# Patient Record
Sex: MALE | Race: WHITE | Employment: OTHER | URBAN - METROPOLITAN AREA
[De-identification: names, ages, dates, MRNs, and addresses within clinical notes are randomized per-mention and may not be internally consistent; named-entity substitution may affect disease eponyms.]

---

## 2018-03-12 ENCOUNTER — TELEPHONE (OUTPATIENT)
Dept: CARDIOLOGY CLINIC | Age: 68
End: 2018-03-12

## 2018-03-12 RX ORDER — METOLAZONE 5 MG/1
5 TABLET ORAL PRN
Qty: 10 TABLET | Refills: 0 | Status: SHIPPED | OUTPATIENT
Start: 2018-03-12 | End: 2018-03-28 | Stop reason: CLARIF

## 2018-03-13 LAB
ALBUMIN SERPL-MCNC: NORMAL G/DL
ALP BLD-CCNC: NORMAL U/L
ALT SERPL-CCNC: NORMAL U/L
ANION GAP SERPL CALCULATED.3IONS-SCNC: NORMAL MMOL/L
AST SERPL-CCNC: NORMAL U/L
AVERAGE GLUCOSE: NORMAL
BASOPHILS ABSOLUTE: NORMAL /ΜL
BASOPHILS RELATIVE PERCENT: NORMAL %
BILIRUB SERPL-MCNC: NORMAL MG/DL (ref 0.1–1.4)
BUN BLDV-MCNC: NORMAL MG/DL
CALCIUM SERPL-MCNC: NORMAL MG/DL
CHLORIDE BLD-SCNC: NORMAL MMOL/L
CO2: NORMAL MMOL/L
CREAT SERPL-MCNC: NORMAL MG/DL
EOSINOPHILS ABSOLUTE: NORMAL /ΜL
EOSINOPHILS RELATIVE PERCENT: NORMAL %
GFR CALCULATED: NORMAL
GLUCOSE BLD-MCNC: 180 MG/DL
HBA1C MFR BLD: 8 %
HCT VFR BLD CALC: 46.7 % (ref 41–53)
HEMOGLOBIN: 15.5 G/DL (ref 13.5–17.5)
LYMPHOCYTES ABSOLUTE: NORMAL /ΜL
LYMPHOCYTES RELATIVE PERCENT: NORMAL %
MCH RBC QN AUTO: NORMAL PG
MCHC RBC AUTO-ENTMCNC: NORMAL G/DL
MCV RBC AUTO: NORMAL FL
MONOCYTES ABSOLUTE: NORMAL /ΜL
MONOCYTES RELATIVE PERCENT: NORMAL %
NEUTROPHILS ABSOLUTE: NORMAL /ΜL
NEUTROPHILS RELATIVE PERCENT: NORMAL %
PLATELET # BLD: NORMAL K/ΜL
PMV BLD AUTO: NORMAL FL
POTASSIUM SERPL-SCNC: NORMAL MMOL/L
RBC # BLD: NORMAL 10^6/ΜL
SODIUM BLD-SCNC: NORMAL MMOL/L
TOTAL PROTEIN: NORMAL
WBC # BLD: NORMAL 10^3/ML

## 2018-03-14 ENCOUNTER — TELEPHONE (OUTPATIENT)
Dept: NON INVASIVE DIAGNOSTICS | Age: 68
End: 2018-03-14

## 2018-03-14 ENCOUNTER — HOSPITAL ENCOUNTER (OUTPATIENT)
Dept: OTHER | Age: 68
Setting detail: THERAPIES SERIES
Discharge: HOME | End: 2018-03-14
Payer: COMMERCIAL

## 2018-03-14 VITALS
HEART RATE: 82 BPM | SYSTOLIC BLOOD PRESSURE: 141 MMHG | WEIGHT: 315 LBS | BODY MASS INDEX: 48.54 KG/M2 | RESPIRATION RATE: 20 BRPM | DIASTOLIC BLOOD PRESSURE: 74 MMHG

## 2018-03-14 LAB
ANION GAP SERPL CALCULATED.3IONS-SCNC: 14 MMOL/L (ref 7–16)
BUN BLDV-MCNC: 32 MG/DL (ref 8–23)
CALCIUM SERPL-MCNC: 9.7 MG/DL (ref 8.6–10.2)
CHLORIDE BLD-SCNC: 96 MMOL/L (ref 98–107)
CO2: 25 MMOL/L (ref 22–29)
CREAT SERPL-MCNC: 1.3 MG/DL (ref 0.7–1.2)
GFR AFRICAN AMERICAN: >60
GFR NON-AFRICAN AMERICAN: >60 ML/MIN/1.73
GLUCOSE BLD-MCNC: 270 MG/DL (ref 74–109)
POTASSIUM SERPL-SCNC: 4.3 MMOL/L (ref 3.5–5)
PRO-BNP: 1340 PG/ML (ref 0–125)
SODIUM BLD-SCNC: 135 MMOL/L (ref 132–146)

## 2018-03-14 PROCEDURE — 99214 OFFICE O/P EST MOD 30 MIN: CPT

## 2018-03-14 PROCEDURE — 36415 COLL VENOUS BLD VENIPUNCTURE: CPT

## 2018-03-14 PROCEDURE — 83880 ASSAY OF NATRIURETIC PEPTIDE: CPT

## 2018-03-14 PROCEDURE — 2580000003 HC RX 258: Performed by: INTERNAL MEDICINE

## 2018-03-14 PROCEDURE — 96374 THER/PROPH/DIAG INJ IV PUSH: CPT

## 2018-03-14 PROCEDURE — 80048 BASIC METABOLIC PNL TOTAL CA: CPT

## 2018-03-14 PROCEDURE — 6360000002 HC RX W HCPCS: Performed by: INTERNAL MEDICINE

## 2018-03-14 RX ORDER — SODIUM CHLORIDE 0.9 % (FLUSH) 0.9 %
10 SYRINGE (ML) INJECTION PRN
Status: DISCONTINUED | OUTPATIENT
Start: 2018-03-14 | End: 2018-03-15 | Stop reason: HOSPADM

## 2018-03-14 RX ORDER — FUROSEMIDE 10 MG/ML
40 INJECTION INTRAMUSCULAR; INTRAVENOUS ONCE
Status: COMPLETED | OUTPATIENT
Start: 2018-03-14 | End: 2018-03-14

## 2018-03-14 RX ADMIN — Medication 10 ML: at 10:26

## 2018-03-14 RX ADMIN — FUROSEMIDE 40 MG: 10 INJECTION, SOLUTION INTRAMUSCULAR; INTRAVENOUS at 10:22

## 2018-03-22 ENCOUNTER — HOSPITAL ENCOUNTER (OUTPATIENT)
Dept: OTHER | Age: 68
Setting detail: THERAPIES SERIES
Discharge: HOME | End: 2018-03-22
Payer: COMMERCIAL

## 2018-03-22 VITALS
BODY MASS INDEX: 47.7 KG/M2 | WEIGHT: 315 LBS | DIASTOLIC BLOOD PRESSURE: 57 MMHG | SYSTOLIC BLOOD PRESSURE: 108 MMHG | RESPIRATION RATE: 18 BRPM | HEART RATE: 80 BPM

## 2018-03-22 LAB
ANION GAP SERPL CALCULATED.3IONS-SCNC: 16 MMOL/L (ref 7–16)
BUN BLDV-MCNC: 40 MG/DL (ref 8–23)
CALCIUM SERPL-MCNC: 9.1 MG/DL (ref 8.6–10.2)
CHLORIDE BLD-SCNC: 100 MMOL/L (ref 98–107)
CO2: 21 MMOL/L (ref 22–29)
CREAT SERPL-MCNC: 1.6 MG/DL (ref 0.7–1.2)
GFR AFRICAN AMERICAN: 53
GFR NON-AFRICAN AMERICAN: 53 ML/MIN/1.73
GLUCOSE BLD-MCNC: 193 MG/DL (ref 74–109)
POTASSIUM SERPL-SCNC: 4.1 MMOL/L (ref 3.5–5)
PRO-BNP: 625 PG/ML (ref 0–125)
SODIUM BLD-SCNC: 137 MMOL/L (ref 132–146)

## 2018-03-22 PROCEDURE — 99204 OFFICE O/P NEW MOD 45 MIN: CPT

## 2018-03-22 PROCEDURE — 83880 ASSAY OF NATRIURETIC PEPTIDE: CPT

## 2018-03-22 PROCEDURE — 36415 COLL VENOUS BLD VENIPUNCTURE: CPT

## 2018-03-22 PROCEDURE — 80048 BASIC METABOLIC PNL TOTAL CA: CPT

## 2018-03-26 ENCOUNTER — HOSPITAL ENCOUNTER (OUTPATIENT)
Dept: OTHER | Age: 68
Setting detail: THERAPIES SERIES
Discharge: HOME | End: 2018-03-26
Payer: COMMERCIAL

## 2018-03-26 VITALS
BODY MASS INDEX: 49.09 KG/M2 | RESPIRATION RATE: 18 BRPM | DIASTOLIC BLOOD PRESSURE: 73 MMHG | SYSTOLIC BLOOD PRESSURE: 131 MMHG | WEIGHT: 315 LBS | HEART RATE: 76 BPM

## 2018-03-26 LAB
ANION GAP SERPL CALCULATED.3IONS-SCNC: 15 MMOL/L (ref 7–16)
BUN BLDV-MCNC: 28 MG/DL (ref 8–23)
CALCIUM SERPL-MCNC: 9 MG/DL (ref 8.6–10.2)
CHLORIDE BLD-SCNC: 98 MMOL/L (ref 98–107)
CO2: 23 MMOL/L (ref 22–29)
CREAT SERPL-MCNC: 1.5 MG/DL (ref 0.7–1.2)
GFR AFRICAN AMERICAN: 57
GFR NON-AFRICAN AMERICAN: 57 ML/MIN/1.73
GLUCOSE BLD-MCNC: 343 MG/DL (ref 74–109)
POTASSIUM SERPL-SCNC: 4.6 MMOL/L (ref 3.5–5)
PRO-BNP: 1039 PG/ML (ref 0–125)
SODIUM BLD-SCNC: 136 MMOL/L (ref 132–146)

## 2018-03-26 PROCEDURE — 80048 BASIC METABOLIC PNL TOTAL CA: CPT

## 2018-03-26 PROCEDURE — 36415 COLL VENOUS BLD VENIPUNCTURE: CPT

## 2018-03-26 PROCEDURE — 99204 OFFICE O/P NEW MOD 45 MIN: CPT

## 2018-03-26 PROCEDURE — 83880 ASSAY OF NATRIURETIC PEPTIDE: CPT

## 2018-03-26 PROCEDURE — 96374 THER/PROPH/DIAG INJ IV PUSH: CPT

## 2018-03-26 PROCEDURE — 2580000003 HC RX 258: Performed by: INTERNAL MEDICINE

## 2018-03-26 PROCEDURE — 6360000002 HC RX W HCPCS: Performed by: INTERNAL MEDICINE

## 2018-03-26 RX ORDER — FUROSEMIDE 10 MG/ML
40 INJECTION INTRAMUSCULAR; INTRAVENOUS ONCE
Status: COMPLETED | OUTPATIENT
Start: 2018-03-26 | End: 2018-03-26

## 2018-03-26 RX ORDER — SODIUM CHLORIDE 0.9 % (FLUSH) 0.9 %
10 SYRINGE (ML) INJECTION PRN
Status: DISCONTINUED | OUTPATIENT
Start: 2018-03-26 | End: 2018-03-27 | Stop reason: HOSPADM

## 2018-03-26 RX ADMIN — FUROSEMIDE 40 MG: 10 INJECTION, SOLUTION INTRAMUSCULAR; INTRAVENOUS at 08:45

## 2018-03-26 RX ADMIN — Medication 10 ML: at 08:45

## 2018-03-28 ENCOUNTER — OFFICE VISIT (OUTPATIENT)
Dept: CARDIOLOGY CLINIC | Age: 68
End: 2018-03-28
Payer: COMMERCIAL

## 2018-03-28 ENCOUNTER — OFFICE VISIT (OUTPATIENT)
Dept: NON INVASIVE DIAGNOSTICS | Age: 68
End: 2018-03-28
Payer: COMMERCIAL

## 2018-03-28 VITALS
BODY MASS INDEX: 44.1 KG/M2 | WEIGHT: 315 LBS | HEART RATE: 68 BPM | DIASTOLIC BLOOD PRESSURE: 70 MMHG | HEIGHT: 71 IN | SYSTOLIC BLOOD PRESSURE: 102 MMHG | RESPIRATION RATE: 18 BRPM

## 2018-03-28 VITALS
SYSTOLIC BLOOD PRESSURE: 136 MMHG | BODY MASS INDEX: 44.1 KG/M2 | HEART RATE: 67 BPM | HEIGHT: 71 IN | DIASTOLIC BLOOD PRESSURE: 84 MMHG | RESPIRATION RATE: 16 BRPM | WEIGHT: 315 LBS

## 2018-03-28 DIAGNOSIS — I42.8 NICM (NONISCHEMIC CARDIOMYOPATHY) (HCC): ICD-10-CM

## 2018-03-28 DIAGNOSIS — I48.0 PAF (PAROXYSMAL ATRIAL FIBRILLATION) (HCC): ICD-10-CM

## 2018-03-28 DIAGNOSIS — I10 HTN (HYPERTENSION), BENIGN: Chronic | ICD-10-CM

## 2018-03-28 DIAGNOSIS — I47.1 AVNRT (AV NODAL RE-ENTRY TACHYCARDIA) (HCC): ICD-10-CM

## 2018-03-28 DIAGNOSIS — I50.22 CHRONIC SYSTOLIC CHF (CONGESTIVE HEART FAILURE) (HCC): Primary | ICD-10-CM

## 2018-03-28 DIAGNOSIS — Z95.810 BIVENTRICULAR IMPLANTABLE CARDIOVERTER-DEFIBRILLATOR IN SITU: ICD-10-CM

## 2018-03-28 DIAGNOSIS — I42.8 NICM (NONISCHEMIC CARDIOMYOPATHY) (HCC): Primary | ICD-10-CM

## 2018-03-28 PROCEDURE — 99214 OFFICE O/P EST MOD 30 MIN: CPT | Performed by: INTERNAL MEDICINE

## 2018-03-28 PROCEDURE — 1123F ACP DISCUSS/DSCN MKR DOCD: CPT | Performed by: NURSE PRACTITIONER

## 2018-03-28 PROCEDURE — G8484 FLU IMMUNIZE NO ADMIN: HCPCS | Performed by: INTERNAL MEDICINE

## 2018-03-28 PROCEDURE — G8417 CALC BMI ABV UP PARAM F/U: HCPCS | Performed by: INTERNAL MEDICINE

## 2018-03-28 PROCEDURE — G8427 DOCREV CUR MEDS BY ELIG CLIN: HCPCS | Performed by: NURSE PRACTITIONER

## 2018-03-28 PROCEDURE — G8484 FLU IMMUNIZE NO ADMIN: HCPCS | Performed by: NURSE PRACTITIONER

## 2018-03-28 PROCEDURE — G8417 CALC BMI ABV UP PARAM F/U: HCPCS | Performed by: NURSE PRACTITIONER

## 2018-03-28 PROCEDURE — 3017F COLORECTAL CA SCREEN DOC REV: CPT | Performed by: INTERNAL MEDICINE

## 2018-03-28 PROCEDURE — 99213 OFFICE O/P EST LOW 20 MIN: CPT | Performed by: NURSE PRACTITIONER

## 2018-03-28 PROCEDURE — G8427 DOCREV CUR MEDS BY ELIG CLIN: HCPCS | Performed by: INTERNAL MEDICINE

## 2018-03-28 PROCEDURE — 1036F TOBACCO NON-USER: CPT | Performed by: NURSE PRACTITIONER

## 2018-03-28 PROCEDURE — 4040F PNEUMOC VAC/ADMIN/RCVD: CPT | Performed by: NURSE PRACTITIONER

## 2018-03-28 PROCEDURE — 3017F COLORECTAL CA SCREEN DOC REV: CPT | Performed by: NURSE PRACTITIONER

## 2018-03-28 PROCEDURE — 1036F TOBACCO NON-USER: CPT | Performed by: INTERNAL MEDICINE

## 2018-03-28 PROCEDURE — 4040F PNEUMOC VAC/ADMIN/RCVD: CPT | Performed by: INTERNAL MEDICINE

## 2018-03-28 PROCEDURE — 1123F ACP DISCUSS/DSCN MKR DOCD: CPT | Performed by: INTERNAL MEDICINE

## 2018-03-28 PROCEDURE — 93284 PRGRMG EVAL IMPLANTABLE DFB: CPT | Performed by: INTERNAL MEDICINE

## 2018-03-28 PROCEDURE — 93000 ELECTROCARDIOGRAM COMPLETE: CPT | Performed by: INTERNAL MEDICINE

## 2018-03-28 NOTE — PATIENT INSTRUCTIONS
Please call the office if there are any questions about your most recent appointment or future testing/procedures to be performed.

## 2018-03-28 NOTE — PROGRESS NOTES
Cardiac Electrophysiology Office Progress Note    Malik Gordon  1950  Date of Service: 3/28/18  PCP: Bruno Miller MD   Cardiology: Macon Bence, MD  Electrophysiology: Alexandro Gomez DO    Patient Active Problem List    Diagnosis Date Noted    Renal insufficiency 08/17/2014     Priority: High    NICM (nonischemic cardiomyopathy) ([facility] 12/14/2013     Priority: High    AVNRT (AV yue re-entry tachycardia) ([facility] 11/17/2011     Priority: High     Overview Note:     1). S/P SVT ablation 10/16/14: 1. Atrial Function: Abnormal with inducible atrial fibrillation/flutter. 2. AV Yue Function: Abnormal with inducible SVT consistent with AVNRT. Successful slow pathway modification without recurrent SVT.  DM (diabetes mellitus), type 2, uncontrolled ([facility] 11/17/2011     Priority: Medium    HTN (hypertension), benign 11/17/2011     Priority: Medium    Gout 12/19/2016     Priority: Low    Hyperlipidemia 11/17/2011     Priority: Low    SNEHA (obstructive sleep apnea) 11/17/2011     Priority: Low    Depression 11/17/2011     Priority: Low    CHF (NYHA class III, ACC/AHA stage C) ([facility]     Chronic systolic CHF (congestive heart failure) ([facility] 12/08/2015    PAF (paroxysmal atrial fibrillation) ([facility] 12/08/2015    Biventricular implantable cardioverter-defibrillator in situ 09/05/2014     Overview Note:     A). ICD generator is a [employer], model #: F0584296, serial #: [id number]. DOI: 8/20/14   B). The right atrial lead is a [employer] model #: [id number], serial #: [id number]. C). The RV ICD lead is a [employer] model #: [id number], serial #: [id number]. D). The LV lead is a [employer] model #: [id number], serial #: [id number].            Current Outpatient Prescriptions   Medication Sig Dispense Refill    sertraline (ZOLOFT) 100 MG tablet Take 100 mg by mouth daily      ELIQUIS 5 MG TABS tablet TAKE 1 TABLET TWICE A  tablet 3    VILAZODONE HCL 10 MG Tablet TAKE ONE TABLET BY MOUTH EVERY DAY  2    metoprolol succinate (TOPROL XL) 100 MG extended release tablet TAKE 1 TABLET TWICE A  tablet 3    dofetilide (TIKOSYN) 250 MCG capsule TAKE 1 CAPSULE TWICE A  capsule 3    torsemide (DEMADEX) 20 MG tablet Take 20 mg by mouth daily      furosemide (LASIX) 40 MG tablet Take 1 tablet by mouth 2 times daily 30 tablet 0    famotidine (PEPCID) 20 MG tablet Take 1 tablet by mouth daily 60 tablet 3    metolazone (ZAROXOLYN) 5 MG tablet TAKE ONE TABLET BY MOUTH DAILY (TAKE 1 TABLET BY MOUTH ONE-HALF HOUR BEFORE MORNING LASIX DOSE ON 09/21 AND 09/22 ONLY. ) 10 tablet 0    spironolactone (ALDACTONE) 25 MG tablet Take 1 tablet by mouth daily 90 tablet 3    allopurinol (ZYLOPRIM) 100 MG tablet Take 200 mg by mouth daily       colchicine (COLCRYS) 0.6 MG tablet Take 0.6 mg by mouth three times a week Monday Wednesday Friday.  gabapentin (NEURONTIN) 100 MG capsule Take 1 capsule by mouth 3 times daily 90 capsule 3    busPIRone (BUSPAR) 15 MG tablet Take 15 mg by mouth daily       HUMULIN R 500 UNIT/ML injection See Admin Instructions @ 0700 150 units  @ 1500 120 units  @ 2300 30 units      isosorbide mononitrate (IMDUR) 30 MG CR tablet Take 1 tablet by mouth 2 times daily. 30 tablet 3    hydrALAZINE (APRESOLINE) 100 MG tablet Take 1 tablet by mouth every 8 hours. 90 tablet 3    potassium chloride SA (K-DUR;KLOR-CON M) 20 MEQ tablet Take 1 tablet by mouth daily. 30 tablet 11    magnesium oxide (MAG-OX) 400 (240 MG) MG tablet Take 1 tablet by mouth daily. 30 tablet 6    digoxin (LANOXIN) 125 MCG tablet Take 1 tablet by mouth daily. 30 tablet 3    atorvastatin (LIPITOR) 20 MG tablet Take 20 mg by mouth nightly        No current facility-administered medications for this visit.          Allergies   Allergen Reactions    Food Hives     Strawberries      Soap & Cleansers Rash     TIDE detergent       SUBJECTIVE: Tess Lei presents to the office today for the management of: Severe NICMP, AVNRT s/p ablation, PAF, LBBB, [id number], serial #: [id number]. DOI: 8/20/14   B). Normal function    4. IDDM    5. HTN    6. SNEHA    7. Combined systolic/diastolic HF  A). NYHA Class lll, Stage C  B). Follows with Dr Zoila Paris    8. PAF  A). [id number] score: 4  B). Eliquis  C). Tikosyn 250 mcg BID  D). CrCl 66.2 mL/min (#, cr 1.2 - labs 2/15/17)      Plan:    1. Mr Horne's Bi-V ICD function is normal and the device programmed accordingly based on the above interrogation. [facility] has [facility] and feels his HEATHER and LINDSAY are improving after receiving IV Lasix. He also started the metolazone this week which was prescribed by Dr Zoila Paris 3/12/18. From a rhythm perspective he is doing well on Tikosyn with an overall AF burden <1%. His QTc (manually calculated) is 490 ms. He reports no bleeding issues on Eliquis. 2. He was reminded to obtain Q3-6 month BMP for CrCl and Mg level while on Tikosyn. We will obtain his most recent labs from Dr Elida Abarca office. 3. He will send a remote transmission in 3 months with in-office follow-up in 6 months. 4. Mr Bradley Peralta was asked to call the office with concerns prior to his scheduled follow-up. Thank you for allowing me to participate in his care. I have spent a total of 20 minutes with the patient reviewing the above stated recommendations. And a total of >50% of that time involved face-to-face time providing counseling and or coordination of care with the other providers    John Thomas, MSN, APRN-BC, AGACNP, [address] [facility]    CC: Dr Iza Prajapati.  Tomasic          Dr. Michelle Rincon

## 2018-03-29 ENCOUNTER — HOSPITAL ENCOUNTER (OUTPATIENT)
Dept: OTHER | Age: 68
Setting detail: THERAPIES SERIES
Discharge: HOME | End: 2018-03-29
Payer: COMMERCIAL

## 2018-03-29 VITALS
WEIGHT: 315 LBS | SYSTOLIC BLOOD PRESSURE: 122 MMHG | BODY MASS INDEX: 47.98 KG/M2 | DIASTOLIC BLOOD PRESSURE: 71 MMHG | HEART RATE: 71 BPM | RESPIRATION RATE: 18 BRPM

## 2018-03-29 LAB
ANION GAP SERPL CALCULATED.3IONS-SCNC: 16 MMOL/L (ref 7–16)
BUN BLDV-MCNC: 36 MG/DL (ref 8–23)
CALCIUM SERPL-MCNC: 9.4 MG/DL (ref 8.6–10.2)
CHLORIDE BLD-SCNC: 95 MMOL/L (ref 98–107)
CO2: 24 MMOL/L (ref 22–29)
CREAT SERPL-MCNC: 1.4 MG/DL (ref 0.7–1.2)
GFR AFRICAN AMERICAN: >60
GFR NON-AFRICAN AMERICAN: >60 ML/MIN/1.73
GLUCOSE BLD-MCNC: 240 MG/DL (ref 74–109)
POTASSIUM SERPL-SCNC: 4 MMOL/L (ref 3.5–5)
PRO-BNP: 615 PG/ML (ref 0–125)
SODIUM BLD-SCNC: 135 MMOL/L (ref 132–146)

## 2018-03-29 PROCEDURE — 83880 ASSAY OF NATRIURETIC PEPTIDE: CPT

## 2018-03-29 PROCEDURE — 36415 COLL VENOUS BLD VENIPUNCTURE: CPT

## 2018-03-29 PROCEDURE — 80048 BASIC METABOLIC PNL TOTAL CA: CPT

## 2018-03-29 PROCEDURE — 2580000003 HC RX 258: Performed by: INTERNAL MEDICINE

## 2018-03-29 PROCEDURE — 6360000002 HC RX W HCPCS: Performed by: INTERNAL MEDICINE

## 2018-03-29 PROCEDURE — 96374 THER/PROPH/DIAG INJ IV PUSH: CPT

## 2018-03-29 PROCEDURE — 99204 OFFICE O/P NEW MOD 45 MIN: CPT

## 2018-03-29 RX ORDER — SODIUM CHLORIDE 0.9 % (FLUSH) 0.9 %
10 SYRINGE (ML) INJECTION PRN
Status: DISCONTINUED | OUTPATIENT
Start: 2018-03-29 | End: 2018-03-30 | Stop reason: HOSPADM

## 2018-03-29 RX ORDER — FUROSEMIDE 10 MG/ML
40 INJECTION INTRAMUSCULAR; INTRAVENOUS ONCE
Status: COMPLETED | OUTPATIENT
Start: 2018-03-29 | End: 2018-03-29

## 2018-03-29 RX ADMIN — FUROSEMIDE 40 MG: 10 INJECTION, SOLUTION INTRAMUSCULAR; INTRAVENOUS at 08:38

## 2018-03-29 RX ADMIN — Medication 10 ML: at 08:38

## 2018-04-04 ENCOUNTER — TELEPHONE (OUTPATIENT)
Dept: NON INVASIVE DIAGNOSTICS | Age: 68
End: 2018-04-04

## 2018-04-05 ENCOUNTER — HOSPITAL ENCOUNTER (OUTPATIENT)
Dept: OTHER | Age: 68
Setting detail: THERAPIES SERIES
Discharge: HOME | End: 2018-04-05
Payer: COMMERCIAL

## 2018-04-05 VITALS
RESPIRATION RATE: 18 BRPM | DIASTOLIC BLOOD PRESSURE: 74 MMHG | SYSTOLIC BLOOD PRESSURE: 135 MMHG | HEART RATE: 72 BPM | WEIGHT: 315 LBS | BODY MASS INDEX: 47.56 KG/M2

## 2018-04-05 LAB
ANION GAP SERPL CALCULATED.3IONS-SCNC: 17 MMOL/L (ref 7–16)
BUN BLDV-MCNC: 39 MG/DL (ref 8–23)
CALCIUM SERPL-MCNC: 9.1 MG/DL (ref 8.6–10.2)
CHLORIDE BLD-SCNC: 92 MMOL/L (ref 98–107)
CO2: 23 MMOL/L (ref 22–29)
CREAT SERPL-MCNC: 1.5 MG/DL (ref 0.7–1.2)
GFR AFRICAN AMERICAN: 57
GFR NON-AFRICAN AMERICAN: 57 ML/MIN/1.73
GLUCOSE BLD-MCNC: 433 MG/DL (ref 74–109)
POTASSIUM SERPL-SCNC: 4.2 MMOL/L (ref 3.5–5)
PRO-BNP: 537 PG/ML (ref 0–125)
SODIUM BLD-SCNC: 132 MMOL/L (ref 132–146)

## 2018-04-05 PROCEDURE — 36415 COLL VENOUS BLD VENIPUNCTURE: CPT

## 2018-04-05 PROCEDURE — 83880 ASSAY OF NATRIURETIC PEPTIDE: CPT

## 2018-04-05 PROCEDURE — 80048 BASIC METABOLIC PNL TOTAL CA: CPT

## 2018-04-05 PROCEDURE — 99204 OFFICE O/P NEW MOD 45 MIN: CPT

## 2018-04-19 ENCOUNTER — HOSPITAL ENCOUNTER (OUTPATIENT)
Dept: OTHER | Age: 68
Setting detail: THERAPIES SERIES
Discharge: HOME | End: 2018-04-19
Payer: COMMERCIAL

## 2018-04-19 VITALS
BODY MASS INDEX: 48.54 KG/M2 | WEIGHT: 315 LBS | DIASTOLIC BLOOD PRESSURE: 82 MMHG | HEART RATE: 72 BPM | RESPIRATION RATE: 18 BRPM | SYSTOLIC BLOOD PRESSURE: 127 MMHG

## 2018-04-19 LAB
ANION GAP SERPL CALCULATED.3IONS-SCNC: 15 MMOL/L (ref 7–16)
BUN BLDV-MCNC: 25 MG/DL (ref 8–23)
CALCIUM SERPL-MCNC: 9.3 MG/DL (ref 8.6–10.2)
CHLORIDE BLD-SCNC: 98 MMOL/L (ref 98–107)
CO2: 25 MMOL/L (ref 22–29)
CREAT SERPL-MCNC: 1.3 MG/DL (ref 0.7–1.2)
GFR AFRICAN AMERICAN: >60
GFR NON-AFRICAN AMERICAN: >60 ML/MIN/1.73
GLUCOSE BLD-MCNC: 245 MG/DL (ref 74–109)
POTASSIUM SERPL-SCNC: 4.3 MMOL/L (ref 3.5–5)
PRO-BNP: 1008 PG/ML (ref 0–125)
SODIUM BLD-SCNC: 138 MMOL/L (ref 132–146)

## 2018-04-19 PROCEDURE — 36415 COLL VENOUS BLD VENIPUNCTURE: CPT

## 2018-04-19 PROCEDURE — 96374 THER/PROPH/DIAG INJ IV PUSH: CPT

## 2018-04-19 PROCEDURE — 80048 BASIC METABOLIC PNL TOTAL CA: CPT

## 2018-04-19 PROCEDURE — 2580000003 HC RX 258: Performed by: INTERNAL MEDICINE

## 2018-04-19 PROCEDURE — 83880 ASSAY OF NATRIURETIC PEPTIDE: CPT

## 2018-04-19 PROCEDURE — 99214 OFFICE O/P EST MOD 30 MIN: CPT

## 2018-04-19 PROCEDURE — 6360000002 HC RX W HCPCS: Performed by: INTERNAL MEDICINE

## 2018-04-19 RX ORDER — FUROSEMIDE 10 MG/ML
40 INJECTION INTRAMUSCULAR; INTRAVENOUS ONCE
Status: COMPLETED | OUTPATIENT
Start: 2018-04-19 | End: 2018-04-19

## 2018-04-19 RX ORDER — SODIUM CHLORIDE 0.9 % (FLUSH) 0.9 %
10 SYRINGE (ML) INJECTION PRN
Status: DISCONTINUED | OUTPATIENT
Start: 2018-04-19 | End: 2018-04-20 | Stop reason: HOSPADM

## 2018-04-19 RX ADMIN — Medication 10 ML: at 08:37

## 2018-04-19 RX ADMIN — FUROSEMIDE 40 MG: 10 INJECTION, SOLUTION INTRAMUSCULAR; INTRAVENOUS at 08:36

## 2018-04-26 ENCOUNTER — HOSPITAL ENCOUNTER (OUTPATIENT)
Dept: OTHER | Age: 68
Setting detail: THERAPIES SERIES
Discharge: HOME | End: 2018-04-26
Payer: COMMERCIAL

## 2018-04-26 VITALS
DIASTOLIC BLOOD PRESSURE: 68 MMHG | SYSTOLIC BLOOD PRESSURE: 154 MMHG | BODY MASS INDEX: 47.7 KG/M2 | HEART RATE: 70 BPM | RESPIRATION RATE: 18 BRPM | WEIGHT: 315 LBS

## 2018-04-26 PROCEDURE — 99214 OFFICE O/P EST MOD 30 MIN: CPT

## 2018-05-03 ENCOUNTER — OFFICE VISIT (OUTPATIENT)
Dept: FAMILY MEDICINE CLINIC | Age: 68
End: 2018-05-03
Payer: COMMERCIAL

## 2018-05-03 VITALS
TEMPERATURE: 97.7 F | RESPIRATION RATE: 16 BRPM | HEIGHT: 71 IN | SYSTOLIC BLOOD PRESSURE: 127 MMHG | HEART RATE: 70 BPM | BODY MASS INDEX: 44.1 KG/M2 | DIASTOLIC BLOOD PRESSURE: 79 MMHG | OXYGEN SATURATION: 97 % | WEIGHT: 315 LBS

## 2018-05-03 DIAGNOSIS — I10 HTN (HYPERTENSION), BENIGN: Primary | Chronic | ICD-10-CM

## 2018-05-03 DIAGNOSIS — I50.9 CHF (NYHA CLASS III, ACC/AHA STAGE C) (HCC): ICD-10-CM

## 2018-05-03 DIAGNOSIS — E78.2 MIXED HYPERLIPIDEMIA: Chronic | ICD-10-CM

## 2018-05-03 PROCEDURE — 2022F DILAT RTA XM EVC RTNOPTHY: CPT | Performed by: FAMILY MEDICINE

## 2018-05-03 PROCEDURE — 99214 OFFICE O/P EST MOD 30 MIN: CPT | Performed by: FAMILY MEDICINE

## 2018-05-03 PROCEDURE — 1123F ACP DISCUSS/DSCN MKR DOCD: CPT | Performed by: FAMILY MEDICINE

## 2018-05-03 PROCEDURE — 3017F COLORECTAL CA SCREEN DOC REV: CPT | Performed by: FAMILY MEDICINE

## 2018-05-03 PROCEDURE — 1036F TOBACCO NON-USER: CPT | Performed by: FAMILY MEDICINE

## 2018-05-03 PROCEDURE — 4040F PNEUMOC VAC/ADMIN/RCVD: CPT | Performed by: FAMILY MEDICINE

## 2018-05-03 PROCEDURE — G8427 DOCREV CUR MEDS BY ELIG CLIN: HCPCS | Performed by: FAMILY MEDICINE

## 2018-05-03 PROCEDURE — 3045F PR MOST RECENT HEMOGLOBIN A1C LEVEL 7.0-9.0%: CPT | Performed by: FAMILY MEDICINE

## 2018-05-03 PROCEDURE — G8417 CALC BMI ABV UP PARAM F/U: HCPCS | Performed by: FAMILY MEDICINE

## 2018-05-18 ENCOUNTER — HOSPITAL ENCOUNTER (OUTPATIENT)
Dept: OTHER | Age: 68
Setting detail: THERAPIES SERIES
Discharge: HOME | End: 2018-05-18
Payer: COMMERCIAL

## 2018-05-18 VITALS
RESPIRATION RATE: 18 BRPM | BODY MASS INDEX: 49.79 KG/M2 | SYSTOLIC BLOOD PRESSURE: 128 MMHG | HEART RATE: 70 BPM | DIASTOLIC BLOOD PRESSURE: 79 MMHG | WEIGHT: 315 LBS

## 2018-05-18 LAB
ANION GAP SERPL CALCULATED.3IONS-SCNC: 15 MMOL/L (ref 7–16)
BUN BLDV-MCNC: 25 MG/DL (ref 8–23)
CALCIUM SERPL-MCNC: 9 MG/DL (ref 8.6–10.2)
CHLORIDE BLD-SCNC: 104 MMOL/L (ref 98–107)
CO2: 22 MMOL/L (ref 22–29)
CREAT SERPL-MCNC: 1.4 MG/DL (ref 0.7–1.2)
GFR AFRICAN AMERICAN: >60
GFR NON-AFRICAN AMERICAN: >60 ML/MIN/1.73
GLUCOSE BLD-MCNC: 98 MG/DL (ref 74–109)
POTASSIUM SERPL-SCNC: 4.5 MMOL/L (ref 3.5–5)
PRO-BNP: 2381 PG/ML (ref 0–125)
SODIUM BLD-SCNC: 141 MMOL/L (ref 132–146)

## 2018-05-18 PROCEDURE — 6360000002 HC RX W HCPCS: Performed by: INTERNAL MEDICINE

## 2018-05-18 PROCEDURE — 99214 OFFICE O/P EST MOD 30 MIN: CPT

## 2018-05-18 PROCEDURE — 80048 BASIC METABOLIC PNL TOTAL CA: CPT

## 2018-05-18 PROCEDURE — 36415 COLL VENOUS BLD VENIPUNCTURE: CPT

## 2018-05-18 PROCEDURE — 96374 THER/PROPH/DIAG INJ IV PUSH: CPT

## 2018-05-18 PROCEDURE — 83880 ASSAY OF NATRIURETIC PEPTIDE: CPT

## 2018-05-18 PROCEDURE — 2580000003 HC RX 258: Performed by: INTERNAL MEDICINE

## 2018-05-18 RX ORDER — FUROSEMIDE 10 MG/ML
40 INJECTION INTRAMUSCULAR; INTRAVENOUS ONCE
Status: COMPLETED | OUTPATIENT
Start: 2018-05-18 | End: 2018-05-18

## 2018-05-18 RX ORDER — SODIUM CHLORIDE 0.9 % (FLUSH) 0.9 %
10 SYRINGE (ML) INJECTION PRN
Status: DISCONTINUED | OUTPATIENT
Start: 2018-05-18 | End: 2018-05-19 | Stop reason: HOSPADM

## 2018-05-18 RX ADMIN — FUROSEMIDE 40 MG: 10 INJECTION, SOLUTION INTRAMUSCULAR; INTRAVENOUS at 13:03

## 2018-05-18 RX ADMIN — Medication 10 ML: at 13:03

## 2018-05-21 ENCOUNTER — HOSPITAL ENCOUNTER (OUTPATIENT)
Dept: OTHER | Age: 68
Setting detail: THERAPIES SERIES
Discharge: HOME | End: 2018-05-21
Payer: COMMERCIAL

## 2018-05-21 VITALS
BODY MASS INDEX: 49.65 KG/M2 | DIASTOLIC BLOOD PRESSURE: 74 MMHG | WEIGHT: 315 LBS | RESPIRATION RATE: 18 BRPM | HEART RATE: 70 BPM | SYSTOLIC BLOOD PRESSURE: 131 MMHG

## 2018-05-21 LAB
ANION GAP SERPL CALCULATED.3IONS-SCNC: 13 MMOL/L (ref 7–16)
BUN BLDV-MCNC: 27 MG/DL (ref 8–23)
CALCIUM SERPL-MCNC: 9 MG/DL (ref 8.6–10.2)
CHLORIDE BLD-SCNC: 104 MMOL/L (ref 98–107)
CO2: 25 MMOL/L (ref 22–29)
CREAT SERPL-MCNC: 1.3 MG/DL (ref 0.7–1.2)
GFR AFRICAN AMERICAN: >60
GFR NON-AFRICAN AMERICAN: >60 ML/MIN/1.73
GLUCOSE BLD-MCNC: 98 MG/DL (ref 74–109)
POTASSIUM SERPL-SCNC: 4.2 MMOL/L (ref 3.5–5)
PRO-BNP: 2037 PG/ML (ref 0–125)
SODIUM BLD-SCNC: 142 MMOL/L (ref 132–146)

## 2018-05-21 PROCEDURE — 36415 COLL VENOUS BLD VENIPUNCTURE: CPT

## 2018-05-21 PROCEDURE — 2580000003 HC RX 258: Performed by: INTERNAL MEDICINE

## 2018-05-21 PROCEDURE — 99214 OFFICE O/P EST MOD 30 MIN: CPT

## 2018-05-21 PROCEDURE — 83880 ASSAY OF NATRIURETIC PEPTIDE: CPT

## 2018-05-21 PROCEDURE — 96374 THER/PROPH/DIAG INJ IV PUSH: CPT

## 2018-05-21 PROCEDURE — 6360000002 HC RX W HCPCS: Performed by: INTERNAL MEDICINE

## 2018-05-21 PROCEDURE — 80048 BASIC METABOLIC PNL TOTAL CA: CPT

## 2018-05-21 RX ORDER — SODIUM CHLORIDE 0.9 % (FLUSH) 0.9 %
10 SYRINGE (ML) INJECTION PRN
Status: DISCONTINUED | OUTPATIENT
Start: 2018-05-21 | End: 2018-05-22 | Stop reason: HOSPADM

## 2018-05-21 RX ORDER — FUROSEMIDE 10 MG/ML
40 INJECTION INTRAMUSCULAR; INTRAVENOUS ONCE
Status: COMPLETED | OUTPATIENT
Start: 2018-05-21 | End: 2018-05-21

## 2018-05-21 RX ADMIN — Medication 10 ML: at 09:58

## 2018-05-21 RX ADMIN — FUROSEMIDE 40 MG: 10 INJECTION, SOLUTION INTRAMUSCULAR; INTRAVENOUS at 09:58

## 2018-05-31 ENCOUNTER — HOSPITAL ENCOUNTER (OUTPATIENT)
Dept: OTHER | Age: 68
Setting detail: THERAPIES SERIES
Discharge: HOME | End: 2018-05-31
Payer: COMMERCIAL

## 2018-05-31 VITALS
DIASTOLIC BLOOD PRESSURE: 79 MMHG | WEIGHT: 315 LBS | RESPIRATION RATE: 18 BRPM | BODY MASS INDEX: 48.26 KG/M2 | HEART RATE: 70 BPM | SYSTOLIC BLOOD PRESSURE: 134 MMHG

## 2018-06-26 ENCOUNTER — HOSPITAL ENCOUNTER (OUTPATIENT)
Dept: OTHER | Age: 68
Setting detail: THERAPIES SERIES
Discharge: HOME | End: 2018-06-26
Payer: COMMERCIAL

## 2018-06-26 VITALS
BODY MASS INDEX: 48.26 KG/M2 | DIASTOLIC BLOOD PRESSURE: 82 MMHG | HEART RATE: 89 BPM | WEIGHT: 315 LBS | RESPIRATION RATE: 18 BRPM | SYSTOLIC BLOOD PRESSURE: 118 MMHG

## 2018-06-26 LAB
ANION GAP SERPL CALCULATED.3IONS-SCNC: 15 MMOL/L (ref 7–16)
BUN BLDV-MCNC: 30 MG/DL (ref 8–23)
CALCIUM SERPL-MCNC: 8.8 MG/DL (ref 8.6–10.2)
CHLORIDE BLD-SCNC: 105 MMOL/L (ref 98–107)
CO2: 20 MMOL/L (ref 22–29)
CREAT SERPL-MCNC: 1.2 MG/DL (ref 0.7–1.2)
GFR AFRICAN AMERICAN: >60
GFR NON-AFRICAN AMERICAN: >60 ML/MIN/1.73
GLUCOSE BLD-MCNC: 225 MG/DL (ref 74–109)
POTASSIUM SERPL-SCNC: 4.7 MMOL/L (ref 3.5–5)
PRO-BNP: 1678 PG/ML (ref 0–125)
SODIUM BLD-SCNC: 140 MMOL/L (ref 132–146)

## 2018-06-26 PROCEDURE — 99214 OFFICE O/P EST MOD 30 MIN: CPT

## 2018-06-26 PROCEDURE — 83880 ASSAY OF NATRIURETIC PEPTIDE: CPT

## 2018-06-26 PROCEDURE — 36415 COLL VENOUS BLD VENIPUNCTURE: CPT

## 2018-06-26 PROCEDURE — 80048 BASIC METABOLIC PNL TOTAL CA: CPT

## 2018-07-26 ENCOUNTER — HOSPITAL ENCOUNTER (OUTPATIENT)
Dept: OTHER | Age: 68
Setting detail: THERAPIES SERIES
Discharge: HOME | End: 2018-07-26
Payer: COMMERCIAL

## 2018-07-26 VITALS — BODY MASS INDEX: 47.55 KG/M2 | WEIGHT: 315 LBS

## 2018-07-26 LAB
ANION GAP SERPL CALCULATED.3IONS-SCNC: 14 MMOL/L (ref 7–16)
BUN BLDV-MCNC: 25 MG/DL (ref 8–23)
CALCIUM SERPL-MCNC: 8.8 MG/DL (ref 8.6–10.2)
CHLORIDE BLD-SCNC: 105 MMOL/L (ref 98–107)
CO2: 21 MMOL/L (ref 22–29)
CREAT SERPL-MCNC: 1.2 MG/DL (ref 0.7–1.2)
GFR AFRICAN AMERICAN: >60
GFR NON-AFRICAN AMERICAN: >60 ML/MIN/1.73
GLUCOSE BLD-MCNC: 147 MG/DL (ref 74–109)
POTASSIUM SERPL-SCNC: 4.3 MMOL/L (ref 3.5–5)
PRO-BNP: 1047 PG/ML (ref 0–125)
SODIUM BLD-SCNC: 140 MMOL/L (ref 132–146)

## 2018-07-26 PROCEDURE — 83880 ASSAY OF NATRIURETIC PEPTIDE: CPT

## 2018-07-26 PROCEDURE — 80048 BASIC METABOLIC PNL TOTAL CA: CPT

## 2018-07-26 PROCEDURE — 36415 COLL VENOUS BLD VENIPUNCTURE: CPT

## 2018-07-26 PROCEDURE — 99214 OFFICE O/P EST MOD 30 MIN: CPT

## 2018-08-09 ENCOUNTER — OFFICE VISIT (OUTPATIENT)
Dept: FAMILY MEDICINE CLINIC | Age: 68
End: 2018-08-09
Payer: COMMERCIAL

## 2018-08-09 VITALS
HEIGHT: 71 IN | WEIGHT: 315 LBS | RESPIRATION RATE: 16 BRPM | BODY MASS INDEX: 44.1 KG/M2 | HEART RATE: 74 BPM | SYSTOLIC BLOOD PRESSURE: 115 MMHG | DIASTOLIC BLOOD PRESSURE: 74 MMHG | OXYGEN SATURATION: 97 %

## 2018-08-09 DIAGNOSIS — I50.22 CHRONIC SYSTOLIC CHF (CONGESTIVE HEART FAILURE) (HCC): ICD-10-CM

## 2018-08-09 DIAGNOSIS — G47.33 OSA (OBSTRUCTIVE SLEEP APNEA): Chronic | ICD-10-CM

## 2018-08-09 DIAGNOSIS — Z12.11 SCREEN FOR COLON CANCER: ICD-10-CM

## 2018-08-09 DIAGNOSIS — E78.2 MIXED HYPERLIPIDEMIA: Chronic | ICD-10-CM

## 2018-08-09 DIAGNOSIS — I10 HTN (HYPERTENSION), BENIGN: Primary | Chronic | ICD-10-CM

## 2018-08-09 DIAGNOSIS — Z12.11 ENCOUNTER FOR SCREENING FECAL OCCULT BLOOD TESTING: ICD-10-CM

## 2018-08-09 DIAGNOSIS — M1A.49X0 OTHER SECONDARY CHRONIC GOUT OF MULTIPLE SITES WITHOUT TOPHUS: Chronic | ICD-10-CM

## 2018-08-09 PROCEDURE — 3017F COLORECTAL CA SCREEN DOC REV: CPT | Performed by: FAMILY MEDICINE

## 2018-08-09 PROCEDURE — G8427 DOCREV CUR MEDS BY ELIG CLIN: HCPCS | Performed by: FAMILY MEDICINE

## 2018-08-09 PROCEDURE — 1101F PT FALLS ASSESS-DOCD LE1/YR: CPT | Performed by: FAMILY MEDICINE

## 2018-08-09 PROCEDURE — 99214 OFFICE O/P EST MOD 30 MIN: CPT | Performed by: FAMILY MEDICINE

## 2018-08-09 PROCEDURE — G8417 CALC BMI ABV UP PARAM F/U: HCPCS | Performed by: FAMILY MEDICINE

## 2018-08-09 PROCEDURE — 1123F ACP DISCUSS/DSCN MKR DOCD: CPT | Performed by: FAMILY MEDICINE

## 2018-08-09 PROCEDURE — 4040F PNEUMOC VAC/ADMIN/RCVD: CPT | Performed by: FAMILY MEDICINE

## 2018-08-09 PROCEDURE — 1036F TOBACCO NON-USER: CPT | Performed by: FAMILY MEDICINE

## 2018-08-09 ASSESSMENT — PATIENT HEALTH QUESTIONNAIRE - PHQ9
SUM OF ALL RESPONSES TO PHQ QUESTIONS 1-9: 0
SUM OF ALL RESPONSES TO PHQ9 QUESTIONS 1 & 2: 0
2. FEELING DOWN, DEPRESSED OR HOPELESS: 0
SUM OF ALL RESPONSES TO PHQ QUESTIONS 1-9: 0
1. LITTLE INTEREST OR PLEASURE IN DOING THINGS: 0

## 2018-08-09 NOTE — PROGRESS NOTES
Hypertension:  Patient is here for follow up chronic hypertension. This is  generally controlled on current medication regimen. Takes meds as directed and tolerates them well. Most recent labs reviewed with patient and are remarkable. No symptoms from htn standpoint per ROS. Patient is  compliant with lifestyle modifications. Patient does not smoke. Comorbid conditions include cardiomyopathy, SNEHA, hyperlipidemia, diabetes, and gout. He also has diabetic neuropathy, CHF, CAD, atrial fibrillation and arthritis. Patient stated he gets the pneumo shot at the [facility] and is not due yet. Patient also complains about his knees and hands. He see a docotor for his hands 8-16-18. He will be seeing a hand speicalist for her left hand trigger finger and numbness. He does have a history of fusion of the 4th and 5th cervical vertebrae. The xrays revealed DDD. He does use his CPAP machine nightly and this has improved his sleep apnea symptoms. I reviewed his labs from the [facility]. Patient's past medical, surgical, social and/or family history reviewed, updated in chart, and are non-contributory (unless otherwise stated). Medications and allergies also reviewed and updated in chart.         Review of Systems:  Constitutional:  No fever, no fatigue, no chills, no headaches, no weight change  Dermatology:  No rash, no mole, no dry or sensitive skin  ENT:  No cough, no sore throat, no sinus pain, no runny nose, no ear pain  Cardiology:  No chest pain, no palpitations, no leg edema, no shortness of breath, no PND  Gastroenterology:  No dysphagia, no abdominal pain, no nausea, no vomiting, no constipation, no diarrhea, no heartburn  Musculoskeletal:  No joint pain, no leg cramps, no back pain, no muscle aches  Respiratory:  No shortness of breath, no orthopnea, no wheezing, no LINDSAY, no hemoptysis  Urology:  No blood in the urine, no urinary frequency, no urinary incontinence, no urinary urgency, no nocturia, no dysuria    Vitals:    08/09/18 0745   BP: 115/74   Pulse: 74   Resp: 16   SpO2: 97%   Weight: (!) 346 lb (156.9 kg)   Height: 5' 11\" (1.803 m)       General:  Patient alert and oriented x 3, NAD, pleasant  HEENT:  Atraumatic, normocephalic, PERRLA, EOMI, clear conjunctiva, TMs clear, nose-clear, throat - no erythema  Neck:  Supple, no goiter, no carotid bruits, no LAD  Lungs:  CTA B  Heart:  RRR, no murmurs, gallops or rubs  Abdomen:  Soft/nt/nd, + bowel sounds  Extremities:  No clubbing, cyanosis or edema  Skin: unremarkable    Assessment/Plan:      Ofelia Scott was seen today for hypertension. Diagnoses and all orders for this visit:    HTN (hypertension), benign    Encounter for screening fecal occult blood testing    Other secondary chronic gout of multiple sites without tophus    Mixed hyperlipidemia    Chronic systolic CHF (congestive heart failure) (HCC)    SNEHA (obstructive sleep apnea)    Screen for colon cancer  -     POCT Fecal Immunochemical Test (FIT); Future      As above. Call or go to ED immediately if symptoms worsen or persist.  No Follow-up on file. , or sooner if necessary. Educational materials and/or home exercises printed for patient's review and were included in patient instructions on his/her After Visit Summary and given to patient at the end of visit. Counseled regarding above diagnosis, including possible risks and complications,  especially if left uncontrolled. Counseled regarding the possible side effects, risks, benefits and alternatives to treatment; patient and/or guardian verbalizes understanding, agrees, feels comfortable with and wishes to proceed with above treatment plan. Advised patient to call with any new medication issues, and read all Rx info from pharmacy to assure aware of all possible risks and side effects of medication before taking. Reviewed age and gender appropriate health screening exams and vaccinations.   Advised patient regarding importance of

## 2018-08-31 ENCOUNTER — HOSPITAL ENCOUNTER (OUTPATIENT)
Dept: OTHER | Age: 68
Setting detail: THERAPIES SERIES
Discharge: HOME | End: 2018-08-31
Payer: COMMERCIAL

## 2018-08-31 VITALS
BODY MASS INDEX: 47.14 KG/M2 | DIASTOLIC BLOOD PRESSURE: 77 MMHG | WEIGHT: 315 LBS | RESPIRATION RATE: 16 BRPM | SYSTOLIC BLOOD PRESSURE: 133 MMHG | HEART RATE: 73 BPM

## 2018-08-31 LAB
ANION GAP SERPL CALCULATED.3IONS-SCNC: 17 MMOL/L (ref 7–16)
BUN BLDV-MCNC: 29 MG/DL (ref 8–23)
CALCIUM SERPL-MCNC: 9.2 MG/DL (ref 8.6–10.2)
CHLORIDE BLD-SCNC: 96 MMOL/L (ref 98–107)
CO2: 23 MMOL/L (ref 22–29)
CREAT SERPL-MCNC: 1.3 MG/DL (ref 0.7–1.2)
GFR AFRICAN AMERICAN: >60
GFR NON-AFRICAN AMERICAN: >60 ML/MIN/1.73
GLUCOSE BLD-MCNC: 233 MG/DL (ref 74–109)
POTASSIUM SERPL-SCNC: 4.3 MMOL/L (ref 3.5–5)
PRO-BNP: 687 PG/ML (ref 0–125)
SODIUM BLD-SCNC: 136 MMOL/L (ref 132–146)

## 2018-08-31 PROCEDURE — 83880 ASSAY OF NATRIURETIC PEPTIDE: CPT

## 2018-08-31 PROCEDURE — 36415 COLL VENOUS BLD VENIPUNCTURE: CPT

## 2018-08-31 PROCEDURE — 80048 BASIC METABOLIC PNL TOTAL CA: CPT

## 2018-08-31 PROCEDURE — 99214 OFFICE O/P EST MOD 30 MIN: CPT

## 2018-09-07 LAB
ALBUMIN SERPL-MCNC: 4 G/DL
ALP BLD-CCNC: NORMAL U/L
ALT SERPL-CCNC: NORMAL U/L
ANION GAP SERPL CALCULATED.3IONS-SCNC: NORMAL MMOL/L
AST SERPL-CCNC: NORMAL U/L
AVERAGE GLUCOSE: NORMAL
BASOPHILS ABSOLUTE: ABNORMAL /ΜL
BASOPHILS RELATIVE PERCENT: ABNORMAL %
BILIRUB SERPL-MCNC: NORMAL MG/DL (ref 0.1–1.4)
BILIRUBIN, URINE: NEGATIVE
BLOOD, URINE: NORMAL
BUN BLDV-MCNC: 24 MG/DL
CALCIUM SERPL-MCNC: 10.1 MG/DL
CHLORIDE BLD-SCNC: 102 MMOL/L
CLARITY: CLEAR
CO2: 28 MMOL/L
COLOR: YELLOW
CREAT SERPL-MCNC: 1.3 MG/DL
EOSINOPHILS ABSOLUTE: ABNORMAL /ΜL
EOSINOPHILS RELATIVE PERCENT: ABNORMAL %
GFR CALCULATED: NORMAL
GLUCOSE BLD-MCNC: 81 MG/DL
GLUCOSE URINE: NORMAL
HBA1C MFR BLD: 8.5 %
HCT VFR BLD CALC: 47.9 % (ref 41–53)
HEMOGLOBIN: 16.1 G/DL (ref 13.5–17.5)
KETONES, URINE: NEGATIVE
LEUKOCYTE ESTERASE, URINE: NEGATIVE
LYMPHOCYTES ABSOLUTE: ABNORMAL /ΜL
LYMPHOCYTES RELATIVE PERCENT: ABNORMAL %
MCH RBC QN AUTO: ABNORMAL PG
MCHC RBC AUTO-ENTMCNC: ABNORMAL G/DL
MCV RBC AUTO: ABNORMAL FL
MONOCYTES ABSOLUTE: ABNORMAL /ΜL
MONOCYTES RELATIVE PERCENT: ABNORMAL %
NEUTROPHILS ABSOLUTE: ABNORMAL /ΜL
NEUTROPHILS RELATIVE PERCENT: ABNORMAL %
NITRITE, URINE: POSITIVE
PH UA: 6 (ref 4.5–8)
PLATELET # BLD: 146 K/ΜL
PMV BLD AUTO: ABNORMAL FL
POTASSIUM SERPL-SCNC: 4.2 MMOL/L
PROTEIN UA: NEGATIVE
RBC # BLD: ABNORMAL 10^6/ΜL
SODIUM BLD-SCNC: 141 MMOL/L
SPECIFIC GRAVITY, URINE: 1.01
TOTAL PROTEIN: 7.3
TSH SERPL DL<=0.05 MIU/L-ACNC: 2.18 UIU/ML
UROBILINOGEN, URINE: NORMAL
WBC # BLD: 8.4 10^3/ML

## 2018-09-27 ENCOUNTER — HOSPITAL ENCOUNTER (OUTPATIENT)
Dept: OTHER | Age: 68
Setting detail: THERAPIES SERIES
Discharge: HOME | End: 2018-09-27
Payer: COMMERCIAL

## 2018-09-27 VITALS
RESPIRATION RATE: 18 BRPM | DIASTOLIC BLOOD PRESSURE: 72 MMHG | SYSTOLIC BLOOD PRESSURE: 131 MMHG | BODY MASS INDEX: 46.85 KG/M2 | WEIGHT: 315 LBS

## 2018-09-27 LAB
ANION GAP SERPL CALCULATED.3IONS-SCNC: 13 MMOL/L (ref 7–16)
BUN BLDV-MCNC: 19 MG/DL (ref 8–23)
CALCIUM SERPL-MCNC: 9.3 MG/DL (ref 8.6–10.2)
CHLORIDE BLD-SCNC: 102 MMOL/L (ref 98–107)
CO2: 24 MMOL/L (ref 22–29)
CREAT SERPL-MCNC: 1.2 MG/DL (ref 0.7–1.2)
GFR AFRICAN AMERICAN: >60
GFR NON-AFRICAN AMERICAN: >60 ML/MIN/1.73
GLUCOSE BLD-MCNC: 190 MG/DL (ref 74–109)
POTASSIUM SERPL-SCNC: 3.9 MMOL/L (ref 3.5–5)
PRO-BNP: 741 PG/ML (ref 0–125)
SODIUM BLD-SCNC: 139 MMOL/L (ref 132–146)

## 2018-09-27 PROCEDURE — 36415 COLL VENOUS BLD VENIPUNCTURE: CPT

## 2018-09-27 PROCEDURE — 99214 OFFICE O/P EST MOD 30 MIN: CPT

## 2018-09-27 PROCEDURE — 80048 BASIC METABOLIC PNL TOTAL CA: CPT

## 2018-09-27 PROCEDURE — 83880 ASSAY OF NATRIURETIC PEPTIDE: CPT

## 2018-10-04 ENCOUNTER — OFFICE VISIT (OUTPATIENT)
Dept: NON INVASIVE DIAGNOSTICS | Age: 68
End: 2018-10-04
Payer: COMMERCIAL

## 2018-10-04 VITALS
WEIGHT: 315 LBS | RESPIRATION RATE: 20 BRPM | BODY MASS INDEX: 44.1 KG/M2 | HEART RATE: 76 BPM | DIASTOLIC BLOOD PRESSURE: 88 MMHG | SYSTOLIC BLOOD PRESSURE: 124 MMHG | HEIGHT: 71 IN

## 2018-10-04 DIAGNOSIS — I48.0 PAF (PAROXYSMAL ATRIAL FIBRILLATION) (HCC): ICD-10-CM

## 2018-10-04 DIAGNOSIS — Z95.810 BIVENTRICULAR IMPLANTABLE CARDIOVERTER-DEFIBRILLATOR IN SITU: Primary | ICD-10-CM

## 2018-10-04 DIAGNOSIS — I42.8 NICM (NONISCHEMIC CARDIOMYOPATHY) (HCC): ICD-10-CM

## 2018-10-04 DIAGNOSIS — I47.1 AVNRT (AV NODAL RE-ENTRY TACHYCARDIA) (HCC): ICD-10-CM

## 2018-10-04 PROCEDURE — 3017F COLORECTAL CA SCREEN DOC REV: CPT | Performed by: INTERNAL MEDICINE

## 2018-10-04 PROCEDURE — 1101F PT FALLS ASSESS-DOCD LE1/YR: CPT | Performed by: INTERNAL MEDICINE

## 2018-10-04 PROCEDURE — G8417 CALC BMI ABV UP PARAM F/U: HCPCS | Performed by: INTERNAL MEDICINE

## 2018-10-04 PROCEDURE — 1036F TOBACCO NON-USER: CPT | Performed by: INTERNAL MEDICINE

## 2018-10-04 PROCEDURE — G8427 DOCREV CUR MEDS BY ELIG CLIN: HCPCS | Performed by: INTERNAL MEDICINE

## 2018-10-04 PROCEDURE — 1123F ACP DISCUSS/DSCN MKR DOCD: CPT | Performed by: INTERNAL MEDICINE

## 2018-10-04 PROCEDURE — 93284 PRGRMG EVAL IMPLANTABLE DFB: CPT | Performed by: INTERNAL MEDICINE

## 2018-10-04 PROCEDURE — 4040F PNEUMOC VAC/ADMIN/RCVD: CPT | Performed by: INTERNAL MEDICINE

## 2018-10-04 PROCEDURE — 99213 OFFICE O/P EST LOW 20 MIN: CPT | Performed by: INTERNAL MEDICINE

## 2018-10-04 PROCEDURE — G8484 FLU IMMUNIZE NO ADMIN: HCPCS | Performed by: INTERNAL MEDICINE

## 2018-10-04 PROCEDURE — 93290 INTERROG DEV EVAL ICPMS IP: CPT | Performed by: INTERNAL MEDICINE

## 2018-10-10 ENCOUNTER — OFFICE VISIT (OUTPATIENT)
Dept: CARDIOLOGY CLINIC | Age: 68
End: 2018-10-10
Payer: COMMERCIAL

## 2018-10-10 VITALS
HEART RATE: 63 BPM | BODY MASS INDEX: 44.1 KG/M2 | RESPIRATION RATE: 12 BRPM | DIASTOLIC BLOOD PRESSURE: 78 MMHG | WEIGHT: 315 LBS | SYSTOLIC BLOOD PRESSURE: 108 MMHG | HEIGHT: 71 IN

## 2018-10-10 DIAGNOSIS — I48.0 PAF (PAROXYSMAL ATRIAL FIBRILLATION) (HCC): ICD-10-CM

## 2018-10-10 DIAGNOSIS — I50.22 CHRONIC SYSTOLIC CHF (CONGESTIVE HEART FAILURE) (HCC): ICD-10-CM

## 2018-10-10 DIAGNOSIS — I10 HTN (HYPERTENSION), BENIGN: Chronic | ICD-10-CM

## 2018-10-10 DIAGNOSIS — Z95.810 BIVENTRICULAR IMPLANTABLE CARDIOVERTER-DEFIBRILLATOR IN SITU: ICD-10-CM

## 2018-10-10 DIAGNOSIS — I42.8 NICM (NONISCHEMIC CARDIOMYOPATHY) (HCC): Primary | ICD-10-CM

## 2018-10-10 PROCEDURE — G8417 CALC BMI ABV UP PARAM F/U: HCPCS | Performed by: INTERNAL MEDICINE

## 2018-10-10 PROCEDURE — 99214 OFFICE O/P EST MOD 30 MIN: CPT | Performed by: INTERNAL MEDICINE

## 2018-10-10 PROCEDURE — 1123F ACP DISCUSS/DSCN MKR DOCD: CPT | Performed by: INTERNAL MEDICINE

## 2018-10-10 PROCEDURE — 1036F TOBACCO NON-USER: CPT | Performed by: INTERNAL MEDICINE

## 2018-10-10 PROCEDURE — 3017F COLORECTAL CA SCREEN DOC REV: CPT | Performed by: INTERNAL MEDICINE

## 2018-10-10 PROCEDURE — 1101F PT FALLS ASSESS-DOCD LE1/YR: CPT | Performed by: INTERNAL MEDICINE

## 2018-10-10 PROCEDURE — G8427 DOCREV CUR MEDS BY ELIG CLIN: HCPCS | Performed by: INTERNAL MEDICINE

## 2018-10-10 PROCEDURE — 93000 ELECTROCARDIOGRAM COMPLETE: CPT | Performed by: INTERNAL MEDICINE

## 2018-10-10 PROCEDURE — 4040F PNEUMOC VAC/ADMIN/RCVD: CPT | Performed by: INTERNAL MEDICINE

## 2018-10-10 PROCEDURE — G8484 FLU IMMUNIZE NO ADMIN: HCPCS | Performed by: INTERNAL MEDICINE

## 2018-10-10 NOTE — PROGRESS NOTES
Patient Active Problem List   Diagnosis    [facility] teddy [facility]    DM (diabetes mellitus), type 2, uncontrolled ([facility]    HTN (hypertension), benign    Hyperlipidemia    SNEHA (obstructive sleep apnea)    Depression    NICM (nonischemic cardiomyopathy) ([facility]    Renal insufficiency    Biventricular implantable cardioverter-defibrillator in situ    Chronic systolic CHF (congestive heart failure) ([facility])    PAF (paroxysmal atrial fibrillation) ([facility])    CHF (NYHA class III, ACC/AHA stage C) ([facility])    Gout       Current Outpatient Prescriptions   Medication Sig Dispense Refill    metoprolol succinate (TOPROL XL) 100 MG extended release tablet TAKE 1 TABLET TWICE A  tablet 3    dofetilide (TIKOSYN) 250 MCG capsule TAKE 1 CAPSULE TWICE A  capsule 1    sertraline (ZOLOFT) 100 MG tablet Take 100 mg by mouth daily      ELIQUIS 5 MG TABS tablet TAKE 1 TABLET TWICE A  tablet 3    VILAZODONE HCL 10 MG Tablet TAKE ONE TABLET BY MOUTH EVERY DAY  2    torsemide (DEMADEX) 20 MG tablet Take 20 mg by mouth daily      furosemide (LASIX) 40 MG tablet Take 1 tablet by mouth 2 times daily 30 tablet 0    famotidine (PEPCID) 20 MG tablet Take 1 tablet by mouth daily 60 tablet 3    metolazone (ZAROXOLYN) 5 MG tablet TAKE ONE TABLET BY MOUTH DAILY (TAKE 1 TABLET BY MOUTH ONE-HALF HOUR BEFORE MORNING LASIX DOSE ON 09/21 AND 09/22 ONLY.) (Patient taking differently: TAKE ONE TABLET BY MOUTH PRN) 10 tablet 0    spironolactone (ALDACTONE) 25 MG tablet Take 1 tablet by mouth daily 90 tablet 3    allopurinol (ZYLOPRIM) 100 MG tablet Take 400 mg by mouth daily       gabapentin (NEURONTIN) 100 MG capsule Take 1 capsule by mouth 3 times daily 90 capsule 3    busPIRone (BUSPAR) 15 MG tablet Take 15 mg by mouth daily       HUMULIN R 500 UNIT/ML injection See Admin Instructions @ 0700 150 units  @ 1500 120 units  @ 2300 30 units      isosorbide mononitrate (IMDUR) 30 MG CR tablet Take 1

## 2018-10-22 RX ORDER — APIXABAN 5 MG/1
TABLET, FILM COATED ORAL
Qty: 180 TABLET | Refills: 3 | Status: ON HOLD | OUTPATIENT
Start: 2018-10-22 | End: 2019-10-19 | Stop reason: SDUPTHER

## 2018-10-25 ENCOUNTER — HOSPITAL ENCOUNTER (OUTPATIENT)
Dept: OTHER | Age: 68
Setting detail: THERAPIES SERIES
Discharge: HOME | End: 2018-10-25
Payer: COMMERCIAL

## 2018-10-25 VITALS
BODY MASS INDEX: 48.08 KG/M2 | WEIGHT: 315 LBS | HEART RATE: 86 BPM | DIASTOLIC BLOOD PRESSURE: 85 MMHG | RESPIRATION RATE: 18 BRPM | SYSTOLIC BLOOD PRESSURE: 152 MMHG

## 2018-10-25 LAB
ANION GAP SERPL CALCULATED.3IONS-SCNC: 13 MMOL/L (ref 7–16)
BUN BLDV-MCNC: 24 MG/DL (ref 8–23)
CALCIUM SERPL-MCNC: 9.5 MG/DL (ref 8.6–10.2)
CHLORIDE BLD-SCNC: 97 MMOL/L (ref 98–107)
CO2: 25 MMOL/L (ref 22–29)
CREAT SERPL-MCNC: 1.2 MG/DL (ref 0.7–1.2)
GFR AFRICAN AMERICAN: >60
GFR NON-AFRICAN AMERICAN: >60 ML/MIN/1.73
GLUCOSE BLD-MCNC: 311 MG/DL (ref 74–109)
POTASSIUM SERPL-SCNC: 4.4 MMOL/L (ref 3.5–5)
PRO-BNP: 469 PG/ML (ref 0–125)
SODIUM BLD-SCNC: 135 MMOL/L (ref 132–146)

## 2018-10-25 PROCEDURE — 36415 COLL VENOUS BLD VENIPUNCTURE: CPT

## 2018-10-25 PROCEDURE — 99214 OFFICE O/P EST MOD 30 MIN: CPT

## 2018-10-25 PROCEDURE — 83880 ASSAY OF NATRIURETIC PEPTIDE: CPT

## 2018-10-25 PROCEDURE — 80048 BASIC METABOLIC PNL TOTAL CA: CPT

## 2018-10-25 NOTE — PROGRESS NOTES
Pt. Up 9 pounds, denies any discomfort, no lower leg edema noted, abd softly distneded which is not a new finding, pt requesting not to receive lasix, pt stated will call if edema occurs or weight continues to increase, labs drawn and follow up appt. Made, will monitor bld work.

## 2018-11-06 ENCOUNTER — TELEPHONE (OUTPATIENT)
Dept: FAMILY MEDICINE CLINIC | Age: 68
End: 2018-11-06

## 2018-11-08 ENCOUNTER — OFFICE VISIT (OUTPATIENT)
Dept: FAMILY MEDICINE CLINIC | Age: 68
End: 2018-11-08
Payer: COMMERCIAL

## 2018-11-08 VITALS
DIASTOLIC BLOOD PRESSURE: 76 MMHG | WEIGHT: 315 LBS | OXYGEN SATURATION: 96 % | HEART RATE: 55 BPM | BODY MASS INDEX: 46.44 KG/M2 | TEMPERATURE: 97.8 F | SYSTOLIC BLOOD PRESSURE: 127 MMHG | RESPIRATION RATE: 20 BRPM

## 2018-11-08 DIAGNOSIS — I50.9 CHF (NYHA CLASS III, ACC/AHA STAGE C) (HCC): ICD-10-CM

## 2018-11-08 DIAGNOSIS — E78.2 MIXED HYPERLIPIDEMIA: Chronic | ICD-10-CM

## 2018-11-08 DIAGNOSIS — I10 HTN (HYPERTENSION), BENIGN: Primary | Chronic | ICD-10-CM

## 2018-11-08 DIAGNOSIS — G47.33 OSA (OBSTRUCTIVE SLEEP APNEA): Chronic | ICD-10-CM

## 2018-11-08 DIAGNOSIS — N28.9 RENAL INSUFFICIENCY: Chronic | ICD-10-CM

## 2018-11-08 PROCEDURE — G8484 FLU IMMUNIZE NO ADMIN: HCPCS | Performed by: FAMILY MEDICINE

## 2018-11-08 PROCEDURE — G8417 CALC BMI ABV UP PARAM F/U: HCPCS | Performed by: FAMILY MEDICINE

## 2018-11-08 PROCEDURE — 99214 OFFICE O/P EST MOD 30 MIN: CPT | Performed by: FAMILY MEDICINE

## 2018-11-08 PROCEDURE — G8427 DOCREV CUR MEDS BY ELIG CLIN: HCPCS | Performed by: FAMILY MEDICINE

## 2018-11-08 PROCEDURE — 1101F PT FALLS ASSESS-DOCD LE1/YR: CPT | Performed by: FAMILY MEDICINE

## 2018-11-08 PROCEDURE — 1123F ACP DISCUSS/DSCN MKR DOCD: CPT | Performed by: FAMILY MEDICINE

## 2018-11-08 PROCEDURE — 3017F COLORECTAL CA SCREEN DOC REV: CPT | Performed by: FAMILY MEDICINE

## 2018-11-08 PROCEDURE — 4040F PNEUMOC VAC/ADMIN/RCVD: CPT | Performed by: FAMILY MEDICINE

## 2018-11-08 PROCEDURE — 1036F TOBACCO NON-USER: CPT | Performed by: FAMILY MEDICINE

## 2018-11-08 NOTE — PROGRESS NOTES
disease. Patient and/or guardian verbalizes understanding and agrees with above counseling, assessment and plan. All questions answered.

## 2018-11-15 ENCOUNTER — TELEPHONE (OUTPATIENT)
Dept: NON INVASIVE DIAGNOSTICS | Age: 68
End: 2018-11-15

## 2018-11-28 ENCOUNTER — HOSPITAL ENCOUNTER (OUTPATIENT)
Dept: OTHER | Age: 68
Setting detail: THERAPIES SERIES
Discharge: HOME | End: 2018-11-28
Payer: COMMERCIAL

## 2018-11-28 VITALS
RESPIRATION RATE: 18 BRPM | HEART RATE: 79 BPM | DIASTOLIC BLOOD PRESSURE: 93 MMHG | WEIGHT: 315 LBS | SYSTOLIC BLOOD PRESSURE: 144 MMHG | BODY MASS INDEX: 47.73 KG/M2

## 2018-11-28 LAB
ANION GAP SERPL CALCULATED.3IONS-SCNC: 13 MMOL/L (ref 7–16)
BUN BLDV-MCNC: 24 MG/DL (ref 8–23)
CALCIUM SERPL-MCNC: 9.2 MG/DL (ref 8.6–10.2)
CHLORIDE BLD-SCNC: 100 MMOL/L (ref 98–107)
CO2: 26 MMOL/L (ref 22–29)
CREAT SERPL-MCNC: 1.4 MG/DL (ref 0.7–1.2)
GFR AFRICAN AMERICAN: >60
GFR NON-AFRICAN AMERICAN: >60 ML/MIN/1.73
GLUCOSE BLD-MCNC: 193 MG/DL (ref 74–99)
POTASSIUM SERPL-SCNC: 4.5 MMOL/L (ref 3.5–5)
PRO-BNP: 766 PG/ML (ref 0–125)
SODIUM BLD-SCNC: 139 MMOL/L (ref 132–146)

## 2018-11-28 PROCEDURE — 36415 COLL VENOUS BLD VENIPUNCTURE: CPT

## 2018-11-28 PROCEDURE — 83880 ASSAY OF NATRIURETIC PEPTIDE: CPT

## 2018-11-28 PROCEDURE — 99214 OFFICE O/P EST MOD 30 MIN: CPT

## 2018-11-28 PROCEDURE — 80048 BASIC METABOLIC PNL TOTAL CA: CPT

## 2018-12-11 RX ORDER — DOFETILIDE 0.25 MG/1
CAPSULE ORAL
Qty: 180 CAPSULE | Refills: 1 | Status: SHIPPED | OUTPATIENT
Start: 2018-12-11 | End: 2019-06-09 | Stop reason: SDUPTHER

## 2018-12-12 ENCOUNTER — HOSPITAL ENCOUNTER (OUTPATIENT)
Dept: OTHER | Age: 68
Setting detail: THERAPIES SERIES
Discharge: HOME | End: 2018-12-12
Payer: COMMERCIAL

## 2018-12-12 VITALS — RESPIRATION RATE: 18 BRPM | BODY MASS INDEX: 47.77 KG/M2 | WEIGHT: 315 LBS

## 2018-12-12 LAB
ANION GAP SERPL CALCULATED.3IONS-SCNC: 9 MMOL/L (ref 7–16)
BUN BLDV-MCNC: 28 MG/DL (ref 8–23)
CALCIUM SERPL-MCNC: 9.2 MG/DL (ref 8.6–10.2)
CHLORIDE BLD-SCNC: 102 MMOL/L (ref 98–107)
CO2: 25 MMOL/L (ref 22–29)
CREAT SERPL-MCNC: 1.3 MG/DL (ref 0.7–1.2)
GFR AFRICAN AMERICAN: >60
GFR NON-AFRICAN AMERICAN: >60 ML/MIN/1.73
GLUCOSE BLD-MCNC: 107 MG/DL (ref 74–99)
POTASSIUM SERPL-SCNC: 4.1 MMOL/L (ref 3.5–5)
PRO-BNP: 741 PG/ML (ref 0–125)
SODIUM BLD-SCNC: 136 MMOL/L (ref 132–146)

## 2018-12-12 PROCEDURE — 80048 BASIC METABOLIC PNL TOTAL CA: CPT

## 2018-12-12 PROCEDURE — 83880 ASSAY OF NATRIURETIC PEPTIDE: CPT

## 2018-12-12 PROCEDURE — 99214 OFFICE O/P EST MOD 30 MIN: CPT

## 2018-12-12 PROCEDURE — 36415 COLL VENOUS BLD VENIPUNCTURE: CPT

## 2019-01-16 ENCOUNTER — HOSPITAL ENCOUNTER (OUTPATIENT)
Dept: OTHER | Age: 69
Setting detail: THERAPIES SERIES
Discharge: HOME | End: 2019-01-16
Payer: COMMERCIAL

## 2019-01-16 VITALS
SYSTOLIC BLOOD PRESSURE: 134 MMHG | WEIGHT: 315 LBS | DIASTOLIC BLOOD PRESSURE: 81 MMHG | BODY MASS INDEX: 47.92 KG/M2 | RESPIRATION RATE: 18 BRPM | HEART RATE: 82 BPM

## 2019-01-16 LAB
ANION GAP SERPL CALCULATED.3IONS-SCNC: 12 MMOL/L (ref 7–16)
BUN BLDV-MCNC: 29 MG/DL (ref 8–23)
CALCIUM SERPL-MCNC: 9.3 MG/DL (ref 8.6–10.2)
CHLORIDE BLD-SCNC: 99 MMOL/L (ref 98–107)
CO2: 23 MMOL/L (ref 22–29)
CREAT SERPL-MCNC: 1.3 MG/DL (ref 0.7–1.2)
GFR AFRICAN AMERICAN: >60
GFR NON-AFRICAN AMERICAN: >60 ML/MIN/1.73
GLUCOSE BLD-MCNC: 247 MG/DL (ref 74–99)
POTASSIUM SERPL-SCNC: 4.2 MMOL/L (ref 3.5–5)
PRO-BNP: 966 PG/ML (ref 0–125)
SODIUM BLD-SCNC: 134 MMOL/L (ref 132–146)

## 2019-01-16 PROCEDURE — 83880 ASSAY OF NATRIURETIC PEPTIDE: CPT

## 2019-01-16 PROCEDURE — 36415 COLL VENOUS BLD VENIPUNCTURE: CPT

## 2019-01-16 PROCEDURE — 99214 OFFICE O/P EST MOD 30 MIN: CPT

## 2019-01-16 PROCEDURE — 80048 BASIC METABOLIC PNL TOTAL CA: CPT

## 2019-01-21 ENCOUNTER — TELEPHONE (OUTPATIENT)
Dept: NON INVASIVE DIAGNOSTICS | Age: 69
End: 2019-01-21

## 2019-01-24 ENCOUNTER — NURSE ONLY (OUTPATIENT)
Dept: NON INVASIVE DIAGNOSTICS | Age: 69
End: 2019-01-24
Payer: COMMERCIAL

## 2019-01-24 DIAGNOSIS — Z95.810 BIVENTRICULAR IMPLANTABLE CARDIOVERTER-DEFIBRILLATOR IN SITU: Primary | ICD-10-CM

## 2019-01-24 PROCEDURE — 93295 DEV INTERROG REMOTE 1/2/MLT: CPT | Performed by: INTERNAL MEDICINE

## 2019-01-24 PROCEDURE — 93296 REM INTERROG EVL PM/IDS: CPT | Performed by: INTERNAL MEDICINE

## 2019-02-06 ENCOUNTER — HOSPITAL ENCOUNTER (INPATIENT)
Age: 69
LOS: 3 days | Discharge: HOME | DRG: 292 | End: 2019-02-10
Attending: EMERGENCY MEDICINE | Admitting: HOSPITALIST
Payer: COMMERCIAL

## 2019-02-06 ENCOUNTER — APPOINTMENT (OUTPATIENT)
Dept: GENERAL RADIOLOGY | Age: 69
DRG: 292 | End: 2019-02-06
Payer: COMMERCIAL

## 2019-02-06 DIAGNOSIS — I50.43 ACUTE ON CHRONIC COMBINED SYSTOLIC AND DIASTOLIC CONGESTIVE HEART FAILURE (HCC): Primary | ICD-10-CM

## 2019-02-06 DIAGNOSIS — R07.9 CHEST PAIN, UNSPECIFIED TYPE: ICD-10-CM

## 2019-02-06 LAB
BASOPHILS ABSOLUTE: 0.02 E9/L (ref 0–0.2)
BASOPHILS RELATIVE PERCENT: 0.2 % (ref 0–2)
EOSINOPHILS ABSOLUTE: 0.04 E9/L (ref 0.05–0.5)
EOSINOPHILS RELATIVE PERCENT: 0.3 % (ref 0–6)
HCT VFR BLD CALC: 46.1 % (ref 37–54)
HEMOGLOBIN: 15.3 G/DL (ref 12.5–16.5)
IMMATURE GRANULOCYTES #: 0.06 E9/L
IMMATURE GRANULOCYTES %: 0.5 % (ref 0–5)
LYMPHOCYTES ABSOLUTE: 1.64 E9/L (ref 1.5–4)
LYMPHOCYTES RELATIVE PERCENT: 14 % (ref 20–42)
MCH RBC QN AUTO: 31 PG (ref 26–35)
MCHC RBC AUTO-ENTMCNC: 33.2 % (ref 32–34.5)
MCV RBC AUTO: 93.3 FL (ref 80–99.9)
MONOCYTES ABSOLUTE: 1.14 E9/L (ref 0.1–0.95)
MONOCYTES RELATIVE PERCENT: 9.7 % (ref 2–12)
NEUTROPHILS ABSOLUTE: 8.84 E9/L (ref 1.8–7.3)
NEUTROPHILS RELATIVE PERCENT: 75.3 % (ref 43–80)
PDW BLD-RTO: 14.5 FL (ref 11.5–15)
PLATELET # BLD: 161 E9/L (ref 130–450)
PMV BLD AUTO: 12.1 FL (ref 7–12)
RBC # BLD: 4.94 E12/L (ref 3.8–5.8)
WBC # BLD: 11.7 E9/L (ref 4.5–11.5)

## 2019-02-06 PROCEDURE — 85025 COMPLETE CBC W/AUTO DIFF WBC: CPT

## 2019-02-06 PROCEDURE — 80048 BASIC METABOLIC PNL TOTAL CA: CPT

## 2019-02-06 PROCEDURE — 83880 ASSAY OF NATRIURETIC PEPTIDE: CPT

## 2019-02-06 PROCEDURE — 84484 ASSAY OF TROPONIN QUANT: CPT

## 2019-02-06 PROCEDURE — 99285 EMERGENCY DEPT VISIT HI MDM: CPT

## 2019-02-06 PROCEDURE — 71045 X-RAY EXAM CHEST 1 VIEW: CPT

## 2019-02-06 PROCEDURE — 80162 ASSAY OF DIGOXIN TOTAL: CPT

## 2019-02-06 RX ORDER — FUROSEMIDE 10 MG/ML
40 INJECTION INTRAMUSCULAR; INTRAVENOUS ONCE
Status: COMPLETED | OUTPATIENT
Start: 2019-02-06 | End: 2019-02-07

## 2019-02-06 RX ORDER — DOFETILIDE 0.25 MG/1
250 CAPSULE ORAL ONCE
Status: COMPLETED | OUTPATIENT
Start: 2019-02-06 | End: 2019-02-07

## 2019-02-06 RX ORDER — ASPIRIN 81 MG/1
324 TABLET, CHEWABLE ORAL ONCE
Status: COMPLETED | OUTPATIENT
Start: 2019-02-06 | End: 2019-02-07

## 2019-02-07 PROBLEM — I50.43 ACUTE ON CHRONIC COMBINED SYSTOLIC AND DIASTOLIC HEART FAILURE (HCC): Status: ACTIVE | Noted: 2019-02-07

## 2019-02-07 LAB
ANION GAP SERPL CALCULATED.3IONS-SCNC: 11 MMOL/L (ref 7–16)
BUN BLDV-MCNC: 24 MG/DL (ref 8–23)
CALCIUM SERPL-MCNC: 9.3 MG/DL (ref 8.6–10.2)
CHLORIDE BLD-SCNC: 100 MMOL/L (ref 98–107)
CO2: 27 MMOL/L (ref 22–29)
CREAT SERPL-MCNC: 1.4 MG/DL (ref 0.7–1.2)
DIGOXIN LEVEL: <0.3 NG/ML (ref 0.8–2)
GFR AFRICAN AMERICAN: >60
GFR NON-AFRICAN AMERICAN: >60 ML/MIN/1.73
GLUCOSE BLD-MCNC: 85 MG/DL (ref 74–99)
LV EF: 25 %
LVEF MODALITY: NORMAL
METER GLUCOSE: 112 MG/DL (ref 74–99)
METER GLUCOSE: 131 MG/DL (ref 74–99)
METER GLUCOSE: 151 MG/DL (ref 74–99)
METER GLUCOSE: 213 MG/DL (ref 74–99)
METER GLUCOSE: 75 MG/DL (ref 74–99)
POTASSIUM REFLEX MAGNESIUM: 4.7 MMOL/L (ref 3.5–5)
PRO-BNP: 2383 PG/ML (ref 0–125)
SODIUM BLD-SCNC: 138 MMOL/L (ref 132–146)
TROPONIN: 0.02 NG/ML (ref 0–0.03)
TROPONIN: 0.02 NG/ML (ref 0–0.03)
TROPONIN: 0.03 NG/ML (ref 0–0.03)
TSH SERPL DL<=0.05 MIU/L-ACNC: 2.62 UIU/ML (ref 0.27–4.2)

## 2019-02-07 PROCEDURE — 84443 ASSAY THYROID STIM HORMONE: CPT

## 2019-02-07 PROCEDURE — 6370000000 HC RX 637 (ALT 250 FOR IP): Performed by: HOSPITALIST

## 2019-02-07 PROCEDURE — 97165 OT EVAL LOW COMPLEX 30 MIN: CPT

## 2019-02-07 PROCEDURE — 96374 THER/PROPH/DIAG INJ IV PUSH: CPT

## 2019-02-07 PROCEDURE — 6370000000 HC RX 637 (ALT 250 FOR IP): Performed by: EMERGENCY MEDICINE

## 2019-02-07 PROCEDURE — 6360000002 HC RX W HCPCS: Performed by: HOSPITALIST

## 2019-02-07 PROCEDURE — 97535 SELF CARE MNGMENT TRAINING: CPT

## 2019-02-07 PROCEDURE — 6360000002 HC RX W HCPCS: Performed by: EMERGENCY MEDICINE

## 2019-02-07 PROCEDURE — 99255 IP/OBS CONSLTJ NEW/EST HI 80: CPT | Performed by: INTERNAL MEDICINE

## 2019-02-07 PROCEDURE — 99223 1ST HOSP IP/OBS HIGH 75: CPT | Performed by: HOSPITALIST

## 2019-02-07 PROCEDURE — 84484 ASSAY OF TROPONIN QUANT: CPT

## 2019-02-07 PROCEDURE — 82962 GLUCOSE BLOOD TEST: CPT

## 2019-02-07 PROCEDURE — 94660 CPAP INITIATION&MGMT: CPT

## 2019-02-07 PROCEDURE — 6370000000 HC RX 637 (ALT 250 FOR IP): Performed by: INTERNAL MEDICINE

## 2019-02-07 PROCEDURE — 2060000000 HC ICU INTERMEDIATE R&B

## 2019-02-07 PROCEDURE — 36415 COLL VENOUS BLD VENIPUNCTURE: CPT

## 2019-02-07 PROCEDURE — 97161 PT EVAL LOW COMPLEX 20 MIN: CPT

## 2019-02-07 PROCEDURE — 93306 TTE W/DOPPLER COMPLETE: CPT

## 2019-02-07 PROCEDURE — 2580000003 HC RX 258: Performed by: HOSPITALIST

## 2019-02-07 RX ORDER — LANOLIN ALCOHOL/MO/W.PET/CERES
400 CREAM (GRAM) TOPICAL DAILY
Status: DISCONTINUED | OUTPATIENT
Start: 2019-02-07 | End: 2019-02-07 | Stop reason: SDUPTHER

## 2019-02-07 RX ORDER — POTASSIUM CHLORIDE 20 MEQ/1
40 TABLET, EXTENDED RELEASE ORAL PRN
Status: DISCONTINUED | OUTPATIENT
Start: 2019-02-07 | End: 2019-02-10 | Stop reason: HOSPADM

## 2019-02-07 RX ORDER — SODIUM CHLORIDE 0.9 % (FLUSH) 0.9 %
10 SYRINGE (ML) INJECTION PRN
Status: DISCONTINUED | OUTPATIENT
Start: 2019-02-07 | End: 2019-02-10 | Stop reason: HOSPADM

## 2019-02-07 RX ORDER — DEXTROSE MONOHYDRATE 50 MG/ML
100 INJECTION, SOLUTION INTRAVENOUS PRN
Status: DISCONTINUED | OUTPATIENT
Start: 2019-02-07 | End: 2019-02-10 | Stop reason: HOSPADM

## 2019-02-07 RX ORDER — FUROSEMIDE 10 MG/ML
40 INJECTION INTRAMUSCULAR; INTRAVENOUS 2 TIMES DAILY WITH MEALS
Status: DISCONTINUED | OUTPATIENT
Start: 2019-02-07 | End: 2019-02-10

## 2019-02-07 RX ORDER — ALLOPURINOL 100 MG/1
200 TABLET ORAL 2 TIMES DAILY
Status: DISCONTINUED | OUTPATIENT
Start: 2019-02-07 | End: 2019-02-10 | Stop reason: HOSPADM

## 2019-02-07 RX ORDER — DOFETILIDE 0.25 MG/1
250 CAPSULE ORAL EVERY 12 HOURS SCHEDULED
Status: DISCONTINUED | OUTPATIENT
Start: 2019-02-07 | End: 2019-02-10 | Stop reason: HOSPADM

## 2019-02-07 RX ORDER — SODIUM CHLORIDE 0.9 % (FLUSH) 0.9 %
10 SYRINGE (ML) INJECTION EVERY 12 HOURS SCHEDULED
Status: DISCONTINUED | OUTPATIENT
Start: 2019-02-07 | End: 2019-02-10 | Stop reason: HOSPADM

## 2019-02-07 RX ORDER — POTASSIUM CHLORIDE 7.45 MG/ML
10 INJECTION INTRAVENOUS PRN
Status: DISCONTINUED | OUTPATIENT
Start: 2019-02-07 | End: 2019-02-10 | Stop reason: HOSPADM

## 2019-02-07 RX ORDER — DEXTROSE MONOHYDRATE 25 G/50ML
12.5 INJECTION, SOLUTION INTRAVENOUS PRN
Status: DISCONTINUED | OUTPATIENT
Start: 2019-02-07 | End: 2019-02-10 | Stop reason: HOSPADM

## 2019-02-07 RX ORDER — POTASSIUM CHLORIDE 20 MEQ/1
20 TABLET, EXTENDED RELEASE ORAL DAILY
Status: DISCONTINUED | OUTPATIENT
Start: 2019-02-07 | End: 2019-02-10 | Stop reason: HOSPADM

## 2019-02-07 RX ORDER — POTASSIUM CHLORIDE 20MEQ/15ML
40 LIQUID (ML) ORAL PRN
Status: DISCONTINUED | OUTPATIENT
Start: 2019-02-07 | End: 2019-02-10 | Stop reason: HOSPADM

## 2019-02-07 RX ORDER — GABAPENTIN 100 MG/1
100 CAPSULE ORAL 3 TIMES DAILY
Status: DISCONTINUED | OUTPATIENT
Start: 2019-02-07 | End: 2019-02-10 | Stop reason: HOSPADM

## 2019-02-07 RX ORDER — SPIRONOLACTONE 25 MG/1
25 TABLET ORAL DAILY
Status: DISCONTINUED | OUTPATIENT
Start: 2019-02-07 | End: 2019-02-10 | Stop reason: HOSPADM

## 2019-02-07 RX ORDER — BUSPIRONE HYDROCHLORIDE 5 MG/1
15 TABLET ORAL DAILY
Status: DISCONTINUED | OUTPATIENT
Start: 2019-02-07 | End: 2019-02-07 | Stop reason: CLARIF

## 2019-02-07 RX ORDER — BUSPIRONE HYDROCHLORIDE 7.5 MG/1
15 TABLET ORAL DAILY
Status: DISCONTINUED | OUTPATIENT
Start: 2019-02-07 | End: 2019-02-10 | Stop reason: HOSPADM

## 2019-02-07 RX ORDER — DIGOXIN 125 MCG
125 TABLET ORAL DAILY
Status: DISCONTINUED | OUTPATIENT
Start: 2019-02-07 | End: 2019-02-10 | Stop reason: HOSPADM

## 2019-02-07 RX ORDER — SERTRALINE HYDROCHLORIDE 100 MG/1
100 TABLET, FILM COATED ORAL DAILY
Status: DISCONTINUED | OUTPATIENT
Start: 2019-02-07 | End: 2019-02-10 | Stop reason: HOSPADM

## 2019-02-07 RX ORDER — METOPROLOL SUCCINATE 100 MG/1
100 TABLET, EXTENDED RELEASE ORAL 2 TIMES DAILY
Status: DISCONTINUED | OUTPATIENT
Start: 2019-02-07 | End: 2019-02-10 | Stop reason: HOSPADM

## 2019-02-07 RX ORDER — NICOTINE POLACRILEX 4 MG
15 LOZENGE BUCCAL PRN
Status: DISCONTINUED | OUTPATIENT
Start: 2019-02-07 | End: 2019-02-10 | Stop reason: HOSPADM

## 2019-02-07 RX ORDER — HYDRALAZINE HYDROCHLORIDE 50 MG/1
100 TABLET, FILM COATED ORAL EVERY 8 HOURS SCHEDULED
Status: DISCONTINUED | OUTPATIENT
Start: 2019-02-07 | End: 2019-02-10 | Stop reason: HOSPADM

## 2019-02-07 RX ORDER — FAMOTIDINE 20 MG/1
20 TABLET, FILM COATED ORAL DAILY
Status: DISCONTINUED | OUTPATIENT
Start: 2019-02-07 | End: 2019-02-10 | Stop reason: HOSPADM

## 2019-02-07 RX ORDER — ISOSORBIDE MONONITRATE 30 MG/1
30 TABLET, EXTENDED RELEASE ORAL 2 TIMES DAILY
Status: DISCONTINUED | OUTPATIENT
Start: 2019-02-07 | End: 2019-02-10 | Stop reason: HOSPADM

## 2019-02-07 RX ORDER — LISINOPRIL 5 MG/1
5 TABLET ORAL DAILY
Status: DISCONTINUED | OUTPATIENT
Start: 2019-02-07 | End: 2019-02-10 | Stop reason: HOSPADM

## 2019-02-07 RX ORDER — ATORVASTATIN CALCIUM 20 MG/1
20 TABLET, FILM COATED ORAL NIGHTLY
Status: DISCONTINUED | OUTPATIENT
Start: 2019-02-07 | End: 2019-02-10 | Stop reason: HOSPADM

## 2019-02-07 RX ADMIN — ISOSORBIDE MONONITRATE 30 MG: 30 TABLET, EXTENDED RELEASE ORAL at 08:24

## 2019-02-07 RX ADMIN — POTASSIUM CHLORIDE 20 MEQ: 20 TABLET, EXTENDED RELEASE ORAL at 08:24

## 2019-02-07 RX ADMIN — GABAPENTIN 100 MG: 100 CAPSULE ORAL at 08:24

## 2019-02-07 RX ADMIN — FAMOTIDINE 20 MG: 20 TABLET, FILM COATED ORAL at 08:24

## 2019-02-07 RX ADMIN — MAGNESIUM OXIDE TAB 400 MG (241.3 MG ELEMENTAL MG) 400 MG: 400 (241.3 MG) TAB at 11:16

## 2019-02-07 RX ADMIN — FUROSEMIDE 40 MG: 10 INJECTION, SOLUTION INTRAMUSCULAR; INTRAVENOUS at 00:16

## 2019-02-07 RX ADMIN — METOPROLOL SUCCINATE 100 MG: 100 TABLET, EXTENDED RELEASE ORAL at 21:38

## 2019-02-07 RX ADMIN — Medication 10 ML: at 21:35

## 2019-02-07 RX ADMIN — APIXABAN 5 MG: 5 TABLET, FILM COATED ORAL at 21:30

## 2019-02-07 RX ADMIN — INSULIN LISPRO 30 UNITS: 100 INJECTION, SOLUTION INTRAVENOUS; SUBCUTANEOUS at 11:41

## 2019-02-07 RX ADMIN — FUROSEMIDE 40 MG: 10 INJECTION, SOLUTION INTRAMUSCULAR; INTRAVENOUS at 08:25

## 2019-02-07 RX ADMIN — INSULIN LISPRO 3 UNITS: 100 INJECTION, SOLUTION INTRAVENOUS; SUBCUTANEOUS at 21:43

## 2019-02-07 RX ADMIN — LISINOPRIL 5 MG: 5 TABLET ORAL at 11:16

## 2019-02-07 RX ADMIN — BUSPIRONE HYDROCHLORIDE 15 MG: 7.5 TABLET ORAL at 14:10

## 2019-02-07 RX ADMIN — ALLOPURINOL 200 MG: 100 TABLET ORAL at 08:24

## 2019-02-07 RX ADMIN — FUROSEMIDE 40 MG: 10 INJECTION, SOLUTION INTRAMUSCULAR; INTRAVENOUS at 16:55

## 2019-02-07 RX ADMIN — DIGOXIN 125 MCG: 125 TABLET ORAL at 08:24

## 2019-02-07 RX ADMIN — ISOSORBIDE MONONITRATE 30 MG: 30 TABLET, EXTENDED RELEASE ORAL at 16:55

## 2019-02-07 RX ADMIN — DOFETILIDE 250 MCG: 0.25 CAPSULE ORAL at 21:34

## 2019-02-07 RX ADMIN — ATORVASTATIN CALCIUM 20 MG: 20 TABLET, FILM COATED ORAL at 21:34

## 2019-02-07 RX ADMIN — INSULIN LISPRO 3 UNITS: 100 INJECTION, SOLUTION INTRAVENOUS; SUBCUTANEOUS at 11:42

## 2019-02-07 RX ADMIN — HYDRALAZINE HYDROCHLORIDE 100 MG: 50 TABLET, FILM COATED ORAL at 08:24

## 2019-02-07 RX ADMIN — HYDRALAZINE HYDROCHLORIDE 100 MG: 50 TABLET, FILM COATED ORAL at 14:10

## 2019-02-07 RX ADMIN — DOFETILIDE 250 MCG: 0.25 CAPSULE ORAL at 01:37

## 2019-02-07 RX ADMIN — GABAPENTIN 100 MG: 100 CAPSULE ORAL at 21:30

## 2019-02-07 RX ADMIN — DOFETILIDE 250 MCG: 0.25 CAPSULE ORAL at 11:16

## 2019-02-07 RX ADMIN — METOPROLOL SUCCINATE 100 MG: 100 TABLET, EXTENDED RELEASE ORAL at 08:25

## 2019-02-07 RX ADMIN — SERTRALINE 100 MG: 100 TABLET, FILM COATED ORAL at 16:58

## 2019-02-07 RX ADMIN — ALLOPURINOL 200 MG: 100 TABLET ORAL at 16:55

## 2019-02-07 RX ADMIN — SPIRONOLACTONE 25 MG: 25 TABLET, FILM COATED ORAL at 08:25

## 2019-02-07 RX ADMIN — HYDRALAZINE HYDROCHLORIDE 100 MG: 50 TABLET, FILM COATED ORAL at 21:47

## 2019-02-07 RX ADMIN — ASPIRIN 81 MG 324 MG: 81 TABLET ORAL at 00:16

## 2019-02-07 RX ADMIN — Medication 10 ML: at 08:24

## 2019-02-07 RX ADMIN — GABAPENTIN 100 MG: 100 CAPSULE ORAL at 14:12

## 2019-02-07 ASSESSMENT — PAIN SCALES - GENERAL
PAINLEVEL_OUTOF10: 0

## 2019-02-08 LAB
ANION GAP SERPL CALCULATED.3IONS-SCNC: 11 MMOL/L (ref 7–16)
BASOPHILS ABSOLUTE: 0.03 E9/L (ref 0–0.2)
BASOPHILS RELATIVE PERCENT: 0.3 % (ref 0–2)
BUN BLDV-MCNC: 27 MG/DL (ref 8–23)
CALCIUM SERPL-MCNC: 8.6 MG/DL (ref 8.6–10.2)
CHLORIDE BLD-SCNC: 100 MMOL/L (ref 98–107)
CHOLESTEROL, TOTAL: 101 MG/DL (ref 0–199)
CO2: 24 MMOL/L (ref 22–29)
CREAT SERPL-MCNC: 1.4 MG/DL (ref 0.7–1.2)
EOSINOPHILS ABSOLUTE: 0.14 E9/L (ref 0.05–0.5)
EOSINOPHILS RELATIVE PERCENT: 1.6 % (ref 0–6)
GFR AFRICAN AMERICAN: >60
GFR NON-AFRICAN AMERICAN: >60 ML/MIN/1.73
GLUCOSE BLD-MCNC: 185 MG/DL (ref 74–99)
HCT VFR BLD CALC: 42 % (ref 37–54)
HDLC SERPL-MCNC: 34 MG/DL
HEMOGLOBIN: 13.9 G/DL (ref 12.5–16.5)
IMMATURE GRANULOCYTES #: 0.03 E9/L
IMMATURE GRANULOCYTES %: 0.3 % (ref 0–5)
LDL CHOLESTEROL CALCULATED: 55 MG/DL (ref 0–99)
LYMPHOCYTES ABSOLUTE: 1.88 E9/L (ref 1.5–4)
LYMPHOCYTES RELATIVE PERCENT: 21.5 % (ref 20–42)
MAGNESIUM: 2 MG/DL (ref 1.6–2.6)
MCH RBC QN AUTO: 30.5 PG (ref 26–35)
MCHC RBC AUTO-ENTMCNC: 33.1 % (ref 32–34.5)
MCV RBC AUTO: 92.1 FL (ref 80–99.9)
METER GLUCOSE: 136 MG/DL (ref 74–99)
METER GLUCOSE: 173 MG/DL (ref 74–99)
METER GLUCOSE: 187 MG/DL (ref 74–99)
METER GLUCOSE: 202 MG/DL (ref 74–99)
MONOCYTES ABSOLUTE: 0.88 E9/L (ref 0.1–0.95)
MONOCYTES RELATIVE PERCENT: 10.1 % (ref 2–12)
NEUTROPHILS ABSOLUTE: 5.77 E9/L (ref 1.8–7.3)
NEUTROPHILS RELATIVE PERCENT: 66.2 % (ref 43–80)
PDW BLD-RTO: 14.2 FL (ref 11.5–15)
PLATELET # BLD: 128 E9/L (ref 130–450)
PMV BLD AUTO: 12.2 FL (ref 7–12)
POTASSIUM SERPL-SCNC: 4.2 MMOL/L (ref 3.5–5)
RBC # BLD: 4.56 E12/L (ref 3.8–5.8)
SODIUM BLD-SCNC: 135 MMOL/L (ref 132–146)
TRIGL SERPL-MCNC: 62 MG/DL (ref 0–149)
VLDLC SERPL CALC-MCNC: 12 MG/DL
WBC # BLD: 8.7 E9/L (ref 4.5–11.5)

## 2019-02-08 PROCEDURE — 2580000003 HC RX 258: Performed by: HOSPITALIST

## 2019-02-08 PROCEDURE — 82962 GLUCOSE BLOOD TEST: CPT

## 2019-02-08 PROCEDURE — 85025 COMPLETE CBC W/AUTO DIFF WBC: CPT

## 2019-02-08 PROCEDURE — 80061 LIPID PANEL: CPT

## 2019-02-08 PROCEDURE — 99232 SBSQ HOSP IP/OBS MODERATE 35: CPT | Performed by: INTERNAL MEDICINE

## 2019-02-08 PROCEDURE — 6360000002 HC RX W HCPCS: Performed by: HOSPITALIST

## 2019-02-08 PROCEDURE — 36415 COLL VENOUS BLD VENIPUNCTURE: CPT

## 2019-02-08 PROCEDURE — 83735 ASSAY OF MAGNESIUM: CPT

## 2019-02-08 PROCEDURE — 2060000000 HC ICU INTERMEDIATE R&B

## 2019-02-08 PROCEDURE — 80048 BASIC METABOLIC PNL TOTAL CA: CPT

## 2019-02-08 PROCEDURE — 94660 CPAP INITIATION&MGMT: CPT

## 2019-02-08 PROCEDURE — 6370000000 HC RX 637 (ALT 250 FOR IP): Performed by: HOSPITALIST

## 2019-02-08 PROCEDURE — 6370000000 HC RX 637 (ALT 250 FOR IP): Performed by: INTERNAL MEDICINE

## 2019-02-08 RX ADMIN — ATORVASTATIN CALCIUM 20 MG: 20 TABLET, FILM COATED ORAL at 20:58

## 2019-02-08 RX ADMIN — SERTRALINE 100 MG: 100 TABLET, FILM COATED ORAL at 09:04

## 2019-02-08 RX ADMIN — HYDRALAZINE HYDROCHLORIDE 100 MG: 50 TABLET, FILM COATED ORAL at 20:58

## 2019-02-08 RX ADMIN — INSULIN LISPRO 3 UNITS: 100 INJECTION, SOLUTION INTRAVENOUS; SUBCUTANEOUS at 06:42

## 2019-02-08 RX ADMIN — LISINOPRIL 5 MG: 5 TABLET ORAL at 09:04

## 2019-02-08 RX ADMIN — GABAPENTIN 100 MG: 100 CAPSULE ORAL at 14:12

## 2019-02-08 RX ADMIN — INSULIN LISPRO 3 UNITS: 100 INJECTION, SOLUTION INTRAVENOUS; SUBCUTANEOUS at 11:54

## 2019-02-08 RX ADMIN — BUSPIRONE HYDROCHLORIDE 15 MG: 7.5 TABLET ORAL at 09:04

## 2019-02-08 RX ADMIN — APIXABAN 5 MG: 5 TABLET, FILM COATED ORAL at 09:04

## 2019-02-08 RX ADMIN — GABAPENTIN 100 MG: 100 CAPSULE ORAL at 20:57

## 2019-02-08 RX ADMIN — GABAPENTIN 100 MG: 100 CAPSULE ORAL at 09:04

## 2019-02-08 RX ADMIN — Medication 10 ML: at 09:05

## 2019-02-08 RX ADMIN — DOFETILIDE 250 MCG: 0.25 CAPSULE ORAL at 20:58

## 2019-02-08 RX ADMIN — HYDRALAZINE HYDROCHLORIDE 100 MG: 50 TABLET, FILM COATED ORAL at 14:12

## 2019-02-08 RX ADMIN — ISOSORBIDE MONONITRATE 30 MG: 30 TABLET, EXTENDED RELEASE ORAL at 15:29

## 2019-02-08 RX ADMIN — INSULIN LISPRO 30 UNITS: 100 INJECTION, SOLUTION INTRAVENOUS; SUBCUTANEOUS at 11:56

## 2019-02-08 RX ADMIN — Medication 10 ML: at 20:59

## 2019-02-08 RX ADMIN — FAMOTIDINE 20 MG: 20 TABLET, FILM COATED ORAL at 09:04

## 2019-02-08 RX ADMIN — APIXABAN 5 MG: 5 TABLET, FILM COATED ORAL at 20:58

## 2019-02-08 RX ADMIN — METOPROLOL SUCCINATE 100 MG: 100 TABLET, EXTENDED RELEASE ORAL at 20:58

## 2019-02-08 RX ADMIN — DIGOXIN 125 MCG: 125 TABLET ORAL at 09:05

## 2019-02-08 RX ADMIN — METOPROLOL SUCCINATE 100 MG: 100 TABLET, EXTENDED RELEASE ORAL at 09:04

## 2019-02-08 RX ADMIN — FUROSEMIDE 40 MG: 10 INJECTION, SOLUTION INTRAMUSCULAR; INTRAVENOUS at 09:05

## 2019-02-08 RX ADMIN — INSULIN LISPRO 3 UNITS: 100 INJECTION, SOLUTION INTRAVENOUS; SUBCUTANEOUS at 21:03

## 2019-02-08 RX ADMIN — ALLOPURINOL 200 MG: 100 TABLET ORAL at 09:05

## 2019-02-08 RX ADMIN — ALLOPURINOL 200 MG: 100 TABLET ORAL at 15:29

## 2019-02-08 RX ADMIN — MAGNESIUM OXIDE TAB 400 MG (241.3 MG ELEMENTAL MG) 400 MG: 400 (241.3 MG) TAB at 09:04

## 2019-02-08 RX ADMIN — SPIRONOLACTONE 25 MG: 25 TABLET, FILM COATED ORAL at 09:04

## 2019-02-08 RX ADMIN — POTASSIUM CHLORIDE 20 MEQ: 20 TABLET, EXTENDED RELEASE ORAL at 09:05

## 2019-02-08 RX ADMIN — DOFETILIDE 250 MCG: 0.25 CAPSULE ORAL at 09:04

## 2019-02-08 RX ADMIN — HYDRALAZINE HYDROCHLORIDE 100 MG: 50 TABLET, FILM COATED ORAL at 06:42

## 2019-02-08 RX ADMIN — ISOSORBIDE MONONITRATE 30 MG: 30 TABLET, EXTENDED RELEASE ORAL at 09:04

## 2019-02-08 RX ADMIN — INSULIN LISPRO 30 UNITS: 100 INJECTION, SOLUTION INTRAVENOUS; SUBCUTANEOUS at 06:44

## 2019-02-08 RX ADMIN — FUROSEMIDE 40 MG: 10 INJECTION, SOLUTION INTRAMUSCULAR; INTRAVENOUS at 17:03

## 2019-02-08 ASSESSMENT — PAIN SCALES - GENERAL
PAINLEVEL_OUTOF10: 0

## 2019-02-09 LAB
ANION GAP SERPL CALCULATED.3IONS-SCNC: 11 MMOL/L (ref 7–16)
BASOPHILS ABSOLUTE: 0.03 E9/L (ref 0–0.2)
BASOPHILS RELATIVE PERCENT: 0.3 % (ref 0–2)
BUN BLDV-MCNC: 28 MG/DL (ref 8–23)
CALCIUM SERPL-MCNC: 8.8 MG/DL (ref 8.6–10.2)
CHLORIDE BLD-SCNC: 98 MMOL/L (ref 98–107)
CO2: 26 MMOL/L (ref 22–29)
CREAT SERPL-MCNC: 1.2 MG/DL (ref 0.7–1.2)
EOSINOPHILS ABSOLUTE: 0.17 E9/L (ref 0.05–0.5)
EOSINOPHILS RELATIVE PERCENT: 1.9 % (ref 0–6)
GFR AFRICAN AMERICAN: >60
GFR NON-AFRICAN AMERICAN: >60 ML/MIN/1.73
GLUCOSE BLD-MCNC: 180 MG/DL (ref 74–99)
HCT VFR BLD CALC: 45.9 % (ref 37–54)
HEMOGLOBIN: 15.1 G/DL (ref 12.5–16.5)
IMMATURE GRANULOCYTES #: 0.03 E9/L
IMMATURE GRANULOCYTES %: 0.3 % (ref 0–5)
LYMPHOCYTES ABSOLUTE: 2.48 E9/L (ref 1.5–4)
LYMPHOCYTES RELATIVE PERCENT: 28.4 % (ref 20–42)
MAGNESIUM: 2.3 MG/DL (ref 1.6–2.6)
MCH RBC QN AUTO: 30.6 PG (ref 26–35)
MCHC RBC AUTO-ENTMCNC: 32.9 % (ref 32–34.5)
MCV RBC AUTO: 93.1 FL (ref 80–99.9)
METER GLUCOSE: 106 MG/DL (ref 74–99)
METER GLUCOSE: 115 MG/DL (ref 74–99)
METER GLUCOSE: 148 MG/DL (ref 74–99)
METER GLUCOSE: 221 MG/DL (ref 74–99)
METER GLUCOSE: 69 MG/DL (ref 74–99)
METER GLUCOSE: 85 MG/DL (ref 74–99)
MONOCYTES ABSOLUTE: 0.9 E9/L (ref 0.1–0.95)
MONOCYTES RELATIVE PERCENT: 10.3 % (ref 2–12)
NEUTROPHILS ABSOLUTE: 5.13 E9/L (ref 1.8–7.3)
NEUTROPHILS RELATIVE PERCENT: 58.8 % (ref 43–80)
PDW BLD-RTO: 14.1 FL (ref 11.5–15)
PLATELET # BLD: 158 E9/L (ref 130–450)
PMV BLD AUTO: 12.4 FL (ref 7–12)
POTASSIUM SERPL-SCNC: 4.4 MMOL/L (ref 3.5–5)
RBC # BLD: 4.93 E12/L (ref 3.8–5.8)
SODIUM BLD-SCNC: 135 MMOL/L (ref 132–146)
WBC # BLD: 8.7 E9/L (ref 4.5–11.5)

## 2019-02-09 PROCEDURE — 99232 SBSQ HOSP IP/OBS MODERATE 35: CPT | Performed by: INTERNAL MEDICINE

## 2019-02-09 PROCEDURE — 36415 COLL VENOUS BLD VENIPUNCTURE: CPT

## 2019-02-09 PROCEDURE — 82962 GLUCOSE BLOOD TEST: CPT

## 2019-02-09 PROCEDURE — 6370000000 HC RX 637 (ALT 250 FOR IP): Performed by: INTERNAL MEDICINE

## 2019-02-09 PROCEDURE — 85025 COMPLETE CBC W/AUTO DIFF WBC: CPT

## 2019-02-09 PROCEDURE — 99232 SBSQ HOSP IP/OBS MODERATE 35: CPT | Performed by: NURSE PRACTITIONER

## 2019-02-09 PROCEDURE — 2580000003 HC RX 258: Performed by: HOSPITALIST

## 2019-02-09 PROCEDURE — 6360000002 HC RX W HCPCS: Performed by: HOSPITALIST

## 2019-02-09 PROCEDURE — APPSS30 APP SPLIT SHARED TIME 16-30 MINUTES: Performed by: NURSE PRACTITIONER

## 2019-02-09 PROCEDURE — 83735 ASSAY OF MAGNESIUM: CPT

## 2019-02-09 PROCEDURE — 2060000000 HC ICU INTERMEDIATE R&B

## 2019-02-09 PROCEDURE — 94660 CPAP INITIATION&MGMT: CPT

## 2019-02-09 PROCEDURE — 6370000000 HC RX 637 (ALT 250 FOR IP): Performed by: HOSPITALIST

## 2019-02-09 PROCEDURE — 80048 BASIC METABOLIC PNL TOTAL CA: CPT

## 2019-02-09 RX ADMIN — APIXABAN 5 MG: 5 TABLET, FILM COATED ORAL at 20:33

## 2019-02-09 RX ADMIN — HYDRALAZINE HYDROCHLORIDE 100 MG: 50 TABLET, FILM COATED ORAL at 06:29

## 2019-02-09 RX ADMIN — INSULIN LISPRO 3 UNITS: 100 INJECTION, SOLUTION INTRAVENOUS; SUBCUTANEOUS at 20:39

## 2019-02-09 RX ADMIN — ALLOPURINOL 200 MG: 100 TABLET ORAL at 16:50

## 2019-02-09 RX ADMIN — DOFETILIDE 250 MCG: 0.25 CAPSULE ORAL at 08:24

## 2019-02-09 RX ADMIN — Medication 10 ML: at 16:50

## 2019-02-09 RX ADMIN — GABAPENTIN 100 MG: 100 CAPSULE ORAL at 20:33

## 2019-02-09 RX ADMIN — APIXABAN 5 MG: 5 TABLET, FILM COATED ORAL at 08:24

## 2019-02-09 RX ADMIN — HYDRALAZINE HYDROCHLORIDE 100 MG: 50 TABLET, FILM COATED ORAL at 13:29

## 2019-02-09 RX ADMIN — METOPROLOL SUCCINATE 100 MG: 100 TABLET, EXTENDED RELEASE ORAL at 08:24

## 2019-02-09 RX ADMIN — FUROSEMIDE 40 MG: 10 INJECTION, SOLUTION INTRAMUSCULAR; INTRAVENOUS at 08:25

## 2019-02-09 RX ADMIN — METOPROLOL SUCCINATE 100 MG: 100 TABLET, EXTENDED RELEASE ORAL at 20:36

## 2019-02-09 RX ADMIN — Medication 10 ML: at 08:25

## 2019-02-09 RX ADMIN — INSULIN LISPRO 30 UNITS: 100 INJECTION, SOLUTION INTRAVENOUS; SUBCUTANEOUS at 07:55

## 2019-02-09 RX ADMIN — SPIRONOLACTONE 25 MG: 25 TABLET, FILM COATED ORAL at 08:24

## 2019-02-09 RX ADMIN — INSULIN LISPRO 3 UNITS: 100 INJECTION, SOLUTION INTRAVENOUS; SUBCUTANEOUS at 07:54

## 2019-02-09 RX ADMIN — Medication 10 ML: at 20:37

## 2019-02-09 RX ADMIN — POTASSIUM CHLORIDE 20 MEQ: 20 TABLET, EXTENDED RELEASE ORAL at 08:24

## 2019-02-09 RX ADMIN — GABAPENTIN 100 MG: 100 CAPSULE ORAL at 13:29

## 2019-02-09 RX ADMIN — ISOSORBIDE MONONITRATE 30 MG: 30 TABLET, EXTENDED RELEASE ORAL at 16:50

## 2019-02-09 RX ADMIN — BUSPIRONE HYDROCHLORIDE 15 MG: 7.5 TABLET ORAL at 08:25

## 2019-02-09 RX ADMIN — FUROSEMIDE 40 MG: 10 INJECTION, SOLUTION INTRAMUSCULAR; INTRAVENOUS at 16:50

## 2019-02-09 RX ADMIN — HYDRALAZINE HYDROCHLORIDE 100 MG: 50 TABLET, FILM COATED ORAL at 20:36

## 2019-02-09 RX ADMIN — INSULIN LISPRO 30 UNITS: 100 INJECTION, SOLUTION INTRAVENOUS; SUBCUTANEOUS at 12:00

## 2019-02-09 RX ADMIN — ISOSORBIDE MONONITRATE 30 MG: 30 TABLET, EXTENDED RELEASE ORAL at 08:25

## 2019-02-09 RX ADMIN — SERTRALINE 100 MG: 100 TABLET, FILM COATED ORAL at 08:25

## 2019-02-09 RX ADMIN — ATORVASTATIN CALCIUM 20 MG: 20 TABLET, FILM COATED ORAL at 20:33

## 2019-02-09 RX ADMIN — LISINOPRIL 5 MG: 5 TABLET ORAL at 08:24

## 2019-02-09 RX ADMIN — ALLOPURINOL 200 MG: 100 TABLET ORAL at 08:24

## 2019-02-09 RX ADMIN — DOFETILIDE 250 MCG: 0.25 CAPSULE ORAL at 20:33

## 2019-02-09 RX ADMIN — MAGNESIUM OXIDE TAB 400 MG (241.3 MG ELEMENTAL MG) 400 MG: 400 (241.3 MG) TAB at 08:24

## 2019-02-09 RX ADMIN — GABAPENTIN 100 MG: 100 CAPSULE ORAL at 08:24

## 2019-02-09 RX ADMIN — DIGOXIN 125 MCG: 125 TABLET ORAL at 08:24

## 2019-02-09 RX ADMIN — FAMOTIDINE 20 MG: 20 TABLET, FILM COATED ORAL at 08:25

## 2019-02-09 ASSESSMENT — PAIN SCALES - GENERAL
PAINLEVEL_OUTOF10: 0

## 2019-02-10 VITALS
SYSTOLIC BLOOD PRESSURE: 104 MMHG | TEMPERATURE: 97.8 F | WEIGHT: 315 LBS | OXYGEN SATURATION: 97 % | RESPIRATION RATE: 18 BRPM | DIASTOLIC BLOOD PRESSURE: 69 MMHG | HEIGHT: 71 IN | BODY MASS INDEX: 44.1 KG/M2 | HEART RATE: 82 BPM

## 2019-02-10 LAB
ANION GAP SERPL CALCULATED.3IONS-SCNC: 10 MMOL/L (ref 7–16)
BASOPHILS ABSOLUTE: 0.03 E9/L (ref 0–0.2)
BASOPHILS RELATIVE PERCENT: 0.4 % (ref 0–2)
BUN BLDV-MCNC: 29 MG/DL (ref 8–23)
CALCIUM SERPL-MCNC: 8.4 MG/DL (ref 8.6–10.2)
CHLORIDE BLD-SCNC: 99 MMOL/L (ref 98–107)
CO2: 25 MMOL/L (ref 22–29)
CREAT SERPL-MCNC: 1.3 MG/DL (ref 0.7–1.2)
EOSINOPHILS ABSOLUTE: 0.15 E9/L (ref 0.05–0.5)
EOSINOPHILS RELATIVE PERCENT: 2.2 % (ref 0–6)
GFR AFRICAN AMERICAN: >60
GFR NON-AFRICAN AMERICAN: >60 ML/MIN/1.73
GLUCOSE BLD-MCNC: 156 MG/DL (ref 74–99)
HCT VFR BLD CALC: 42.1 % (ref 37–54)
HEMOGLOBIN: 13.7 G/DL (ref 12.5–16.5)
IMMATURE GRANULOCYTES #: 0.01 E9/L
IMMATURE GRANULOCYTES %: 0.1 % (ref 0–5)
LYMPHOCYTES ABSOLUTE: 2.02 E9/L (ref 1.5–4)
LYMPHOCYTES RELATIVE PERCENT: 29.1 % (ref 20–42)
MAGNESIUM: 2.3 MG/DL (ref 1.6–2.6)
MCH RBC QN AUTO: 30.5 PG (ref 26–35)
MCHC RBC AUTO-ENTMCNC: 32.5 % (ref 32–34.5)
MCV RBC AUTO: 93.8 FL (ref 80–99.9)
METER GLUCOSE: 146 MG/DL (ref 74–99)
METER GLUCOSE: 232 MG/DL (ref 74–99)
METER GLUCOSE: 267 MG/DL (ref 74–99)
MONOCYTES ABSOLUTE: 0.76 E9/L (ref 0.1–0.95)
MONOCYTES RELATIVE PERCENT: 11 % (ref 2–12)
NEUTROPHILS ABSOLUTE: 3.97 E9/L (ref 1.8–7.3)
NEUTROPHILS RELATIVE PERCENT: 57.2 % (ref 43–80)
PDW BLD-RTO: 14 FL (ref 11.5–15)
PLATELET # BLD: 151 E9/L (ref 130–450)
PMV BLD AUTO: 12.3 FL (ref 7–12)
POTASSIUM SERPL-SCNC: 4.1 MMOL/L (ref 3.5–5)
RBC # BLD: 4.49 E12/L (ref 3.8–5.8)
SODIUM BLD-SCNC: 134 MMOL/L (ref 132–146)
WBC # BLD: 6.9 E9/L (ref 4.5–11.5)

## 2019-02-10 PROCEDURE — 80048 BASIC METABOLIC PNL TOTAL CA: CPT

## 2019-02-10 PROCEDURE — 94660 CPAP INITIATION&MGMT: CPT

## 2019-02-10 PROCEDURE — APPSS45 APP SPLIT SHARED TIME 31-45 MINUTES: Performed by: NURSE PRACTITIONER

## 2019-02-10 PROCEDURE — 6360000002 HC RX W HCPCS: Performed by: HOSPITALIST

## 2019-02-10 PROCEDURE — 99232 SBSQ HOSP IP/OBS MODERATE 35: CPT | Performed by: NURSE PRACTITIONER

## 2019-02-10 PROCEDURE — 6370000000 HC RX 637 (ALT 250 FOR IP): Performed by: INTERNAL MEDICINE

## 2019-02-10 PROCEDURE — 82962 GLUCOSE BLOOD TEST: CPT

## 2019-02-10 PROCEDURE — 6360000002 HC RX W HCPCS: Performed by: NURSE PRACTITIONER

## 2019-02-10 PROCEDURE — 99239 HOSP IP/OBS DSCHRG MGMT >30: CPT | Performed by: INTERNAL MEDICINE

## 2019-02-10 PROCEDURE — 6370000000 HC RX 637 (ALT 250 FOR IP): Performed by: HOSPITALIST

## 2019-02-10 PROCEDURE — 83735 ASSAY OF MAGNESIUM: CPT

## 2019-02-10 PROCEDURE — 36415 COLL VENOUS BLD VENIPUNCTURE: CPT

## 2019-02-10 PROCEDURE — 85025 COMPLETE CBC W/AUTO DIFF WBC: CPT

## 2019-02-10 PROCEDURE — 2580000003 HC RX 258: Performed by: HOSPITALIST

## 2019-02-10 RX ORDER — FUROSEMIDE 10 MG/ML
40 INJECTION INTRAMUSCULAR; INTRAVENOUS ONCE
Status: COMPLETED | OUTPATIENT
Start: 2019-02-10 | End: 2019-02-10

## 2019-02-10 RX ORDER — FUROSEMIDE 40 MG/1
40 TABLET ORAL 2 TIMES DAILY
Status: DISCONTINUED | OUTPATIENT
Start: 2019-02-11 | End: 2019-02-10 | Stop reason: HOSPADM

## 2019-02-10 RX ADMIN — INSULIN LISPRO 6 UNITS: 100 INJECTION, SOLUTION INTRAVENOUS; SUBCUTANEOUS at 16:51

## 2019-02-10 RX ADMIN — DIGOXIN 125 MCG: 125 TABLET ORAL at 08:37

## 2019-02-10 RX ADMIN — FUROSEMIDE 40 MG: 10 INJECTION, SOLUTION INTRAMUSCULAR; INTRAVENOUS at 14:30

## 2019-02-10 RX ADMIN — GABAPENTIN 100 MG: 100 CAPSULE ORAL at 13:48

## 2019-02-10 RX ADMIN — Medication 10 ML: at 08:38

## 2019-02-10 RX ADMIN — DOFETILIDE 250 MCG: 0.25 CAPSULE ORAL at 08:39

## 2019-02-10 RX ADMIN — INSULIN LISPRO 3 UNITS: 100 INJECTION, SOLUTION INTRAVENOUS; SUBCUTANEOUS at 06:30

## 2019-02-10 RX ADMIN — HYDRALAZINE HYDROCHLORIDE 100 MG: 50 TABLET, FILM COATED ORAL at 14:30

## 2019-02-10 RX ADMIN — SERTRALINE 100 MG: 100 TABLET, FILM COATED ORAL at 08:39

## 2019-02-10 RX ADMIN — APIXABAN 5 MG: 5 TABLET, FILM COATED ORAL at 08:38

## 2019-02-10 RX ADMIN — HYDRALAZINE HYDROCHLORIDE 100 MG: 50 TABLET, FILM COATED ORAL at 06:30

## 2019-02-10 RX ADMIN — FAMOTIDINE 20 MG: 20 TABLET, FILM COATED ORAL at 08:37

## 2019-02-10 RX ADMIN — LISINOPRIL 5 MG: 5 TABLET ORAL at 08:38

## 2019-02-10 RX ADMIN — BUSPIRONE HYDROCHLORIDE 15 MG: 7.5 TABLET ORAL at 08:39

## 2019-02-10 RX ADMIN — INSULIN LISPRO 9 UNITS: 100 INJECTION, SOLUTION INTRAVENOUS; SUBCUTANEOUS at 11:43

## 2019-02-10 RX ADMIN — FUROSEMIDE 40 MG: 10 INJECTION, SOLUTION INTRAMUSCULAR; INTRAVENOUS at 08:37

## 2019-02-10 RX ADMIN — ISOSORBIDE MONONITRATE 30 MG: 30 TABLET, EXTENDED RELEASE ORAL at 08:37

## 2019-02-10 RX ADMIN — SPIRONOLACTONE 25 MG: 25 TABLET, FILM COATED ORAL at 08:38

## 2019-02-10 RX ADMIN — ISOSORBIDE MONONITRATE 30 MG: 30 TABLET, EXTENDED RELEASE ORAL at 16:11

## 2019-02-10 RX ADMIN — SODIUM CHLORIDE, PRESERVATIVE FREE 10 ML: 5 INJECTION INTRAVENOUS at 08:38

## 2019-02-10 RX ADMIN — MAGNESIUM OXIDE TAB 400 MG (241.3 MG ELEMENTAL MG) 400 MG: 400 (241.3 MG) TAB at 08:38

## 2019-02-10 RX ADMIN — METOPROLOL SUCCINATE 100 MG: 100 TABLET, EXTENDED RELEASE ORAL at 08:38

## 2019-02-10 RX ADMIN — POTASSIUM CHLORIDE 20 MEQ: 20 TABLET, EXTENDED RELEASE ORAL at 08:38

## 2019-02-10 RX ADMIN — GABAPENTIN 100 MG: 100 CAPSULE ORAL at 08:38

## 2019-02-10 RX ADMIN — ALLOPURINOL 200 MG: 100 TABLET ORAL at 16:11

## 2019-02-10 RX ADMIN — ALLOPURINOL 200 MG: 100 TABLET ORAL at 08:38

## 2019-02-10 ASSESSMENT — PAIN SCALES - GENERAL
PAINLEVEL_OUTOF10: 0
PAINLEVEL_OUTOF10: 0

## 2019-02-11 ENCOUNTER — TELEPHONE (OUTPATIENT)
Dept: CARDIOLOGY CLINIC | Age: 69
End: 2019-02-11

## 2019-02-11 ENCOUNTER — CARE COORDINATION (OUTPATIENT)
Dept: CASE MANAGEMENT | Age: 69
End: 2019-02-11

## 2019-02-12 LAB
EKG ATRIAL RATE: 78 BPM
EKG P AXIS: 15 DEGREES
EKG P-R INTERVAL: 172 MS
EKG Q-T INTERVAL: 454 MS
EKG QRS DURATION: 164 MS
EKG QTC CALCULATION (BAZETT): 517 MS
EKG R AXIS: -139 DEGREES
EKG T AXIS: 20 DEGREES
EKG VENTRICULAR RATE: 78 BPM

## 2019-02-13 ENCOUNTER — HOSPITAL ENCOUNTER (OUTPATIENT)
Dept: OTHER | Age: 69
Setting detail: THERAPIES SERIES
Discharge: HOME | End: 2019-02-13
Payer: COMMERCIAL

## 2019-02-13 VITALS
WEIGHT: 315 LBS | SYSTOLIC BLOOD PRESSURE: 125 MMHG | DIASTOLIC BLOOD PRESSURE: 79 MMHG | HEART RATE: 69 BPM | RESPIRATION RATE: 18 BRPM | BODY MASS INDEX: 46.88 KG/M2

## 2019-02-13 LAB
ANION GAP SERPL CALCULATED.3IONS-SCNC: 10 MMOL/L (ref 7–16)
BUN BLDV-MCNC: 29 MG/DL (ref 8–23)
CALCIUM SERPL-MCNC: 9.8 MG/DL (ref 8.6–10.2)
CHLORIDE BLD-SCNC: 101 MMOL/L (ref 98–107)
CO2: 28 MMOL/L (ref 22–29)
CREAT SERPL-MCNC: 1.4 MG/DL (ref 0.7–1.2)
GFR AFRICAN AMERICAN: >60
GFR NON-AFRICAN AMERICAN: >60 ML/MIN/1.73
GLUCOSE BLD-MCNC: 165 MG/DL (ref 74–99)
POTASSIUM SERPL-SCNC: 4.4 MMOL/L (ref 3.5–5)
PRO-BNP: 1589 PG/ML (ref 0–125)
SODIUM BLD-SCNC: 139 MMOL/L (ref 132–146)

## 2019-02-13 PROCEDURE — 83880 ASSAY OF NATRIURETIC PEPTIDE: CPT

## 2019-02-13 PROCEDURE — 80048 BASIC METABOLIC PNL TOTAL CA: CPT

## 2019-02-13 PROCEDURE — 36415 COLL VENOUS BLD VENIPUNCTURE: CPT

## 2019-02-13 PROCEDURE — 99214 OFFICE O/P EST MOD 30 MIN: CPT

## 2019-02-21 ENCOUNTER — HOSPITAL ENCOUNTER (OUTPATIENT)
Dept: OTHER | Age: 69
Setting detail: THERAPIES SERIES
Discharge: HOME | End: 2019-02-21
Payer: COMMERCIAL

## 2019-02-21 VITALS
DIASTOLIC BLOOD PRESSURE: 83 MMHG | WEIGHT: 315 LBS | RESPIRATION RATE: 18 BRPM | BODY MASS INDEX: 47.07 KG/M2 | SYSTOLIC BLOOD PRESSURE: 134 MMHG | HEART RATE: 81 BPM

## 2019-02-21 LAB
ANION GAP SERPL CALCULATED.3IONS-SCNC: 12 MMOL/L (ref 7–16)
BUN BLDV-MCNC: 25 MG/DL (ref 8–23)
CALCIUM SERPL-MCNC: 9.3 MG/DL (ref 8.6–10.2)
CHLORIDE BLD-SCNC: 100 MMOL/L (ref 98–107)
CO2: 25 MMOL/L (ref 22–29)
CREAT SERPL-MCNC: 1.3 MG/DL (ref 0.7–1.2)
GFR AFRICAN AMERICAN: >60
GFR NON-AFRICAN AMERICAN: >60 ML/MIN/1.73
GLUCOSE BLD-MCNC: 268 MG/DL (ref 74–99)
POTASSIUM SERPL-SCNC: 4.1 MMOL/L (ref 3.5–5)
PRO-BNP: 999 PG/ML (ref 0–125)
SODIUM BLD-SCNC: 137 MMOL/L (ref 132–146)

## 2019-02-21 PROCEDURE — 36415 COLL VENOUS BLD VENIPUNCTURE: CPT

## 2019-02-21 PROCEDURE — 83880 ASSAY OF NATRIURETIC PEPTIDE: CPT

## 2019-02-21 PROCEDURE — 80048 BASIC METABOLIC PNL TOTAL CA: CPT

## 2019-02-21 PROCEDURE — 99214 OFFICE O/P EST MOD 30 MIN: CPT

## 2019-02-22 ENCOUNTER — CARE COORDINATION (OUTPATIENT)
Dept: CARE COORDINATION | Age: 69
End: 2019-02-22

## 2019-02-26 ENCOUNTER — OFFICE VISIT (OUTPATIENT)
Dept: CARDIOLOGY CLINIC | Age: 69
End: 2019-02-26
Payer: COMMERCIAL

## 2019-02-26 VITALS
BODY MASS INDEX: 44.1 KG/M2 | WEIGHT: 315 LBS | HEIGHT: 71 IN | DIASTOLIC BLOOD PRESSURE: 68 MMHG | HEART RATE: 60 BPM | RESPIRATION RATE: 18 BRPM | SYSTOLIC BLOOD PRESSURE: 102 MMHG

## 2019-02-26 DIAGNOSIS — N18.2 CKD (CHRONIC KIDNEY DISEASE) STAGE 2, GFR 60-89 ML/MIN: ICD-10-CM

## 2019-02-26 DIAGNOSIS — Z95.810 BIVENTRICULAR IMPLANTABLE CARDIOVERTER-DEFIBRILLATOR IN SITU: ICD-10-CM

## 2019-02-26 DIAGNOSIS — I44.7 LBBB (LEFT BUNDLE BRANCH BLOCK): ICD-10-CM

## 2019-02-26 DIAGNOSIS — I42.8 NICM (NONISCHEMIC CARDIOMYOPATHY) (HCC): Primary | ICD-10-CM

## 2019-02-26 DIAGNOSIS — I50.22 CHRONIC SYSTOLIC HEART FAILURE (HCC): ICD-10-CM

## 2019-02-26 DIAGNOSIS — I48.0 PAF (PAROXYSMAL ATRIAL FIBRILLATION) (HCC): ICD-10-CM

## 2019-02-26 PROCEDURE — 4040F PNEUMOC VAC/ADMIN/RCVD: CPT | Performed by: PHYSICIAN ASSISTANT

## 2019-02-26 PROCEDURE — 3017F COLORECTAL CA SCREEN DOC REV: CPT | Performed by: PHYSICIAN ASSISTANT

## 2019-02-26 PROCEDURE — G8427 DOCREV CUR MEDS BY ELIG CLIN: HCPCS | Performed by: PHYSICIAN ASSISTANT

## 2019-02-26 PROCEDURE — 1101F PT FALLS ASSESS-DOCD LE1/YR: CPT | Performed by: PHYSICIAN ASSISTANT

## 2019-02-26 PROCEDURE — 1123F ACP DISCUSS/DSCN MKR DOCD: CPT | Performed by: PHYSICIAN ASSISTANT

## 2019-02-26 PROCEDURE — 1036F TOBACCO NON-USER: CPT | Performed by: PHYSICIAN ASSISTANT

## 2019-02-26 PROCEDURE — G8417 CALC BMI ABV UP PARAM F/U: HCPCS | Performed by: PHYSICIAN ASSISTANT

## 2019-02-26 PROCEDURE — 99214 OFFICE O/P EST MOD 30 MIN: CPT | Performed by: PHYSICIAN ASSISTANT

## 2019-02-26 PROCEDURE — 93000 ELECTROCARDIOGRAM COMPLETE: CPT | Performed by: INTERNAL MEDICINE

## 2019-02-26 PROCEDURE — G8484 FLU IMMUNIZE NO ADMIN: HCPCS | Performed by: PHYSICIAN ASSISTANT

## 2019-02-26 RX ORDER — COLCHICINE 0.6 MG/1
0.6 TABLET ORAL DAILY
COMMUNITY
End: 2019-03-26 | Stop reason: ALTCHOICE

## 2019-02-26 RX ORDER — METOLAZONE 5 MG/1
5 TABLET ORAL DAILY
COMMUNITY
End: 2019-07-01 | Stop reason: SDUPTHER

## 2019-02-26 RX ORDER — BUMETANIDE 2 MG/1
2 TABLET ORAL DAILY
Qty: 30 TABLET | Refills: 5 | Status: SHIPPED | OUTPATIENT
Start: 2019-02-26 | End: 2019-03-26 | Stop reason: SDUPTHER

## 2019-02-26 RX ORDER — TORSEMIDE 20 MG/1
20 TABLET ORAL DAILY
COMMUNITY
End: 2019-02-26 | Stop reason: ALTCHOICE

## 2019-03-08 ENCOUNTER — HOSPITAL ENCOUNTER (OUTPATIENT)
Dept: OTHER | Age: 69
Setting detail: THERAPIES SERIES
Discharge: HOME | End: 2019-03-08
Payer: COMMERCIAL

## 2019-03-08 DIAGNOSIS — Z12.11 SCREEN FOR COLON CANCER: ICD-10-CM

## 2019-03-08 LAB
CONTROL: NORMAL
HEMOCCULT STL QL: NEGATIVE

## 2019-03-08 PROCEDURE — 82274 ASSAY TEST FOR BLOOD FECAL: CPT | Performed by: FAMILY MEDICINE

## 2019-03-12 ENCOUNTER — OFFICE VISIT (OUTPATIENT)
Dept: FAMILY MEDICINE CLINIC | Age: 69
End: 2019-03-12
Payer: COMMERCIAL

## 2019-03-12 ENCOUNTER — CARE COORDINATION (OUTPATIENT)
Dept: CARE COORDINATION | Age: 69
End: 2019-03-12

## 2019-03-12 VITALS
HEART RATE: 67 BPM | SYSTOLIC BLOOD PRESSURE: 115 MMHG | WEIGHT: 315 LBS | DIASTOLIC BLOOD PRESSURE: 78 MMHG | RESPIRATION RATE: 16 BRPM | BODY MASS INDEX: 44.1 KG/M2 | HEIGHT: 71 IN | OXYGEN SATURATION: 96 %

## 2019-03-12 DIAGNOSIS — E11.65 UNCONTROLLED TYPE 2 DIABETES MELLITUS WITH HYPERGLYCEMIA (HCC): ICD-10-CM

## 2019-03-12 DIAGNOSIS — Z23 NEED FOR PNEUMOCOCCAL VACCINATION: ICD-10-CM

## 2019-03-12 DIAGNOSIS — E66.01 MORBID OBESITY WITH BMI OF 45.0-49.9, ADULT (HCC): ICD-10-CM

## 2019-03-12 DIAGNOSIS — I10 HTN (HYPERTENSION), BENIGN: Primary | ICD-10-CM

## 2019-03-12 DIAGNOSIS — E78.2 MIXED HYPERLIPIDEMIA: ICD-10-CM

## 2019-03-12 DIAGNOSIS — F33.41 RECURRENT MAJOR DEPRESSIVE DISORDER, IN PARTIAL REMISSION (HCC): ICD-10-CM

## 2019-03-12 DIAGNOSIS — I50.9 CHF (NYHA CLASS III, ACC/AHA STAGE C) (HCC): ICD-10-CM

## 2019-03-12 PROCEDURE — G8417 CALC BMI ABV UP PARAM F/U: HCPCS | Performed by: FAMILY MEDICINE

## 2019-03-12 PROCEDURE — 1101F PT FALLS ASSESS-DOCD LE1/YR: CPT | Performed by: FAMILY MEDICINE

## 2019-03-12 PROCEDURE — 3046F HEMOGLOBIN A1C LEVEL >9.0%: CPT | Performed by: FAMILY MEDICINE

## 2019-03-12 PROCEDURE — 1036F TOBACCO NON-USER: CPT | Performed by: FAMILY MEDICINE

## 2019-03-12 PROCEDURE — 2022F DILAT RTA XM EVC RTNOPTHY: CPT | Performed by: FAMILY MEDICINE

## 2019-03-12 PROCEDURE — 90732 PPSV23 VACC 2 YRS+ SUBQ/IM: CPT | Performed by: FAMILY MEDICINE

## 2019-03-12 PROCEDURE — 4040F PNEUMOC VAC/ADMIN/RCVD: CPT | Performed by: FAMILY MEDICINE

## 2019-03-12 PROCEDURE — 3017F COLORECTAL CA SCREEN DOC REV: CPT | Performed by: FAMILY MEDICINE

## 2019-03-12 PROCEDURE — G8482 FLU IMMUNIZE ORDER/ADMIN: HCPCS | Performed by: FAMILY MEDICINE

## 2019-03-12 PROCEDURE — 1123F ACP DISCUSS/DSCN MKR DOCD: CPT | Performed by: FAMILY MEDICINE

## 2019-03-12 PROCEDURE — G8427 DOCREV CUR MEDS BY ELIG CLIN: HCPCS | Performed by: FAMILY MEDICINE

## 2019-03-12 PROCEDURE — 90471 IMMUNIZATION ADMIN: CPT | Performed by: FAMILY MEDICINE

## 2019-03-12 PROCEDURE — 99214 OFFICE O/P EST MOD 30 MIN: CPT | Performed by: FAMILY MEDICINE

## 2019-03-12 PROCEDURE — 1111F DSCHRG MED/CURRENT MED MERGE: CPT | Performed by: FAMILY MEDICINE

## 2019-03-21 ENCOUNTER — HOSPITAL ENCOUNTER (OUTPATIENT)
Dept: OTHER | Age: 69
Setting detail: THERAPIES SERIES
Discharge: HOME | End: 2019-03-21
Payer: COMMERCIAL

## 2019-03-26 ENCOUNTER — HOSPITAL ENCOUNTER (OUTPATIENT)
Age: 69
Discharge: HOME | End: 2019-03-28
Payer: COMMERCIAL

## 2019-03-26 ENCOUNTER — OFFICE VISIT (OUTPATIENT)
Dept: CARDIOLOGY CLINIC | Age: 69
End: 2019-03-26
Payer: COMMERCIAL

## 2019-03-26 VITALS
HEART RATE: 61 BPM | WEIGHT: 315 LBS | SYSTOLIC BLOOD PRESSURE: 108 MMHG | RESPIRATION RATE: 16 BRPM | DIASTOLIC BLOOD PRESSURE: 60 MMHG | HEIGHT: 71 IN | BODY MASS INDEX: 44.1 KG/M2

## 2019-03-26 DIAGNOSIS — Z95.810 BIVENTRICULAR IMPLANTABLE CARDIOVERTER-DEFIBRILLATOR IN SITU: ICD-10-CM

## 2019-03-26 DIAGNOSIS — I10 HTN (HYPERTENSION), BENIGN: Chronic | ICD-10-CM

## 2019-03-26 DIAGNOSIS — I44.7 LBBB (LEFT BUNDLE BRANCH BLOCK): ICD-10-CM

## 2019-03-26 DIAGNOSIS — I38 VHD (VALVULAR HEART DISEASE): ICD-10-CM

## 2019-03-26 DIAGNOSIS — I42.8 NICM (NONISCHEMIC CARDIOMYOPATHY) (HCC): ICD-10-CM

## 2019-03-26 DIAGNOSIS — I48.0 PAF (PAROXYSMAL ATRIAL FIBRILLATION) (HCC): ICD-10-CM

## 2019-03-26 DIAGNOSIS — N18.2 CKD (CHRONIC KIDNEY DISEASE) STAGE 2, GFR 60-89 ML/MIN: ICD-10-CM

## 2019-03-26 DIAGNOSIS — I50.22 CHRONIC SYSTOLIC HEART FAILURE (HCC): ICD-10-CM

## 2019-03-26 DIAGNOSIS — I50.22 CHRONIC SYSTOLIC HEART FAILURE (HCC): Primary | ICD-10-CM

## 2019-03-26 LAB
ANION GAP SERPL CALCULATED.3IONS-SCNC: 16 MMOL/L (ref 7–16)
BUN BLDV-MCNC: 29 MG/DL (ref 8–23)
CALCIUM SERPL-MCNC: 8.9 MG/DL (ref 8.6–10.2)
CHLORIDE BLD-SCNC: 100 MMOL/L (ref 98–107)
CO2: 24 MMOL/L (ref 22–29)
CREAT SERPL-MCNC: 1.3 MG/DL (ref 0.7–1.2)
GFR AFRICAN AMERICAN: >60
GFR NON-AFRICAN AMERICAN: >60 ML/MIN/1.73
GLUCOSE BLD-MCNC: 184 MG/DL (ref 74–99)
POTASSIUM SERPL-SCNC: 4.2 MMOL/L (ref 3.5–5)
PRO-BNP: 1312 PG/ML (ref 0–125)
SODIUM BLD-SCNC: 140 MMOL/L (ref 132–146)

## 2019-03-26 PROCEDURE — G8427 DOCREV CUR MEDS BY ELIG CLIN: HCPCS | Performed by: PHYSICIAN ASSISTANT

## 2019-03-26 PROCEDURE — 93000 ELECTROCARDIOGRAM COMPLETE: CPT | Performed by: PHYSICIAN ASSISTANT

## 2019-03-26 PROCEDURE — 83880 ASSAY OF NATRIURETIC PEPTIDE: CPT

## 2019-03-26 PROCEDURE — 80048 BASIC METABOLIC PNL TOTAL CA: CPT

## 2019-03-26 PROCEDURE — 3017F COLORECTAL CA SCREEN DOC REV: CPT | Performed by: PHYSICIAN ASSISTANT

## 2019-03-26 PROCEDURE — 1123F ACP DISCUSS/DSCN MKR DOCD: CPT | Performed by: PHYSICIAN ASSISTANT

## 2019-03-26 PROCEDURE — 4040F PNEUMOC VAC/ADMIN/RCVD: CPT | Performed by: PHYSICIAN ASSISTANT

## 2019-03-26 PROCEDURE — 1036F TOBACCO NON-USER: CPT | Performed by: PHYSICIAN ASSISTANT

## 2019-03-26 PROCEDURE — 99213 OFFICE O/P EST LOW 20 MIN: CPT | Performed by: PHYSICIAN ASSISTANT

## 2019-03-26 PROCEDURE — G8482 FLU IMMUNIZE ORDER/ADMIN: HCPCS | Performed by: PHYSICIAN ASSISTANT

## 2019-03-26 PROCEDURE — G8417 CALC BMI ABV UP PARAM F/U: HCPCS | Performed by: PHYSICIAN ASSISTANT

## 2019-03-26 RX ORDER — BUMETANIDE 2 MG/1
2 TABLET ORAL DAILY
Qty: 90 TABLET | Refills: 3 | Status: ON HOLD | OUTPATIENT
Start: 2019-03-26 | End: 2019-09-09 | Stop reason: SDUPTHER

## 2019-03-27 ENCOUNTER — TELEPHONE (OUTPATIENT)
Dept: CARDIOLOGY CLINIC | Age: 69
End: 2019-03-27

## 2019-03-28 ENCOUNTER — HOSPITAL ENCOUNTER (OUTPATIENT)
Dept: OTHER | Age: 69
Setting detail: THERAPIES SERIES
Discharge: HOME | End: 2019-03-28
Payer: COMMERCIAL

## 2019-03-28 VITALS
BODY MASS INDEX: 47.56 KG/M2 | RESPIRATION RATE: 18 BRPM | DIASTOLIC BLOOD PRESSURE: 88 MMHG | SYSTOLIC BLOOD PRESSURE: 137 MMHG | WEIGHT: 315 LBS | HEART RATE: 82 BPM

## 2019-03-28 PROCEDURE — 99214 OFFICE O/P EST MOD 30 MIN: CPT

## 2019-04-01 ENCOUNTER — CARE COORDINATION (OUTPATIENT)
Dept: CASE MANAGEMENT | Age: 69
End: 2019-04-01

## 2019-04-09 NOTE — PROGRESS NOTES
Cardiac Electrophysiology Office Progress Note    Candi Hobbs  1950  Date of Service: 4/10/19  PCP: Jose J Liu MD   Cardiology: Maritza Dai MD  Electrophysiology: Katarzyna Sanabria DO    Patient Active Problem List    Diagnosis Date Noted    Renal insufficiency 08/17/2014     Priority: High    NICM (nonischemic cardiomyopathy) ([facility] 12/14/2013     Priority: High    AVNRT (AV yue re-entry tachycardia) ([facility] 11/17/2011     Priority: High     Overview Note:     1). S/P SVT ablation 10/16/14: 1. Atrial Function: Abnormal with inducible atrial fibrillation/flutter. 2. AV Yue Function: Abnormal with inducible SVT consistent with AVNRT. Successful slow pathway modification without recurrent SVT.  DM (diabetes mellitus), type 2, uncontrolled ([facility] 11/17/2011     Priority: Medium    HTN (hypertension), benign 11/17/2011     Priority: Medium    Gout 12/19/2016     Priority: Low    Hyperlipidemia 11/17/2011     Priority: Low    SNEHA (obstructive sleep apnea) 11/17/2011     Priority: Low    Depression 11/17/2011     Priority: Low    Acute on chronic combined systolic and diastolic congestive heart failure ([facility] 02/07/2019    CHF (NYHA class III, ACC/AHA stage C) ([facility]     Chronic systolic CHF (congestive heart failure) ([facility] 12/08/2015    PAF (paroxysmal atrial fibrillation) ([facility] 12/08/2015    Biventricular implantable cardioverter-defibrillator in situ 09/05/2014     Overview Note:     A). ICD generator is a [employer], model #: [id number], serial #: [id number]. DOI: 8/20/14   B). The right atrial lead is a [employer] model #: [id number], serial #: [id number]. C). The RV ICD lead is a [employer] model #: [id number], serial #: [id number]. D). The LV lead is a [employer] model #: [id number], serial #: [id number].            Current Outpatient Medications   Medication Sig Dispense Refill    bumetanide (BUMEX) 2 MG tablet Take 1 tablet by mouth daily 90 tablet 3    metolazone (ZAROXOLYN) 5 MG tablet Take 5 mg by mouth daily Indications: States takes as needed for weight gain.  dofetilide (TIKOSYN) 250 MCG capsule TAKE 1 CAPSULE TWICE A  capsule 1    ELIQUIS 5 MG TABS tablet TAKE 1 TABLET TWICE A  tablet 3    metoprolol succinate (TOPROL XL) 100 MG extended release tablet TAKE 1 TABLET TWICE A  tablet 3    sertraline (ZOLOFT) 100 MG tablet Take 100 mg by mouth daily      VILAZODONE HCL 10 MG Tablet TAKE ONE TABLET BY MOUTH EVERY DAY  2    famotidine (PEPCID) 20 MG tablet Take 1 tablet by mouth daily 60 tablet 3    spironolactone (ALDACTONE) 25 MG tablet Take 1 tablet by mouth daily 90 tablet 3    allopurinol (ZYLOPRIM) 100 MG tablet Take 400 mg by mouth daily       gabapentin (NEURONTIN) 100 MG capsule Take 1 capsule by mouth 3 times daily 90 capsule 3    busPIRone (BUSPAR) 15 MG tablet Take 15 mg by mouth daily       HUMULIN R 500 UNIT/ML injection See Admin Instructions @ 0700 150 units  @ 1500 120 units  @ 2300 30 units      isosorbide mononitrate (IMDUR) 30 MG CR tablet Take 1 tablet by mouth 2 times daily. 30 tablet 3    hydrALAZINE (APRESOLINE) 100 MG tablet Take 1 tablet by mouth every 8 hours. 90 tablet 3    potassium chloride SA (K-DUR;KLOR-CON M) 20 MEQ tablet Take 1 tablet by mouth daily. 30 tablet 11    magnesium oxide (MAG-OX) 400 (240 MG) MG tablet Take 1 tablet by mouth daily. 30 tablet 6    digoxin (LANOXIN) 125 MCG tablet Take 1 tablet by mouth daily. 30 tablet 3    atorvastatin (LIPITOR) 20 MG tablet Take 20 mg by mouth nightly        No current facility-administered medications for this visit. Allergies   Allergen Reactions    Food Hives     Strawberries      Soap & Cleansers Rash     TIDE detergent       SUBJECTIVE: Jeaneth Pena presents to the office today for the management of: Severe NICMP, AVNRT s/p ablation, PAF, LBBB, NYHA III, & CRT-D in situ, Tikosyn AAD therapy.  He was admitted to the hospital with acute decompensated heart failure and evaluated by Cardiology. He received IV diuresis. His Lasix was switched to Bumex. An echocardiogram was performed with an LVEF 25%; LVPWd 1.1 cm; IVSd 1.1 cm; mod dilated right and left atria; mod MR. In January he was BiV paced 96%. He presents today for 6 month follow-up. A friend  unexpectedly and he is understandably upset. From an EP perspective he offers no complaints. His overall AF burden is <1%; he presents in sinus rhythm on Tikosyn. He is noted to be on Zoloft which is contraindicated with Tikosyn due to potential QT prolongation>>torsades de pointe. He will call the [facility] today where he follows. His QTc is stable. He appears euvolemic and denies HF symptoms. He is BiV paced 97%. He denies angina, dyspnea, syncope, orthopnea and PND. He also denies ICD shock. He is enrolled in remote monitoring. Review of Systems   Respiratory: Negative for chest tightness and shortness of breath. Cardiovascular: Negative for chest pain, palpitations and leg swelling. Neurological: Negative for dizziness, syncope and light-headedness. All other systems reviewed and are negative. Vitals:    04/10/19 0858   BP: 114/70   Pulse: 60   Resp: 20   Weight: (!) 337 lb (152.9 kg)   Height: 5' 11\" (1.803 m)     Constitutional: Oriented to person, place, and time. Well-developed and cooperative. Head: Normocephalic and atraumatic. Eyes: Conjunctivae are normal.    Cardiovascular: S1 normal, S2 normal and intact distal pulses. Normal rate and rhythm. PMI is not displaced. Pulmonary/Chest: Effort normal and breath sounds normal. No respiratory distress. Abdominal: Soft. No tenderness. Musculoskeletal: Normal range of motion of all extremities, no muscle weakness. Neurological: Alert and oriented to person, place, and time. Gait normal.   Skin: Skin is warm and dry. No bruising, no ecchymosis and no rash noted. Extremity: No clubbing or cyanosis. No edema. Psychiatric: Normal mood and affect.  Thought content normal.   CRT-D site: stable, well healed, no evidence of erosion    2D echocardiogram 2/6/19  Details     Reading Physician Reading Date Result Priority   Sandip Reyes MD 2/7/2019       Narrative     Transthoracic Echocardiography Report (TTE)     Demographics      Patient Name   [facility]       Gender           Male                 Demetrice Dawn      Medical Record [id number]        Room Number      7514   Number      Account #     [de-identified]       Procedure Date   02/07/2019      Corporate ID                   Ordering         Mayco East MD                                  Physician      Accession      [id number]       Referring       Norbert Villegas   Number                         Physician      Date of Birth  1950      Sonographer      Cassie Borrero RD      Age            68 year(s)      Interpreting     [address]                              Physician        Physician Cardiology                                                   Josie Crowder MD                                     Any Other     Procedure    Type of Study      TTE procedure:Echo Complete W/Doppler & Color Flow.     Procedure Date  Date: 02/07/2019 Start: 08:37 AM    Study Location: Echo Lab    Indications:Congestive heart failure. Patient Status: Routine    Height: 71 inches Weight: 341 pounds BSA: 2.65 m^2 BMI: 47.56 kg/m^2    HR: 71 bpm BP: 142/86 mmHg     Findings      Left Ventricle   Left ventricle is moderately enlarged .   Mild left ventricular concentric hypertrophy noted.   Global hypokinesis.   Ejection fraction is visually estimated at 25%.   There is doppler evidence of stage III diastolic dysfunction.      Right Ventricle   Mildly dilated right ventricle.      Left Atrium   The left atrium is moderately dilated.      Right Atrium   Moderately enlarged right atrium size.      Mitral Valve   Moderate mitral regurgitation is present.      Tricuspid Valve   No evidence of tricuspid regurgitation.  Mild tricuspid regurgitation. RVSP   is 55 mmHg.      Aortic Valve   Aortic valve opens well.      Pulmonic Valve   Not well visualized. Normal Doppler evaluation.      Pericardial Effusion  Dickenson Star is a trivial pericardial effusion noted.      Aorta   Aortic root dimension within normal limits.   Miscellaneous   Normal Inferior Vena Cava diameter.      Conclusions      Summary   Left ventricle is moderately enlarged .   Mild left ventricular concentric hypertrophy noted.   Global hypokinesis.   Ejection fraction is visually estimated at 25%.   There is doppler evidence of stage III diastolic dysfunction.   Moderate mitral regurgitation is present.   No evidence of tricuspid regurgitation. Mild tricuspid regurgitation.  RVSP   is 55 mmHg.      Signature      ----------------------------------------------------------------   Electronically signed by Mara Del Rosario MD(Interpreting physician)  Kait Hylton 02/07/2019 04:56 PM   ----------------------------------------------------------------     M-Mode/2D Measurements & Calculations      LV Diastolic    LV Systolic Dimension: 5.4   AV Cusp Separation: 1.5 cmLA   Dimension: 6.3  cm                           Dimension: 5.8 cmAO Root   cm              LV Volume Diastolic: 655.5   Dimension: 3.2 cm   LV FS:14.3 %    ml   LV PW           LV Volume Systolic: 889.4 ml   Diastolic: 1.1  LV EDV/LV EDV Index: 203.8   cm              ml/77 ml/m^2LV ESV/LV ESV    RV Diastolic Dimension: 4.7   LV PW Systolic: Index: 460.6 YG/85KN/ m^2    cm   1.3 cm          EF Calculated: 31.8 %   Septum          LV Mass Index: 115 l/min*m^2 LA/Aorta: 9.45   Diastolic: 1.1  LV Length: 10.3 cm           Ascending Aorta: 3.5 cm   cm                                           LA volume/Index: 110.3 ml   Septum          LVOT: 2.1 cm                 /85UE/C^4   Systolic: 1.4                                RA Area: 34.5 cm^2   cm   CO: 2.88 l/min   CI: 1.09   l/m*m^2   LV Mass: 303.51   g     Doppler Measurements & Calculations      MV Peak E-Wave: 1.1 m/s    AV Peak Velocity:    LVOT Peak Velocity: 0.66   MV Peak A-Wave: 0.33 m/s   1.27 m/s             m/s   MV E/A Ratio: 3.32         AV Peak Gradient:    LVOT Mean Velocity: 0.42   MV Peak Gradient: 6.6 mmHg 6.43 mmHg            m/s   MV Mean Gradient: 1.9 mmHg AV Mean Velocity:    LVOT Peak Gradient: 1.8   MV Mean Velocity: 0.63 m/s 0.83 m/s             mmHgLVOT Mean Gradient:   MV Deceleration Time: 111  AV Mean Gradient:    0.9 mmHg   msec                       3.2 mmHg             Estimated RVSP: 54.8 mmHg   MV P1/2t: 48.9 msec        AV VTI: 19.9 cm      Estimated RAP:8 mmHg   MVA by PHT:4.5 cm^2        AV Area   MV Area (continuity): 1.3  (Continuity):2.04   cm^2                       cm^2                 TR Velocity:3.42 m/s   MV E' Septal Velocity:                          TR Gradient:46.81 mmHg   0.04 m/s                   LVOT VTI: 11.7 cm    PV Peak Velocity: 0.62 m/s   MV E' Lateral Velocity: 5                       PV Peak Gradient: 1.55   m/s                        Estimated PASP:      mmHg   MR Velocity: 4.71 m/s      54.81 mmHg           PV Mean Velocity: 0.39 m/s   MV TERESSA PISA: 0.27 cm^2                          PV Mean Gradient: 0.7 mmHg   MR VTI: 125.5 cm   Alias Velocity: 0.21   m/sPISA Radius: 1 cm      PISA area: 6.28 cm^2MR   flow rate: 128.74 ml/sMR   volume:33.88 ml     [email or web address]/[email or web address]? DocKey=[id number]         Device Interrogation/Reprogramming 4/10/19   Make/Model [employer]. DOI 8/20/14  Mode DDD  60/130 ppm  P wave: 1.8 mV  Impedance: 360 ohms   Threshold: 1 V @ 0.5 ms  RV R wave: >12 mV  Impedance: 450 ohms   Threshold: 0.75 V @ 0.5 ms  LV:  Impedance: 610 ohms   Threshold:0.75 V @ 0.5 ms  Pacing: A: 10%  RV: 97%  LV: 97%  Battery Voltage/Longevity:  2.8years. Arrhythmias: 1. AF burden: <1%. CoreVue: Stable.   Overall device function is normal  All device programmable settings were evaluated per above and in the scanned document, along with iterative adjustments (capture thresholds) to assess and select the most appropriate final programming to provide for consistent delivery of the appropriate therapy and to verify function of the device. EC/10/19: Sinus rhythm, AS-BiV paced, QTc: 510 ms. I have independently reviewed all of the ECGs as per above. I have reviewed all progress notes & records from the time of the patient's last office visit up to present. Impression:    1. AVNRT  A). S/P SVT ablation 10/16/14: 1. Atrial Function: Abnormal with inducible atrial fibrillation/flutter. 2. AV Yue Function: Abnormal with inducible SVT consistent with AVNRT. Successful slow pathway modification without recurrent SVT. 2. NICMP  A). follows with Dr Ivanna Gupta  B). Imdur/apresoline, Toprol XL, aldactone, bumex, digoxin  C). EF: 10-15%--2016  D). Echocardiogram 19: LVEF 25%    3. CRT-D in situ  A). ICD generator is a [employer], model #: [id number], serial #: [id number]. DOI: 14   B). Normal function    4. IRDM  Lab Results   Component Value Date    LABA1C 8.5 2018     No results found for: EAG    5. HTN    6. SNEHA    7. Combined systolic/diastolic HF  A). NYHA Class lll, Stage C  B). Follows with Dr Ivanna Gupta    8. PAF  A). [id number]-IVOy score: 4  B). Eliquis  C). Tikosyn 250 mcg BID  D). Estimated Creatinine Clearance: 84 mL/min (A) (based on SCr of 1.3 mg/dL (H)). E). QTc is stable      Plan:    1. Mr Anastasiia Dubose was hospitalized in February with acute decompensated HF. He was diuresed by Cardiology and now follows at the [address] [place] [address]. An echocardiogram showed an LVEF 25% (see above). His OptiVol is stable and he appears euvolemic today. He denies any HF symptoms. He is BiV paced 97%. He remains on Tikosyn with an overall AF burden <1%; his QTc is stable. He denies any bleeding on Eliquis.     2. Zoloft is contraindicated

## 2019-04-10 ENCOUNTER — OFFICE VISIT (OUTPATIENT)
Dept: NON INVASIVE DIAGNOSTICS | Age: 69
End: 2019-04-10
Payer: COMMERCIAL

## 2019-04-10 VITALS
HEART RATE: 60 BPM | WEIGHT: 315 LBS | HEIGHT: 71 IN | RESPIRATION RATE: 20 BRPM | DIASTOLIC BLOOD PRESSURE: 70 MMHG | BODY MASS INDEX: 44.1 KG/M2 | SYSTOLIC BLOOD PRESSURE: 114 MMHG

## 2019-04-10 DIAGNOSIS — I42.8 NICM (NONISCHEMIC CARDIOMYOPATHY) (HCC): ICD-10-CM

## 2019-04-10 DIAGNOSIS — I48.0 PAF (PAROXYSMAL ATRIAL FIBRILLATION) (HCC): Primary | ICD-10-CM

## 2019-04-10 DIAGNOSIS — Z95.810 BIVENTRICULAR IMPLANTABLE CARDIOVERTER-DEFIBRILLATOR IN SITU: ICD-10-CM

## 2019-04-10 PROCEDURE — 99214 OFFICE O/P EST MOD 30 MIN: CPT | Performed by: NURSE PRACTITIONER

## 2019-04-10 PROCEDURE — 1036F TOBACCO NON-USER: CPT | Performed by: NURSE PRACTITIONER

## 2019-04-10 PROCEDURE — G8417 CALC BMI ABV UP PARAM F/U: HCPCS | Performed by: NURSE PRACTITIONER

## 2019-04-10 PROCEDURE — 93284 PRGRMG EVAL IMPLANTABLE DFB: CPT | Performed by: NURSE PRACTITIONER

## 2019-04-10 PROCEDURE — 3017F COLORECTAL CA SCREEN DOC REV: CPT | Performed by: NURSE PRACTITIONER

## 2019-04-10 PROCEDURE — G8427 DOCREV CUR MEDS BY ELIG CLIN: HCPCS | Performed by: NURSE PRACTITIONER

## 2019-04-10 PROCEDURE — 1123F ACP DISCUSS/DSCN MKR DOCD: CPT | Performed by: NURSE PRACTITIONER

## 2019-04-10 PROCEDURE — 4040F PNEUMOC VAC/ADMIN/RCVD: CPT | Performed by: NURSE PRACTITIONER

## 2019-04-10 PROCEDURE — 93000 ELECTROCARDIOGRAM COMPLETE: CPT | Performed by: NURSE PRACTITIONER

## 2019-04-10 ASSESSMENT — ENCOUNTER SYMPTOMS
SHORTNESS OF BREATH: 0
CHEST TIGHTNESS: 0

## 2019-04-11 ENCOUNTER — CARE COORDINATION (OUTPATIENT)
Dept: CASE MANAGEMENT | Age: 69
End: 2019-04-11

## 2019-04-11 NOTE — CARE COORDINATION
[facility] Transitions Follow Up Call    2019    Patient: Orlando Garcia  Patient : 1950   MRN: <[id number]>  Reason for Admission: No admission diagnoses are documented for this encounter. Discharge Date: 2/10/19 RARS: Readmission Risk Score: 17    Spoke with: Gemini Gold for Care Transition BPCI-A follow up. Identified self/role. Care Transitions Subsequent and Final Call    Subsequent and Final Calls  Do you have any ongoing symptoms?:  No  Have your medications changed?:  No  Do you have any questions related to your medications?:  Yes  Patient Reports:  [employer] - Pre-Authorization (See Note)  Do you currently have any active services?:  Yes  Are you currently active with any services?:  Outpatient/Community Services  Do you have any needs or concerns that I can assist you with?:  No  Identified Barriers:  None  Care Transitions Interventions  No Identified Needs  Other Interventions:          Patient states he is doing well. Patient denies chest pain, SOB, swelling, and weight gain. Patient states glucose WNL. Patient states he is down 1.5 lbs since yesterday. [facility] Patient had appointment with Electrophysiology yesterday. Patient states he was started on [place] but insurance denied claim. Advised patient of process for pre-authorization and to contact PCP regarding denial, verbalized understanding. Reviewed diet and 1500 fluid restriction, verbalized understanding. Reviewed appointments, verbalized understanding. Patient denies additional needs or concerns at this time. Patient instructed to notify PCP or CTC for any new or worsening symptoms, contact information provided. Patient verbalized understanding. Central Team BPCI-A will continue to follow.       Follow Up  Future Appointments   Date Time Provider Kamala Guzman   2019  8:00 AM [facility] CHF ROOM 5 SEYZ CHF None   2019  5:10 PM SCHEDULE, REMOTE CHECK ANNY OSBORN PHYS [facility]   6/14/2019  9:30 AM [facility]   7/1/2019 12:45 PM Dorys Alarcon MD [address] [place] [address],  [address] [facility]   10/15/2019 10:30 AM Madelyn Saunders DO ELECTRO PHYS [facility]       Marlee Conn RN  Care Transition Coordinator  [phone]  21

## 2019-04-24 ENCOUNTER — CARE COORDINATION (OUTPATIENT)
Dept: CASE MANAGEMENT | Age: 69
End: 2019-04-24

## 2019-04-25 ENCOUNTER — NURSE ONLY (OUTPATIENT)
Dept: NON INVASIVE DIAGNOSTICS | Age: 69
End: 2019-04-25
Payer: COMMERCIAL

## 2019-04-25 ENCOUNTER — HOSPITAL ENCOUNTER (OUTPATIENT)
Dept: OTHER | Age: 69
Setting detail: THERAPIES SERIES
Discharge: HOME | End: 2019-04-25
Payer: COMMERCIAL

## 2019-04-25 VITALS
DIASTOLIC BLOOD PRESSURE: 90 MMHG | SYSTOLIC BLOOD PRESSURE: 142 MMHG | HEART RATE: 86 BPM | RESPIRATION RATE: 20 BRPM | BODY MASS INDEX: 47.7 KG/M2 | WEIGHT: 315 LBS

## 2019-04-25 DIAGNOSIS — Z95.810 BIVENTRICULAR IMPLANTABLE CARDIOVERTER-DEFIBRILLATOR IN SITU: Primary | ICD-10-CM

## 2019-04-25 DIAGNOSIS — I42.8 NICM (NONISCHEMIC CARDIOMYOPATHY) (HCC): ICD-10-CM

## 2019-04-25 LAB
ANION GAP SERPL CALCULATED.3IONS-SCNC: 9 MMOL/L (ref 7–16)
BUN BLDV-MCNC: 24 MG/DL (ref 8–23)
CALCIUM SERPL-MCNC: 9 MG/DL (ref 8.6–10.2)
CHLORIDE BLD-SCNC: 102 MMOL/L (ref 98–107)
CO2: 26 MMOL/L (ref 22–29)
CREAT SERPL-MCNC: 1.1 MG/DL (ref 0.7–1.2)
GFR AFRICAN AMERICAN: >60
GFR NON-AFRICAN AMERICAN: >60 ML/MIN/1.73
GLUCOSE BLD-MCNC: 270 MG/DL (ref 74–99)
POTASSIUM SERPL-SCNC: 4.6 MMOL/L (ref 3.5–5)
PRO-BNP: 1457 PG/ML (ref 0–125)
SODIUM BLD-SCNC: 137 MMOL/L (ref 132–146)

## 2019-04-25 PROCEDURE — 80048 BASIC METABOLIC PNL TOTAL CA: CPT

## 2019-04-25 PROCEDURE — 96374 THER/PROPH/DIAG INJ IV PUSH: CPT

## 2019-04-25 PROCEDURE — 36415 COLL VENOUS BLD VENIPUNCTURE: CPT

## 2019-04-25 PROCEDURE — 99214 OFFICE O/P EST MOD 30 MIN: CPT

## 2019-04-25 PROCEDURE — 6360000002 HC RX W HCPCS: Performed by: INTERNAL MEDICINE

## 2019-04-25 PROCEDURE — 93296 REM INTERROG EVL PM/IDS: CPT | Performed by: INTERNAL MEDICINE

## 2019-04-25 PROCEDURE — 93295 DEV INTERROG REMOTE 1/2/MLT: CPT | Performed by: INTERNAL MEDICINE

## 2019-04-25 PROCEDURE — 83880 ASSAY OF NATRIURETIC PEPTIDE: CPT

## 2019-04-25 PROCEDURE — 2580000003 HC RX 258: Performed by: INTERNAL MEDICINE

## 2019-04-25 RX ORDER — FUROSEMIDE 10 MG/ML
40 INJECTION INTRAMUSCULAR; INTRAVENOUS ONCE
Status: COMPLETED | OUTPATIENT
Start: 2019-04-25 | End: 2019-04-25

## 2019-04-25 RX ORDER — SODIUM CHLORIDE 0.9 % (FLUSH) 0.9 %
10 SYRINGE (ML) INJECTION 2 TIMES DAILY
Status: DISCONTINUED | OUTPATIENT
Start: 2019-04-25 | End: 2019-04-26 | Stop reason: HOSPADM

## 2019-04-25 RX ADMIN — Medication 10 ML: at 08:06

## 2019-04-25 RX ADMIN — FUROSEMIDE 40 MG: 10 INJECTION, SOLUTION INTRAMUSCULAR; INTRAVENOUS at 08:06

## 2019-04-25 NOTE — PROGRESS NOTES
Weight today 342lb, breath sounds diminished bilat with fine scattered crackles in left lower base, pt denies any shortness of breath or discomnfort, l, 1+ pitting edema noted BLE, IV Lasix given after labs drawn and follow up appt made.

## 2019-05-09 NOTE — PROGRESS NOTES
See P[employer] Busby report. Remote monitoring reviewed over a 90 day period.   End of 90 day monitoring period date of service 04/27/19

## 2019-05-15 ENCOUNTER — CARE COORDINATION (OUTPATIENT)
Dept: CASE MANAGEMENT | Age: 69
End: 2019-05-15

## 2019-05-15 NOTE — CARE COORDINATION
[facility] Transitions Follow Up Call    5/15/2019    Patient: Georgia Mclain  Patient : 1950   MRN: <[id number]>  Reason for Admission:   Discharge Date: 2/10/19 RARS: Readmission Risk Score: 17           Care Transitions Subsequent and Final Call    Subsequent and Final Calls  Care Transitions Interventions  Other Interventions: Follow Up : Final call attempted today. Was unable to reach patient, left a voice message with my contact information for return call if needed. Pt has reached the end of the bundle program so will sign off at this point for care transitions.    Future Appointments   Date Time Provider Kamala Guzman   2019  8:00 AM [facility] CHF ROOM 5 SEYZ CHF None   2019  9:30 AM [facility]   2019 12:45 PM Edilma Cornell MD [address] [facility]   2019  5:00 PM SCHEDULE, REMOTE CHECK ANNY ELECTRO PHYS [facility]   10/15/2019 10:30 AM Shea Gallego DO ELECTRO PHYS [facility]       Dayanna Hale RN

## 2019-05-23 ENCOUNTER — HOSPITAL ENCOUNTER (OUTPATIENT)
Dept: OTHER | Age: 69
Setting detail: THERAPIES SERIES
Discharge: HOME | End: 2019-05-23
Payer: COMMERCIAL

## 2019-05-23 VITALS
RESPIRATION RATE: 18 BRPM | DIASTOLIC BLOOD PRESSURE: 81 MMHG | SYSTOLIC BLOOD PRESSURE: 129 MMHG | HEART RATE: 77 BPM | BODY MASS INDEX: 48.24 KG/M2 | WEIGHT: 315 LBS

## 2019-05-23 LAB
ANION GAP SERPL CALCULATED.3IONS-SCNC: 10 MMOL/L (ref 7–16)
BUN BLDV-MCNC: 29 MG/DL (ref 8–23)
CALCIUM SERPL-MCNC: 9.3 MG/DL (ref 8.6–10.2)
CHLORIDE BLD-SCNC: 101 MMOL/L (ref 98–107)
CO2: 25 MMOL/L (ref 22–29)
CREAT SERPL-MCNC: 1.2 MG/DL (ref 0.7–1.2)
GFR AFRICAN AMERICAN: >60
GFR NON-AFRICAN AMERICAN: >60 ML/MIN/1.73
GLUCOSE BLD-MCNC: 349 MG/DL (ref 74–99)
POTASSIUM SERPL-SCNC: 4.3 MMOL/L (ref 3.5–5)
PRO-BNP: 1217 PG/ML (ref 0–125)
SODIUM BLD-SCNC: 136 MMOL/L (ref 132–146)

## 2019-05-23 PROCEDURE — 36415 COLL VENOUS BLD VENIPUNCTURE: CPT

## 2019-05-23 PROCEDURE — 80048 BASIC METABOLIC PNL TOTAL CA: CPT

## 2019-05-23 PROCEDURE — 83880 ASSAY OF NATRIURETIC PEPTIDE: CPT

## 2019-05-23 PROCEDURE — 99214 OFFICE O/P EST MOD 30 MIN: CPT

## 2019-06-10 RX ORDER — DOFETILIDE 0.25 MG/1
CAPSULE ORAL
Qty: 180 CAPSULE | Refills: 1 | Status: ON HOLD | OUTPATIENT
Start: 2019-06-10 | End: 2019-09-24 | Stop reason: HOSPADM

## 2019-06-14 ENCOUNTER — HOSPITAL ENCOUNTER (OUTPATIENT)
Dept: OTHER | Age: 69
Setting detail: THERAPIES SERIES
Discharge: HOME | End: 2019-06-14
Payer: COMMERCIAL

## 2019-06-14 ENCOUNTER — OFFICE VISIT (OUTPATIENT)
Dept: FAMILY MEDICINE CLINIC | Age: 69
End: 2019-06-14
Payer: COMMERCIAL

## 2019-06-14 VITALS
SYSTOLIC BLOOD PRESSURE: 160 MMHG | WEIGHT: 315 LBS | BODY MASS INDEX: 47.82 KG/M2 | OXYGEN SATURATION: 97 % | DIASTOLIC BLOOD PRESSURE: 85 MMHG | RESPIRATION RATE: 18 BRPM | HEART RATE: 85 BPM

## 2019-06-14 VITALS
WEIGHT: 315 LBS | DIASTOLIC BLOOD PRESSURE: 76 MMHG | OXYGEN SATURATION: 96 % | SYSTOLIC BLOOD PRESSURE: 133 MMHG | HEIGHT: 71 IN | RESPIRATION RATE: 16 BRPM | BODY MASS INDEX: 44.1 KG/M2 | HEART RATE: 78 BPM

## 2019-06-14 DIAGNOSIS — E11.65 UNCONTROLLED TYPE 2 DIABETES MELLITUS WITH HYPERGLYCEMIA (HCC): Primary | ICD-10-CM

## 2019-06-14 LAB
ANION GAP SERPL CALCULATED.3IONS-SCNC: 14 MMOL/L (ref 7–16)
BUN BLDV-MCNC: 28 MG/DL (ref 8–23)
CALCIUM SERPL-MCNC: 9.6 MG/DL (ref 8.6–10.2)
CHLORIDE BLD-SCNC: 101 MMOL/L (ref 98–107)
CO2: 25 MMOL/L (ref 22–29)
CREAT SERPL-MCNC: 1.3 MG/DL (ref 0.7–1.2)
CREATININE URINE POCT: 50
GFR AFRICAN AMERICAN: >60
GFR NON-AFRICAN AMERICAN: >60 ML/MIN/1.73
GLUCOSE BLD-MCNC: 197 MG/DL (ref 74–99)
HBA1C MFR BLD: 9.7 %
MICROALBUMIN/CREAT 24H UR: 80 MG/G{CREAT}
MICROALBUMIN/CREAT UR-RTO: 300
POTASSIUM SERPL-SCNC: 3.9 MMOL/L (ref 3.5–5)
PRO-BNP: 878 PG/ML (ref 0–125)
SODIUM BLD-SCNC: 140 MMOL/L (ref 132–146)

## 2019-06-14 PROCEDURE — 1123F ACP DISCUSS/DSCN MKR DOCD: CPT | Performed by: FAMILY MEDICINE

## 2019-06-14 PROCEDURE — 2580000003 HC RX 258: Performed by: INTERNAL MEDICINE

## 2019-06-14 PROCEDURE — 1036F TOBACCO NON-USER: CPT | Performed by: FAMILY MEDICINE

## 2019-06-14 PROCEDURE — 6360000002 HC RX W HCPCS: Performed by: INTERNAL MEDICINE

## 2019-06-14 PROCEDURE — G8417 CALC BMI ABV UP PARAM F/U: HCPCS | Performed by: FAMILY MEDICINE

## 2019-06-14 PROCEDURE — G8427 DOCREV CUR MEDS BY ELIG CLIN: HCPCS | Performed by: FAMILY MEDICINE

## 2019-06-14 PROCEDURE — 96374 THER/PROPH/DIAG INJ IV PUSH: CPT

## 2019-06-14 PROCEDURE — 4040F PNEUMOC VAC/ADMIN/RCVD: CPT | Performed by: FAMILY MEDICINE

## 2019-06-14 PROCEDURE — 2022F DILAT RTA XM EVC RTNOPTHY: CPT | Performed by: FAMILY MEDICINE

## 2019-06-14 PROCEDURE — 80048 BASIC METABOLIC PNL TOTAL CA: CPT

## 2019-06-14 PROCEDURE — 99214 OFFICE O/P EST MOD 30 MIN: CPT | Performed by: FAMILY MEDICINE

## 2019-06-14 PROCEDURE — 83880 ASSAY OF NATRIURETIC PEPTIDE: CPT

## 2019-06-14 PROCEDURE — 36415 COLL VENOUS BLD VENIPUNCTURE: CPT

## 2019-06-14 PROCEDURE — 3017F COLORECTAL CA SCREEN DOC REV: CPT | Performed by: FAMILY MEDICINE

## 2019-06-14 PROCEDURE — 82044 UR ALBUMIN SEMIQUANTITATIVE: CPT | Performed by: FAMILY MEDICINE

## 2019-06-14 PROCEDURE — 99214 OFFICE O/P EST MOD 30 MIN: CPT

## 2019-06-14 PROCEDURE — 3046F HEMOGLOBIN A1C LEVEL >9.0%: CPT | Performed by: FAMILY MEDICINE

## 2019-06-14 PROCEDURE — 83036 HEMOGLOBIN GLYCOSYLATED A1C: CPT | Performed by: FAMILY MEDICINE

## 2019-06-14 RX ORDER — FUROSEMIDE 10 MG/ML
40 INJECTION INTRAMUSCULAR; INTRAVENOUS ONCE
Status: COMPLETED | OUTPATIENT
Start: 2019-06-14 | End: 2019-06-14

## 2019-06-14 RX ORDER — SODIUM CHLORIDE 0.9 % (FLUSH) 0.9 %
10 SYRINGE (ML) INJECTION 2 TIMES DAILY
Status: DISCONTINUED | OUTPATIENT
Start: 2019-06-14 | End: 2019-06-15 | Stop reason: HOSPADM

## 2019-06-14 RX ADMIN — Medication 10 ML: at 12:28

## 2019-06-14 RX ADMIN — FUROSEMIDE 40 MG: 10 INJECTION, SOLUTION INTRAMUSCULAR; INTRAVENOUS at 12:28

## 2019-06-14 NOTE — PROGRESS NOTES
Pt c/o shortness of breath, breath sounds clear diminished bilat, SaO2 on room air 97%. 1+ ptting edema note BLE, IV Lasix given after labs drawn and follow up appt.  made

## 2019-07-01 ENCOUNTER — OFFICE VISIT (OUTPATIENT)
Dept: CARDIOLOGY CLINIC | Age: 69
End: 2019-07-01
Payer: COMMERCIAL

## 2019-07-01 VITALS
HEART RATE: 66 BPM | SYSTOLIC BLOOD PRESSURE: 134 MMHG | HEIGHT: 71 IN | RESPIRATION RATE: 16 BRPM | WEIGHT: 315 LBS | DIASTOLIC BLOOD PRESSURE: 74 MMHG | BODY MASS INDEX: 44.1 KG/M2

## 2019-07-01 DIAGNOSIS — I50.22 CHRONIC SYSTOLIC HEART FAILURE (HCC): Primary | ICD-10-CM

## 2019-07-01 DIAGNOSIS — I48.0 PAF (PAROXYSMAL ATRIAL FIBRILLATION) (HCC): ICD-10-CM

## 2019-07-01 DIAGNOSIS — I38 VHD (VALVULAR HEART DISEASE): ICD-10-CM

## 2019-07-01 DIAGNOSIS — I44.7 LBBB (LEFT BUNDLE BRANCH BLOCK): ICD-10-CM

## 2019-07-01 DIAGNOSIS — I10 HTN (HYPERTENSION), BENIGN: ICD-10-CM

## 2019-07-01 DIAGNOSIS — Z95.810 BIVENTRICULAR IMPLANTABLE CARDIOVERTER-DEFIBRILLATOR IN SITU: ICD-10-CM

## 2019-07-01 DIAGNOSIS — I42.8 NICM (NONISCHEMIC CARDIOMYOPATHY) (HCC): ICD-10-CM

## 2019-07-01 DIAGNOSIS — N18.2 CKD (CHRONIC KIDNEY DISEASE) STAGE 2, GFR 60-89 ML/MIN: ICD-10-CM

## 2019-07-01 PROCEDURE — 99214 OFFICE O/P EST MOD 30 MIN: CPT | Performed by: INTERNAL MEDICINE

## 2019-07-01 PROCEDURE — 93000 ELECTROCARDIOGRAM COMPLETE: CPT | Performed by: INTERNAL MEDICINE

## 2019-07-01 PROCEDURE — G8417 CALC BMI ABV UP PARAM F/U: HCPCS | Performed by: INTERNAL MEDICINE

## 2019-07-01 PROCEDURE — 3017F COLORECTAL CA SCREEN DOC REV: CPT | Performed by: INTERNAL MEDICINE

## 2019-07-01 PROCEDURE — 1123F ACP DISCUSS/DSCN MKR DOCD: CPT | Performed by: INTERNAL MEDICINE

## 2019-07-01 PROCEDURE — 1036F TOBACCO NON-USER: CPT | Performed by: INTERNAL MEDICINE

## 2019-07-01 PROCEDURE — G8427 DOCREV CUR MEDS BY ELIG CLIN: HCPCS | Performed by: INTERNAL MEDICINE

## 2019-07-01 PROCEDURE — 4040F PNEUMOC VAC/ADMIN/RCVD: CPT | Performed by: INTERNAL MEDICINE

## 2019-07-01 RX ORDER — SERTRALINE HYDROCHLORIDE 100 MG/1
100 TABLET, FILM COATED ORAL DAILY
COMMUNITY
End: 2019-09-12 | Stop reason: ALTCHOICE

## 2019-07-01 RX ORDER — METOLAZONE 5 MG/1
5 TABLET ORAL DAILY
Qty: 10 TABLET | Refills: 5 | Status: ON HOLD | OUTPATIENT
Start: 2019-07-01 | End: 2019-09-24 | Stop reason: HOSPADM

## 2019-07-02 NOTE — PROGRESS NOTES
Patient Active Problem List   Diagnosis    AVNRT (AV teddy re-entry tachycardia) ([facility]    DM (diabetes mellitus), type 2, uncontrolled ([facility]    HTN (hypertension), benign    Hyperlipidemia    SNEHA (obstructive sleep apnea)    Depression    NICM (nonischemic cardiomyopathy) ([facility]    Renal insufficiency    Biventricular implantable cardioverter-defibrillator in situ    Chronic systolic [facility]congest[facility]) [facility]    PAF (paroxysmal atrial fibrillation) ([facility]    CHF (NYHA class III, ACC/AHA stage C) ([facility]    Gout    Acute on chronic combined systolic and diastolic congestive heart failure ([facility])       Current Outpatient Medications   Medication Sig Dispense Refill    sertraline (ZOLOFT) 100 MG tablet Take 100 mg by mouth daily      metolazone (ZAROXOLYN) 5 MG tablet Take 1 tablet by mouth daily Indications: States takes as needed for weight gain.  10 tablet 5    insulin detemir (LEVEMIR FLEXTOUCH) 100 UNIT/ML injection pen Inject 10 Units into the skin nightly 5 pen 3    dofetilide (TIKOSYN) 250 MCG capsule TAKE 1 CAPSULE TWICE A  capsule 1    bumetanide (BUMEX) 2 MG tablet Take 1 tablet by mouth daily 90 tablet 3    ELIQUIS 5 MG TABS tablet TAKE 1 TABLET TWICE A  tablet 3    metoprolol succinate (TOPROL XL) 100 MG extended release tablet TAKE 1 TABLET TWICE A  tablet 3    VILAZODONE HCL 10 MG Tablet TAKE ONE TABLET BY MOUTH EVERY DAY  2    famotidine (PEPCID) 20 MG tablet Take 1 tablet by mouth daily 60 tablet 3    spironolactone (ALDACTONE) 25 MG tablet Take 1 tablet by mouth daily 90 tablet 3    allopurinol (ZYLOPRIM) 100 MG tablet Take 400 mg by mouth daily       gabapentin (NEURONTIN) 100 MG capsule Take 1 capsule by mouth 3 times daily 90 capsule 3    busPIRone (BUSPAR) 15 MG tablet Take 15 mg by mouth daily       HUMULIN R 500 UNIT/ML injection See Admin Instructions @ 0700 150 units  @ 1500 120 units  @ 2300 30 units      isosorbide mononitrate (IMDUR) 30 bundle branch block)  metolazone (ZAROXOLYN) 5 MG tablet   7. Biventricular implantable cardioverter-defibrillator in situ  metolazone (ZAROXOLYN) 5 MG tablet   8. CKD (chronic kidney disease) stage 2, GFR 60-89 ml/min  metolazone (ZAROXOLYN) 5 MG tablet   6    Above assessment cardiac issues stable. PLAN:   See above orders. Reviewed prior records and testing. Assessment of medication compliance. Continue to monitor labs and procedures in CHF Clinic. Labs 6/14/19 noted with BUN 23 and creatinine of 1.3 also BNP level of 878  Prescription for Xarelto 5 mg to use on an as-needed basis for volume management  Discussed issues that would prompt earlier evaluation. Follow-up office visit in 6 mos.

## 2019-07-29 ENCOUNTER — NURSE ONLY (OUTPATIENT)
Dept: NON INVASIVE DIAGNOSTICS | Age: 69
End: 2019-07-29
Payer: COMMERCIAL

## 2019-07-29 DIAGNOSIS — Z95.810 BIVENTRICULAR IMPLANTABLE CARDIOVERTER-DEFIBRILLATOR IN SITU: Primary | ICD-10-CM

## 2019-07-29 DIAGNOSIS — I48.0 PAF (PAROXYSMAL ATRIAL FIBRILLATION) (HCC): ICD-10-CM

## 2019-07-29 PROCEDURE — 93295 DEV INTERROG REMOTE 1/2/MLT: CPT | Performed by: INTERNAL MEDICINE

## 2019-07-29 PROCEDURE — 93296 REM INTERROG EVL PM/IDS: CPT | Performed by: INTERNAL MEDICINE

## 2019-08-08 ENCOUNTER — HOSPITAL ENCOUNTER (OUTPATIENT)
Dept: OTHER | Age: 69
Setting detail: THERAPIES SERIES
Discharge: HOME | End: 2019-08-08
Payer: COMMERCIAL

## 2019-08-08 VITALS
SYSTOLIC BLOOD PRESSURE: 130 MMHG | HEART RATE: 84 BPM | DIASTOLIC BLOOD PRESSURE: 73 MMHG | RESPIRATION RATE: 18 BRPM | BODY MASS INDEX: 47.18 KG/M2 | WEIGHT: 315 LBS

## 2019-08-08 LAB
ANION GAP SERPL CALCULATED.3IONS-SCNC: 11 MMOL/L (ref 7–16)
BUN BLDV-MCNC: 31 MG/DL (ref 8–23)
CALCIUM SERPL-MCNC: 10.7 MG/DL (ref 8.6–10.2)
CHLORIDE BLD-SCNC: 100 MMOL/L (ref 98–107)
CO2: 29 MMOL/L (ref 22–29)
CREAT SERPL-MCNC: 1.3 MG/DL (ref 0.7–1.2)
GFR AFRICAN AMERICAN: >60
GFR NON-AFRICAN AMERICAN: >60 ML/MIN/1.73
GLUCOSE BLD-MCNC: 246 MG/DL (ref 74–99)
POTASSIUM SERPL-SCNC: 3.9 MMOL/L (ref 3.5–5)
PRO-BNP: 1264 PG/ML (ref 0–125)
SODIUM BLD-SCNC: 140 MMOL/L (ref 132–146)

## 2019-08-08 PROCEDURE — 99214 OFFICE O/P EST MOD 30 MIN: CPT

## 2019-08-08 PROCEDURE — 80048 BASIC METABOLIC PNL TOTAL CA: CPT

## 2019-08-08 PROCEDURE — 36415 COLL VENOUS BLD VENIPUNCTURE: CPT

## 2019-08-08 PROCEDURE — 83880 ASSAY OF NATRIURETIC PEPTIDE: CPT

## 2019-08-20 RX ORDER — METOPROLOL SUCCINATE 100 MG/1
TABLET, EXTENDED RELEASE ORAL
Qty: 180 TABLET | Refills: 0 | Status: ON HOLD | OUTPATIENT
Start: 2019-08-20 | End: 2019-10-30 | Stop reason: HOSPADM

## 2019-09-03 ENCOUNTER — HOSPITAL ENCOUNTER (INPATIENT)
Age: 69
LOS: 6 days | Discharge: HOME HEALTH | DRG: 264 | End: 2019-09-09
Attending: EMERGENCY MEDICINE | Admitting: INTERNAL MEDICINE
Payer: COMMERCIAL

## 2019-09-03 ENCOUNTER — APPOINTMENT (OUTPATIENT)
Dept: GENERAL RADIOLOGY | Age: 69
DRG: 264 | End: 2019-09-03
Payer: COMMERCIAL

## 2019-09-03 DIAGNOSIS — I44.7 LBBB (LEFT BUNDLE BRANCH BLOCK): ICD-10-CM

## 2019-09-03 DIAGNOSIS — I48.0 PAF (PAROXYSMAL ATRIAL FIBRILLATION) (HCC): ICD-10-CM

## 2019-09-03 DIAGNOSIS — N18.2 CKD (CHRONIC KIDNEY DISEASE) STAGE 2, GFR 60-89 ML/MIN: ICD-10-CM

## 2019-09-03 DIAGNOSIS — I50.9 ACUTE ON CHRONIC CONGESTIVE HEART FAILURE, UNSPECIFIED HEART FAILURE TYPE (HCC): Primary | ICD-10-CM

## 2019-09-03 DIAGNOSIS — I42.8 NICM (NONISCHEMIC CARDIOMYOPATHY) (HCC): ICD-10-CM

## 2019-09-03 DIAGNOSIS — Z95.810 BIVENTRICULAR IMPLANTABLE CARDIOVERTER-DEFIBRILLATOR IN SITU: ICD-10-CM

## 2019-09-03 DIAGNOSIS — I50.22 CHRONIC SYSTOLIC HEART FAILURE (HCC): ICD-10-CM

## 2019-09-03 PROBLEM — I50.23 CHF (CONGESTIVE HEART FAILURE), NYHA CLASS III, ACUTE ON CHRONIC, SYSTOLIC (HCC): Status: ACTIVE | Noted: 2019-09-03

## 2019-09-03 LAB
ANION GAP SERPL CALCULATED.3IONS-SCNC: 12 MMOL/L (ref 7–16)
BASOPHILS ABSOLUTE: 0.02 E9/L (ref 0–0.2)
BASOPHILS RELATIVE PERCENT: 0.3 % (ref 0–2)
BUN BLDV-MCNC: 30 MG/DL (ref 8–23)
CALCIUM SERPL-MCNC: 8.7 MG/DL (ref 8.6–10.2)
CHLORIDE BLD-SCNC: 104 MMOL/L (ref 98–107)
CK MB: 2.8 NG/ML (ref 0–7.7)
CK MB: 3 NG/ML (ref 0–7.7)
CO2: 24 MMOL/L (ref 22–29)
CREAT SERPL-MCNC: 1.2 MG/DL (ref 0.7–1.2)
DIGOXIN LEVEL: 0.9 NG/ML (ref 0.8–2)
EOSINOPHILS ABSOLUTE: 0.16 E9/L (ref 0.05–0.5)
EOSINOPHILS RELATIVE PERCENT: 2.6 % (ref 0–6)
GFR AFRICAN AMERICAN: >60
GFR NON-AFRICAN AMERICAN: >60 ML/MIN/1.73
GLUCOSE BLD-MCNC: 160 MG/DL (ref 74–99)
HCT VFR BLD CALC: 39 % (ref 37–54)
HEMOGLOBIN: 12.9 G/DL (ref 12.5–16.5)
IMMATURE GRANULOCYTES #: 0.02 E9/L
IMMATURE GRANULOCYTES %: 0.3 % (ref 0–5)
LYMPHOCYTES ABSOLUTE: 1.59 E9/L (ref 1.5–4)
LYMPHOCYTES RELATIVE PERCENT: 25.5 % (ref 20–42)
MAGNESIUM: 1.7 MG/DL (ref 1.6–2.6)
MCH RBC QN AUTO: 30.9 PG (ref 26–35)
MCHC RBC AUTO-ENTMCNC: 33.1 % (ref 32–34.5)
MCV RBC AUTO: 93.3 FL (ref 80–99.9)
METER GLUCOSE: 102 MG/DL (ref 74–99)
METER GLUCOSE: 82 MG/DL (ref 74–99)
MONOCYTES ABSOLUTE: 0.63 E9/L (ref 0.1–0.95)
MONOCYTES RELATIVE PERCENT: 10.1 % (ref 2–12)
NEUTROPHILS ABSOLUTE: 3.81 E9/L (ref 1.8–7.3)
NEUTROPHILS RELATIVE PERCENT: 61.2 % (ref 43–80)
PDW BLD-RTO: 14.4 FL (ref 11.5–15)
PLATELET # BLD: 161 E9/L (ref 130–450)
PMV BLD AUTO: 11.5 FL (ref 7–12)
POTASSIUM SERPL-SCNC: 3.8 MMOL/L (ref 3.5–5)
PRO-BNP: 3857 PG/ML (ref 0–125)
RBC # BLD: 4.18 E12/L (ref 3.8–5.8)
SODIUM BLD-SCNC: 140 MMOL/L (ref 132–146)
TOTAL CK: 112 U/L (ref 20–200)
TOTAL CK: 118 U/L (ref 20–200)
TROPONIN: 0.04 NG/ML (ref 0–0.03)
TROPONIN: 0.04 NG/ML (ref 0–0.03)
TROPONIN: 0.05 NG/ML (ref 0–0.03)
WBC # BLD: 6.2 E9/L (ref 4.5–11.5)

## 2019-09-03 PROCEDURE — 94664 DEMO&/EVAL PT USE INHALER: CPT

## 2019-09-03 PROCEDURE — 2580000003 HC RX 258: Performed by: NURSE PRACTITIONER

## 2019-09-03 PROCEDURE — 93005 ELECTROCARDIOGRAM TRACING: CPT | Performed by: EMERGENCY MEDICINE

## 2019-09-03 PROCEDURE — 80162 ASSAY OF DIGOXIN TOTAL: CPT

## 2019-09-03 PROCEDURE — 6370000000 HC RX 637 (ALT 250 FOR IP): Performed by: EMERGENCY MEDICINE

## 2019-09-03 PROCEDURE — 94760 N-INVAS EAR/PLS OXIMETRY 1: CPT

## 2019-09-03 PROCEDURE — 99222 1ST HOSP IP/OBS MODERATE 55: CPT | Performed by: INTERNAL MEDICINE

## 2019-09-03 PROCEDURE — 82962 GLUCOSE BLOOD TEST: CPT

## 2019-09-03 PROCEDURE — 36415 COLL VENOUS BLD VENIPUNCTURE: CPT

## 2019-09-03 PROCEDURE — 82550 ASSAY OF CK (CPK): CPT

## 2019-09-03 PROCEDURE — 85025 COMPLETE CBC W/AUTO DIFF WBC: CPT

## 2019-09-03 PROCEDURE — 6370000000 HC RX 637 (ALT 250 FOR IP): Performed by: NURSE PRACTITIONER

## 2019-09-03 PROCEDURE — 71045 X-RAY EXAM CHEST 1 VIEW: CPT

## 2019-09-03 PROCEDURE — 2060000000 HC ICU INTERMEDIATE R&B

## 2019-09-03 PROCEDURE — 99255 IP/OBS CONSLTJ NEW/EST HI 80: CPT | Performed by: INTERNAL MEDICINE

## 2019-09-03 PROCEDURE — 84484 ASSAY OF TROPONIN QUANT: CPT

## 2019-09-03 PROCEDURE — APPSS45 APP SPLIT SHARED TIME 31-45 MINUTES: Performed by: NURSE PRACTITIONER

## 2019-09-03 PROCEDURE — 99285 EMERGENCY DEPT VISIT HI MDM: CPT

## 2019-09-03 PROCEDURE — 82553 CREATINE MB FRACTION: CPT

## 2019-09-03 PROCEDURE — 80048 BASIC METABOLIC PNL TOTAL CA: CPT

## 2019-09-03 PROCEDURE — 83735 ASSAY OF MAGNESIUM: CPT

## 2019-09-03 PROCEDURE — APPSS60 APP SPLIT SHARED TIME 46-60 MINUTES: Performed by: NURSE PRACTITIONER

## 2019-09-03 PROCEDURE — 2500000003 HC RX 250 WO HCPCS: Performed by: INTERNAL MEDICINE

## 2019-09-03 PROCEDURE — 2500000003 HC RX 250 WO HCPCS: Performed by: EMERGENCY MEDICINE

## 2019-09-03 PROCEDURE — 83880 ASSAY OF NATRIURETIC PEPTIDE: CPT

## 2019-09-03 RX ORDER — SODIUM CHLORIDE 0.9 % (FLUSH) 0.9 %
10 SYRINGE (ML) INJECTION EVERY 12 HOURS SCHEDULED
Status: DISCONTINUED | OUTPATIENT
Start: 2019-09-03 | End: 2019-09-03 | Stop reason: SDUPTHER

## 2019-09-03 RX ORDER — DEXTROSE MONOHYDRATE 25 G/50ML
12.5 INJECTION, SOLUTION INTRAVENOUS PRN
Status: DISCONTINUED | OUTPATIENT
Start: 2019-09-03 | End: 2019-09-09 | Stop reason: HOSPADM

## 2019-09-03 RX ORDER — ACETAMINOPHEN 325 MG/1
650 TABLET ORAL EVERY 4 HOURS PRN
Status: DISCONTINUED | OUTPATIENT
Start: 2019-09-03 | End: 2019-09-09 | Stop reason: HOSPADM

## 2019-09-03 RX ORDER — ACETAMINOPHEN 325 MG/1
650 TABLET ORAL EVERY 4 HOURS PRN
Status: DISCONTINUED | OUTPATIENT
Start: 2019-09-03 | End: 2019-09-03 | Stop reason: SDUPTHER

## 2019-09-03 RX ORDER — SODIUM CHLORIDE 0.9 % (FLUSH) 0.9 %
10 SYRINGE (ML) INJECTION PRN
Status: DISCONTINUED | OUTPATIENT
Start: 2019-09-03 | End: 2019-09-09 | Stop reason: HOSPADM

## 2019-09-03 RX ORDER — BUMETANIDE 0.25 MG/ML
2 INJECTION, SOLUTION INTRAMUSCULAR; INTRAVENOUS 2 TIMES DAILY
Status: DISCONTINUED | OUTPATIENT
Start: 2019-09-03 | End: 2019-09-09 | Stop reason: HOSPADM

## 2019-09-03 RX ORDER — POTASSIUM CHLORIDE 20 MEQ/1
20 TABLET, EXTENDED RELEASE ORAL DAILY
Status: DISCONTINUED | OUTPATIENT
Start: 2019-09-04 | End: 2019-09-08

## 2019-09-03 RX ORDER — FAMOTIDINE 20 MG/1
20 TABLET, FILM COATED ORAL 2 TIMES DAILY
Status: DISCONTINUED | OUTPATIENT
Start: 2019-09-03 | End: 2019-09-09 | Stop reason: HOSPADM

## 2019-09-03 RX ORDER — SODIUM CHLORIDE 0.9 % (FLUSH) 0.9 %
10 SYRINGE (ML) INJECTION EVERY 12 HOURS SCHEDULED
Status: DISCONTINUED | OUTPATIENT
Start: 2019-09-03 | End: 2019-09-09 | Stop reason: HOSPADM

## 2019-09-03 RX ORDER — HYDRALAZINE HYDROCHLORIDE 50 MG/1
100 TABLET, FILM COATED ORAL EVERY 8 HOURS SCHEDULED
Status: DISCONTINUED | OUTPATIENT
Start: 2019-09-03 | End: 2019-09-09 | Stop reason: HOSPADM

## 2019-09-03 RX ORDER — MORPHINE SULFATE 2 MG/ML
2 INJECTION, SOLUTION INTRAMUSCULAR; INTRAVENOUS
Status: DISCONTINUED | OUTPATIENT
Start: 2019-09-03 | End: 2019-09-09 | Stop reason: HOSPADM

## 2019-09-03 RX ORDER — SODIUM CHLORIDE 0.9 % (FLUSH) 0.9 %
10 SYRINGE (ML) INJECTION PRN
Status: DISCONTINUED | OUTPATIENT
Start: 2019-09-03 | End: 2019-09-03 | Stop reason: SDUPTHER

## 2019-09-03 RX ORDER — SENNA PLUS 8.6 MG/1
1 TABLET ORAL DAILY PRN
Status: DISCONTINUED | OUTPATIENT
Start: 2019-09-03 | End: 2019-09-09 | Stop reason: HOSPADM

## 2019-09-03 RX ORDER — BUSPIRONE HYDROCHLORIDE 5 MG/1
30 TABLET ORAL DAILY
Status: DISCONTINUED | OUTPATIENT
Start: 2019-09-04 | End: 2019-09-09 | Stop reason: HOSPADM

## 2019-09-03 RX ORDER — INSULIN GLARGINE 100 [IU]/ML
10 INJECTION, SOLUTION SUBCUTANEOUS NIGHTLY
Status: DISCONTINUED | OUTPATIENT
Start: 2019-09-03 | End: 2019-09-09 | Stop reason: HOSPADM

## 2019-09-03 RX ORDER — BUMETANIDE 0.25 MG/ML
1 INJECTION, SOLUTION INTRAMUSCULAR; INTRAVENOUS ONCE
Status: COMPLETED | OUTPATIENT
Start: 2019-09-03 | End: 2019-09-03

## 2019-09-03 RX ORDER — NICOTINE POLACRILEX 4 MG
15 LOZENGE BUCCAL PRN
Status: DISCONTINUED | OUTPATIENT
Start: 2019-09-03 | End: 2019-09-09 | Stop reason: HOSPADM

## 2019-09-03 RX ORDER — DIGOXIN 125 MCG
125 TABLET ORAL DAILY
Status: DISCONTINUED | OUTPATIENT
Start: 2019-09-04 | End: 2019-09-09 | Stop reason: HOSPADM

## 2019-09-03 RX ORDER — ALLOPURINOL 100 MG/1
200 TABLET ORAL 2 TIMES DAILY
Status: DISCONTINUED | OUTPATIENT
Start: 2019-09-03 | End: 2019-09-09 | Stop reason: HOSPADM

## 2019-09-03 RX ORDER — IPRATROPIUM BROMIDE AND ALBUTEROL SULFATE 2.5; .5 MG/3ML; MG/3ML
1 SOLUTION RESPIRATORY (INHALATION) ONCE
Status: COMPLETED | OUTPATIENT
Start: 2019-09-03 | End: 2019-09-03

## 2019-09-03 RX ORDER — METOPROLOL SUCCINATE 100 MG/1
100 TABLET, EXTENDED RELEASE ORAL 2 TIMES DAILY
Status: DISCONTINUED | OUTPATIENT
Start: 2019-09-03 | End: 2019-09-09 | Stop reason: HOSPADM

## 2019-09-03 RX ORDER — DEXTROSE MONOHYDRATE 50 MG/ML
100 INJECTION, SOLUTION INTRAVENOUS PRN
Status: DISCONTINUED | OUTPATIENT
Start: 2019-09-03 | End: 2019-09-09 | Stop reason: HOSPADM

## 2019-09-03 RX ORDER — ATORVASTATIN CALCIUM 40 MG/1
80 TABLET, FILM COATED ORAL NIGHTLY
Status: DISCONTINUED | OUTPATIENT
Start: 2019-09-03 | End: 2019-09-09 | Stop reason: HOSPADM

## 2019-09-03 RX ORDER — ONDANSETRON 2 MG/ML
4 INJECTION INTRAMUSCULAR; INTRAVENOUS EVERY 8 HOURS PRN
Status: DISCONTINUED | OUTPATIENT
Start: 2019-09-03 | End: 2019-09-03

## 2019-09-03 RX ORDER — SERTRALINE HYDROCHLORIDE 100 MG/1
100 TABLET, FILM COATED ORAL DAILY
Status: DISCONTINUED | OUTPATIENT
Start: 2019-09-03 | End: 2019-09-09 | Stop reason: HOSPADM

## 2019-09-03 RX ORDER — GABAPENTIN 100 MG/1
100 CAPSULE ORAL 3 TIMES DAILY
Status: DISCONTINUED | OUTPATIENT
Start: 2019-09-03 | End: 2019-09-09 | Stop reason: HOSPADM

## 2019-09-03 RX ORDER — DOFETILIDE 0.25 MG/1
250 CAPSULE ORAL EVERY 12 HOURS SCHEDULED
Status: DISCONTINUED | OUTPATIENT
Start: 2019-09-03 | End: 2019-09-09 | Stop reason: HOSPADM

## 2019-09-03 RX ORDER — SPIRONOLACTONE 25 MG/1
25 TABLET ORAL DAILY
Status: DISCONTINUED | OUTPATIENT
Start: 2019-09-04 | End: 2019-09-09 | Stop reason: HOSPADM

## 2019-09-03 RX ORDER — ISOSORBIDE MONONITRATE 30 MG/1
30 TABLET, EXTENDED RELEASE ORAL 2 TIMES DAILY
Status: DISCONTINUED | OUTPATIENT
Start: 2019-09-03 | End: 2019-09-09 | Stop reason: HOSPADM

## 2019-09-03 RX ADMIN — HYDRALAZINE HYDROCHLORIDE 100 MG: 50 TABLET, FILM COATED ORAL at 21:52

## 2019-09-03 RX ADMIN — FAMOTIDINE 20 MG: 20 TABLET ORAL at 20:36

## 2019-09-03 RX ADMIN — METOPROLOL SUCCINATE 100 MG: 100 TABLET, EXTENDED RELEASE ORAL at 20:36

## 2019-09-03 RX ADMIN — IPRATROPIUM BROMIDE AND ALBUTEROL SULFATE 1 AMPULE: .5; 3 SOLUTION RESPIRATORY (INHALATION) at 11:11

## 2019-09-03 RX ADMIN — ISOSORBIDE MONONITRATE 30 MG: 30 TABLET, EXTENDED RELEASE ORAL at 16:26

## 2019-09-03 RX ADMIN — BUMETANIDE 2 MG: 0.25 INJECTION INTRAMUSCULAR; INTRAVENOUS at 22:06

## 2019-09-03 RX ADMIN — Medication 10 ML: at 20:37

## 2019-09-03 RX ADMIN — Medication 10 ML: at 22:07

## 2019-09-03 RX ADMIN — ALLOPURINOL 200 MG: 100 TABLET ORAL at 20:36

## 2019-09-03 RX ADMIN — BUMETANIDE 1 MG: 0.25 INJECTION INTRAMUSCULAR; INTRAVENOUS at 10:37

## 2019-09-03 RX ADMIN — ATORVASTATIN CALCIUM 80 MG: 40 TABLET, FILM COATED ORAL at 20:36

## 2019-09-03 RX ADMIN — GABAPENTIN 100 MG: 100 CAPSULE ORAL at 14:17

## 2019-09-03 RX ADMIN — FAMOTIDINE 20 MG: 20 TABLET ORAL at 14:17

## 2019-09-03 RX ADMIN — HYDRALAZINE HYDROCHLORIDE 100 MG: 50 TABLET, FILM COATED ORAL at 14:17

## 2019-09-03 RX ADMIN — DOFETILIDE 250 MCG: 0.25 CAPSULE ORAL at 20:36

## 2019-09-03 RX ADMIN — APIXABAN 5 MG: 5 TABLET, FILM COATED ORAL at 20:36

## 2019-09-03 RX ADMIN — GABAPENTIN 100 MG: 100 CAPSULE ORAL at 20:36

## 2019-09-03 ASSESSMENT — PAIN DESCRIPTION - PROGRESSION
CLINICAL_PROGRESSION: NOT CHANGED
CLINICAL_PROGRESSION: NOT CHANGED

## 2019-09-03 ASSESSMENT — PAIN DESCRIPTION - DESCRIPTORS
DESCRIPTORS: ACHING;CONSTANT;DISCOMFORT
DESCRIPTORS: ACHING;DISCOMFORT;HEADACHE

## 2019-09-03 ASSESSMENT — PAIN DESCRIPTION - FREQUENCY
FREQUENCY: CONTINUOUS
FREQUENCY: INTERMITTENT

## 2019-09-03 ASSESSMENT — PAIN - FUNCTIONAL ASSESSMENT
PAIN_FUNCTIONAL_ASSESSMENT: ACTIVITIES ARE NOT PREVENTED
PAIN_FUNCTIONAL_ASSESSMENT: PREVENTS OR INTERFERES SOME ACTIVE ACTIVITIES AND ADLS

## 2019-09-03 ASSESSMENT — PAIN DESCRIPTION - LOCATION
LOCATION: LEG
LOCATION: HEAD

## 2019-09-03 ASSESSMENT — PAIN DESCRIPTION - PAIN TYPE
TYPE: ACUTE PAIN
TYPE: CHRONIC PAIN

## 2019-09-03 ASSESSMENT — PAIN DESCRIPTION - ONSET
ONSET: PROGRESSIVE
ONSET: ON-GOING

## 2019-09-03 ASSESSMENT — PAIN SCALES - GENERAL
PAINLEVEL_OUTOF10: 6
PAINLEVEL_OUTOF10: 0
PAINLEVEL_OUTOF10: 7

## 2019-09-03 ASSESSMENT — PAIN DESCRIPTION - ORIENTATION: ORIENTATION: RIGHT;LEFT

## 2019-09-03 NOTE — CONSULTS
showed normal  coronary arteries (detailed report unavailable for review). 11/11 Lexiscan stress (EF 30%, reversible lateral defect, severe diffuse hypokinesis)  9. Nonischemic cardiomyopathy. A. History of atrial fibrillation/supraventricular tachycardia/AVNRT. B. Chronic anticoagulation with Eliquis therapy. C. Status post biventricular ICD (SJM model [id number], serial #  [id number]) on August 20, 2014, per Dr. Shade Avery. 10. Echocardiogram August 19, 2014 (interpreting physician Dr. Christie Frank) summary: Left ventricle is moderately enlarged, fairly  reduced left ventricular systolic function. Overall ejection fraction is  severely decreased. EF 20% to 25%. The left atrium is severely dilated. Moderately enlarged right atrial size. Moderately dilated right  ventricle. Moderately reduced right ventricular systolic function. Mild  to moderate mitral regurgitation is present. Mild to moderate tricuspid  regurgitation and pulmonary hypertension are severe. 11. Echocariogram 3/15: EF 40%, mild global hypokinesis, mod inferior hypokinesis, mod LVH, LA mildly dilated, mild TR, mod PH (RVSP 59-64)  12. Left bundle branch block--chronic. 13. Remote Left leg DVT  14. Anxiety and Depression   15. Gout  16. S/p Bilateral knee surgery, back surgery, and right eye cataract surgery   17. Most recent echocardiogram 02/07/2019: LV moderately enlarged. Mild LVH. Global hypokinesis. EF 25% Stage III Diastolic Dysfunction. Moderate MR. Medications Prior to admit:  Prior to Admission medications    Medication Sig Start Date End Date Taking? Authorizing Provider   SEMAGLUTIDE SC Inject 1.5 mLs into the skin once a week Patient takes on mondays.    Yes Historical Provider, MD   metoprolol succinate (TOPROL XL) 100 MG extended release tablet TAKE 1 TABLET TWICE A DAY  Patient taking differently: 50 mg 2 times daily  8/20/19  Yes Howie Murphy DO   metolazone (ZAROXOLYN) 5 MG tablet Take 1 tablet by mouth daily clear throughout fields   CARDIOVASCULAR:     No carotid bruit  Heart Inspection- shows no noted pulsations  Heart Palpation- no heaves or thrills; PMI is non-displaced   Heart Ausculation- Regular rate and rhythm, no murmur. No s3, s4 or rub   PV: 2+ pitting bilateral lower extremity edema. No varicosities. Pedal pulses palpable, no clubbing or cyanosis   ABDOMEN: Soft, obese, non-tender to light palpation. Bowel sounds present. No palpable masses no organomegaly; no abdominal bruit  MS: Good muscle strength and tone. No atrophy or abnormal movements. : Deferred  SKIN: Warm and dry no statis dermatitis or ulcers   NEURO / PSYCH: Oriented to person, place and time. Speech clear and appropriate. Follows all commands. Pleasant affect     DATA:    ECG: Paced rhythm   Tele strips: Paced rhythm   Diagnostic:      Intake/Output Summary (Last 24 hours) at 9/3/2019 1410  Last data filed at 9/3/2019 1152  Gross per 24 hour   Intake --   Output 475 ml   Net -475 ml       Labs:   CBC:   Recent Labs     09/03/19  0950   WBC 6.2   HGB 12.9   HCT 39.0        BMP:   Recent Labs     09/03/19  0950      K 3.8   CO2 24   BUN 30*   CREATININE 1.2   LABGLOM >60   CALCIUM 8.7     Mag:   Recent Labs     09/03/19  0950   MG 1.7   CARDIAC ENZYMES:  Recent Labs     09/03/19  0950 09/03/19  1301   CKTOTAL  --  118   CKMB  --  3.0   TROPONINI 0.04* 0.04*     09/03/2019 CXR:   Mild cardiomegaly, unchanged     IMPRESSION and PLAN to follow as per Dr. Adia Levy     Electronically signed by PILAR Infante CNP on 9/3/2019 at 2:10 PM   ____________________________________  I independently interviewed and examined the patient. I have reviewed the above documentation completed by the [facility]. Please see my additional contributions to the HPI, physical exam, and assessment / medical decision making.     HPI, ROS, PMH, PSH, [facility], , and medications independently reviewed (agree; see above documentation)    Reason for Consultation: Acute

## 2019-09-03 NOTE — ED PROVIDER NOTES
HPI:  9/3/19,   Time: 10:40 AM         Pooja Acharya is a 81 y.o. male presenting to the ED for shortness of breath, beginning more than 3 days ago. The complaint has been persistent, moderate in severity, and worsened by light exertion. Patient has a history of congestive heart failure and has been increasingly short of breath with increasing leg edema over the last 5 days. He has a history of CHF and atrial fibrillation he has a pacemaker. ROS:   Pertinent positives and negatives are stated within HPI, all other systems reviewed and are negative.  --------------------------------------------- PAST HISTORY ---------------------------------------------  Past Medical History:  has a past medical history of Anxiety, Arthritis, Atrial fibrillation ([address] AVNRT (AV teddy re-entry tachycardia) ([address] CAD (coronary artery disease), CHF (congestive heart failure) ([address] Diabetes mellitus ([address] Diabetic neuropathy ([address] Diastolic dysfunction, Fall, Gout, History of blood transfusion, Hx of blood clots, Hyperlipidemia, Hypertension, LBBB (left bundle branch block), Moderate mitral regurgitation, Nonischemic cardiomyopathy ([address] Obesity, SNEHA (obstructive sleep apnea), Severe tricuspid regurgitation, SVT (supraventricular tachycardia) ([address] and Viral cardiomyopathy ([address] Past Surgical History:  has a past surgical history that includes Diagnostic Cardiac Cath Lab Procedure; knee surgery; Femur Surgery; ECHO Compl W Dop Color Flow (11/19/2011); Cardiac catheterization (11/21/2011); Echo Complete (12/14/2013); back surgery; joint replacement (Left); Cardiac defibrillator placement (08/20/2014); and eye surgery (Right, 08/2019). Social History:  reports that he quit smoking about 39 years ago. His smoking use included cigars. He has never used smokeless tobacco. He reports that he does not drink alcohol or use drugs.     Family History: family history includes Alzheimer's Disease in his mother; Arrhythmia Given 9/5/19 2045)   busPIRone (BUSPAR) tablet 30 mg (30 mg Oral Given 9/5/19 0930)   digoxin (LANOXIN) tablet 125 mcg (125 mcg Oral Given 9/5/19 0930)   dofetilide (TIKOSYN) capsule 250 mcg (250 mcg Oral Given 9/5/19 2045)   apixaban (ELIQUIS) tablet 5 mg (5 mg Oral Given 9/5/19 2045)   hydrALAZINE (APRESOLINE) tablet 100 mg (100 mg Oral Given 9/5/19 2247)   isosorbide mononitrate (IMDUR) extended release tablet 30 mg (30 mg Oral Given 9/5/19 1629)   magnesium oxide (MAG-OX) tablet 400 mg (400 mg Oral Given 9/5/19 0930)   metoprolol succinate (TOPROL XL) extended release tablet 100 mg (100 mg Oral Given 9/5/19 2045)   potassium chloride (KLOR-CON M) extended release tablet 20 mEq (20 mEq Oral Given 9/5/19 0930)   spironolactone (ALDACTONE) tablet 25 mg (25 mg Oral Given 9/5/19 0930)   sertraline (ZOLOFT) tablet 100 mg (100 mg Oral Given 9/5/19 0930)   glucose (GLUTOSE) 40 % oral gel 15 g (has no administration in time range)   dextrose 50 % IV solution (has no administration in time range)   glucagon (rDNA) injection 1 mg (has no administration in time range)   dextrose 5 % solution (has no administration in time range)   insulin lispro (HUMALOG) injection vial 0-12 Units (2 Units Subcutaneous Given 9/5/19 1629)   insulin lispro (HUMALOG) injection vial 0-6 Units (2 Units Subcutaneous Given 9/5/19 2048)   gabapentin (NEURONTIN) capsule 100 mg (100 mg Oral Given 9/5/19 2045)   insulin glargine (LANTUS) injection vial 10 Units (10 Units Subcutaneous Given 9/5/19 2048)   bumetanide (BUMEX) injection 2 mg (2 mg Intravenous Given 9/5/19 1629)   bumetanide (BUMEX) injection 1 mg (1 mg Intravenous Given 9/3/19 1037)   ipratropium-albuterol (DUONEB) nebulizer solution 1 ampule (1 ampule Inhalation Given 9/3/19 1111)   metOLazone (ZAROXOLYN) tablet 5 mg (5 mg Oral Given 9/5/19 1114)         Medical Decision Making:    Review of labs and imaging. BNP is elevated at 3800. EKG shows a paced rhythm.   Chest x-ray shows

## 2019-09-03 NOTE — H&P
(obstructive sleep apnea)     treated with CPAP    Severe tricuspid regurgitation 04/19/2016    SVT (supraventricular tachycardia) (HCC)     Viral cardiomyopathy [facility])        Surgical History:  Past Surgical History:   Procedure Laterality Date    BACK SURGERY      CARDIAC CATHETERIZATION  11/21/2011    Dr. Aquiles Mcclendon  08/20/2014    BiV/ICD  (Sofia Martinez)    Dr. Stoney Tello CATH LAB PROCEDURE      ECHO COMPL W DOP COLOR FLOW  11/19/2011         ECHO COMPLETE  12/14/2013         FEMUR SURGERY      rt; compound frx right femur at 3years old.  JOINT REPLACEMENT Left     knee    KNEE SURGERY      7 on right/1 on left/ total replacement on R       Medications Prior to Admission:    Prior to Admission medications    Medication Sig Start Date End Date Taking? Authorizing Provider   metoprolol succinate (TOPROL XL) 100 MG extended release tablet TAKE 1 TABLET TWICE A DAY 8/20/19   Yousif Sensing, DO   sertraline (ZOLOFT) 100 MG tablet Take 100 mg by mouth daily    Historical Provider, MD   metolazone (ZAROXOLYN) 5 MG tablet Take 1 tablet by mouth daily Indications: States takes as needed for weight gain.  7/1/19   Tanya Lyon MD   insulin detemir (LEVEMIR FLEXTOUCH) 100 UNIT/ML injection pen Inject 10 Units into the skin nightly 6/14/19   Renetta Quintero MD   [facility]) 250 MCG capsule TAKE 1 CAPSULE TWICE A DAY 6/10/19   [place] Sensing, DO   bumetanide (BUMEX) 2 MG tablet Take 1 tablet by mouth daily 3/26/19   JERO Bhat   ELIQUIS 5 MG TABS tablet TAKE 1 TABLET TWICE A DAY 10/22/18   Yousif Sensing, DO   VILAZODONE HCL 10 MG Tablet TAKE ONE TABLET BY MOUTH EVERY DAY 9/14/17   Historical Provider, MD   famotidine (PEPCID) 20 MG tablet Take 1 tablet by mouth daily 12/22/16   Erika Gaming MD   spironolactone (ALDACTONE) 25 MG tablet Take 1 tablet by mouth daily 5/3/16   Tanya Lyon MD   allopurinol (ZYLOPRIM) 100 MG tablet Take 400 mg by mouth daily

## 2019-09-04 LAB
ALBUMIN SERPL-MCNC: 3.2 G/DL (ref 3.5–5.2)
ALP BLD-CCNC: 82 U/L (ref 40–129)
ALT SERPL-CCNC: 28 U/L (ref 0–40)
ANION GAP SERPL CALCULATED.3IONS-SCNC: 12 MMOL/L (ref 7–16)
AST SERPL-CCNC: 29 U/L (ref 0–39)
BASOPHILS ABSOLUTE: 0.03 E9/L (ref 0–0.2)
BASOPHILS RELATIVE PERCENT: 0.5 % (ref 0–2)
BILIRUB SERPL-MCNC: 0.8 MG/DL (ref 0–1.2)
BUN BLDV-MCNC: 26 MG/DL (ref 8–23)
CALCIUM SERPL-MCNC: 8.7 MG/DL (ref 8.6–10.2)
CHLORIDE BLD-SCNC: 103 MMOL/L (ref 98–107)
CO2: 25 MMOL/L (ref 22–29)
CREAT SERPL-MCNC: 1.2 MG/DL (ref 0.7–1.2)
EKG ATRIAL RATE: 86 BPM
EKG Q-T INTERVAL: 496 MS
EKG QRS DURATION: 190 MS
EKG QTC CALCULATION (BAZETT): 572 MS
EKG R AXIS: -143 DEGREES
EKG T AXIS: 12 DEGREES
EKG VENTRICULAR RATE: 80 BPM
EOSINOPHILS ABSOLUTE: 0.23 E9/L (ref 0.05–0.5)
EOSINOPHILS RELATIVE PERCENT: 3.5 % (ref 0–6)
GFR AFRICAN AMERICAN: >60
GFR NON-AFRICAN AMERICAN: >60 ML/MIN/1.73
GLUCOSE BLD-MCNC: 61 MG/DL (ref 74–99)
HBA1C MFR BLD: 8.4 % (ref 4–5.6)
HCT VFR BLD CALC: 38.2 % (ref 37–54)
HEMOGLOBIN: 12.6 G/DL (ref 12.5–16.5)
IMMATURE GRANULOCYTES #: 0.03 E9/L
IMMATURE GRANULOCYTES %: 0.5 % (ref 0–5)
LYMPHOCYTES ABSOLUTE: 1.71 E9/L (ref 1.5–4)
LYMPHOCYTES RELATIVE PERCENT: 25.8 % (ref 20–42)
MAGNESIUM: 1.8 MG/DL (ref 1.6–2.6)
MCH RBC QN AUTO: 31.1 PG (ref 26–35)
MCHC RBC AUTO-ENTMCNC: 33 % (ref 32–34.5)
MCV RBC AUTO: 94.3 FL (ref 80–99.9)
METER GLUCOSE: 125 MG/DL (ref 74–99)
METER GLUCOSE: 135 MG/DL (ref 74–99)
METER GLUCOSE: 186 MG/DL (ref 74–99)
METER GLUCOSE: 55 MG/DL (ref 74–99)
METER GLUCOSE: 81 MG/DL (ref 74–99)
MONOCYTES ABSOLUTE: 0.67 E9/L (ref 0.1–0.95)
MONOCYTES RELATIVE PERCENT: 10.1 % (ref 2–12)
NEUTROPHILS ABSOLUTE: 3.97 E9/L (ref 1.8–7.3)
NEUTROPHILS RELATIVE PERCENT: 59.6 % (ref 43–80)
PDW BLD-RTO: 14.3 FL (ref 11.5–15)
PHOSPHORUS: 3.3 MG/DL (ref 2.5–4.5)
PLATELET # BLD: 152 E9/L (ref 130–450)
PMV BLD AUTO: 12 FL (ref 7–12)
POTASSIUM REFLEX MAGNESIUM: 3.3 MMOL/L (ref 3.5–5)
RBC # BLD: 4.05 E12/L (ref 3.8–5.8)
SODIUM BLD-SCNC: 140 MMOL/L (ref 132–146)
TOTAL PROTEIN: 6.6 G/DL (ref 6.4–8.3)
WBC # BLD: 6.6 E9/L (ref 4.5–11.5)

## 2019-09-04 PROCEDURE — 0JBQ0ZZ EXCISION OF RIGHT FOOT SUBCUTANEOUS TISSUE AND FASCIA, OPEN APPROACH: ICD-10-PCS | Performed by: PODIATRIST

## 2019-09-04 PROCEDURE — 36415 COLL VENOUS BLD VENIPUNCTURE: CPT

## 2019-09-04 PROCEDURE — 2580000003 HC RX 258: Performed by: NURSE PRACTITIONER

## 2019-09-04 PROCEDURE — 99232 SBSQ HOSP IP/OBS MODERATE 35: CPT | Performed by: INTERNAL MEDICINE

## 2019-09-04 PROCEDURE — 2500000003 HC RX 250 WO HCPCS: Performed by: INTERNAL MEDICINE

## 2019-09-04 PROCEDURE — 85025 COMPLETE CBC W/AUTO DIFF WBC: CPT

## 2019-09-04 PROCEDURE — 2060000000 HC ICU INTERMEDIATE R&B

## 2019-09-04 PROCEDURE — 97161 PT EVAL LOW COMPLEX 20 MIN: CPT

## 2019-09-04 PROCEDURE — 97165 OT EVAL LOW COMPLEX 30 MIN: CPT

## 2019-09-04 PROCEDURE — 83036 HEMOGLOBIN GLYCOSYLATED A1C: CPT

## 2019-09-04 PROCEDURE — 6370000000 HC RX 637 (ALT 250 FOR IP): Performed by: NURSE PRACTITIONER

## 2019-09-04 PROCEDURE — APPSS30 APP SPLIT SHARED TIME 16-30 MINUTES: Performed by: NURSE PRACTITIONER

## 2019-09-04 PROCEDURE — 2700000000 HC OXYGEN THERAPY PER DAY

## 2019-09-04 PROCEDURE — 93010 ELECTROCARDIOGRAM REPORT: CPT | Performed by: INTERNAL MEDICINE

## 2019-09-04 PROCEDURE — 82962 GLUCOSE BLOOD TEST: CPT

## 2019-09-04 PROCEDURE — 84100 ASSAY OF PHOSPHORUS: CPT

## 2019-09-04 PROCEDURE — 83735 ASSAY OF MAGNESIUM: CPT

## 2019-09-04 PROCEDURE — 80053 COMPREHEN METABOLIC PANEL: CPT

## 2019-09-04 RX ADMIN — ISOSORBIDE MONONITRATE 30 MG: 30 TABLET, EXTENDED RELEASE ORAL at 08:56

## 2019-09-04 RX ADMIN — MAGNESIUM OXIDE TAB 400 MG (241.3 MG ELEMENTAL MG) 400 MG: 400 (241.3 MG) TAB at 08:55

## 2019-09-04 RX ADMIN — APIXABAN 5 MG: 5 TABLET, FILM COATED ORAL at 20:36

## 2019-09-04 RX ADMIN — APIXABAN 5 MG: 5 TABLET, FILM COATED ORAL at 08:55

## 2019-09-04 RX ADMIN — Medication 10 ML: at 20:36

## 2019-09-04 RX ADMIN — DOFETILIDE 250 MCG: 0.25 CAPSULE ORAL at 08:56

## 2019-09-04 RX ADMIN — FAMOTIDINE 20 MG: 20 TABLET ORAL at 20:36

## 2019-09-04 RX ADMIN — POTASSIUM CHLORIDE 20 MEQ: 20 TABLET, EXTENDED RELEASE ORAL at 08:56

## 2019-09-04 RX ADMIN — Medication 10 ML: at 08:55

## 2019-09-04 RX ADMIN — DIGOXIN 125 MCG: 125 TABLET ORAL at 08:55

## 2019-09-04 RX ADMIN — FAMOTIDINE 20 MG: 20 TABLET ORAL at 08:55

## 2019-09-04 RX ADMIN — Medication 10 ML: at 07:13

## 2019-09-04 RX ADMIN — ISOSORBIDE MONONITRATE 30 MG: 30 TABLET, EXTENDED RELEASE ORAL at 16:12

## 2019-09-04 RX ADMIN — DOFETILIDE 250 MCG: 0.25 CAPSULE ORAL at 20:36

## 2019-09-04 RX ADMIN — GABAPENTIN 100 MG: 100 CAPSULE ORAL at 20:36

## 2019-09-04 RX ADMIN — BUSPIRONE HYDROCHLORIDE 30 MG: 5 TABLET ORAL at 08:56

## 2019-09-04 RX ADMIN — ALLOPURINOL 200 MG: 100 TABLET ORAL at 08:55

## 2019-09-04 RX ADMIN — METOPROLOL SUCCINATE 100 MG: 100 TABLET, EXTENDED RELEASE ORAL at 20:36

## 2019-09-04 RX ADMIN — METOPROLOL SUCCINATE 100 MG: 100 TABLET, EXTENDED RELEASE ORAL at 08:55

## 2019-09-04 RX ADMIN — GABAPENTIN 100 MG: 100 CAPSULE ORAL at 13:46

## 2019-09-04 RX ADMIN — HYDRALAZINE HYDROCHLORIDE 100 MG: 50 TABLET, FILM COATED ORAL at 23:30

## 2019-09-04 RX ADMIN — SERTRALINE 100 MG: 100 TABLET, FILM COATED ORAL at 08:56

## 2019-09-04 RX ADMIN — BUMETANIDE 2 MG: 0.25 INJECTION INTRAMUSCULAR; INTRAVENOUS at 16:12

## 2019-09-04 RX ADMIN — ATORVASTATIN CALCIUM 80 MG: 40 TABLET, FILM COATED ORAL at 20:36

## 2019-09-04 RX ADMIN — SPIRONOLACTONE 25 MG: 25 TABLET ORAL at 08:55

## 2019-09-04 RX ADMIN — BUMETANIDE 2 MG: 0.25 INJECTION INTRAMUSCULAR; INTRAVENOUS at 07:12

## 2019-09-04 RX ADMIN — ALLOPURINOL 200 MG: 100 TABLET ORAL at 20:36

## 2019-09-04 RX ADMIN — GABAPENTIN 100 MG: 100 CAPSULE ORAL at 08:56

## 2019-09-04 RX ADMIN — HYDRALAZINE HYDROCHLORIDE 100 MG: 50 TABLET, FILM COATED ORAL at 13:46

## 2019-09-04 ASSESSMENT — PAIN SCALES - GENERAL
PAINLEVEL_OUTOF10: 0

## 2019-09-04 NOTE — PROGRESS NOTES
CARDIOLOGY  INPATIENT PROGRESS NOTE       Date:  9/4/2019    Patient: Dionne Peace, 81 y.o., 1950  Admission:  9/3/2019  9:07 AM,  LOS: 1 day   Admit DX: CHF (congestive heart failure), NYHA class III, acute on chronic, systolic (HCC) [T37.38]  CHF (congestive heart failure), NYHA class III, acute on chronic, systolic (HCC) [R46.99]      Chief Complaint:  Patient is being seen in follow up for:  1. Acute on chronic systolic CHF  2. Nonischemic cardiomyopathy  3. Paroxysmal atrial fibrillation  4. Biventricular ICD  5. Moderate mitral regurgitation    SUBJECTIVE:    Dionne Peace was seen and examined. Notes and labs reviewed. No CP  SOB with minimal exertion. There were not complications over night. ROS:   Cardiac: As per HPI   General: No fever, chills   Pulmonary: As per HPI   HEENT: No diplopia, dysphagia, epistaxis   GI: No nausea, vomiting, abdominal pain, hematochezia or melena  : No dysuria or hematuria   Endocrine: no polydipsia, polyuria  Musculoskeletal: WOO x 4, no arthritis   Skin: no rash or pruritis  Neuro/Psych: No headache or focal motor/sensory deficits    OBJECTIVE:        Intake/Output Summary (Last 24 hours) at 9/4/2019 0911  Last data filed at 9/4/2019 0859  Gross per 24 hour   Intake 948 ml   Output 2125 ml   Net -1177 ml       PHYSICAL EXAM: /76   Pulse 83   Temp 97.6 °F (36.4 °C) (Oral)   Resp 16   Ht 5' 11\" (1.803 m)   Wt (!) 346 lb 6.4 oz (157.1 kg)   SpO2 98%   BMI 48.31 kg/m²       CONST: Well developed, well nourished, NAD   HEENT: NC/AT, EOMI, no icterus, MMM, no rhinorrhea, no perioral lesions   NECK: Supple, no JVD, trachea medline, no thyromegaly. No carotid bruits   RESP: No respiratory distress. Normal effort. No accessory muscle use. LUNGS: Decreased in bases bilaterally no wheezes, rales, or rhonchi. CV: PMI non-displaced. No parasternal heave. No thrill.     Heart:  RRR normal S1S2, grade 2/6 whole systolic murmur heard

## 2019-09-04 NOTE — PROGRESS NOTES
needed/appropriate   Balance Sitting: Good  (at EOB)  Standing: Fair  (with cane)  Fair+ dynamic standing balance during completion of ADLs and other functional tasks. Activity Tolerance Fair-  Patient reported experiencing shortness of breath with minimal functional mobility; patient's O2 saturation remained >94% throughout this session. Patient will demonstrate Good understanding and consistent implementation of energy conservation techniques and work simplification techniques into ADL/IADL routines. Visual/  Perceptual WFL     N/A        Strength: ROM: Additional Information:    R UE  4/5  WFL    L UE 4/5  WFL      Hearing: WFL  Sensation: No complaints of numbness/tingling in B UEs. Tone: WFL  Edema: No (B UEs)    Comments/Treatment: Upon arrival, patient supine in bed. At end of session, patient supine in bed (per patient preferenc) with call light and phone within reach and all lines and tubes intact. Patient would benefit from continued skilled OT to increase safety and independence with completion of ADL/IADL tasks for functional independence and quality of life. Patient education provided regardin) energy conservation techniques for implementation into ADL/IADL routines. Patient verbalized understanding.     Eval Complexity: Low    Assessment of Current Deficits:   Functional Mobility [x]  ADLs [x] Strength [x]  Cognition []  Functional Transfers  [x] IADLs [x] Safety Awareness [x]  Endurance [x]  Fine Motor Coordination [] Balance [x] Vision/Perception [] Sensation []   Gross Motor Coordination [] ROM [] Delirium []                  Motor Control []    Plan of Care:   ADL Retraining [x]   Equipment Needs [x]   Neuromuscular Re-Education [x] Energy Conservation Techniques [x]  Functional Transfer Training [x] Patient and/or Family Education [x]  Functional Mobility Training [x]  Environmental Modifications [x]  Cognitive Re-Training []   Compensatory Techniques for ADLs [x]  Splinting Needs

## 2019-09-04 NOTE — PROGRESS NOTES
Physical Therapy    Facility/Department: [address] INTERNAL MEDICINE 2  Initial Assessment    NAME: Pooja Acharya  : 1950  MRN: [id number]    Date of Service: 2019      Patient Diagnosis(es): There were no encounter diagnoses. has a past medical history of Anxiety, Arthritis, Atrial fibrillation (HCC), AVNRT (AV teddy re-entry tachycardia) ([address] CAD (coronary artery disease), CHF (congestive heart failure) ([address] Diabetes mellitus ([address] Diabetic neuropathy ([address] Diastolic dysfunction, Fall, Gout, History of blood transfusion, Hx of blood clots, Hyperlipidemia, Hypertension, LBBB (left bundle branch block), Moderate mitral regurgitation, Nonischemic cardiomyopathy ([address] Obesity, SNEHA (obstructive sleep apnea), Severe tricuspid regurgitation, SVT (supraventricular tachycardia) ([address] and Viral cardiomyopathy ([address] has a past surgical history that includes Diagnostic Cardiac Cath Lab Procedure; knee surgery; Femur Surgery; ECHO Compl W Dop Color Flow (2011); Cardiac catheterization (2011); Echo Complete (2013); back surgery; joint replacement (Left); Cardiac defibrillator placement (2014); and eye surgery (Right, 2019). Evaluating Therapist: Sheryl Hinojosa PT    Room #:402  DIAGNOSIS: CHF  PRECAUTIONS: falls    Social:  Pt lives with wife in a 1st floor setup. 2 steps to enter with no rails. Prior to admission independent with cane. Initial Evaluation  Date: 19 Treatment      Short Term/ Long Term   Goals   Was pt agreeable to Eval/treatment? yes     Does pt have pain?  Chronic B LE pain knees and ankles     Bed Mobility  Rolling: independent  Supine to sit: independent  Sit to supine: independent  Scooting: independent  independent   Transfers Sit to stand: SBA  Stand to sit: SBA  Stand pivot: SBA  independenet   Ambulation    75 feet with s cane with CG/SBA  100 feet with cane with independent   Stair Negotiation  Ascended and descended  NT   3 steps

## 2019-09-04 NOTE — PROGRESS NOTES
oz (157.1 kg)   SpO2 98%   BMI 48.31 kg/m²   General Appearance: well-developed and well nourished, in no acute distress, alert and obese  Skin: warm and dry  Head: normocephalic and atraumatic  Eyes: pupils equal, round, and reactive to light and conjunctivae normal  ENT: hearing grossly normal bilaterally  Neck: neck supple and non tender without mass   Pulmonary/Chest: decreased breath sounds noted- throughout moist cough noted   Cardiovascular: normal rate, intact distal pulses and paced   Abdomen: soft, non-tender, bowel sounds normal, no masses and distention present  Extremities: no cyanosis, no clubbing and 2-3 + edema-  bilateral lower extremity   Musculoskeletal: no swollen joints, no joint deformity and no tender joints  Neurologic: no cranial nerve deficit, speech normal and gait abnormal- unsteady uses cane      Recent Labs     09/03/19  0950 09/04/19  0250    140   K 3.8 3.3*    103   CO2 24 25   BUN 30* 26*   CREATININE 1.2 1.2   GLUCOSE 160* 61*   CALCIUM 8.7 8.7       Recent Labs     09/03/19  0950 09/04/19  0250   WBC 6.2 6.6   RBC 4.18 4.05   HGB 12.9 12.6   HCT 39.0 38.2   MCV 93.3 94.3   MCH 30.9 31.1   MCHC 33.1 33.0   RDW 14.4 14.3    152   MPV 11.5 12.0       Last 3 Troponin:    Lab Results   Component Value Date    TROPONINI 0.05 09/03/2019    TROPONINI 0.04 09/03/2019    TROPONINI 0.04 09/03/2019        Radiology:   XR CHEST PORTABLE   Final Result   Mild cardiomegaly, unchanged                   Assessment:    Principal Problem:    CHF (congestive heart failure), NYHA class III, acute on chronic, systolic (HCC)  Active Problems:    DM (diabetes mellitus), type 2, uncontrolled (HCC)    HTN (hypertension), benign    Hyperlipidemia    Depression    Chronic anticoagulation  Resolved Problems:    * No resolved hospital problems. *      Plan:    1.  Acute on Chronic Stage III Systolic and Diastolic CHF - cardiology following - diurese with IV  Bumex - fluid restriction - consulted CHF educator - monitor electrolytes - bilateral lower extremity edema with ascites present - no cardiac intervention planned at thistime   2. Chest Pain - cardiac enzymes remain stable  - in past have few silent MI - left arm discomfort with nausea   3. Coronary Artery Disease -status post pacemaker - continue current medication regime - cardiology following  4. Hypertension -BP well controlled - no change in current antihypertensive medications  5. Diabetes mellitus type 2 -uncontrolled - A1c 8.4 - was on Levemir nightly and Humulin are 500 sliding scale - patient currently on Lantus and medium dose Humalog sliding scale -will monitor blood glucose closely may need to decrease the basal insulin as BG was 61 today - will monitor BG closely   6. Hyperlipidemia - controlled with medications   7. Depression - pleasant and corporative   8.  Hypokalemia  - oral K-dur ordered       Electronically signed by PILAR Tompkins - CNP on 9/4/2019 at 9:08 AM

## 2019-09-04 NOTE — CONSULTS
Podiatry  Attending Consult Note      Reason for Consult: Evaluation right great toe    Requesting Physician:  Primary care    CHIEF COMPLAINT: Right great toe    HISTORY OF PRESENT ILLNESS:      The patient is a 81 y.o. male with significant past medical history of diabetes who was seen in the consultation for evaluation of his right great toe. Patient relates that he usually gets a callus on the bottom of his great toe, recently they noticed the area appeared split. Patient relates no known injury, no prior episodes of any type of diabetic wounds. He has been diabetic for quite some time. He was evaluated previously in the [place] by podiatry where he usually follows at the [facility]. Patient at present denies any nausea, vomiting, fever or chills.       Past Medical History:    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 tachycardia) ([address]     CAD (coronary artery disease)     CHF (congestive heart failure) (HCC)     Diabetes mellitus ([address]     Diabetic neuropathy ([address]     Diastolic dysfunction 15/20/9448    stage 3    Fall     right knee    Gout     chemically induced    History of blood transfusion     Hx of blood clots 1976    left leg    Hyperlipidemia     Hypertension     LBBB (left bundle branch block)     Moderate mitral regurgitation 04/19/2016    Nonischemic cardiomyopathy ([address]     Obesity     SNEHA (obstructive sleep apnea)     treated with CPAP    Severe tricuspid regurgitation 04/19/2016    SVT [facility]ra[facility]ricular tachycardia) [facility])     Viral cardiomyopathy ([address]      Past Surgical History:    Past Surgical History:   Procedure Laterality Date    BACK SURGERY      CARDIAC CATHETERIZATION  11/21/2011    Dr. Mas  08/20/2014    BiV/ICD  (Rufus Lerma)    Dr. Valery Lam CATH LAB PROCEDURE      ECHO COMPL W DOP COLOR FLOW  11/19/2011         ECHO COMPLETE  12/14/2013         EYE SURGERY Right 08/2019    FEMUR SURGERY      rt; compound frx right femur at 3years old.     JOINT REPLACEMENT Left     knee    KNEE SURGERY      7 on right/1 on left/ total replacement on R     Current Medications:     sodium chloride flush  10 mL Intravenous 2 times per day    famotidine  20 mg Oral BID    allopurinol  200 mg Oral BID    atorvastatin  80 mg Oral Nightly    busPIRone  30 mg Oral Daily    digoxin  125 mcg Oral Daily    dofetilide  250 mcg Oral 2 times per day    apixaban  5 mg Oral BID    hydrALAZINE  100 mg Oral 3 times per day    isosorbide mononitrate  30 mg Oral BID    magnesium oxide  400 mg Oral Daily    metoprolol succinate  100 mg Oral BID    potassium chloride  20 mEq Oral Daily    spironolactone  25 mg Oral Daily    sertraline  100 mg Oral Daily    insulin lispro  0-12 Units Subcutaneous TID WC    insulin lispro  0-6 Units Subcutaneous Nightly    gabapentin  100 mg Oral TID    insulin glargine  10 Units Subcutaneous Nightly    bumetanide  2 mg Intravenous BID      dextrose       acetaminophen, morphine, sodium chloride flush, senna, glucose, dextrose, glucagon (rDNA), dextrose    Allergies:  Food and Soap & cleansers    Social History:    Social History     Socioeconomic History    Marital status:      Spouse name: Not on file    Number of children: Not on file    Years of education: Not on file    Highest education level: Not on file   Occupational History    Occupation: [employer]     Comment: [place]; medical discharge    Occupation: teacher     Comment: [id number] handicapped    Occupation:    Social Needs    Financial resource strain: Not on file    Food insecurity:     Worry: Not on file     Inability: Not on file    Transportation needs:     Medical: Not on file     Non-medical: Not on file   Tobacco Use    Smoking status: Former Smoker     Types: Cigars     Last attempt to quit: 1/1/1980 came open (Active)   Dressing/Treatment Open to air 9/4/2019  1:50 PM   Wound Length (cm) 2.5 cm 9/3/2019  7:30 PM   Wound Width (cm) 3 cm 9/3/2019  7:30 PM   Wound Depth (cm) 0 cm 9/3/2019 12:10 PM   Wound Surface Area (cm^2) 7.5 cm^2 9/3/2019  7:30 PM   Change in Wound Size % (l*w) 0 9/3/2019  7:30 PM   Wound Volume (cm^3) 0 cm^3 9/3/2019 12:10 PM   Wound Assessment Pink;Red; White 9/4/2019  1:50 PM   Drainage Amount None 9/4/2019  1:50 PM   Ricarda-wound Assessment Pink 9/4/2019  1:50 PM   Number of days: 1       Labs:  Cultures : No results found for: ORG  No results found for: PAT      IMPRESSION/RECOMMENDATIONS:    1. Diabetic with ulceration. Neuropathy in a patient with foot deformity, equinus. Posterior tibial type dysfunction. Edema. After oral consent attention directed to the right great toe where sharp excisional debridement utilizing a 15 blade to remove devitalized nonviable tissue through and including subcutaneous tissue to stimulate bleeding and promote healing. Post debridement good bleeding base and wound edges. Pressure for hemostasis. Xeroform dressing. Utilize shoes with ambulation. Patient would benefit once discharged from diabetic shoes with custom inserts to decrease pressure. Decrease edema, elevate, cardio following  Discussed in detail. Recommend him doing this through the [place].       Electronically signed by Wendi Carrel, DPM on 9/4/2019 at 3:13 PM

## 2019-09-04 NOTE — PROGRESS NOTES
[facility] Quality Flow/Interdisciplinary Rounds Progress Note        Quality Flow Rounds held on September 4, 2019    Disciplines Attending:  Bedside Nurse, ,  and Nursing Unit Leadership    Pooja Acharya was admitted on 9/3/2019  9:07 AM    Anticipated Discharge Date:  Expected Discharge Date: 09/06/19    Disposition:    Jareth Score:  Jareth Scale Score: 19    Readmission Risk              Risk of Unplanned Readmission:        17           Discussed patient goal for the day, patient clinical progression, and barriers to discharge.   The following Goal(s) of the Day/Commitment(s) have been identified:  Other  PT/OT eval. needed      Og Santana  September 4, 2019

## 2019-09-04 NOTE — CONSULTS
I provided Justine Zuñiga with the Heart Failure book, the HF Zones, and the Sodium Content pamphlet. We reviewed the HF zones, signs and symptoms to report on day 1 of onset, medication compliance, daily weights, low sodium diet (label reading)   I advised patient you can reduce the risk for heart failure exacerbations by modifying/controlling the risk factors they have. We discussed self-managed care which includes the following: to take medications as prescribed- to call the doctor with any side effects do not just stop taking the medication, follow a cardiac heart healthy / low sodium diet, do daily weights, exercise regularly- per doctor recommendation and not to smoke. I stressed the importance of informing the cardiologist the first day of onset of signs and symptoms in the \"Yellow Zone\" of the HF Zones. Verbalizes understanding     Echocardiogram / EF: 2/7/2019   Summary   Left ventricle is moderately enlarged .   Mild left ventricular concentric hypertrophy noted.   Global hypokinesis.   Ejection fraction is visually estimated at 25%.   There is doppler evidence of stage III diastolic dysfunction.   Moderate mitral regurgitation is present.   No evidence of tricuspid regurgitation. Mild tricuspid regurgitation. RVSP   is 55 mmHg. Lab Results   Component Value Date    BNP 1,791 (H) 12/15/2013      Lab Results   Component Value Date    PROBNP 3,857 (H) 09/03/2019        History of: SVT, viral cardiomyopathy, SNEHA, obesity, LBBB, HTN, HLD, diastolic dysfunction.  CAD, CHF, afib, anxiety     Medications:    sodium chloride flush  10 mL Intravenous 2 times per day    famotidine  20 mg Oral BID    allopurinol  200 mg Oral BID    atorvastatin  80 mg Oral Nightly    busPIRone  30 mg Oral Daily    digoxin  125 mcg Oral Daily    dofetilide  250 mcg Oral 2 times per day    apixaban  5 mg Oral BID    hydrALAZINE  100 mg Oral 3 times per day    isosorbide mononitrate  30 mg Oral BID    magnesium oxide

## 2019-09-05 LAB
ANION GAP SERPL CALCULATED.3IONS-SCNC: 11 MMOL/L (ref 7–16)
BUN BLDV-MCNC: 28 MG/DL (ref 8–23)
CALCIUM SERPL-MCNC: 8.7 MG/DL (ref 8.6–10.2)
CHLORIDE BLD-SCNC: 103 MMOL/L (ref 98–107)
CO2: 26 MMOL/L (ref 22–29)
CREAT SERPL-MCNC: 1.3 MG/DL (ref 0.7–1.2)
GFR AFRICAN AMERICAN: >60
GFR NON-AFRICAN AMERICAN: >60 ML/MIN/1.73
GLUCOSE BLD-MCNC: 180 MG/DL (ref 74–99)
METER GLUCOSE: 178 MG/DL (ref 74–99)
METER GLUCOSE: 189 MG/DL (ref 74–99)
METER GLUCOSE: 211 MG/DL (ref 74–99)
METER GLUCOSE: 214 MG/DL (ref 74–99)
POTASSIUM SERPL-SCNC: 3.9 MMOL/L (ref 3.5–5)
SODIUM BLD-SCNC: 140 MMOL/L (ref 132–146)

## 2019-09-05 PROCEDURE — 2580000003 HC RX 258: Performed by: NURSE PRACTITIONER

## 2019-09-05 PROCEDURE — 99232 SBSQ HOSP IP/OBS MODERATE 35: CPT | Performed by: INTERNAL MEDICINE

## 2019-09-05 PROCEDURE — 82962 GLUCOSE BLOOD TEST: CPT

## 2019-09-05 PROCEDURE — 2060000000 HC ICU INTERMEDIATE R&B

## 2019-09-05 PROCEDURE — 6370000000 HC RX 637 (ALT 250 FOR IP): Performed by: NURSE PRACTITIONER

## 2019-09-05 PROCEDURE — 2500000003 HC RX 250 WO HCPCS: Performed by: INTERNAL MEDICINE

## 2019-09-05 PROCEDURE — 6370000000 HC RX 637 (ALT 250 FOR IP): Performed by: INTERNAL MEDICINE

## 2019-09-05 PROCEDURE — 97535 SELF CARE MNGMENT TRAINING: CPT

## 2019-09-05 PROCEDURE — 80048 BASIC METABOLIC PNL TOTAL CA: CPT

## 2019-09-05 PROCEDURE — APPSS30 APP SPLIT SHARED TIME 16-30 MINUTES: Performed by: NURSE PRACTITIONER

## 2019-09-05 PROCEDURE — 36415 COLL VENOUS BLD VENIPUNCTURE: CPT

## 2019-09-05 PROCEDURE — 2700000000 HC OXYGEN THERAPY PER DAY

## 2019-09-05 RX ORDER — METOLAZONE 5 MG/1
5 TABLET ORAL ONCE
Status: COMPLETED | OUTPATIENT
Start: 2019-09-05 | End: 2019-09-05

## 2019-09-05 RX ADMIN — DOFETILIDE 250 MCG: 0.25 CAPSULE ORAL at 20:45

## 2019-09-05 RX ADMIN — FAMOTIDINE 20 MG: 20 TABLET ORAL at 20:45

## 2019-09-05 RX ADMIN — ISOSORBIDE MONONITRATE 30 MG: 30 TABLET, EXTENDED RELEASE ORAL at 16:29

## 2019-09-05 RX ADMIN — METOLAZONE 5 MG: 5 TABLET ORAL at 11:14

## 2019-09-05 RX ADMIN — GABAPENTIN 100 MG: 100 CAPSULE ORAL at 13:52

## 2019-09-05 RX ADMIN — HYDRALAZINE HYDROCHLORIDE 100 MG: 50 TABLET, FILM COATED ORAL at 06:04

## 2019-09-05 RX ADMIN — ALLOPURINOL 200 MG: 100 TABLET ORAL at 20:45

## 2019-09-05 RX ADMIN — Medication 10 ML: at 20:45

## 2019-09-05 RX ADMIN — METOPROLOL SUCCINATE 100 MG: 100 TABLET, EXTENDED RELEASE ORAL at 20:45

## 2019-09-05 RX ADMIN — INSULIN GLARGINE 10 UNITS: 100 INJECTION, SOLUTION SUBCUTANEOUS at 20:48

## 2019-09-05 RX ADMIN — APIXABAN 5 MG: 5 TABLET, FILM COATED ORAL at 20:45

## 2019-09-05 RX ADMIN — Medication 10 ML: at 09:31

## 2019-09-05 RX ADMIN — GABAPENTIN 100 MG: 100 CAPSULE ORAL at 09:37

## 2019-09-05 RX ADMIN — DOFETILIDE 250 MCG: 0.25 CAPSULE ORAL at 09:30

## 2019-09-05 RX ADMIN — INSULIN LISPRO 2 UNITS: 100 INJECTION, SOLUTION INTRAVENOUS; SUBCUTANEOUS at 16:29

## 2019-09-05 RX ADMIN — APIXABAN 5 MG: 5 TABLET, FILM COATED ORAL at 09:30

## 2019-09-05 RX ADMIN — HYDRALAZINE HYDROCHLORIDE 100 MG: 50 TABLET, FILM COATED ORAL at 13:52

## 2019-09-05 RX ADMIN — HYDRALAZINE HYDROCHLORIDE 100 MG: 50 TABLET, FILM COATED ORAL at 22:47

## 2019-09-05 RX ADMIN — BUMETANIDE 2 MG: 0.25 INJECTION INTRAMUSCULAR; INTRAVENOUS at 16:29

## 2019-09-05 RX ADMIN — INSULIN LISPRO 4 UNITS: 100 INJECTION, SOLUTION INTRAVENOUS; SUBCUTANEOUS at 11:14

## 2019-09-05 RX ADMIN — ATORVASTATIN CALCIUM 80 MG: 40 TABLET, FILM COATED ORAL at 20:45

## 2019-09-05 RX ADMIN — MAGNESIUM OXIDE TAB 400 MG (241.3 MG ELEMENTAL MG) 400 MG: 400 (241.3 MG) TAB at 09:30

## 2019-09-05 RX ADMIN — GABAPENTIN 100 MG: 100 CAPSULE ORAL at 20:45

## 2019-09-05 RX ADMIN — FAMOTIDINE 20 MG: 20 TABLET ORAL at 09:30

## 2019-09-05 RX ADMIN — ISOSORBIDE MONONITRATE 30 MG: 30 TABLET, EXTENDED RELEASE ORAL at 09:30

## 2019-09-05 RX ADMIN — INSULIN LISPRO 2 UNITS: 100 INJECTION, SOLUTION INTRAVENOUS; SUBCUTANEOUS at 20:48

## 2019-09-05 RX ADMIN — SPIRONOLACTONE 25 MG: 25 TABLET ORAL at 09:30

## 2019-09-05 RX ADMIN — SERTRALINE 100 MG: 100 TABLET, FILM COATED ORAL at 09:30

## 2019-09-05 RX ADMIN — ALLOPURINOL 200 MG: 100 TABLET ORAL at 09:30

## 2019-09-05 RX ADMIN — BUMETANIDE 2 MG: 0.25 INJECTION INTRAMUSCULAR; INTRAVENOUS at 09:30

## 2019-09-05 RX ADMIN — DIGOXIN 125 MCG: 125 TABLET ORAL at 09:30

## 2019-09-05 RX ADMIN — POTASSIUM CHLORIDE 20 MEQ: 20 TABLET, EXTENDED RELEASE ORAL at 09:30

## 2019-09-05 RX ADMIN — METOPROLOL SUCCINATE 100 MG: 100 TABLET, EXTENDED RELEASE ORAL at 09:30

## 2019-09-05 RX ADMIN — BUSPIRONE HYDROCHLORIDE 30 MG: 5 TABLET ORAL at 09:30

## 2019-09-05 RX ADMIN — INSULIN LISPRO 2 UNITS: 100 INJECTION, SOLUTION INTRAVENOUS; SUBCUTANEOUS at 06:08

## 2019-09-05 ASSESSMENT — PAIN DESCRIPTION - ORIENTATION
ORIENTATION: RIGHT;LEFT
ORIENTATION: RIGHT;LEFT

## 2019-09-05 ASSESSMENT — PAIN SCALES - GENERAL
PAINLEVEL_OUTOF10: 6
PAINLEVEL_OUTOF10: 6

## 2019-09-05 ASSESSMENT — PAIN DESCRIPTION - DESCRIPTORS
DESCRIPTORS: ACHING;CONSTANT;DISCOMFORT
DESCRIPTORS: ACHING

## 2019-09-05 ASSESSMENT — PAIN DESCRIPTION - LOCATION
LOCATION: KNEE
LOCATION: KNEE

## 2019-09-05 ASSESSMENT — PAIN DESCRIPTION - PAIN TYPE
TYPE: CHRONIC PAIN
TYPE: CHRONIC PAIN

## 2019-09-05 NOTE — PROGRESS NOTES
4. Hypertension -BP well controlled - no change in current antihypertensive medications  5. Diabetes mellitus type 2 - uncontrolled - A1c 8.4 - was on Levemir nightly and Humulin are 500 sliding scale - patient currently on Lantus and medium dose Humalog sliding scale - monitoring glucose closely   6. Hyperlipidemia - controlled with medications   7. Depression - Zoloft and Buspar   8.  Hypokalemia  - clinically resolved       Electronically signed by PILAR Alex - CNP on 9/5/2019 at 12:27 PM

## 2019-09-06 LAB
ANION GAP SERPL CALCULATED.3IONS-SCNC: 10 MMOL/L (ref 7–16)
BUN BLDV-MCNC: 33 MG/DL (ref 8–23)
CALCIUM SERPL-MCNC: 9.1 MG/DL (ref 8.6–10.2)
CHLORIDE BLD-SCNC: 98 MMOL/L (ref 98–107)
CO2: 29 MMOL/L (ref 22–29)
CREAT SERPL-MCNC: 1.5 MG/DL (ref 0.7–1.2)
GFR AFRICAN AMERICAN: 56
GFR NON-AFRICAN AMERICAN: 56 ML/MIN/1.73
GLUCOSE BLD-MCNC: 163 MG/DL (ref 74–99)
METER GLUCOSE: 132 MG/DL (ref 74–99)
METER GLUCOSE: 200 MG/DL (ref 74–99)
METER GLUCOSE: 225 MG/DL (ref 74–99)
METER GLUCOSE: 229 MG/DL (ref 74–99)
POTASSIUM SERPL-SCNC: 3.9 MMOL/L (ref 3.5–5)
SODIUM BLD-SCNC: 137 MMOL/L (ref 132–146)

## 2019-09-06 PROCEDURE — 2060000000 HC ICU INTERMEDIATE R&B

## 2019-09-06 PROCEDURE — 6370000000 HC RX 637 (ALT 250 FOR IP): Performed by: NURSE PRACTITIONER

## 2019-09-06 PROCEDURE — 97530 THERAPEUTIC ACTIVITIES: CPT

## 2019-09-06 PROCEDURE — 6370000000 HC RX 637 (ALT 250 FOR IP): Performed by: INTERNAL MEDICINE

## 2019-09-06 PROCEDURE — 2500000003 HC RX 250 WO HCPCS: Performed by: INTERNAL MEDICINE

## 2019-09-06 PROCEDURE — 99233 SBSQ HOSP IP/OBS HIGH 50: CPT | Performed by: INTERNAL MEDICINE

## 2019-09-06 PROCEDURE — APPSS30 APP SPLIT SHARED TIME 16-30 MINUTES: Performed by: PHYSICIAN ASSISTANT

## 2019-09-06 PROCEDURE — 2580000003 HC RX 258: Performed by: NURSE PRACTITIONER

## 2019-09-06 PROCEDURE — 36415 COLL VENOUS BLD VENIPUNCTURE: CPT

## 2019-09-06 PROCEDURE — 82962 GLUCOSE BLOOD TEST: CPT

## 2019-09-06 PROCEDURE — 80048 BASIC METABOLIC PNL TOTAL CA: CPT

## 2019-09-06 PROCEDURE — 2700000000 HC OXYGEN THERAPY PER DAY

## 2019-09-06 PROCEDURE — 99232 SBSQ HOSP IP/OBS MODERATE 35: CPT | Performed by: INTERNAL MEDICINE

## 2019-09-06 RX ORDER — METOLAZONE 5 MG/1
5 TABLET ORAL ONCE
Status: COMPLETED | OUTPATIENT
Start: 2019-09-06 | End: 2019-09-06

## 2019-09-06 RX ADMIN — ALLOPURINOL 200 MG: 100 TABLET ORAL at 13:25

## 2019-09-06 RX ADMIN — MAGNESIUM OXIDE TAB 400 MG (241.3 MG ELEMENTAL MG) 400 MG: 400 (241.3 MG) TAB at 10:30

## 2019-09-06 RX ADMIN — GABAPENTIN 100 MG: 100 CAPSULE ORAL at 20:21

## 2019-09-06 RX ADMIN — HYDRALAZINE HYDROCHLORIDE 100 MG: 50 TABLET, FILM COATED ORAL at 13:25

## 2019-09-06 RX ADMIN — FAMOTIDINE 20 MG: 20 TABLET ORAL at 10:31

## 2019-09-06 RX ADMIN — BUMETANIDE 2 MG: 0.25 INJECTION INTRAMUSCULAR; INTRAVENOUS at 09:37

## 2019-09-06 RX ADMIN — INSULIN LISPRO 2 UNITS: 100 INJECTION, SOLUTION INTRAVENOUS; SUBCUTANEOUS at 20:28

## 2019-09-06 RX ADMIN — DIGOXIN 125 MCG: 125 TABLET ORAL at 10:31

## 2019-09-06 RX ADMIN — APIXABAN 5 MG: 5 TABLET, FILM COATED ORAL at 10:31

## 2019-09-06 RX ADMIN — HYDRALAZINE HYDROCHLORIDE 100 MG: 50 TABLET, FILM COATED ORAL at 23:07

## 2019-09-06 RX ADMIN — ISOSORBIDE MONONITRATE 30 MG: 30 TABLET, EXTENDED RELEASE ORAL at 16:09

## 2019-09-06 RX ADMIN — Medication 10 ML: at 17:06

## 2019-09-06 RX ADMIN — Medication 10 ML: at 08:13

## 2019-09-06 RX ADMIN — BUSPIRONE HYDROCHLORIDE 30 MG: 5 TABLET ORAL at 10:31

## 2019-09-06 RX ADMIN — INSULIN GLARGINE 10 UNITS: 100 INJECTION, SOLUTION SUBCUTANEOUS at 20:28

## 2019-09-06 RX ADMIN — BUMETANIDE 2 MG: 0.25 INJECTION INTRAMUSCULAR; INTRAVENOUS at 17:06

## 2019-09-06 RX ADMIN — ALLOPURINOL 200 MG: 100 TABLET ORAL at 20:21

## 2019-09-06 RX ADMIN — METOPROLOL SUCCINATE 100 MG: 100 TABLET, EXTENDED RELEASE ORAL at 10:32

## 2019-09-06 RX ADMIN — ATORVASTATIN CALCIUM 80 MG: 40 TABLET, FILM COATED ORAL at 20:21

## 2019-09-06 RX ADMIN — APIXABAN 5 MG: 5 TABLET, FILM COATED ORAL at 20:21

## 2019-09-06 RX ADMIN — POTASSIUM CHLORIDE 20 MEQ: 20 TABLET, EXTENDED RELEASE ORAL at 10:30

## 2019-09-06 RX ADMIN — GABAPENTIN 100 MG: 100 CAPSULE ORAL at 13:25

## 2019-09-06 RX ADMIN — METOLAZONE 5 MG: 5 TABLET ORAL at 08:34

## 2019-09-06 RX ADMIN — METOPROLOL SUCCINATE 100 MG: 100 TABLET, EXTENDED RELEASE ORAL at 20:21

## 2019-09-06 RX ADMIN — Medication 10 ML: at 20:22

## 2019-09-06 RX ADMIN — INSULIN LISPRO 4 UNITS: 100 INJECTION, SOLUTION INTRAVENOUS; SUBCUTANEOUS at 12:09

## 2019-09-06 RX ADMIN — GABAPENTIN 100 MG: 100 CAPSULE ORAL at 10:30

## 2019-09-06 RX ADMIN — DOFETILIDE 250 MCG: 0.25 CAPSULE ORAL at 20:21

## 2019-09-06 RX ADMIN — SPIRONOLACTONE 25 MG: 25 TABLET ORAL at 10:31

## 2019-09-06 RX ADMIN — ISOSORBIDE MONONITRATE 30 MG: 30 TABLET, EXTENDED RELEASE ORAL at 11:15

## 2019-09-06 RX ADMIN — DOFETILIDE 250 MCG: 0.25 CAPSULE ORAL at 10:31

## 2019-09-06 RX ADMIN — SERTRALINE 100 MG: 100 TABLET, FILM COATED ORAL at 10:31

## 2019-09-06 RX ADMIN — FAMOTIDINE 20 MG: 20 TABLET ORAL at 20:21

## 2019-09-06 RX ADMIN — INSULIN LISPRO 4 UNITS: 100 INJECTION, SOLUTION INTRAVENOUS; SUBCUTANEOUS at 17:04

## 2019-09-06 ASSESSMENT — PAIN DESCRIPTION - PROGRESSION: CLINICAL_PROGRESSION: NOT CHANGED

## 2019-09-06 ASSESSMENT — PAIN SCALES - GENERAL
PAINLEVEL_OUTOF10: 0
PAINLEVEL_OUTOF10: 6
PAINLEVEL_OUTOF10: 0

## 2019-09-06 ASSESSMENT — PAIN DESCRIPTION - ORIENTATION
ORIENTATION: RIGHT;LEFT
ORIENTATION: RIGHT;LEFT

## 2019-09-06 ASSESSMENT — PAIN DESCRIPTION - PAIN TYPE
TYPE: CHRONIC PAIN
TYPE: CHRONIC PAIN

## 2019-09-06 ASSESSMENT — PAIN DESCRIPTION - LOCATION
LOCATION: KNEE
LOCATION: LEG

## 2019-09-06 ASSESSMENT — PAIN DESCRIPTION - FREQUENCY: FREQUENCY: CONTINUOUS

## 2019-09-06 ASSESSMENT — PAIN DESCRIPTION - DESCRIPTORS
DESCRIPTORS: ACHING
DESCRIPTORS: ACHING;CONSTANT;DISCOMFORT;SHARP

## 2019-09-06 ASSESSMENT — PAIN - FUNCTIONAL ASSESSMENT: PAIN_FUNCTIONAL_ASSESSMENT: ACTIVITIES ARE NOT PREVENTED

## 2019-09-06 ASSESSMENT — PAIN DESCRIPTION - ONSET: ONSET: ON-GOING

## 2019-09-06 NOTE — CARE COORDINATION
9/6/2019  Social Work Discharge Planning:  Pt and spouse reside together. Pt has a cane and a CPAP. Pt is declining HHC needs at this time. SW will continue to follow. Electronically signed by MADIE Justin on 9/6/2019 at 10:28 AM

## 2019-09-06 NOTE — PROGRESS NOTES
09/03/19  0950   PROBNP 3,857*       Cardiac Tests:  ECG: Paced rhythm     Telemetry findings reviewed: Paced rhythm     Impression:  1. Acute on chronic HFrEF -- I/O's net negative 3.5 L thus far  2. Nonischemic cardiomyopathy with severe LV dysfunction / BiV ICD in place  3. Paroxysmal atrial fibrillation -- on Tikoysn, Toprol XL, and eliquis  4. HTN  5. DM  6. HLD  7. Severe SNEHA -- compliant with CPAP  8. BMI 48.6  9. Chronic LBBB  10. Moderate mitral regurgitation  11. CKD -- most recent Cr 1.2 --> 1.5     Plan:  1. Continue IV diuresis / will dose with zaroxolyn again today  2. Continue hydralazine/nitrate and aldactone  3. Continue eliquis  4. Monitor QTc, renal function/electrolytes  5.  Treatment of SNEHA     Pebbles Pelletier MD  [facility]) Cardiology

## 2019-09-07 LAB
ANION GAP SERPL CALCULATED.3IONS-SCNC: 12 MMOL/L (ref 7–16)
BUN BLDV-MCNC: 34 MG/DL (ref 8–23)
CALCIUM SERPL-MCNC: 9.7 MG/DL (ref 8.6–10.2)
CHLORIDE BLD-SCNC: 98 MMOL/L (ref 98–107)
CO2: 30 MMOL/L (ref 22–29)
CREAT SERPL-MCNC: 1.5 MG/DL (ref 0.7–1.2)
GFR AFRICAN AMERICAN: 56
GFR NON-AFRICAN AMERICAN: 56 ML/MIN/1.73
GLUCOSE BLD-MCNC: 153 MG/DL (ref 74–99)
METER GLUCOSE: 146 MG/DL (ref 74–99)
METER GLUCOSE: 169 MG/DL (ref 74–99)
METER GLUCOSE: 220 MG/DL (ref 74–99)
METER GLUCOSE: 256 MG/DL (ref 74–99)
POTASSIUM SERPL-SCNC: 3.8 MMOL/L (ref 3.5–5)
SODIUM BLD-SCNC: 140 MMOL/L (ref 132–146)

## 2019-09-07 PROCEDURE — 6370000000 HC RX 637 (ALT 250 FOR IP): Performed by: INTERNAL MEDICINE

## 2019-09-07 PROCEDURE — 2580000003 HC RX 258: Performed by: NURSE PRACTITIONER

## 2019-09-07 PROCEDURE — 36415 COLL VENOUS BLD VENIPUNCTURE: CPT

## 2019-09-07 PROCEDURE — 80048 BASIC METABOLIC PNL TOTAL CA: CPT

## 2019-09-07 PROCEDURE — 99232 SBSQ HOSP IP/OBS MODERATE 35: CPT | Performed by: INTERNAL MEDICINE

## 2019-09-07 PROCEDURE — 6370000000 HC RX 637 (ALT 250 FOR IP): Performed by: NURSE PRACTITIONER

## 2019-09-07 PROCEDURE — 2500000003 HC RX 250 WO HCPCS: Performed by: INTERNAL MEDICINE

## 2019-09-07 PROCEDURE — 2060000000 HC ICU INTERMEDIATE R&B

## 2019-09-07 PROCEDURE — 82962 GLUCOSE BLOOD TEST: CPT

## 2019-09-07 PROCEDURE — APPSS30 APP SPLIT SHARED TIME 16-30 MINUTES: Performed by: PHYSICIAN ASSISTANT

## 2019-09-07 RX ORDER — METOLAZONE 5 MG/1
5 TABLET ORAL ONCE
Status: COMPLETED | OUTPATIENT
Start: 2019-09-07 | End: 2019-09-07

## 2019-09-07 RX ADMIN — METOLAZONE 5 MG: 5 TABLET ORAL at 16:35

## 2019-09-07 RX ADMIN — POTASSIUM CHLORIDE 20 MEQ: 20 TABLET, EXTENDED RELEASE ORAL at 08:01

## 2019-09-07 RX ADMIN — SERTRALINE 100 MG: 100 TABLET, FILM COATED ORAL at 08:01

## 2019-09-07 RX ADMIN — INSULIN GLARGINE 10 UNITS: 100 INJECTION, SOLUTION SUBCUTANEOUS at 20:38

## 2019-09-07 RX ADMIN — Medication 10 ML: at 20:44

## 2019-09-07 RX ADMIN — ISOSORBIDE MONONITRATE 30 MG: 30 TABLET, EXTENDED RELEASE ORAL at 16:35

## 2019-09-07 RX ADMIN — SPIRONOLACTONE 25 MG: 25 TABLET ORAL at 08:01

## 2019-09-07 RX ADMIN — METOPROLOL SUCCINATE 100 MG: 100 TABLET, EXTENDED RELEASE ORAL at 20:37

## 2019-09-07 RX ADMIN — APIXABAN 5 MG: 5 TABLET, FILM COATED ORAL at 08:00

## 2019-09-07 RX ADMIN — INSULIN LISPRO 4 UNITS: 100 INJECTION, SOLUTION INTRAVENOUS; SUBCUTANEOUS at 11:39

## 2019-09-07 RX ADMIN — ATORVASTATIN CALCIUM 80 MG: 40 TABLET, FILM COATED ORAL at 20:37

## 2019-09-07 RX ADMIN — FAMOTIDINE 20 MG: 20 TABLET ORAL at 08:02

## 2019-09-07 RX ADMIN — APIXABAN 5 MG: 5 TABLET, FILM COATED ORAL at 20:37

## 2019-09-07 RX ADMIN — BUMETANIDE 2 MG: 0.25 INJECTION INTRAMUSCULAR; INTRAVENOUS at 16:35

## 2019-09-07 RX ADMIN — BUSPIRONE HYDROCHLORIDE 30 MG: 5 TABLET ORAL at 08:05

## 2019-09-07 RX ADMIN — METOPROLOL SUCCINATE 100 MG: 100 TABLET, EXTENDED RELEASE ORAL at 08:01

## 2019-09-07 RX ADMIN — MAGNESIUM OXIDE TAB 400 MG (241.3 MG ELEMENTAL MG) 400 MG: 400 (241.3 MG) TAB at 08:05

## 2019-09-07 RX ADMIN — INSULIN LISPRO 2 UNITS: 100 INJECTION, SOLUTION INTRAVENOUS; SUBCUTANEOUS at 06:19

## 2019-09-07 RX ADMIN — INSULIN LISPRO 2 UNITS: 100 INJECTION, SOLUTION INTRAVENOUS; SUBCUTANEOUS at 16:35

## 2019-09-07 RX ADMIN — DIGOXIN 125 MCG: 125 TABLET ORAL at 08:01

## 2019-09-07 RX ADMIN — Medication 10 ML: at 08:03

## 2019-09-07 RX ADMIN — DOFETILIDE 250 MCG: 0.25 CAPSULE ORAL at 08:01

## 2019-09-07 RX ADMIN — INSULIN LISPRO 3 UNITS: 100 INJECTION, SOLUTION INTRAVENOUS; SUBCUTANEOUS at 20:38

## 2019-09-07 RX ADMIN — GABAPENTIN 100 MG: 100 CAPSULE ORAL at 13:33

## 2019-09-07 RX ADMIN — DOFETILIDE 250 MCG: 0.25 CAPSULE ORAL at 20:37

## 2019-09-07 RX ADMIN — GABAPENTIN 100 MG: 100 CAPSULE ORAL at 20:37

## 2019-09-07 RX ADMIN — GABAPENTIN 100 MG: 100 CAPSULE ORAL at 08:02

## 2019-09-07 RX ADMIN — BUMETANIDE 2 MG: 0.25 INJECTION INTRAMUSCULAR; INTRAVENOUS at 08:02

## 2019-09-07 RX ADMIN — FAMOTIDINE 20 MG: 20 TABLET ORAL at 20:37

## 2019-09-07 RX ADMIN — ALLOPURINOL 200 MG: 100 TABLET ORAL at 20:37

## 2019-09-07 RX ADMIN — ISOSORBIDE MONONITRATE 30 MG: 30 TABLET, EXTENDED RELEASE ORAL at 08:05

## 2019-09-07 RX ADMIN — HYDRALAZINE HYDROCHLORIDE 100 MG: 50 TABLET, FILM COATED ORAL at 23:08

## 2019-09-07 RX ADMIN — ALLOPURINOL 200 MG: 100 TABLET ORAL at 08:02

## 2019-09-07 RX ADMIN — HYDRALAZINE HYDROCHLORIDE 100 MG: 50 TABLET, FILM COATED ORAL at 13:33

## 2019-09-07 ASSESSMENT — PAIN DESCRIPTION - PAIN TYPE: TYPE: CHRONIC PAIN

## 2019-09-07 ASSESSMENT — PAIN SCALES - GENERAL
PAINLEVEL_OUTOF10: 0
PAINLEVEL_OUTOF10: 6
PAINLEVEL_OUTOF10: 0
PAINLEVEL_OUTOF10: 0

## 2019-09-07 ASSESSMENT — PAIN DESCRIPTION - DESCRIPTORS: DESCRIPTORS: ACHING;DULL

## 2019-09-07 ASSESSMENT — PAIN DESCRIPTION - ORIENTATION: ORIENTATION: RIGHT;LEFT

## 2019-09-07 ASSESSMENT — PAIN DESCRIPTION - LOCATION: LOCATION: KNEE

## 2019-09-07 NOTE — PROGRESS NOTES
2 mg Intravenous Q3H PRN Nguyen Dobbs MD        sodium chloride flush 0.9 % injection 10 mL  10 mL Intravenous 2 times per day Fredick Silvius, APRN - CNP   10 mL at 09/07/19 0803    sodium chloride flush 0.9 % injection 10 mL  10 mL Intravenous PRN Bishnu Marcosvius, APRN - CNP   10 mL at 09/06/19 1706    senna (SENOKOT) tablet 8.6 mg  1 tablet Oral Daily PRN Chavezck Silvius, APRN - CNP        famotidine (PEPCID) tablet 20 mg  20 mg Oral BID Xavierdick Silvius, APRN - CNP   20 mg at 09/07/19 0802    allopurinol (ZYLOPRIM) tablet 200 mg  200 mg Oral BID Fredick Silvius, APRN - CNP   200 mg at 09/07/19 0802    atorvastatin (LIPITOR) tablet 80 mg  80 mg Oral Nightly Xavierdick Silvius, APRN - CNP   80 mg at 09/06/19 2021    busPIRone (BUSPAR) tablet 30 mg  30 mg Oral Daily Xavierdick Silvius, APRN - CNP   30 mg at 09/07/19 0805    digoxin (LANOXIN) tablet 125 mcg  125 mcg Oral Daily Chavezck Silvius, APRN - CNP   125 mcg at 09/07/19 0801    dofetilide (TIKOSYN) capsule 250 mcg  250 mcg Oral 2 times per day Chavezck Pritivius, APRN - CNP   250 mcg at 09/07/19 0801    apixaban (ELIQUIS) tablet 5 mg  5 mg Oral BID Xavierdick Pritivius, APRN - CNP   5 mg at 09/07/19 0800    hydrALAZINE (APRESOLINE) tablet 100 mg  100 mg Oral 3 times per day Melia Garner MD   100 mg at 09/06/19 2307    isosorbide mononitrate (IMDUR) extended release tablet 30 mg  30 mg Oral BID Fredick Silvius, APRN - CNP   30 mg at 09/07/19 0805    magnesium oxide (MAG-OX) tablet 400 mg  400 mg Oral Daily Xavierdick Silvius, APRN - CNP   400 mg at 09/07/19 0805    metoprolol succinate (TOPROL XL) extended release tablet 100 mg  100 mg Oral BID XavierPILAR Wong CNP   100 mg at 09/07/19 0801    potassium chloride (KLOR-CON M) extended release tablet 20 mEq  20 mEq Oral Daily PILAR Root CNP   20 mEq at 09/07/19 0801    spironolactone (ALDACTONE) tablet 25 mg  25 mg Oral Daily PILAR Root CNP   25 mg at 09/07/19 0801    sertraline (ZOLOFT) tablet 100 mg

## 2019-09-07 NOTE — PLAN OF CARE
Problem: Falls - Risk of:  Goal: Will remain free from falls  Description  Will remain free from falls  Outcome: Met This Shift  Goal: Absence of physical injury  Description  Absence of physical injury  Outcome: Met This Shift     Problem: Pain:  Goal: Control of acute pain  Description  Control of acute pain  Outcome: Met This Shift     Problem: Pain:  Goal: Control of chronic pain  Description  Control of chronic pain  Outcome: Ongoing     Problem: Respiratory:  Goal: Respiratory status will improve  Description  Respiratory status will improve  Outcome: Ongoing     Problem: Fluid Volume:  Goal: Risk for excess fluid volume will decrease  Description  Risk for excess fluid volume will decrease  Outcome: Not Met This Shift    Electronically signed by Prince Hedrick RN on 9/7/19 at 1:42 PM

## 2019-09-08 LAB
ANION GAP SERPL CALCULATED.3IONS-SCNC: 12 MMOL/L (ref 7–16)
BUN BLDV-MCNC: 36 MG/DL (ref 8–23)
CALCIUM SERPL-MCNC: 9.7 MG/DL (ref 8.6–10.2)
CHLORIDE BLD-SCNC: 93 MMOL/L (ref 98–107)
CO2: 29 MMOL/L (ref 22–29)
CREAT SERPL-MCNC: 1.5 MG/DL (ref 0.7–1.2)
GFR AFRICAN AMERICAN: 56
GFR NON-AFRICAN AMERICAN: 56 ML/MIN/1.73
GLUCOSE BLD-MCNC: 146 MG/DL (ref 74–99)
MAGNESIUM: 1.7 MG/DL (ref 1.6–2.6)
METER GLUCOSE: 139 MG/DL (ref 74–99)
METER GLUCOSE: 195 MG/DL (ref 74–99)
METER GLUCOSE: 233 MG/DL (ref 74–99)
METER GLUCOSE: 277 MG/DL (ref 74–99)
POTASSIUM SERPL-SCNC: 3.3 MMOL/L (ref 3.5–5)
SODIUM BLD-SCNC: 134 MMOL/L (ref 132–146)

## 2019-09-08 PROCEDURE — 36415 COLL VENOUS BLD VENIPUNCTURE: CPT

## 2019-09-08 PROCEDURE — 6360000002 HC RX W HCPCS: Performed by: INTERNAL MEDICINE

## 2019-09-08 PROCEDURE — 6370000000 HC RX 637 (ALT 250 FOR IP): Performed by: INTERNAL MEDICINE

## 2019-09-08 PROCEDURE — 6370000000 HC RX 637 (ALT 250 FOR IP): Performed by: NURSE PRACTITIONER

## 2019-09-08 PROCEDURE — 80048 BASIC METABOLIC PNL TOTAL CA: CPT

## 2019-09-08 PROCEDURE — 83735 ASSAY OF MAGNESIUM: CPT

## 2019-09-08 PROCEDURE — 2580000003 HC RX 258: Performed by: NURSE PRACTITIONER

## 2019-09-08 PROCEDURE — 99233 SBSQ HOSP IP/OBS HIGH 50: CPT | Performed by: INTERNAL MEDICINE

## 2019-09-08 PROCEDURE — 2500000003 HC RX 250 WO HCPCS: Performed by: INTERNAL MEDICINE

## 2019-09-08 PROCEDURE — 99232 SBSQ HOSP IP/OBS MODERATE 35: CPT | Performed by: INTERNAL MEDICINE

## 2019-09-08 PROCEDURE — 82962 GLUCOSE BLOOD TEST: CPT

## 2019-09-08 PROCEDURE — 2060000000 HC ICU INTERMEDIATE R&B

## 2019-09-08 RX ORDER — MAGNESIUM SULFATE IN WATER 40 MG/ML
2 INJECTION, SOLUTION INTRAVENOUS ONCE
Status: COMPLETED | OUTPATIENT
Start: 2019-09-08 | End: 2019-09-08

## 2019-09-08 RX ORDER — POTASSIUM CHLORIDE 20 MEQ/1
40 TABLET, EXTENDED RELEASE ORAL DAILY
Status: DISCONTINUED | OUTPATIENT
Start: 2019-09-08 | End: 2019-09-08

## 2019-09-08 RX ORDER — POTASSIUM CHLORIDE 20 MEQ/1
40 TABLET, EXTENDED RELEASE ORAL DAILY
Status: DISCONTINUED | OUTPATIENT
Start: 2019-09-09 | End: 2019-09-09 | Stop reason: HOSPADM

## 2019-09-08 RX ORDER — POTASSIUM CHLORIDE 20 MEQ/1
40 TABLET, EXTENDED RELEASE ORAL 2 TIMES DAILY
Status: COMPLETED | OUTPATIENT
Start: 2019-09-08 | End: 2019-09-08

## 2019-09-08 RX ORDER — METOLAZONE 5 MG/1
5 TABLET ORAL ONCE
Status: COMPLETED | OUTPATIENT
Start: 2019-09-08 | End: 2019-09-08

## 2019-09-08 RX ADMIN — METOPROLOL SUCCINATE 100 MG: 100 TABLET, EXTENDED RELEASE ORAL at 08:00

## 2019-09-08 RX ADMIN — POTASSIUM CHLORIDE 40 MEQ: 20 TABLET, EXTENDED RELEASE ORAL at 09:51

## 2019-09-08 RX ADMIN — GABAPENTIN 100 MG: 100 CAPSULE ORAL at 08:00

## 2019-09-08 RX ADMIN — APIXABAN 5 MG: 5 TABLET, FILM COATED ORAL at 20:39

## 2019-09-08 RX ADMIN — APIXABAN 5 MG: 5 TABLET, FILM COATED ORAL at 08:00

## 2019-09-08 RX ADMIN — ALLOPURINOL 200 MG: 100 TABLET ORAL at 20:39

## 2019-09-08 RX ADMIN — POTASSIUM CHLORIDE 20 MEQ: 20 TABLET, EXTENDED RELEASE ORAL at 07:58

## 2019-09-08 RX ADMIN — SPIRONOLACTONE 25 MG: 25 TABLET ORAL at 07:58

## 2019-09-08 RX ADMIN — DOFETILIDE 250 MCG: 0.25 CAPSULE ORAL at 20:39

## 2019-09-08 RX ADMIN — POTASSIUM CHLORIDE 40 MEQ: 20 TABLET, EXTENDED RELEASE ORAL at 20:42

## 2019-09-08 RX ADMIN — DIGOXIN 125 MCG: 125 TABLET ORAL at 07:57

## 2019-09-08 RX ADMIN — SERTRALINE 100 MG: 100 TABLET, FILM COATED ORAL at 07:57

## 2019-09-08 RX ADMIN — INSULIN LISPRO 2 UNITS: 100 INJECTION, SOLUTION INTRAVENOUS; SUBCUTANEOUS at 16:28

## 2019-09-08 RX ADMIN — INSULIN GLARGINE 10 UNITS: 100 INJECTION, SOLUTION SUBCUTANEOUS at 20:44

## 2019-09-08 RX ADMIN — HYDRALAZINE HYDROCHLORIDE 100 MG: 50 TABLET, FILM COATED ORAL at 06:16

## 2019-09-08 RX ADMIN — DOFETILIDE 250 MCG: 0.25 CAPSULE ORAL at 08:00

## 2019-09-08 RX ADMIN — Medication 10 ML: at 20:42

## 2019-09-08 RX ADMIN — MAGNESIUM SULFATE HEPTAHYDRATE 2 G: 40 INJECTION, SOLUTION INTRAVENOUS at 09:51

## 2019-09-08 RX ADMIN — INSULIN LISPRO 4 UNITS: 100 INJECTION, SOLUTION INTRAVENOUS; SUBCUTANEOUS at 11:36

## 2019-09-08 RX ADMIN — METOPROLOL SUCCINATE 100 MG: 100 TABLET, EXTENDED RELEASE ORAL at 20:39

## 2019-09-08 RX ADMIN — GABAPENTIN 100 MG: 100 CAPSULE ORAL at 13:30

## 2019-09-08 RX ADMIN — ISOSORBIDE MONONITRATE 30 MG: 30 TABLET, EXTENDED RELEASE ORAL at 16:27

## 2019-09-08 RX ADMIN — BUSPIRONE HYDROCHLORIDE 30 MG: 5 TABLET ORAL at 07:58

## 2019-09-08 RX ADMIN — METOLAZONE 5 MG: 5 TABLET ORAL at 09:51

## 2019-09-08 RX ADMIN — ISOSORBIDE MONONITRATE 30 MG: 30 TABLET, EXTENDED RELEASE ORAL at 08:00

## 2019-09-08 RX ADMIN — HYDRALAZINE HYDROCHLORIDE 100 MG: 50 TABLET, FILM COATED ORAL at 13:30

## 2019-09-08 RX ADMIN — MAGNESIUM OXIDE TAB 400 MG (241.3 MG ELEMENTAL MG) 400 MG: 400 (241.3 MG) TAB at 07:58

## 2019-09-08 RX ADMIN — FAMOTIDINE 20 MG: 20 TABLET ORAL at 08:00

## 2019-09-08 RX ADMIN — Medication 10 ML: at 07:57

## 2019-09-08 RX ADMIN — FAMOTIDINE 20 MG: 20 TABLET ORAL at 20:39

## 2019-09-08 RX ADMIN — INSULIN LISPRO 3 UNITS: 100 INJECTION, SOLUTION INTRAVENOUS; SUBCUTANEOUS at 20:45

## 2019-09-08 RX ADMIN — ALLOPURINOL 200 MG: 100 TABLET ORAL at 08:00

## 2019-09-08 RX ADMIN — HYDRALAZINE HYDROCHLORIDE 100 MG: 50 TABLET, FILM COATED ORAL at 22:30

## 2019-09-08 RX ADMIN — GABAPENTIN 100 MG: 100 CAPSULE ORAL at 20:39

## 2019-09-08 RX ADMIN — ATORVASTATIN CALCIUM 80 MG: 40 TABLET, FILM COATED ORAL at 20:39

## 2019-09-08 RX ADMIN — BUMETANIDE 2 MG: 0.25 INJECTION INTRAMUSCULAR; INTRAVENOUS at 07:58

## 2019-09-08 RX ADMIN — BUMETANIDE 2 MG: 0.25 INJECTION INTRAMUSCULAR; INTRAVENOUS at 16:28

## 2019-09-08 ASSESSMENT — PAIN SCALES - GENERAL
PAINLEVEL_OUTOF10: 0

## 2019-09-08 NOTE — PROGRESS NOTES
affect    Intake/Output:    Intake/Output Summary (Last 24 hours) at 9/8/2019 0825  Last data filed at 9/8/2019 0806  Gross per 24 hour   Intake 360 ml   Output 3525 ml   Net -3165 ml     I/O this shift:  In: -   Out: 400 [Urine:400]    Laboratory Tests:  Recent Labs     09/06/19  0240 09/07/19  0718 09/08/19  0330    140 134   K 3.9 3.8 3.3*   CL 98 98 93*   CO2 29 30* 29   BUN 33* 34* 36*   CREATININE 1.5* 1.5* 1.5*   GLUCOSE 163* 153* 146*   CALCIUM 9.1 9.7 9.7     Lab Results   Component Value Date    MG 1.8 09/04/2019     Lab Results   Component Value Date    CKTOTAL 112 09/03/2019    CKMB 2.8 09/03/2019    TROPONINI 0.05 (H) 09/03/2019    TROPONINI 0.04 (H) 09/03/2019    TROPONINI 0.04 (H) 09/03/2019     Lab Results   Component Value Date    INR 1.8 12/18/2016    INR 1.6 04/18/2016    INR 1.3 03/09/2015    PROTIME 20.0 (H) 12/18/2016    PROTIME 17.3 (H) 04/18/2016    PROTIME 14.2 (H) 03/09/2015     Lab Results   Component Value Date    TSH 2.620 02/07/2019     Lab Results   Component Value Date    LABA1C 8.4 (H) 09/04/2019     No results found for: EAG  Lab Results   Component Value Date    CHOL 101 02/08/2019    CHOL 180 02/14/2017    CHOL 118 06/02/2016     Lab Results   Component Value Date    TRIG 62 02/08/2019    TRIG 181 02/14/2017    TRIG 69 06/02/2016     Lab Results   Component Value Date    HDL 34 02/08/2019    HDL 39 02/14/2017    HDL 38 06/02/2016     Lab Results   Component Value Date    LDLCALC 55 02/08/2019    LDLCALC 105 02/14/2017    LDLCALC 66 06/02/2016     Lab Results   Component Value Date    LABVLDL 12 02/08/2019    LABVLDL 13 04/19/2016    LABVLDL 14 01/07/2016    VLDL 36 02/14/2017     No results found for: CHOLHDLRATIO  No results for input(s): PROBNP in the last 72 hours. Cardiac Tests:  ECG: Paced rhythm     Telemetry findings reviewed: Paced rhythm (AS/)     Impression:  1. Acute on chronic HFrEF -- I/O's net negative 7.6 L thus far  2.  Nonischemic cardiomyopathy with severe LV dysfunction / BiV ICD in place  3. Paroxysmal atrial fibrillation -- on Tikoysn, Toprol XL, and eliquis  4. HTN  5. DM  6. HLD  7. Severe SNEHA -- compliant with CPAP  8. BMI 48.6  9. Chronic LBBB  10. Moderate mitral regurgitation  11. CKD -- most recent Cr 1.2 --> 1.5 --> 1.5 --> 1.5  12. Hypokalemia -- K 3.3     Plan:  1. Continue IV diuresis / will dose with zaroxolyn again today  2. Continue hydralazine/nitrate and aldactone  3. Continue eliquis  4. Monitor QTc, renal function/electrolytes --> will supplement extra K and check Mg level  5.  Treatment of SNEHA     Brian Teixeira MD  [facility]) Cardiology

## 2019-09-09 VITALS
HEIGHT: 71 IN | OXYGEN SATURATION: 95 % | RESPIRATION RATE: 18 BRPM | SYSTOLIC BLOOD PRESSURE: 123 MMHG | TEMPERATURE: 98.2 F | HEART RATE: 80 BPM | WEIGHT: 315 LBS | BODY MASS INDEX: 44.1 KG/M2 | DIASTOLIC BLOOD PRESSURE: 88 MMHG

## 2019-09-09 LAB
ANION GAP SERPL CALCULATED.3IONS-SCNC: 14 MMOL/L (ref 7–16)
BUN BLDV-MCNC: 39 MG/DL (ref 8–23)
CALCIUM SERPL-MCNC: 10.3 MG/DL (ref 8.6–10.2)
CHLORIDE BLD-SCNC: 92 MMOL/L (ref 98–107)
CO2: 29 MMOL/L (ref 22–29)
CREAT SERPL-MCNC: 1.6 MG/DL (ref 0.7–1.2)
GFR AFRICAN AMERICAN: 52
GFR NON-AFRICAN AMERICAN: 52 ML/MIN/1.73
GLUCOSE BLD-MCNC: 184 MG/DL (ref 74–99)
METER GLUCOSE: 159 MG/DL (ref 74–99)
METER GLUCOSE: 248 MG/DL (ref 74–99)
POTASSIUM SERPL-SCNC: 4 MMOL/L (ref 3.5–5)
SODIUM BLD-SCNC: 135 MMOL/L (ref 132–146)

## 2019-09-09 PROCEDURE — 6370000000 HC RX 637 (ALT 250 FOR IP): Performed by: INTERNAL MEDICINE

## 2019-09-09 PROCEDURE — 82962 GLUCOSE BLOOD TEST: CPT

## 2019-09-09 PROCEDURE — 36415 COLL VENOUS BLD VENIPUNCTURE: CPT

## 2019-09-09 PROCEDURE — 97530 THERAPEUTIC ACTIVITIES: CPT

## 2019-09-09 PROCEDURE — 2500000003 HC RX 250 WO HCPCS: Performed by: INTERNAL MEDICINE

## 2019-09-09 PROCEDURE — 80048 BASIC METABOLIC PNL TOTAL CA: CPT

## 2019-09-09 PROCEDURE — 99239 HOSP IP/OBS DSCHRG MGMT >30: CPT | Performed by: INTERNAL MEDICINE

## 2019-09-09 PROCEDURE — 2580000003 HC RX 258: Performed by: NURSE PRACTITIONER

## 2019-09-09 PROCEDURE — 6370000000 HC RX 637 (ALT 250 FOR IP): Performed by: NURSE PRACTITIONER

## 2019-09-09 RX ORDER — BUMETANIDE 2 MG/1
2 TABLET ORAL 2 TIMES DAILY
Qty: 90 TABLET | Refills: 3 | Status: SHIPPED | OUTPATIENT
Start: 2019-09-09 | End: 2019-09-12 | Stop reason: SDUPTHER

## 2019-09-09 RX ADMIN — HYDRALAZINE HYDROCHLORIDE 100 MG: 50 TABLET, FILM COATED ORAL at 06:43

## 2019-09-09 RX ADMIN — SERTRALINE 100 MG: 100 TABLET, FILM COATED ORAL at 08:14

## 2019-09-09 RX ADMIN — DOFETILIDE 250 MCG: 0.25 CAPSULE ORAL at 08:14

## 2019-09-09 RX ADMIN — ISOSORBIDE MONONITRATE 30 MG: 30 TABLET, EXTENDED RELEASE ORAL at 08:01

## 2019-09-09 RX ADMIN — GABAPENTIN 100 MG: 100 CAPSULE ORAL at 08:02

## 2019-09-09 RX ADMIN — BUSPIRONE HYDROCHLORIDE 30 MG: 5 TABLET ORAL at 08:14

## 2019-09-09 RX ADMIN — INSULIN LISPRO 2 UNITS: 100 INJECTION, SOLUTION INTRAVENOUS; SUBCUTANEOUS at 06:46

## 2019-09-09 RX ADMIN — GABAPENTIN 100 MG: 100 CAPSULE ORAL at 13:11

## 2019-09-09 RX ADMIN — ALLOPURINOL 200 MG: 100 TABLET ORAL at 08:01

## 2019-09-09 RX ADMIN — DIGOXIN 125 MCG: 125 TABLET ORAL at 08:01

## 2019-09-09 RX ADMIN — Medication 10 ML: at 08:02

## 2019-09-09 RX ADMIN — BUMETANIDE 2 MG: 0.25 INJECTION INTRAMUSCULAR; INTRAVENOUS at 08:02

## 2019-09-09 RX ADMIN — FAMOTIDINE 20 MG: 20 TABLET ORAL at 08:02

## 2019-09-09 RX ADMIN — HYDRALAZINE HYDROCHLORIDE 100 MG: 50 TABLET, FILM COATED ORAL at 13:11

## 2019-09-09 RX ADMIN — INSULIN LISPRO 4 UNITS: 100 INJECTION, SOLUTION INTRAVENOUS; SUBCUTANEOUS at 11:16

## 2019-09-09 RX ADMIN — METOPROLOL SUCCINATE 100 MG: 100 TABLET, EXTENDED RELEASE ORAL at 08:02

## 2019-09-09 RX ADMIN — MAGNESIUM OXIDE TAB 400 MG (241.3 MG ELEMENTAL MG) 400 MG: 400 (241.3 MG) TAB at 08:01

## 2019-09-09 RX ADMIN — POTASSIUM CHLORIDE 40 MEQ: 20 TABLET, EXTENDED RELEASE ORAL at 08:01

## 2019-09-09 RX ADMIN — APIXABAN 5 MG: 5 TABLET, FILM COATED ORAL at 08:01

## 2019-09-09 RX ADMIN — SPIRONOLACTONE 25 MG: 25 TABLET ORAL at 08:01

## 2019-09-09 ASSESSMENT — PAIN SCALES - GENERAL
PAINLEVEL_OUTOF10: 0
PAINLEVEL_OUTOF10: 0

## 2019-09-09 NOTE — PROGRESS NOTES
Daily Progress Note      SUBJECTIVE: Patient seen today in follow-up foot wound. Patient overall doing well. Swelling is decreased. Breathing continues to improve. No nausea, vomiting, fever chills. No right foot/great toe pain. OBJECTIVE: Oriented x3. Bilateral lower extremity exam demonstrates intact pedal pulses. Diminished protective sensation. Wound appreciated plantar right great toe is clean. There is no purulence or odor. No surrounding erythema or pain.   The foot is everted abducted    Medications    Current Facility-Administered Medications: potassium chloride (KLOR-CON M) extended release tablet 40 mEq, 40 mEq, Oral, Daily  acetaminophen (TYLENOL) tablet 650 mg, 650 mg, Oral, Q4H PRN  morphine (PF) injection 2 mg, 2 mg, Intravenous, Q3H PRN  sodium chloride flush 0.9 % injection 10 mL, 10 mL, Intravenous, 2 times per day  sodium chloride flush 0.9 % injection 10 mL, 10 mL, Intravenous, PRN  senna (SENOKOT) tablet 8.6 mg, 1 tablet, Oral, Daily PRN  famotidine (PEPCID) tablet 20 mg, 20 mg, Oral, BID  allopurinol (ZYLOPRIM) tablet 200 mg, 200 mg, Oral, BID  atorvastatin (LIPITOR) tablet 80 mg, 80 mg, Oral, Nightly  busPIRone (BUSPAR) tablet 30 mg, 30 mg, Oral, Daily  digoxin (LANOXIN) tablet 125 mcg, 125 mcg, Oral, Daily  dofetilide (TIKOSYN) capsule 250 mcg, 250 mcg, Oral, 2 times per day  apixaban (ELIQUIS) tablet 5 mg, 5 mg, Oral, BID  hydrALAZINE (APRESOLINE) tablet 100 mg, 100 mg, Oral, 3 times per day  isosorbide mononitrate (IMDUR) extended release tablet 30 mg, 30 mg, Oral, BID  magnesium oxide (MAG-OX) tablet 400 mg, 400 mg, Oral, Daily  metoprolol succinate (TOPROL XL) extended release tablet 100 mg, 100 mg, Oral, BID  spironolactone (ALDACTONE) tablet 25 mg, 25 mg, Oral, Daily  sertraline (ZOLOFT) tablet 100 mg, 100 mg, Oral, Daily  glucose (GLUTOSE) 40 % oral gel 15 g, 15 g, Oral, PRN  dextrose 50 % IV solution, 12.5 g, Intravenous, PRN  glucagon (rDNA) injection 1 mg, 1 mg, Intramuscular, PRN  dextrose 5 % solution, 100 mL/hr, Intravenous, PRN  insulin lispro (HUMALOG) injection vial 0-12 Units, 0-12 Units, Subcutaneous, TID WC  insulin lispro (HUMALOG) injection vial 0-6 Units, 0-6 Units, Subcutaneous, Nightly  gabapentin (NEURONTIN) capsule 100 mg, 100 mg, Oral, TID  insulin glargine (LANTUS) injection vial 10 Units, 10 Units, Subcutaneous, Nightly  bumetanide (BUMEX) injection 2 mg, 2 mg, Intravenous, BID  Physical    VITALS:  /75   Pulse 80   Temp 98.2 °F (36.8 °C) (Oral)   Resp 18   Ht 5' 11\" (1.803 m)   Wt (!) 324 lb 8.3 oz (147.2 kg)   SpO2 95%   BMI 45.26 kg/m²   TEMPERATURE:  Current - Temp: 98.2 °F (36.8 °C); Max - Temp  Av.9 °F (36.6 °C)  Min: 97.4 °F (36.3 °C)  Max: 98.2 °F (36.8 °C)  RESPIRATIONS RANGE: Resp  Av.4  Min: 16  Max: 18  PULSE RANGE: Pulse  Av.4  Min: 78  Max: 80  BLOOD PRESSURE RANGE:  Systolic (54ONY), GLENN:575 , Min:94 , UNR:740   ; Diastolic (76TZB), HON:55, Min:62, Max:99    PULSE OXIMETRY RANGE: SpO2  Av.5 %  Min: 94 %  Max: 97 %  24HR INTAKE/OUTPUT:      Intake/Output Summary (Last 24 hours) at 2019 0950  Last data filed at 2019 2231  Gross per 24 hour   Intake 120 ml   Output 2250 ml   Net -2130 ml           Data    CBC with Differential:    Lab Results   Component Value Date    WBC 6.6 2019    RBC 4.05 2019    HGB 12.6 2019    HCT 38.2 2019     2019    MCV 94.3 2019    MCH 31.1 2019    MCHC 33.0 2019    RDW 14.3 2019    SEGSPCT 37 2011    LYMPHOPCT 25.8 2019    MONOPCT 10.1 2019    BASOPCT 0.5 2019    MONOSABS 0.67 2019    LYMPHSABS 1.71 2019    EOSABS 0.23 2019    BASOSABS 0.03 2019       ASSESSMENT AND PLAN: Diabetic with ulcer right foot. Neuropathy. Foot deformity. Continue local care. Reinforced no pressure.   Once discharged, recommend appropriate accommodative shoe with insert to decrease pressure and decrease any risk of additional breakdown.

## 2019-09-09 NOTE — PROGRESS NOTES
Physical Therapy  Facility/Department: [address] INTERNAL MEDICINE 2  Daily Treatment Note  NAME: Ciro Arenas  : 1950  MRN: [id number]    Date of Service: 2019  Patient Diagnosis(es): The primary encounter diagnosis was Acute on chronic congestive heart failure, unspecified heart failure type ([address] Diagnoses of NICM (nonischemic cardiomyopathy) ([address] Chronic systolic heart failure ([address] PAF (paroxysmal atrial fibrillation) ([address] LBBB (left bundle branch block), Biventricular implantable cardioverter-defibrillator in situ, and CKD (chronic kidney disease) stage 2, GFR 60-89 ml/min were also pertinent to this visit. has a past medical history of Anxiety, Arthritis, Atrial fibrillation (HCC), AVNRT (AV teddy re-entry tachycardia) ([address] CAD (coronary artery disease), CHF (congestive heart failure) ([address] Diabetes mellitus ([address] Diabetic neuropathy ([address] Diastolic dysfunction, Fall, Gout, History of blood transfusion, Hx of blood clots, Hyperlipidemia, Hypertension, LBBB (left bundle branch block), Moderate mitral regurgitation, Nonischemic cardiomyopathy ([address] Obesity, SNEHA (obstructive sleep apnea), Severe tricuspid regurgitation, SVT (supraventricular tachycardia) ([address] and Viral cardiomyopathy ([address] has a past surgical history that includes Diagnostic Cardiac Cath Lab Procedure; knee surgery; Femur Surgery; ECHO Compl W Dop Color Flow (2011); Cardiac catheterization (2011); Echo Complete (2013); back surgery; joint replacement (Left); Cardiac defibrillator placement (2014); and eye surgery (Right, 2019).    Evaluating Therapist: Amarilis Rich PT     Room #:402  DIAGNOSIS: CHF  PRECAUTIONS: falls     Social:  Pt lives with wife in a 1st floor setup. 2 steps to enter with no rails. Prior to admission independent with cane.        Initial Evaluation  Date: 19 Treatment date         Short Term/ Long Term   Goals   Was pt agreeable to Eval/treatment? yes Yes      Does pt have pain? Chronic B LE pain knees and ankles      Bed Mobility  Rolling: independent  Supine to sit: independent  Sit to supine: independent  Scooting: independent   NT independent   Transfers Sit to stand: SBA  Stand to sit: SBA  Stand pivot: SBA Sit to stand independent  Stand to sit independent  Stand pivot with sp cane independent independenet   Ambulation    75 feet with s cane with CG/ feet sp cane supervision 100 feet with cane with independent   Stair Negotiation  Ascended and descended  NT  NT  3 steps with 0 rail and cane CGA   AM-PAC Raw score               20/24 20//24        Patient education  Pt was educated on importance of mobility    Patient response to education:   Pt verbalized understanding Pt demonstrated skill Pt requires further education in this area   yes         Additional Comments: SpO2 on room air 97% at rest and 94% after ambulation    Pt was left in chair with call light left by patient. Pt is making good progress toward established Physical Therapy goals. Continue with physical therapy current plan of care.     Abigail KERN [id number]

## 2019-09-10 ENCOUNTER — CARE COORDINATION (OUTPATIENT)
Dept: CARE COORDINATION | Age: 69
End: 2019-09-10

## 2019-09-12 ENCOUNTER — OFFICE VISIT (OUTPATIENT)
Dept: CARDIOLOGY CLINIC | Age: 69
End: 2019-09-12
Payer: COMMERCIAL

## 2019-09-12 ENCOUNTER — TELEPHONE (OUTPATIENT)
Dept: CARDIOLOGY CLINIC | Age: 69
End: 2019-09-12

## 2019-09-12 ENCOUNTER — TELEPHONE (OUTPATIENT)
Dept: FAMILY MEDICINE CLINIC | Age: 69
End: 2019-09-12

## 2019-09-12 ENCOUNTER — HOSPITAL ENCOUNTER (OUTPATIENT)
Dept: OTHER | Age: 69
Setting detail: THERAPIES SERIES
Discharge: HOME | End: 2019-09-12
Payer: COMMERCIAL

## 2019-09-12 VITALS
SYSTOLIC BLOOD PRESSURE: 137 MMHG | BODY MASS INDEX: 44.35 KG/M2 | WEIGHT: 315 LBS | RESPIRATION RATE: 18 BRPM | DIASTOLIC BLOOD PRESSURE: 68 MMHG | HEART RATE: 80 BPM

## 2019-09-12 VITALS
HEART RATE: 81 BPM | OXYGEN SATURATION: 95 % | BODY MASS INDEX: 44.1 KG/M2 | DIASTOLIC BLOOD PRESSURE: 60 MMHG | HEIGHT: 71 IN | SYSTOLIC BLOOD PRESSURE: 94 MMHG | WEIGHT: 315 LBS

## 2019-09-12 DIAGNOSIS — Z95.810 BIVENTRICULAR IMPLANTABLE CARDIOVERTER-DEFIBRILLATOR IN SITU: ICD-10-CM

## 2019-09-12 DIAGNOSIS — I34.0 NON-RHEUMATIC MITRAL REGURGITATION: ICD-10-CM

## 2019-09-12 DIAGNOSIS — G47.33 OSA (OBSTRUCTIVE SLEEP APNEA): ICD-10-CM

## 2019-09-12 DIAGNOSIS — I48.0 PAROXYSMAL ATRIAL FIBRILLATION (HCC): ICD-10-CM

## 2019-09-12 DIAGNOSIS — I10 ESSENTIAL HYPERTENSION: ICD-10-CM

## 2019-09-12 DIAGNOSIS — I48.0 PAF (PAROXYSMAL ATRIAL FIBRILLATION) (HCC): ICD-10-CM

## 2019-09-12 DIAGNOSIS — E11.65 UNCONTROLLED TYPE 2 DIABETES MELLITUS WITH HYPERGLYCEMIA (HCC): ICD-10-CM

## 2019-09-12 DIAGNOSIS — N18.2 CKD (CHRONIC KIDNEY DISEASE) STAGE 2, GFR 60-89 ML/MIN: ICD-10-CM

## 2019-09-12 DIAGNOSIS — I42.8 NICM (NONISCHEMIC CARDIOMYOPATHY) (HCC): ICD-10-CM

## 2019-09-12 DIAGNOSIS — I44.7 LBBB (LEFT BUNDLE BRANCH BLOCK): ICD-10-CM

## 2019-09-12 DIAGNOSIS — I50.22 CHRONIC SYSTOLIC HEART FAILURE (HCC): Primary | ICD-10-CM

## 2019-09-12 LAB
ANION GAP SERPL CALCULATED.3IONS-SCNC: 17 MMOL/L (ref 7–16)
BUN BLDV-MCNC: 48 MG/DL (ref 8–23)
CALCIUM SERPL-MCNC: 10.6 MG/DL (ref 8.6–10.2)
CHLORIDE BLD-SCNC: 92 MMOL/L (ref 98–107)
CO2: 29 MMOL/L (ref 22–29)
CREAT SERPL-MCNC: 1.9 MG/DL (ref 0.7–1.2)
GFR AFRICAN AMERICAN: 43
GFR NON-AFRICAN AMERICAN: 43 ML/MIN/1.73
GLUCOSE BLD-MCNC: 141 MG/DL (ref 74–99)
POTASSIUM SERPL-SCNC: 3.7 MMOL/L (ref 3.5–5)
PRO-BNP: 1586 PG/ML (ref 0–125)
SODIUM BLD-SCNC: 138 MMOL/L (ref 132–146)

## 2019-09-12 PROCEDURE — 1123F ACP DISCUSS/DSCN MKR DOCD: CPT | Performed by: NURSE PRACTITIONER

## 2019-09-12 PROCEDURE — 36415 COLL VENOUS BLD VENIPUNCTURE: CPT

## 2019-09-12 PROCEDURE — G8427 DOCREV CUR MEDS BY ELIG CLIN: HCPCS | Performed by: NURSE PRACTITIONER

## 2019-09-12 PROCEDURE — G8417 CALC BMI ABV UP PARAM F/U: HCPCS | Performed by: NURSE PRACTITIONER

## 2019-09-12 PROCEDURE — 3045F PR MOST RECENT HEMOGLOBIN A1C LEVEL 7.0-9.0%: CPT | Performed by: NURSE PRACTITIONER

## 2019-09-12 PROCEDURE — 1036F TOBACCO NON-USER: CPT | Performed by: NURSE PRACTITIONER

## 2019-09-12 PROCEDURE — 99214 OFFICE O/P EST MOD 30 MIN: CPT

## 2019-09-12 PROCEDURE — 83880 ASSAY OF NATRIURETIC PEPTIDE: CPT

## 2019-09-12 PROCEDURE — 3017F COLORECTAL CA SCREEN DOC REV: CPT | Performed by: NURSE PRACTITIONER

## 2019-09-12 PROCEDURE — 99214 OFFICE O/P EST MOD 30 MIN: CPT | Performed by: NURSE PRACTITIONER

## 2019-09-12 PROCEDURE — 93000 ELECTROCARDIOGRAM COMPLETE: CPT | Performed by: INTERNAL MEDICINE

## 2019-09-12 PROCEDURE — 4040F PNEUMOC VAC/ADMIN/RCVD: CPT | Performed by: NURSE PRACTITIONER

## 2019-09-12 PROCEDURE — 80048 BASIC METABOLIC PNL TOTAL CA: CPT

## 2019-09-12 PROCEDURE — 2022F DILAT RTA XM EVC RTNOPTHY: CPT | Performed by: NURSE PRACTITIONER

## 2019-09-12 PROCEDURE — 1111F DSCHRG MED/CURRENT MED MERGE: CPT | Performed by: NURSE PRACTITIONER

## 2019-09-12 RX ORDER — LOSARTAN POTASSIUM 50 MG/1
50 TABLET ORAL DAILY
Qty: 90 TABLET | Refills: 1 | Status: ON HOLD | OUTPATIENT
Start: 2019-09-12 | End: 2019-09-24 | Stop reason: HOSPADM

## 2019-09-12 RX ORDER — HYDRALAZINE HYDROCHLORIDE 100 MG/1
50 TABLET, FILM COATED ORAL EVERY 8 HOURS SCHEDULED
Qty: 90 TABLET | Refills: 3 | Status: ON HOLD | OUTPATIENT
Start: 2019-09-12 | End: 2019-09-24 | Stop reason: HOSPADM

## 2019-09-12 RX ORDER — BUMETANIDE 2 MG/1
2 TABLET ORAL 2 TIMES DAILY
Qty: 90 TABLET | Refills: 3 | Status: ON HOLD | OUTPATIENT
Start: 2019-09-12 | End: 2019-09-24 | Stop reason: HOSPADM

## 2019-09-12 RX ORDER — ATORVASTATIN CALCIUM 80 MG/1
80 TABLET, FILM COATED ORAL DAILY
COMMUNITY
End: 2020-01-31

## 2019-09-12 RX ORDER — BUMETANIDE 2 MG/1
2 TABLET ORAL DAILY PRN
COMMUNITY
End: 2019-09-12

## 2019-09-12 RX ORDER — POTASSIUM CHLORIDE 20 MEQ/1
20 TABLET, EXTENDED RELEASE ORAL DAILY PRN
Qty: 30 TABLET | Refills: 11 | Status: ON HOLD | OUTPATIENT
Start: 2019-09-12 | End: 2019-10-30 | Stop reason: HOSPADM

## 2019-09-12 NOTE — PATIENT INSTRUCTIONS
1. Decrease hydralazine to 50 mg three times daily    2. Start losartan 50 mg daily    3. Stop taking potassium every day and use only on the days you would take metolazone (zaroxlyn)     4. Get blood work in 7-10 days    5. If you labs look ok and you are feeling ok then we will continue to adjust your heart medication. 6. Weigh yourself daily    -No fluid restriction     -Diet should sodium restricted to 2 grams    -Again watch your daily weight trends and if you gain water weight please follow below instructions.    -If you gain 3-5 pounds in 2-3 days OR notice that you are retaining fluid in anyway just like you did before then take an extra dose of your water pill (bumetanide/Bumex) every day until you lose the weight or feel better.     -If you notice that you have taken more than 3 extra doses in 1 week then please call and let us know. -If at any time you feel that you are retaining fluid, your medications are not working, or you feel ill in anyway, then please call us for either same day appointment or the next day, and for instructions. Our goal is to keep you out of the emergency room and the hospital and we have ways to do it. You just need to call us in a timely manner.     -If you become sick for other reasons, and notice that you are not urinating as much, the urine is very dark, you have significant diarrhea or vomiting, then please DO NOT take your water pill and CALL US immediately. 7. Return visit with  [employer] in 1-2 months. Sooner if needed if you have any changes in symptoms.

## 2019-09-12 NOTE — PROGRESS NOTES
Date    BACK SURGERY      CARDIAC CATHETERIZATION  11/21/2011    Dr. Saud Doherty  08/20/2014    BiV/ICD  (Kelly Peñaloza)    Dr. Marcia Nolasco CATH LAB PROCEDURE      ECHO COMPL W DOP COLOR FLOW  11/19/2011         ECHO COMPLETE  12/14/2013         EYE SURGERY Right 08/2019    FEMUR SURGERY      rt; compound frx right femur at 3years old.  JOINT REPLACEMENT Left     knee    KNEE SURGERY      7 on right/1 on left/ total replacement on R         Allergies   Allergen Reactions    Food Hives     Strawberries      Soap & Cleansers Rash     TIDE detergent         Outpatient Medications Marked as Taking for the 9/12/19 encounter (Office Visit) with PILAR Ward CNP   Medication Sig Dispense Refill    atorvastatin (LIPITOR) 80 MG tablet Take 80 mg by mouth daily      CPAP Machine MISC by Does not apply route nightly      hydrALAZINE (APRESOLINE) 100 MG tablet Take 0.5 tablets by mouth every 8 hours 90 tablet 3    losartan (COZAAR) 50 MG tablet Take 1 tablet by mouth daily 90 tablet 1    potassium chloride (KLOR-CON M) 20 MEQ extended release tablet Take 1 tablet by mouth daily as needed (only on the days that you take an extra bumex) 30 tablet 11    bumetanide (BUMEX) 2 MG tablet Take 1 tablet by mouth 2 times daily 90 tablet 3    metoprolol succinate (TOPROL XL) 100 MG extended release tablet TAKE 1 TABLET TWICE A  tablet 0    metolazone (ZAROXOLYN) 5 MG tablet Take 1 tablet by mouth daily Indications: States takes as needed for weight gain.  (Patient taking differently: Take 5 mg by mouth daily as needed Indications: States takes as needed for weight gain. ) 10 tablet 5    dofetilide (TIKOSYN) 250 MCG capsule TAKE 1 CAPSULE TWICE A  capsule 1    ELIQUIS 5 MG TABS tablet TAKE 1 TABLET TWICE A  tablet 3    VILAZODONE HCL 10 MG Tablet TAKE ONE TABLET BY MOUTH EVERY DAY  2    famotidine (PEPCID) 20 MG tablet Take 1 tablet by mouth

## 2019-09-18 ENCOUNTER — OFFICE VISIT (OUTPATIENT)
Dept: FAMILY MEDICINE CLINIC | Age: 69
End: 2019-09-18
Payer: COMMERCIAL

## 2019-09-18 VITALS
TEMPERATURE: 97.4 F | WEIGHT: 315 LBS | BODY MASS INDEX: 44.02 KG/M2 | OXYGEN SATURATION: 96 % | HEART RATE: 63 BPM | SYSTOLIC BLOOD PRESSURE: 97 MMHG | DIASTOLIC BLOOD PRESSURE: 63 MMHG

## 2019-09-18 DIAGNOSIS — N28.9 RENAL INSUFFICIENCY: ICD-10-CM

## 2019-09-18 DIAGNOSIS — E78.2 MIXED HYPERLIPIDEMIA: ICD-10-CM

## 2019-09-18 DIAGNOSIS — E11.65 UNCONTROLLED TYPE 2 DIABETES MELLITUS WITH HYPERGLYCEMIA (HCC): ICD-10-CM

## 2019-09-18 DIAGNOSIS — I10 HTN (HYPERTENSION), BENIGN: ICD-10-CM

## 2019-09-18 DIAGNOSIS — I50.22 CHRONIC SYSTOLIC CHF (CONGESTIVE HEART FAILURE) (HCC): Primary | ICD-10-CM

## 2019-09-18 PROBLEM — I44.7 LBBB (LEFT BUNDLE BRANCH BLOCK): Status: ACTIVE | Noted: 2019-09-18

## 2019-09-18 PROBLEM — N18.2 CKD (CHRONIC KIDNEY DISEASE) STAGE 2, GFR 60-89 ML/MIN: Status: ACTIVE | Noted: 2019-09-18

## 2019-09-18 PROBLEM — I34.0 NON-RHEUMATIC MITRAL REGURGITATION: Status: ACTIVE | Noted: 2019-09-18

## 2019-09-18 PROCEDURE — 4040F PNEUMOC VAC/ADMIN/RCVD: CPT | Performed by: FAMILY MEDICINE

## 2019-09-18 PROCEDURE — 3045F PR MOST RECENT HEMOGLOBIN A1C LEVEL 7.0-9.0%: CPT | Performed by: FAMILY MEDICINE

## 2019-09-18 PROCEDURE — 3017F COLORECTAL CA SCREEN DOC REV: CPT | Performed by: FAMILY MEDICINE

## 2019-09-18 PROCEDURE — 99214 OFFICE O/P EST MOD 30 MIN: CPT | Performed by: FAMILY MEDICINE

## 2019-09-18 PROCEDURE — 1123F ACP DISCUSS/DSCN MKR DOCD: CPT | Performed by: FAMILY MEDICINE

## 2019-09-18 PROCEDURE — 2022F DILAT RTA XM EVC RTNOPTHY: CPT | Performed by: FAMILY MEDICINE

## 2019-09-18 PROCEDURE — G8427 DOCREV CUR MEDS BY ELIG CLIN: HCPCS | Performed by: FAMILY MEDICINE

## 2019-09-18 PROCEDURE — G8417 CALC BMI ABV UP PARAM F/U: HCPCS | Performed by: FAMILY MEDICINE

## 2019-09-18 PROCEDURE — 1111F DSCHRG MED/CURRENT MED MERGE: CPT | Performed by: FAMILY MEDICINE

## 2019-09-18 PROCEDURE — 1036F TOBACCO NON-USER: CPT | Performed by: FAMILY MEDICINE

## 2019-09-20 ENCOUNTER — HOSPITAL ENCOUNTER (OUTPATIENT)
Age: 69
Discharge: HOME | End: 2019-09-20
Payer: COMMERCIAL

## 2019-09-20 ENCOUNTER — TELEPHONE (OUTPATIENT)
Dept: CARDIOLOGY CLINIC | Age: 69
End: 2019-09-20

## 2019-09-20 DIAGNOSIS — I42.8 NICM (NONISCHEMIC CARDIOMYOPATHY) (HCC): ICD-10-CM

## 2019-09-20 DIAGNOSIS — I50.22 CHRONIC SYSTOLIC HEART FAILURE (HCC): ICD-10-CM

## 2019-09-20 LAB
ANION GAP SERPL CALCULATED.3IONS-SCNC: 15 MMOL/L (ref 7–16)
BUN BLDV-MCNC: 62 MG/DL (ref 8–23)
CALCIUM SERPL-MCNC: 9.6 MG/DL (ref 8.6–10.2)
CHLORIDE BLD-SCNC: 95 MMOL/L (ref 98–107)
CO2: 27 MMOL/L (ref 22–29)
CREAT SERPL-MCNC: 2 MG/DL (ref 0.7–1.2)
GFR AFRICAN AMERICAN: 41
GFR NON-AFRICAN AMERICAN: 41 ML/MIN/1.73
GLUCOSE BLD-MCNC: 266 MG/DL (ref 74–99)
POTASSIUM SERPL-SCNC: 3.6 MMOL/L (ref 3.5–5)
PRO-BNP: 1291 PG/ML (ref 0–125)
SODIUM BLD-SCNC: 137 MMOL/L (ref 132–146)

## 2019-09-20 PROCEDURE — 83880 ASSAY OF NATRIURETIC PEPTIDE: CPT

## 2019-09-20 PROCEDURE — 36415 COLL VENOUS BLD VENIPUNCTURE: CPT

## 2019-09-20 PROCEDURE — 80048 BASIC METABOLIC PNL TOTAL CA: CPT

## 2019-09-21 ENCOUNTER — HOSPITAL ENCOUNTER (OUTPATIENT)
Age: 69
Discharge: HOME | End: 2019-09-21
Payer: COMMERCIAL

## 2019-09-21 ENCOUNTER — HOSPITAL ENCOUNTER (INPATIENT)
Age: 69
LOS: 1 days | Discharge: HOME HEALTH | DRG: 309 | End: 2019-09-24
Attending: INTERNAL MEDICINE | Admitting: STUDENT IN AN ORGANIZED HEALTH CARE EDUCATION/TRAINING PROGRAM
Payer: COMMERCIAL

## 2019-09-21 ENCOUNTER — TELEPHONE (OUTPATIENT)
Dept: NON INVASIVE DIAGNOSTICS | Age: 69
End: 2019-09-21

## 2019-09-21 ENCOUNTER — HOSPITAL ENCOUNTER (EMERGENCY)
Age: 69
Discharge: ANOTHER HOSPITAL | End: 2019-09-21
Attending: EMERGENCY MEDICINE
Payer: COMMERCIAL

## 2019-09-21 ENCOUNTER — APPOINTMENT (OUTPATIENT)
Dept: GENERAL RADIOLOGY | Age: 69
End: 2019-09-21
Payer: COMMERCIAL

## 2019-09-21 VITALS
RESPIRATION RATE: 16 BRPM | WEIGHT: 315 LBS | TEMPERATURE: 97.6 F | OXYGEN SATURATION: 98 % | HEIGHT: 71 IN | SYSTOLIC BLOOD PRESSURE: 106 MMHG | DIASTOLIC BLOOD PRESSURE: 67 MMHG | HEART RATE: 81 BPM | BODY MASS INDEX: 44.1 KG/M2

## 2019-09-21 DIAGNOSIS — R55 SYNCOPE, UNSPECIFIED SYNCOPE TYPE: ICD-10-CM

## 2019-09-21 DIAGNOSIS — Z45.02 AICD DISCHARGE: ICD-10-CM

## 2019-09-21 DIAGNOSIS — R07.9 CHEST PAIN, UNSPECIFIED TYPE: Primary | ICD-10-CM

## 2019-09-21 DIAGNOSIS — N18.9 CHRONIC KIDNEY DISEASE, UNSPECIFIED CKD STAGE: ICD-10-CM

## 2019-09-21 LAB
ALBUMIN SERPL-MCNC: 3.8 G/DL (ref 3.5–5.2)
ALP BLD-CCNC: 94 U/L (ref 40–129)
ALT SERPL-CCNC: 28 U/L (ref 0–40)
ANION GAP SERPL CALCULATED.3IONS-SCNC: 15 MMOL/L (ref 7–16)
AST SERPL-CCNC: 30 U/L (ref 0–39)
BASOPHILS ABSOLUTE: 0.03 E9/L (ref 0–0.2)
BASOPHILS RELATIVE PERCENT: 0.5 % (ref 0–2)
BILIRUB SERPL-MCNC: 1 MG/DL (ref 0–1.2)
BUN BLDV-MCNC: 56 MG/DL (ref 8–23)
CALCIUM SERPL-MCNC: 9.4 MG/DL (ref 8.6–10.2)
CHLORIDE BLD-SCNC: 92 MMOL/L (ref 98–107)
CO2: 26 MMOL/L (ref 22–29)
CREAT SERPL-MCNC: 1.8 MG/DL (ref 0.7–1.2)
EKG ATRIAL RATE: 227 BPM
EKG Q-T INTERVAL: 526 MS
EKG QRS DURATION: 198 MS
EKG QTC CALCULATION (BAZETT): 606 MS
EKG R AXIS: -147 DEGREES
EKG T AXIS: 11 DEGREES
EKG VENTRICULAR RATE: 80 BPM
EOSINOPHILS ABSOLUTE: 0.13 E9/L (ref 0.05–0.5)
EOSINOPHILS RELATIVE PERCENT: 2.2 % (ref 0–6)
GFR AFRICAN AMERICAN: 46
GFR NON-AFRICAN AMERICAN: 46 ML/MIN/1.73
GLUCOSE BLD-MCNC: 391 MG/DL (ref 74–99)
HCT VFR BLD CALC: 45.6 % (ref 37–54)
HEMOGLOBIN: 15.3 G/DL (ref 12.5–16.5)
IMMATURE GRANULOCYTES #: 0.01 E9/L
IMMATURE GRANULOCYTES %: 0.2 % (ref 0–5)
LYMPHOCYTES ABSOLUTE: 1.55 E9/L (ref 1.5–4)
LYMPHOCYTES RELATIVE PERCENT: 26.6 % (ref 20–42)
MAGNESIUM: 1.6 MG/DL (ref 1.6–2.6)
MCH RBC QN AUTO: 30.8 PG (ref 26–35)
MCHC RBC AUTO-ENTMCNC: 33.6 % (ref 32–34.5)
MCV RBC AUTO: 91.9 FL (ref 80–99.9)
METER GLUCOSE: 198 MG/DL (ref 74–99)
MONOCYTES ABSOLUTE: 0.65 E9/L (ref 0.1–0.95)
MONOCYTES RELATIVE PERCENT: 11.2 % (ref 2–12)
NEUTROPHILS ABSOLUTE: 3.45 E9/L (ref 1.8–7.3)
NEUTROPHILS RELATIVE PERCENT: 59.3 % (ref 43–80)
PDW BLD-RTO: 14.4 FL (ref 11.5–15)
PLATELET # BLD: 157 E9/L (ref 130–450)
PMV BLD AUTO: 13.1 FL (ref 7–12)
POTASSIUM REFLEX MAGNESIUM: 3.8 MMOL/L (ref 3.5–5)
PRO-BNP: 1634 PG/ML (ref 0–125)
RBC # BLD: 4.96 E12/L (ref 3.8–5.8)
SODIUM BLD-SCNC: 133 MMOL/L (ref 132–146)
TOTAL PROTEIN: 8 G/DL (ref 6.4–8.3)
TROPONIN: 0.05 NG/ML (ref 0–0.03)
TROPONIN: 0.06 NG/ML (ref 0–0.03)
WBC # BLD: 5.8 E9/L (ref 4.5–11.5)

## 2019-09-21 PROCEDURE — 93010 ELECTROCARDIOGRAM REPORT: CPT | Performed by: INTERNAL MEDICINE

## 2019-09-21 PROCEDURE — 71046 X-RAY EXAM CHEST 2 VIEWS: CPT

## 2019-09-21 PROCEDURE — 83735 ASSAY OF MAGNESIUM: CPT

## 2019-09-21 PROCEDURE — 80053 COMPREHEN METABOLIC PANEL: CPT

## 2019-09-21 PROCEDURE — APPSS45 APP SPLIT SHARED TIME 31-45 MINUTES: Performed by: PHYSICIAN ASSISTANT

## 2019-09-21 PROCEDURE — 84484 ASSAY OF TROPONIN QUANT: CPT

## 2019-09-21 PROCEDURE — 2580000003 HC RX 258: Performed by: EMERGENCY MEDICINE

## 2019-09-21 PROCEDURE — 6370000000 HC RX 637 (ALT 250 FOR IP): Performed by: HOSPITALIST

## 2019-09-21 PROCEDURE — 93005 ELECTROCARDIOGRAM TRACING: CPT | Performed by: NURSE PRACTITIONER

## 2019-09-21 PROCEDURE — A0425 GROUND MILEAGE: HCPCS

## 2019-09-21 PROCEDURE — 82962 GLUCOSE BLOOD TEST: CPT

## 2019-09-21 PROCEDURE — 83880 ASSAY OF NATRIURETIC PEPTIDE: CPT

## 2019-09-21 PROCEDURE — G0378 HOSPITAL OBSERVATION PER HR: HCPCS

## 2019-09-21 PROCEDURE — 85025 COMPLETE CBC W/AUTO DIFF WBC: CPT

## 2019-09-21 PROCEDURE — 1200000000 HC SEMI PRIVATE

## 2019-09-21 PROCEDURE — 99222 1ST HOSP IP/OBS MODERATE 55: CPT | Performed by: INTERNAL MEDICINE

## 2019-09-21 PROCEDURE — A0426 ALS 1: HCPCS

## 2019-09-21 PROCEDURE — G0379 DIRECT REFER HOSPITAL OBSERV: HCPCS

## 2019-09-21 PROCEDURE — 2580000003 HC RX 258: Performed by: HOSPITALIST

## 2019-09-21 PROCEDURE — 36415 COLL VENOUS BLD VENIPUNCTURE: CPT

## 2019-09-21 PROCEDURE — 99285 EMERGENCY DEPT VISIT HI MDM: CPT

## 2019-09-21 RX ORDER — LOSARTAN POTASSIUM 50 MG/1
50 TABLET ORAL DAILY
Status: DISCONTINUED | OUTPATIENT
Start: 2019-09-22 | End: 2019-09-24 | Stop reason: HOSPADM

## 2019-09-21 RX ORDER — BUSPIRONE HYDROCHLORIDE 10 MG/1
30 TABLET ORAL DAILY
Status: DISCONTINUED | OUTPATIENT
Start: 2019-09-22 | End: 2019-09-24 | Stop reason: HOSPADM

## 2019-09-21 RX ORDER — SODIUM CHLORIDE 0.9 % (FLUSH) 0.9 %
10 SYRINGE (ML) INJECTION PRN
Status: DISCONTINUED | OUTPATIENT
Start: 2019-09-21 | End: 2019-09-24 | Stop reason: HOSPADM

## 2019-09-21 RX ORDER — SPIRONOLACTONE 25 MG/1
25 TABLET ORAL DAILY
Status: DISCONTINUED | OUTPATIENT
Start: 2019-09-22 | End: 2019-09-24 | Stop reason: HOSPADM

## 2019-09-21 RX ORDER — DIGOXIN 125 MCG
125 TABLET ORAL DAILY
Status: DISCONTINUED | OUTPATIENT
Start: 2019-09-22 | End: 2019-09-24 | Stop reason: HOSPADM

## 2019-09-21 RX ORDER — BUMETANIDE 1 MG/1
2 TABLET ORAL 2 TIMES DAILY
Status: DISCONTINUED | OUTPATIENT
Start: 2019-09-21 | End: 2019-09-24

## 2019-09-21 RX ORDER — LANOLIN ALCOHOL/MO/W.PET/CERES
400 CREAM (GRAM) TOPICAL DAILY
Status: DISCONTINUED | OUTPATIENT
Start: 2019-09-22 | End: 2019-09-24 | Stop reason: HOSPADM

## 2019-09-21 RX ORDER — SODIUM CHLORIDE 0.9 % (FLUSH) 0.9 %
10 SYRINGE (ML) INJECTION EVERY 12 HOURS SCHEDULED
Status: DISCONTINUED | OUTPATIENT
Start: 2019-09-21 | End: 2019-09-24 | Stop reason: HOSPADM

## 2019-09-21 RX ORDER — ALLOPURINOL 100 MG/1
200 TABLET ORAL 2 TIMES DAILY
Status: DISCONTINUED | OUTPATIENT
Start: 2019-09-21 | End: 2019-09-24 | Stop reason: HOSPADM

## 2019-09-21 RX ORDER — ISOSORBIDE MONONITRATE 30 MG/1
30 TABLET, EXTENDED RELEASE ORAL 2 TIMES DAILY
Status: DISCONTINUED | OUTPATIENT
Start: 2019-09-22 | End: 2019-09-22

## 2019-09-21 RX ORDER — DEXTROSE MONOHYDRATE 25 G/50ML
12.5 INJECTION, SOLUTION INTRAVENOUS PRN
Status: DISCONTINUED | OUTPATIENT
Start: 2019-09-21 | End: 2019-09-24 | Stop reason: HOSPADM

## 2019-09-21 RX ORDER — DEXTROSE MONOHYDRATE 50 MG/ML
100 INJECTION, SOLUTION INTRAVENOUS PRN
Status: DISCONTINUED | OUTPATIENT
Start: 2019-09-21 | End: 2019-09-24 | Stop reason: HOSPADM

## 2019-09-21 RX ORDER — METOLAZONE 5 MG/1
5 TABLET ORAL DAILY
Status: DISCONTINUED | OUTPATIENT
Start: 2019-09-22 | End: 2019-09-24 | Stop reason: HOSPADM

## 2019-09-21 RX ORDER — ONDANSETRON 2 MG/ML
4 INJECTION INTRAMUSCULAR; INTRAVENOUS EVERY 6 HOURS PRN
Status: DISCONTINUED | OUTPATIENT
Start: 2019-09-21 | End: 2019-09-21

## 2019-09-21 RX ORDER — METOPROLOL SUCCINATE 100 MG/1
100 TABLET, EXTENDED RELEASE ORAL 2 TIMES DAILY
Status: DISCONTINUED | OUTPATIENT
Start: 2019-09-21 | End: 2019-09-24 | Stop reason: HOSPADM

## 2019-09-21 RX ORDER — FAMOTIDINE 20 MG/1
20 TABLET, FILM COATED ORAL DAILY
Status: DISCONTINUED | OUTPATIENT
Start: 2019-09-22 | End: 2019-09-24 | Stop reason: HOSPADM

## 2019-09-21 RX ORDER — ATORVASTATIN CALCIUM 40 MG/1
80 TABLET, FILM COATED ORAL DAILY
Status: DISCONTINUED | OUTPATIENT
Start: 2019-09-21 | End: 2019-09-24 | Stop reason: HOSPADM

## 2019-09-21 RX ORDER — 0.9 % SODIUM CHLORIDE 0.9 %
500 INTRAVENOUS SOLUTION INTRAVENOUS ONCE
Status: COMPLETED | OUTPATIENT
Start: 2019-09-21 | End: 2019-09-21

## 2019-09-21 RX ORDER — GABAPENTIN 100 MG/1
100 CAPSULE ORAL 3 TIMES DAILY
Status: DISCONTINUED | OUTPATIENT
Start: 2019-09-21 | End: 2019-09-24 | Stop reason: HOSPADM

## 2019-09-21 RX ORDER — VILAZODONE HYDROCHLORIDE 10 MG/1
10 TABLET ORAL DAILY
Status: DISCONTINUED | OUTPATIENT
Start: 2019-09-22 | End: 2019-09-24 | Stop reason: HOSPADM

## 2019-09-21 RX ORDER — HYDRALAZINE HYDROCHLORIDE 25 MG/1
50 TABLET, FILM COATED ORAL EVERY 8 HOURS SCHEDULED
Status: DISCONTINUED | OUTPATIENT
Start: 2019-09-21 | End: 2019-09-22

## 2019-09-21 RX ORDER — NICOTINE POLACRILEX 4 MG
15 LOZENGE BUCCAL PRN
Status: DISCONTINUED | OUTPATIENT
Start: 2019-09-21 | End: 2019-09-24 | Stop reason: HOSPADM

## 2019-09-21 RX ORDER — POTASSIUM CHLORIDE 20 MEQ/1
20 TABLET, EXTENDED RELEASE ORAL DAILY PRN
Status: DISCONTINUED | OUTPATIENT
Start: 2019-09-22 | End: 2019-09-24 | Stop reason: HOSPADM

## 2019-09-21 RX ADMIN — GABAPENTIN 100 MG: 100 CAPSULE ORAL at 21:48

## 2019-09-21 RX ADMIN — BUMETANIDE 2 MG: 1 TABLET ORAL at 21:47

## 2019-09-21 RX ADMIN — INSULIN LISPRO 2 UNITS: 100 INJECTION, SOLUTION INTRAVENOUS; SUBCUTANEOUS at 22:03

## 2019-09-21 RX ADMIN — SODIUM CHLORIDE 500 ML: 9 INJECTION, SOLUTION INTRAVENOUS at 14:20

## 2019-09-21 RX ADMIN — ALLOPURINOL 200 MG: 100 TABLET ORAL at 21:47

## 2019-09-21 RX ADMIN — APIXABAN 5 MG: 5 TABLET, FILM COATED ORAL at 21:47

## 2019-09-21 RX ADMIN — Medication 10 ML: at 21:48

## 2019-09-21 RX ADMIN — ATORVASTATIN CALCIUM 80 MG: 40 TABLET, FILM COATED ORAL at 21:47

## 2019-09-21 ASSESSMENT — PAIN SCALES - GENERAL: PAINLEVEL_OUTOF10: 0

## 2019-09-21 NOTE — CONSULTS
fibrillation ([facility]     AVNRT (AV teddy re-entry tachycardia) ([facility]     CAD (coronary artery disease)     CHF (congestive heart failure) ([facility]     Diabetes mellitus ([facility]     Diabetic neuropathy ([facility]     Diastolic dysfunction 33/41/4241    stage 3    Fall     right knee    Gout     chemically induced    History of blood transfusion     Hx of blood clots 1976    left leg    Hyperlipidemia     Hypertension     LBBB (left bundle branch block)     Moderate mitral regurgitation 04/19/2016    Nonischemic cardiomyopathy ([facility]     Obesity     SNEHA (obstructive sleep apnea)     treated with CPAP    Severe tricuspid regurgitation 04/19/2016    SVT (supraventricular tachycardia) (HCC)     Viral cardiomyopathy ([facility]        Surgical History:  Past Surgical History:   Procedure Laterality Date    BACK SURGERY      CARDIAC CATHETERIZATION  11/21/2011    Dr. Claudean Curd  08/20/2014    BiV/ICD  (Kim Segundo)    Dr. Ev Redmond CATH LAB PROCEDURE      ECHO COMPL W DOP COLOR FLOW  11/19/2011         ECHO COMPLETE  12/14/2013         EYE SURGERY Right 08/2019    FEMUR SURGERY      rt; compound frx right femur at 3years old.  JOINT REPLACEMENT Left     knee    KNEE SURGERY      7 on right/1 on left/ total replacement on R       Medications Prior to Admission:    Prior to Admission medications    Medication Sig Start Date End Date Taking?  Authorizing Provider   Semaglutide,0.25 or 0.5MG/DOS, (OZEMPIC, 0.25 OR 0.5 MG/DOSE,) 2 MG/1.5ML SOPN Inject 0.2 mLs into the skin    Historical Provider, MD   atorvastatin (LIPITOR) 80 MG tablet Take 80 mg by mouth daily    Historical Provider, MD   CPAP Machine MISC by Does not apply route nightly    Historical Provider, MD   hydrALAZINE (APRESOLINE) 100 MG tablet Take 0.5 tablets by mouth every 8 hours 9/12/19   PILAR Infante - CNP   losartan (COZAAR) 50 MG tablet Take 1 tablet by mouth daily  Patient not taking: cigars. He quit after 2.00 years of use. He has never used smokeless tobacco. He reports that he drank alcohol. He reports that he does not use drugs. Family History:       Problem Relation Age of Onset    Alzheimer's Disease Mother     Diabetes Mother     No Known Problems Father         lives in [facility]    No Known Problems Brother     Other Brother         aneurysm behind his eye    Arrhythmia Brother          age 61         PHYSICAL EXAM:  Vitals:  BP (!) 107/58   Pulse 80   Temp 97.6 °F (36.4 °C) (Oral)   Resp 16   Ht 5' 11\" (1.803 m)   Wt (!) 315 lb (142.9 kg)   SpO2 97%   BMI 43.93 kg/m²   General Appearance: well-developed and well nourished, in no acute distress and alert  Skin: warm and dry, no rash or erythema  Head: normocephalic and atraumatic  Eyes: extraocular eye movements intact and conjunctivae normal  Pulmonary/Chest: clear to auscultation bilaterally- no wheezes, rales or rhonchi, normal air movement, no respiratory distress  Cardiovascular: normal rate, normal S1 and S2, no murmurs, no gallops and no JVD  Abdomen: soft, non-tender, non-distended, normal bowel sounds  Extremities: no cyanosis, no clubbing and no edema         LABS:  Recent Labs     19  0920 19  1402    133   K 3.6 3.8   CL 95* 92*   CO2 27 26   BUN 62* 56*   CREATININE 2.0* 1.8*   GLUCOSE 266* 391*   CALCIUM 9.6 9.4       Recent Labs     19  1402   WBC 5.8   RBC 4.96   HGB 15.3   HCT 45.6   MCV 91.9   MCH 30.8   MCHC 33.6   RDW 14.4      MPV 13.1*       No results for input(s): POCGLU in the last 72 hours. Radiology:   XR CHEST STANDARD (2 VW)   Final Result   NO ACUTE CARDIOPULMONARY PROCESS       Cardiomegaly which appears to be stable and unchanged from prior study             EKG:    None Completed in ED     ASSESSMENT:      Active Problems:    Biventricular implantable cardioverter-defibrillator in situ    Atrial fibrillation (HCC)    Chronic systolic heart failure

## 2019-09-21 NOTE — TELEPHONE ENCOUNTER
Mr. Bishnu Sharma was notified of an ICD shock that he experienced yesterday at 03:45 PM, at that time the patient was returning home from a shopping trip when he felt diaphoretic and sat down at that point his devic shows that he developed Af with RVR that became dual tachycardia in the VF zone and he received one shock, the patient is amnestic to this shock suggesting LOC. Recently on the day of admission the patient had lab work drawn that show a reduced CrCl. He is in agreement to present to the ER for and EKG and further evaluation, if his CrCl remains reduced he may need alteration in his Tikosyn dosing. He reports compliance with his medication.

## 2019-09-21 NOTE — H&P
cleansers    Social History:    reports that he quit smoking about 39 years ago. His smoking use included cigars. He quit after 2.00 years of use. He has never used smokeless tobacco. He reports that he drank alcohol. He reports that he does not use drugs. Family History:      Problem Relation Age of Onset    Alzheimer's Disease Mother     Diabetes Mother     No Known Problems Father         lives in [facility]    No Known Problems Brother     Other Brother         aneurysm behind his eye    Arrhythmia Brother          age 61       PHYSICAL EXAM:  Vitals:  /81   Pulse 80   Temp 97.6 °F (36.4 °C) (Oral)   Resp 16   Ht 5' 11\" (1.803 m)   Wt (!) 315 lb (142.9 kg)   SpO2 95%   BMI 43.93 kg/m²   General Appearance: well-developed and well nourished, in no acute distress and alert  Skin: warm and dry, no rash or erythema  Head: normocephalic and atraumatic  Eyes: extraocular eye movements intact and conjunctivae normal  Pulmonary/Chest: clear to auscultation bilaterally- no wheezes, rales or rhonchi, normal air movement, no respiratory distress  Cardiovascular: normal rate, normal S1 and S2, no murmurs, no gallops and no JVD  Abdomen: soft, non-tender, non-distended, normal bowel sounds  Extremities: no cyanosis, no clubbing and no edema      LABS:  Recent Labs     19  0920 19  1402    133   K 3.6 3.8   CL 95* 92*   CO2 27 26   BUN 62* 56*   CREATININE 2.0* 1.8*   GLUCOSE 266* 391*   CALCIUM 9.6 9.4       Recent Labs     19  1402   WBC 5.8   RBC 4.96   HGB 15.3   HCT 45.6   MCV 91.9   MCH 30.8   MCHC 33.6   RDW 14.4      MPV 13.1*       No results for input(s): POCGLU in the last 72 hours.      Radiology: Xr Chest Standard (2 Vw)    Result Date: 2019  Patient MRN: [id number] : 1950 Age:  81 years Gender: Male Order Date: 2019 2:00 PM Exam: XR CHEST (2 VW) Number of Images: 2 views Indication:   Aicd discharge, CP Aicd discharge, CP Comparison: Prior study from 09/03/2019 is available Findings: Examination of the chest in PA and lateral views shows that both lungs are free of active disease. The heart is enlarged with left ventricular contour. There is a left-sided AICD with lead in right atrium and right ventricle. .. The trachea is in the midline. The mediastinum and both hemidiaphragms are normal. The bony thorax is intact. NO ACUTE CARDIOPULMONARY PROCESS Cardiomegaly which appears to be stable and unchanged from prior study       EKG: None Completed in ED     ASSESSMENT:      Active Problems:    AICD discharge  Resolved Problems:    * No resolved hospital problems. *      PLAN:    1. AICD firing  -Patient was evaluated by cardiology in the ED, they recommend the patient be transferred downtown to be evaluated by electrophysiology. 2. Elevated troponin  -Seems to be a chronic issue for patient. 3. HFrEF  -No acute exacerbation noted at this time. 4. Chest discomfort  -Seems to be consistent with muscle pain after firing of AICD    5. Hypertension  -Stable    6. Hyperlipidemia  -Patient taking home medications    7. CAD   8. Diabetes mellitus    Code Status: Full code      Electronically signed by Bakari Lopez PA-C on 9/21/2019 at 3:34 PM      NOTE: This report was transcribed using voice recognition software. Every effort was made to ensure accuracy; however, inadvertent computerized transcription errors may be present.

## 2019-09-22 PROBLEM — E66.01 CLASS 3 SEVERE OBESITY DUE TO EXCESS CALORIES WITH SERIOUS COMORBIDITY AND BODY MASS INDEX (BMI) OF 40.0 TO 44.9 IN ADULT (HCC): Status: ACTIVE | Noted: 2019-09-22

## 2019-09-22 PROBLEM — N18.30 CKD (CHRONIC KIDNEY DISEASE) STAGE 3, GFR 30-59 ML/MIN (HCC): Status: ACTIVE | Noted: 2019-09-18

## 2019-09-22 LAB
ANION GAP SERPL CALCULATED.3IONS-SCNC: 12 MMOL/L (ref 7–16)
BASOPHILS ABSOLUTE: 0.03 E9/L (ref 0–0.2)
BASOPHILS RELATIVE PERCENT: 0.4 % (ref 0–2)
BUN BLDV-MCNC: 55 MG/DL (ref 8–23)
CALCIUM SERPL-MCNC: 9.2 MG/DL (ref 8.6–10.2)
CHLORIDE BLD-SCNC: 97 MMOL/L (ref 98–107)
CO2: 30 MMOL/L (ref 22–29)
CREAT SERPL-MCNC: 1.9 MG/DL (ref 0.7–1.2)
CREATININE URINE: 58 MG/DL (ref 40–278)
EKG ATRIAL RATE: 76 BPM
EKG Q-T INTERVAL: 496 MS
EKG QRS DURATION: 200 MS
EKG QTC CALCULATION (BAZETT): 572 MS
EKG R AXIS: 176 DEGREES
EKG T AXIS: -25 DEGREES
EKG VENTRICULAR RATE: 80 BPM
EOSINOPHILS ABSOLUTE: 0.26 E9/L (ref 0.05–0.5)
EOSINOPHILS RELATIVE PERCENT: 3.9 % (ref 0–6)
GFR AFRICAN AMERICAN: 43
GFR NON-AFRICAN AMERICAN: 43 ML/MIN/1.73
GLUCOSE BLD-MCNC: 114 MG/DL (ref 74–99)
HCT VFR BLD CALC: 43.7 % (ref 37–54)
HEMOGLOBIN: 14.4 G/DL (ref 12.5–16.5)
IMMATURE GRANULOCYTES #: 0.03 E9/L
IMMATURE GRANULOCYTES %: 0.4 % (ref 0–5)
LYMPHOCYTES ABSOLUTE: 1.96 E9/L (ref 1.5–4)
LYMPHOCYTES RELATIVE PERCENT: 29.4 % (ref 20–42)
MAGNESIUM: 1.7 MG/DL (ref 1.6–2.6)
MCH RBC QN AUTO: 30.2 PG (ref 26–35)
MCHC RBC AUTO-ENTMCNC: 33 % (ref 32–34.5)
MCV RBC AUTO: 91.6 FL (ref 80–99.9)
METER GLUCOSE: 293 MG/DL (ref 74–99)
METER GLUCOSE: 310 MG/DL (ref 74–99)
METER GLUCOSE: 378 MG/DL (ref 74–99)
METER GLUCOSE: 88 MG/DL (ref 74–99)
MICROALBUMIN UR-MCNC: <12 MG/L
MICROALBUMIN/CREAT UR-RTO: ABNORMAL (ref 0–30)
MONOCYTES ABSOLUTE: 0.83 E9/L (ref 0.1–0.95)
MONOCYTES RELATIVE PERCENT: 12.4 % (ref 2–12)
NEUTROPHILS ABSOLUTE: 3.56 E9/L (ref 1.8–7.3)
NEUTROPHILS RELATIVE PERCENT: 53.5 % (ref 43–80)
PDW BLD-RTO: 14.2 FL (ref 11.5–15)
PLATELET # BLD: 151 E9/L (ref 130–450)
PMV BLD AUTO: 12.9 FL (ref 7–12)
POTASSIUM REFLEX MAGNESIUM: 3.5 MMOL/L (ref 3.5–5)
PROTEIN PROTEIN: 4 MG/DL (ref 0–12)
PROTEIN/CREAT RATIO: 0.1
PROTEIN/CREAT RATIO: 0.1 (ref 0–0.2)
RBC # BLD: 4.77 E12/L (ref 3.8–5.8)
SODIUM BLD-SCNC: 139 MMOL/L (ref 132–146)
TROPONIN: 0.06 NG/ML (ref 0–0.03)
WBC # BLD: 6.7 E9/L (ref 4.5–11.5)

## 2019-09-22 PROCEDURE — 36415 COLL VENOUS BLD VENIPUNCTURE: CPT

## 2019-09-22 PROCEDURE — 82044 UR ALBUMIN SEMIQUANTITATIVE: CPT

## 2019-09-22 PROCEDURE — 93284 PRGRMG EVAL IMPLANTABLE DFB: CPT | Performed by: INTERNAL MEDICINE

## 2019-09-22 PROCEDURE — 99223 1ST HOSP IP/OBS HIGH 75: CPT | Performed by: INTERNAL MEDICINE

## 2019-09-22 PROCEDURE — 80048 BASIC METABOLIC PNL TOTAL CA: CPT

## 2019-09-22 PROCEDURE — 96366 THER/PROPH/DIAG IV INF ADDON: CPT

## 2019-09-22 PROCEDURE — 96365 THER/PROPH/DIAG IV INF INIT: CPT

## 2019-09-22 PROCEDURE — 6370000000 HC RX 637 (ALT 250 FOR IP): Performed by: INTERNAL MEDICINE

## 2019-09-22 PROCEDURE — 82570 ASSAY OF URINE CREATININE: CPT

## 2019-09-22 PROCEDURE — 6370000000 HC RX 637 (ALT 250 FOR IP): Performed by: NURSE PRACTITIONER

## 2019-09-22 PROCEDURE — 6370000000 HC RX 637 (ALT 250 FOR IP): Performed by: HOSPITALIST

## 2019-09-22 PROCEDURE — 93010 ELECTROCARDIOGRAM REPORT: CPT | Performed by: INTERNAL MEDICINE

## 2019-09-22 PROCEDURE — 93005 ELECTROCARDIOGRAM TRACING: CPT | Performed by: NURSE PRACTITIONER

## 2019-09-22 PROCEDURE — 85025 COMPLETE CBC W/AUTO DIFF WBC: CPT

## 2019-09-22 PROCEDURE — G0378 HOSPITAL OBSERVATION PER HR: HCPCS

## 2019-09-22 PROCEDURE — 84484 ASSAY OF TROPONIN QUANT: CPT

## 2019-09-22 PROCEDURE — 84156 ASSAY OF PROTEIN URINE: CPT

## 2019-09-22 PROCEDURE — 6360000002 HC RX W HCPCS: Performed by: NURSE PRACTITIONER

## 2019-09-22 PROCEDURE — 82962 GLUCOSE BLOOD TEST: CPT

## 2019-09-22 PROCEDURE — 93005 ELECTROCARDIOGRAM TRACING: CPT | Performed by: INTERNAL MEDICINE

## 2019-09-22 PROCEDURE — 2580000003 HC RX 258: Performed by: HOSPITALIST

## 2019-09-22 PROCEDURE — 83735 ASSAY OF MAGNESIUM: CPT

## 2019-09-22 RX ORDER — DOFETILIDE 0.12 MG/1
125 CAPSULE ORAL EVERY 12 HOURS SCHEDULED
Status: DISCONTINUED | OUTPATIENT
Start: 2019-09-22 | End: 2019-09-23

## 2019-09-22 RX ORDER — MAGNESIUM SULFATE IN WATER 40 MG/ML
2 INJECTION, SOLUTION INTRAVENOUS ONCE
Status: COMPLETED | OUTPATIENT
Start: 2019-09-22 | End: 2019-09-22

## 2019-09-22 RX ORDER — POTASSIUM CHLORIDE 20 MEQ/1
20 TABLET, EXTENDED RELEASE ORAL ONCE
Status: COMPLETED | OUTPATIENT
Start: 2019-09-22 | End: 2019-09-22

## 2019-09-22 RX ADMIN — ALLOPURINOL 200 MG: 100 TABLET ORAL at 20:38

## 2019-09-22 RX ADMIN — INSULIN LISPRO 15 UNITS: 100 INJECTION, SOLUTION INTRAVENOUS; SUBCUTANEOUS at 11:21

## 2019-09-22 RX ADMIN — INSULIN LISPRO 9 UNITS: 100 INJECTION, SOLUTION INTRAVENOUS; SUBCUTANEOUS at 16:42

## 2019-09-22 RX ADMIN — METOLAZONE 5 MG: 5 TABLET ORAL at 09:35

## 2019-09-22 RX ADMIN — GABAPENTIN 100 MG: 100 CAPSULE ORAL at 09:32

## 2019-09-22 RX ADMIN — BUMETANIDE 2 MG: 1 TABLET ORAL at 09:34

## 2019-09-22 RX ADMIN — VILAZODONE HYDROCHLORIDE 10 MG: 10 TABLET ORAL at 09:32

## 2019-09-22 RX ADMIN — DOFETILIDE 125 MCG: 0.12 CAPSULE ORAL at 18:14

## 2019-09-22 RX ADMIN — POTASSIUM CHLORIDE 20 MEQ: 20 TABLET, EXTENDED RELEASE ORAL at 09:38

## 2019-09-22 RX ADMIN — DIGOXIN 125 MCG: 125 TABLET ORAL at 09:33

## 2019-09-22 RX ADMIN — ISOSORBIDE MONONITRATE 30 MG: 30 TABLET, EXTENDED RELEASE ORAL at 09:31

## 2019-09-22 RX ADMIN — Medication 400 MG: at 09:34

## 2019-09-22 RX ADMIN — FAMOTIDINE 20 MG: 20 TABLET ORAL at 09:31

## 2019-09-22 RX ADMIN — Medication 10 ML: at 09:38

## 2019-09-22 RX ADMIN — GABAPENTIN 100 MG: 100 CAPSULE ORAL at 20:39

## 2019-09-22 RX ADMIN — INSULIN LISPRO 6 UNITS: 100 INJECTION, SOLUTION INTRAVENOUS; SUBCUTANEOUS at 20:37

## 2019-09-22 RX ADMIN — APIXABAN 5 MG: 5 TABLET, FILM COATED ORAL at 20:38

## 2019-09-22 RX ADMIN — BUSPIRONE HYDROCHLORIDE 30 MG: 10 TABLET ORAL at 09:33

## 2019-09-22 RX ADMIN — MAGNESIUM SULFATE 2 G: 2 INJECTION INTRAVENOUS at 14:35

## 2019-09-22 RX ADMIN — BUMETANIDE 2 MG: 1 TABLET ORAL at 16:43

## 2019-09-22 RX ADMIN — GABAPENTIN 100 MG: 100 CAPSULE ORAL at 13:49

## 2019-09-22 RX ADMIN — Medication 10 ML: at 20:39

## 2019-09-22 RX ADMIN — SPIRONOLACTONE 25 MG: 25 TABLET ORAL at 09:33

## 2019-09-22 RX ADMIN — METOPROLOL SUCCINATE 100 MG: 100 TABLET, EXTENDED RELEASE ORAL at 20:38

## 2019-09-22 RX ADMIN — ATORVASTATIN CALCIUM 80 MG: 40 TABLET, FILM COATED ORAL at 09:31

## 2019-09-22 RX ADMIN — ALLOPURINOL 200 MG: 100 TABLET ORAL at 09:34

## 2019-09-22 RX ADMIN — APIXABAN 5 MG: 5 TABLET, FILM COATED ORAL at 09:32

## 2019-09-22 ASSESSMENT — PAIN SCALES - GENERAL
PAINLEVEL_OUTOF10: 0

## 2019-09-22 NOTE — H&P
Past Surgical History:          Procedure Laterality Date    BACK SURGERY      CARDIAC CATHETERIZATION  11/21/2011    Dr. Mary Grigsby  08/20/2014    BiV/ICD  (Jihan Else)    Dr. Christiana Almeida CATH LAB PROCEDURE      ECHO COMPL W DOP COLOR FLOW  11/19/2011         ECHO COMPLETE  12/14/2013         EYE SURGERY Right 08/2019    FEMUR SURGERY      rt; compound frx right femur at 3years old.  JOINT REPLACEMENT Left     knee    KNEE SURGERY      7 on right/1 on left/ total replacement on R       Medications Prior to Admission:      Prior to Admission medications    Medication Sig Start Date End Date Taking? Authorizing Provider   atorvastatin (LIPITOR) 80 MG tablet Take 80 mg by mouth daily   Yes Historical Provider, MD   CPAP Machine MISC by Does not apply route nightly   Yes Historical Provider, MD   hydrALAZINE (APRESOLINE) 100 MG tablet Take 0.5 tablets by mouth every 8 hours 9/12/19  Yes PILAR Vargas CNP   losartan (COZAAR) 50 MG tablet Take 1 tablet by mouth daily 9/12/19  Yes PILAR Vargas CNP   potassium chloride (KLOR-CON M) 20 MEQ extended release tablet Take 1 tablet by mouth daily as needed (only on the days that you take an extra bumex) 9/12/19  Yes PILAR Vargas CNP   bumetanide (BUMEX) 2 MG tablet Take 1 tablet by mouth 2 times daily 9/12/19  Yes PILAR Vargas CNP   metoprolol succinate (TOPROL XL) 100 MG extended release tablet TAKE 1 TABLET TWICE A DAY 8/20/19  Yes Dorina Baron DO   metolazone (ZAROXOLYN) 5 MG tablet Take 1 tablet by mouth daily Indications: States takes as needed for weight gain.  7/1/19  Yes Star Dickey MD   dofetilide (TIKOSYN) 250 MCG capsule TAKE 1 CAPSULE TWICE A DAY 6/10/19  Yes Dorina Baron DO   ELIQUIS 5 MG TABS tablet TAKE 1 TABLET TWICE A DAY 10/22/18  Yes Dorina Baron DO   VILAZODONE HCL 10 MG Tablet TAKE ONE TABLET BY MOUTH EVERY DAY 9/14/17  Yes Historical Provider, MD

## 2019-09-22 NOTE — CONSULTS
aldactone, bumex, digoxin, Cozaar   - EF: 10-15%--4/2016  - Echocardiogram 2/6/19: LVEF 25%  - Recent hospitalization for acute on chronic 9/3 to 9/9   - Recent up titration of Bumex and Zaroxolyn     3. CRT-D in situ  A). ICD generator is a [employer], model #: [id number], serial #: [id number]. DOI: 8/20/14   B). Normal function    4. AVNRT  A). S/P SVT ablation 10/16/14: 1. Atrial Function: Abnormal with inducible atrial fibrillation/flutter. 2. AV Yue Function: Abnormal with inducible SVT consistent with AVNRT. Successful slow pathway modification without recurrent SVT. 5. Persistent AF   - [id number] score: 4  - Eliquis  - Tikosyn 250 mcg BID at home   - Estimated Creatinine Clearance: 40mL/min (A) (based on SCr of 1.9 mg/dL (H)). - QTc is slightly prolonged    - Resume Tikosyn at reduced dosing  125mcg BIDdue to STEFF and reduced creatine clearance   - Previously paroxysmal now persistent  (9/22/19)    6. STEFF   - Recent diuretic change    - baseline creat. 1.3 now up to 2.0-1.9    7. History of hypertension now hypotensive     8. DM   Lab Results   Component Value Date    LABA1C 8.4 (H) 09/04/2019     No results found for: EAG    9. SNEHA   - CPAP at home     10. Gout     11. Depression   -Vilazodone  - Buspirone     12. False positive stress test in past     13. Elevated Tropoin   - No rise and fall pattern    - Elevated previous   - In setting of STEFF       Recommendations:    1. Stress to rule out ischemia   2. EKG this AM and at noon to assess QTc   3. Resume Tikosyn at reduced dose 125mcg BID due to STEFF today at 1900 with EKG 2 hours after   4. Replace K   5. Continue Toprol 100mg BID and make holding parameters SBP less than 95mmg     I have spent a total of 110 minutes with the patient and the family reviewing the above stated recommendations.   And a total of >50% of that time involved face-to-face time providing counseling and or coordination of care with the noted that the patient developed AF with RVR and then VT required 30 J shock of ICD which successfully terminated the rhythm back to NSR with recurrent AF. His device interrogation today shows that he had been in AF since 9/7/19. As a result of this shock he was instructed to go to the ER for lab work and an EKG. EKG showed that his QTc was prolonged at 577ms and Tikosyn was discontinued. He was transferred to [facility] for further management. The patient denies any chest pain, dyspnea, palpitations, orthopnea or paroxysmal nocturnal dyspnea. Cardiac electrophysiology service is consulted for ICD discharge. ROS:   Constitutional: Negative for fever. Positive for activity change and negative for appetite change. HENT: Negative for epistaxis. Eyes: Negative for diploplia, blurred vision. Respiratory: Negative for cough, chest tightness, shortness of breath and wheezing. Cardiovascular: pertinent positives in HPI  Gastrointestinal: Negative for abdominal pain and blood in stool. All other review of systems are negative     PHYSICAL EXAM:   Vitals:    09/22/19 0137 09/22/19 0525 09/22/19 0531 09/22/19 0745   BP: 101/78  110/80 100/62   Pulse: 80 79  81   Resp: 18   18   Temp: 97 °F (36.1 °C)   98.6 °F (37 °C)   TempSrc: Temporal   Oral   SpO2: 94%      Weight: (!) 317 lb 3 oz (143.9 kg)      Height:          Constitutional: Well-developed, no acute distress  Eyes: conjunctivae normal, no xanthelasma   Ears, Nose, Throat: oral mucosa moist, no cyanosis   CV: no JVD. Regular rate and rhythm. Normal S1S2 and no S3. No murmurs, rubs, or gallops. PMI is nondisplaced  Lungs: clear to auscultation bilaterally, normal respiratory effort without used of accessory muscles  Abdomen: soft, non-tender, bowel sounds present, no masses or hepatomegaly   Musculoskeletal: no digital clubbing, trace edema of LEs  Skin: warm, no rashes   CRT-D: Well healed.  No erosion, infection or migration    EKG 9/22/19: atrial flutter with Bi FS:14.3 %    ml   LV PW           LV Volume Systolic: 880.8 ml   Diastolic: 1.1  LV EDV/LV EDV Index: 203.8   cm              ml/77 ml/m^2LV ESV/LV ESV    RV Diastolic Dimension: 4.7   LV PW Systolic: Index: 801.1 OF/41ZV/ m^2    cm   1.3 cm          EF Calculated: 31.8 %   Septum          LV Mass Index: 115 l/min*m^2 LA/Aorta: 8.57   Diastolic: 1.1  LV Length: 10.3 cm           Ascending Aorta: 3.5 cm   cm                                           LA volume/Index: 110.3 ml   Septum          LVOT: 2.1 cm                 /55EP/Z^1   Systolic: 1.4                                RA Area: 34.5 cm^2   cm   CO: 2.88 l/min   CI: 1.09   l/m*m^2   LV Mass: 303.51   g     Doppler Measurements & Calculations      MV Peak E-Wave: 1.1 m/s    AV Peak Velocity:    LVOT Peak Velocity: 0.66   MV Peak A-Wave: 0.33 m/s   1.27 m/s             m/s   MV E/A Ratio: 3.32         AV Peak Gradient:    LVOT Mean Velocity: 0.42   MV Peak Gradient: 6.6 mmHg 6.43 mmHg            m/s   MV Mean Gradient: 1.9 mmHg AV Mean Velocity:    LVOT Peak Gradient: 1.8   MV Mean Velocity: 0.63 m/s 0.83 m/s             mmHgLVOT Mean Gradient:   MV Deceleration Time: 111  AV Mean Gradient:    0.9 mmHg   msec                       3.2 mmHg             Estimated RVSP: 54.8 mmHg   MV P1/2t: 48.9 msec        AV VTI: 19.9 cm      Estimated RAP:8 mmHg   MVA by PHT:4.5 cm^2        AV Area   MV Area (continuity): 1.3  (Continuity):2.04   cm^2                       cm^2                 TR Velocity:3.42 m/s   MV E' Septal Velocity:                          TR Gradient:46.81 mmHg   0.04 m/s                   LVOT VTI: 11.7 cm    PV Peak Velocity: 0.62 m/s   MV E' Lateral Velocity: 5                       PV Peak Gradient: 1.55   m/s                        Estimated PASP:      mmHg   MR Velocity: 4.71 m/s      54.81 mmHg           PV Mean Velocity: 0.39 m/s   MV TERESSA PISA: 0.27 cm^2                          PV Mean Gradient: 0.7 mmHg   MR VTI: 125.5 cm   Alias Velocity: 0.21   m/sPISA Radius: 1 cm      PISA area: 6.28 cm^2MR   flow rate: 128.74 ml/sMR   volume:33.88 ml     [email or web address]."[employer][email or web address]? DocKey=[id number]    Cardiac Catheterization:  Reported negative LHC in 2009 however no records are available     Stress Test 11/18/11:   Indication- 73-year-old male with the history of diabetes,   hypertension, hyperlipidemia, and supraventricular tachycardia. A   previous similar study on November 24, 2009 revealed a large reversible   anterolateral myocardial perfusion defect with possible inferior wall   extension, mild hypokinesis, and estimated ejection fraction of 40%. CTA of the chest on [place] 10, 2011 demonstrated diffuse calcification of   the left anterior descending coronary artery and a complex  plaque of   the left anterior descending coronary artery could not be excluded. The   study was not designed to be a CT coronary angiogram.       Procedure- The patient underwent the LexiScan protocol with 0.4 mg of   LexiScan intravenously followed by saline flush with increase in the   heart rate from 84 beats per minute to 97 without ischemic changes.        The patient received 30 mCi of Tc-99m sestamibi  iv  for stress imaging   and 3 mCi of thallium-201 IV for resting imaging.       SPECT myocardial perfusion imaging was performed with assessment of   global left ventricular function and wall motion.  All of the data was   analyzed on the independent workstation.       Findings- The inferior wall was attenuated in both the stress and rest   imaging and cannot be fully assessed.       There is moderate  reduction of counts in a moderate  area of the   lateral wall in the short axis views that appears to be of less   magnitude on the resting imaging.       The left ventricle is severely dilated with global hypokinesis.    Left   ventricular end-systolic volume is 709 ml which is double 2. Persistent AF   - [id number] score: 4  - Eliquis  - Tikosyn 250 mcg BID at home   - Estimated Creatinine Clearance: 40mL/min (A) (based on SCr of 1.9 mg/dL (H)). - QTc is slightly prolonged    - Resume Tikosyn at reduced dosing  125mcg BIDdue to STEFF and reduced creatine clearance   - Previously paroxysmal now persistent  (9/22/19)    3. Nonischemic cardiomyopathy  - Negative LHC in 2009. - S/p CRT-D 8/20/14.  - LV EF: 10-15%--4/2016  - Echocardiogram 2/6/19: LVEF 25%  - On Imdur, Apresoline, Toprol XL, Aldactone, Bumex, Digoxin and Cozaar   - Recent HF hospitalization on 9/3 to 9/9/19   - Recent up titration of Bumex and Zaroxolyn   - Follows with Dr José Luis Blair    4. CRT-D in situ  - ICD generator is a [employer], model #: [id number], serial #: [id number]. DOI: 8/20/14   - Normal device function    5. AVNRT  - S/p slow pathway ablation of AVNRT on 10/16/14. 6. STEFF on CKD  - Possible from prerenal due to recent diuretic change   Estimated Creatinine Clearance: 55 mL/min (A) (based on SCr of 1.9 mg/dL (H)). 7. Hypertension   - Now hypotensive     8. Diabetes mellitus   Lab Results   Component Value Date    LABA1C 8.4 (H) 09/04/2019     No results found for: EAG    9. SNEHA   - CPAP at home     10. Gout     11. Depression   - On Vilazodone and Buspirone     Recommendations:    1. Continue Toprol  mg bid and discontinue Hydralazine and Nitrate for now due to hypotension. 2. Resume Tikosyn at reduced dose of 125mcg every 12 hours today evening and monitor QTc closely. 3. Continue Eliquis 5 mg bid. 4. Keep potassium > 4 and magnesium > 2.  5. Will schedule nuclear stress test to exclude ischemia. 6. Nephrology consult for management of STEFF on CKD. I have spent a total of 110 minutes with the patient and the family reviewing the above stated recommendations. And a total of >50% of that time involved face-to-face time providing counseling and or coordination of care with the other providers.     Thank you for

## 2019-09-23 ENCOUNTER — APPOINTMENT (OUTPATIENT)
Dept: NUCLEAR MEDICINE | Age: 69
DRG: 309 | End: 2019-09-23
Attending: INTERNAL MEDICINE
Payer: COMMERCIAL

## 2019-09-23 ENCOUNTER — APPOINTMENT (OUTPATIENT)
Dept: NON INVASIVE DIAGNOSTICS | Age: 69
DRG: 309 | End: 2019-09-23
Attending: INTERNAL MEDICINE
Payer: COMMERCIAL

## 2019-09-23 ENCOUNTER — APPOINTMENT (OUTPATIENT)
Dept: ULTRASOUND IMAGING | Age: 69
DRG: 309 | End: 2019-09-23
Attending: INTERNAL MEDICINE
Payer: COMMERCIAL

## 2019-09-23 ENCOUNTER — TELEPHONE (OUTPATIENT)
Dept: NON INVASIVE DIAGNOSTICS | Age: 69
End: 2019-09-23

## 2019-09-23 PROBLEM — I49.9 ARRHYTHMIA: Status: ACTIVE | Noted: 2019-09-23

## 2019-09-23 LAB
ANION GAP SERPL CALCULATED.3IONS-SCNC: 16 MMOL/L (ref 7–16)
BUN BLDV-MCNC: 51 MG/DL (ref 8–23)
CALCIUM SERPL-MCNC: 8.9 MG/DL (ref 8.6–10.2)
CHLORIDE BLD-SCNC: 90 MMOL/L (ref 98–107)
CHLORIDE URINE RANDOM: 27 MMOL/L
CO2: 25 MMOL/L (ref 22–29)
CREAT SERPL-MCNC: 1.8 MG/DL (ref 0.7–1.2)
CREATININE URINE: 165 MG/DL (ref 40–278)
EKG ATRIAL RATE: 250 BPM
EKG ATRIAL RATE: 78 BPM
EKG Q-T INTERVAL: 496 MS
EKG Q-T INTERVAL: 498 MS
EKG QRS DURATION: 196 MS
EKG QRS DURATION: 196 MS
EKG QTC CALCULATION (BAZETT): 572 MS
EKG QTC CALCULATION (BAZETT): 574 MS
EKG R AXIS: -177 DEGREES
EKG R AXIS: 175 DEGREES
EKG T AXIS: -13 DEGREES
EKG T AXIS: -6 DEGREES
EKG VENTRICULAR RATE: 80 BPM
EKG VENTRICULAR RATE: 80 BPM
GFR AFRICAN AMERICAN: 46
GFR NON-AFRICAN AMERICAN: 46 ML/MIN/1.73
GLUCOSE BLD-MCNC: 397 MG/DL (ref 74–99)
HCT VFR BLD CALC: 45 % (ref 37–54)
HEMOGLOBIN: 14.9 G/DL (ref 12.5–16.5)
LV EF: 14 %
LVEF MODALITY: NORMAL
MAGNESIUM: 1.7 MG/DL (ref 1.6–2.6)
MCH RBC QN AUTO: 30 PG (ref 26–35)
MCHC RBC AUTO-ENTMCNC: 33.1 % (ref 32–34.5)
MCV RBC AUTO: 90.7 FL (ref 80–99.9)
METER GLUCOSE: 301 MG/DL (ref 74–99)
METER GLUCOSE: 363 MG/DL (ref 74–99)
METER GLUCOSE: 389 MG/DL (ref 74–99)
METER GLUCOSE: 425 MG/DL (ref 74–99)
OSMOLALITY URINE: 390 MOSM/KG (ref 300–900)
PDW BLD-RTO: 14.2 FL (ref 11.5–15)
PLATELET # BLD: 155 E9/L (ref 130–450)
PMV BLD AUTO: 13.4 FL (ref 7–12)
POTASSIUM SERPL-SCNC: 4 MMOL/L (ref 3.5–5)
RBC # BLD: 4.96 E12/L (ref 3.8–5.8)
SODIUM BLD-SCNC: 131 MMOL/L (ref 132–146)
SODIUM URINE: 36 MMOL/L
WBC # BLD: 7.5 E9/L (ref 4.5–11.5)

## 2019-09-23 PROCEDURE — 6370000000 HC RX 637 (ALT 250 FOR IP): Performed by: NURSE PRACTITIONER

## 2019-09-23 PROCEDURE — 83735 ASSAY OF MAGNESIUM: CPT

## 2019-09-23 PROCEDURE — 6360000002 HC RX W HCPCS: Performed by: NURSE PRACTITIONER

## 2019-09-23 PROCEDURE — 36415 COLL VENOUS BLD VENIPUNCTURE: CPT

## 2019-09-23 PROCEDURE — 82436 ASSAY OF URINE CHLORIDE: CPT

## 2019-09-23 PROCEDURE — 83935 ASSAY OF URINE OSMOLALITY: CPT

## 2019-09-23 PROCEDURE — 6370000000 HC RX 637 (ALT 250 FOR IP): Performed by: INTERNAL MEDICINE

## 2019-09-23 PROCEDURE — 93017 CV STRESS TEST TRACING ONLY: CPT

## 2019-09-23 PROCEDURE — 85027 COMPLETE CBC AUTOMATED: CPT

## 2019-09-23 PROCEDURE — 99233 SBSQ HOSP IP/OBS HIGH 50: CPT | Performed by: INTERNAL MEDICINE

## 2019-09-23 PROCEDURE — 93018 CV STRESS TEST I&R ONLY: CPT | Performed by: INTERNAL MEDICINE

## 2019-09-23 PROCEDURE — 80048 BASIC METABOLIC PNL TOTAL CA: CPT

## 2019-09-23 PROCEDURE — 84300 ASSAY OF URINE SODIUM: CPT

## 2019-09-23 PROCEDURE — 2580000003 HC RX 258: Performed by: HOSPITALIST

## 2019-09-23 PROCEDURE — 3430000000 HC RX DIAGNOSTIC RADIOPHARMACEUTICAL: Performed by: RADIOLOGY

## 2019-09-23 PROCEDURE — 6370000000 HC RX 637 (ALT 250 FOR IP): Performed by: HOSPITALIST

## 2019-09-23 PROCEDURE — 94660 CPAP INITIATION&MGMT: CPT

## 2019-09-23 PROCEDURE — 82570 ASSAY OF URINE CREATININE: CPT

## 2019-09-23 PROCEDURE — 82962 GLUCOSE BLOOD TEST: CPT

## 2019-09-23 PROCEDURE — A9500 TC99M SESTAMIBI: HCPCS | Performed by: RADIOLOGY

## 2019-09-23 PROCEDURE — 2140000000 HC CCU INTERMEDIATE R&B

## 2019-09-23 PROCEDURE — 76770 US EXAM ABDO BACK WALL COMP: CPT

## 2019-09-23 PROCEDURE — 78452 HT MUSCLE IMAGE SPECT MULT: CPT

## 2019-09-23 PROCEDURE — 93005 ELECTROCARDIOGRAM TRACING: CPT | Performed by: NURSE PRACTITIONER

## 2019-09-23 RX ORDER — MAGNESIUM SULFATE IN WATER 40 MG/ML
2 INJECTION, SOLUTION INTRAVENOUS ONCE
Status: DISCONTINUED | OUTPATIENT
Start: 2019-09-23 | End: 2019-09-23 | Stop reason: SDUPTHER

## 2019-09-23 RX ORDER — MAGNESIUM SULFATE IN WATER 40 MG/ML
2 INJECTION, SOLUTION INTRAVENOUS ONCE
Status: COMPLETED | OUTPATIENT
Start: 2019-09-23 | End: 2019-09-23

## 2019-09-23 RX ADMIN — Medication 10 ML: at 17:59

## 2019-09-23 RX ADMIN — ATORVASTATIN CALCIUM 80 MG: 40 TABLET, FILM COATED ORAL at 08:47

## 2019-09-23 RX ADMIN — FAMOTIDINE 20 MG: 20 TABLET ORAL at 08:49

## 2019-09-23 RX ADMIN — METOPROLOL SUCCINATE 100 MG: 100 TABLET, EXTENDED RELEASE ORAL at 20:54

## 2019-09-23 RX ADMIN — Medication 400 MG: at 08:47

## 2019-09-23 RX ADMIN — BUMETANIDE 2 MG: 1 TABLET ORAL at 08:48

## 2019-09-23 RX ADMIN — Medication 35 MILLICURIE: at 11:19

## 2019-09-23 RX ADMIN — ALLOPURINOL 200 MG: 100 TABLET ORAL at 08:47

## 2019-09-23 RX ADMIN — DOFETILIDE 125 MCG: 0.12 CAPSULE ORAL at 06:01

## 2019-09-23 RX ADMIN — REGADENOSON 0.4 MG: 0.08 INJECTION, SOLUTION INTRAVENOUS at 11:12

## 2019-09-23 RX ADMIN — INSULIN LISPRO 7 UNITS: 100 INJECTION, SOLUTION INTRAVENOUS; SUBCUTANEOUS at 20:54

## 2019-09-23 RX ADMIN — METOPROLOL SUCCINATE 100 MG: 100 TABLET, EXTENDED RELEASE ORAL at 08:55

## 2019-09-23 RX ADMIN — ALLOPURINOL 200 MG: 100 TABLET ORAL at 20:53

## 2019-09-23 RX ADMIN — DIGOXIN 125 MCG: 125 TABLET ORAL at 08:49

## 2019-09-23 RX ADMIN — Medication 10 ML: at 08:50

## 2019-09-23 RX ADMIN — INSULIN LISPRO 18 UNITS: 100 INJECTION, SOLUTION INTRAVENOUS; SUBCUTANEOUS at 17:53

## 2019-09-23 RX ADMIN — VILAZODONE HYDROCHLORIDE 10 MG: 10 TABLET ORAL at 08:49

## 2019-09-23 RX ADMIN — APIXABAN 5 MG: 5 TABLET, FILM COATED ORAL at 20:53

## 2019-09-23 RX ADMIN — Medication 12 MILLICURIE: at 09:15

## 2019-09-23 RX ADMIN — LOSARTAN POTASSIUM 50 MG: 50 TABLET, FILM COATED ORAL at 08:48

## 2019-09-23 RX ADMIN — APIXABAN 5 MG: 5 TABLET, FILM COATED ORAL at 08:49

## 2019-09-23 RX ADMIN — GABAPENTIN 100 MG: 100 CAPSULE ORAL at 13:24

## 2019-09-23 RX ADMIN — GABAPENTIN 100 MG: 100 CAPSULE ORAL at 20:53

## 2019-09-23 RX ADMIN — GABAPENTIN 100 MG: 100 CAPSULE ORAL at 08:49

## 2019-09-23 RX ADMIN — INSULIN LISPRO 15 UNITS: 100 INJECTION, SOLUTION INTRAVENOUS; SUBCUTANEOUS at 14:31

## 2019-09-23 RX ADMIN — MAGNESIUM SULFATE 2 G: 2 INJECTION INTRAVENOUS at 17:53

## 2019-09-23 RX ADMIN — BUSPIRONE HYDROCHLORIDE 30 MG: 10 TABLET ORAL at 08:49

## 2019-09-23 ASSESSMENT — PAIN DESCRIPTION - PROGRESSION: CLINICAL_PROGRESSION: NOT CHANGED

## 2019-09-23 ASSESSMENT — PAIN DESCRIPTION - LOCATION
LOCATION: KNEE
LOCATION: KNEE

## 2019-09-23 ASSESSMENT — PAIN DESCRIPTION - FREQUENCY
FREQUENCY: CONTINUOUS
FREQUENCY: CONTINUOUS

## 2019-09-23 ASSESSMENT — PAIN SCALES - GENERAL
PAINLEVEL_OUTOF10: 7

## 2019-09-23 ASSESSMENT — PAIN DESCRIPTION - ORIENTATION
ORIENTATION: RIGHT;LEFT
ORIENTATION: RIGHT;LEFT

## 2019-09-23 ASSESSMENT — PAIN DESCRIPTION - ONSET
ONSET: ON-GOING
ONSET: ON-GOING

## 2019-09-23 ASSESSMENT — PAIN DESCRIPTION - PAIN TYPE
TYPE: CHRONIC PAIN
TYPE: CHRONIC PAIN

## 2019-09-23 ASSESSMENT — PAIN DESCRIPTION - DESCRIPTORS
DESCRIPTORS: CONSTANT;ACHING;RADIATING
DESCRIPTORS: CONSTANT;ACHING;RADIATING

## 2019-09-23 NOTE — CARE COORDINATION
Care Coordination:  Visited patient to discuss transition of care upon discharge. Lives at home with wife. She works at [employer] and is currently out of town. Brother will be available for transport upon discharge. Active with [facility] for skilled nursing and also active with Telehealth services via [place]. Has Bipap from [place], no other medical equipment other than a moist heating pad. Follow[facility] at [facility] and [facility]. Also follows at [facility] practice clinic . [facility]. Does not anticipate any home going needs. BOBY raza notified of admission, spoke to Pierrette Fabry at [facility].   CM to contact me for further information    Orion Tobias

## 2019-09-23 NOTE — PROGRESS NOTES
ADVANCED CARE PLANNING    Patient Name: Temo Ziegler       YOB: 1950              MRN:    [id number]  Admission Date:  9/21/2019  7:16 PM    Active Diagnoses:  Principal Problem:    Atrial fibrillation ([facility]  Active Problems:    DM (diabetes mellitus), type 2, uncontrolled ([facility]    HTN (hypertension), benign    Hyperlipidemia    SNEHA (obstructive sleep apnea)    Gout    Chronic systolic heart failure (HCC)    CKD (chronic kidney disease) stage 3, GFR 30-59 ml/min (HCC)    AICD discharge    Class 3 severe obesity due to excess calories with serious comorbidity and body mass index (BMI) of 40.0 to 44.9 in [facility])    Arrhythmia  Resolved Problems:    * No resolved hospital problems. *      These active diagnoses are of sufficient risk that focused discussion on advanced care planning is indicated in order to allow the patient to thoughtfully consider personal goals of care; and, if situations arise that prevent the patient to personally give input, to ensure appropriate representation of their personal desire for different levels and levels of care. Persons present in discussion: Ricky Antoine MD,     Discussion: I reviewed his admission for afib and his desires for ongoing aggressive care, including potential intubation and mechanical ventilation. I presented and explained the availability of our palliative care team to him, including the availability of advanced directives forms which he can review with his family and then fill out if they so wish. He wishes to be FULL code. Time Spent on Advanced Planning Documents: 30 minutes    Electronically signed by Pepito Naidu MD on 9/23/2019 at 5:40 PM    NOTE: This report was transcribed using voice recognition software. Every effort was made to ensure accuracy; however, inadvertent computerized transcription errors may be present.

## 2019-09-23 NOTE — CONSULTS
[facility] Roberto A. Jerrol Rima, MD Marisela Provencal, MD Milly Romberg, MD Heath Navy, DO, JA PATIENT’S [facility] OF NICKY GAMEZ MD .  Consultation  Patient's Name: Christina Ellis  8:39 PM  9/22/2019    Nephrologist: Olga Chan MD    Reason for Consult:  CKD III  Requesting Physician:  Kimberly Celeste MD    Chief Complaint:  ICD firing     History Obtained From:  Patient , records     History of Present Ilness:      80 yo morbidly obese male hx PAF, AICD, CHF CAD DM GOUT HLP HTN came to ER for AICD firing, felt diaphoretic, he then sat down at which point his AICD discharged. He was evaluated today at EP office, where read on AICD indicates patient had a run of atrial fibrillation with RVR prior to discharge. Pt baseline cr 1.1-1.3 till beginging of September when he was hospitlized for acute /chronic chf and was d/c with cr of 1.9   Pt currently on losartan 50 mg daily , bumex 2 mg bid , aldacton 25 mg daily and metolazone 5 mg daily   He clinically look euvolemic with no LE edema and clear chest on exam .   He reports no hematuria .    Last TTE with EF of 25 % and RSVP of 55        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 tachycardia) ([address]     CAD (coronary artery disease)     CHF (congestive heart failure) ([address]     Diabetes mellitus ([address]     Diabetic neuropathy ([address]     Diastolic dysfunction 84/10/7516    stage 3    Fall     right knee    Gout     chemically induced    History of blood transfusion     Hx of blood clots 1976    left leg    Hyperlipidemia     Hypertension     LBBB (left bundle branch block)     Moderate mitral regurgitation 04/19/2016    Nonischemic cardiomyopathy ([address]     Obesity     SNEHA (obstructive sleep apnea)     treated with CPAP    Severe tricuspid regurgitation 04/19/2016    SVT (supraventricular tachycardia) (HCC)     Viral cardiomyopathy (HCC)        Past Surgical History:   Procedure Laterality Date    BACK SURGERY      CARDIAC CATHETERIZATION  2011    Dr. Nely Zelaya  2014    BiV/ICD  ([facility])    Dr. Terri Pak CATH LAB PROCEDURE      ECHO COMPL W DOP COLOR FLOW  2011         ECHO COMPLETE  2013         EYE SURGERY Right 2019    FEMUR SURGERY      rt; compound frx right femur at 3years old.  JOINT REPLACEMENT Left     knee    KNEE SURGERY      7 on right/1 on left/ total replacement on R       Family History   Problem Relation Age of Onset    Alzheimer's Disease Mother     Diabetes Mother     No Known Problems Father         lives in [facility]    No Known Problems Brother     Other Brother         aneurysm behind his eye    Arrhythmia Brother          age 61        reports that he quit smoking about 39 years ago. His smoking use included cigars. He quit after 2.00 years of use. He has never used smokeless tobacco. He reports that he drank alcohol. He reports that he does not use drugs.     Allergies:  Food and Soap & cleansers    Current Medications:      [START ON 2019] regadenoson (LEXISCAN) injection 0.4 mg ONCE PRN   insulin lispro (HUMALOG) injection vial 0-18 Units TID WC   insulin lispro (HUMALOG) injection vial 0-9 Units Nightly   dofetilide (TIKOSYN) capsule 125 mcg 2 times per day   allopurinol (ZYLOPRIM) tablet 200 mg BID   atorvastatin (LIPITOR) tablet 80 mg Daily   bumetanide (BUMEX) tablet 2 mg BID   busPIRone (BUSPAR) tablet 30 mg Daily   digoxin (LANOXIN) tablet 125 mcg Daily   apixaban (ELIQUIS) tablet 5 mg BID   magnesium oxide (MAG-OX) tablet 400 mg Daily   losartan (COZAAR) tablet 50 mg Daily   metOLazone (ZAROXOLYN) tablet 5 mg Daily   metoprolol succinate (TOPROL XL) extended release tablet 100 mg BID   potassium chloride (KLOR-CON M) extended release tablet 20 mEq Daily PRN   spironolactone (ALDACTONE) tablet 25 mg Daily   vilazodone HCl (VIIBRYD)

## 2019-09-23 NOTE — PROGRESS NOTES
SVT ablation 10/16/14: 1. Atrial Function: Abnormal with inducible atrial fibrillation/flutter. 2. AV Yue Function: Abnormal with inducible SVT consistent with AVNRT. Successful slow pathway modification without recurrent SVT.  DM (diabetes mellitus), type 2, uncontrolled ([facility] 11/17/2011     Priority: Medium    HTN (hypertension), benign 11/17/2011     Priority: Medium    Gout 12/19/2016     Priority: Low    Hyperlipidemia 11/17/2011     Priority: Low    SNEHA (obstructive sleep apnea) 11/17/2011     Priority: Low    Depression 11/17/2011     Priority: Low    Arrhythmia 09/23/2019    Class 3 severe obesity due to excess calories with serious comorbidity and body mass index (BMI) of 40.0 to 44.9 in adult [facility]) 09/22/2019    AICD discharge 09/21/2019    LBBB (left bundle branch block) 09/18/2019    CKD (chronic kidney disease) stage 3, GFR 30-59 ml/min ([facility] 09/18/2019    Non-rheumatic mitral regurgitation 09/18/2019    Essential hypertension 09/18/2019    CHF (congestive heart failure), NYHA class III, acute on chronic, systolic ([facility] [facility] 87/00/8261    Chronic anticoagulation     Acute on chronic combined systolic and diastolic congestive heart failure ([address] 02/07/2019    Acute on chronic congestive heart failure (HCC)     Chronic systolic heart failure (HCC) 12/08/2015    PAF (paroxysmal atrial fibrillation) ([facility] 12/08/2015    Atrial fibrillation ([facility] 11/18/2014    Biventricular implantable cardioverter-defibrillator in situ 09/05/2014     Overview Note:     A). ICD generator is a SJM, model #: [id number], serial #: [id number]. DOI: 8/20/14   B). The right atrial lead is a SJM model #: [id number], serial #: [id number]. C). The RV ICD lead is a SJM model #: [id number], serial #: [id number]. D). The LV lead is a SJM model #: [id number], serial #: [id number].         Chest pain 11/17/2011       Past Medical History:   Diagnosis Date    Anxiety     Arthritis     Atrial fibrillation ([facility]     AVNRT (AV Oral Daily Chace Crouch MD   80 mg at 09/23/19 0847    bumetanide (BUMEX) tablet 2 mg  2 mg Oral BID Chace Crouch MD   2 mg at 09/23/19 0848    busPIRone (BUSPAR) tablet 30 mg  30 mg Oral Daily Chace Crouch MD   30 mg at 09/23/19 0849    digoxin (LANOXIN) tablet 125 mcg  125 mcg Oral Daily Chace Crouch MD   125 mcg at 09/23/19 0849    apixaban (ELIQUIS) tablet 5 mg  5 mg Oral BID Chace Crouch MD   5 mg at 09/23/19 0849    magnesium oxide (MAG-OX) tablet 400 mg  400 mg Oral Daily Chace Crouch MD   400 mg at 09/23/19 0847    losartan (COZAAR) tablet 50 mg  50 mg Oral Daily Christopher Fam, APRN - CNP   50 mg at 09/23/19 0848    metOLazone (ZAROXOLYN) tablet 5 mg  5 mg Oral Daily Chace Crouch MD   5 mg at 09/22/19 0935    metoprolol succinate (TOPROL XL) extended release tablet 100 mg  100 mg Oral BID Christopher Fam, APRN - CNP   100 mg at 09/23/19 0855    potassium chloride (KLOR-CON M) extended release tablet 20 mEq  20 mEq Oral Daily PRN Dahlia Aceves MD        spironolactone (ALDACTONE) tablet 25 mg  25 mg Oral Daily Chace Crouch MD   25 mg at 09/22/19 0933    vilazodone HCl (VIIBRYD) TABS 10 mg  10 mg Oral Daily Chace Crouch MD   10 mg at 09/23/19 0849    gabapentin (NEURONTIN) capsule 100 mg  100 mg Oral TID Chace Crouch MD   100 mg at 09/23/19 1324    famotidine (PEPCID) tablet 20 mg  20 mg Oral Daily Chace Crouch MD   20 mg at 09/23/19 0849    sodium chloride flush 0.9 % injection 10 mL  10 mL Intravenous 2 times per day Chace Crouch MD   10 mL at 09/23/19 0850    sodium chloride flush 0.9 % injection 10 mL  10 mL Intravenous PRN Chace Crouch MD        magnesium hydroxide (MILK OF MAGNESIA) 400 MG/5ML suspension 30 mL  30 mL Oral Daily PRN Chace Crouch MD        glucose (GLUTOSE) 40 % oral gel 15 g  15 g Oral PRN Chace Crouch MD        dextrose 50 % IV solution  12.5 g Intravenous PRN Chace Crouch MD        glucagon (rDNA) injection 1 mg  1 mg stage III diastolic dysfunction.   Moderate mitral regurgitation is present.   No evidence of tricuspid regurgitation. Mild tricuspid regurgitation.  RVSP   is 55 mmHg.      Signature      ----------------------------------------------------------------   Electronically signed by Skylar Rios MD(Interpreting physician)  Regina Davis 02/07/2019 04:56 PM   ----------------------------------------------------------------     M-Mode/2D Measurements & Calculations      LV Diastolic    LV Systolic Dimension: 5.4   AV Cusp Separation: 1.5 cmLA   Dimension: 6.3  cm                           Dimension: 5.8 cmAO Root   cm              LV Volume Diastolic: 792.0   Dimension: 3.2 cm   LV FS:14.3 %    ml   LV PW           LV Volume Systolic: 051.9 ml   Diastolic: 1.1  LV EDV/LV EDV Index: 203.8   cm              ml/77 ml/m^2LV ESV/LV ESV    RV Diastolic Dimension: 4.7   LV PW Systolic: Index: 804.9 PI/68RW/ m^2    cm   1.3 cm          EF Calculated: 31.8 %   Septum          LV Mass Index: 115 l/min*m^2 LA/Aorta: 8.37   Diastolic: 1.1  LV Length: 10.3 cm           Ascending Aorta: 3.5 cm   cm                                           LA volume/Index: 110.3 ml   Septum          LVOT: 2.1 cm                 /50AQ/P^9   Systolic: 1.4                                RA Area: 34.5 cm^2   cm   CO: 2.88 l/min   CI: 1.09   l/m*m^2   LV Mass: 303.51   g     Doppler Measurements & Calculations      MV Peak E-Wave: 1.1 m/s    AV Peak Velocity:    LVOT Peak Velocity: 0.66   MV Peak A-Wave: 0.33 m/s   1.27 m/s             m/s   MV E/A Ratio: 3.32         AV Peak Gradient:    LVOT Mean Velocity: 0.42   MV Peak Gradient: 6.6 mmHg 6.43 mmHg            m/s   MV Mean Gradient: 1.9 mmHg AV Mean Velocity:    LVOT Peak Gradient: 1.8   MV Mean Velocity: 0.63 m/s 0.83 m/s             mmHgLVOT Mean Gradient:   MV Deceleration Time: 111  AV Mean Gradient:    0.9 mmHg   msec                       3.2 mmHg             Estimated RVSP: 54.8 mmHg   MV P1/2t: 48.9 msec        AV VTI: 19.9 cm      Estimated RAP:8 mmHg   MVA by PHT:4.5 cm^2        AV Area   MV Area (continuity): 1.3  (Continuity):2.04   cm^2                       cm^2                 TR Velocity:3.42 m/s   MV E' Septal Velocity:                          TR Gradient:46.81 mmHg   0.04 m/s                   LVOT VTI: 11.7 cm    PV Peak Velocity: 0.62 m/s   MV E' Lateral Velocity: 5                       PV Peak Gradient: 1.55   m/s                        Estimated PASP:      mmHg   MR Velocity: 4.71 m/s      54.81 mmHg           PV Mean Velocity: 0.39 m/s   MV TERESSA PISA: 0.27 cm^2                          PV Mean Gradient: 0.7 mmHg   MR VTI: 125.5 cm   Alias Velocity: 0.21   m/sPISA Radius: 1 cm      PISA area: 6.28 cm^2MR   flow rate: 128.74 ml/sMR   volume:33.88 ml     [email or web address] [id number]%[id number]    Cardiac Catheterization:  Reported negative LHC in 2009 however no records are available     Stress Test 11/18/11:   Indication- 61-year-old male with the history of diabetes,   hypertension, hyperlipidemia, and supraventricular tachycardia. A   previous similar study on November 24, 2009 revealed a large reversible   anterolateral myocardial perfusion defect with possible inferior wall   extension, mild hypokinesis, and estimated ejection fraction of 40%. CTA of the chest on [place] 10, 2011 demonstrated diffuse calcification of   the left anterior descending coronary artery and a complex  plaque of   the left anterior descending coronary artery could not be excluded.  The   study was not designed to be a CT coronary angiogram.       Procedure- The patient underwent the LexiScan protocol with 0.4 mg of   LexiScan intravenously followed by saline flush with increase in the   heart rate from 84 beats per minute to 97 without ischemic changes.        The patient received 30 mCi of Tc-99m sestamibi  iv  for stress imaging and 3 mCi of thallium-201 IV for resting imaging.       SPECT myocardial perfusion imaging was performed with assessment of   global left ventricular function and wall motion. All of the data was   analyzed on the independent workstation.       Findings- The inferior wall was attenuated in both the stress and rest   imaging and cannot be fully assessed.       There is moderate  reduction of counts in a moderate  area of the   lateral wall in the short axis views that appears to be of less   magnitude on the resting imaging.       The left ventricle is severely dilated with global hypokinesis.    Left   ventricular end-systolic volume is 739 ml which is double the upper   normal limit  and ejection fraction of the left ventricle was   overestimated by the computer. Visual ejection fraction is less than   30%.     Impression-       This patient has the same reversible lateral wall myocardial perfusion   defect that was described on November 24, 2009 accompanied by  severe   cardiomyopathy  and severe global systolic dysfunction. Ejection   fraction of the left ventricle is less than 30% and there is severe   diffuse global hypokinesis.  Echocardiography and MRI may provide more   information  on these parameters.        If there are any physician concerns regarding this report, a   Radiologist can be reached by calling the following number [phone].            - RXM         Read ByNaman Alfaro   Released By- Nataliia AVILA        Released Date Time- 11/18/11 1536    ------------------------------------------------------------------------------       Device Interrogation ( 9/22/19 )  Make/Model [employer]    Mode DDD 60/130  P wave: 1.0 mV  Impedance: 390 ohms   Threshold: AF   RV R wave: <12 mV  Impedance: 550 ohms   Threshold: 0.75 V @ 0.5 ms  Pacing: A: 1.3%  BV: 81%    Battery Voltage/Longevity:  2.3 years     Arrhythmias: AF with RVR to VF one shock (H) 09/04/2019     No results found for: EAG    9. SNEHA   - CPAP at home     10. Gout     11. Depression   -Vilazodone  - Buspirone     Recommendations:    1. Mr. Maren Singh remains in AF with appropriate BiV pacing. In review of his remote transmission, he has been in AF since July 2019. He also has been on Tikosyn as an OP. In review on his most recent stress, his EF is now 14%. Also, during this admission, his Tikosyn dose was reduced to 125 mcg BID 2/2 QT prolongation. Based on his EF and recent rapid ventricular tachycardia episode with resultant ICD shock, I would recommend discontinuing Tikosyn and after a 5-day wash-out, start amiodarone at 400 mg BID X 1 week, 400 mg QD for 1 week then 200 mg QD thereafter. Once he is loaded with amiodarone, then plan for CV. Will recheck baseline LFT's tomorrow and TFT's.    2. Continue HF management per Cardiology. Will ask Dr. Michael Zavaleta to see him from Advanced [facility]. 3. Please see orders. I have spent a total of 35 minutes with the patient and the family reviewing the above stated recommendations. A total of >50% of that time involved face-to-face time providing counseling and or coordination of care with the other providers. Thank you for allowing me to participate in your patient's care. Please call me if there are any questions or concerns. Edgardo Brito DO  [id number] [facility] Cardiac Electrophysiology  [address] [facility]    NOTE: This report was transcribed using voice recognition software.  Every effort was made to ensure accuracy; however, inadvertent computerized transcription errors may be present.    ______________________________________________________________________

## 2019-09-23 NOTE — PROGRESS NOTES
to auscultation, bilaterally without Rales/Wheezes/Rhonchi. Cardiovascular: Regular rate and irregular rhythm with normal S1/S2 without murmurs, rubs or gallops. Abdomen: Soft, non-tender, non-distended with normal bowel sounds. Musculoskeletal: No clubbing, cyanosis or edema bilaterally. Brisk capillary refill. 2+ lower extremity pulses (dorsalis pedis). Skin:  No rashes    Neurologic: awake, alert and following commands       Labs:   Recent Labs     09/21/19  1402 09/22/19  0315   WBC 5.8 6.7   HGB 15.3 14.4   HCT 45.6 43.7    151     Recent Labs     09/20/19  0920 09/21/19  1402 09/22/19  0315    133 139   K 3.6 3.8 3.5   CL 95* 92* 97*   CO2 27 26 30*   BUN 62* 56* 55*   CREATININE 2.0* 1.8* 1.9*   CALCIUM 9.6 9.4 9.2     Recent Labs     09/21/19  1402   AST 30   ALT 28   BILITOT 1.0   ALKPHOS 94     No results for input(s): INR in the last 72 hours.   Recent Labs     09/21/19  1402 09/21/19 2054 09/22/19  0315   TROPONINI 0.05* 0.06* 0.06*       Imaging:  NM Cardiac Stress Test Nuclear Imaging    (Results Pending)   US RETROPERITONEAL COMPLETE    (Results Pending)         Assessment/Plan:  Active Hospital Problems    Diagnosis Date Noted    DM (diabetes mellitus), type 2, uncontrolled ([facility] [E11.65] 11/17/2011     Priority: Medium    HTN (hypertension), benign [I10] 11/17/2011     Priority: Medium    Gout [M10.9] 12/19/2016     Priority: Low    Hyperlipidemia [E78.5] 11/17/2011     Priority: Low    SNEHA (obstructive sleep apnea) [G47.33] 11/17/2011     Priority: Low    Class 3 severe obesity due to excess calories with serious comorbidity and body mass index (BMI) of 40.0 to 44.9 in adult ([facility] [E66.01, Z68.41] 09/22/2019    AICD discharge [Z45.02] 09/21/2019    CKD (chronic kidney disease) stage 3, GFR 30-59 ml/min ([facility]) [N18.3] 09/18/2019    Chronic systolic heart failure ([facility] [I50.22] 12/08/2015    Atrial fibrillation ([facility] [I48.91] 11/18/2014     Plan:  · To have stress test

## 2019-09-23 NOTE — PROGRESS NOTES
Dr. Charleen Sanabria on unit and notified pt SBP in the 90's after receiving toprol, bumex, and cozaar. Notified pt has not received aldactone or zaroxolyn today.

## 2019-09-23 NOTE — PROGRESS NOTES
Spoke with Dr. Claire Rivas regarding tikosyn and QTc 572. orders obtained and placed to d/c tikosyn at this time.

## 2019-09-24 VITALS
HEART RATE: 80 BPM | WEIGHT: 315 LBS | SYSTOLIC BLOOD PRESSURE: 118 MMHG | BODY MASS INDEX: 44.1 KG/M2 | TEMPERATURE: 97.6 F | OXYGEN SATURATION: 98 % | HEIGHT: 71 IN | RESPIRATION RATE: 16 BRPM | DIASTOLIC BLOOD PRESSURE: 71 MMHG

## 2019-09-24 LAB
ALBUMIN SERPL-MCNC: 3.2 G/DL (ref 3.5–5.2)
ALP BLD-CCNC: 93 U/L (ref 40–129)
ALT SERPL-CCNC: 22 U/L (ref 0–40)
ANION GAP SERPL CALCULATED.3IONS-SCNC: 19 MMOL/L (ref 7–16)
AST SERPL-CCNC: 20 U/L (ref 0–39)
BILIRUB SERPL-MCNC: 0.9 MG/DL (ref 0–1.2)
BUN BLDV-MCNC: 54 MG/DL (ref 8–23)
CALCIUM SERPL-MCNC: 8.9 MG/DL (ref 8.6–10.2)
CHLORIDE BLD-SCNC: 87 MMOL/L (ref 98–107)
CO2: 24 MMOL/L (ref 22–29)
CREAT SERPL-MCNC: 1.7 MG/DL (ref 0.7–1.2)
DIGOXIN LEVEL: 1 NG/ML (ref 0.8–2)
FERRITIN: 476 NG/ML
GFR AFRICAN AMERICAN: 49
GFR NON-AFRICAN AMERICAN: 49 ML/MIN/1.73
GLUCOSE BLD-MCNC: 303 MG/DL (ref 74–99)
HCT VFR BLD CALC: 45.2 % (ref 37–54)
HEMOGLOBIN: 14.9 G/DL (ref 12.5–16.5)
IRON SATURATION: 33 % (ref 20–55)
IRON: 62 MCG/DL (ref 59–158)
MAGNESIUM: 2.2 MG/DL (ref 1.6–2.6)
MCH RBC QN AUTO: 30 PG (ref 26–35)
MCHC RBC AUTO-ENTMCNC: 33 % (ref 32–34.5)
MCV RBC AUTO: 90.9 FL (ref 80–99.9)
METER GLUCOSE: 312 MG/DL (ref 74–99)
METER GLUCOSE: 411 MG/DL (ref 74–99)
METER GLUCOSE: 465 MG/DL (ref 74–99)
PDW BLD-RTO: 13.9 FL (ref 11.5–15)
PHOSPHORUS: 2.7 MG/DL (ref 2.5–4.5)
PLATELET # BLD: 148 E9/L (ref 130–450)
PMV BLD AUTO: 13.2 FL (ref 7–12)
POTASSIUM SERPL-SCNC: 3.7 MMOL/L (ref 3.5–5)
PRO-BNP: 808 PG/ML (ref 0–125)
RBC # BLD: 4.97 E12/L (ref 3.8–5.8)
SODIUM BLD-SCNC: 130 MMOL/L (ref 132–146)
T3 TOTAL: 93.79 NG/DL (ref 80–200)
T4 TOTAL: 6.1 MCG/DL (ref 4.5–11.7)
TOTAL IRON BINDING CAPACITY: 187 MCG/DL (ref 250–450)
TOTAL PROTEIN: 7.8 G/DL (ref 6.4–8.3)
TRANSFERRIN: 149 MG/DL (ref 200–360)
TSH SERPL DL<=0.05 MIU/L-ACNC: 2.24 UIU/ML (ref 0.27–4.2)
WBC # BLD: 7.7 E9/L (ref 4.5–11.5)

## 2019-09-24 PROCEDURE — 84436 ASSAY OF TOTAL THYROXINE: CPT

## 2019-09-24 PROCEDURE — 84100 ASSAY OF PHOSPHORUS: CPT

## 2019-09-24 PROCEDURE — APPSS180 APP SPLIT SHARED TIME > 60 MINUTES: Performed by: NURSE PRACTITIONER

## 2019-09-24 PROCEDURE — 83735 ASSAY OF MAGNESIUM: CPT

## 2019-09-24 PROCEDURE — 6370000000 HC RX 637 (ALT 250 FOR IP): Performed by: NURSE PRACTITIONER

## 2019-09-24 PROCEDURE — 80162 ASSAY OF DIGOXIN TOTAL: CPT

## 2019-09-24 PROCEDURE — 97530 THERAPEUTIC ACTIVITIES: CPT

## 2019-09-24 PROCEDURE — 82962 GLUCOSE BLOOD TEST: CPT

## 2019-09-24 PROCEDURE — 84443 ASSAY THYROID STIM HORMONE: CPT

## 2019-09-24 PROCEDURE — 6370000000 HC RX 637 (ALT 250 FOR IP): Performed by: INTERNAL MEDICINE

## 2019-09-24 PROCEDURE — 83540 ASSAY OF IRON: CPT

## 2019-09-24 PROCEDURE — 83880 ASSAY OF NATRIURETIC PEPTIDE: CPT

## 2019-09-24 PROCEDURE — 83550 IRON BINDING TEST: CPT

## 2019-09-24 PROCEDURE — 99233 SBSQ HOSP IP/OBS HIGH 50: CPT | Performed by: INTERNAL MEDICINE

## 2019-09-24 PROCEDURE — 97535 SELF CARE MNGMENT TRAINING: CPT

## 2019-09-24 PROCEDURE — 99254 IP/OBS CNSLTJ NEW/EST MOD 60: CPT | Performed by: INTERNAL MEDICINE

## 2019-09-24 PROCEDURE — 85027 COMPLETE CBC AUTOMATED: CPT

## 2019-09-24 PROCEDURE — 94660 CPAP INITIATION&MGMT: CPT

## 2019-09-24 PROCEDURE — 84466 ASSAY OF TRANSFERRIN: CPT

## 2019-09-24 PROCEDURE — 6370000000 HC RX 637 (ALT 250 FOR IP): Performed by: HOSPITALIST

## 2019-09-24 PROCEDURE — 2580000003 HC RX 258: Performed by: HOSPITALIST

## 2019-09-24 PROCEDURE — 82728 ASSAY OF FERRITIN: CPT

## 2019-09-24 PROCEDURE — 80053 COMPREHEN METABOLIC PANEL: CPT

## 2019-09-24 PROCEDURE — 84480 ASSAY TRIIODOTHYRONINE (T3): CPT

## 2019-09-24 PROCEDURE — 97165 OT EVAL LOW COMPLEX 30 MIN: CPT

## 2019-09-24 PROCEDURE — 36415 COLL VENOUS BLD VENIPUNCTURE: CPT

## 2019-09-24 PROCEDURE — 97162 PT EVAL MOD COMPLEX 30 MIN: CPT

## 2019-09-24 RX ORDER — BUMETANIDE 2 MG/1
2 TABLET ORAL DAILY
Qty: 30 TABLET | Refills: 0 | OUTPATIENT
Start: 2019-09-25

## 2019-09-24 RX ORDER — POTASSIUM CHLORIDE 20 MEQ/1
20 TABLET, EXTENDED RELEASE ORAL ONCE
Status: COMPLETED | OUTPATIENT
Start: 2019-09-24 | End: 2019-09-24

## 2019-09-24 RX ORDER — BUMETANIDE 0.25 MG/ML
1 INJECTION, SOLUTION INTRAMUSCULAR; INTRAVENOUS 2 TIMES DAILY
Status: DISCONTINUED | OUTPATIENT
Start: 2019-09-24 | End: 2019-09-24

## 2019-09-24 RX ORDER — BUMETANIDE 2 MG/1
2 TABLET ORAL DAILY
Qty: 30 TABLET | Refills: 3 | Status: ON HOLD | OUTPATIENT
Start: 2019-09-25 | End: 2019-11-19 | Stop reason: HOSPADM

## 2019-09-24 RX ORDER — BUMETANIDE 1 MG/1
2 TABLET ORAL DAILY
Status: DISCONTINUED | OUTPATIENT
Start: 2019-09-24 | End: 2019-09-24 | Stop reason: HOSPADM

## 2019-09-24 RX ORDER — SPIRONOLACTONE 25 MG/1
25 TABLET ORAL DAILY
Qty: 30 TABLET | Refills: 0 | OUTPATIENT
Start: 2019-09-25

## 2019-09-24 RX ADMIN — BUMETANIDE 2 MG: 1 TABLET ORAL at 17:36

## 2019-09-24 RX ADMIN — VILAZODONE HYDROCHLORIDE 10 MG: 10 TABLET ORAL at 08:45

## 2019-09-24 RX ADMIN — ALLOPURINOL 200 MG: 100 TABLET ORAL at 08:45

## 2019-09-24 RX ADMIN — POTASSIUM CHLORIDE 20 MEQ: 20 TABLET, EXTENDED RELEASE ORAL at 16:34

## 2019-09-24 RX ADMIN — Medication 10 ML: at 08:47

## 2019-09-24 RX ADMIN — ATORVASTATIN CALCIUM 80 MG: 40 TABLET, FILM COATED ORAL at 08:45

## 2019-09-24 RX ADMIN — BUSPIRONE HYDROCHLORIDE 30 MG: 10 TABLET ORAL at 08:45

## 2019-09-24 RX ADMIN — FAMOTIDINE 20 MG: 20 TABLET ORAL at 08:54

## 2019-09-24 RX ADMIN — Medication 400 MG: at 08:45

## 2019-09-24 RX ADMIN — METOPROLOL SUCCINATE 100 MG: 100 TABLET, EXTENDED RELEASE ORAL at 08:45

## 2019-09-24 RX ADMIN — INSULIN LISPRO 18 UNITS: 100 INJECTION, SOLUTION INTRAVENOUS; SUBCUTANEOUS at 17:37

## 2019-09-24 RX ADMIN — GABAPENTIN 100 MG: 100 CAPSULE ORAL at 08:44

## 2019-09-24 RX ADMIN — GABAPENTIN 100 MG: 100 CAPSULE ORAL at 13:40

## 2019-09-24 RX ADMIN — INSULIN LISPRO 12 UNITS: 100 INJECTION, SOLUTION INTRAVENOUS; SUBCUTANEOUS at 06:30

## 2019-09-24 RX ADMIN — INSULIN LISPRO 18 UNITS: 100 INJECTION, SOLUTION INTRAVENOUS; SUBCUTANEOUS at 11:16

## 2019-09-24 RX ADMIN — APIXABAN 5 MG: 5 TABLET, FILM COATED ORAL at 08:45

## 2019-09-24 RX ADMIN — DIGOXIN 125 MCG: 125 TABLET ORAL at 08:45

## 2019-09-24 ASSESSMENT — PAIN DESCRIPTION - PROGRESSION
CLINICAL_PROGRESSION: NOT CHANGED
CLINICAL_PROGRESSION: NOT CHANGED

## 2019-09-24 ASSESSMENT — PAIN DESCRIPTION - PAIN TYPE
TYPE: CHRONIC PAIN
TYPE: CHRONIC PAIN

## 2019-09-24 ASSESSMENT — PAIN DESCRIPTION - DESCRIPTORS
DESCRIPTORS: CONSTANT;ACHING;RADIATING
DESCRIPTORS: CONSTANT;ACHING;RADIATING

## 2019-09-24 ASSESSMENT — PAIN DESCRIPTION - FREQUENCY
FREQUENCY: CONTINUOUS
FREQUENCY: CONTINUOUS

## 2019-09-24 ASSESSMENT — PAIN DESCRIPTION - LOCATION
LOCATION: KNEE
LOCATION: KNEE

## 2019-09-24 ASSESSMENT — PAIN SCALES - GENERAL
PAINLEVEL_OUTOF10: 7
PAINLEVEL_OUTOF10: 0
PAINLEVEL_OUTOF10: 0

## 2019-09-24 ASSESSMENT — PAIN DESCRIPTION - ONSET
ONSET: ON-GOING
ONSET: ON-GOING

## 2019-09-24 ASSESSMENT — PAIN DESCRIPTION - ORIENTATION
ORIENTATION: LEFT;RIGHT
ORIENTATION: RIGHT;LEFT

## 2019-09-24 NOTE — PROGRESS NOTES
reducation. Education provided on benefits of OOB activity to prevent iatrogenic effects. Patient response to education:   Pt verbalized understanding Pt demonstrated skill Pt requires further education in this area   Yes Requires assist/VCs Yes     Rehab potential is Good for reaching above PT goals. Pts/ family goals   1. To return home. Patient and or family understand(s) diagnosis, prognosis, and plan of care. PLAN  PT care will be provided in accordance with the objectives noted above. Whenever appropriate, clear delegation orders will be provided for nursing staff. Exercises and functional mobility practice will be used as well as appropriate assistive devices or modalities to obtain goals. Patient and family education will also be administered as needed. Frequency of treatments will be 2-5x/week x 7-10 days.     Time in: 1315  Time out: [address] [place], [place], DPT  License [id number]

## 2019-09-24 NOTE — PROGRESS NOTES
[facility] MD Phil Arvizu, MD Matilde Barnett, MD Kiran Baldwin, MARY ANN Doss, NURIS  Progress Note    9/24/2019    SUBJECTIVE:   9/23: Feeling better. No new complaint. Fact feels quite well, he tells me. (-) sob/chavarria/cp/palp Appetite ok  9/24: Resting comfortably. Feels well. No new complaint. Good appetite. Losartan, Bumex, metolazone all held yesterday. BP now improved, 103/56. Heart rate 80, paced    PROBLEM LIST:    Principal Problem:    Atrial fibrillation (HCC)  Active Problems:    DM (diabetes mellitus), type 2, uncontrolled (HCC)    HTN (hypertension), benign    Hyperlipidemia    SNEHA (obstructive sleep apnea)    Gout    Chronic systolic heart failure (HCC)    CKD (chronic kidney disease) stage 3, GFR 30-59 ml/min ([facility])    AICD discharge    Class 3 severe obesity due to excess calories with serious comorbidity and body mass index (BMI) of 40.0 to 44.9 in adult [facility])    Arrhythmia  Resolved Problems:    * No resolved hospital problems.  *         DIET:    DIET CARDIAC;     MEDS (scheduled):    insulin lispro  0-18 Units Subcutaneous TID WC    insulin lispro  0-9 Units Subcutaneous Nightly    allopurinol  200 mg Oral BID    atorvastatin  80 mg Oral Daily    [Held by provider] bumetanide  2 mg Oral BID    busPIRone  30 mg Oral Daily    digoxin  125 mcg Oral Daily    apixaban  5 mg Oral BID    magnesium oxide  400 mg Oral Daily    [Held by provider] losartan  50 mg Oral Daily    [Held by provider] metOLazone  5 mg Oral Daily    metoprolol succinate  100 mg Oral BID    spironolactone  25 mg Oral Daily    vilazodone HCl  10 mg Oral Daily    gabapentin  100 mg Oral TID    famotidine  20 mg Oral Daily    sodium chloride flush  10 mL Intravenous 2 times per day       MEDS (infusions):   dextrose         MEDS (prn):  potassium chloride, sodium chloride flush, magnesium hydroxide, glucose, dextrose, glucagon (rDNA), dextrose    PHYSICAL EXAM: ASSESSMENT / RECOMMENDATIONS:    1. Acute kidney injury, hemodynamically mediated due to hypotension, hemodynamic effect of AF with RVR. Renal ultrasound unremarkable. 2.  Chronic kidney disease stage III A, baseline creatinine 1.1 1.3 mg/dL. Random urine protein creatinine ratio 0.1  3. CHF, systolic and diastolic, recent admission for acute decompensation, though currently appears compensated  4. Syncopal episode   5. AF/RVR. Heart rate 80, paced. 6.  Morbid obesity   7. Diabetes Mellitus      Creatinine at the time of discharge from his decompensated HF was 1.9 mg/dL  Creatinine improved somewhat to 1.7 mg/dL this morning    Slight improvement in creatinine as of this morning  Hypotension improved, blood pressure still lowish  Does not appear hypervolemic  FENa consistent with prerenal azotemia    Hold losartan  Resume Bumex --? When?   Recommend tomorrow  Bumex decreased to daily yesterday, agree  Hold metolazone  Follow labs, UO      Electronically signed by Que Oneal MD on 9/24/2019

## 2019-09-24 NOTE — PROGRESS NOTES
Resp 16   Ht 5' 11\" (1.803 m)   Wt (!) 319 lb 8 oz (144.9 kg)   SpO2 95%   BMI 44.56 kg/m²     General appearance: No apparent distress, appears stated age and cooperative. HEENT: Conjunctivae/corneas clear. Pupils equal and round. No injections noted. Neck: Supple. No lesions, scars or masses. Trachea midline. Respiratory:  Normal respiratory effort. Clear to auscultation, bilaterally without Rales/Wheezes/Rhonchi. Cardiovascular: Regular rate and irregular rhythm with normal S1/S2 without murmurs, rubs or gallops. Abdomen: Soft, non-tender, non-distended with normal bowel sounds. Musculoskeletal: No clubbing, cyanosis or edema bilaterally. Brisk capillary refill. 2+ lower extremity pulses (dorsalis pedis). Skin:  No rashes    Neurologic: awake, alert and following commands       Labs:   Recent Labs     09/22/19  0315 09/23/19  1418 09/24/19  0525   WBC 6.7 7.5 7.7   HGB 14.4 14.9 14.9   HCT 43.7 45.0 45.2    155 148     Recent Labs     09/22/19  0315 09/23/19  1418 09/24/19  0525    131* 130*   K 3.5 4.0 3.7   CL 97* 90* 87*   CO2 30* 25 24   BUN 55* 51* 54*   CREATININE 1.9* 1.8* 1.7*   CALCIUM 9.2 8.9 8.9   PHOS  --   --  2.7     Recent Labs     09/21/19  1402 09/24/19  0525   AST 30 20   ALT 28 22   BILITOT 1.0 0.9   ALKPHOS 94 93     No results for input(s): INR in the last 72 hours. Recent Labs     09/21/19  1402 09/21/19  2054 09/22/19  0315   TROPONINI 0.05* 0.06* 0.06*       Imaging:  US RETROPERITONEAL COMPLETE   Final Result      Normal size kidneys. No indication for obstructive uropathy in the right and left kidney. Cyst in the left kidney not well characterized by the present   ultrasound examination. See above comments and recommendations. NM Cardiac Stress Test Nuclear Imaging   Final Result   1. No reversible perfusion defect. Fixed defects is noted. 2. Ejection fraction is 14 %.    3. Findings compatible with a severe dilated cardiomyopathy showing

## 2019-09-24 NOTE — CONSULTS
Advanced Heart Failure and Pulmonary Hypertension Consult Note      Reason for Consultation: [address]      Referring Physician: Dr. Carole Alexander  Primary Cardiologist: Dr. Taj Streeter         History of Present Illness:     Mr. Kelly Cloud is an  male with a PMHx of HFrEF, nonischemic cardiomyopathy, LBBB, CRT-D in situ, PAF, moderate secondary MR, hypertension, CKD, diabetes, morbid obesity, severe ivy, and depression. He presented to [facility] ED on 9/21/2019 for AICD defibrillation, device interrogation demonstrated atrial fibrillation rvr evolving into VT treated with one discharge. Prior to ACID discharge he remembers becoming lightheaded and diaphoretic, sat down with possible syncope. He was evaluated in [facility] ED with EKG demonstrating prolonged QTc (patient was on Tikosyn) therefore he was transferred to [facility] for evaluation by EP service line. His tikosyn dosing was adjusted due to renal dysfunction, he also underwent lexiscan nuclear stress test that demonstrated no reversible perfusion defect, EF 14%. He remains in atrial fibrillation and therefore it is currently stopped with plans to start amiodarone after wash out with subsequent dccv. Due to STEFF on top of CKD his GDMT has been placed on hold and nephrology following. Advanced heart failure was consulted for nonischemic cardiomyopathy with limited medication titration secondary to renal dysfunction. He reports chronic dyspnea with exertion, shortness of breath, or decline in overall functional capacity. He denies orthopnea, PND, nocturnal cough or hemoptysis. He denies abdominal distention or bloating, early satiety, anorexia/change in appetite, unintentional weight loss. He doeslower extremity edema. He denies exertional lightheadedness. He denies palpitations, syncope or near syncope. Review of systems is negative for chest pain, pressure, discomfort. When ambulating on an incline, he denies leg claudication.  History is negative for and Depression   15. Gout  16. S/p Bilateral knee surgery, back surgery, and right eye cataract surgery   17. Most recent echocardiogram 2019: LV moderately enlarged. Mild LVH. Global hypokinesis. EF 25% Stage III Diastolic Dysfunction. Moderate MR.       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 [facility]cardia[facility]     CAD (coronary artery disease)     CHF (congestive heart failure) ([facility]     Diabetes mellitus ([facility]     Diabetic neuropathy ([facility]     Diastolic dysfunction     stage 3    Fall     right knee    Gout     chemically induced    History of blood transfusion     Hx of blood clots 1976    left leg    Hyperlipidemia     Hypertension     LBBB (left bundle branch block)     Moderate mitral regurgitation 2016    Nonischemic cardiomyopathy ([facility]     Obesity     SNEHA (obstructive sleep apnea)     treated with CPAP    Severe tricuspid regurgitation 2016    [facility]     Viral cardiomyopathy ([facility]            Past Surgical History:   Procedure Laterality Date    BACK SURGERY      CARDIAC CATHETERIZATION  2011    Dr. Gali Carr  2014    [facility]V/ICD  ([facility])    Dr. Roberta Neely CATH LAB PROCEDURE      ECHO COMPL W DOP COLOR FLOW  2011         ECHO COMPLETE  2013         EYE SURGERY Right 2019    FEMUR SURGERY      rt; compound frx right femur at 3years old.     JOINT REPLACEMENT Left     knee    KNEE SURGERY      7 on right/1 on left/ total replacement on R         Family History   Problem Relation Age of Onset    Alzheimer's Disease Mother     Diabetes Mother     No Known Problems Father         lives in [facility]    No Known Problems Brother     Other Brother         aneurysm behind his eye    Arrhythmia Brother          age 61       Social History     Socioeconomic indicated  NEUROLOGIC: There are no focalizing motor or sensory deficits. CN II-XII are grossly intact. Aram Bernardo EXTREMITIES: no cyanosis, no clubbing. 2+ bilateral lower extremity edema. All the following diagnostics were personally reviewed and interpreted by me. LAB DATA:     9/21/2019 14:02 9/22/2019 03:15 9/23/2019 14:18 9/24/2019 05:25   Sodium 133 139 131 (L) 130 (L)   Potassium 3.8 3.5 4.0 3.7   Chloride 92 (L) 97 (L) 90 (L) 87 (L)   CO2 26 30 (H) 25 24   BUN 56 (H) 55 (H) 51 (H) 54 (H)   Creatinine 1.8 (H) 1.9 (H) 1.8 (H) 1.7 (H)   Anion Gap 15 12 16 19 (H)   GFR Non- 46 43 46 49   GFR  46 43 46 49   Magnesium 1.6 1.7 1.7 2.2   Protein/Creat Ratio       Glucose 391 (H) 114 (H) 397 (H) 303 (H)   Calcium 9.4 9.2 8.9 8.9   Phosphorus    2.7   Total Protein 8.0   7.8   Pro-BNP 1,634 (H)      Troponin 0.05 (H) 0.06 (H)     Albumin 3.8   3.2 (L)   Alk Phos 94   93   ALT 28   22   AST 30   20   Bilirubin 1.0   0.9   T3, Total    93.79   T4, Total    6.1   TSH    2.240   Digoxin Lvl    1.0   WBC 5.8 6.7 7.5 7.7   RBC 4.96 4.77 4.96 4.97   Hemoglobin Quant 15.3 14.4 14.9 14.9   Hematocrit 45.6 43.7 45.0 45.2   MCV 91.9 91.6 90.7 90.9   MCH 30.8 30.2 30.0 30.0   MCHC 33.6 33.0 33.1 33.0   MPV 13.1 (H) 12.9 (H) 13.4 (H) 13.2 (H)   RDW 14.4 14.2 14.2 13.9   Platelet Count 280 833 155 148       IMAGING:    CXR (9/21/2019)  Impression  NO ACUTE CARDIOPULMONARY PROCESS   Cardiomegaly which appears to be stable and unchanged from prior study      CARDIAC TESTING:     TTE (2/7/2019,  [employer])   Summary   Left ventricle is moderately enlarged .   Mild left ventricular concentric hypertrophy noted.   Global hypokinesis.   Ejection fraction is visually estimated at 25%.   There is doppler evidence of stage III diastolic dysfunction.   Moderate mitral regurgitation is present.   No evidence of tricuspid regurgitation. Mild tricuspid regurgitation. RVSP   is 55 mmHg.     NM Stress

## 2019-09-24 NOTE — PROGRESS NOTES
[facility] [address] [place] [address] Electrophysiology  Progress Note  PATIENT: Tana Ham  MEDICAL RECORD NUMBER: [id number]  DATE OF SERVICE:  9/24/2019  PRIMARY ELECTROPHYSIOLOGIST: Dr. Kevon Cope: Eladia Rivas DO and Hilaria Bar MD      09/22/19: HPI: This is a 81 y.o. 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) [facility]    Chest pain    DM (diabetes mellitus), type 2, uncontrolled ([facility]    HTN (hypertension), benign    Hyperlipidemia    SNEHA (obstructive sleep apnea)    Depression    NICM (nonischemic cardiomyopathy) ([facility]    Renal insufficiency    Biventricular implantable cardioverter-defibrillator in situ    Atrial fibrillation (HCC)    Chronic systolic heart failure (HCC)    PAF (paroxysmal atrial fibrillation) (HCC)    Acute on chronic congestive heart failure (HCC)    Gout    Acute on chronic combined systolic and diastolic congestive heart failure ([facility]    CHF (congestive heart failure), NYHA class III, acute on chronic, systolic (HCC)    Chronic anticoagulation    LBBB (left bundle branch block)    CKD (chronic kidney disease) stage 3, GFR 30-59 ml/min ([facility]    Non-rheumatic mitral regurgitation    Essential hypertension    AICD discharge    Class 3 severe obesity due to excess calories with serious comorbidity and body mass index (BMI) of 40.0 to 44.9 in [facility])    Arrhythmia   who presents to the hospital complaining of ICD shock. Mr. Nhi Myrick  In known to Dr. 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  On 9/3/19 he was admitted to [facility] for congestive heart failure and received IV diuresis and diuresed 10L and was discharged in stable condition with increased Bumex and Zaroxolyn on the 9th. He was later seen in the CHF clinic where he was started on Losartan 50. Lab work collected on 9/20 at 9:20 in the morning showed his creatine has increased to 2.0. Later on the 20th around 3:45pm the patient returned home from a shopping trip and was walking threw his living room when he suddenly felt dizzy and diaphoretic he sat on the couch with a glass of water in one hand and had a syncopal episode. His device sent a transmission on the  21st showing that his syncopal episode of the 20th was in fact Af with RVR evolving in VT with a rate of 279bpm that was treated with one 30J shock after 20 sec. Converting him to SR momentarily until AF resumed seconds later, his device interrogation also shows that he had been in AF since the 7th. As a result of this shock he was instructed to go to the ER for lab work and an EKG. His EKG showed that his QTc was in fact prolonged to 577ms and he was transferred to [facility] for further management. Due to his decreased renal function his Tikosyn has been on hold to this point and he has remained in AF. He has no complaints of chest pain, chest pressure, he reports improved functional status post hospitalization, he has been compliants with his mediation, he has no complaints of bleeding. Cardiac electrophysiology service is consulted for ICD discharge. 09/23/19: Mr. Dewey Leon was seen today. He remains in AF with appropriate BiV pacing. His QTc this AM is 499 ms. He underwent a stress test today which revealed no ischemia with an EF of 14%.     9/24/19: Remains in AF, his last dose of Tikosyn was 9/23/19 at 0600, he continues to have significant BLE edema, multiple medication have been placed on hold by nephrology including Bumex, Hydralazine, Imdur, Aldactone and cozaar, Dr. Sue Finney has been consulted for management of his CHF.     Patient Active Problem List    Diagnosis Date Noted    Renal insufficiency 08/17/2014     Priority: High    NICM (nonischemic cardiomyopathy) ([facility] 12/14/2013     Priority: High    [facility] yue [facility]) [facility] 11/17/2011     Priority: High     Overview Note:     1). S/P SVT ablation 10/16/14: 1. Atrial Function: Abnormal with inducible atrial fibrillation/flutter. 2. AV Yue Function: Abnormal with inducible SVT consistent with AVNRT. Successful slow pathway modification without recurrent SVT.  DM (diabetes mellitus), type 2, uncontrolled ([address] 11/17/2011     Priority: Medium    HTN (hypertension), benign 11/17/2011     Priority: Medium    Gout 12/19/2016     Priority: Low    Hyperlipidemia 11/17/2011     Priority: Low    SNEHA (obstructive sleep apnea) 11/17/2011     Priority: Low    Depression 11/17/2011     Priority: Low    Arrhythmia 09/23/2019    Class 3 severe obesity due to excess calories with serious comorbidity and body mass index (BMI) of 40.0 to 44.9 in adult [facility]) 09/22/2019    AICD discharge 09/21/2019    LBBB (left bundle branch block) 09/18/2019    CKD (chronic kidney disease) stage 3, GFR 30-59 ml/min ([facility] 09/18/2019    Non-rheumatic mitral regurgitation 09/18/2019    Essential hypertension 09/18/2019    CHF (congestive heart failure), NYHA class III, acute on chronic, systolic ([facility] 17/26/8053    Chronic anticoagulation     Acute on chronic combined systolic and diastolic congestive heart failure ([facility] 02/07/2019    Acute on chronic congestive heart failure (HCC)     Chronic systolic heart failure (HCC) 12/08/2015    PAF (paroxysmal atrial fibrillation) ([facility] 12/08/2015    Atrial fibrillation ([facility] 11/18/2014    Biventricular implantable cardioverter-defibrillator in situ 09/05/2014     Overview Note:     A). ICD generator is a [employer], model #: [id number], serial #: [id number]. DOI: 8/20/14   B).  The right atrial lead is a [employer] model #: [id number], Units at 09/23/19 2054    allopurinol (ZYLOPRIM) tablet 200 mg  200 mg Oral BID Chace Crouch MD   200 mg at 09/24/19 0845    atorvastatin (LIPITOR) tablet 80 mg  80 mg Oral Daily Chace Crouch MD   80 mg at 09/24/19 0845    [Held by provider] bumetanide (BUMEX) tablet 2 mg  2 mg Oral BID Naheed Martinez MD   Stopped at 09/23/19 1833    busPIRone (BUSPAR) tablet 30 mg  30 mg Oral Daily Chace Crouch MD   30 mg at 09/24/19 0845    digoxin (LANOXIN) tablet 125 mcg  125 mcg Oral Daily Chace Crouch MD   125 mcg at 09/24/19 0845    apixaban (ELIQUIS) tablet 5 mg  5 mg Oral BID Chace Crouch MD   5 mg at 09/24/19 0845    magnesium oxide (MAG-OX) tablet 400 mg  400 mg Oral Daily Chace Crouch MD   400 mg at 09/24/19 0845    [Held by provider] losartan (COZAAR) tablet 50 mg  50 mg Oral Daily Ramonia Cliffer, APRN - CNP   50 mg at 09/23/19 0848    [Held by provider] metOLazone (ZAROXOLYN) tablet 5 mg  5 mg Oral Daily Chace Crouch MD   Stopped at 09/23/19 1833    metoprolol succinate (TOPROL XL) extended release tablet 100 mg  100 mg Oral BID Ramonia Cliffer, APRN - CNP   100 mg at 09/24/19 0845    potassium chloride (KLOR-CON M) extended release tablet 20 mEq  20 mEq Oral Daily PRN Chace Crouch MD        spironolactone (ALDACTONE) tablet 25 mg  25 mg Oral Daily Diego Aguilera MD   Stopped at 09/23/19 1834    vilazodone HCl (VIIBRYD) TABS 10 mg  10 mg Oral Daily Chace Crouch MD   10 mg at 09/24/19 0845    gabapentin (NEURONTIN) capsule 100 mg  100 mg Oral TID Chace Crouch MD   100 mg at 09/24/19 1340    famotidine (PEPCID) tablet 20 mg  20 mg Oral Daily Chace Crouch MD   20 mg at 09/24/19 0854    sodium chloride flush 0.9 % injection 10 mL  10 mL Intravenous 2 times per day Chace Crouch MD   10 mL at 09/24/19 0804    sodium chloride flush 0.9 % injection 10 mL  10 mL Intravenous PRN Chace Crouch MD   10 mL at 09/23/19 1752    magnesium hydroxide (MILK OF MAGNESIA) 400 MG/5ML Gradient: 1.9 mmHg AV Mean Velocity:    LVOT Peak Gradient: 1.8   MV Mean Velocity: 0.63 m/s 0.83 m/s             mmHgLVOT Mean Gradient:   MV Deceleration Time: 111  AV Mean Gradient:    0.9 mmHg   msec                       3.2 mmHg             Estimated RVSP: 54.8 mmHg   MV P1/2t: 48.9 msec        AV VTI: 19.9 cm      Estimated RAP:8 mmHg   MVA by PHT:4.5 cm^2        AV Area   MV Area (continuity): 1.3  (Continuity):2.04   cm^2                       cm^2                 TR Velocity:3.42 m/s   MV E' Septal Velocity:                          TR Gradient:46.81 mmHg   0.04 m/s                   LVOT VTI: 11.7 cm    PV Peak Velocity: 0.62 m/s   MV E' Lateral Velocity: 5                       PV Peak Gradient: 1.55   m/s                        Estimated PASP:      mmHg   MR Velocity: 4.71 m/s      54.81 mmHg           PV Mean Velocity: 0.39 m/s   MV TERESSA PISA: 0.27 cm^2                          PV Mean Gradient: 0.7 mmHg   MR VTI: 125.5 cm   Alias Velocity: 0.21   m/sPISA Radius: 1 cm      PISA area: 6.28 cm^2MR   flow rate: 128.74 ml/sMR   volume:33.88 ml     [email or web address] [id number]    Cardiac Catheterization:  Reported negative LHC in 2009 however no records are available     Stress Test 11/18/11:   Indication- 82-year-old male with the history of diabetes,   hypertension, hyperlipidemia, and supraventricular tachycardia. A   previous similar study on November 24, 2009 revealed a large reversible   anterolateral myocardial perfusion defect with possible inferior wall   extension, mild hypokinesis, and estimated ejection fraction of 40%. CTA of the chest on [place] 10, 2011 demonstrated diffuse calcification of   the left anterior descending coronary artery and a complex  plaque of   the left anterior descending coronary artery could not be excluded.  The   study was not designed to be a CT coronary angiogram.

## 2019-09-25 ENCOUNTER — CARE COORDINATION (OUTPATIENT)
Dept: CARE COORDINATION | Age: 69
End: 2019-09-25

## 2019-09-25 ENCOUNTER — TELEPHONE (OUTPATIENT)
Dept: ADMINISTRATIVE | Age: 69
End: 2019-09-25

## 2019-09-25 NOTE — TELEPHONE ENCOUNTER
Patient states that he has a secondary insurance through his wife, are we able to see if they could potentially approve? Thank you.

## 2019-09-25 NOTE — CARE COORDINATION
Yes  Have you scheduled your follow up appointment?:  Yes (Comment: 10/4/19)  How are you going to get to your appointment?:  Car - family or friend to transport (Comment: Spouse will bring to appointment)  Were you discharged with any Home Care or Post Acute Services:  Yes  Post Acute Services:  Home Health (Comment: Patient states first visit will be on Friday, 9/27/19)  Do you feel like you have everything you need to keep you well at home?:  Yes  Care Transitions Interventions       Allergies and medications reviewed with patient. AVS MEDICATIONS- patient is taking as instructed on the AVS unless indicated differently below: There are NEW medications for you.  START taking them after you leave the hospital:  Medication Sig      bumetanide (BUMEX) 2 MG tablet Take 1 tablet by mouth daily     You told us you were taking these medications at home, but the amount or how often you take this medication has CHANGED:  None Listed  These are medications you told us you were taking at home, CONTINUE taking them after you leave the hospital:  Medication Sig    atorvastatin (LIPITOR) 80 MG tablet Take 80 mg by mouth daily    CPAP Machine MISC by Does not apply route nightly    potassium chloride (KLOR-CON M) 20 MEQ extended release tablet Take 1 tablet by mouth daily as needed (only on the days that you take an extra bumex)    metoprolol succinate (TOPROL XL) 100 MG extended release tablet TAKE 1 TABLET TWICE A DAY    ELIQUIS 5 MG TABS tablet TAKE 1 TABLET TWICE A DAY    VILAZODONE HCL 10 MG Tablet TAKE ONE TABLET BY MOUTH EVERY DAY    famotidine (PEPCID) 20 MG tablet Take 1 tablet by mouth daily    spironolactone (ALDACTONE) 25 MG tablet Take 1 tablet by mouth daily    allopurinol (ZYLOPRIM) 100 MG tablet Take 200 mg by mouth 2 times daily     gabapentin (NEURONTIN) 100 MG capsule Take 1 capsule by mouth 3 times daily    busPIRone (BUSPAR) 15 MG tablet Take 30 mg by mouth daily     HUMULIN R 500 UNIT/ML

## 2019-09-26 ENCOUNTER — TELEPHONE (OUTPATIENT)
Dept: CARDIOLOGY CLINIC | Age: 69
End: 2019-09-26

## 2019-09-26 ENCOUNTER — TELEPHONE (OUTPATIENT)
Dept: NON INVASIVE DIAGNOSTICS | Age: 69
End: 2019-09-26

## 2019-09-26 ENCOUNTER — HOSPITAL ENCOUNTER (OUTPATIENT)
Age: 69
Discharge: HOME | End: 2019-09-26
Payer: COMMERCIAL

## 2019-09-26 LAB
ANION GAP SERPL CALCULATED.3IONS-SCNC: 15 MMOL/L (ref 7–16)
BUN BLDV-MCNC: 51 MG/DL (ref 8–23)
CALCIUM SERPL-MCNC: 9.6 MG/DL (ref 8.6–10.2)
CHLORIDE BLD-SCNC: 95 MMOL/L (ref 98–107)
CO2: 26 MMOL/L (ref 22–29)
CREAT SERPL-MCNC: 1.6 MG/DL (ref 0.7–1.2)
GFR AFRICAN AMERICAN: 52
GFR NON-AFRICAN AMERICAN: 52 ML/MIN/1.73
GLUCOSE BLD-MCNC: 300 MG/DL (ref 74–99)
POTASSIUM SERPL-SCNC: 3.5 MMOL/L (ref 3.5–5)
SODIUM BLD-SCNC: 136 MMOL/L (ref 132–146)

## 2019-09-26 PROCEDURE — 80048 BASIC METABOLIC PNL TOTAL CA: CPT

## 2019-09-26 PROCEDURE — 36415 COLL VENOUS BLD VENIPUNCTURE: CPT

## 2019-10-01 ENCOUNTER — NURSE ONLY (OUTPATIENT)
Dept: NON INVASIVE DIAGNOSTICS | Age: 69
End: 2019-10-01
Payer: COMMERCIAL

## 2019-10-01 DIAGNOSIS — I48.19 PERSISTENT ATRIAL FIBRILLATION (HCC): Primary | ICD-10-CM

## 2019-10-01 DIAGNOSIS — Z79.899 ENCOUNTER FOR MONITORING AMIODARONE THERAPY: ICD-10-CM

## 2019-10-01 DIAGNOSIS — Z51.81 ENCOUNTER FOR MONITORING AMIODARONE THERAPY: ICD-10-CM

## 2019-10-01 PROCEDURE — 93000 ELECTROCARDIOGRAM COMPLETE: CPT | Performed by: INTERNAL MEDICINE

## 2019-10-01 RX ORDER — ISOSORBIDE MONONITRATE 30 MG/1
30 TABLET, EXTENDED RELEASE ORAL DAILY
Status: ON HOLD | COMMUNITY
Start: 2015-03-18 | End: 2019-10-03

## 2019-10-02 ENCOUNTER — TELEPHONE (OUTPATIENT)
Dept: NON INVASIVE DIAGNOSTICS | Age: 69
End: 2019-10-02

## 2019-10-02 ENCOUNTER — APPOINTMENT (OUTPATIENT)
Dept: GENERAL RADIOLOGY | Age: 69
DRG: 291 | End: 2019-10-02
Payer: COMMERCIAL

## 2019-10-02 ENCOUNTER — TELEPHONE (OUTPATIENT)
Dept: ADMINISTRATIVE | Age: 69
End: 2019-10-02

## 2019-10-02 ENCOUNTER — HOSPITAL ENCOUNTER (INPATIENT)
Age: 69
LOS: 19 days | Discharge: ANOTHER HOSPITAL | DRG: 291 | End: 2019-10-21
Attending: EMERGENCY MEDICINE | Admitting: INTERNAL MEDICINE
Payer: COMMERCIAL

## 2019-10-02 ENCOUNTER — APPOINTMENT (OUTPATIENT)
Dept: CT IMAGING | Age: 69
DRG: 291 | End: 2019-10-02
Payer: COMMERCIAL

## 2019-10-02 DIAGNOSIS — I47.20 VENTRICULAR TACHYCARDIA: Primary | ICD-10-CM

## 2019-10-02 DIAGNOSIS — N18.9 CHRONIC KIDNEY DISEASE, UNSPECIFIED CKD STAGE: ICD-10-CM

## 2019-10-02 PROBLEM — I10 ESSENTIAL HYPERTENSION: Chronic | Status: ACTIVE | Noted: 2019-09-18

## 2019-10-02 PROBLEM — I44.7 LBBB (LEFT BUNDLE BRANCH BLOCK): Chronic | Status: ACTIVE | Noted: 2019-09-18

## 2019-10-02 PROBLEM — I49.9 CARDIAC ARRHYTHMIA: Status: ACTIVE | Noted: 2019-10-02

## 2019-10-02 LAB
ALBUMIN SERPL-MCNC: 3.7 G/DL (ref 3.5–5.2)
ALP BLD-CCNC: 90 U/L (ref 40–129)
ALT SERPL-CCNC: 23 U/L (ref 0–40)
ANION GAP SERPL CALCULATED.3IONS-SCNC: 15 MMOL/L (ref 7–16)
APTT: 33.5 SEC (ref 24.5–35.1)
AST SERPL-CCNC: 23 U/L (ref 0–39)
BASOPHILS ABSOLUTE: 0.04 E9/L (ref 0–0.2)
BASOPHILS RELATIVE PERCENT: 0.5 % (ref 0–2)
BILIRUB SERPL-MCNC: 0.9 MG/DL (ref 0–1.2)
BUN BLDV-MCNC: 48 MG/DL (ref 8–23)
CALCIUM SERPL-MCNC: 9.4 MG/DL (ref 8.6–10.2)
CHLORIDE BLD-SCNC: 94 MMOL/L (ref 98–107)
CO2: 25 MMOL/L (ref 22–29)
CREAT SERPL-MCNC: 2.1 MG/DL (ref 0.7–1.2)
EOSINOPHILS ABSOLUTE: 0.21 E9/L (ref 0.05–0.5)
EOSINOPHILS RELATIVE PERCENT: 2.4 % (ref 0–6)
GFR AFRICAN AMERICAN: 38
GFR NON-AFRICAN AMERICAN: 38 ML/MIN/1.73
GLUCOSE BLD-MCNC: 141 MG/DL (ref 74–99)
HCT VFR BLD CALC: 42.4 % (ref 37–54)
HEMOGLOBIN: 14 G/DL (ref 12.5–16.5)
IMMATURE GRANULOCYTES #: 0.03 E9/L
IMMATURE GRANULOCYTES %: 0.3 % (ref 0–5)
INR BLD: 2.4
LYMPHOCYTES ABSOLUTE: 2.48 E9/L (ref 1.5–4)
LYMPHOCYTES RELATIVE PERCENT: 28.8 % (ref 20–42)
MAGNESIUM: 1.9 MG/DL (ref 1.6–2.6)
MCH RBC QN AUTO: 30.1 PG (ref 26–35)
MCHC RBC AUTO-ENTMCNC: 33 % (ref 32–34.5)
MCV RBC AUTO: 91.2 FL (ref 80–99.9)
MONOCYTES ABSOLUTE: 0.87 E9/L (ref 0.1–0.95)
MONOCYTES RELATIVE PERCENT: 10.1 % (ref 2–12)
NEUTROPHILS ABSOLUTE: 4.99 E9/L (ref 1.8–7.3)
NEUTROPHILS RELATIVE PERCENT: 57.9 % (ref 43–80)
PDW BLD-RTO: 14.6 FL (ref 11.5–15)
PLATELET # BLD: 149 E9/L (ref 130–450)
PMV BLD AUTO: 13.1 FL (ref 7–12)
POTASSIUM SERPL-SCNC: 4.2 MMOL/L (ref 3.5–5)
PROTHROMBIN TIME: 28.7 SEC (ref 9.3–12.4)
RBC # BLD: 4.65 E12/L (ref 3.8–5.8)
SODIUM BLD-SCNC: 134 MMOL/L (ref 132–146)
TOTAL PROTEIN: 7.7 G/DL (ref 6.4–8.3)
TROPONIN: 0.06 NG/ML (ref 0–0.03)
WBC # BLD: 8.6 E9/L (ref 4.5–11.5)

## 2019-10-02 PROCEDURE — 80053 COMPREHEN METABOLIC PANEL: CPT

## 2019-10-02 PROCEDURE — 83735 ASSAY OF MAGNESIUM: CPT

## 2019-10-02 PROCEDURE — 93005 ELECTROCARDIOGRAM TRACING: CPT | Performed by: EMERGENCY MEDICINE

## 2019-10-02 PROCEDURE — 99285 EMERGENCY DEPT VISIT HI MDM: CPT

## 2019-10-02 PROCEDURE — 85610 PROTHROMBIN TIME: CPT

## 2019-10-02 PROCEDURE — 74176 CT ABD & PELVIS W/O CONTRAST: CPT

## 2019-10-02 PROCEDURE — 2060000000 HC ICU INTERMEDIATE R&B

## 2019-10-02 PROCEDURE — 6360000002 HC RX W HCPCS: Performed by: EMERGENCY MEDICINE

## 2019-10-02 PROCEDURE — 85730 THROMBOPLASTIN TIME PARTIAL: CPT

## 2019-10-02 PROCEDURE — 85025 COMPLETE CBC W/AUTO DIFF WBC: CPT

## 2019-10-02 PROCEDURE — 83880 ASSAY OF NATRIURETIC PEPTIDE: CPT

## 2019-10-02 PROCEDURE — 99223 1ST HOSP IP/OBS HIGH 75: CPT | Performed by: INTERNAL MEDICINE

## 2019-10-02 PROCEDURE — 84484 ASSAY OF TROPONIN QUANT: CPT

## 2019-10-02 PROCEDURE — 71045 X-RAY EXAM CHEST 1 VIEW: CPT

## 2019-10-02 RX ORDER — LIDOCAINE HYDROCHLORIDE ANHYDROUS AND DEXTROSE MONOHYDRATE .4; 5 G/100ML; G/100ML
1 INJECTION, SOLUTION INTRAVENOUS CONTINUOUS
Status: DISCONTINUED | OUTPATIENT
Start: 2019-10-02 | End: 2019-10-03

## 2019-10-02 RX ORDER — AMIODARONE HYDROCHLORIDE 200 MG/1
TABLET ORAL
Qty: 56 TABLET | Refills: 0 | Status: ON HOLD | OUTPATIENT
Start: 2019-10-02 | End: 2019-10-30 | Stop reason: HOSPADM

## 2019-10-02 RX ORDER — AMIODARONE HYDROCHLORIDE 200 MG/1
400 TABLET ORAL 2 TIMES DAILY
COMMUNITY
End: 2019-10-02 | Stop reason: SDUPTHER

## 2019-10-02 RX ADMIN — LIDOCAINE HYDROCHLORIDE 100 MG: 20 INJECTION INTRAVENOUS at 21:20

## 2019-10-03 ENCOUNTER — APPOINTMENT (OUTPATIENT)
Dept: ULTRASOUND IMAGING | Age: 69
DRG: 291 | End: 2019-10-03
Payer: COMMERCIAL

## 2019-10-03 PROBLEM — N18.9 ACUTE KIDNEY INJURY SUPERIMPOSED ON CHRONIC KIDNEY DISEASE (HCC): Status: ACTIVE | Noted: 2019-09-18

## 2019-10-03 PROBLEM — N17.9 ACUTE KIDNEY INJURY SUPERIMPOSED ON CHRONIC KIDNEY DISEASE (HCC): Status: ACTIVE | Noted: 2019-09-18

## 2019-10-03 LAB
ALBUMIN SERPL-MCNC: 3.6 G/DL (ref 3.5–5.2)
ALP BLD-CCNC: 90 U/L (ref 40–129)
ALT SERPL-CCNC: 99 U/L (ref 0–40)
ANION GAP SERPL CALCULATED.3IONS-SCNC: 15 MMOL/L (ref 7–16)
AST SERPL-CCNC: 89 U/L (ref 0–39)
BASOPHILS ABSOLUTE: 0.04 E9/L (ref 0–0.2)
BASOPHILS RELATIVE PERCENT: 0.4 % (ref 0–2)
BILIRUB SERPL-MCNC: 1.2 MG/DL (ref 0–1.2)
BILIRUBIN DIRECT: 0.4 MG/DL (ref 0–0.3)
BILIRUBIN, INDIRECT: 0.8 MG/DL (ref 0–1)
BUN BLDV-MCNC: 51 MG/DL (ref 8–23)
CALCIUM SERPL-MCNC: 9.3 MG/DL (ref 8.6–10.2)
CHLORIDE BLD-SCNC: 97 MMOL/L (ref 98–107)
CHLORIDE URINE RANDOM: <20 MMOL/L
CO2: 23 MMOL/L (ref 22–29)
CREAT SERPL-MCNC: 2 MG/DL (ref 0.7–1.2)
CREATININE URINE: 341 MG/DL (ref 40–278)
EKG ATRIAL RATE: 441 BPM
EKG ATRIAL RATE: 65 BPM
EKG ATRIAL RATE: 98 BPM
EKG ATRIAL RATE: 98 BPM
EKG Q-T INTERVAL: 400 MS
EKG Q-T INTERVAL: 410 MS
EKG Q-T INTERVAL: 410 MS
EKG Q-T INTERVAL: 474 MS
EKG QRS DURATION: 116 MS
EKG QRS DURATION: 190 MS
EKG QTC CALCULATION (BAZETT): 481 MS
EKG QTC CALCULATION (BAZETT): 507 MS
EKG QTC CALCULATION (BAZETT): 507 MS
EKG QTC CALCULATION (BAZETT): 543 MS
EKG R AXIS: -135 DEGREES
EKG R AXIS: -63 DEGREES
EKG R AXIS: -72 DEGREES
EKG R AXIS: -72 DEGREES
EKG T AXIS: 115 DEGREES
EKG T AXIS: 115 DEGREES
EKG T AXIS: 116 DEGREES
EKG T AXIS: 30 DEGREES
EKG VENTRICULAR RATE: 79 BPM
EKG VENTRICULAR RATE: 87 BPM
EKG VENTRICULAR RATE: 92 BPM
EKG VENTRICULAR RATE: 92 BPM
EOSINOPHILS ABSOLUTE: 0.09 E9/L (ref 0.05–0.5)
EOSINOPHILS RELATIVE PERCENT: 1 % (ref 0–6)
GFR AFRICAN AMERICAN: 41
GFR NON-AFRICAN AMERICAN: 41 ML/MIN/1.73
GLUCOSE BLD-MCNC: 83 MG/DL (ref 74–99)
HCT VFR BLD CALC: 41.5 % (ref 37–54)
HEMOGLOBIN: 14.1 G/DL (ref 12.5–16.5)
IMMATURE GRANULOCYTES #: 0.06 E9/L
IMMATURE GRANULOCYTES %: 0.7 % (ref 0–5)
LYMPHOCYTES ABSOLUTE: 2.1 E9/L (ref 1.5–4)
LYMPHOCYTES RELATIVE PERCENT: 22.9 % (ref 20–42)
MAGNESIUM: 2 MG/DL (ref 1.6–2.6)
MCH RBC QN AUTO: 31.1 PG (ref 26–35)
MCHC RBC AUTO-ENTMCNC: 34 % (ref 32–34.5)
MCV RBC AUTO: 91.4 FL (ref 80–99.9)
METER GLUCOSE: 157 MG/DL (ref 74–99)
METER GLUCOSE: 164 MG/DL (ref 74–99)
METER GLUCOSE: 277 MG/DL (ref 74–99)
METER GLUCOSE: 90 MG/DL (ref 74–99)
MONOCYTES ABSOLUTE: 0.9 E9/L (ref 0.1–0.95)
MONOCYTES RELATIVE PERCENT: 9.8 % (ref 2–12)
NEUTROPHILS ABSOLUTE: 5.99 E9/L (ref 1.8–7.3)
NEUTROPHILS RELATIVE PERCENT: 65.2 % (ref 43–80)
PDW BLD-RTO: 14.6 FL (ref 11.5–15)
PLATELET # BLD: 147 E9/L (ref 130–450)
PMV BLD AUTO: 13.1 FL (ref 7–12)
POTASSIUM REFLEX MAGNESIUM: 4.3 MMOL/L (ref 3.5–5)
POTASSIUM, UR: 51.3 MMOL/L
PRO-BNP: ABNORMAL PG/ML (ref 0–125)
RBC # BLD: 4.54 E12/L (ref 3.8–5.8)
SODIUM BLD-SCNC: 135 MMOL/L (ref 132–146)
SODIUM URINE: <20 MMOL/L
TOTAL PROTEIN: 7.7 G/DL (ref 6.4–8.3)
UREA NITROGEN, UR: 777 MG/DL (ref 800–1666)
WBC # BLD: 9.2 E9/L (ref 4.5–11.5)

## 2019-10-03 PROCEDURE — 99232 SBSQ HOSP IP/OBS MODERATE 35: CPT | Performed by: INTERNAL MEDICINE

## 2019-10-03 PROCEDURE — 82962 GLUCOSE BLOOD TEST: CPT

## 2019-10-03 PROCEDURE — 93010 ELECTROCARDIOGRAM REPORT: CPT | Performed by: INTERNAL MEDICINE

## 2019-10-03 PROCEDURE — 6360000002 HC RX W HCPCS: Performed by: INTERNAL MEDICINE

## 2019-10-03 PROCEDURE — 83735 ASSAY OF MAGNESIUM: CPT

## 2019-10-03 PROCEDURE — 2060000000 HC ICU INTERMEDIATE R&B

## 2019-10-03 PROCEDURE — 80048 BASIC METABOLIC PNL TOTAL CA: CPT

## 2019-10-03 PROCEDURE — 84300 ASSAY OF URINE SODIUM: CPT

## 2019-10-03 PROCEDURE — 84133 ASSAY OF URINE POTASSIUM: CPT

## 2019-10-03 PROCEDURE — 76770 US EXAM ABDO BACK WALL COMP: CPT

## 2019-10-03 PROCEDURE — 82436 ASSAY OF URINE CHLORIDE: CPT

## 2019-10-03 PROCEDURE — 99223 1ST HOSP IP/OBS HIGH 75: CPT | Performed by: INTERNAL MEDICINE

## 2019-10-03 PROCEDURE — 97161 PT EVAL LOW COMPLEX 20 MIN: CPT

## 2019-10-03 PROCEDURE — 85025 COMPLETE CBC W/AUTO DIFF WBC: CPT

## 2019-10-03 PROCEDURE — 97165 OT EVAL LOW COMPLEX 30 MIN: CPT

## 2019-10-03 PROCEDURE — 2580000003 HC RX 258: Performed by: INTERNAL MEDICINE

## 2019-10-03 PROCEDURE — 93005 ELECTROCARDIOGRAM TRACING: CPT | Performed by: INTERNAL MEDICINE

## 2019-10-03 PROCEDURE — 80076 HEPATIC FUNCTION PANEL: CPT

## 2019-10-03 PROCEDURE — 36415 COLL VENOUS BLD VENIPUNCTURE: CPT

## 2019-10-03 PROCEDURE — 84540 ASSAY OF URINE/UREA-N: CPT

## 2019-10-03 PROCEDURE — APPSS60 APP SPLIT SHARED TIME 46-60 MINUTES: Performed by: CLINICAL NURSE SPECIALIST

## 2019-10-03 PROCEDURE — 6370000000 HC RX 637 (ALT 250 FOR IP): Performed by: INTERNAL MEDICINE

## 2019-10-03 PROCEDURE — 94660 CPAP INITIATION&MGMT: CPT

## 2019-10-03 PROCEDURE — 82570 ASSAY OF URINE CREATININE: CPT

## 2019-10-03 RX ORDER — MORPHINE SULFATE 2 MG/ML
2 INJECTION, SOLUTION INTRAMUSCULAR; INTRAVENOUS EVERY 4 HOURS PRN
Status: DISCONTINUED | OUTPATIENT
Start: 2019-10-03 | End: 2019-10-07

## 2019-10-03 RX ORDER — SODIUM CHLORIDE 0.9 % (FLUSH) 0.9 %
10 SYRINGE (ML) INJECTION PRN
Status: DISCONTINUED | OUTPATIENT
Start: 2019-10-03 | End: 2019-10-21 | Stop reason: HOSPADM

## 2019-10-03 RX ORDER — ONDANSETRON 2 MG/ML
4 INJECTION INTRAMUSCULAR; INTRAVENOUS EVERY 8 HOURS PRN
Status: DISCONTINUED | OUTPATIENT
Start: 2019-10-03 | End: 2019-10-03 | Stop reason: SDUPTHER

## 2019-10-03 RX ORDER — ALLOPURINOL 100 MG/1
200 TABLET ORAL 2 TIMES DAILY
Status: DISCONTINUED | OUTPATIENT
Start: 2019-10-03 | End: 2019-10-21 | Stop reason: HOSPADM

## 2019-10-03 RX ORDER — ISOSORBIDE MONONITRATE 30 MG/1
30 TABLET, EXTENDED RELEASE ORAL DAILY
Status: DISCONTINUED | OUTPATIENT
Start: 2019-10-03 | End: 2019-10-03

## 2019-10-03 RX ORDER — POTASSIUM CHLORIDE 20 MEQ/1
40 TABLET, EXTENDED RELEASE ORAL PRN
Status: DISCONTINUED | OUTPATIENT
Start: 2019-10-03 | End: 2019-10-21 | Stop reason: HOSPADM

## 2019-10-03 RX ORDER — BUSPIRONE HYDROCHLORIDE 10 MG/1
30 TABLET ORAL DAILY
Status: DISCONTINUED | OUTPATIENT
Start: 2019-10-03 | End: 2019-10-21 | Stop reason: HOSPADM

## 2019-10-03 RX ORDER — MAGNESIUM SULFATE 1 G/100ML
1 INJECTION INTRAVENOUS PRN
Status: DISCONTINUED | OUTPATIENT
Start: 2019-10-03 | End: 2019-10-21 | Stop reason: HOSPADM

## 2019-10-03 RX ORDER — POTASSIUM CHLORIDE 20 MEQ/1
20 TABLET, EXTENDED RELEASE ORAL DAILY PRN
Status: DISCONTINUED | OUTPATIENT
Start: 2019-10-03 | End: 2019-10-21 | Stop reason: HOSPADM

## 2019-10-03 RX ORDER — GABAPENTIN 100 MG/1
100 CAPSULE ORAL 3 TIMES DAILY
Status: DISCONTINUED | OUTPATIENT
Start: 2019-10-03 | End: 2019-10-21 | Stop reason: HOSPADM

## 2019-10-03 RX ORDER — SODIUM CHLORIDE 0.9 % (FLUSH) 0.9 %
10 SYRINGE (ML) INJECTION EVERY 12 HOURS SCHEDULED
Status: DISCONTINUED | OUTPATIENT
Start: 2019-10-03 | End: 2019-10-21 | Stop reason: HOSPADM

## 2019-10-03 RX ORDER — ACETAMINOPHEN 325 MG/1
650 TABLET ORAL EVERY 4 HOURS PRN
Status: DISCONTINUED | OUTPATIENT
Start: 2019-10-03 | End: 2019-10-03 | Stop reason: SDUPTHER

## 2019-10-03 RX ORDER — SODIUM CHLORIDE 0.9 % (FLUSH) 0.9 %
10 SYRINGE (ML) INJECTION EVERY 12 HOURS SCHEDULED
Status: DISCONTINUED | OUTPATIENT
Start: 2019-10-03 | End: 2019-10-03 | Stop reason: SDUPTHER

## 2019-10-03 RX ORDER — DEXTROSE MONOHYDRATE 50 MG/ML
100 INJECTION, SOLUTION INTRAVENOUS PRN
Status: DISCONTINUED | OUTPATIENT
Start: 2019-10-03 | End: 2019-10-21 | Stop reason: HOSPADM

## 2019-10-03 RX ORDER — DEXTROSE MONOHYDRATE 25 G/50ML
12.5 INJECTION, SOLUTION INTRAVENOUS PRN
Status: DISCONTINUED | OUTPATIENT
Start: 2019-10-03 | End: 2019-10-21 | Stop reason: HOSPADM

## 2019-10-03 RX ORDER — ATORVASTATIN CALCIUM 40 MG/1
80 TABLET, FILM COATED ORAL DAILY
Status: DISCONTINUED | OUTPATIENT
Start: 2019-10-03 | End: 2019-10-05

## 2019-10-03 RX ORDER — SODIUM CHLORIDE 0.9 % (FLUSH) 0.9 %
10 SYRINGE (ML) INJECTION PRN
Status: DISCONTINUED | OUTPATIENT
Start: 2019-10-03 | End: 2019-10-03 | Stop reason: SDUPTHER

## 2019-10-03 RX ORDER — FUROSEMIDE 10 MG/ML
40 INJECTION INTRAMUSCULAR; INTRAVENOUS 2 TIMES DAILY
Status: DISCONTINUED | OUTPATIENT
Start: 2019-10-03 | End: 2019-10-03

## 2019-10-03 RX ORDER — FAMOTIDINE 20 MG/1
20 TABLET, FILM COATED ORAL DAILY
Status: DISCONTINUED | OUTPATIENT
Start: 2019-10-03 | End: 2019-10-06

## 2019-10-03 RX ORDER — NICOTINE POLACRILEX 4 MG
15 LOZENGE BUCCAL PRN
Status: DISCONTINUED | OUTPATIENT
Start: 2019-10-03 | End: 2019-10-21 | Stop reason: HOSPADM

## 2019-10-03 RX ORDER — ONDANSETRON 2 MG/ML
4 INJECTION INTRAMUSCULAR; INTRAVENOUS EVERY 6 HOURS PRN
Status: DISCONTINUED | OUTPATIENT
Start: 2019-10-03 | End: 2019-10-21 | Stop reason: HOSPADM

## 2019-10-03 RX ORDER — ACETAMINOPHEN 325 MG/1
650 TABLET ORAL EVERY 4 HOURS PRN
Status: DISCONTINUED | OUTPATIENT
Start: 2019-10-03 | End: 2019-10-21 | Stop reason: HOSPADM

## 2019-10-03 RX ORDER — SPIRONOLACTONE 25 MG/1
25 TABLET ORAL DAILY
Status: DISCONTINUED | OUTPATIENT
Start: 2019-10-03 | End: 2019-10-05

## 2019-10-03 RX ORDER — METOPROLOL SUCCINATE 100 MG/1
100 TABLET, EXTENDED RELEASE ORAL 2 TIMES DAILY
Status: DISCONTINUED | OUTPATIENT
Start: 2019-10-03 | End: 2019-10-21 | Stop reason: HOSPADM

## 2019-10-03 RX ORDER — AMIODARONE HYDROCHLORIDE 200 MG/1
200 TABLET ORAL DAILY
Status: DISCONTINUED | OUTPATIENT
Start: 2019-10-03 | End: 2019-10-05

## 2019-10-03 RX ORDER — POTASSIUM CHLORIDE 7.45 MG/ML
10 INJECTION INTRAVENOUS PRN
Status: DISCONTINUED | OUTPATIENT
Start: 2019-10-03 | End: 2019-10-21 | Stop reason: HOSPADM

## 2019-10-03 RX ORDER — FUROSEMIDE 10 MG/ML
40 INJECTION INTRAMUSCULAR; INTRAVENOUS 2 TIMES DAILY
Status: DISCONTINUED | OUTPATIENT
Start: 2019-10-03 | End: 2019-10-06

## 2019-10-03 RX ADMIN — FUROSEMIDE 40 MG: 10 INJECTION, SOLUTION INTRAMUSCULAR; INTRAVENOUS at 07:12

## 2019-10-03 RX ADMIN — GABAPENTIN 100 MG: 100 CAPSULE ORAL at 20:37

## 2019-10-03 RX ADMIN — APIXABAN 5 MG: 5 TABLET, FILM COATED ORAL at 20:37

## 2019-10-03 RX ADMIN — INSULIN LISPRO 3 UNITS: 100 INJECTION, SOLUTION INTRAVENOUS; SUBCUTANEOUS at 16:06

## 2019-10-03 RX ADMIN — BUSPIRONE HYDROCHLORIDE 30 MG: 10 TABLET ORAL at 09:34

## 2019-10-03 RX ADMIN — ALLOPURINOL 200 MG: 100 TABLET ORAL at 09:33

## 2019-10-03 RX ADMIN — ONDANSETRON 4 MG: 2 INJECTION INTRAMUSCULAR; INTRAVENOUS at 05:28

## 2019-10-03 RX ADMIN — MAGNESIUM OXIDE TAB 400 MG (241.3 MG ELEMENTAL MG) 400 MG: 400 (241.3 MG) TAB at 09:33

## 2019-10-03 RX ADMIN — GABAPENTIN 100 MG: 100 CAPSULE ORAL at 13:44

## 2019-10-03 RX ADMIN — FAMOTIDINE 20 MG: 20 TABLET ORAL at 09:34

## 2019-10-03 RX ADMIN — Medication 10 ML: at 09:34

## 2019-10-03 RX ADMIN — Medication 10 ML: at 20:38

## 2019-10-03 RX ADMIN — GABAPENTIN 100 MG: 100 CAPSULE ORAL at 09:33

## 2019-10-03 RX ADMIN — AMIODARONE HYDROCHLORIDE 200 MG: 200 TABLET ORAL at 09:34

## 2019-10-03 RX ADMIN — ALLOPURINOL 200 MG: 100 TABLET ORAL at 20:38

## 2019-10-03 RX ADMIN — APIXABAN 5 MG: 5 TABLET, FILM COATED ORAL at 09:33

## 2019-10-03 RX ADMIN — ATORVASTATIN CALCIUM 80 MG: 40 TABLET, FILM COATED ORAL at 09:33

## 2019-10-03 RX ADMIN — INSULIN LISPRO 5 UNITS: 100 INJECTION, SOLUTION INTRAVENOUS; SUBCUTANEOUS at 20:38

## 2019-10-03 RX ADMIN — INSULIN LISPRO 3 UNITS: 100 INJECTION, SOLUTION INTRAVENOUS; SUBCUTANEOUS at 11:32

## 2019-10-03 ASSESSMENT — PAIN SCALES - GENERAL
PAINLEVEL_OUTOF10: 0
PAINLEVEL_OUTOF10: 8
PAINLEVEL_OUTOF10: 0
PAINLEVEL_OUTOF10: 0

## 2019-10-03 ASSESSMENT — PAIN DESCRIPTION - FREQUENCY: FREQUENCY: CONTINUOUS

## 2019-10-03 ASSESSMENT — PAIN DESCRIPTION - PAIN TYPE: TYPE: CHRONIC PAIN

## 2019-10-03 ASSESSMENT — PAIN DESCRIPTION - LOCATION: LOCATION: BACK

## 2019-10-03 ASSESSMENT — PAIN DESCRIPTION - PROGRESSION: CLINICAL_PROGRESSION: NOT CHANGED

## 2019-10-03 ASSESSMENT — PAIN DESCRIPTION - DIRECTION: RADIATING_TOWARDS: DOWN LEGS

## 2019-10-03 ASSESSMENT — PAIN - FUNCTIONAL ASSESSMENT: PAIN_FUNCTIONAL_ASSESSMENT: PREVENTS OR INTERFERES SOME ACTIVE ACTIVITIES AND ADLS

## 2019-10-03 ASSESSMENT — PAIN DESCRIPTION - ORIENTATION: ORIENTATION: LOWER

## 2019-10-03 ASSESSMENT — PAIN DESCRIPTION - ONSET: ONSET: ON-GOING

## 2019-10-03 ASSESSMENT — PAIN DESCRIPTION - DESCRIPTORS: DESCRIPTORS: ACHING;CONSTANT;RADIATING

## 2019-10-04 ENCOUNTER — TELEPHONE (OUTPATIENT)
Dept: CARDIOLOGY CLINIC | Age: 69
End: 2019-10-04

## 2019-10-04 LAB
ALBUMIN SERPL-MCNC: 3.2 G/DL (ref 3.5–5.2)
ALP BLD-CCNC: 87 U/L (ref 40–129)
ALT SERPL-CCNC: 180 U/L (ref 0–40)
ANION GAP SERPL CALCULATED.3IONS-SCNC: 13 MMOL/L (ref 7–16)
AST SERPL-CCNC: 147 U/L (ref 0–39)
BASOPHILS ABSOLUTE: 0.03 E9/L (ref 0–0.2)
BASOPHILS RELATIVE PERCENT: 0.4 % (ref 0–2)
BILIRUB SERPL-MCNC: 1.1 MG/DL (ref 0–1.2)
BILIRUBIN DIRECT: 0.3 MG/DL (ref 0–0.3)
BILIRUBIN, INDIRECT: 0.8 MG/DL (ref 0–1)
BUN BLDV-MCNC: 57 MG/DL (ref 8–23)
CALCIUM SERPL-MCNC: 8.9 MG/DL (ref 8.6–10.2)
CHLORIDE BLD-SCNC: 95 MMOL/L (ref 98–107)
CO2: 25 MMOL/L (ref 22–29)
CREAT SERPL-MCNC: 2 MG/DL (ref 0.7–1.2)
EOSINOPHILS ABSOLUTE: 0.18 E9/L (ref 0.05–0.5)
EOSINOPHILS RELATIVE PERCENT: 2.4 % (ref 0–6)
GFR AFRICAN AMERICAN: 41
GFR NON-AFRICAN AMERICAN: 41 ML/MIN/1.73
GLUCOSE BLD-MCNC: 190 MG/DL (ref 74–99)
HCT VFR BLD CALC: 39.6 % (ref 37–54)
HEMOGLOBIN: 13.5 G/DL (ref 12.5–16.5)
IMMATURE GRANULOCYTES #: 0.03 E9/L
IMMATURE GRANULOCYTES %: 0.4 % (ref 0–5)
LYMPHOCYTES ABSOLUTE: 2.07 E9/L (ref 1.5–4)
LYMPHOCYTES RELATIVE PERCENT: 28 % (ref 20–42)
MAGNESIUM: 2.2 MG/DL (ref 1.6–2.6)
MCH RBC QN AUTO: 31.3 PG (ref 26–35)
MCHC RBC AUTO-ENTMCNC: 34.1 % (ref 32–34.5)
MCV RBC AUTO: 91.9 FL (ref 80–99.9)
METER GLUCOSE: 186 MG/DL (ref 74–99)
METER GLUCOSE: 233 MG/DL (ref 74–99)
METER GLUCOSE: 245 MG/DL (ref 74–99)
METER GLUCOSE: 271 MG/DL (ref 74–99)
MONOCYTES ABSOLUTE: 0.75 E9/L (ref 0.1–0.95)
MONOCYTES RELATIVE PERCENT: 10.2 % (ref 2–12)
NEUTROPHILS ABSOLUTE: 4.32 E9/L (ref 1.8–7.3)
NEUTROPHILS RELATIVE PERCENT: 58.6 % (ref 43–80)
PDW BLD-RTO: 14.6 FL (ref 11.5–15)
PHOSPHORUS: 3.4 MG/DL (ref 2.5–4.5)
PLATELET # BLD: 120 E9/L (ref 130–450)
PMV BLD AUTO: 12.9 FL (ref 7–12)
POTASSIUM REFLEX MAGNESIUM: 4.4 MMOL/L (ref 3.5–5)
POTASSIUM SERPL-SCNC: 4.4 MMOL/L (ref 3.5–5)
RBC # BLD: 4.31 E12/L (ref 3.8–5.8)
SODIUM BLD-SCNC: 133 MMOL/L (ref 132–146)
TOTAL PROTEIN: 7 G/DL (ref 6.4–8.3)
WBC # BLD: 7.4 E9/L (ref 4.5–11.5)

## 2019-10-04 PROCEDURE — 6370000000 HC RX 637 (ALT 250 FOR IP): Performed by: INTERNAL MEDICINE

## 2019-10-04 PROCEDURE — 99232 SBSQ HOSP IP/OBS MODERATE 35: CPT | Performed by: INTERNAL MEDICINE

## 2019-10-04 PROCEDURE — 84100 ASSAY OF PHOSPHORUS: CPT

## 2019-10-04 PROCEDURE — 80076 HEPATIC FUNCTION PANEL: CPT

## 2019-10-04 PROCEDURE — 2580000003 HC RX 258: Performed by: INTERNAL MEDICINE

## 2019-10-04 PROCEDURE — 36415 COLL VENOUS BLD VENIPUNCTURE: CPT

## 2019-10-04 PROCEDURE — 80048 BASIC METABOLIC PNL TOTAL CA: CPT

## 2019-10-04 PROCEDURE — 85025 COMPLETE CBC W/AUTO DIFF WBC: CPT

## 2019-10-04 PROCEDURE — 82962 GLUCOSE BLOOD TEST: CPT

## 2019-10-04 PROCEDURE — 6360000002 HC RX W HCPCS: Performed by: INTERNAL MEDICINE

## 2019-10-04 PROCEDURE — 2060000000 HC ICU INTERMEDIATE R&B

## 2019-10-04 PROCEDURE — 94660 CPAP INITIATION&MGMT: CPT

## 2019-10-04 PROCEDURE — 83735 ASSAY OF MAGNESIUM: CPT

## 2019-10-04 RX ADMIN — AMIODARONE HYDROCHLORIDE 200 MG: 200 TABLET ORAL at 09:15

## 2019-10-04 RX ADMIN — INSULIN LISPRO 6 UNITS: 100 INJECTION, SOLUTION INTRAVENOUS; SUBCUTANEOUS at 16:38

## 2019-10-04 RX ADMIN — INSULIN LISPRO 3 UNITS: 100 INJECTION, SOLUTION INTRAVENOUS; SUBCUTANEOUS at 06:23

## 2019-10-04 RX ADMIN — ALLOPURINOL 200 MG: 100 TABLET ORAL at 20:56

## 2019-10-04 RX ADMIN — BUSPIRONE HYDROCHLORIDE 30 MG: 10 TABLET ORAL at 09:15

## 2019-10-04 RX ADMIN — SPIRONOLACTONE 25 MG: 25 TABLET ORAL at 09:15

## 2019-10-04 RX ADMIN — GABAPENTIN 100 MG: 100 CAPSULE ORAL at 20:56

## 2019-10-04 RX ADMIN — GABAPENTIN 100 MG: 100 CAPSULE ORAL at 09:15

## 2019-10-04 RX ADMIN — ALLOPURINOL 200 MG: 100 TABLET ORAL at 09:15

## 2019-10-04 RX ADMIN — MAGNESIUM OXIDE TAB 400 MG (241.3 MG ELEMENTAL MG) 400 MG: 400 (241.3 MG) TAB at 09:15

## 2019-10-04 RX ADMIN — INSULIN LISPRO 6 UNITS: 100 INJECTION, SOLUTION INTRAVENOUS; SUBCUTANEOUS at 11:29

## 2019-10-04 RX ADMIN — FUROSEMIDE 40 MG: 10 INJECTION, SOLUTION INTRAMUSCULAR; INTRAVENOUS at 09:16

## 2019-10-04 RX ADMIN — APIXABAN 5 MG: 5 TABLET, FILM COATED ORAL at 20:56

## 2019-10-04 RX ADMIN — INSULIN LISPRO 5 UNITS: 100 INJECTION, SOLUTION INTRAVENOUS; SUBCUTANEOUS at 20:55

## 2019-10-04 RX ADMIN — GABAPENTIN 100 MG: 100 CAPSULE ORAL at 14:27

## 2019-10-04 RX ADMIN — Medication 10 ML: at 20:57

## 2019-10-04 RX ADMIN — APIXABAN 5 MG: 5 TABLET, FILM COATED ORAL at 09:15

## 2019-10-04 RX ADMIN — Medication 10 ML: at 09:15

## 2019-10-04 RX ADMIN — ATORVASTATIN CALCIUM 80 MG: 40 TABLET, FILM COATED ORAL at 09:15

## 2019-10-04 RX ADMIN — METOPROLOL SUCCINATE 100 MG: 100 TABLET, EXTENDED RELEASE ORAL at 20:56

## 2019-10-04 RX ADMIN — MORPHINE SULFATE 2 MG: 2 INJECTION, SOLUTION INTRAMUSCULAR; INTRAVENOUS at 09:16

## 2019-10-04 RX ADMIN — FAMOTIDINE 20 MG: 20 TABLET ORAL at 09:15

## 2019-10-04 RX ADMIN — METOPROLOL SUCCINATE 100 MG: 100 TABLET, EXTENDED RELEASE ORAL at 09:15

## 2019-10-04 RX ADMIN — Medication 10 ML: at 17:29

## 2019-10-04 RX ADMIN — FUROSEMIDE 40 MG: 10 INJECTION, SOLUTION INTRAMUSCULAR; INTRAVENOUS at 17:29

## 2019-10-04 ASSESSMENT — PAIN SCALES - GENERAL
PAINLEVEL_OUTOF10: 6
PAINLEVEL_OUTOF10: 0
PAINLEVEL_OUTOF10: 2
PAINLEVEL_OUTOF10: 6
PAINLEVEL_OUTOF10: 0

## 2019-10-04 ASSESSMENT — PAIN DESCRIPTION - ORIENTATION: ORIENTATION: LOWER

## 2019-10-04 ASSESSMENT — PAIN DESCRIPTION - FREQUENCY: FREQUENCY: CONTINUOUS

## 2019-10-04 ASSESSMENT — PAIN DESCRIPTION - ONSET: ONSET: ON-GOING

## 2019-10-04 ASSESSMENT — PAIN DESCRIPTION - PAIN TYPE: TYPE: CHRONIC PAIN

## 2019-10-04 ASSESSMENT — PAIN DESCRIPTION - DESCRIPTORS: DESCRIPTORS: DULL;ACHING

## 2019-10-04 ASSESSMENT — PAIN DESCRIPTION - LOCATION: LOCATION: BACK;GENERALIZED

## 2019-10-04 ASSESSMENT — PAIN DESCRIPTION - PROGRESSION: CLINICAL_PROGRESSION: NOT CHANGED

## 2019-10-04 ASSESSMENT — PAIN - FUNCTIONAL ASSESSMENT: PAIN_FUNCTIONAL_ASSESSMENT: PREVENTS OR INTERFERES SOME ACTIVE ACTIVITIES AND ADLS

## 2019-10-05 LAB
ALBUMIN SERPL-MCNC: 3.6 G/DL (ref 3.5–5.2)
ALP BLD-CCNC: 100 U/L (ref 40–129)
ALT SERPL-CCNC: 234 U/L (ref 0–40)
ANION GAP SERPL CALCULATED.3IONS-SCNC: 15 MMOL/L (ref 7–16)
AST SERPL-CCNC: 148 U/L (ref 0–39)
BASOPHILS ABSOLUTE: 0.02 E9/L (ref 0–0.2)
BASOPHILS RELATIVE PERCENT: 0.3 % (ref 0–2)
BILIRUB SERPL-MCNC: 1.1 MG/DL (ref 0–1.2)
BILIRUBIN DIRECT: 0.3 MG/DL (ref 0–0.3)
BILIRUBIN, INDIRECT: 0.8 MG/DL (ref 0–1)
BUN BLDV-MCNC: 60 MG/DL (ref 8–23)
CALCIUM SERPL-MCNC: 9.1 MG/DL (ref 8.6–10.2)
CHLORIDE BLD-SCNC: 93 MMOL/L (ref 98–107)
CO2: 25 MMOL/L (ref 22–29)
CREAT SERPL-MCNC: 2.2 MG/DL (ref 0.7–1.2)
EOSINOPHILS ABSOLUTE: 0.18 E9/L (ref 0.05–0.5)
EOSINOPHILS RELATIVE PERCENT: 2.3 % (ref 0–6)
GFR AFRICAN AMERICAN: 36
GFR NON-AFRICAN AMERICAN: 36 ML/MIN/1.73
GLUCOSE BLD-MCNC: 224 MG/DL (ref 74–99)
HCT VFR BLD CALC: 43.7 % (ref 37–54)
HEMOGLOBIN: 14.5 G/DL (ref 12.5–16.5)
IMMATURE GRANULOCYTES #: 0.03 E9/L
IMMATURE GRANULOCYTES %: 0.4 % (ref 0–5)
LYMPHOCYTES ABSOLUTE: 2.4 E9/L (ref 1.5–4)
LYMPHOCYTES RELATIVE PERCENT: 30.2 % (ref 20–42)
MAGNESIUM: 2.3 MG/DL (ref 1.6–2.6)
MCH RBC QN AUTO: 31 PG (ref 26–35)
MCHC RBC AUTO-ENTMCNC: 33.2 % (ref 32–34.5)
MCV RBC AUTO: 93.6 FL (ref 80–99.9)
METER GLUCOSE: 191 MG/DL (ref 74–99)
METER GLUCOSE: 229 MG/DL (ref 74–99)
METER GLUCOSE: 251 MG/DL (ref 74–99)
METER GLUCOSE: 278 MG/DL (ref 74–99)
MONOCYTES ABSOLUTE: 0.79 E9/L (ref 0.1–0.95)
MONOCYTES RELATIVE PERCENT: 9.9 % (ref 2–12)
NEUTROPHILS ABSOLUTE: 4.54 E9/L (ref 1.8–7.3)
NEUTROPHILS RELATIVE PERCENT: 56.9 % (ref 43–80)
PDW BLD-RTO: 14.8 FL (ref 11.5–15)
PLATELET # BLD: 132 E9/L (ref 130–450)
PMV BLD AUTO: 13.4 FL (ref 7–12)
POTASSIUM REFLEX MAGNESIUM: 4.7 MMOL/L (ref 3.5–5)
RBC # BLD: 4.67 E12/L (ref 3.8–5.8)
SODIUM BLD-SCNC: 133 MMOL/L (ref 132–146)
TOTAL PROTEIN: 7.6 G/DL (ref 6.4–8.3)
WBC # BLD: 8 E9/L (ref 4.5–11.5)

## 2019-10-05 PROCEDURE — 2060000000 HC ICU INTERMEDIATE R&B

## 2019-10-05 PROCEDURE — 6360000002 HC RX W HCPCS: Performed by: INTERNAL MEDICINE

## 2019-10-05 PROCEDURE — 87798 DETECT AGENT NOS DNA AMP: CPT

## 2019-10-05 PROCEDURE — 36415 COLL VENOUS BLD VENIPUNCTURE: CPT

## 2019-10-05 PROCEDURE — 6370000000 HC RX 637 (ALT 250 FOR IP): Performed by: INTERNAL MEDICINE

## 2019-10-05 PROCEDURE — 99233 SBSQ HOSP IP/OBS HIGH 50: CPT | Performed by: INTERNAL MEDICINE

## 2019-10-05 PROCEDURE — 80076 HEPATIC FUNCTION PANEL: CPT

## 2019-10-05 PROCEDURE — 87633 RESP VIRUS 12-25 TARGETS: CPT

## 2019-10-05 PROCEDURE — 99232 SBSQ HOSP IP/OBS MODERATE 35: CPT | Performed by: INTERNAL MEDICINE

## 2019-10-05 PROCEDURE — 87486 CHLMYD PNEUM DNA AMP PROBE: CPT

## 2019-10-05 PROCEDURE — 2580000003 HC RX 258: Performed by: INTERNAL MEDICINE

## 2019-10-05 PROCEDURE — APPSS60 APP SPLIT SHARED TIME 46-60 MINUTES: Performed by: NURSE PRACTITIONER

## 2019-10-05 PROCEDURE — 80048 BASIC METABOLIC PNL TOTAL CA: CPT

## 2019-10-05 PROCEDURE — 82962 GLUCOSE BLOOD TEST: CPT

## 2019-10-05 PROCEDURE — 83735 ASSAY OF MAGNESIUM: CPT

## 2019-10-05 PROCEDURE — 94660 CPAP INITIATION&MGMT: CPT

## 2019-10-05 PROCEDURE — 87581 M.PNEUMON DNA AMP PROBE: CPT

## 2019-10-05 PROCEDURE — 85025 COMPLETE CBC W/AUTO DIFF WBC: CPT

## 2019-10-05 PROCEDURE — 6360000002 HC RX W HCPCS: Performed by: NURSE PRACTITIONER

## 2019-10-05 RX ADMIN — AMIODARONE HYDROCHLORIDE 200 MG: 200 TABLET ORAL at 09:55

## 2019-10-05 RX ADMIN — BUSPIRONE HYDROCHLORIDE 30 MG: 10 TABLET ORAL at 09:54

## 2019-10-05 RX ADMIN — ALLOPURINOL 200 MG: 100 TABLET ORAL at 22:05

## 2019-10-05 RX ADMIN — APIXABAN 5 MG: 5 TABLET, FILM COATED ORAL at 22:05

## 2019-10-05 RX ADMIN — FAMOTIDINE 20 MG: 20 TABLET ORAL at 09:55

## 2019-10-05 RX ADMIN — GABAPENTIN 100 MG: 100 CAPSULE ORAL at 09:54

## 2019-10-05 RX ADMIN — ALLOPURINOL 200 MG: 100 TABLET ORAL at 09:55

## 2019-10-05 RX ADMIN — ONDANSETRON 4 MG: 2 INJECTION INTRAMUSCULAR; INTRAVENOUS at 13:18

## 2019-10-05 RX ADMIN — GABAPENTIN 100 MG: 100 CAPSULE ORAL at 22:05

## 2019-10-05 RX ADMIN — APIXABAN 5 MG: 5 TABLET, FILM COATED ORAL at 09:55

## 2019-10-05 RX ADMIN — ONDANSETRON 4 MG: 2 INJECTION INTRAMUSCULAR; INTRAVENOUS at 22:05

## 2019-10-05 RX ADMIN — INSULIN LISPRO 6 UNITS: 100 INJECTION, SOLUTION INTRAVENOUS; SUBCUTANEOUS at 22:08

## 2019-10-05 RX ADMIN — Medication 10 ML: at 09:54

## 2019-10-05 RX ADMIN — METOPROLOL SUCCINATE 100 MG: 100 TABLET, EXTENDED RELEASE ORAL at 22:05

## 2019-10-05 RX ADMIN — ATORVASTATIN CALCIUM 80 MG: 40 TABLET, FILM COATED ORAL at 09:54

## 2019-10-05 RX ADMIN — SPIRONOLACTONE 25 MG: 25 TABLET ORAL at 09:54

## 2019-10-05 RX ADMIN — METOPROLOL SUCCINATE 100 MG: 100 TABLET, EXTENDED RELEASE ORAL at 09:55

## 2019-10-05 RX ADMIN — MAGNESIUM OXIDE TAB 400 MG (241.3 MG ELEMENTAL MG) 400 MG: 400 (241.3 MG) TAB at 09:55

## 2019-10-05 RX ADMIN — INSULIN LISPRO 9 UNITS: 100 INJECTION, SOLUTION INTRAVENOUS; SUBCUTANEOUS at 11:55

## 2019-10-05 RX ADMIN — GABAPENTIN 100 MG: 100 CAPSULE ORAL at 13:18

## 2019-10-05 RX ADMIN — FUROSEMIDE 40 MG: 10 INJECTION, SOLUTION INTRAMUSCULAR; INTRAVENOUS at 17:39

## 2019-10-05 RX ADMIN — INSULIN LISPRO 9 UNITS: 100 INJECTION, SOLUTION INTRAVENOUS; SUBCUTANEOUS at 16:22

## 2019-10-05 RX ADMIN — INSULIN LISPRO 3 UNITS: 100 INJECTION, SOLUTION INTRAVENOUS; SUBCUTANEOUS at 06:07

## 2019-10-05 RX ADMIN — Medication 10 ML: at 22:06

## 2019-10-05 ASSESSMENT — PAIN DESCRIPTION - ONSET: ONSET: ON-GOING

## 2019-10-05 ASSESSMENT — ENCOUNTER SYMPTOMS
DIARRHEA: 0
NAUSEA: 1
PHOTOPHOBIA: 0
VOMITING: 1
TROUBLE SWALLOWING: 0
COUGH: 0
VOMITING: 0
ABDOMINAL PAIN: 0
SHORTNESS OF BREATH: 1
ABDOMINAL DISTENTION: 0
CONSTIPATION: 0
SHORTNESS OF BREATH: 0
SORE THROAT: 0
NAUSEA: 0

## 2019-10-05 ASSESSMENT — PAIN DESCRIPTION - LOCATION: LOCATION: GENERALIZED

## 2019-10-05 ASSESSMENT — PAIN SCALES - GENERAL
PAINLEVEL_OUTOF10: 0
PAINLEVEL_OUTOF10: 7
PAINLEVEL_OUTOF10: 0

## 2019-10-05 ASSESSMENT — PAIN DESCRIPTION - PAIN TYPE: TYPE: CHRONIC PAIN

## 2019-10-05 ASSESSMENT — PAIN DESCRIPTION - PROGRESSION: CLINICAL_PROGRESSION: GRADUALLY WORSENING

## 2019-10-05 ASSESSMENT — PAIN - FUNCTIONAL ASSESSMENT: PAIN_FUNCTIONAL_ASSESSMENT: PREVENTS OR INTERFERES SOME ACTIVE ACTIVITIES AND ADLS

## 2019-10-05 ASSESSMENT — PAIN DESCRIPTION - FREQUENCY: FREQUENCY: CONTINUOUS

## 2019-10-05 ASSESSMENT — PAIN DESCRIPTION - DESCRIPTORS: DESCRIPTORS: ACHING;CONSTANT;DISCOMFORT

## 2019-10-06 LAB
ALBUMIN SERPL-MCNC: 3.5 G/DL (ref 3.5–5.2)
ALP BLD-CCNC: 113 U/L (ref 40–129)
ALT SERPL-CCNC: 361 U/L (ref 0–40)
ANION GAP SERPL CALCULATED.3IONS-SCNC: 14 MMOL/L (ref 7–16)
AST SERPL-CCNC: 199 U/L (ref 0–39)
BILIRUB SERPL-MCNC: 1 MG/DL (ref 0–1.2)
BUN BLDV-MCNC: 64 MG/DL (ref 8–23)
CALCIUM SERPL-MCNC: 9.2 MG/DL (ref 8.6–10.2)
CEA: 2.9 NG/ML (ref 0–5.2)
CHLORIDE BLD-SCNC: 91 MMOL/L (ref 98–107)
CO2: 25 MMOL/L (ref 22–29)
CREAT SERPL-MCNC: 2.3 MG/DL (ref 0.7–1.2)
FERRITIN: 534 NG/ML
FILM ARRAY ADENOVIRUS: NORMAL
FILM ARRAY BORDETELLA PERTUSSIS: NORMAL
FILM ARRAY CHLAMYDOPHILIA PNEUMONIAE: NORMAL
FILM ARRAY CORONAVIRUS 229E: NORMAL
FILM ARRAY CORONAVIRUS HKU1: NORMAL
FILM ARRAY CORONAVIRUS NL63: NORMAL
FILM ARRAY CORONAVIRUS OC43: NORMAL
FILM ARRAY INFLUENZA A VIRUS 09H1: NORMAL
FILM ARRAY INFLUENZA A VIRUS H1: NORMAL
FILM ARRAY INFLUENZA A VIRUS H3: NORMAL
FILM ARRAY INFLUENZA A VIRUS: NORMAL
FILM ARRAY INFLUENZA B: NORMAL
FILM ARRAY METAPNEUMOVIRUS: NORMAL
FILM ARRAY MYCOPLASMA PNEUMONIAE: NORMAL
FILM ARRAY PARAINFLUENZA VIRUS 1: NORMAL
FILM ARRAY PARAINFLUENZA VIRUS 2: NORMAL
FILM ARRAY PARAINFLUENZA VIRUS 3: NORMAL
FILM ARRAY PARAINFLUENZA VIRUS 4: NORMAL
FILM ARRAY RESPIRATORY SYNCITIAL VIRUS: NORMAL
FILM ARRAY RHINOVIRUS/ENTEROVIRUS: NORMAL
GFR AFRICAN AMERICAN: 34
GFR NON-AFRICAN AMERICAN: 34 ML/MIN/1.73
GLUCOSE BLD-MCNC: 200 MG/DL (ref 74–99)
HCT VFR BLD CALC: 43.9 % (ref 37–54)
HEMOGLOBIN: 14.3 G/DL (ref 12.5–16.5)
IRON SATURATION: 25 % (ref 20–55)
IRON: 46 MCG/DL (ref 59–158)
MAGNESIUM: 2.4 MG/DL (ref 1.6–2.6)
MCH RBC QN AUTO: 29.9 PG (ref 26–35)
MCHC RBC AUTO-ENTMCNC: 32.6 % (ref 32–34.5)
MCV RBC AUTO: 91.6 FL (ref 80–99.9)
METER GLUCOSE: 193 MG/DL (ref 74–99)
METER GLUCOSE: 266 MG/DL (ref 74–99)
METER GLUCOSE: 271 MG/DL (ref 74–99)
METER GLUCOSE: 279 MG/DL (ref 74–99)
PDW BLD-RTO: 15 FL (ref 11.5–15)
PHOSPHORUS: 2.9 MG/DL (ref 2.5–4.5)
PLATELET # BLD: 150 E9/L (ref 130–450)
PMV BLD AUTO: 12.6 FL (ref 7–12)
POTASSIUM REFLEX MAGNESIUM: 4.3 MMOL/L (ref 3.5–5)
POTASSIUM SERPL-SCNC: 4.3 MMOL/L (ref 3.5–5)
PRO-BNP: 6515 PG/ML (ref 0–125)
RBC # BLD: 4.79 E12/L (ref 3.8–5.8)
SODIUM BLD-SCNC: 130 MMOL/L (ref 132–146)
T4 FREE: 1.28 NG/DL (ref 0.93–1.7)
T4 TOTAL: 6.6 MCG/DL (ref 4.5–11.7)
TOTAL IRON BINDING CAPACITY: 183 MCG/DL (ref 250–450)
TOTAL PROTEIN: 7.9 G/DL (ref 6.4–8.3)
TSH SERPL DL<=0.05 MIU/L-ACNC: 3.61 UIU/ML (ref 0.27–4.2)
WBC # BLD: 8.5 E9/L (ref 4.5–11.5)

## 2019-10-06 PROCEDURE — 82784 ASSAY IGA/IGD/IGG/IGM EACH: CPT

## 2019-10-06 PROCEDURE — 82728 ASSAY OF FERRITIN: CPT

## 2019-10-06 PROCEDURE — 2580000003 HC RX 258: Performed by: INTERNAL MEDICINE

## 2019-10-06 PROCEDURE — 85027 COMPLETE CBC AUTOMATED: CPT

## 2019-10-06 PROCEDURE — 83540 ASSAY OF IRON: CPT

## 2019-10-06 PROCEDURE — 99232 SBSQ HOSP IP/OBS MODERATE 35: CPT | Performed by: INTERNAL MEDICINE

## 2019-10-06 PROCEDURE — 83735 ASSAY OF MAGNESIUM: CPT

## 2019-10-06 PROCEDURE — 36415 COLL VENOUS BLD VENIPUNCTURE: CPT

## 2019-10-06 PROCEDURE — 84100 ASSAY OF PHOSPHORUS: CPT

## 2019-10-06 PROCEDURE — 84443 ASSAY THYROID STIM HORMONE: CPT

## 2019-10-06 PROCEDURE — 86255 FLUORESCENT ANTIBODY SCREEN: CPT

## 2019-10-06 PROCEDURE — 84439 ASSAY OF FREE THYROXINE: CPT

## 2019-10-06 PROCEDURE — 80074 ACUTE HEPATITIS PANEL: CPT

## 2019-10-06 PROCEDURE — 83880 ASSAY OF NATRIURETIC PEPTIDE: CPT

## 2019-10-06 PROCEDURE — 6360000002 HC RX W HCPCS: Performed by: NURSE PRACTITIONER

## 2019-10-06 PROCEDURE — 82378 CARCINOEMBRYONIC ANTIGEN: CPT

## 2019-10-06 PROCEDURE — 86038 ANTINUCLEAR ANTIBODIES: CPT

## 2019-10-06 PROCEDURE — 82962 GLUCOSE BLOOD TEST: CPT

## 2019-10-06 PROCEDURE — 82787 IGG 1 2 3 OR 4 EACH: CPT

## 2019-10-06 PROCEDURE — 6370000000 HC RX 637 (ALT 250 FOR IP): Performed by: INTERNAL MEDICINE

## 2019-10-06 PROCEDURE — 2060000000 HC ICU INTERMEDIATE R&B

## 2019-10-06 PROCEDURE — 94660 CPAP INITIATION&MGMT: CPT

## 2019-10-06 PROCEDURE — 83550 IRON BINDING TEST: CPT

## 2019-10-06 PROCEDURE — 82103 ALPHA-1-ANTITRYPSIN TOTAL: CPT

## 2019-10-06 PROCEDURE — 82105 ALPHA-FETOPROTEIN SERUM: CPT

## 2019-10-06 PROCEDURE — 6370000000 HC RX 637 (ALT 250 FOR IP): Performed by: NURSE PRACTITIONER

## 2019-10-06 PROCEDURE — 80048 BASIC METABOLIC PNL TOTAL CA: CPT

## 2019-10-06 PROCEDURE — 80053 COMPREHEN METABOLIC PANEL: CPT

## 2019-10-06 PROCEDURE — 84436 ASSAY OF TOTAL THYROXINE: CPT

## 2019-10-06 RX ORDER — FUROSEMIDE 10 MG/ML
40 INJECTION INTRAMUSCULAR; INTRAVENOUS 2 TIMES DAILY
Status: DISCONTINUED | OUTPATIENT
Start: 2019-10-07 | End: 2019-10-12

## 2019-10-06 RX ORDER — PANTOPRAZOLE SODIUM 40 MG/1
40 TABLET, DELAYED RELEASE ORAL
Status: DISCONTINUED | OUTPATIENT
Start: 2019-10-06 | End: 2019-10-21 | Stop reason: HOSPADM

## 2019-10-06 RX ADMIN — MAGNESIUM OXIDE TAB 400 MG (241.3 MG ELEMENTAL MG) 400 MG: 400 (241.3 MG) TAB at 08:05

## 2019-10-06 RX ADMIN — ALLOPURINOL 200 MG: 100 TABLET ORAL at 21:03

## 2019-10-06 RX ADMIN — Medication 10 ML: at 08:08

## 2019-10-06 RX ADMIN — FUROSEMIDE 40 MG: 10 INJECTION, SOLUTION INTRAMUSCULAR; INTRAVENOUS at 08:05

## 2019-10-06 RX ADMIN — GABAPENTIN 100 MG: 100 CAPSULE ORAL at 21:03

## 2019-10-06 RX ADMIN — APIXABAN 5 MG: 5 TABLET, FILM COATED ORAL at 08:05

## 2019-10-06 RX ADMIN — APIXABAN 5 MG: 5 TABLET, FILM COATED ORAL at 21:03

## 2019-10-06 RX ADMIN — GABAPENTIN 100 MG: 100 CAPSULE ORAL at 13:25

## 2019-10-06 RX ADMIN — GABAPENTIN 100 MG: 100 CAPSULE ORAL at 08:05

## 2019-10-06 RX ADMIN — INSULIN LISPRO 9 UNITS: 100 INJECTION, SOLUTION INTRAVENOUS; SUBCUTANEOUS at 21:13

## 2019-10-06 RX ADMIN — FAMOTIDINE 20 MG: 20 TABLET ORAL at 08:05

## 2019-10-06 RX ADMIN — PANTOPRAZOLE SODIUM 40 MG: 40 TABLET, DELAYED RELEASE ORAL at 15:14

## 2019-10-06 RX ADMIN — BUSPIRONE HYDROCHLORIDE 30 MG: 10 TABLET ORAL at 08:05

## 2019-10-06 RX ADMIN — INSULIN LISPRO 3 UNITS: 100 INJECTION, SOLUTION INTRAVENOUS; SUBCUTANEOUS at 06:40

## 2019-10-06 RX ADMIN — ALLOPURINOL 200 MG: 100 TABLET ORAL at 08:06

## 2019-10-06 RX ADMIN — Medication 10 ML: at 21:03

## 2019-10-06 RX ADMIN — INSULIN LISPRO 9 UNITS: 100 INJECTION, SOLUTION INTRAVENOUS; SUBCUTANEOUS at 11:13

## 2019-10-06 RX ADMIN — INSULIN LISPRO 9 UNITS: 100 INJECTION, SOLUTION INTRAVENOUS; SUBCUTANEOUS at 15:14

## 2019-10-06 ASSESSMENT — PAIN SCALES - GENERAL
PAINLEVEL_OUTOF10: 0
PAINLEVEL_OUTOF10: 0

## 2019-10-06 NOTE — DISCHARGE SUMMARY
Hospital Medicine Discharge Summary    Patient ID: Jerline Lesches      Patient's PCP: Chantal Interiano MD    Admit Date: 9/21/2019     Discharge Date:  9/24/2019      Admitting Physician: Peace Ware MD     Discharge Physician: Deisy Licea MD     Discharge Diagnoses: Active Hospital Problems    Diagnosis Date Noted    DM (diabetes mellitus), type 2, uncontrolled ([address] [E11.65] 11/17/2011     Priority: Medium    HTN (hypertension), benign [I10] 11/17/2011     Priority: Medium    Gout [M10.9] 12/19/2016     Priority: Low    Hyperlipidemia [E78.5] 11/17/2011     Priority: Low    SNEHA (obstructive sleep apnea) [G47.33] 11/17/2011     Priority: Low    Arrhythmia [I49.9] 09/23/2019    Class 3 severe obesity due to excess calories with serious comorbidity and body mass index (BMI) of 40.0 to 44.9 in adult ([facility] [E66.01, Z68.41] 09/22/2019    AICD discharge [Z45.02] 09/21/2019    Acute kidney injury superimposed on chronic kidney disease ([address] [N17.9, N18.9] 09/18/2019    Chronic systolic heart failure ([facility] [I50.22] 12/08/2015    Atrial fibrillation ([facility] [address] [facility] [I48.91] 11/18/2014       The patient was seen and examined on day of discharge and this discharge summary is in conjunction with any daily progress note from day of discharge. Admission HPI: 82 yo morbidly obese male hx PAF, AICD, CHF CAD DM GOUT HLP HTN came to ER for AICD firing, felt diaphoretic, he then sat down at which point his AICD discharged. He was evaluated today at EP office, where read on AICD indicates patient had a run of atrial fibrillation with RVR prior to discharge.  The patient states that he does not actually recall defibrillator discharge, raising concern for possible syncope during the event.  After interrogation at the EP office, he was sent to the emergency department for cardiac work-up.  Presently he is noting mild fatigue, as well as left upper chest pressure.   He is on Tikosyn.  On RA comfortable R-3Normal              Details   atorvastatin (LIPITOR) 80 MG tablet Take 80 mg by mouth dailyHistorical Med      CPAP Machine MISC NIGHTLY, Historical Med      potassium chloride (KLOR-CON M) 20 MEQ extended release tablet Take 1 tablet by mouth daily as needed (only on the days that you take an extra bumex), Disp-30 tablet, R-11Normal      metoprolol succinate (TOPROL XL) 100 MG extended release tablet TAKE 1 TABLET TWICE A DAY, Disp-180 tablet, R-0Normal      ELIQUIS 5 MG TABS tablet TAKE 1 TABLET TWICE A DAY, Disp-180 tablet, R-3Normal      VILAZODONE HCL 10 MG Tablet TAKE ONE TABLET BY MOUTH EVERY DAY, R-2, DAWHistorical Med      famotidine (PEPCID) 20 MG tablet Take 1 tablet by mouth daily, Disp-60 tablet, R-3      spironolactone (ALDACTONE) 25 MG tablet Take 1 tablet by mouth daily, Disp-90 tablet, R-3      allopurinol (ZYLOPRIM) 100 MG tablet Take 200 mg by mouth 2 times daily Historical Med      gabapentin (NEURONTIN) 100 MG capsule Take 1 capsule by mouth 3 times daily, Disp-90 capsule, R-3      busPIRone (BUSPAR) 15 MG tablet Take 30 mg by mouth daily Historical Med      HUMULIN R 500 UNIT/ML injection See Admin Instructions Indications: 150u in the am, 120u in afternoon, 30u at night if needed 160 U bidHistorical Med      magnesium oxide (MAG-OX) 400 (240 MG) MG tablet Take 1 tablet by mouth daily. , Disp-30 tablet, R-6             Time Spent on discharge is more than 31 min in the examination, evaluation, counseling and review of medications and discharge plan. Lauri Browning MD   10/6/2019      Thank you Hank Bumpers, MD for the opportunity to be involved in this patient's care. If you have any questions or concerns please feel free to contact me. NOTE: This report was transcribed using voice recognition software. Every effort was made to ensure accuracy; however, inadvertent computerized transcription errors may be present.

## 2019-10-07 ENCOUNTER — APPOINTMENT (OUTPATIENT)
Dept: NUCLEAR MEDICINE | Age: 69
DRG: 291 | End: 2019-10-07
Payer: COMMERCIAL

## 2019-10-07 LAB
ALBUMIN SERPL-MCNC: 3.4 G/DL (ref 3.5–5.2)
ALP BLD-CCNC: 114 U/L (ref 40–129)
ALT SERPL-CCNC: 294 U/L (ref 0–40)
AMMONIA: 41.2 UMOL/L (ref 16–60)
ANION GAP SERPL CALCULATED.3IONS-SCNC: 10 MMOL/L (ref 7–16)
AST SERPL-CCNC: 102 U/L (ref 0–39)
BILIRUB SERPL-MCNC: 0.9 MG/DL (ref 0–1.2)
BILIRUBIN DIRECT: 0.3 MG/DL (ref 0–0.3)
BILIRUBIN, INDIRECT: 0.6 MG/DL (ref 0–1)
BUN BLDV-MCNC: 65 MG/DL (ref 8–23)
CALCIUM SERPL-MCNC: 9.1 MG/DL (ref 8.6–10.2)
CHLORIDE BLD-SCNC: 95 MMOL/L (ref 98–107)
CO2: 25 MMOL/L (ref 22–29)
CREAT SERPL-MCNC: 2.3 MG/DL (ref 0.7–1.2)
GFR AFRICAN AMERICAN: 34
GFR NON-AFRICAN AMERICAN: 34 ML/MIN/1.73
GLUCOSE BLD-MCNC: 216 MG/DL (ref 74–99)
HAV IGM SER IA-ACNC: NORMAL
HCT VFR BLD CALC: 40 % (ref 37–54)
HEMOGLOBIN: 13.5 G/DL (ref 12.5–16.5)
HEPATITIS B CORE IGM ANTIBODY: NORMAL
HEPATITIS B SURFACE ANTIGEN INTERPRETATION: NORMAL
HEPATITIS C ANTIBODY INTERPRETATION: NORMAL
INR BLD: 2.3
MCH RBC QN AUTO: 30.8 PG (ref 26–35)
MCHC RBC AUTO-ENTMCNC: 33.8 % (ref 32–34.5)
MCV RBC AUTO: 91.1 FL (ref 80–99.9)
METER GLUCOSE: 177 MG/DL (ref 74–99)
METER GLUCOSE: 211 MG/DL (ref 74–99)
METER GLUCOSE: 277 MG/DL (ref 74–99)
METER GLUCOSE: 327 MG/DL (ref 74–99)
PDW BLD-RTO: 15.1 FL (ref 11.5–15)
PLATELET # BLD: 144 E9/L (ref 130–450)
PMV BLD AUTO: 13.3 FL (ref 7–12)
POTASSIUM REFLEX MAGNESIUM: 4.9 MMOL/L (ref 3.5–5)
PROTHROMBIN TIME: 27.8 SEC (ref 9.3–12.4)
RBC # BLD: 4.39 E12/L (ref 3.8–5.8)
SODIUM BLD-SCNC: 130 MMOL/L (ref 132–146)
TOTAL PROTEIN: 7.6 G/DL (ref 6.4–8.3)
WBC # BLD: 8.2 E9/L (ref 4.5–11.5)

## 2019-10-07 PROCEDURE — 36415 COLL VENOUS BLD VENIPUNCTURE: CPT

## 2019-10-07 PROCEDURE — 85027 COMPLETE CBC AUTOMATED: CPT

## 2019-10-07 PROCEDURE — A9537 TC99M MEBROFENIN: HCPCS | Performed by: RADIOLOGY

## 2019-10-07 PROCEDURE — 6360000002 HC RX W HCPCS: Performed by: INTERNAL MEDICINE

## 2019-10-07 PROCEDURE — 99232 SBSQ HOSP IP/OBS MODERATE 35: CPT | Performed by: INTERNAL MEDICINE

## 2019-10-07 PROCEDURE — 82140 ASSAY OF AMMONIA: CPT

## 2019-10-07 PROCEDURE — 6370000000 HC RX 637 (ALT 250 FOR IP): Performed by: INTERNAL MEDICINE

## 2019-10-07 PROCEDURE — 80053 COMPREHEN METABOLIC PANEL: CPT

## 2019-10-07 PROCEDURE — 85610 PROTHROMBIN TIME: CPT

## 2019-10-07 PROCEDURE — 78226 HEPATOBILIARY SYSTEM IMAGING: CPT

## 2019-10-07 PROCEDURE — 2060000000 HC ICU INTERMEDIATE R&B

## 2019-10-07 PROCEDURE — 80076 HEPATIC FUNCTION PANEL: CPT

## 2019-10-07 PROCEDURE — 82962 GLUCOSE BLOOD TEST: CPT

## 2019-10-07 PROCEDURE — 94660 CPAP INITIATION&MGMT: CPT

## 2019-10-07 PROCEDURE — 6370000000 HC RX 637 (ALT 250 FOR IP): Performed by: NURSE PRACTITIONER

## 2019-10-07 PROCEDURE — 3430000000 HC RX DIAGNOSTIC RADIOPHARMACEUTICAL: Performed by: RADIOLOGY

## 2019-10-07 PROCEDURE — 2580000003 HC RX 258: Performed by: INTERNAL MEDICINE

## 2019-10-07 PROCEDURE — 6370000000 HC RX 637 (ALT 250 FOR IP): Performed by: STUDENT IN AN ORGANIZED HEALTH CARE EDUCATION/TRAINING PROGRAM

## 2019-10-07 RX ORDER — DOCUSATE SODIUM 100 MG/1
100 CAPSULE, LIQUID FILLED ORAL 2 TIMES DAILY
Status: DISCONTINUED | OUTPATIENT
Start: 2019-10-07 | End: 2019-10-21 | Stop reason: HOSPADM

## 2019-10-07 RX ORDER — SENNA PLUS 8.6 MG/1
1 TABLET ORAL NIGHTLY
Status: DISCONTINUED | OUTPATIENT
Start: 2019-10-07 | End: 2019-10-21 | Stop reason: HOSPADM

## 2019-10-07 RX ADMIN — INSULIN LISPRO 12 UNITS: 100 INJECTION, SOLUTION INTRAVENOUS; SUBCUTANEOUS at 21:18

## 2019-10-07 RX ADMIN — FUROSEMIDE 40 MG: 10 INJECTION, SOLUTION INTRAMUSCULAR; INTRAVENOUS at 17:56

## 2019-10-07 RX ADMIN — INSULIN LISPRO 9 UNITS: 100 INJECTION, SOLUTION INTRAVENOUS; SUBCUTANEOUS at 17:56

## 2019-10-07 RX ADMIN — DOCUSATE SODIUM 100 MG: 100 CAPSULE, LIQUID FILLED ORAL at 21:17

## 2019-10-07 RX ADMIN — APIXABAN 5 MG: 5 TABLET, FILM COATED ORAL at 10:48

## 2019-10-07 RX ADMIN — FUROSEMIDE 40 MG: 10 INJECTION, SOLUTION INTRAMUSCULAR; INTRAVENOUS at 10:47

## 2019-10-07 RX ADMIN — Medication 6 MILLICURIE: at 11:46

## 2019-10-07 RX ADMIN — MAGNESIUM OXIDE TAB 400 MG (241.3 MG ELEMENTAL MG) 400 MG: 400 (241.3 MG) TAB at 10:48

## 2019-10-07 RX ADMIN — GABAPENTIN 100 MG: 100 CAPSULE ORAL at 21:17

## 2019-10-07 RX ADMIN — DOCUSATE SODIUM 100 MG: 100 CAPSULE, LIQUID FILLED ORAL at 13:23

## 2019-10-07 RX ADMIN — Medication 10 ML: at 21:16

## 2019-10-07 RX ADMIN — SENNOSIDES 8.6 MG: 8.6 TABLET, FILM COATED ORAL at 21:17

## 2019-10-07 RX ADMIN — ALLOPURINOL 200 MG: 100 TABLET ORAL at 10:47

## 2019-10-07 RX ADMIN — PANTOPRAZOLE SODIUM 40 MG: 40 TABLET, DELAYED RELEASE ORAL at 17:56

## 2019-10-07 RX ADMIN — Medication 10 ML: at 10:48

## 2019-10-07 RX ADMIN — GABAPENTIN 100 MG: 100 CAPSULE ORAL at 13:23

## 2019-10-07 RX ADMIN — METOPROLOL SUCCINATE 100 MG: 100 TABLET, EXTENDED RELEASE ORAL at 10:47

## 2019-10-07 RX ADMIN — GABAPENTIN 100 MG: 100 CAPSULE ORAL at 10:48

## 2019-10-07 RX ADMIN — APIXABAN 5 MG: 5 TABLET, FILM COATED ORAL at 21:17

## 2019-10-07 RX ADMIN — ALLOPURINOL 200 MG: 100 TABLET ORAL at 21:26

## 2019-10-07 RX ADMIN — BUSPIRONE HYDROCHLORIDE 30 MG: 10 TABLET ORAL at 10:48

## 2019-10-07 ASSESSMENT — PAIN SCALES - GENERAL
PAINLEVEL_OUTOF10: 0

## 2019-10-08 LAB
AFP-TUMOR MARKER: 1 NG/ML (ref 0–9)
ALBUMIN SERPL-MCNC: 3.4 G/DL (ref 3.5–5.2)
ALP BLD-CCNC: 112 U/L (ref 40–129)
ALPHA-1 ANTITRYPSIN: 186 MG/DL (ref 90–200)
ALT SERPL-CCNC: 219 U/L (ref 0–40)
AMMONIA: 30.6 UMOL/L (ref 16–60)
ANCA IFA: NORMAL
ANION GAP SERPL CALCULATED.3IONS-SCNC: 14 MMOL/L (ref 7–16)
ANTI-NUCLEAR ANTIBODY (ANA): NEGATIVE
AST SERPL-CCNC: 52 U/L (ref 0–39)
BILIRUB SERPL-MCNC: 1.1 MG/DL (ref 0–1.2)
BILIRUBIN DIRECT: 0.4 MG/DL (ref 0–0.3)
BILIRUBIN, INDIRECT: 0.7 MG/DL (ref 0–1)
BUN BLDV-MCNC: 60 MG/DL (ref 8–23)
CALCIUM SERPL-MCNC: 9 MG/DL (ref 8.6–10.2)
CHLORIDE BLD-SCNC: 93 MMOL/L (ref 98–107)
CO2: 25 MMOL/L (ref 22–29)
CREAT SERPL-MCNC: 2.3 MG/DL (ref 0.7–1.2)
GFR AFRICAN AMERICAN: 34
GFR NON-AFRICAN AMERICAN: 34 ML/MIN/1.73
GLUCOSE BLD-MCNC: 214 MG/DL (ref 74–99)
IGG 1: 1080 MG/DL (ref 240–1118)
IGG 2: 205 MG/DL (ref 124–549)
IGG 3: 122 MG/DL (ref 21–134)
IGG 4: 34 MG/DL (ref 1–123)
IGG: 1444 MG/DL (ref 700–1600)
IGM: 117 MG/DL (ref 40–230)
INR BLD: 2.1
METER GLUCOSE: 239 MG/DL (ref 74–99)
METER GLUCOSE: 258 MG/DL (ref 74–99)
METER GLUCOSE: 281 MG/DL (ref 74–99)
METER GLUCOSE: 339 MG/DL (ref 74–99)
POTASSIUM REFLEX MAGNESIUM: 4.4 MMOL/L (ref 3.5–5)
PROTHROMBIN TIME: 25 SEC (ref 9.3–12.4)
SODIUM BLD-SCNC: 132 MMOL/L (ref 132–146)
TOTAL PROTEIN: 7.6 G/DL (ref 6.4–8.3)

## 2019-10-08 PROCEDURE — 2580000003 HC RX 258: Performed by: INTERNAL MEDICINE

## 2019-10-08 PROCEDURE — 36415 COLL VENOUS BLD VENIPUNCTURE: CPT

## 2019-10-08 PROCEDURE — 2060000000 HC ICU INTERMEDIATE R&B

## 2019-10-08 PROCEDURE — 6370000000 HC RX 637 (ALT 250 FOR IP): Performed by: INTERNAL MEDICINE

## 2019-10-08 PROCEDURE — 80053 COMPREHEN METABOLIC PANEL: CPT

## 2019-10-08 PROCEDURE — 80076 HEPATIC FUNCTION PANEL: CPT

## 2019-10-08 PROCEDURE — 97530 THERAPEUTIC ACTIVITIES: CPT

## 2019-10-08 PROCEDURE — 6370000000 HC RX 637 (ALT 250 FOR IP): Performed by: NURSE PRACTITIONER

## 2019-10-08 PROCEDURE — 94660 CPAP INITIATION&MGMT: CPT

## 2019-10-08 PROCEDURE — 99232 SBSQ HOSP IP/OBS MODERATE 35: CPT | Performed by: INTERNAL MEDICINE

## 2019-10-08 PROCEDURE — 82140 ASSAY OF AMMONIA: CPT

## 2019-10-08 PROCEDURE — 6370000000 HC RX 637 (ALT 250 FOR IP): Performed by: STUDENT IN AN ORGANIZED HEALTH CARE EDUCATION/TRAINING PROGRAM

## 2019-10-08 PROCEDURE — 85610 PROTHROMBIN TIME: CPT

## 2019-10-08 PROCEDURE — 6360000002 HC RX W HCPCS: Performed by: INTERNAL MEDICINE

## 2019-10-08 PROCEDURE — 82962 GLUCOSE BLOOD TEST: CPT

## 2019-10-08 RX ORDER — INSULIN GLARGINE 100 [IU]/ML
10 INJECTION, SOLUTION SUBCUTANEOUS NIGHTLY
Status: DISCONTINUED | OUTPATIENT
Start: 2019-10-08 | End: 2019-10-09

## 2019-10-08 RX ADMIN — INSULIN LISPRO 6 UNITS: 100 INJECTION, SOLUTION INTRAVENOUS; SUBCUTANEOUS at 06:57

## 2019-10-08 RX ADMIN — Medication 10 ML: at 18:31

## 2019-10-08 RX ADMIN — CHLOROTHIAZIDE SODIUM 500 MG: 500 INJECTION, POWDER, LYOPHILIZED, FOR SOLUTION INTRAVENOUS at 14:35

## 2019-10-08 RX ADMIN — SENNOSIDES 8.6 MG: 8.6 TABLET, FILM COATED ORAL at 20:25

## 2019-10-08 RX ADMIN — GABAPENTIN 100 MG: 100 CAPSULE ORAL at 09:25

## 2019-10-08 RX ADMIN — BUSPIRONE HYDROCHLORIDE 30 MG: 10 TABLET ORAL at 09:24

## 2019-10-08 RX ADMIN — ALLOPURINOL 200 MG: 100 TABLET ORAL at 09:25

## 2019-10-08 RX ADMIN — APIXABAN 5 MG: 5 TABLET, FILM COATED ORAL at 20:25

## 2019-10-08 RX ADMIN — APIXABAN 5 MG: 5 TABLET, FILM COATED ORAL at 09:24

## 2019-10-08 RX ADMIN — Medication 10 ML: at 09:26

## 2019-10-08 RX ADMIN — GABAPENTIN 100 MG: 100 CAPSULE ORAL at 20:25

## 2019-10-08 RX ADMIN — METOPROLOL SUCCINATE 100 MG: 100 TABLET, EXTENDED RELEASE ORAL at 20:25

## 2019-10-08 RX ADMIN — Medication 10 ML: at 14:35

## 2019-10-08 RX ADMIN — FUROSEMIDE 40 MG: 10 INJECTION, SOLUTION INTRAMUSCULAR; INTRAVENOUS at 18:31

## 2019-10-08 RX ADMIN — GABAPENTIN 100 MG: 100 CAPSULE ORAL at 14:35

## 2019-10-08 RX ADMIN — INSULIN LISPRO 12 UNITS: 100 INJECTION, SOLUTION INTRAVENOUS; SUBCUTANEOUS at 11:13

## 2019-10-08 RX ADMIN — PANTOPRAZOLE SODIUM 40 MG: 40 TABLET, DELAYED RELEASE ORAL at 06:57

## 2019-10-08 RX ADMIN — Medication 10 ML: at 20:26

## 2019-10-08 RX ADMIN — INSULIN GLARGINE 10 UNITS: 100 INJECTION, SOLUTION SUBCUTANEOUS at 20:30

## 2019-10-08 RX ADMIN — METOPROLOL SUCCINATE 100 MG: 100 TABLET, EXTENDED RELEASE ORAL at 09:23

## 2019-10-08 RX ADMIN — FUROSEMIDE 40 MG: 10 INJECTION, SOLUTION INTRAMUSCULAR; INTRAVENOUS at 09:26

## 2019-10-08 RX ADMIN — PANTOPRAZOLE SODIUM 40 MG: 40 TABLET, DELAYED RELEASE ORAL at 16:26

## 2019-10-08 RX ADMIN — MAGNESIUM OXIDE TAB 400 MG (241.3 MG ELEMENTAL MG) 400 MG: 400 (241.3 MG) TAB at 09:23

## 2019-10-08 RX ADMIN — INSULIN LISPRO 9 UNITS: 100 INJECTION, SOLUTION INTRAVENOUS; SUBCUTANEOUS at 20:29

## 2019-10-08 RX ADMIN — DOCUSATE SODIUM 100 MG: 100 CAPSULE, LIQUID FILLED ORAL at 09:24

## 2019-10-08 RX ADMIN — DOCUSATE SODIUM 100 MG: 100 CAPSULE, LIQUID FILLED ORAL at 20:25

## 2019-10-08 RX ADMIN — INSULIN LISPRO 9 UNITS: 100 INJECTION, SOLUTION INTRAVENOUS; SUBCUTANEOUS at 16:26

## 2019-10-08 RX ADMIN — ALLOPURINOL 200 MG: 100 TABLET ORAL at 20:25

## 2019-10-08 ASSESSMENT — ENCOUNTER SYMPTOMS
TROUBLE SWALLOWING: 0
BACK PAIN: 0
VOMITING: 0
SHORTNESS OF BREATH: 0
NAUSEA: 0
DIARRHEA: 0
COUGH: 0
PHOTOPHOBIA: 0
ABDOMINAL PAIN: 1
CHEST TIGHTNESS: 0
ABDOMINAL DISTENTION: 0

## 2019-10-08 ASSESSMENT — PAIN SCALES - GENERAL
PAINLEVEL_OUTOF10: 6
PAINLEVEL_OUTOF10: 0

## 2019-10-09 LAB
ALBUMIN SERPL-MCNC: 3.4 G/DL (ref 3.5–5.2)
ALP BLD-CCNC: 113 U/L (ref 40–129)
ALT SERPL-CCNC: 161 U/L (ref 0–40)
AMMONIA: 50.7 UMOL/L (ref 16–60)
ANION GAP SERPL CALCULATED.3IONS-SCNC: 13 MMOL/L (ref 7–16)
ANTI-MITOCHONDRIAL AB, IFA: NEGATIVE
AST SERPL-CCNC: 30 U/L (ref 0–39)
BILIRUB SERPL-MCNC: 1 MG/DL (ref 0–1.2)
BILIRUBIN DIRECT: 0.3 MG/DL (ref 0–0.3)
BILIRUBIN, INDIRECT: 0.7 MG/DL (ref 0–1)
BUN BLDV-MCNC: 57 MG/DL (ref 8–23)
CALCIUM SERPL-MCNC: 9.2 MG/DL (ref 8.6–10.2)
CHLORIDE BLD-SCNC: 95 MMOL/L (ref 98–107)
CO2: 24 MMOL/L (ref 22–29)
CREAT SERPL-MCNC: 2.1 MG/DL (ref 0.7–1.2)
GFR AFRICAN AMERICAN: 38
GFR NON-AFRICAN AMERICAN: 38 ML/MIN/1.73
GLUCOSE BLD-MCNC: 255 MG/DL (ref 74–99)
INR BLD: 1.7
MAGNESIUM: 2.3 MG/DL (ref 1.6–2.6)
METER GLUCOSE: 254 MG/DL (ref 74–99)
METER GLUCOSE: 279 MG/DL (ref 74–99)
METER GLUCOSE: 304 MG/DL (ref 74–99)
METER GLUCOSE: 341 MG/DL (ref 74–99)
PHOSPHORUS: 3.3 MG/DL (ref 2.5–4.5)
POTASSIUM REFLEX MAGNESIUM: 4.6 MMOL/L (ref 3.5–5)
POTASSIUM SERPL-SCNC: 4.6 MMOL/L (ref 3.5–5)
PROTHROMBIN TIME: 19.9 SEC (ref 9.3–12.4)
SODIUM BLD-SCNC: 132 MMOL/L (ref 132–146)
TOTAL PROTEIN: 7.8 G/DL (ref 6.4–8.3)

## 2019-10-09 PROCEDURE — 2060000000 HC ICU INTERMEDIATE R&B

## 2019-10-09 PROCEDURE — 6360000002 HC RX W HCPCS: Performed by: INTERNAL MEDICINE

## 2019-10-09 PROCEDURE — 94660 CPAP INITIATION&MGMT: CPT

## 2019-10-09 PROCEDURE — 80076 HEPATIC FUNCTION PANEL: CPT

## 2019-10-09 PROCEDURE — 83735 ASSAY OF MAGNESIUM: CPT

## 2019-10-09 PROCEDURE — 6370000000 HC RX 637 (ALT 250 FOR IP): Performed by: INTERNAL MEDICINE

## 2019-10-09 PROCEDURE — 36415 COLL VENOUS BLD VENIPUNCTURE: CPT

## 2019-10-09 PROCEDURE — 82962 GLUCOSE BLOOD TEST: CPT

## 2019-10-09 PROCEDURE — 6370000000 HC RX 637 (ALT 250 FOR IP): Performed by: PHYSICIAN ASSISTANT

## 2019-10-09 PROCEDURE — 99232 SBSQ HOSP IP/OBS MODERATE 35: CPT | Performed by: INTERNAL MEDICINE

## 2019-10-09 PROCEDURE — 80053 COMPREHEN METABOLIC PANEL: CPT

## 2019-10-09 PROCEDURE — 6370000000 HC RX 637 (ALT 250 FOR IP): Performed by: NURSE PRACTITIONER

## 2019-10-09 PROCEDURE — 82140 ASSAY OF AMMONIA: CPT

## 2019-10-09 PROCEDURE — 2580000003 HC RX 258: Performed by: INTERNAL MEDICINE

## 2019-10-09 PROCEDURE — APPSS30 APP SPLIT SHARED TIME 16-30 MINUTES: Performed by: PHYSICIAN ASSISTANT

## 2019-10-09 PROCEDURE — 85610 PROTHROMBIN TIME: CPT

## 2019-10-09 PROCEDURE — 84100 ASSAY OF PHOSPHORUS: CPT

## 2019-10-09 PROCEDURE — 80048 BASIC METABOLIC PNL TOTAL CA: CPT

## 2019-10-09 PROCEDURE — 6370000000 HC RX 637 (ALT 250 FOR IP): Performed by: STUDENT IN AN ORGANIZED HEALTH CARE EDUCATION/TRAINING PROGRAM

## 2019-10-09 RX ORDER — INSULIN GLARGINE 100 [IU]/ML
20 INJECTION, SOLUTION SUBCUTANEOUS NIGHTLY
Status: DISCONTINUED | OUTPATIENT
Start: 2019-10-09 | End: 2019-10-20

## 2019-10-09 RX ORDER — MIDODRINE HYDROCHLORIDE 5 MG/1
5 TABLET ORAL
Status: DISCONTINUED | OUTPATIENT
Start: 2019-10-10 | End: 2019-10-21 | Stop reason: HOSPADM

## 2019-10-09 RX ADMIN — CHLOROTHIAZIDE SODIUM 500 MG: 500 INJECTION, POWDER, LYOPHILIZED, FOR SOLUTION INTRAVENOUS at 01:09

## 2019-10-09 RX ADMIN — INSULIN LISPRO 9 UNITS: 100 INJECTION, SOLUTION INTRAVENOUS; SUBCUTANEOUS at 06:28

## 2019-10-09 RX ADMIN — MAGNESIUM HYDROXIDE 30 ML: 400 SUSPENSION ORAL at 11:56

## 2019-10-09 RX ADMIN — PANTOPRAZOLE SODIUM 40 MG: 40 TABLET, DELAYED RELEASE ORAL at 17:42

## 2019-10-09 RX ADMIN — GABAPENTIN 100 MG: 100 CAPSULE ORAL at 15:14

## 2019-10-09 RX ADMIN — METOPROLOL SUCCINATE 100 MG: 100 TABLET, EXTENDED RELEASE ORAL at 09:45

## 2019-10-09 RX ADMIN — FUROSEMIDE 40 MG: 10 INJECTION, SOLUTION INTRAMUSCULAR; INTRAVENOUS at 09:45

## 2019-10-09 RX ADMIN — INSULIN LISPRO 12 UNITS: 100 INJECTION, SOLUTION INTRAVENOUS; SUBCUTANEOUS at 11:49

## 2019-10-09 RX ADMIN — GABAPENTIN 100 MG: 100 CAPSULE ORAL at 20:59

## 2019-10-09 RX ADMIN — GABAPENTIN 100 MG: 100 CAPSULE ORAL at 09:44

## 2019-10-09 RX ADMIN — ALLOPURINOL 200 MG: 100 TABLET ORAL at 20:59

## 2019-10-09 RX ADMIN — MAGNESIUM OXIDE TAB 400 MG (241.3 MG ELEMENTAL MG) 400 MG: 400 (241.3 MG) TAB at 09:44

## 2019-10-09 RX ADMIN — DOCUSATE SODIUM 100 MG: 100 CAPSULE, LIQUID FILLED ORAL at 21:00

## 2019-10-09 RX ADMIN — APIXABAN 5 MG: 5 TABLET, FILM COATED ORAL at 09:44

## 2019-10-09 RX ADMIN — ALLOPURINOL 200 MG: 100 TABLET ORAL at 09:44

## 2019-10-09 RX ADMIN — PANTOPRAZOLE SODIUM 40 MG: 40 TABLET, DELAYED RELEASE ORAL at 06:27

## 2019-10-09 RX ADMIN — Medication 10 ML: at 20:59

## 2019-10-09 RX ADMIN — INSULIN GLARGINE 20 UNITS: 100 INJECTION, SOLUTION SUBCUTANEOUS at 21:02

## 2019-10-09 RX ADMIN — INSULIN LISPRO 9 UNITS: 100 INJECTION, SOLUTION INTRAVENOUS; SUBCUTANEOUS at 17:45

## 2019-10-09 RX ADMIN — Medication 10 ML: at 01:09

## 2019-10-09 RX ADMIN — Medication 10 ML: at 10:05

## 2019-10-09 RX ADMIN — DOCUSATE SODIUM 100 MG: 100 CAPSULE, LIQUID FILLED ORAL at 09:45

## 2019-10-09 RX ADMIN — BUSPIRONE HYDROCHLORIDE 30 MG: 10 TABLET ORAL at 09:45

## 2019-10-09 RX ADMIN — APIXABAN 5 MG: 5 TABLET, FILM COATED ORAL at 20:59

## 2019-10-09 RX ADMIN — SENNOSIDES 8.6 MG: 8.6 TABLET, FILM COATED ORAL at 21:00

## 2019-10-09 RX ADMIN — INSULIN LISPRO 12 UNITS: 100 INJECTION, SOLUTION INTRAVENOUS; SUBCUTANEOUS at 21:01

## 2019-10-09 ASSESSMENT — ENCOUNTER SYMPTOMS
SHORTNESS OF BREATH: 0
COUGH: 0
VOMITING: 0
TROUBLE SWALLOWING: 0
ABDOMINAL PAIN: 1
CHEST TIGHTNESS: 0
ABDOMINAL DISTENTION: 0
DIARRHEA: 0
PHOTOPHOBIA: 0
NAUSEA: 0
BACK PAIN: 0

## 2019-10-09 ASSESSMENT — PAIN SCALES - GENERAL
PAINLEVEL_OUTOF10: 0

## 2019-10-10 LAB
ALBUMIN SERPL-MCNC: 3.5 G/DL (ref 3.5–5.2)
ALP BLD-CCNC: 108 U/L (ref 40–129)
ALT SERPL-CCNC: 107 U/L (ref 0–40)
ANION GAP SERPL CALCULATED.3IONS-SCNC: 12 MMOL/L (ref 7–16)
AST SERPL-CCNC: 20 U/L (ref 0–39)
BILIRUB SERPL-MCNC: 1 MG/DL (ref 0–1.2)
BILIRUBIN DIRECT: 0.3 MG/DL (ref 0–0.3)
BILIRUBIN, INDIRECT: 0.7 MG/DL (ref 0–1)
BUN BLDV-MCNC: 56 MG/DL (ref 8–23)
CALCIUM SERPL-MCNC: 9.4 MG/DL (ref 8.6–10.2)
CHLORIDE BLD-SCNC: 93 MMOL/L (ref 98–107)
CO2: 28 MMOL/L (ref 22–29)
CREAT SERPL-MCNC: 2 MG/DL (ref 0.7–1.2)
GFR AFRICAN AMERICAN: 41
GFR NON-AFRICAN AMERICAN: 41 ML/MIN/1.73
GLUCOSE BLD-MCNC: 251 MG/DL (ref 74–99)
INR BLD: 1.7
METER GLUCOSE: 208 MG/DL (ref 74–99)
METER GLUCOSE: 229 MG/DL (ref 74–99)
METER GLUCOSE: 269 MG/DL (ref 74–99)
METER GLUCOSE: 306 MG/DL (ref 74–99)
POTASSIUM REFLEX MAGNESIUM: 4.4 MMOL/L (ref 3.5–5)
PROTHROMBIN TIME: 19.7 SEC (ref 9.3–12.4)
SODIUM BLD-SCNC: 133 MMOL/L (ref 132–146)
TOTAL PROTEIN: 7.5 G/DL (ref 6.4–8.3)

## 2019-10-10 PROCEDURE — 36415 COLL VENOUS BLD VENIPUNCTURE: CPT

## 2019-10-10 PROCEDURE — 6370000000 HC RX 637 (ALT 250 FOR IP): Performed by: INTERNAL MEDICINE

## 2019-10-10 PROCEDURE — 99232 SBSQ HOSP IP/OBS MODERATE 35: CPT | Performed by: INTERNAL MEDICINE

## 2019-10-10 PROCEDURE — 85610 PROTHROMBIN TIME: CPT

## 2019-10-10 PROCEDURE — 80076 HEPATIC FUNCTION PANEL: CPT

## 2019-10-10 PROCEDURE — 94660 CPAP INITIATION&MGMT: CPT

## 2019-10-10 PROCEDURE — 6370000000 HC RX 637 (ALT 250 FOR IP): Performed by: NURSE PRACTITIONER

## 2019-10-10 PROCEDURE — 2580000003 HC RX 258: Performed by: INTERNAL MEDICINE

## 2019-10-10 PROCEDURE — 6370000000 HC RX 637 (ALT 250 FOR IP): Performed by: PHYSICIAN ASSISTANT

## 2019-10-10 PROCEDURE — 2060000000 HC ICU INTERMEDIATE R&B

## 2019-10-10 PROCEDURE — 82962 GLUCOSE BLOOD TEST: CPT

## 2019-10-10 PROCEDURE — 80053 COMPREHEN METABOLIC PANEL: CPT

## 2019-10-10 PROCEDURE — 6370000000 HC RX 637 (ALT 250 FOR IP): Performed by: STUDENT IN AN ORGANIZED HEALTH CARE EDUCATION/TRAINING PROGRAM

## 2019-10-10 RX ADMIN — GABAPENTIN 100 MG: 100 CAPSULE ORAL at 21:09

## 2019-10-10 RX ADMIN — ALLOPURINOL 200 MG: 100 TABLET ORAL at 21:10

## 2019-10-10 RX ADMIN — INSULIN GLARGINE 20 UNITS: 100 INJECTION, SOLUTION SUBCUTANEOUS at 21:10

## 2019-10-10 RX ADMIN — ALLOPURINOL 200 MG: 100 TABLET ORAL at 09:09

## 2019-10-10 RX ADMIN — INSULIN LISPRO 6 UNITS: 100 INJECTION, SOLUTION INTRAVENOUS; SUBCUTANEOUS at 11:27

## 2019-10-10 RX ADMIN — PANTOPRAZOLE SODIUM 40 MG: 40 TABLET, DELAYED RELEASE ORAL at 16:04

## 2019-10-10 RX ADMIN — INSULIN LISPRO 9 UNITS: 100 INJECTION, SOLUTION INTRAVENOUS; SUBCUTANEOUS at 21:10

## 2019-10-10 RX ADMIN — MAGNESIUM OXIDE TAB 400 MG (241.3 MG ELEMENTAL MG) 400 MG: 400 (241.3 MG) TAB at 09:09

## 2019-10-10 RX ADMIN — PANTOPRAZOLE SODIUM 40 MG: 40 TABLET, DELAYED RELEASE ORAL at 06:55

## 2019-10-10 RX ADMIN — DOCUSATE SODIUM 100 MG: 100 CAPSULE, LIQUID FILLED ORAL at 09:09

## 2019-10-10 RX ADMIN — GABAPENTIN 100 MG: 100 CAPSULE ORAL at 09:09

## 2019-10-10 RX ADMIN — Medication 10 ML: at 21:11

## 2019-10-10 RX ADMIN — MIDODRINE HYDROCHLORIDE 5 MG: 5 TABLET ORAL at 11:27

## 2019-10-10 RX ADMIN — BUSPIRONE HYDROCHLORIDE 30 MG: 10 TABLET ORAL at 09:09

## 2019-10-10 RX ADMIN — ACETAMINOPHEN 650 MG: 325 TABLET ORAL at 06:55

## 2019-10-10 RX ADMIN — GABAPENTIN 100 MG: 100 CAPSULE ORAL at 13:24

## 2019-10-10 RX ADMIN — SENNOSIDES 8.6 MG: 8.6 TABLET, FILM COATED ORAL at 21:10

## 2019-10-10 RX ADMIN — Medication 10 ML: at 09:09

## 2019-10-10 RX ADMIN — MIDODRINE HYDROCHLORIDE 5 MG: 5 TABLET ORAL at 16:04

## 2019-10-10 RX ADMIN — METOPROLOL SUCCINATE 100 MG: 100 TABLET, EXTENDED RELEASE ORAL at 09:09

## 2019-10-10 RX ADMIN — APIXABAN 5 MG: 5 TABLET, FILM COATED ORAL at 09:09

## 2019-10-10 RX ADMIN — MIDODRINE HYDROCHLORIDE 5 MG: 5 TABLET ORAL at 09:09

## 2019-10-10 RX ADMIN — INSULIN LISPRO 12 UNITS: 100 INJECTION, SOLUTION INTRAVENOUS; SUBCUTANEOUS at 16:04

## 2019-10-10 RX ADMIN — DOCUSATE SODIUM 100 MG: 100 CAPSULE, LIQUID FILLED ORAL at 21:10

## 2019-10-10 RX ADMIN — APIXABAN 5 MG: 5 TABLET, FILM COATED ORAL at 21:10

## 2019-10-10 RX ADMIN — ACETAMINOPHEN 650 MG: 325 TABLET ORAL at 23:37

## 2019-10-10 RX ADMIN — INSULIN LISPRO 6 UNITS: 100 INJECTION, SOLUTION INTRAVENOUS; SUBCUTANEOUS at 06:56

## 2019-10-10 ASSESSMENT — PAIN DESCRIPTION - ORIENTATION: ORIENTATION: ANTERIOR

## 2019-10-10 ASSESSMENT — PAIN DESCRIPTION - DESCRIPTORS
DESCRIPTORS: HEADACHE
DESCRIPTORS: HEADACHE

## 2019-10-10 ASSESSMENT — PAIN DESCRIPTION - ONSET
ONSET: ON-GOING
ONSET: ON-GOING

## 2019-10-10 ASSESSMENT — PAIN SCALES - GENERAL
PAINLEVEL_OUTOF10: 5
PAINLEVEL_OUTOF10: 6
PAINLEVEL_OUTOF10: 6
PAINLEVEL_OUTOF10: 0

## 2019-10-10 ASSESSMENT — PAIN DESCRIPTION - LOCATION
LOCATION: HEAD
LOCATION: HEAD

## 2019-10-10 ASSESSMENT — PAIN DESCRIPTION - PAIN TYPE
TYPE: ACUTE PAIN
TYPE: ACUTE PAIN

## 2019-10-10 ASSESSMENT — PAIN - FUNCTIONAL ASSESSMENT
PAIN_FUNCTIONAL_ASSESSMENT: ACTIVITIES ARE NOT PREVENTED
PAIN_FUNCTIONAL_ASSESSMENT: ACTIVITIES ARE NOT PREVENTED

## 2019-10-10 ASSESSMENT — PAIN DESCRIPTION - FREQUENCY
FREQUENCY: INTERMITTENT
FREQUENCY: CONTINUOUS

## 2019-10-10 ASSESSMENT — PAIN DESCRIPTION - PROGRESSION: CLINICAL_PROGRESSION: NOT CHANGED

## 2019-10-11 LAB
ALBUMIN SERPL-MCNC: 3.3 G/DL (ref 3.5–5.2)
ALP BLD-CCNC: 109 U/L (ref 40–129)
ALT SERPL-CCNC: 86 U/L (ref 0–40)
ANION GAP SERPL CALCULATED.3IONS-SCNC: 12 MMOL/L (ref 7–16)
AST SERPL-CCNC: 18 U/L (ref 0–39)
BILIRUB SERPL-MCNC: 1.1 MG/DL (ref 0–1.2)
BILIRUBIN DIRECT: 0.3 MG/DL (ref 0–0.3)
BILIRUBIN, INDIRECT: 0.8 MG/DL (ref 0–1)
BUN BLDV-MCNC: 51 MG/DL (ref 8–23)
CALCIUM SERPL-MCNC: 9.1 MG/DL (ref 8.6–10.2)
CHLORIDE BLD-SCNC: 93 MMOL/L (ref 98–107)
CO2: 26 MMOL/L (ref 22–29)
CREAT SERPL-MCNC: 1.7 MG/DL (ref 0.7–1.2)
GFR AFRICAN AMERICAN: 49
GFR NON-AFRICAN AMERICAN: 49 ML/MIN/1.73
GLUCOSE BLD-MCNC: 219 MG/DL (ref 74–99)
INR BLD: 1.7
METER GLUCOSE: 215 MG/DL (ref 74–99)
METER GLUCOSE: 272 MG/DL (ref 74–99)
METER GLUCOSE: 273 MG/DL (ref 74–99)
POTASSIUM REFLEX MAGNESIUM: 4.2 MMOL/L (ref 3.5–5)
PROTHROMBIN TIME: 20.7 SEC (ref 9.3–12.4)
SODIUM BLD-SCNC: 131 MMOL/L (ref 132–146)
TOTAL PROTEIN: 7.6 G/DL (ref 6.4–8.3)

## 2019-10-11 PROCEDURE — 2060000000 HC ICU INTERMEDIATE R&B

## 2019-10-11 PROCEDURE — 6370000000 HC RX 637 (ALT 250 FOR IP): Performed by: PHYSICIAN ASSISTANT

## 2019-10-11 PROCEDURE — 99232 SBSQ HOSP IP/OBS MODERATE 35: CPT | Performed by: INTERNAL MEDICINE

## 2019-10-11 PROCEDURE — 36415 COLL VENOUS BLD VENIPUNCTURE: CPT

## 2019-10-11 PROCEDURE — 2580000003 HC RX 258: Performed by: INTERNAL MEDICINE

## 2019-10-11 PROCEDURE — 80076 HEPATIC FUNCTION PANEL: CPT

## 2019-10-11 PROCEDURE — 94660 CPAP INITIATION&MGMT: CPT

## 2019-10-11 PROCEDURE — 6370000000 HC RX 637 (ALT 250 FOR IP): Performed by: INTERNAL MEDICINE

## 2019-10-11 PROCEDURE — 6370000000 HC RX 637 (ALT 250 FOR IP): Performed by: STUDENT IN AN ORGANIZED HEALTH CARE EDUCATION/TRAINING PROGRAM

## 2019-10-11 PROCEDURE — 6370000000 HC RX 637 (ALT 250 FOR IP): Performed by: NURSE PRACTITIONER

## 2019-10-11 PROCEDURE — 80053 COMPREHEN METABOLIC PANEL: CPT

## 2019-10-11 PROCEDURE — 85610 PROTHROMBIN TIME: CPT

## 2019-10-11 PROCEDURE — 82962 GLUCOSE BLOOD TEST: CPT

## 2019-10-11 RX ADMIN — Medication 10 ML: at 15:59

## 2019-10-11 RX ADMIN — DOCUSATE SODIUM 100 MG: 100 CAPSULE, LIQUID FILLED ORAL at 08:29

## 2019-10-11 RX ADMIN — INSULIN LISPRO 6 UNITS: 100 INJECTION, SOLUTION INTRAVENOUS; SUBCUTANEOUS at 11:08

## 2019-10-11 RX ADMIN — ALLOPURINOL 200 MG: 100 TABLET ORAL at 20:03

## 2019-10-11 RX ADMIN — ALLOPURINOL 200 MG: 100 TABLET ORAL at 08:29

## 2019-10-11 RX ADMIN — INSULIN GLARGINE 20 UNITS: 100 INJECTION, SOLUTION SUBCUTANEOUS at 20:02

## 2019-10-11 RX ADMIN — PANTOPRAZOLE SODIUM 40 MG: 40 TABLET, DELAYED RELEASE ORAL at 16:10

## 2019-10-11 RX ADMIN — MIDODRINE HYDROCHLORIDE 5 MG: 5 TABLET ORAL at 11:15

## 2019-10-11 RX ADMIN — APIXABAN 5 MG: 5 TABLET, FILM COATED ORAL at 08:30

## 2019-10-11 RX ADMIN — BUSPIRONE HYDROCHLORIDE 30 MG: 10 TABLET ORAL at 08:29

## 2019-10-11 RX ADMIN — GABAPENTIN 100 MG: 100 CAPSULE ORAL at 20:03

## 2019-10-11 RX ADMIN — METOPROLOL SUCCINATE 100 MG: 100 TABLET, EXTENDED RELEASE ORAL at 20:03

## 2019-10-11 RX ADMIN — SENNOSIDES 8.6 MG: 8.6 TABLET, FILM COATED ORAL at 20:03

## 2019-10-11 RX ADMIN — MIDODRINE HYDROCHLORIDE 5 MG: 5 TABLET ORAL at 16:10

## 2019-10-11 RX ADMIN — PANTOPRAZOLE SODIUM 40 MG: 40 TABLET, DELAYED RELEASE ORAL at 08:29

## 2019-10-11 RX ADMIN — GABAPENTIN 100 MG: 100 CAPSULE ORAL at 16:10

## 2019-10-11 RX ADMIN — INSULIN LISPRO 9 UNITS: 100 INJECTION, SOLUTION INTRAVENOUS; SUBCUTANEOUS at 20:01

## 2019-10-11 RX ADMIN — Medication 10 ML: at 08:30

## 2019-10-11 RX ADMIN — Medication 10 ML: at 20:03

## 2019-10-11 RX ADMIN — INSULIN LISPRO 9 UNITS: 100 INJECTION, SOLUTION INTRAVENOUS; SUBCUTANEOUS at 16:09

## 2019-10-11 RX ADMIN — DOCUSATE SODIUM 100 MG: 100 CAPSULE, LIQUID FILLED ORAL at 20:03

## 2019-10-11 RX ADMIN — GABAPENTIN 100 MG: 100 CAPSULE ORAL at 08:29

## 2019-10-11 RX ADMIN — MAGNESIUM OXIDE TAB 400 MG (241.3 MG ELEMENTAL MG) 400 MG: 400 (241.3 MG) TAB at 08:29

## 2019-10-11 RX ADMIN — INSULIN LISPRO 6 UNITS: 100 INJECTION, SOLUTION INTRAVENOUS; SUBCUTANEOUS at 08:30

## 2019-10-11 RX ADMIN — MIDODRINE HYDROCHLORIDE 5 MG: 5 TABLET ORAL at 08:29

## 2019-10-11 RX ADMIN — APIXABAN 5 MG: 5 TABLET, FILM COATED ORAL at 20:03

## 2019-10-11 ASSESSMENT — PAIN SCALES - GENERAL
PAINLEVEL_OUTOF10: 0
PAINLEVEL_OUTOF10: 0
PAINLEVEL_OUTOF10: 2
PAINLEVEL_OUTOF10: 0

## 2019-10-12 LAB
ALBUMIN SERPL-MCNC: 3.4 G/DL (ref 3.5–5.2)
ALP BLD-CCNC: 105 U/L (ref 40–129)
ALT SERPL-CCNC: 70 U/L (ref 0–40)
ANION GAP SERPL CALCULATED.3IONS-SCNC: 11 MMOL/L (ref 7–16)
AST SERPL-CCNC: 18 U/L (ref 0–39)
BILIRUB SERPL-MCNC: 1.2 MG/DL (ref 0–1.2)
BILIRUBIN DIRECT: 0.4 MG/DL (ref 0–0.3)
BILIRUBIN, INDIRECT: 0.8 MG/DL (ref 0–1)
BUN BLDV-MCNC: 50 MG/DL (ref 8–23)
CALCIUM SERPL-MCNC: 9.3 MG/DL (ref 8.6–10.2)
CHLORIDE BLD-SCNC: 94 MMOL/L (ref 98–107)
CO2: 27 MMOL/L (ref 22–29)
CREAT SERPL-MCNC: 1.8 MG/DL (ref 0.7–1.2)
GFR AFRICAN AMERICAN: 46
GFR NON-AFRICAN AMERICAN: 46 ML/MIN/1.73
GLUCOSE BLD-MCNC: 224 MG/DL (ref 74–99)
MAGNESIUM: 2.4 MG/DL (ref 1.6–2.6)
METER GLUCOSE: 193 MG/DL (ref 74–99)
METER GLUCOSE: 199 MG/DL (ref 74–99)
METER GLUCOSE: 218 MG/DL (ref 74–99)
METER GLUCOSE: 238 MG/DL (ref 74–99)
PHOSPHORUS: 2.9 MG/DL (ref 2.5–4.5)
POTASSIUM REFLEX MAGNESIUM: 4.7 MMOL/L (ref 3.5–5)
POTASSIUM SERPL-SCNC: 4.7 MMOL/L (ref 3.5–5)
SODIUM BLD-SCNC: 132 MMOL/L (ref 132–146)
TOTAL PROTEIN: 7.7 G/DL (ref 6.4–8.3)

## 2019-10-12 PROCEDURE — 6370000000 HC RX 637 (ALT 250 FOR IP): Performed by: NURSE PRACTITIONER

## 2019-10-12 PROCEDURE — 2580000003 HC RX 258: Performed by: INTERNAL MEDICINE

## 2019-10-12 PROCEDURE — 6370000000 HC RX 637 (ALT 250 FOR IP): Performed by: INTERNAL MEDICINE

## 2019-10-12 PROCEDURE — 6370000000 HC RX 637 (ALT 250 FOR IP): Performed by: PHYSICIAN ASSISTANT

## 2019-10-12 PROCEDURE — 2500000003 HC RX 250 WO HCPCS: Performed by: INTERNAL MEDICINE

## 2019-10-12 PROCEDURE — 80048 BASIC METABOLIC PNL TOTAL CA: CPT

## 2019-10-12 PROCEDURE — 6370000000 HC RX 637 (ALT 250 FOR IP): Performed by: STUDENT IN AN ORGANIZED HEALTH CARE EDUCATION/TRAINING PROGRAM

## 2019-10-12 PROCEDURE — 99232 SBSQ HOSP IP/OBS MODERATE 35: CPT | Performed by: INTERNAL MEDICINE

## 2019-10-12 PROCEDURE — 2060000000 HC ICU INTERMEDIATE R&B

## 2019-10-12 PROCEDURE — 36415 COLL VENOUS BLD VENIPUNCTURE: CPT

## 2019-10-12 PROCEDURE — 94660 CPAP INITIATION&MGMT: CPT

## 2019-10-12 PROCEDURE — 80053 COMPREHEN METABOLIC PANEL: CPT

## 2019-10-12 PROCEDURE — 80076 HEPATIC FUNCTION PANEL: CPT

## 2019-10-12 PROCEDURE — 82962 GLUCOSE BLOOD TEST: CPT

## 2019-10-12 PROCEDURE — 83735 ASSAY OF MAGNESIUM: CPT

## 2019-10-12 PROCEDURE — 84100 ASSAY OF PHOSPHORUS: CPT

## 2019-10-12 RX ORDER — BUMETANIDE 0.25 MG/ML
2 INJECTION, SOLUTION INTRAMUSCULAR; INTRAVENOUS ONCE
Status: COMPLETED | OUTPATIENT
Start: 2019-10-12 | End: 2019-10-12

## 2019-10-12 RX ADMIN — INSULIN LISPRO 6 UNITS: 100 INJECTION, SOLUTION INTRAVENOUS; SUBCUTANEOUS at 21:35

## 2019-10-12 RX ADMIN — GABAPENTIN 100 MG: 100 CAPSULE ORAL at 21:46

## 2019-10-12 RX ADMIN — MIDODRINE HYDROCHLORIDE 5 MG: 5 TABLET ORAL at 11:42

## 2019-10-12 RX ADMIN — INSULIN LISPRO 6 UNITS: 100 INJECTION, SOLUTION INTRAVENOUS; SUBCUTANEOUS at 11:18

## 2019-10-12 RX ADMIN — GABAPENTIN 100 MG: 100 CAPSULE ORAL at 08:51

## 2019-10-12 RX ADMIN — PANTOPRAZOLE SODIUM 40 MG: 40 TABLET, DELAYED RELEASE ORAL at 15:36

## 2019-10-12 RX ADMIN — MIDODRINE HYDROCHLORIDE 5 MG: 5 TABLET ORAL at 16:49

## 2019-10-12 RX ADMIN — DOCUSATE SODIUM 100 MG: 100 CAPSULE, LIQUID FILLED ORAL at 08:51

## 2019-10-12 RX ADMIN — PANTOPRAZOLE SODIUM 40 MG: 40 TABLET, DELAYED RELEASE ORAL at 06:23

## 2019-10-12 RX ADMIN — SENNOSIDES 8.6 MG: 8.6 TABLET, FILM COATED ORAL at 21:45

## 2019-10-12 RX ADMIN — APIXABAN 5 MG: 5 TABLET, FILM COATED ORAL at 08:51

## 2019-10-12 RX ADMIN — MIDODRINE HYDROCHLORIDE 5 MG: 5 TABLET ORAL at 08:51

## 2019-10-12 RX ADMIN — ALLOPURINOL 200 MG: 100 TABLET ORAL at 08:51

## 2019-10-12 RX ADMIN — ALLOPURINOL 200 MG: 100 TABLET ORAL at 21:46

## 2019-10-12 RX ADMIN — MAGNESIUM OXIDE TAB 400 MG (241.3 MG ELEMENTAL MG) 400 MG: 400 (241.3 MG) TAB at 08:51

## 2019-10-12 RX ADMIN — BUMETANIDE 2 MG: 0.25 INJECTION INTRAMUSCULAR; INTRAVENOUS at 12:46

## 2019-10-12 RX ADMIN — Medication 10 ML: at 08:51

## 2019-10-12 RX ADMIN — APIXABAN 5 MG: 5 TABLET, FILM COATED ORAL at 21:46

## 2019-10-12 RX ADMIN — DOCUSATE SODIUM 100 MG: 100 CAPSULE, LIQUID FILLED ORAL at 21:45

## 2019-10-12 RX ADMIN — GABAPENTIN 100 MG: 100 CAPSULE ORAL at 13:52

## 2019-10-12 RX ADMIN — BUSPIRONE HYDROCHLORIDE 30 MG: 10 TABLET ORAL at 08:50

## 2019-10-12 RX ADMIN — BUMETANIDE 0.5 MG/HR: 0.25 INJECTION INTRAMUSCULAR; INTRAVENOUS at 12:46

## 2019-10-12 RX ADMIN — INSULIN LISPRO 3 UNITS: 100 INJECTION, SOLUTION INTRAVENOUS; SUBCUTANEOUS at 16:21

## 2019-10-12 RX ADMIN — INSULIN LISPRO 3 UNITS: 100 INJECTION, SOLUTION INTRAVENOUS; SUBCUTANEOUS at 06:24

## 2019-10-12 RX ADMIN — METOPROLOL SUCCINATE 100 MG: 100 TABLET, EXTENDED RELEASE ORAL at 08:51

## 2019-10-12 RX ADMIN — ACETAMINOPHEN 650 MG: 325 TABLET ORAL at 02:05

## 2019-10-12 RX ADMIN — INSULIN GLARGINE 20 UNITS: 100 INJECTION, SOLUTION SUBCUTANEOUS at 21:35

## 2019-10-12 ASSESSMENT — PAIN SCALES - GENERAL
PAINLEVEL_OUTOF10: 0
PAINLEVEL_OUTOF10: 0
PAINLEVEL_OUTOF10: 6
PAINLEVEL_OUTOF10: 0

## 2019-10-12 ASSESSMENT — PAIN DESCRIPTION - PAIN TYPE: TYPE: ACUTE PAIN

## 2019-10-12 ASSESSMENT — PAIN DESCRIPTION - ONSET: ONSET: SUDDEN

## 2019-10-12 ASSESSMENT — PAIN DESCRIPTION - FREQUENCY: FREQUENCY: INTERMITTENT

## 2019-10-12 ASSESSMENT — PAIN - FUNCTIONAL ASSESSMENT: PAIN_FUNCTIONAL_ASSESSMENT: ACTIVITIES ARE NOT PREVENTED

## 2019-10-12 ASSESSMENT — PAIN DESCRIPTION - LOCATION: LOCATION: HEAD

## 2019-10-12 ASSESSMENT — PAIN DESCRIPTION - DESCRIPTORS: DESCRIPTORS: HEADACHE

## 2019-10-12 ASSESSMENT — PAIN DESCRIPTION - ORIENTATION: ORIENTATION: MID

## 2019-10-12 ASSESSMENT — PAIN DESCRIPTION - PROGRESSION: CLINICAL_PROGRESSION: NOT CHANGED

## 2019-10-13 LAB
ALBUMIN SERPL-MCNC: 3.4 G/DL (ref 3.5–5.2)
ALP BLD-CCNC: 108 U/L (ref 40–129)
ALT SERPL-CCNC: 55 U/L (ref 0–40)
ANION GAP SERPL CALCULATED.3IONS-SCNC: 14 MMOL/L (ref 7–16)
AST SERPL-CCNC: 25 U/L (ref 0–39)
BILIRUB SERPL-MCNC: 1.3 MG/DL (ref 0–1.2)
BILIRUBIN DIRECT: 0.3 MG/DL (ref 0–0.3)
BILIRUBIN, INDIRECT: 1 MG/DL (ref 0–1)
BUN BLDV-MCNC: 51 MG/DL (ref 8–23)
CALCIUM SERPL-MCNC: 9.4 MG/DL (ref 8.6–10.2)
CHLORIDE BLD-SCNC: 96 MMOL/L (ref 98–107)
CO2: 25 MMOL/L (ref 22–29)
CREAT SERPL-MCNC: 1.9 MG/DL (ref 0.7–1.2)
GFR AFRICAN AMERICAN: 43
GFR NON-AFRICAN AMERICAN: 43 ML/MIN/1.73
GLUCOSE BLD-MCNC: 185 MG/DL (ref 74–99)
MAGNESIUM: 2.1 MG/DL (ref 1.6–2.6)
METER GLUCOSE: 211 MG/DL (ref 74–99)
METER GLUCOSE: 223 MG/DL (ref 74–99)
METER GLUCOSE: 246 MG/DL (ref 74–99)
METER GLUCOSE: 280 MG/DL (ref 74–99)
PHOSPHORUS: 3.5 MG/DL (ref 2.5–4.5)
POTASSIUM REFLEX MAGNESIUM: 3.9 MMOL/L (ref 3.5–5)
POTASSIUM SERPL-SCNC: 3.9 MMOL/L (ref 3.5–5)
SODIUM BLD-SCNC: 135 MMOL/L (ref 132–146)
TOTAL PROTEIN: 7.8 G/DL (ref 6.4–8.3)

## 2019-10-13 PROCEDURE — 6370000000 HC RX 637 (ALT 250 FOR IP): Performed by: STUDENT IN AN ORGANIZED HEALTH CARE EDUCATION/TRAINING PROGRAM

## 2019-10-13 PROCEDURE — 6370000000 HC RX 637 (ALT 250 FOR IP): Performed by: INTERNAL MEDICINE

## 2019-10-13 PROCEDURE — 84100 ASSAY OF PHOSPHORUS: CPT

## 2019-10-13 PROCEDURE — 2580000003 HC RX 258: Performed by: INTERNAL MEDICINE

## 2019-10-13 PROCEDURE — 83735 ASSAY OF MAGNESIUM: CPT

## 2019-10-13 PROCEDURE — 2500000003 HC RX 250 WO HCPCS: Performed by: INTERNAL MEDICINE

## 2019-10-13 PROCEDURE — 6370000000 HC RX 637 (ALT 250 FOR IP): Performed by: PHYSICIAN ASSISTANT

## 2019-10-13 PROCEDURE — 82962 GLUCOSE BLOOD TEST: CPT

## 2019-10-13 PROCEDURE — 99232 SBSQ HOSP IP/OBS MODERATE 35: CPT | Performed by: INTERNAL MEDICINE

## 2019-10-13 PROCEDURE — 36415 COLL VENOUS BLD VENIPUNCTURE: CPT

## 2019-10-13 PROCEDURE — 6370000000 HC RX 637 (ALT 250 FOR IP): Performed by: NURSE PRACTITIONER

## 2019-10-13 PROCEDURE — 80076 HEPATIC FUNCTION PANEL: CPT

## 2019-10-13 PROCEDURE — 94660 CPAP INITIATION&MGMT: CPT

## 2019-10-13 PROCEDURE — 80053 COMPREHEN METABOLIC PANEL: CPT

## 2019-10-13 PROCEDURE — 80048 BASIC METABOLIC PNL TOTAL CA: CPT

## 2019-10-13 PROCEDURE — 2060000000 HC ICU INTERMEDIATE R&B

## 2019-10-13 RX ADMIN — BUMETANIDE 0.5 MG/HR: 0.25 INJECTION INTRAMUSCULAR; INTRAVENOUS at 09:08

## 2019-10-13 RX ADMIN — ALLOPURINOL 200 MG: 100 TABLET ORAL at 09:07

## 2019-10-13 RX ADMIN — INSULIN GLARGINE 20 UNITS: 100 INJECTION, SOLUTION SUBCUTANEOUS at 21:15

## 2019-10-13 RX ADMIN — GABAPENTIN 100 MG: 100 CAPSULE ORAL at 21:11

## 2019-10-13 RX ADMIN — DOCUSATE SODIUM 100 MG: 100 CAPSULE, LIQUID FILLED ORAL at 09:07

## 2019-10-13 RX ADMIN — MIDODRINE HYDROCHLORIDE 5 MG: 5 TABLET ORAL at 12:00

## 2019-10-13 RX ADMIN — APIXABAN 5 MG: 5 TABLET, FILM COATED ORAL at 09:07

## 2019-10-13 RX ADMIN — PANTOPRAZOLE SODIUM 40 MG: 40 TABLET, DELAYED RELEASE ORAL at 16:02

## 2019-10-13 RX ADMIN — APIXABAN 5 MG: 5 TABLET, FILM COATED ORAL at 21:11

## 2019-10-13 RX ADMIN — ALLOPURINOL 200 MG: 100 TABLET ORAL at 21:11

## 2019-10-13 RX ADMIN — MIDODRINE HYDROCHLORIDE 5 MG: 5 TABLET ORAL at 16:02

## 2019-10-13 RX ADMIN — GABAPENTIN 100 MG: 100 CAPSULE ORAL at 14:49

## 2019-10-13 RX ADMIN — INSULIN LISPRO 6 UNITS: 100 INJECTION, SOLUTION INTRAVENOUS; SUBCUTANEOUS at 16:14

## 2019-10-13 RX ADMIN — SENNOSIDES 8.6 MG: 8.6 TABLET, FILM COATED ORAL at 21:11

## 2019-10-13 RX ADMIN — DOCUSATE SODIUM 100 MG: 100 CAPSULE, LIQUID FILLED ORAL at 21:10

## 2019-10-13 RX ADMIN — GABAPENTIN 100 MG: 100 CAPSULE ORAL at 09:07

## 2019-10-13 RX ADMIN — BUSPIRONE HYDROCHLORIDE 30 MG: 10 TABLET ORAL at 09:07

## 2019-10-13 RX ADMIN — INSULIN LISPRO 6 UNITS: 100 INJECTION, SOLUTION INTRAVENOUS; SUBCUTANEOUS at 12:00

## 2019-10-13 RX ADMIN — MIDODRINE HYDROCHLORIDE 5 MG: 5 TABLET ORAL at 09:07

## 2019-10-13 RX ADMIN — INSULIN LISPRO 6 UNITS: 100 INJECTION, SOLUTION INTRAVENOUS; SUBCUTANEOUS at 06:13

## 2019-10-13 RX ADMIN — PANTOPRAZOLE SODIUM 40 MG: 40 TABLET, DELAYED RELEASE ORAL at 06:13

## 2019-10-13 RX ADMIN — INSULIN LISPRO 9 UNITS: 100 INJECTION, SOLUTION INTRAVENOUS; SUBCUTANEOUS at 21:15

## 2019-10-13 RX ADMIN — METOPROLOL SUCCINATE 100 MG: 100 TABLET, EXTENDED RELEASE ORAL at 09:08

## 2019-10-13 RX ADMIN — MAGNESIUM OXIDE TAB 400 MG (241.3 MG ELEMENTAL MG) 400 MG: 400 (241.3 MG) TAB at 09:08

## 2019-10-13 ASSESSMENT — ENCOUNTER SYMPTOMS
SORE THROAT: 0
NAUSEA: 0
TROUBLE SWALLOWING: 0
SHORTNESS OF BREATH: 1
ABDOMINAL DISTENTION: 0
COUGH: 0
CONSTIPATION: 0
CHEST TIGHTNESS: 0
BLOOD IN STOOL: 0
VOMITING: 0
ABDOMINAL PAIN: 0

## 2019-10-13 ASSESSMENT — PAIN SCALES - GENERAL
PAINLEVEL_OUTOF10: 0

## 2019-10-14 LAB
ALBUMIN SERPL-MCNC: 3.3 G/DL (ref 3.5–5.2)
ALP BLD-CCNC: 115 U/L (ref 40–129)
ALT SERPL-CCNC: 47 U/L (ref 0–40)
ANION GAP SERPL CALCULATED.3IONS-SCNC: 14 MMOL/L (ref 7–16)
AST SERPL-CCNC: 18 U/L (ref 0–39)
BILIRUB SERPL-MCNC: 0.9 MG/DL (ref 0–1.2)
BILIRUBIN DIRECT: 0.3 MG/DL (ref 0–0.3)
BILIRUBIN, INDIRECT: 0.6 MG/DL (ref 0–1)
BUN BLDV-MCNC: 50 MG/DL (ref 8–23)
CALCIUM SERPL-MCNC: 9.2 MG/DL (ref 8.6–10.2)
CHLORIDE BLD-SCNC: 94 MMOL/L (ref 98–107)
CO2: 27 MMOL/L (ref 22–29)
CREAT SERPL-MCNC: 2.1 MG/DL (ref 0.7–1.2)
GFR AFRICAN AMERICAN: 38
GFR NON-AFRICAN AMERICAN: 38 ML/MIN/1.73
GLUCOSE BLD-MCNC: 227 MG/DL (ref 74–99)
METER GLUCOSE: 180 MG/DL (ref 74–99)
METER GLUCOSE: 189 MG/DL (ref 74–99)
METER GLUCOSE: 232 MG/DL (ref 74–99)
METER GLUCOSE: 328 MG/DL (ref 74–99)
POTASSIUM REFLEX MAGNESIUM: 3.9 MMOL/L (ref 3.5–5)
SODIUM BLD-SCNC: 135 MMOL/L (ref 132–146)
TOTAL PROTEIN: 7.6 G/DL (ref 6.4–8.3)

## 2019-10-14 PROCEDURE — 82962 GLUCOSE BLOOD TEST: CPT

## 2019-10-14 PROCEDURE — 80076 HEPATIC FUNCTION PANEL: CPT

## 2019-10-14 PROCEDURE — 6370000000 HC RX 637 (ALT 250 FOR IP): Performed by: INTERNAL MEDICINE

## 2019-10-14 PROCEDURE — 80053 COMPREHEN METABOLIC PANEL: CPT

## 2019-10-14 PROCEDURE — 6370000000 HC RX 637 (ALT 250 FOR IP): Performed by: STUDENT IN AN ORGANIZED HEALTH CARE EDUCATION/TRAINING PROGRAM

## 2019-10-14 PROCEDURE — 2060000000 HC ICU INTERMEDIATE R&B

## 2019-10-14 PROCEDURE — 94660 CPAP INITIATION&MGMT: CPT

## 2019-10-14 PROCEDURE — 2500000003 HC RX 250 WO HCPCS: Performed by: INTERNAL MEDICINE

## 2019-10-14 PROCEDURE — 99232 SBSQ HOSP IP/OBS MODERATE 35: CPT | Performed by: INTERNAL MEDICINE

## 2019-10-14 PROCEDURE — APPSS30 APP SPLIT SHARED TIME 16-30 MINUTES: Performed by: NURSE PRACTITIONER

## 2019-10-14 PROCEDURE — 2580000003 HC RX 258: Performed by: INTERNAL MEDICINE

## 2019-10-14 PROCEDURE — 36415 COLL VENOUS BLD VENIPUNCTURE: CPT

## 2019-10-14 PROCEDURE — 6370000000 HC RX 637 (ALT 250 FOR IP): Performed by: PHYSICIAN ASSISTANT

## 2019-10-14 PROCEDURE — 6370000000 HC RX 637 (ALT 250 FOR IP): Performed by: NURSE PRACTITIONER

## 2019-10-14 RX ADMIN — ALLOPURINOL 200 MG: 100 TABLET ORAL at 21:06

## 2019-10-14 RX ADMIN — ALLOPURINOL 200 MG: 100 TABLET ORAL at 08:55

## 2019-10-14 RX ADMIN — MAGNESIUM OXIDE TAB 400 MG (241.3 MG ELEMENTAL MG) 400 MG: 400 (241.3 MG) TAB at 08:55

## 2019-10-14 RX ADMIN — INSULIN LISPRO 6 UNITS: 100 INJECTION, SOLUTION INTRAVENOUS; SUBCUTANEOUS at 07:10

## 2019-10-14 RX ADMIN — BUMETANIDE 1 MG/HR: 0.25 INJECTION INTRAMUSCULAR; INTRAVENOUS at 14:06

## 2019-10-14 RX ADMIN — APIXABAN 5 MG: 5 TABLET, FILM COATED ORAL at 08:55

## 2019-10-14 RX ADMIN — PANTOPRAZOLE SODIUM 40 MG: 40 TABLET, DELAYED RELEASE ORAL at 17:00

## 2019-10-14 RX ADMIN — GABAPENTIN 100 MG: 100 CAPSULE ORAL at 14:06

## 2019-10-14 RX ADMIN — PANTOPRAZOLE SODIUM 40 MG: 40 TABLET, DELAYED RELEASE ORAL at 07:10

## 2019-10-14 RX ADMIN — SENNOSIDES 8.6 MG: 8.6 TABLET, FILM COATED ORAL at 21:06

## 2019-10-14 RX ADMIN — INSULIN LISPRO 3 UNITS: 100 INJECTION, SOLUTION INTRAVENOUS; SUBCUTANEOUS at 21:10

## 2019-10-14 RX ADMIN — INSULIN LISPRO 12 UNITS: 100 INJECTION, SOLUTION INTRAVENOUS; SUBCUTANEOUS at 11:31

## 2019-10-14 RX ADMIN — INSULIN LISPRO 3 UNITS: 100 INJECTION, SOLUTION INTRAVENOUS; SUBCUTANEOUS at 17:00

## 2019-10-14 RX ADMIN — MIDODRINE HYDROCHLORIDE 5 MG: 5 TABLET ORAL at 17:00

## 2019-10-14 RX ADMIN — DOCUSATE SODIUM 100 MG: 100 CAPSULE, LIQUID FILLED ORAL at 08:55

## 2019-10-14 RX ADMIN — DOCUSATE SODIUM 100 MG: 100 CAPSULE, LIQUID FILLED ORAL at 21:06

## 2019-10-14 RX ADMIN — GABAPENTIN 100 MG: 100 CAPSULE ORAL at 08:55

## 2019-10-14 RX ADMIN — APIXABAN 5 MG: 5 TABLET, FILM COATED ORAL at 21:06

## 2019-10-14 RX ADMIN — GABAPENTIN 100 MG: 100 CAPSULE ORAL at 21:06

## 2019-10-14 RX ADMIN — MIDODRINE HYDROCHLORIDE 5 MG: 5 TABLET ORAL at 08:55

## 2019-10-14 RX ADMIN — BUMETANIDE 1 MG/HR: 0.25 INJECTION INTRAMUSCULAR; INTRAVENOUS at 01:09

## 2019-10-14 RX ADMIN — BUSPIRONE HYDROCHLORIDE 30 MG: 10 TABLET ORAL at 08:55

## 2019-10-14 RX ADMIN — MIDODRINE HYDROCHLORIDE 5 MG: 5 TABLET ORAL at 11:31

## 2019-10-14 RX ADMIN — INSULIN GLARGINE 20 UNITS: 100 INJECTION, SOLUTION SUBCUTANEOUS at 21:10

## 2019-10-14 ASSESSMENT — PAIN SCALES - GENERAL
PAINLEVEL_OUTOF10: 0

## 2019-10-15 LAB
ALBUMIN SERPL-MCNC: 3.4 G/DL (ref 3.5–5.2)
ALP BLD-CCNC: 110 U/L (ref 40–129)
ALT SERPL-CCNC: 37 U/L (ref 0–40)
ANION GAP SERPL CALCULATED.3IONS-SCNC: 14 MMOL/L (ref 7–16)
AST SERPL-CCNC: 18 U/L (ref 0–39)
BILIRUB SERPL-MCNC: 1.2 MG/DL (ref 0–1.2)
BILIRUBIN DIRECT: 0.3 MG/DL (ref 0–0.3)
BILIRUBIN, INDIRECT: 0.9 MG/DL (ref 0–1)
BUN BLDV-MCNC: 49 MG/DL (ref 8–23)
CALCIUM SERPL-MCNC: 9.4 MG/DL (ref 8.6–10.2)
CHLORIDE BLD-SCNC: 94 MMOL/L (ref 98–107)
CO2: 29 MMOL/L (ref 22–29)
CREAT SERPL-MCNC: 2 MG/DL (ref 0.7–1.2)
GFR AFRICAN AMERICAN: 41
GFR NON-AFRICAN AMERICAN: 41 ML/MIN/1.73
GLUCOSE BLD-MCNC: 198 MG/DL (ref 74–99)
MAGNESIUM: 1.7 MG/DL (ref 1.6–2.6)
METER GLUCOSE: 179 MG/DL (ref 74–99)
METER GLUCOSE: 180 MG/DL (ref 74–99)
METER GLUCOSE: 271 MG/DL (ref 74–99)
METER GLUCOSE: 371 MG/DL (ref 74–99)
POTASSIUM REFLEX MAGNESIUM: 3.8 MMOL/L (ref 3.5–5)
SODIUM BLD-SCNC: 137 MMOL/L (ref 132–146)
TOTAL PROTEIN: 7.7 G/DL (ref 6.4–8.3)

## 2019-10-15 PROCEDURE — 82962 GLUCOSE BLOOD TEST: CPT

## 2019-10-15 PROCEDURE — 6370000000 HC RX 637 (ALT 250 FOR IP): Performed by: INTERNAL MEDICINE

## 2019-10-15 PROCEDURE — 36415 COLL VENOUS BLD VENIPUNCTURE: CPT

## 2019-10-15 PROCEDURE — 80053 COMPREHEN METABOLIC PANEL: CPT

## 2019-10-15 PROCEDURE — 99232 SBSQ HOSP IP/OBS MODERATE 35: CPT | Performed by: INTERNAL MEDICINE

## 2019-10-15 PROCEDURE — 94660 CPAP INITIATION&MGMT: CPT

## 2019-10-15 PROCEDURE — 6370000000 HC RX 637 (ALT 250 FOR IP): Performed by: STUDENT IN AN ORGANIZED HEALTH CARE EDUCATION/TRAINING PROGRAM

## 2019-10-15 PROCEDURE — 6370000000 HC RX 637 (ALT 250 FOR IP): Performed by: NURSE PRACTITIONER

## 2019-10-15 PROCEDURE — 2580000003 HC RX 258: Performed by: INTERNAL MEDICINE

## 2019-10-15 PROCEDURE — 6370000000 HC RX 637 (ALT 250 FOR IP): Performed by: PHYSICIAN ASSISTANT

## 2019-10-15 PROCEDURE — 83735 ASSAY OF MAGNESIUM: CPT

## 2019-10-15 PROCEDURE — 80076 HEPATIC FUNCTION PANEL: CPT

## 2019-10-15 PROCEDURE — 2060000000 HC ICU INTERMEDIATE R&B

## 2019-10-15 PROCEDURE — 2500000003 HC RX 250 WO HCPCS: Performed by: INTERNAL MEDICINE

## 2019-10-15 RX ORDER — ATORVASTATIN CALCIUM 40 MG/1
80 TABLET, FILM COATED ORAL NIGHTLY
Status: DISCONTINUED | OUTPATIENT
Start: 2019-10-15 | End: 2019-10-21 | Stop reason: HOSPADM

## 2019-10-15 RX ADMIN — GABAPENTIN 100 MG: 100 CAPSULE ORAL at 09:52

## 2019-10-15 RX ADMIN — APIXABAN 5 MG: 5 TABLET, FILM COATED ORAL at 09:52

## 2019-10-15 RX ADMIN — SENNOSIDES 8.6 MG: 8.6 TABLET, FILM COATED ORAL at 20:28

## 2019-10-15 RX ADMIN — DOCUSATE SODIUM 100 MG: 100 CAPSULE, LIQUID FILLED ORAL at 09:52

## 2019-10-15 RX ADMIN — PANTOPRAZOLE SODIUM 40 MG: 40 TABLET, DELAYED RELEASE ORAL at 16:37

## 2019-10-15 RX ADMIN — BUMETANIDE 1 MG/HR: 0.25 INJECTION INTRAMUSCULAR; INTRAVENOUS at 02:15

## 2019-10-15 RX ADMIN — METOPROLOL SUCCINATE 100 MG: 100 TABLET, EXTENDED RELEASE ORAL at 09:52

## 2019-10-15 RX ADMIN — ALLOPURINOL 200 MG: 100 TABLET ORAL at 20:28

## 2019-10-15 RX ADMIN — INSULIN LISPRO 9 UNITS: 100 INJECTION, SOLUTION INTRAVENOUS; SUBCUTANEOUS at 20:29

## 2019-10-15 RX ADMIN — ATORVASTATIN CALCIUM 80 MG: 40 TABLET, FILM COATED ORAL at 20:28

## 2019-10-15 RX ADMIN — BUMETANIDE 1 MG/HR: 0.25 INJECTION INTRAMUSCULAR; INTRAVENOUS at 13:39

## 2019-10-15 RX ADMIN — INSULIN LISPRO 3 UNITS: 100 INJECTION, SOLUTION INTRAVENOUS; SUBCUTANEOUS at 06:24

## 2019-10-15 RX ADMIN — GABAPENTIN 100 MG: 100 CAPSULE ORAL at 13:39

## 2019-10-15 RX ADMIN — MIDODRINE HYDROCHLORIDE 5 MG: 5 TABLET ORAL at 16:37

## 2019-10-15 RX ADMIN — MAGNESIUM OXIDE TAB 400 MG (241.3 MG ELEMENTAL MG) 400 MG: 400 (241.3 MG) TAB at 09:52

## 2019-10-15 RX ADMIN — PANTOPRAZOLE SODIUM 40 MG: 40 TABLET, DELAYED RELEASE ORAL at 06:24

## 2019-10-15 RX ADMIN — ALLOPURINOL 200 MG: 100 TABLET ORAL at 09:52

## 2019-10-15 RX ADMIN — INSULIN GLARGINE 20 UNITS: 100 INJECTION, SOLUTION SUBCUTANEOUS at 20:00

## 2019-10-15 RX ADMIN — BUSPIRONE HYDROCHLORIDE 30 MG: 10 TABLET ORAL at 09:52

## 2019-10-15 RX ADMIN — ACETAMINOPHEN 650 MG: 325 TABLET ORAL at 16:36

## 2019-10-15 RX ADMIN — GABAPENTIN 100 MG: 100 CAPSULE ORAL at 21:50

## 2019-10-15 RX ADMIN — MIDODRINE HYDROCHLORIDE 5 MG: 5 TABLET ORAL at 11:29

## 2019-10-15 RX ADMIN — APIXABAN 5 MG: 5 TABLET, FILM COATED ORAL at 20:28

## 2019-10-15 RX ADMIN — INSULIN LISPRO 15 UNITS: 100 INJECTION, SOLUTION INTRAVENOUS; SUBCUTANEOUS at 11:28

## 2019-10-15 RX ADMIN — DOCUSATE SODIUM 100 MG: 100 CAPSULE, LIQUID FILLED ORAL at 20:28

## 2019-10-15 RX ADMIN — Medication 10 ML: at 20:28

## 2019-10-15 RX ADMIN — INSULIN LISPRO 3 UNITS: 100 INJECTION, SOLUTION INTRAVENOUS; SUBCUTANEOUS at 16:36

## 2019-10-15 ASSESSMENT — PAIN SCALES - GENERAL
PAINLEVEL_OUTOF10: 0
PAINLEVEL_OUTOF10: 4
PAINLEVEL_OUTOF10: 0
PAINLEVEL_OUTOF10: 0

## 2019-10-15 ASSESSMENT — PAIN DESCRIPTION - FREQUENCY: FREQUENCY: INTERMITTENT

## 2019-10-15 ASSESSMENT — PAIN DESCRIPTION - DESCRIPTORS: DESCRIPTORS: ACHING

## 2019-10-15 ASSESSMENT — PAIN - FUNCTIONAL ASSESSMENT: PAIN_FUNCTIONAL_ASSESSMENT: ACTIVITIES ARE NOT PREVENTED

## 2019-10-15 ASSESSMENT — PAIN DESCRIPTION - LOCATION: LOCATION: HEAD

## 2019-10-15 ASSESSMENT — PAIN DESCRIPTION - PROGRESSION: CLINICAL_PROGRESSION: GRADUALLY WORSENING

## 2019-10-15 ASSESSMENT — PAIN DESCRIPTION - ONSET: ONSET: GRADUAL

## 2019-10-15 ASSESSMENT — PAIN DESCRIPTION - PAIN TYPE: TYPE: ACUTE PAIN

## 2019-10-16 LAB
ALBUMIN SERPL-MCNC: 3.7 G/DL (ref 3.5–5.2)
ALP BLD-CCNC: 119 U/L (ref 40–129)
ALT SERPL-CCNC: 31 U/L (ref 0–40)
ANION GAP SERPL CALCULATED.3IONS-SCNC: 15 MMOL/L (ref 7–16)
AST SERPL-CCNC: 16 U/L (ref 0–39)
BILIRUB SERPL-MCNC: 1.3 MG/DL (ref 0–1.2)
BILIRUBIN DIRECT: 0.4 MG/DL (ref 0–0.3)
BILIRUBIN, INDIRECT: 0.9 MG/DL (ref 0–1)
BUN BLDV-MCNC: 45 MG/DL (ref 8–23)
CALCIUM SERPL-MCNC: 9.5 MG/DL (ref 8.6–10.2)
CHLORIDE BLD-SCNC: 94 MMOL/L (ref 98–107)
CO2: 30 MMOL/L (ref 22–29)
CREAT SERPL-MCNC: 2.1 MG/DL (ref 0.7–1.2)
GFR AFRICAN AMERICAN: 38
GFR NON-AFRICAN AMERICAN: 38 ML/MIN/1.73
GLUCOSE BLD-MCNC: 269 MG/DL (ref 74–99)
MAGNESIUM: 1.7 MG/DL (ref 1.6–2.6)
METER GLUCOSE: 242 MG/DL (ref 74–99)
METER GLUCOSE: 256 MG/DL (ref 74–99)
METER GLUCOSE: 291 MG/DL (ref 74–99)
METER GLUCOSE: 299 MG/DL (ref 74–99)
POTASSIUM REFLEX MAGNESIUM: 3.5 MMOL/L (ref 3.5–5)
SODIUM BLD-SCNC: 139 MMOL/L (ref 132–146)
TOTAL PROTEIN: 8.3 G/DL (ref 6.4–8.3)

## 2019-10-16 PROCEDURE — 2060000000 HC ICU INTERMEDIATE R&B

## 2019-10-16 PROCEDURE — 80053 COMPREHEN METABOLIC PANEL: CPT

## 2019-10-16 PROCEDURE — 2500000003 HC RX 250 WO HCPCS: Performed by: INTERNAL MEDICINE

## 2019-10-16 PROCEDURE — 6370000000 HC RX 637 (ALT 250 FOR IP): Performed by: INTERNAL MEDICINE

## 2019-10-16 PROCEDURE — 6370000000 HC RX 637 (ALT 250 FOR IP): Performed by: PHYSICIAN ASSISTANT

## 2019-10-16 PROCEDURE — 6360000002 HC RX W HCPCS: Performed by: INTERNAL MEDICINE

## 2019-10-16 PROCEDURE — 6370000000 HC RX 637 (ALT 250 FOR IP): Performed by: NURSE PRACTITIONER

## 2019-10-16 PROCEDURE — 2580000003 HC RX 258: Performed by: INTERNAL MEDICINE

## 2019-10-16 PROCEDURE — APPSS30 APP SPLIT SHARED TIME 16-30 MINUTES: Performed by: NURSE PRACTITIONER

## 2019-10-16 PROCEDURE — 99232 SBSQ HOSP IP/OBS MODERATE 35: CPT | Performed by: INTERNAL MEDICINE

## 2019-10-16 PROCEDURE — 80076 HEPATIC FUNCTION PANEL: CPT

## 2019-10-16 PROCEDURE — 36415 COLL VENOUS BLD VENIPUNCTURE: CPT

## 2019-10-16 PROCEDURE — 82962 GLUCOSE BLOOD TEST: CPT

## 2019-10-16 PROCEDURE — 6370000000 HC RX 637 (ALT 250 FOR IP): Performed by: STUDENT IN AN ORGANIZED HEALTH CARE EDUCATION/TRAINING PROGRAM

## 2019-10-16 PROCEDURE — 94660 CPAP INITIATION&MGMT: CPT

## 2019-10-16 PROCEDURE — 83735 ASSAY OF MAGNESIUM: CPT

## 2019-10-16 RX ORDER — POTASSIUM CHLORIDE 20 MEQ/1
20 TABLET, EXTENDED RELEASE ORAL ONCE
Status: COMPLETED | OUTPATIENT
Start: 2019-10-16 | End: 2019-10-16

## 2019-10-16 RX ORDER — MAGNESIUM SULFATE 1 G/100ML
1 INJECTION INTRAVENOUS ONCE
Status: COMPLETED | OUTPATIENT
Start: 2019-10-16 | End: 2019-10-16

## 2019-10-16 RX ADMIN — MIDODRINE HYDROCHLORIDE 5 MG: 5 TABLET ORAL at 11:45

## 2019-10-16 RX ADMIN — INSULIN GLARGINE 20 UNITS: 100 INJECTION, SOLUTION SUBCUTANEOUS at 20:26

## 2019-10-16 RX ADMIN — CHLOROTHIAZIDE SODIUM 500 MG: 500 INJECTION, POWDER, LYOPHILIZED, FOR SOLUTION INTRAVENOUS at 11:45

## 2019-10-16 RX ADMIN — PANTOPRAZOLE SODIUM 40 MG: 40 TABLET, DELAYED RELEASE ORAL at 06:08

## 2019-10-16 RX ADMIN — GABAPENTIN 100 MG: 100 CAPSULE ORAL at 15:30

## 2019-10-16 RX ADMIN — ATORVASTATIN CALCIUM 80 MG: 40 TABLET, FILM COATED ORAL at 20:24

## 2019-10-16 RX ADMIN — SENNOSIDES 8.6 MG: 8.6 TABLET, FILM COATED ORAL at 20:24

## 2019-10-16 RX ADMIN — MAGNESIUM SULFATE HEPTAHYDRATE 1 G: 1 INJECTION, SOLUTION INTRAVENOUS at 20:25

## 2019-10-16 RX ADMIN — Medication 10 ML: at 10:21

## 2019-10-16 RX ADMIN — BUMETANIDE 1 MG/HR: 0.25 INJECTION INTRAMUSCULAR; INTRAVENOUS at 19:15

## 2019-10-16 RX ADMIN — MAGNESIUM OXIDE TAB 400 MG (241.3 MG ELEMENTAL MG) 400 MG: 400 (241.3 MG) TAB at 10:21

## 2019-10-16 RX ADMIN — MIDODRINE HYDROCHLORIDE 5 MG: 5 TABLET ORAL at 16:47

## 2019-10-16 RX ADMIN — APIXABAN 5 MG: 5 TABLET, FILM COATED ORAL at 10:21

## 2019-10-16 RX ADMIN — BUMETANIDE 1 MG/HR: 0.25 INJECTION INTRAMUSCULAR; INTRAVENOUS at 04:15

## 2019-10-16 RX ADMIN — POTASSIUM CHLORIDE 20 MEQ: 20 TABLET, EXTENDED RELEASE ORAL at 20:25

## 2019-10-16 RX ADMIN — BUSPIRONE HYDROCHLORIDE 30 MG: 10 TABLET ORAL at 10:20

## 2019-10-16 RX ADMIN — INSULIN LISPRO 9 UNITS: 100 INJECTION, SOLUTION INTRAVENOUS; SUBCUTANEOUS at 20:25

## 2019-10-16 RX ADMIN — ALLOPURINOL 200 MG: 100 TABLET ORAL at 20:25

## 2019-10-16 RX ADMIN — MIDODRINE HYDROCHLORIDE 5 MG: 5 TABLET ORAL at 10:21

## 2019-10-16 RX ADMIN — ALLOPURINOL 200 MG: 100 TABLET ORAL at 10:21

## 2019-10-16 RX ADMIN — DOCUSATE SODIUM 100 MG: 100 CAPSULE, LIQUID FILLED ORAL at 10:21

## 2019-10-16 RX ADMIN — DOCUSATE SODIUM 100 MG: 100 CAPSULE, LIQUID FILLED ORAL at 20:24

## 2019-10-16 RX ADMIN — INSULIN LISPRO 9 UNITS: 100 INJECTION, SOLUTION INTRAVENOUS; SUBCUTANEOUS at 11:40

## 2019-10-16 RX ADMIN — APIXABAN 5 MG: 5 TABLET, FILM COATED ORAL at 20:25

## 2019-10-16 RX ADMIN — INSULIN LISPRO 6 UNITS: 100 INJECTION, SOLUTION INTRAVENOUS; SUBCUTANEOUS at 06:08

## 2019-10-16 RX ADMIN — PANTOPRAZOLE SODIUM 40 MG: 40 TABLET, DELAYED RELEASE ORAL at 16:47

## 2019-10-16 RX ADMIN — METOPROLOL SUCCINATE 100 MG: 100 TABLET, EXTENDED RELEASE ORAL at 10:21

## 2019-10-16 RX ADMIN — INSULIN LISPRO 9 UNITS: 100 INJECTION, SOLUTION INTRAVENOUS; SUBCUTANEOUS at 16:47

## 2019-10-16 RX ADMIN — GABAPENTIN 100 MG: 100 CAPSULE ORAL at 20:24

## 2019-10-16 RX ADMIN — GABAPENTIN 100 MG: 100 CAPSULE ORAL at 10:21

## 2019-10-16 ASSESSMENT — PAIN SCALES - GENERAL
PAINLEVEL_OUTOF10: 0

## 2019-10-17 LAB
ALBUMIN SERPL-MCNC: 3.4 G/DL (ref 3.5–5.2)
ALP BLD-CCNC: 113 U/L (ref 40–129)
ALT SERPL-CCNC: 28 U/L (ref 0–40)
ANION GAP SERPL CALCULATED.3IONS-SCNC: 12 MMOL/L (ref 7–16)
AST SERPL-CCNC: 18 U/L (ref 0–39)
BILIRUB SERPL-MCNC: 1.3 MG/DL (ref 0–1.2)
BILIRUBIN DIRECT: 0.4 MG/DL (ref 0–0.3)
BILIRUBIN, INDIRECT: 0.9 MG/DL (ref 0–1)
BUN BLDV-MCNC: 47 MG/DL (ref 8–23)
CALCIUM SERPL-MCNC: 9.5 MG/DL (ref 8.6–10.2)
CHLORIDE BLD-SCNC: 95 MMOL/L (ref 98–107)
CO2: 31 MMOL/L (ref 22–29)
CREAT SERPL-MCNC: 1.9 MG/DL (ref 0.7–1.2)
GFR AFRICAN AMERICAN: 43
GFR NON-AFRICAN AMERICAN: 43 ML/MIN/1.73
GLUCOSE BLD-MCNC: 240 MG/DL (ref 74–99)
MAGNESIUM: 1.9 MG/DL (ref 1.6–2.6)
METER GLUCOSE: 212 MG/DL (ref 74–99)
METER GLUCOSE: 227 MG/DL (ref 74–99)
METER GLUCOSE: 289 MG/DL (ref 74–99)
METER GLUCOSE: 318 MG/DL (ref 74–99)
POTASSIUM REFLEX MAGNESIUM: 3.8 MMOL/L (ref 3.5–5)
SODIUM BLD-SCNC: 138 MMOL/L (ref 132–146)
TOTAL PROTEIN: 8 G/DL (ref 6.4–8.3)

## 2019-10-17 PROCEDURE — 6370000000 HC RX 637 (ALT 250 FOR IP): Performed by: STUDENT IN AN ORGANIZED HEALTH CARE EDUCATION/TRAINING PROGRAM

## 2019-10-17 PROCEDURE — 80053 COMPREHEN METABOLIC PANEL: CPT

## 2019-10-17 PROCEDURE — 83735 ASSAY OF MAGNESIUM: CPT

## 2019-10-17 PROCEDURE — 82962 GLUCOSE BLOOD TEST: CPT

## 2019-10-17 PROCEDURE — 6370000000 HC RX 637 (ALT 250 FOR IP): Performed by: INTERNAL MEDICINE

## 2019-10-17 PROCEDURE — 36415 COLL VENOUS BLD VENIPUNCTURE: CPT

## 2019-10-17 PROCEDURE — 6370000000 HC RX 637 (ALT 250 FOR IP): Performed by: PHYSICIAN ASSISTANT

## 2019-10-17 PROCEDURE — 2060000000 HC ICU INTERMEDIATE R&B

## 2019-10-17 PROCEDURE — 94660 CPAP INITIATION&MGMT: CPT

## 2019-10-17 PROCEDURE — 2500000003 HC RX 250 WO HCPCS: Performed by: INTERNAL MEDICINE

## 2019-10-17 PROCEDURE — 6370000000 HC RX 637 (ALT 250 FOR IP): Performed by: NURSE PRACTITIONER

## 2019-10-17 PROCEDURE — 80076 HEPATIC FUNCTION PANEL: CPT

## 2019-10-17 PROCEDURE — 99232 SBSQ HOSP IP/OBS MODERATE 35: CPT | Performed by: INTERNAL MEDICINE

## 2019-10-17 PROCEDURE — 2580000003 HC RX 258: Performed by: INTERNAL MEDICINE

## 2019-10-17 RX ADMIN — INSULIN LISPRO 6 UNITS: 100 INJECTION, SOLUTION INTRAVENOUS; SUBCUTANEOUS at 06:14

## 2019-10-17 RX ADMIN — DOCUSATE SODIUM 100 MG: 100 CAPSULE, LIQUID FILLED ORAL at 22:10

## 2019-10-17 RX ADMIN — APIXABAN 5 MG: 5 TABLET, FILM COATED ORAL at 08:10

## 2019-10-17 RX ADMIN — PANTOPRAZOLE SODIUM 40 MG: 40 TABLET, DELAYED RELEASE ORAL at 16:27

## 2019-10-17 RX ADMIN — BUMETANIDE 1 MG/HR: 0.25 INJECTION INTRAMUSCULAR; INTRAVENOUS at 18:48

## 2019-10-17 RX ADMIN — BUSPIRONE HYDROCHLORIDE 30 MG: 10 TABLET ORAL at 08:10

## 2019-10-17 RX ADMIN — INSULIN LISPRO 12 UNITS: 100 INJECTION, SOLUTION INTRAVENOUS; SUBCUTANEOUS at 11:08

## 2019-10-17 RX ADMIN — METOPROLOL SUCCINATE 100 MG: 100 TABLET, EXTENDED RELEASE ORAL at 22:10

## 2019-10-17 RX ADMIN — GABAPENTIN 100 MG: 100 CAPSULE ORAL at 22:10

## 2019-10-17 RX ADMIN — INSULIN LISPRO 6 UNITS: 100 INJECTION, SOLUTION INTRAVENOUS; SUBCUTANEOUS at 16:27

## 2019-10-17 RX ADMIN — ALLOPURINOL 200 MG: 100 TABLET ORAL at 08:10

## 2019-10-17 RX ADMIN — ALLOPURINOL 200 MG: 100 TABLET ORAL at 22:10

## 2019-10-17 RX ADMIN — INSULIN GLARGINE 20 UNITS: 100 INJECTION, SOLUTION SUBCUTANEOUS at 22:11

## 2019-10-17 RX ADMIN — MAGNESIUM OXIDE TAB 400 MG (241.3 MG ELEMENTAL MG) 400 MG: 400 (241.3 MG) TAB at 08:10

## 2019-10-17 RX ADMIN — MIDODRINE HYDROCHLORIDE 5 MG: 5 TABLET ORAL at 08:10

## 2019-10-17 RX ADMIN — ATORVASTATIN CALCIUM 80 MG: 40 TABLET, FILM COATED ORAL at 22:10

## 2019-10-17 RX ADMIN — MIDODRINE HYDROCHLORIDE 5 MG: 5 TABLET ORAL at 11:08

## 2019-10-17 RX ADMIN — PANTOPRAZOLE SODIUM 40 MG: 40 TABLET, DELAYED RELEASE ORAL at 06:14

## 2019-10-17 RX ADMIN — MIDODRINE HYDROCHLORIDE 5 MG: 5 TABLET ORAL at 16:27

## 2019-10-17 RX ADMIN — INSULIN LISPRO 9 UNITS: 100 INJECTION, SOLUTION INTRAVENOUS; SUBCUTANEOUS at 22:12

## 2019-10-17 RX ADMIN — BUMETANIDE 1 MG/HR: 0.25 INJECTION INTRAMUSCULAR; INTRAVENOUS at 06:14

## 2019-10-17 RX ADMIN — SENNOSIDES 8.6 MG: 8.6 TABLET, FILM COATED ORAL at 22:10

## 2019-10-17 RX ADMIN — APIXABAN 5 MG: 5 TABLET, FILM COATED ORAL at 22:10

## 2019-10-17 RX ADMIN — DOCUSATE SODIUM 100 MG: 100 CAPSULE, LIQUID FILLED ORAL at 08:10

## 2019-10-17 RX ADMIN — GABAPENTIN 100 MG: 100 CAPSULE ORAL at 08:10

## 2019-10-17 RX ADMIN — GABAPENTIN 100 MG: 100 CAPSULE ORAL at 13:08

## 2019-10-17 ASSESSMENT — PAIN SCALES - GENERAL
PAINLEVEL_OUTOF10: 0

## 2019-10-18 LAB
ALBUMIN SERPL-MCNC: 3.5 G/DL (ref 3.5–5.2)
ALP BLD-CCNC: 117 U/L (ref 40–129)
ALT SERPL-CCNC: 25 U/L (ref 0–40)
ANION GAP SERPL CALCULATED.3IONS-SCNC: 14 MMOL/L (ref 7–16)
AST SERPL-CCNC: 18 U/L (ref 0–39)
BILIRUB SERPL-MCNC: 1.1 MG/DL (ref 0–1.2)
BILIRUBIN DIRECT: 0.4 MG/DL (ref 0–0.3)
BILIRUBIN, INDIRECT: 0.7 MG/DL (ref 0–1)
BUN BLDV-MCNC: 44 MG/DL (ref 8–23)
CALCIUM SERPL-MCNC: 9.5 MG/DL (ref 8.6–10.2)
CHLORIDE BLD-SCNC: 93 MMOL/L (ref 98–107)
CO2: 28 MMOL/L (ref 22–29)
CREAT SERPL-MCNC: 1.9 MG/DL (ref 0.7–1.2)
GFR AFRICAN AMERICAN: 43
GFR NON-AFRICAN AMERICAN: 43 ML/MIN/1.73
GLUCOSE BLD-MCNC: 266 MG/DL (ref 74–99)
MAGNESIUM: 2 MG/DL (ref 1.6–2.6)
METER GLUCOSE: 201 MG/DL (ref 74–99)
METER GLUCOSE: 254 MG/DL (ref 74–99)
METER GLUCOSE: 267 MG/DL (ref 74–99)
METER GLUCOSE: 301 MG/DL (ref 74–99)
POTASSIUM REFLEX MAGNESIUM: 3.3 MMOL/L (ref 3.5–5)
SODIUM BLD-SCNC: 135 MMOL/L (ref 132–146)
TOTAL PROTEIN: 7.9 G/DL (ref 6.4–8.3)

## 2019-10-18 PROCEDURE — 80076 HEPATIC FUNCTION PANEL: CPT

## 2019-10-18 PROCEDURE — 6370000000 HC RX 637 (ALT 250 FOR IP): Performed by: INTERNAL MEDICINE

## 2019-10-18 PROCEDURE — 6370000000 HC RX 637 (ALT 250 FOR IP): Performed by: PHYSICIAN ASSISTANT

## 2019-10-18 PROCEDURE — 2580000003 HC RX 258: Performed by: INTERNAL MEDICINE

## 2019-10-18 PROCEDURE — 99232 SBSQ HOSP IP/OBS MODERATE 35: CPT | Performed by: INTERNAL MEDICINE

## 2019-10-18 PROCEDURE — 2060000000 HC ICU INTERMEDIATE R&B

## 2019-10-18 PROCEDURE — 36415 COLL VENOUS BLD VENIPUNCTURE: CPT

## 2019-10-18 PROCEDURE — 80053 COMPREHEN METABOLIC PANEL: CPT

## 2019-10-18 PROCEDURE — 94660 CPAP INITIATION&MGMT: CPT

## 2019-10-18 PROCEDURE — 6360000002 HC RX W HCPCS: Performed by: INTERNAL MEDICINE

## 2019-10-18 PROCEDURE — 83735 ASSAY OF MAGNESIUM: CPT

## 2019-10-18 PROCEDURE — 6370000000 HC RX 637 (ALT 250 FOR IP): Performed by: NURSE PRACTITIONER

## 2019-10-18 PROCEDURE — 2500000003 HC RX 250 WO HCPCS: Performed by: INTERNAL MEDICINE

## 2019-10-18 PROCEDURE — 6370000000 HC RX 637 (ALT 250 FOR IP): Performed by: STUDENT IN AN ORGANIZED HEALTH CARE EDUCATION/TRAINING PROGRAM

## 2019-10-18 PROCEDURE — 82962 GLUCOSE BLOOD TEST: CPT

## 2019-10-18 RX ORDER — POTASSIUM CHLORIDE 20 MEQ/1
40 TABLET, EXTENDED RELEASE ORAL ONCE
Status: COMPLETED | OUTPATIENT
Start: 2019-10-18 | End: 2019-10-18

## 2019-10-18 RX ORDER — POTASSIUM CHLORIDE 20 MEQ/1
40 TABLET, EXTENDED RELEASE ORAL ONCE
Status: DISCONTINUED | OUTPATIENT
Start: 2019-10-18 | End: 2019-10-18

## 2019-10-18 RX ADMIN — ALLOPURINOL 200 MG: 100 TABLET ORAL at 08:21

## 2019-10-18 RX ADMIN — CHLOROTHIAZIDE SODIUM 500 MG: 500 INJECTION, POWDER, LYOPHILIZED, FOR SOLUTION INTRAVENOUS at 23:49

## 2019-10-18 RX ADMIN — GABAPENTIN 100 MG: 100 CAPSULE ORAL at 13:09

## 2019-10-18 RX ADMIN — DOCUSATE SODIUM 100 MG: 100 CAPSULE, LIQUID FILLED ORAL at 21:05

## 2019-10-18 RX ADMIN — APIXABAN 5 MG: 5 TABLET, FILM COATED ORAL at 21:05

## 2019-10-18 RX ADMIN — MAGNESIUM OXIDE TAB 400 MG (241.3 MG ELEMENTAL MG) 400 MG: 400 (241.3 MG) TAB at 08:21

## 2019-10-18 RX ADMIN — MIDODRINE HYDROCHLORIDE 5 MG: 5 TABLET ORAL at 17:44

## 2019-10-18 RX ADMIN — MIDODRINE HYDROCHLORIDE 5 MG: 5 TABLET ORAL at 11:26

## 2019-10-18 RX ADMIN — BUSPIRONE HYDROCHLORIDE 30 MG: 10 TABLET ORAL at 08:21

## 2019-10-18 RX ADMIN — METOPROLOL SUCCINATE 100 MG: 100 TABLET, EXTENDED RELEASE ORAL at 21:03

## 2019-10-18 RX ADMIN — PANTOPRAZOLE SODIUM 40 MG: 40 TABLET, DELAYED RELEASE ORAL at 16:13

## 2019-10-18 RX ADMIN — GABAPENTIN 100 MG: 100 CAPSULE ORAL at 08:20

## 2019-10-18 RX ADMIN — INSULIN LISPRO 9 UNITS: 100 INJECTION, SOLUTION INTRAVENOUS; SUBCUTANEOUS at 06:08

## 2019-10-18 RX ADMIN — BUMETANIDE 1 MG/HR: 0.25 INJECTION INTRAMUSCULAR; INTRAVENOUS at 08:24

## 2019-10-18 RX ADMIN — BUMETANIDE 1 MG/HR: 0.25 INJECTION INTRAMUSCULAR; INTRAVENOUS at 21:34

## 2019-10-18 RX ADMIN — APIXABAN 5 MG: 5 TABLET, FILM COATED ORAL at 08:20

## 2019-10-18 RX ADMIN — DOCUSATE SODIUM 100 MG: 100 CAPSULE, LIQUID FILLED ORAL at 08:20

## 2019-10-18 RX ADMIN — ACETAMINOPHEN 650 MG: 325 TABLET ORAL at 13:14

## 2019-10-18 RX ADMIN — INSULIN GLARGINE 20 UNITS: 100 INJECTION, SOLUTION SUBCUTANEOUS at 21:08

## 2019-10-18 RX ADMIN — CHLOROTHIAZIDE SODIUM 500 MG: 500 INJECTION, POWDER, LYOPHILIZED, FOR SOLUTION INTRAVENOUS at 00:14

## 2019-10-18 RX ADMIN — INSULIN LISPRO 6 UNITS: 100 INJECTION, SOLUTION INTRAVENOUS; SUBCUTANEOUS at 16:18

## 2019-10-18 RX ADMIN — GABAPENTIN 100 MG: 100 CAPSULE ORAL at 21:04

## 2019-10-18 RX ADMIN — CHLOROTHIAZIDE SODIUM 500 MG: 500 INJECTION, POWDER, LYOPHILIZED, FOR SOLUTION INTRAVENOUS at 11:36

## 2019-10-18 RX ADMIN — ALLOPURINOL 200 MG: 100 TABLET ORAL at 21:04

## 2019-10-18 RX ADMIN — PANTOPRAZOLE SODIUM 40 MG: 40 TABLET, DELAYED RELEASE ORAL at 06:09

## 2019-10-18 RX ADMIN — INSULIN LISPRO 9 UNITS: 100 INJECTION, SOLUTION INTRAVENOUS; SUBCUTANEOUS at 21:08

## 2019-10-18 RX ADMIN — POTASSIUM CHLORIDE 40 MEQ: 20 TABLET, EXTENDED RELEASE ORAL at 06:09

## 2019-10-18 RX ADMIN — SENNOSIDES 8.6 MG: 8.6 TABLET, FILM COATED ORAL at 21:04

## 2019-10-18 RX ADMIN — ATORVASTATIN CALCIUM 80 MG: 40 TABLET, FILM COATED ORAL at 21:04

## 2019-10-18 RX ADMIN — MIDODRINE HYDROCHLORIDE 5 MG: 5 TABLET ORAL at 08:21

## 2019-10-18 RX ADMIN — INSULIN LISPRO 12 UNITS: 100 INJECTION, SOLUTION INTRAVENOUS; SUBCUTANEOUS at 11:26

## 2019-10-18 RX ADMIN — POTASSIUM CHLORIDE 40 MEQ: 20 TABLET, EXTENDED RELEASE ORAL at 21:06

## 2019-10-18 ASSESSMENT — PAIN DESCRIPTION - DESCRIPTORS: DESCRIPTORS: ACHING

## 2019-10-18 ASSESSMENT — PAIN SCALES - GENERAL
PAINLEVEL_OUTOF10: 0
PAINLEVEL_OUTOF10: 7

## 2019-10-18 ASSESSMENT — PAIN DESCRIPTION - ORIENTATION: ORIENTATION: MID

## 2019-10-18 ASSESSMENT — PAIN DESCRIPTION - FREQUENCY: FREQUENCY: INTERMITTENT

## 2019-10-18 ASSESSMENT — PAIN - FUNCTIONAL ASSESSMENT: PAIN_FUNCTIONAL_ASSESSMENT: PREVENTS OR INTERFERES SOME ACTIVE ACTIVITIES AND ADLS

## 2019-10-18 ASSESSMENT — PAIN DESCRIPTION - LOCATION: LOCATION: HEAD

## 2019-10-18 ASSESSMENT — PAIN DESCRIPTION - PAIN TYPE: TYPE: ACUTE PAIN

## 2019-10-19 LAB
ALBUMIN SERPL-MCNC: 3.8 G/DL (ref 3.5–5.2)
ALP BLD-CCNC: 131 U/L (ref 40–129)
ALT SERPL-CCNC: 23 U/L (ref 0–40)
ANION GAP SERPL CALCULATED.3IONS-SCNC: 13 MMOL/L (ref 7–16)
AST SERPL-CCNC: 18 U/L (ref 0–39)
BILIRUB SERPL-MCNC: 1 MG/DL (ref 0–1.2)
BILIRUBIN DIRECT: 0.3 MG/DL (ref 0–0.3)
BILIRUBIN, INDIRECT: 0.7 MG/DL (ref 0–1)
BUN BLDV-MCNC: 51 MG/DL (ref 8–23)
CALCIUM SERPL-MCNC: 9.9 MG/DL (ref 8.6–10.2)
CHLORIDE BLD-SCNC: 92 MMOL/L (ref 98–107)
CO2: 32 MMOL/L (ref 22–29)
CREAT SERPL-MCNC: 2.4 MG/DL (ref 0.7–1.2)
GFR AFRICAN AMERICAN: 33
GFR NON-AFRICAN AMERICAN: 33 ML/MIN/1.73
GLUCOSE BLD-MCNC: 241 MG/DL (ref 74–99)
MAGNESIUM: 2 MG/DL (ref 1.6–2.6)
METER GLUCOSE: 135 MG/DL (ref 74–99)
METER GLUCOSE: 265 MG/DL (ref 74–99)
METER GLUCOSE: 301 MG/DL (ref 74–99)
METER GLUCOSE: 343 MG/DL (ref 74–99)
PHOSPHORUS: 3.9 MG/DL (ref 2.5–4.5)
POTASSIUM REFLEX MAGNESIUM: 3.8 MMOL/L (ref 3.5–5)
POTASSIUM SERPL-SCNC: 3.8 MMOL/L (ref 3.5–5)
SODIUM BLD-SCNC: 137 MMOL/L (ref 132–146)
TOTAL PROTEIN: 8.5 G/DL (ref 6.4–8.3)

## 2019-10-19 PROCEDURE — 6360000002 HC RX W HCPCS: Performed by: INTERNAL MEDICINE

## 2019-10-19 PROCEDURE — 6370000000 HC RX 637 (ALT 250 FOR IP): Performed by: NURSE PRACTITIONER

## 2019-10-19 PROCEDURE — 2500000003 HC RX 250 WO HCPCS: Performed by: INTERNAL MEDICINE

## 2019-10-19 PROCEDURE — 80053 COMPREHEN METABOLIC PANEL: CPT

## 2019-10-19 PROCEDURE — 84100 ASSAY OF PHOSPHORUS: CPT

## 2019-10-19 PROCEDURE — 6370000000 HC RX 637 (ALT 250 FOR IP): Performed by: INTERNAL MEDICINE

## 2019-10-19 PROCEDURE — 82962 GLUCOSE BLOOD TEST: CPT

## 2019-10-19 PROCEDURE — 36415 COLL VENOUS BLD VENIPUNCTURE: CPT

## 2019-10-19 PROCEDURE — 94660 CPAP INITIATION&MGMT: CPT

## 2019-10-19 PROCEDURE — 6370000000 HC RX 637 (ALT 250 FOR IP): Performed by: STUDENT IN AN ORGANIZED HEALTH CARE EDUCATION/TRAINING PROGRAM

## 2019-10-19 PROCEDURE — 80048 BASIC METABOLIC PNL TOTAL CA: CPT

## 2019-10-19 PROCEDURE — 6370000000 HC RX 637 (ALT 250 FOR IP): Performed by: PHYSICIAN ASSISTANT

## 2019-10-19 PROCEDURE — 99232 SBSQ HOSP IP/OBS MODERATE 35: CPT | Performed by: INTERNAL MEDICINE

## 2019-10-19 PROCEDURE — 83735 ASSAY OF MAGNESIUM: CPT

## 2019-10-19 PROCEDURE — 80076 HEPATIC FUNCTION PANEL: CPT

## 2019-10-19 PROCEDURE — 2060000000 HC ICU INTERMEDIATE R&B

## 2019-10-19 PROCEDURE — 2580000003 HC RX 258: Performed by: INTERNAL MEDICINE

## 2019-10-19 RX ADMIN — APIXABAN 5 MG: 5 TABLET, FILM COATED ORAL at 21:51

## 2019-10-19 RX ADMIN — PANTOPRAZOLE SODIUM 40 MG: 40 TABLET, DELAYED RELEASE ORAL at 15:28

## 2019-10-19 RX ADMIN — PANTOPRAZOLE SODIUM 40 MG: 40 TABLET, DELAYED RELEASE ORAL at 08:30

## 2019-10-19 RX ADMIN — APIXABAN 5 MG: 5 TABLET, FILM COATED ORAL at 08:30

## 2019-10-19 RX ADMIN — METOPROLOL SUCCINATE 100 MG: 100 TABLET, EXTENDED RELEASE ORAL at 08:30

## 2019-10-19 RX ADMIN — BUMETANIDE 1 MG/HR: 0.25 INJECTION INTRAMUSCULAR; INTRAVENOUS at 13:24

## 2019-10-19 RX ADMIN — MIDODRINE HYDROCHLORIDE 5 MG: 5 TABLET ORAL at 16:10

## 2019-10-19 RX ADMIN — INSULIN GLARGINE 20 UNITS: 100 INJECTION, SOLUTION SUBCUTANEOUS at 21:52

## 2019-10-19 RX ADMIN — ALLOPURINOL 200 MG: 100 TABLET ORAL at 21:51

## 2019-10-19 RX ADMIN — BUSPIRONE HYDROCHLORIDE 30 MG: 10 TABLET ORAL at 11:48

## 2019-10-19 RX ADMIN — MIDODRINE HYDROCHLORIDE 5 MG: 5 TABLET ORAL at 08:31

## 2019-10-19 RX ADMIN — ATORVASTATIN CALCIUM 80 MG: 40 TABLET, FILM COATED ORAL at 21:52

## 2019-10-19 RX ADMIN — GABAPENTIN 100 MG: 100 CAPSULE ORAL at 13:34

## 2019-10-19 RX ADMIN — INSULIN LISPRO 12 UNITS: 100 INJECTION, SOLUTION INTRAVENOUS; SUBCUTANEOUS at 08:38

## 2019-10-19 RX ADMIN — DOCUSATE SODIUM 100 MG: 100 CAPSULE, LIQUID FILLED ORAL at 21:51

## 2019-10-19 RX ADMIN — ACETAMINOPHEN 650 MG: 325 TABLET ORAL at 00:34

## 2019-10-19 RX ADMIN — MIDODRINE HYDROCHLORIDE 5 MG: 5 TABLET ORAL at 12:18

## 2019-10-19 RX ADMIN — INSULIN LISPRO 9 UNITS: 100 INJECTION, SOLUTION INTRAVENOUS; SUBCUTANEOUS at 11:49

## 2019-10-19 RX ADMIN — CHLOROTHIAZIDE SODIUM 500 MG: 500 INJECTION, POWDER, LYOPHILIZED, FOR SOLUTION INTRAVENOUS at 13:34

## 2019-10-19 RX ADMIN — INSULIN LISPRO 12 UNITS: 100 INJECTION, SOLUTION INTRAVENOUS; SUBCUTANEOUS at 21:53

## 2019-10-19 RX ADMIN — GABAPENTIN 100 MG: 100 CAPSULE ORAL at 21:51

## 2019-10-19 RX ADMIN — GABAPENTIN 100 MG: 100 CAPSULE ORAL at 08:30

## 2019-10-19 RX ADMIN — DOCUSATE SODIUM 100 MG: 100 CAPSULE, LIQUID FILLED ORAL at 08:30

## 2019-10-19 RX ADMIN — SENNOSIDES 8.6 MG: 8.6 TABLET, FILM COATED ORAL at 21:51

## 2019-10-19 RX ADMIN — ALLOPURINOL 200 MG: 100 TABLET ORAL at 08:29

## 2019-10-19 RX ADMIN — MAGNESIUM OXIDE TAB 400 MG (241.3 MG ELEMENTAL MG) 400 MG: 400 (241.3 MG) TAB at 08:30

## 2019-10-19 ASSESSMENT — PAIN DESCRIPTION - DESCRIPTORS: DESCRIPTORS: HEADACHE

## 2019-10-19 ASSESSMENT — PAIN SCALES - GENERAL
PAINLEVEL_OUTOF10: 0
PAINLEVEL_OUTOF10: 4
PAINLEVEL_OUTOF10: 0
PAINLEVEL_OUTOF10: 0

## 2019-10-19 ASSESSMENT — PAIN DESCRIPTION - FREQUENCY: FREQUENCY: INTERMITTENT

## 2019-10-19 ASSESSMENT — PAIN DESCRIPTION - LOCATION: LOCATION: HEAD

## 2019-10-19 ASSESSMENT — PAIN DESCRIPTION - PAIN TYPE: TYPE: ACUTE PAIN

## 2019-10-19 ASSESSMENT — PAIN - FUNCTIONAL ASSESSMENT: PAIN_FUNCTIONAL_ASSESSMENT: PREVENTS OR INTERFERES SOME ACTIVE ACTIVITIES AND ADLS

## 2019-10-19 ASSESSMENT — PAIN DESCRIPTION - ONSET: ONSET: PROGRESSIVE

## 2019-10-20 LAB
ALBUMIN SERPL-MCNC: 3.4 G/DL (ref 3.5–5.2)
ALP BLD-CCNC: 116 U/L (ref 40–129)
ALT SERPL-CCNC: 21 U/L (ref 0–40)
ANION GAP SERPL CALCULATED.3IONS-SCNC: 15 MMOL/L (ref 7–16)
AST SERPL-CCNC: 17 U/L (ref 0–39)
BILIRUB SERPL-MCNC: 0.9 MG/DL (ref 0–1.2)
BILIRUBIN DIRECT: 0.3 MG/DL (ref 0–0.3)
BILIRUBIN, INDIRECT: 0.6 MG/DL (ref 0–1)
BUN BLDV-MCNC: 54 MG/DL (ref 8–23)
CALCIUM SERPL-MCNC: 9.6 MG/DL (ref 8.6–10.2)
CHLORIDE BLD-SCNC: 94 MMOL/L (ref 98–107)
CO2: 29 MMOL/L (ref 22–29)
CREAT SERPL-MCNC: 2.4 MG/DL (ref 0.7–1.2)
GFR AFRICAN AMERICAN: 33
GFR NON-AFRICAN AMERICAN: 33 ML/MIN/1.73
GLUCOSE BLD-MCNC: 182 MG/DL (ref 74–99)
MAGNESIUM: 1.9 MG/DL (ref 1.6–2.6)
METER GLUCOSE: 186 MG/DL (ref 74–99)
METER GLUCOSE: 213 MG/DL (ref 74–99)
METER GLUCOSE: 269 MG/DL (ref 74–99)
METER GLUCOSE: 307 MG/DL (ref 74–99)
POTASSIUM REFLEX MAGNESIUM: 3 MMOL/L (ref 3.5–5)
SODIUM BLD-SCNC: 138 MMOL/L (ref 132–146)
TOTAL PROTEIN: 7.7 G/DL (ref 6.4–8.3)

## 2019-10-20 PROCEDURE — 99232 SBSQ HOSP IP/OBS MODERATE 35: CPT | Performed by: INTERNAL MEDICINE

## 2019-10-20 PROCEDURE — 6370000000 HC RX 637 (ALT 250 FOR IP): Performed by: INTERNAL MEDICINE

## 2019-10-20 PROCEDURE — 36415 COLL VENOUS BLD VENIPUNCTURE: CPT

## 2019-10-20 PROCEDURE — 2500000003 HC RX 250 WO HCPCS: Performed by: INTERNAL MEDICINE

## 2019-10-20 PROCEDURE — 80053 COMPREHEN METABOLIC PANEL: CPT

## 2019-10-20 PROCEDURE — 94660 CPAP INITIATION&MGMT: CPT

## 2019-10-20 PROCEDURE — 2580000003 HC RX 258: Performed by: INTERNAL MEDICINE

## 2019-10-20 PROCEDURE — 6370000000 HC RX 637 (ALT 250 FOR IP): Performed by: STUDENT IN AN ORGANIZED HEALTH CARE EDUCATION/TRAINING PROGRAM

## 2019-10-20 PROCEDURE — 82962 GLUCOSE BLOOD TEST: CPT

## 2019-10-20 PROCEDURE — 6370000000 HC RX 637 (ALT 250 FOR IP): Performed by: NURSE PRACTITIONER

## 2019-10-20 PROCEDURE — 83735 ASSAY OF MAGNESIUM: CPT

## 2019-10-20 PROCEDURE — 2060000000 HC ICU INTERMEDIATE R&B

## 2019-10-20 PROCEDURE — 80076 HEPATIC FUNCTION PANEL: CPT

## 2019-10-20 RX ORDER — INSULIN GLARGINE 100 [IU]/ML
26 INJECTION, SOLUTION SUBCUTANEOUS NIGHTLY
Status: DISCONTINUED | OUTPATIENT
Start: 2019-10-20 | End: 2019-10-21 | Stop reason: HOSPADM

## 2019-10-20 RX ORDER — POTASSIUM CHLORIDE 20 MEQ/1
40 TABLET, EXTENDED RELEASE ORAL 2 TIMES DAILY
Status: COMPLETED | OUTPATIENT
Start: 2019-10-20 | End: 2019-10-20

## 2019-10-20 RX ADMIN — INSULIN GLARGINE 26 UNITS: 100 INJECTION, SOLUTION SUBCUTANEOUS at 20:59

## 2019-10-20 RX ADMIN — SENNOSIDES 8.6 MG: 8.6 TABLET, FILM COATED ORAL at 21:02

## 2019-10-20 RX ADMIN — POTASSIUM CHLORIDE 40 MEQ: 20 TABLET, EXTENDED RELEASE ORAL at 21:02

## 2019-10-20 RX ADMIN — APIXABAN 5 MG: 5 TABLET, FILM COATED ORAL at 21:02

## 2019-10-20 RX ADMIN — PANTOPRAZOLE SODIUM 40 MG: 40 TABLET, DELAYED RELEASE ORAL at 07:09

## 2019-10-20 RX ADMIN — DOCUSATE SODIUM 100 MG: 100 CAPSULE, LIQUID FILLED ORAL at 21:02

## 2019-10-20 RX ADMIN — INSULIN LISPRO 12 UNITS: 100 INJECTION, SOLUTION INTRAVENOUS; SUBCUTANEOUS at 11:22

## 2019-10-20 RX ADMIN — BUMETANIDE 1 MG/HR: 0.25 INJECTION INTRAMUSCULAR; INTRAVENOUS at 03:29

## 2019-10-20 RX ADMIN — BUSPIRONE HYDROCHLORIDE 30 MG: 10 TABLET ORAL at 08:21

## 2019-10-20 RX ADMIN — MIDODRINE HYDROCHLORIDE 5 MG: 5 TABLET ORAL at 11:22

## 2019-10-20 RX ADMIN — ALLOPURINOL 200 MG: 100 TABLET ORAL at 21:02

## 2019-10-20 RX ADMIN — PANTOPRAZOLE SODIUM 40 MG: 40 TABLET, DELAYED RELEASE ORAL at 16:05

## 2019-10-20 RX ADMIN — ALLOPURINOL 200 MG: 100 TABLET ORAL at 08:21

## 2019-10-20 RX ADMIN — POTASSIUM CHLORIDE 40 MEQ: 20 TABLET, EXTENDED RELEASE ORAL at 08:21

## 2019-10-20 RX ADMIN — BUMETANIDE 1 MG/HR: 0.25 INJECTION INTRAMUSCULAR; INTRAVENOUS at 16:47

## 2019-10-20 RX ADMIN — INSULIN LISPRO 9 UNITS: 100 INJECTION, SOLUTION INTRAVENOUS; SUBCUTANEOUS at 21:00

## 2019-10-20 RX ADMIN — DOCUSATE SODIUM 100 MG: 100 CAPSULE, LIQUID FILLED ORAL at 08:21

## 2019-10-20 RX ADMIN — APIXABAN 5 MG: 5 TABLET, FILM COATED ORAL at 08:21

## 2019-10-20 RX ADMIN — MIDODRINE HYDROCHLORIDE 5 MG: 5 TABLET ORAL at 16:05

## 2019-10-20 RX ADMIN — GABAPENTIN 100 MG: 100 CAPSULE ORAL at 08:21

## 2019-10-20 RX ADMIN — INSULIN LISPRO 3 UNITS: 100 INJECTION, SOLUTION INTRAVENOUS; SUBCUTANEOUS at 07:12

## 2019-10-20 RX ADMIN — ATORVASTATIN CALCIUM 80 MG: 40 TABLET, FILM COATED ORAL at 21:02

## 2019-10-20 RX ADMIN — MIDODRINE HYDROCHLORIDE 5 MG: 5 TABLET ORAL at 08:21

## 2019-10-20 RX ADMIN — METOPROLOL SUCCINATE 100 MG: 100 TABLET, EXTENDED RELEASE ORAL at 21:02

## 2019-10-20 RX ADMIN — GABAPENTIN 100 MG: 100 CAPSULE ORAL at 21:02

## 2019-10-20 RX ADMIN — GABAPENTIN 100 MG: 100 CAPSULE ORAL at 14:09

## 2019-10-20 RX ADMIN — MAGNESIUM OXIDE TAB 400 MG (241.3 MG ELEMENTAL MG) 400 MG: 400 (241.3 MG) TAB at 08:21

## 2019-10-20 RX ADMIN — INSULIN LISPRO 6 UNITS: 100 INJECTION, SOLUTION INTRAVENOUS; SUBCUTANEOUS at 16:05

## 2019-10-20 RX ADMIN — Medication 10 ML: at 21:03

## 2019-10-20 ASSESSMENT — PAIN SCALES - GENERAL
PAINLEVEL_OUTOF10: 0

## 2019-10-21 ENCOUNTER — HOSPITAL ENCOUNTER (INPATIENT)
Age: 69
LOS: 9 days | Discharge: HOME HEALTH | DRG: 291 | End: 2019-10-30
Attending: INTERNAL MEDICINE | Admitting: INTERNAL MEDICINE
Payer: COMMERCIAL

## 2019-10-21 ENCOUNTER — HOSPITAL ENCOUNTER (OUTPATIENT)
Age: 69
Discharge: HOME | End: 2019-10-21
Payer: COMMERCIAL

## 2019-10-21 ENCOUNTER — APPOINTMENT (OUTPATIENT)
Dept: GENERAL RADIOLOGY | Age: 69
DRG: 291 | End: 2019-10-21
Attending: INTERNAL MEDICINE
Payer: COMMERCIAL

## 2019-10-21 ENCOUNTER — APPOINTMENT (OUTPATIENT)
Dept: GENERAL RADIOLOGY | Age: 69
DRG: 291 | End: 2019-10-21
Payer: COMMERCIAL

## 2019-10-21 VITALS
DIASTOLIC BLOOD PRESSURE: 77 MMHG | SYSTOLIC BLOOD PRESSURE: 105 MMHG | BODY MASS INDEX: 44.1 KG/M2 | RESPIRATION RATE: 18 BRPM | TEMPERATURE: 98.2 F | OXYGEN SATURATION: 97 % | HEART RATE: 96 BPM | WEIGHT: 315 LBS | HEIGHT: 71 IN

## 2019-10-21 PROBLEM — I50.43 CHF (CONGESTIVE HEART FAILURE), NYHA CLASS I, ACUTE ON CHRONIC, COMBINED (HCC): Status: ACTIVE | Noted: 2019-10-21

## 2019-10-21 LAB
ALBUMIN SERPL-MCNC: 3.4 G/DL (ref 3.5–5.2)
ALP BLD-CCNC: 114 U/L (ref 40–129)
ALT SERPL-CCNC: 18 U/L (ref 0–40)
ANION GAP SERPL CALCULATED.3IONS-SCNC: 14 MMOL/L (ref 7–16)
AST SERPL-CCNC: 18 U/L (ref 0–39)
BILIRUB SERPL-MCNC: 0.9 MG/DL (ref 0–1.2)
BILIRUBIN DIRECT: 0.3 MG/DL (ref 0–0.3)
BILIRUBIN, INDIRECT: 0.6 MG/DL (ref 0–1)
BUN BLDV-MCNC: 55 MG/DL (ref 8–23)
CALCIUM SERPL-MCNC: 9.4 MG/DL (ref 8.6–10.2)
CHLORIDE BLD-SCNC: 92 MMOL/L (ref 98–107)
CO2: 28 MMOL/L (ref 22–29)
CREAT SERPL-MCNC: 2.2 MG/DL (ref 0.7–1.2)
GFR AFRICAN AMERICAN: 36
GFR NON-AFRICAN AMERICAN: 36 ML/MIN/1.73
GLUCOSE BLD-MCNC: 208 MG/DL (ref 74–99)
METER GLUCOSE: 218 MG/DL (ref 74–99)
METER GLUCOSE: 245 MG/DL (ref 74–99)
METER GLUCOSE: 248 MG/DL (ref 74–99)
METER GLUCOSE: 258 MG/DL (ref 74–99)
POTASSIUM REFLEX MAGNESIUM: 3.7 MMOL/L (ref 3.5–5)
POTASSIUM SERPL-SCNC: 3.7 MMOL/L (ref 3.5–5)
SODIUM BLD-SCNC: 134 MMOL/L (ref 132–146)
TOTAL PROTEIN: 7.6 G/DL (ref 6.4–8.3)

## 2019-10-21 PROCEDURE — C1751 CATH, INF, PER/CENT/MIDLINE: HCPCS

## 2019-10-21 PROCEDURE — 6370000000 HC RX 637 (ALT 250 FOR IP): Performed by: INTERNAL MEDICINE

## 2019-10-21 PROCEDURE — 80048 BASIC METABOLIC PNL TOTAL CA: CPT

## 2019-10-21 PROCEDURE — 84132 ASSAY OF SERUM POTASSIUM: CPT

## 2019-10-21 PROCEDURE — 2500000003 HC RX 250 WO HCPCS: Performed by: INTERNAL MEDICINE

## 2019-10-21 PROCEDURE — 02HV33Z INSERTION OF INFUSION DEVICE INTO SUPERIOR VENA CAVA, PERCUTANEOUS APPROACH: ICD-10-PCS | Performed by: INTERNAL MEDICINE

## 2019-10-21 PROCEDURE — 2580000003 HC RX 258: Performed by: INTERNAL MEDICINE

## 2019-10-21 PROCEDURE — 2140000000 HC CCU INTERMEDIATE R&B

## 2019-10-21 PROCEDURE — 36415 COLL VENOUS BLD VENIPUNCTURE: CPT

## 2019-10-21 PROCEDURE — 36569 INSJ PICC 5 YR+ W/O IMAGING: CPT

## 2019-10-21 PROCEDURE — 80076 HEPATIC FUNCTION PANEL: CPT

## 2019-10-21 PROCEDURE — 82962 GLUCOSE BLOOD TEST: CPT

## 2019-10-21 PROCEDURE — 6370000000 HC RX 637 (ALT 250 FOR IP): Performed by: NURSE PRACTITIONER

## 2019-10-21 PROCEDURE — 94660 CPAP INITIATION&MGMT: CPT

## 2019-10-21 PROCEDURE — A0425 GROUND MILEAGE: HCPCS

## 2019-10-21 PROCEDURE — 76937 US GUIDE VASCULAR ACCESS: CPT

## 2019-10-21 PROCEDURE — 99239 HOSP IP/OBS DSCHRG MGMT >30: CPT | Performed by: INTERNAL MEDICINE

## 2019-10-21 PROCEDURE — 71045 X-RAY EXAM CHEST 1 VIEW: CPT

## 2019-10-21 PROCEDURE — A0426 ALS 1: HCPCS

## 2019-10-21 PROCEDURE — 83735 ASSAY OF MAGNESIUM: CPT

## 2019-10-21 PROCEDURE — 99233 SBSQ HOSP IP/OBS HIGH 50: CPT | Performed by: INTERNAL MEDICINE

## 2019-10-21 PROCEDURE — 80053 COMPREHEN METABOLIC PANEL: CPT

## 2019-10-21 RX ORDER — ONDANSETRON 2 MG/ML
4 INJECTION INTRAMUSCULAR; INTRAVENOUS EVERY 6 HOURS PRN
Status: CANCELLED | OUTPATIENT
Start: 2019-10-21

## 2019-10-21 RX ORDER — HEPARIN SODIUM (PORCINE) LOCK FLUSH IV SOLN 100 UNIT/ML 100 UNIT/ML
3 SOLUTION INTRAVENOUS PRN
Status: CANCELLED | OUTPATIENT
Start: 2019-10-21

## 2019-10-21 RX ORDER — INSULIN GLARGINE 100 [IU]/ML
26 INJECTION, SOLUTION SUBCUTANEOUS NIGHTLY
Status: DISCONTINUED | OUTPATIENT
Start: 2019-10-21 | End: 2019-10-24

## 2019-10-21 RX ORDER — SODIUM CHLORIDE 0.9 % (FLUSH) 0.9 %
10 SYRINGE (ML) INJECTION PRN
Status: DISCONTINUED | OUTPATIENT
Start: 2019-10-21 | End: 2019-10-24 | Stop reason: SDUPTHER

## 2019-10-21 RX ORDER — ALLOPURINOL 100 MG/1
200 TABLET ORAL 2 TIMES DAILY
Status: CANCELLED | OUTPATIENT
Start: 2019-10-21

## 2019-10-21 RX ORDER — INSULIN GLARGINE 100 [IU]/ML
26 INJECTION, SOLUTION SUBCUTANEOUS NIGHTLY
Status: CANCELLED | OUTPATIENT
Start: 2019-10-21

## 2019-10-21 RX ORDER — GABAPENTIN 100 MG/1
100 CAPSULE ORAL 3 TIMES DAILY
Status: DISCONTINUED | OUTPATIENT
Start: 2019-10-21 | End: 2019-10-31 | Stop reason: HOSPADM

## 2019-10-21 RX ORDER — SENNA PLUS 8.6 MG/1
1 TABLET ORAL NIGHTLY
Status: CANCELLED | OUTPATIENT
Start: 2019-10-21

## 2019-10-21 RX ORDER — BUSPIRONE HYDROCHLORIDE 15 MG/1
30 TABLET ORAL DAILY
Status: CANCELLED | OUTPATIENT
Start: 2019-10-22

## 2019-10-21 RX ORDER — METOLAZONE 5 MG/1
5 TABLET ORAL EVERY OTHER DAY
Status: DISCONTINUED | OUTPATIENT
Start: 2019-10-23 | End: 2019-10-23

## 2019-10-21 RX ORDER — METOPROLOL SUCCINATE 100 MG/1
100 TABLET, EXTENDED RELEASE ORAL 2 TIMES DAILY
Status: DISCONTINUED | OUTPATIENT
Start: 2019-10-21 | End: 2019-10-22

## 2019-10-21 RX ORDER — POTASSIUM CHLORIDE 20 MEQ/1
20 TABLET, EXTENDED RELEASE ORAL DAILY PRN
Status: DISCONTINUED | OUTPATIENT
Start: 2019-10-21 | End: 2019-10-31 | Stop reason: HOSPADM

## 2019-10-21 RX ORDER — SODIUM CHLORIDE 0.9 % (FLUSH) 0.9 %
10 SYRINGE (ML) INJECTION PRN
Status: CANCELLED | OUTPATIENT
Start: 2019-10-21

## 2019-10-21 RX ORDER — SODIUM CHLORIDE 0.9 % (FLUSH) 0.9 %
10 SYRINGE (ML) INJECTION EVERY 12 HOURS SCHEDULED
Status: CANCELLED | OUTPATIENT
Start: 2019-10-21

## 2019-10-21 RX ORDER — PANTOPRAZOLE SODIUM 40 MG/1
40 TABLET, DELAYED RELEASE ORAL
Status: CANCELLED | OUTPATIENT
Start: 2019-10-21

## 2019-10-21 RX ORDER — HEPARIN SODIUM (PORCINE) LOCK FLUSH IV SOLN 100 UNIT/ML 100 UNIT/ML
3 SOLUTION INTRAVENOUS EVERY 12 HOURS SCHEDULED
Status: DISCONTINUED | OUTPATIENT
Start: 2019-10-21 | End: 2019-10-31 | Stop reason: HOSPADM

## 2019-10-21 RX ORDER — ACETAMINOPHEN 325 MG/1
650 TABLET ORAL EVERY 4 HOURS PRN
Status: DISCONTINUED | OUTPATIENT
Start: 2019-10-21 | End: 2019-10-31 | Stop reason: HOSPADM

## 2019-10-21 RX ORDER — ACETAMINOPHEN 325 MG/1
650 TABLET ORAL EVERY 4 HOURS PRN
Status: CANCELLED | OUTPATIENT
Start: 2019-10-21

## 2019-10-21 RX ORDER — MIDODRINE HYDROCHLORIDE 5 MG/1
5 TABLET ORAL
Status: DISCONTINUED | OUTPATIENT
Start: 2019-10-21 | End: 2019-10-22

## 2019-10-21 RX ORDER — METOLAZONE 5 MG/1
5 TABLET ORAL
Status: CANCELLED | OUTPATIENT
Start: 2019-10-23

## 2019-10-21 RX ORDER — DEXTROSE MONOHYDRATE 25 G/50ML
12.5 INJECTION, SOLUTION INTRAVENOUS PRN
Status: DISCONTINUED | OUTPATIENT
Start: 2019-10-21 | End: 2019-10-31 | Stop reason: HOSPADM

## 2019-10-21 RX ORDER — ONDANSETRON 2 MG/ML
4 INJECTION INTRAMUSCULAR; INTRAVENOUS EVERY 6 HOURS PRN
Status: DISCONTINUED | OUTPATIENT
Start: 2019-10-21 | End: 2019-10-31 | Stop reason: HOSPADM

## 2019-10-21 RX ORDER — DOCUSATE SODIUM 100 MG/1
100 CAPSULE, LIQUID FILLED ORAL 2 TIMES DAILY
Status: CANCELLED | OUTPATIENT
Start: 2019-10-21

## 2019-10-21 RX ORDER — HEPARIN SODIUM (PORCINE) LOCK FLUSH IV SOLN 100 UNIT/ML 100 UNIT/ML
3 SOLUTION INTRAVENOUS EVERY 12 HOURS SCHEDULED
Status: DISCONTINUED | OUTPATIENT
Start: 2019-10-21 | End: 2019-10-21 | Stop reason: HOSPADM

## 2019-10-21 RX ORDER — SENNA PLUS 8.6 MG/1
1 TABLET ORAL NIGHTLY
Status: DISCONTINUED | OUTPATIENT
Start: 2019-10-21 | End: 2019-10-31 | Stop reason: HOSPADM

## 2019-10-21 RX ORDER — GABAPENTIN 100 MG/1
100 CAPSULE ORAL 3 TIMES DAILY
Status: CANCELLED | OUTPATIENT
Start: 2019-10-21

## 2019-10-21 RX ORDER — PANTOPRAZOLE SODIUM 40 MG/1
40 TABLET, DELAYED RELEASE ORAL
Status: DISCONTINUED | OUTPATIENT
Start: 2019-10-21 | End: 2019-10-31 | Stop reason: HOSPADM

## 2019-10-21 RX ORDER — NICOTINE POLACRILEX 4 MG
15 LOZENGE BUCCAL PRN
Status: CANCELLED | OUTPATIENT
Start: 2019-10-21

## 2019-10-21 RX ORDER — METOLAZONE 5 MG/1
5 TABLET ORAL
Status: DISCONTINUED | OUTPATIENT
Start: 2019-10-21 | End: 2019-10-21 | Stop reason: HOSPADM

## 2019-10-21 RX ORDER — POTASSIUM CHLORIDE 20 MEQ/1
20 TABLET, EXTENDED RELEASE ORAL DAILY PRN
Status: CANCELLED | OUTPATIENT
Start: 2019-10-21

## 2019-10-21 RX ORDER — BUMETANIDE 1 MG/1
2 TABLET ORAL 2 TIMES DAILY
Status: DISCONTINUED | OUTPATIENT
Start: 2019-10-21 | End: 2019-10-21 | Stop reason: HOSPADM

## 2019-10-21 RX ORDER — METOPROLOL SUCCINATE 100 MG/1
100 TABLET, EXTENDED RELEASE ORAL 2 TIMES DAILY
Status: CANCELLED | OUTPATIENT
Start: 2019-10-21

## 2019-10-21 RX ORDER — POTASSIUM CHLORIDE 20 MEQ/1
40 TABLET, EXTENDED RELEASE ORAL PRN
Status: CANCELLED | OUTPATIENT
Start: 2019-10-21

## 2019-10-21 RX ORDER — DEXTROSE MONOHYDRATE 25 G/50ML
12.5 INJECTION, SOLUTION INTRAVENOUS PRN
Status: CANCELLED | OUTPATIENT
Start: 2019-10-21

## 2019-10-21 RX ORDER — BUMETANIDE 1 MG/1
2 TABLET ORAL 2 TIMES DAILY
Status: DISCONTINUED | OUTPATIENT
Start: 2019-10-21 | End: 2019-10-22

## 2019-10-21 RX ORDER — HEPARIN SODIUM (PORCINE) LOCK FLUSH IV SOLN 100 UNIT/ML 100 UNIT/ML
3 SOLUTION INTRAVENOUS PRN
Status: DISCONTINUED | OUTPATIENT
Start: 2019-10-21 | End: 2019-10-21 | Stop reason: HOSPADM

## 2019-10-21 RX ORDER — POTASSIUM CHLORIDE 7.45 MG/ML
10 INJECTION INTRAVENOUS PRN
Status: CANCELLED | OUTPATIENT
Start: 2019-10-21

## 2019-10-21 RX ORDER — HEPARIN SODIUM (PORCINE) LOCK FLUSH IV SOLN 100 UNIT/ML 100 UNIT/ML
3 SOLUTION INTRAVENOUS EVERY 12 HOURS SCHEDULED
Status: CANCELLED | OUTPATIENT
Start: 2019-10-21

## 2019-10-21 RX ORDER — MAGNESIUM SULFATE 1 G/100ML
1 INJECTION INTRAVENOUS PRN
Status: DISCONTINUED | OUTPATIENT
Start: 2019-10-21 | End: 2019-10-31 | Stop reason: HOSPADM

## 2019-10-21 RX ORDER — POTASSIUM CHLORIDE 20 MEQ/1
40 TABLET, EXTENDED RELEASE ORAL PRN
Status: DISCONTINUED | OUTPATIENT
Start: 2019-10-21 | End: 2019-10-24 | Stop reason: SDUPTHER

## 2019-10-21 RX ORDER — NICOTINE POLACRILEX 4 MG
15 LOZENGE BUCCAL PRN
Status: DISCONTINUED | OUTPATIENT
Start: 2019-10-21 | End: 2019-10-31 | Stop reason: HOSPADM

## 2019-10-21 RX ORDER — LIDOCAINE HYDROCHLORIDE 10 MG/ML
5 INJECTION, SOLUTION EPIDURAL; INFILTRATION; INTRACAUDAL; PERINEURAL ONCE
Status: COMPLETED | OUTPATIENT
Start: 2019-10-21 | End: 2019-10-21

## 2019-10-21 RX ORDER — ATORVASTATIN CALCIUM 40 MG/1
80 TABLET, FILM COATED ORAL NIGHTLY
Status: CANCELLED | OUTPATIENT
Start: 2019-10-21

## 2019-10-21 RX ORDER — ALLOPURINOL 100 MG/1
200 TABLET ORAL 2 TIMES DAILY
Status: DISCONTINUED | OUTPATIENT
Start: 2019-10-21 | End: 2019-10-31 | Stop reason: HOSPADM

## 2019-10-21 RX ORDER — BUMETANIDE 1 MG/1
2 TABLET ORAL 2 TIMES DAILY
Status: CANCELLED | OUTPATIENT
Start: 2019-10-21

## 2019-10-21 RX ORDER — LANOLIN ALCOHOL/MO/W.PET/CERES
400 CREAM (GRAM) TOPICAL DAILY
Status: DISCONTINUED | OUTPATIENT
Start: 2019-10-22 | End: 2019-10-31 | Stop reason: HOSPADM

## 2019-10-21 RX ORDER — POTASSIUM CHLORIDE 7.45 MG/ML
10 INJECTION INTRAVENOUS PRN
Status: DISCONTINUED | OUTPATIENT
Start: 2019-10-21 | End: 2019-10-24 | Stop reason: SDUPTHER

## 2019-10-21 RX ORDER — HEPARIN SODIUM (PORCINE) LOCK FLUSH IV SOLN 100 UNIT/ML 100 UNIT/ML
3 SOLUTION INTRAVENOUS PRN
Status: DISCONTINUED | OUTPATIENT
Start: 2019-10-21 | End: 2019-10-31 | Stop reason: HOSPADM

## 2019-10-21 RX ORDER — MAGNESIUM SULFATE 1 G/100ML
1 INJECTION INTRAVENOUS PRN
Status: CANCELLED | OUTPATIENT
Start: 2019-10-21

## 2019-10-21 RX ORDER — DEXTROSE MONOHYDRATE 50 MG/ML
100 INJECTION, SOLUTION INTRAVENOUS PRN
Status: DISCONTINUED | OUTPATIENT
Start: 2019-10-21 | End: 2019-10-31 | Stop reason: HOSPADM

## 2019-10-21 RX ORDER — ATORVASTATIN CALCIUM 40 MG/1
80 TABLET, FILM COATED ORAL NIGHTLY
Status: DISCONTINUED | OUTPATIENT
Start: 2019-10-21 | End: 2019-10-31 | Stop reason: HOSPADM

## 2019-10-21 RX ORDER — SODIUM CHLORIDE 0.9 % (FLUSH) 0.9 %
10 SYRINGE (ML) INJECTION PRN
Status: DISCONTINUED | OUTPATIENT
Start: 2019-10-21 | End: 2019-10-21 | Stop reason: HOSPADM

## 2019-10-21 RX ORDER — DOCUSATE SODIUM 100 MG/1
100 CAPSULE, LIQUID FILLED ORAL 2 TIMES DAILY
Status: DISCONTINUED | OUTPATIENT
Start: 2019-10-21 | End: 2019-10-31 | Stop reason: HOSPADM

## 2019-10-21 RX ORDER — DEXTROSE MONOHYDRATE 50 MG/ML
100 INJECTION, SOLUTION INTRAVENOUS PRN
Status: CANCELLED | OUTPATIENT
Start: 2019-10-21

## 2019-10-21 RX ORDER — BUSPIRONE HYDROCHLORIDE 10 MG/1
30 TABLET ORAL DAILY
Status: DISCONTINUED | OUTPATIENT
Start: 2019-10-22 | End: 2019-10-31 | Stop reason: HOSPADM

## 2019-10-21 RX ORDER — MIDODRINE HYDROCHLORIDE 5 MG/1
5 TABLET ORAL
Status: CANCELLED | OUTPATIENT
Start: 2019-10-21

## 2019-10-21 RX ORDER — SODIUM CHLORIDE 0.9 % (FLUSH) 0.9 %
10 SYRINGE (ML) INJECTION EVERY 12 HOURS SCHEDULED
Status: DISCONTINUED | OUTPATIENT
Start: 2019-10-21 | End: 2019-10-24 | Stop reason: SDUPTHER

## 2019-10-21 RX ORDER — APIXABAN 5 MG/1
TABLET, FILM COATED ORAL
Qty: 180 TABLET | Refills: 4 | Status: SHIPPED | OUTPATIENT
Start: 2019-10-21 | End: 2020-01-31

## 2019-10-21 RX ADMIN — PANTOPRAZOLE SODIUM 40 MG: 40 TABLET, DELAYED RELEASE ORAL at 06:18

## 2019-10-21 RX ADMIN — METOPROLOL SUCCINATE 100 MG: 100 TABLET, EXTENDED RELEASE ORAL at 08:58

## 2019-10-21 RX ADMIN — GABAPENTIN 100 MG: 100 CAPSULE ORAL at 22:00

## 2019-10-21 RX ADMIN — INSULIN GLARGINE 26 UNITS: 100 INJECTION, SOLUTION SUBCUTANEOUS at 22:11

## 2019-10-21 RX ADMIN — MIDODRINE HYDROCHLORIDE 5 MG: 5 TABLET ORAL at 18:39

## 2019-10-21 RX ADMIN — BUMETANIDE 2 MG: 1 TABLET ORAL at 01:08

## 2019-10-21 RX ADMIN — BUMETANIDE 2 MG: 1 TABLET ORAL at 22:01

## 2019-10-21 RX ADMIN — INSULIN LISPRO 6 UNITS: 100 INJECTION, SOLUTION INTRAVENOUS; SUBCUTANEOUS at 06:32

## 2019-10-21 RX ADMIN — DOCUSATE SODIUM 100 MG: 100 CAPSULE, LIQUID FILLED ORAL at 08:58

## 2019-10-21 RX ADMIN — Medication 10 ML: at 22:13

## 2019-10-21 RX ADMIN — ACETAMINOPHEN 650 MG: 325 TABLET, FILM COATED ORAL at 22:17

## 2019-10-21 RX ADMIN — Medication 10 ML: at 08:58

## 2019-10-21 RX ADMIN — BUSPIRONE HYDROCHLORIDE 30 MG: 10 TABLET ORAL at 08:57

## 2019-10-21 RX ADMIN — APIXABAN 5 MG: 5 TABLET, FILM COATED ORAL at 22:00

## 2019-10-21 RX ADMIN — MIDODRINE HYDROCHLORIDE 5 MG: 5 TABLET ORAL at 08:57

## 2019-10-21 RX ADMIN — METOLAZONE 5 MG: 5 TABLET ORAL at 01:51

## 2019-10-21 RX ADMIN — ATORVASTATIN CALCIUM 80 MG: 40 TABLET, FILM COATED ORAL at 22:00

## 2019-10-21 RX ADMIN — BUMETANIDE 2 MG: 1 TABLET ORAL at 08:58

## 2019-10-21 RX ADMIN — MIDODRINE HYDROCHLORIDE 5 MG: 5 TABLET ORAL at 11:01

## 2019-10-21 RX ADMIN — PANTOPRAZOLE SODIUM 40 MG: 40 TABLET, DELAYED RELEASE ORAL at 18:38

## 2019-10-21 RX ADMIN — GABAPENTIN 100 MG: 100 CAPSULE ORAL at 08:58

## 2019-10-21 RX ADMIN — SENNOSIDES 8.6 MG: 8.6 TABLET, FILM COATED ORAL at 22:00

## 2019-10-21 RX ADMIN — INSULIN LISPRO 9 UNITS: 100 INJECTION, SOLUTION INTRAVENOUS; SUBCUTANEOUS at 22:10

## 2019-10-21 RX ADMIN — LIDOCAINE HYDROCHLORIDE 1 ML: 10 INJECTION, SOLUTION EPIDURAL; INFILTRATION; INTRACAUDAL; PERINEURAL at 14:55

## 2019-10-21 RX ADMIN — APIXABAN 5 MG: 5 TABLET, FILM COATED ORAL at 11:01

## 2019-10-21 RX ADMIN — Medication 10 ML: at 09:45

## 2019-10-21 RX ADMIN — INSULIN LISPRO 6 UNITS: 100 INJECTION, SOLUTION INTRAVENOUS; SUBCUTANEOUS at 11:01

## 2019-10-21 RX ADMIN — DOCUSATE SODIUM 100 MG: 100 CAPSULE, LIQUID FILLED ORAL at 22:00

## 2019-10-21 RX ADMIN — ALLOPURINOL 200 MG: 100 TABLET ORAL at 08:57

## 2019-10-21 RX ADMIN — ALLOPURINOL 200 MG: 100 TABLET ORAL at 22:01

## 2019-10-21 RX ADMIN — MAGNESIUM OXIDE TAB 400 MG (241.3 MG ELEMENTAL MG) 400 MG: 400 (241.3 MG) TAB at 08:57

## 2019-10-21 RX ADMIN — GABAPENTIN 100 MG: 100 CAPSULE ORAL at 13:11

## 2019-10-21 ASSESSMENT — PAIN SCALES - GENERAL
PAINLEVEL_OUTOF10: 6
PAINLEVEL_OUTOF10: 0

## 2019-10-22 LAB
ALBUMIN SERPL-MCNC: 3.2 G/DL (ref 3.5–5.2)
ALP BLD-CCNC: 114 U/L (ref 40–129)
ALT SERPL-CCNC: 17 U/L (ref 0–40)
ANION GAP SERPL CALCULATED.3IONS-SCNC: 12 MMOL/L (ref 7–16)
AST SERPL-CCNC: 21 U/L (ref 0–39)
BILIRUB SERPL-MCNC: 1.1 MG/DL (ref 0–1.2)
BILIRUBIN DIRECT: 0.4 MG/DL (ref 0–0.3)
BILIRUBIN, INDIRECT: 0.7 MG/DL (ref 0–1)
BUN BLDV-MCNC: 69 MG/DL (ref 8–23)
CALCIUM SERPL-MCNC: 9.3 MG/DL (ref 8.6–10.2)
CHLORIDE BLD-SCNC: 90 MMOL/L (ref 98–107)
CO2: 31 MMOL/L (ref 22–29)
CREAT SERPL-MCNC: 2.7 MG/DL (ref 0.7–1.2)
GFR AFRICAN AMERICAN: 29
GFR NON-AFRICAN AMERICAN: 29 ML/MIN/1.73
GLUCOSE BLD-MCNC: 190 MG/DL (ref 74–99)
MAGNESIUM: 2.1 MG/DL (ref 1.6–2.6)
MAGNESIUM: 2.2 MG/DL (ref 1.6–2.6)
METER GLUCOSE: 164 MG/DL (ref 74–99)
METER GLUCOSE: 215 MG/DL (ref 74–99)
METER GLUCOSE: 222 MG/DL (ref 74–99)
METER GLUCOSE: 301 MG/DL (ref 74–99)
PHOSPHORUS: 3.4 MG/DL (ref 2.5–4.5)
POTASSIUM REFLEX MAGNESIUM: 3.4 MMOL/L (ref 3.5–5)
POTASSIUM SERPL-SCNC: 3.4 MMOL/L (ref 3.5–5)
POTASSIUM SERPL-SCNC: 4.1 MMOL/L (ref 3.5–5)
PRO-BNP: 7351 PG/ML (ref 0–125)
SODIUM BLD-SCNC: 133 MMOL/L (ref 132–146)
TOTAL PROTEIN: 7.6 G/DL (ref 6.4–8.3)

## 2019-10-22 PROCEDURE — 94660 CPAP INITIATION&MGMT: CPT

## 2019-10-22 PROCEDURE — APPSS180 APP SPLIT SHARED TIME > 60 MINUTES: Performed by: NURSE PRACTITIONER

## 2019-10-22 PROCEDURE — 83880 ASSAY OF NATRIURETIC PEPTIDE: CPT

## 2019-10-22 PROCEDURE — 80048 BASIC METABOLIC PNL TOTAL CA: CPT

## 2019-10-22 PROCEDURE — 83735 ASSAY OF MAGNESIUM: CPT

## 2019-10-22 PROCEDURE — 6360000002 HC RX W HCPCS: Performed by: INTERNAL MEDICINE

## 2019-10-22 PROCEDURE — 84100 ASSAY OF PHOSPHORUS: CPT

## 2019-10-22 PROCEDURE — 6370000000 HC RX 637 (ALT 250 FOR IP): Performed by: INTERNAL MEDICINE

## 2019-10-22 PROCEDURE — 6370000000 HC RX 637 (ALT 250 FOR IP): Performed by: NURSE PRACTITIONER

## 2019-10-22 PROCEDURE — 80076 HEPATIC FUNCTION PANEL: CPT

## 2019-10-22 PROCEDURE — 2580000003 HC RX 258: Performed by: INTERNAL MEDICINE

## 2019-10-22 PROCEDURE — 2580000003 HC RX 258: Performed by: NURSE PRACTITIONER

## 2019-10-22 PROCEDURE — 6360000002 HC RX W HCPCS: Performed by: NURSE PRACTITIONER

## 2019-10-22 PROCEDURE — 99254 IP/OBS CNSLTJ NEW/EST MOD 60: CPT | Performed by: INTERNAL MEDICINE

## 2019-10-22 PROCEDURE — 2700000000 HC OXYGEN THERAPY PER DAY

## 2019-10-22 PROCEDURE — 2500000003 HC RX 250 WO HCPCS: Performed by: NURSE PRACTITIONER

## 2019-10-22 PROCEDURE — 82962 GLUCOSE BLOOD TEST: CPT

## 2019-10-22 PROCEDURE — 2140000000 HC CCU INTERMEDIATE R&B

## 2019-10-22 PROCEDURE — 97161 PT EVAL LOW COMPLEX 20 MIN: CPT

## 2019-10-22 PROCEDURE — 80053 COMPREHEN METABOLIC PANEL: CPT

## 2019-10-22 PROCEDURE — 36415 COLL VENOUS BLD VENIPUNCTURE: CPT

## 2019-10-22 RX ORDER — BUMETANIDE 0.25 MG/ML
2 INJECTION, SOLUTION INTRAMUSCULAR; INTRAVENOUS ONCE
Status: COMPLETED | OUTPATIENT
Start: 2019-10-22 | End: 2019-10-22

## 2019-10-22 RX ORDER — HYDRALAZINE HYDROCHLORIDE 10 MG/1
10 TABLET, FILM COATED ORAL EVERY 8 HOURS SCHEDULED
Status: DISCONTINUED | OUTPATIENT
Start: 2019-10-22 | End: 2019-10-23

## 2019-10-22 RX ORDER — METOPROLOL SUCCINATE 50 MG/1
50 TABLET, EXTENDED RELEASE ORAL 2 TIMES DAILY
Status: DISCONTINUED | OUTPATIENT
Start: 2019-10-22 | End: 2019-10-31 | Stop reason: HOSPADM

## 2019-10-22 RX ORDER — MILRINONE LACTATE 0.2 MG/ML
0.2 INJECTION, SOLUTION INTRAVENOUS CONTINUOUS
Status: DISCONTINUED | OUTPATIENT
Start: 2019-10-22 | End: 2019-10-28

## 2019-10-22 RX ADMIN — MILRINONE LACTATE IN DEXTROSE 0.2 MCG/KG/MIN: 200 INJECTION, SOLUTION INTRAVENOUS at 14:08

## 2019-10-22 RX ADMIN — INSULIN LISPRO 12 UNITS: 100 INJECTION, SOLUTION INTRAVENOUS; SUBCUTANEOUS at 21:48

## 2019-10-22 RX ADMIN — DOCUSATE SODIUM 100 MG: 100 CAPSULE, LIQUID FILLED ORAL at 08:15

## 2019-10-22 RX ADMIN — Medication 400 MG: at 08:15

## 2019-10-22 RX ADMIN — APIXABAN 5 MG: 5 TABLET, FILM COATED ORAL at 08:14

## 2019-10-22 RX ADMIN — Medication 300 UNITS: at 21:46

## 2019-10-22 RX ADMIN — HYDRALAZINE HYDROCHLORIDE 10 MG: 10 TABLET, FILM COATED ORAL at 21:46

## 2019-10-22 RX ADMIN — BUMETANIDE 2 MG: 0.25 INJECTION INTRAMUSCULAR; INTRAVENOUS at 14:20

## 2019-10-22 RX ADMIN — ALLOPURINOL 200 MG: 100 TABLET ORAL at 08:15

## 2019-10-22 RX ADMIN — GABAPENTIN 100 MG: 100 CAPSULE ORAL at 21:46

## 2019-10-22 RX ADMIN — METOPROLOL SUCCINATE 50 MG: 50 TABLET, FILM COATED, EXTENDED RELEASE ORAL at 21:47

## 2019-10-22 RX ADMIN — DOCUSATE SODIUM 100 MG: 100 CAPSULE, LIQUID FILLED ORAL at 21:46

## 2019-10-22 RX ADMIN — GABAPENTIN 100 MG: 100 CAPSULE ORAL at 08:14

## 2019-10-22 RX ADMIN — ATORVASTATIN CALCIUM 80 MG: 40 TABLET, FILM COATED ORAL at 21:46

## 2019-10-22 RX ADMIN — MIDODRINE HYDROCHLORIDE 5 MG: 5 TABLET ORAL at 08:15

## 2019-10-22 RX ADMIN — BUSPIRONE HYDROCHLORIDE 30 MG: 10 TABLET ORAL at 08:15

## 2019-10-22 RX ADMIN — Medication 10 ML: at 14:08

## 2019-10-22 RX ADMIN — GABAPENTIN 100 MG: 100 CAPSULE ORAL at 12:35

## 2019-10-22 RX ADMIN — PANTOPRAZOLE SODIUM 40 MG: 40 TABLET, DELAYED RELEASE ORAL at 06:11

## 2019-10-22 RX ADMIN — Medication 10 ML: at 14:20

## 2019-10-22 RX ADMIN — INSULIN LISPRO 6 UNITS: 100 INJECTION, SOLUTION INTRAVENOUS; SUBCUTANEOUS at 16:38

## 2019-10-22 RX ADMIN — BUMETANIDE 1 MG/HR: 0.25 INJECTION INTRAMUSCULAR; INTRAVENOUS at 14:21

## 2019-10-22 RX ADMIN — Medication 10 ML: at 21:47

## 2019-10-22 RX ADMIN — Medication 300 UNITS: at 08:17

## 2019-10-22 RX ADMIN — INSULIN LISPRO 3 UNITS: 100 INJECTION, SOLUTION INTRAVENOUS; SUBCUTANEOUS at 06:10

## 2019-10-22 RX ADMIN — Medication 10 ML: at 08:16

## 2019-10-22 RX ADMIN — APIXABAN 5 MG: 5 TABLET, FILM COATED ORAL at 21:47

## 2019-10-22 RX ADMIN — SENNOSIDES 8.6 MG: 8.6 TABLET, FILM COATED ORAL at 21:00

## 2019-10-22 RX ADMIN — INSULIN GLARGINE 26 UNITS: 100 INJECTION, SOLUTION SUBCUTANEOUS at 21:48

## 2019-10-22 RX ADMIN — PANTOPRAZOLE SODIUM 40 MG: 40 TABLET, DELAYED RELEASE ORAL at 16:26

## 2019-10-22 RX ADMIN — BUMETANIDE 2 MG: 1 TABLET ORAL at 08:27

## 2019-10-22 RX ADMIN — ALLOPURINOL 200 MG: 100 TABLET ORAL at 21:47

## 2019-10-22 RX ADMIN — INSULIN LISPRO 6 UNITS: 100 INJECTION, SOLUTION INTRAVENOUS; SUBCUTANEOUS at 11:32

## 2019-10-22 RX ADMIN — POTASSIUM CHLORIDE 40 MEQ: 20 TABLET, EXTENDED RELEASE ORAL at 06:41

## 2019-10-22 RX ADMIN — MIDODRINE HYDROCHLORIDE 5 MG: 5 TABLET ORAL at 12:35

## 2019-10-22 ASSESSMENT — PAIN DESCRIPTION - PROGRESSION: CLINICAL_PROGRESSION: GRADUALLY WORSENING

## 2019-10-22 ASSESSMENT — PAIN SCALES - GENERAL
PAINLEVEL_OUTOF10: 0

## 2019-10-23 LAB
ALBUMIN SERPL-MCNC: 3.3 G/DL (ref 3.5–5.2)
ALP BLD-CCNC: 129 U/L (ref 40–129)
ALT SERPL-CCNC: 17 U/L (ref 0–40)
ANION GAP SERPL CALCULATED.3IONS-SCNC: 12 MMOL/L (ref 7–16)
ANION GAP SERPL CALCULATED.3IONS-SCNC: 16 MMOL/L (ref 7–16)
ANION GAP SERPL CALCULATED.3IONS-SCNC: 19 MMOL/L (ref 7–16)
AST SERPL-CCNC: 21 U/L (ref 0–39)
BILIRUB SERPL-MCNC: 1.2 MG/DL (ref 0–1.2)
BILIRUBIN DIRECT: 0.4 MG/DL (ref 0–0.3)
BILIRUBIN, INDIRECT: 0.8 MG/DL (ref 0–1)
BUN BLDV-MCNC: 74 MG/DL (ref 8–23)
BUN BLDV-MCNC: 75 MG/DL (ref 8–23)
BUN BLDV-MCNC: 77 MG/DL (ref 8–23)
CALCIUM SERPL-MCNC: 9.4 MG/DL (ref 8.6–10.2)
CALCIUM SERPL-MCNC: 9.5 MG/DL (ref 8.6–10.2)
CALCIUM SERPL-MCNC: 9.7 MG/DL (ref 8.6–10.2)
CHLORIDE BLD-SCNC: 88 MMOL/L (ref 98–107)
CHLORIDE BLD-SCNC: 89 MMOL/L (ref 98–107)
CHLORIDE BLD-SCNC: 89 MMOL/L (ref 98–107)
CO2: 24 MMOL/L (ref 22–29)
CO2: 29 MMOL/L (ref 22–29)
CO2: 33 MMOL/L (ref 22–29)
CREAT SERPL-MCNC: 2.6 MG/DL (ref 0.7–1.2)
CREAT SERPL-MCNC: 2.8 MG/DL (ref 0.7–1.2)
CREAT SERPL-MCNC: 2.9 MG/DL (ref 0.7–1.2)
GFR AFRICAN AMERICAN: 26
GFR AFRICAN AMERICAN: 27
GFR AFRICAN AMERICAN: 30
GFR NON-AFRICAN AMERICAN: 26 ML/MIN/1.73
GFR NON-AFRICAN AMERICAN: 27 ML/MIN/1.73
GFR NON-AFRICAN AMERICAN: 30 ML/MIN/1.73
GLUCOSE BLD-MCNC: 238 MG/DL (ref 74–99)
GLUCOSE BLD-MCNC: 281 MG/DL (ref 74–99)
GLUCOSE BLD-MCNC: 320 MG/DL (ref 74–99)
HCT VFR BLD CALC: 39 % (ref 37–54)
HEMOGLOBIN: 12.9 G/DL (ref 12.5–16.5)
LV EF: 28 %
LVEF MODALITY: NORMAL
MAGNESIUM: 1.9 MG/DL (ref 1.6–2.6)
MCH RBC QN AUTO: 30.3 PG (ref 26–35)
MCHC RBC AUTO-ENTMCNC: 33.1 % (ref 32–34.5)
MCV RBC AUTO: 91.5 FL (ref 80–99.9)
METER GLUCOSE: 225 MG/DL (ref 74–99)
METER GLUCOSE: 258 MG/DL (ref 74–99)
METER GLUCOSE: 295 MG/DL (ref 74–99)
METER GLUCOSE: 300 MG/DL (ref 74–99)
PDW BLD-RTO: 15.5 FL (ref 11.5–15)
PHOSPHORUS: 3.4 MG/DL (ref 2.5–4.5)
PLATELET # BLD: 147 E9/L (ref 130–450)
PMV BLD AUTO: 13.2 FL (ref 7–12)
POTASSIUM REFLEX MAGNESIUM: 2.9 MMOL/L (ref 3.5–5)
POTASSIUM SERPL-SCNC: 3.2 MMOL/L (ref 3.5–5)
POTASSIUM SERPL-SCNC: 3.9 MMOL/L (ref 3.5–5)
RBC # BLD: 4.26 E12/L (ref 3.8–5.8)
SODIUM BLD-SCNC: 131 MMOL/L (ref 132–146)
SODIUM BLD-SCNC: 134 MMOL/L (ref 132–146)
SODIUM BLD-SCNC: 134 MMOL/L (ref 132–146)
TOTAL PROTEIN: 7.9 G/DL (ref 6.4–8.3)
WBC # BLD: 6.6 E9/L (ref 4.5–11.5)

## 2019-10-23 PROCEDURE — 6360000002 HC RX W HCPCS: Performed by: NURSE PRACTITIONER

## 2019-10-23 PROCEDURE — 85027 COMPLETE CBC AUTOMATED: CPT

## 2019-10-23 PROCEDURE — 82962 GLUCOSE BLOOD TEST: CPT

## 2019-10-23 PROCEDURE — 80048 BASIC METABOLIC PNL TOTAL CA: CPT

## 2019-10-23 PROCEDURE — 6370000000 HC RX 637 (ALT 250 FOR IP): Performed by: INTERNAL MEDICINE

## 2019-10-23 PROCEDURE — 93306 TTE W/DOPPLER COMPLETE: CPT

## 2019-10-23 PROCEDURE — 2500000003 HC RX 250 WO HCPCS: Performed by: INTERNAL MEDICINE

## 2019-10-23 PROCEDURE — 99233 SBSQ HOSP IP/OBS HIGH 50: CPT | Performed by: INTERNAL MEDICINE

## 2019-10-23 PROCEDURE — 2580000003 HC RX 258: Performed by: INTERNAL MEDICINE

## 2019-10-23 PROCEDURE — 2140000000 HC CCU INTERMEDIATE R&B

## 2019-10-23 PROCEDURE — 2500000003 HC RX 250 WO HCPCS: Performed by: NURSE PRACTITIONER

## 2019-10-23 PROCEDURE — 2580000003 HC RX 258: Performed by: NURSE PRACTITIONER

## 2019-10-23 PROCEDURE — 6360000002 HC RX W HCPCS: Performed by: INTERNAL MEDICINE

## 2019-10-23 PROCEDURE — 80053 COMPREHEN METABOLIC PANEL: CPT

## 2019-10-23 PROCEDURE — 36415 COLL VENOUS BLD VENIPUNCTURE: CPT

## 2019-10-23 PROCEDURE — 6370000000 HC RX 637 (ALT 250 FOR IP): Performed by: NURSE PRACTITIONER

## 2019-10-23 PROCEDURE — 83735 ASSAY OF MAGNESIUM: CPT

## 2019-10-23 PROCEDURE — 84100 ASSAY OF PHOSPHORUS: CPT

## 2019-10-23 PROCEDURE — 97165 OT EVAL LOW COMPLEX 30 MIN: CPT

## 2019-10-23 PROCEDURE — 80076 HEPATIC FUNCTION PANEL: CPT

## 2019-10-23 RX ORDER — ISOSORBIDE DINITRATE 10 MG/1
5 TABLET ORAL 3 TIMES DAILY
Status: DISCONTINUED | OUTPATIENT
Start: 2019-10-23 | End: 2019-10-29

## 2019-10-23 RX ORDER — METOLAZONE 5 MG/1
5 TABLET ORAL ONCE
Status: COMPLETED | OUTPATIENT
Start: 2019-10-23 | End: 2019-10-23

## 2019-10-23 RX ORDER — HYDRALAZINE HYDROCHLORIDE 25 MG/1
25 TABLET, FILM COATED ORAL EVERY 8 HOURS SCHEDULED
Status: DISCONTINUED | OUTPATIENT
Start: 2019-10-23 | End: 2019-10-28

## 2019-10-23 RX ADMIN — MILRINONE LACTATE IN DEXTROSE 0.2 MCG/KG/MIN: 200 INJECTION, SOLUTION INTRAVENOUS at 00:59

## 2019-10-23 RX ADMIN — Medication 10 ML: at 13:07

## 2019-10-23 RX ADMIN — INSULIN LISPRO 9 UNITS: 100 INJECTION, SOLUTION INTRAVENOUS; SUBCUTANEOUS at 16:42

## 2019-10-23 RX ADMIN — POTASSIUM CHLORIDE 10 MEQ: 7.46 INJECTION, SOLUTION INTRAVENOUS at 16:22

## 2019-10-23 RX ADMIN — Medication 300 UNITS: at 20:55

## 2019-10-23 RX ADMIN — GABAPENTIN 100 MG: 100 CAPSULE ORAL at 20:55

## 2019-10-23 RX ADMIN — HYDRALAZINE HYDROCHLORIDE 10 MG: 10 TABLET, FILM COATED ORAL at 06:09

## 2019-10-23 RX ADMIN — HYDRALAZINE HYDROCHLORIDE 10 MG: 10 TABLET, FILM COATED ORAL at 13:06

## 2019-10-23 RX ADMIN — POTASSIUM CHLORIDE 10 MEQ: 7.46 INJECTION, SOLUTION INTRAVENOUS at 14:57

## 2019-10-23 RX ADMIN — BUMETANIDE 2 MG/HR: 0.25 INJECTION INTRAMUSCULAR; INTRAVENOUS at 22:01

## 2019-10-23 RX ADMIN — Medication 400 MG: at 10:08

## 2019-10-23 RX ADMIN — ALLOPURINOL 200 MG: 100 TABLET ORAL at 10:08

## 2019-10-23 RX ADMIN — POTASSIUM CHLORIDE 10 MEQ: 7.46 INJECTION, SOLUTION INTRAVENOUS at 17:32

## 2019-10-23 RX ADMIN — GABAPENTIN 100 MG: 100 CAPSULE ORAL at 13:06

## 2019-10-23 RX ADMIN — MILRINONE LACTATE IN DEXTROSE 0.2 MCG/KG/MIN: 200 INJECTION, SOLUTION INTRAVENOUS at 10:26

## 2019-10-23 RX ADMIN — Medication 10 ML: at 20:56

## 2019-10-23 RX ADMIN — ISOSORBIDE DINITRATE 5 MG: 10 TABLET ORAL at 16:30

## 2019-10-23 RX ADMIN — METOPROLOL SUCCINATE 50 MG: 50 TABLET, FILM COATED, EXTENDED RELEASE ORAL at 20:52

## 2019-10-23 RX ADMIN — BUSPIRONE HYDROCHLORIDE 30 MG: 10 TABLET ORAL at 10:07

## 2019-10-23 RX ADMIN — INSULIN LISPRO 12 UNITS: 100 INJECTION, SOLUTION INTRAVENOUS; SUBCUTANEOUS at 11:48

## 2019-10-23 RX ADMIN — GABAPENTIN 100 MG: 100 CAPSULE ORAL at 10:09

## 2019-10-23 RX ADMIN — PANTOPRAZOLE SODIUM 40 MG: 40 TABLET, DELAYED RELEASE ORAL at 16:30

## 2019-10-23 RX ADMIN — BUMETANIDE 2 MG/HR: 0.25 INJECTION INTRAMUSCULAR; INTRAVENOUS at 07:16

## 2019-10-23 RX ADMIN — METOLAZONE 5 MG: 5 TABLET ORAL at 16:35

## 2019-10-23 RX ADMIN — DOCUSATE SODIUM 100 MG: 100 CAPSULE, LIQUID FILLED ORAL at 20:51

## 2019-10-23 RX ADMIN — HYDRALAZINE HYDROCHLORIDE 25 MG: 25 TABLET, FILM COATED ORAL at 20:52

## 2019-10-23 RX ADMIN — METOPROLOL SUCCINATE 50 MG: 50 TABLET, FILM COATED, EXTENDED RELEASE ORAL at 10:12

## 2019-10-23 RX ADMIN — POTASSIUM CHLORIDE 10 MEQ: 7.46 INJECTION, SOLUTION INTRAVENOUS at 18:35

## 2019-10-23 RX ADMIN — ATORVASTATIN CALCIUM 80 MG: 40 TABLET, FILM COATED ORAL at 20:51

## 2019-10-23 RX ADMIN — ISOSORBIDE DINITRATE 5 MG: 10 TABLET ORAL at 20:51

## 2019-10-23 RX ADMIN — ALLOPURINOL 200 MG: 100 TABLET ORAL at 20:51

## 2019-10-23 RX ADMIN — APIXABAN 5 MG: 5 TABLET, FILM COATED ORAL at 20:52

## 2019-10-23 RX ADMIN — SENNOSIDES 8.6 MG: 8.6 TABLET, FILM COATED ORAL at 20:52

## 2019-10-23 RX ADMIN — APIXABAN 5 MG: 5 TABLET, FILM COATED ORAL at 10:07

## 2019-10-23 RX ADMIN — DOCUSATE SODIUM 100 MG: 100 CAPSULE, LIQUID FILLED ORAL at 10:08

## 2019-10-23 RX ADMIN — INSULIN LISPRO 9 UNITS: 100 INJECTION, SOLUTION INTRAVENOUS; SUBCUTANEOUS at 21:01

## 2019-10-23 RX ADMIN — POTASSIUM CHLORIDE 40 MEQ: 20 TABLET, EXTENDED RELEASE ORAL at 23:34

## 2019-10-23 RX ADMIN — PANTOPRAZOLE SODIUM 40 MG: 40 TABLET, DELAYED RELEASE ORAL at 06:09

## 2019-10-23 RX ADMIN — BUMETANIDE 2 MG/HR: 0.25 INJECTION INTRAMUSCULAR; INTRAVENOUS at 01:00

## 2019-10-23 RX ADMIN — INSULIN GLARGINE 26 UNITS: 100 INJECTION, SOLUTION SUBCUTANEOUS at 21:01

## 2019-10-23 RX ADMIN — POTASSIUM CHLORIDE 10 MEQ: 7.46 INJECTION, SOLUTION INTRAVENOUS at 13:07

## 2019-10-23 RX ADMIN — MILRINONE LACTATE IN DEXTROSE 0.2 MCG/KG/MIN: 200 INJECTION, SOLUTION INTRAVENOUS at 22:01

## 2019-10-23 RX ADMIN — INSULIN LISPRO 6 UNITS: 100 INJECTION, SOLUTION INTRAVENOUS; SUBCUTANEOUS at 06:10

## 2019-10-24 LAB
ALBUMIN SERPL-MCNC: 3.6 G/DL (ref 3.5–5.2)
ALP BLD-CCNC: 137 U/L (ref 40–129)
ALT SERPL-CCNC: 17 U/L (ref 0–40)
ANION GAP SERPL CALCULATED.3IONS-SCNC: 11 MMOL/L (ref 7–16)
ANION GAP SERPL CALCULATED.3IONS-SCNC: 20 MMOL/L (ref 7–16)
ANION GAP SERPL CALCULATED.3IONS-SCNC: 8 MMOL/L (ref 7–16)
AST SERPL-CCNC: 18 U/L (ref 0–39)
BILIRUB SERPL-MCNC: 1 MG/DL (ref 0–1.2)
BILIRUBIN DIRECT: 0.3 MG/DL (ref 0–0.3)
BILIRUBIN, INDIRECT: 0.7 MG/DL (ref 0–1)
BUN BLDV-MCNC: 75 MG/DL (ref 8–23)
BUN BLDV-MCNC: 76 MG/DL (ref 8–23)
BUN BLDV-MCNC: 77 MG/DL (ref 8–23)
CALCIUM SERPL-MCNC: 9.8 MG/DL (ref 8.6–10.2)
CALCIUM SERPL-MCNC: 9.9 MG/DL (ref 8.6–10.2)
CALCIUM SERPL-MCNC: 9.9 MG/DL (ref 8.6–10.2)
CHLORIDE BLD-SCNC: 87 MMOL/L (ref 98–107)
CHLORIDE BLD-SCNC: 88 MMOL/L (ref 98–107)
CHLORIDE BLD-SCNC: 89 MMOL/L (ref 98–107)
CO2: 27 MMOL/L (ref 22–29)
CO2: 34 MMOL/L (ref 22–29)
CO2: 36 MMOL/L (ref 22–29)
CREAT SERPL-MCNC: 2.3 MG/DL (ref 0.7–1.2)
CREAT SERPL-MCNC: 2.3 MG/DL (ref 0.7–1.2)
CREAT SERPL-MCNC: 2.4 MG/DL (ref 0.7–1.2)
GFR AFRICAN AMERICAN: 33
GFR AFRICAN AMERICAN: 34
GFR AFRICAN AMERICAN: 34
GFR NON-AFRICAN AMERICAN: 33 ML/MIN/1.73
GFR NON-AFRICAN AMERICAN: 34 ML/MIN/1.73
GFR NON-AFRICAN AMERICAN: 34 ML/MIN/1.73
GLUCOSE BLD-MCNC: 259 MG/DL (ref 74–99)
GLUCOSE BLD-MCNC: 263 MG/DL (ref 74–99)
GLUCOSE BLD-MCNC: 320 MG/DL (ref 74–99)
MAGNESIUM: 1.9 MG/DL (ref 1.6–2.6)
MAGNESIUM: 1.9 MG/DL (ref 1.6–2.6)
METER GLUCOSE: 251 MG/DL (ref 74–99)
METER GLUCOSE: 295 MG/DL (ref 74–99)
METER GLUCOSE: 295 MG/DL (ref 74–99)
METER GLUCOSE: 330 MG/DL (ref 74–99)
POTASSIUM REFLEX MAGNESIUM: 3.2 MMOL/L (ref 3.5–5)
POTASSIUM REFLEX MAGNESIUM: 3.3 MMOL/L (ref 3.5–5)
POTASSIUM SERPL-SCNC: 3.5 MMOL/L (ref 3.5–5)
SODIUM BLD-SCNC: 132 MMOL/L (ref 132–146)
SODIUM BLD-SCNC: 132 MMOL/L (ref 132–146)
SODIUM BLD-SCNC: 136 MMOL/L (ref 132–146)
TOTAL PROTEIN: 7.9 G/DL (ref 6.4–8.3)

## 2019-10-24 PROCEDURE — 2500000003 HC RX 250 WO HCPCS: Performed by: INTERNAL MEDICINE

## 2019-10-24 PROCEDURE — 6370000000 HC RX 637 (ALT 250 FOR IP): Performed by: INTERNAL MEDICINE

## 2019-10-24 PROCEDURE — 2580000003 HC RX 258: Performed by: INTERNAL MEDICINE

## 2019-10-24 PROCEDURE — 83735 ASSAY OF MAGNESIUM: CPT

## 2019-10-24 PROCEDURE — 6370000000 HC RX 637 (ALT 250 FOR IP): Performed by: FAMILY MEDICINE

## 2019-10-24 PROCEDURE — 36592 COLLECT BLOOD FROM PICC: CPT

## 2019-10-24 PROCEDURE — 2140000000 HC CCU INTERMEDIATE R&B

## 2019-10-24 PROCEDURE — 82962 GLUCOSE BLOOD TEST: CPT

## 2019-10-24 PROCEDURE — 80053 COMPREHEN METABOLIC PANEL: CPT

## 2019-10-24 PROCEDURE — 6360000002 HC RX W HCPCS: Performed by: INTERNAL MEDICINE

## 2019-10-24 PROCEDURE — 6370000000 HC RX 637 (ALT 250 FOR IP): Performed by: NURSE PRACTITIONER

## 2019-10-24 PROCEDURE — 80048 BASIC METABOLIC PNL TOTAL CA: CPT

## 2019-10-24 PROCEDURE — 6360000002 HC RX W HCPCS: Performed by: NURSE PRACTITIONER

## 2019-10-24 PROCEDURE — 80076 HEPATIC FUNCTION PANEL: CPT

## 2019-10-24 PROCEDURE — 36415 COLL VENOUS BLD VENIPUNCTURE: CPT

## 2019-10-24 RX ORDER — POTASSIUM CHLORIDE 7.45 MG/ML
10 INJECTION INTRAVENOUS PRN
Status: DISCONTINUED | OUTPATIENT
Start: 2019-10-24 | End: 2019-10-31 | Stop reason: HOSPADM

## 2019-10-24 RX ORDER — POTASSIUM CHLORIDE 20 MEQ/1
40 TABLET, EXTENDED RELEASE ORAL ONCE
Status: COMPLETED | OUTPATIENT
Start: 2019-10-25 | End: 2019-10-25

## 2019-10-24 RX ORDER — SODIUM CHLORIDE 0.9 % (FLUSH) 0.9 %
10 SYRINGE (ML) INJECTION PRN
Status: DISCONTINUED | OUTPATIENT
Start: 2019-10-24 | End: 2019-10-31 | Stop reason: HOSPADM

## 2019-10-24 RX ORDER — SODIUM CHLORIDE 0.9 % (FLUSH) 0.9 %
10 SYRINGE (ML) INJECTION EVERY 12 HOURS SCHEDULED
Status: DISCONTINUED | OUTPATIENT
Start: 2019-10-24 | End: 2019-10-31 | Stop reason: HOSPADM

## 2019-10-24 RX ORDER — POTASSIUM CHLORIDE 20 MEQ/1
40 TABLET, EXTENDED RELEASE ORAL 2 TIMES DAILY WITH MEALS
Status: DISCONTINUED | OUTPATIENT
Start: 2019-10-24 | End: 2019-10-26

## 2019-10-24 RX ORDER — POTASSIUM CHLORIDE 20 MEQ/1
40 TABLET, EXTENDED RELEASE ORAL PRN
Status: DISCONTINUED | OUTPATIENT
Start: 2019-10-24 | End: 2019-10-31 | Stop reason: HOSPADM

## 2019-10-24 RX ORDER — INSULIN GLARGINE 100 [IU]/ML
20 INJECTION, SOLUTION SUBCUTANEOUS 2 TIMES DAILY
Status: DISCONTINUED | OUTPATIENT
Start: 2019-10-24 | End: 2019-10-25

## 2019-10-24 RX ADMIN — POTASSIUM CHLORIDE 40 MEQ: 20 TABLET, EXTENDED RELEASE ORAL at 10:10

## 2019-10-24 RX ADMIN — INSULIN LISPRO 9 UNITS: 100 INJECTION, SOLUTION INTRAVENOUS; SUBCUTANEOUS at 06:04

## 2019-10-24 RX ADMIN — INSULIN GLARGINE 20 UNITS: 100 INJECTION, SOLUTION SUBCUTANEOUS at 22:35

## 2019-10-24 RX ADMIN — BUSPIRONE HYDROCHLORIDE 30 MG: 10 TABLET ORAL at 10:09

## 2019-10-24 RX ADMIN — ISOSORBIDE DINITRATE 5 MG: 10 TABLET ORAL at 15:15

## 2019-10-24 RX ADMIN — INSULIN LISPRO 6 UNITS: 100 INJECTION, SOLUTION INTRAVENOUS; SUBCUTANEOUS at 11:56

## 2019-10-24 RX ADMIN — INSULIN LISPRO 3 UNITS: 100 INJECTION, SOLUTION INTRAVENOUS; SUBCUTANEOUS at 17:14

## 2019-10-24 RX ADMIN — DOCUSATE SODIUM 100 MG: 100 CAPSULE, LIQUID FILLED ORAL at 10:04

## 2019-10-24 RX ADMIN — MILRINONE LACTATE IN DEXTROSE 0.2 MCG/KG/MIN: 200 INJECTION, SOLUTION INTRAVENOUS at 09:00

## 2019-10-24 RX ADMIN — METOPROLOL SUCCINATE 50 MG: 50 TABLET, FILM COATED, EXTENDED RELEASE ORAL at 10:04

## 2019-10-24 RX ADMIN — Medication 10 ML: at 21:53

## 2019-10-24 RX ADMIN — SENNOSIDES 8.6 MG: 8.6 TABLET, FILM COATED ORAL at 21:49

## 2019-10-24 RX ADMIN — POTASSIUM CHLORIDE 40 MEQ: 20 TABLET, EXTENDED RELEASE ORAL at 06:42

## 2019-10-24 RX ADMIN — ISOSORBIDE DINITRATE 5 MG: 10 TABLET ORAL at 10:04

## 2019-10-24 RX ADMIN — PANTOPRAZOLE SODIUM 40 MG: 40 TABLET, DELAYED RELEASE ORAL at 17:12

## 2019-10-24 RX ADMIN — INSULIN LISPRO 4 UNITS: 100 INJECTION, SOLUTION INTRAVENOUS; SUBCUTANEOUS at 11:57

## 2019-10-24 RX ADMIN — BUMETANIDE 2 MG/HR: 0.25 INJECTION INTRAMUSCULAR; INTRAVENOUS at 23:56

## 2019-10-24 RX ADMIN — DOCUSATE SODIUM 100 MG: 100 CAPSULE, LIQUID FILLED ORAL at 21:49

## 2019-10-24 RX ADMIN — METOPROLOL SUCCINATE 50 MG: 50 TABLET, FILM COATED, EXTENDED RELEASE ORAL at 21:48

## 2019-10-24 RX ADMIN — ALLOPURINOL 200 MG: 100 TABLET ORAL at 10:03

## 2019-10-24 RX ADMIN — Medication 400 MG: at 10:04

## 2019-10-24 RX ADMIN — PANTOPRAZOLE SODIUM 40 MG: 40 TABLET, DELAYED RELEASE ORAL at 06:10

## 2019-10-24 RX ADMIN — ALLOPURINOL 200 MG: 100 TABLET ORAL at 21:48

## 2019-10-24 RX ADMIN — GABAPENTIN 100 MG: 100 CAPSULE ORAL at 21:48

## 2019-10-24 RX ADMIN — ATORVASTATIN CALCIUM 80 MG: 40 TABLET, FILM COATED ORAL at 21:48

## 2019-10-24 RX ADMIN — MILRINONE LACTATE IN DEXTROSE 0.2 MCG/KG/MIN: 200 INJECTION, SOLUTION INTRAVENOUS at 19:44

## 2019-10-24 RX ADMIN — ISOSORBIDE DINITRATE 5 MG: 10 TABLET ORAL at 21:49

## 2019-10-24 RX ADMIN — BUMETANIDE 2 MG/HR: 0.25 INJECTION INTRAMUSCULAR; INTRAVENOUS at 03:44

## 2019-10-24 RX ADMIN — APIXABAN 5 MG: 5 TABLET, FILM COATED ORAL at 10:04

## 2019-10-24 RX ADMIN — Medication 10 ML: at 10:05

## 2019-10-24 RX ADMIN — HYDRALAZINE HYDROCHLORIDE 25 MG: 25 TABLET, FILM COATED ORAL at 15:15

## 2019-10-24 RX ADMIN — APIXABAN 5 MG: 5 TABLET, FILM COATED ORAL at 21:49

## 2019-10-24 RX ADMIN — HYDRALAZINE HYDROCHLORIDE 25 MG: 25 TABLET, FILM COATED ORAL at 06:10

## 2019-10-24 RX ADMIN — INSULIN LISPRO 6 UNITS: 100 INJECTION, SOLUTION INTRAVENOUS; SUBCUTANEOUS at 17:12

## 2019-10-24 RX ADMIN — BUMETANIDE 2 MG/HR: 0.25 INJECTION INTRAMUSCULAR; INTRAVENOUS at 10:05

## 2019-10-24 RX ADMIN — BUMETANIDE 2 MG/HR: 0.25 INJECTION INTRAMUSCULAR; INTRAVENOUS at 17:16

## 2019-10-24 RX ADMIN — GABAPENTIN 100 MG: 100 CAPSULE ORAL at 10:04

## 2019-10-24 RX ADMIN — GABAPENTIN 100 MG: 100 CAPSULE ORAL at 15:15

## 2019-10-24 RX ADMIN — Medication 300 UNITS: at 21:52

## 2019-10-24 RX ADMIN — POTASSIUM CHLORIDE 40 MEQ: 20 TABLET, EXTENDED RELEASE ORAL at 17:12

## 2019-10-24 ASSESSMENT — PAIN SCALES - GENERAL
PAINLEVEL_OUTOF10: 0

## 2019-10-25 LAB
ALBUMIN SERPL-MCNC: 3.6 G/DL (ref 3.5–5.2)
ALP BLD-CCNC: 130 U/L (ref 40–129)
ALT SERPL-CCNC: 16 U/L (ref 0–40)
ANION GAP SERPL CALCULATED.3IONS-SCNC: 15 MMOL/L (ref 7–16)
AST SERPL-CCNC: 19 U/L (ref 0–39)
BILIRUB SERPL-MCNC: 1 MG/DL (ref 0–1.2)
BILIRUBIN DIRECT: 0.3 MG/DL (ref 0–0.3)
BILIRUBIN, INDIRECT: 0.7 MG/DL (ref 0–1)
BUN BLDV-MCNC: 78 MG/DL (ref 8–23)
CALCIUM SERPL-MCNC: 9.6 MG/DL (ref 8.6–10.2)
CHLORIDE BLD-SCNC: 87 MMOL/L (ref 98–107)
CO2: 30 MMOL/L (ref 22–29)
CREAT SERPL-MCNC: 2.2 MG/DL (ref 0.7–1.2)
GFR AFRICAN AMERICAN: 36
GFR NON-AFRICAN AMERICAN: 36 ML/MIN/1.73
GLUCOSE BLD-MCNC: 334 MG/DL (ref 74–99)
MAGNESIUM: 2 MG/DL (ref 1.6–2.6)
METER GLUCOSE: 290 MG/DL (ref 74–99)
METER GLUCOSE: 293 MG/DL (ref 74–99)
METER GLUCOSE: 331 MG/DL (ref 74–99)
METER GLUCOSE: 358 MG/DL (ref 74–99)
POTASSIUM REFLEX MAGNESIUM: 3.4 MMOL/L (ref 3.5–5)
SODIUM BLD-SCNC: 132 MMOL/L (ref 132–146)
TOTAL PROTEIN: 8.1 G/DL (ref 6.4–8.3)

## 2019-10-25 PROCEDURE — 6370000000 HC RX 637 (ALT 250 FOR IP): Performed by: INTERNAL MEDICINE

## 2019-10-25 PROCEDURE — 2580000003 HC RX 258: Performed by: INTERNAL MEDICINE

## 2019-10-25 PROCEDURE — 36415 COLL VENOUS BLD VENIPUNCTURE: CPT

## 2019-10-25 PROCEDURE — 6360000002 HC RX W HCPCS: Performed by: NURSE PRACTITIONER

## 2019-10-25 PROCEDURE — 99233 SBSQ HOSP IP/OBS HIGH 50: CPT | Performed by: INTERNAL MEDICINE

## 2019-10-25 PROCEDURE — 2500000003 HC RX 250 WO HCPCS: Performed by: INTERNAL MEDICINE

## 2019-10-25 PROCEDURE — 82962 GLUCOSE BLOOD TEST: CPT

## 2019-10-25 PROCEDURE — 80076 HEPATIC FUNCTION PANEL: CPT

## 2019-10-25 PROCEDURE — 6370000000 HC RX 637 (ALT 250 FOR IP): Performed by: FAMILY MEDICINE

## 2019-10-25 PROCEDURE — 6370000000 HC RX 637 (ALT 250 FOR IP): Performed by: NURSE PRACTITIONER

## 2019-10-25 PROCEDURE — 2140000000 HC CCU INTERMEDIATE R&B

## 2019-10-25 PROCEDURE — 6360000002 HC RX W HCPCS: Performed by: INTERNAL MEDICINE

## 2019-10-25 PROCEDURE — 80053 COMPREHEN METABOLIC PANEL: CPT

## 2019-10-25 PROCEDURE — 83735 ASSAY OF MAGNESIUM: CPT

## 2019-10-25 RX ORDER — INSULIN GLARGINE 100 [IU]/ML
25 INJECTION, SOLUTION SUBCUTANEOUS 2 TIMES DAILY
Status: DISCONTINUED | OUTPATIENT
Start: 2019-10-25 | End: 2019-10-26

## 2019-10-25 RX ORDER — METOLAZONE 2.5 MG/1
2.5 TABLET ORAL ONCE
Status: COMPLETED | OUTPATIENT
Start: 2019-10-25 | End: 2019-10-25

## 2019-10-25 RX ADMIN — BUSPIRONE HYDROCHLORIDE 30 MG: 10 TABLET ORAL at 09:25

## 2019-10-25 RX ADMIN — METOPROLOL SUCCINATE 50 MG: 50 TABLET, FILM COATED, EXTENDED RELEASE ORAL at 20:49

## 2019-10-25 RX ADMIN — Medication 300 UNITS: at 09:26

## 2019-10-25 RX ADMIN — INSULIN GLARGINE 25 UNITS: 100 INJECTION, SOLUTION SUBCUTANEOUS at 21:04

## 2019-10-25 RX ADMIN — INSULIN LISPRO 8 UNITS: 100 INJECTION, SOLUTION INTRAVENOUS; SUBCUTANEOUS at 11:23

## 2019-10-25 RX ADMIN — ATORVASTATIN CALCIUM 80 MG: 40 TABLET, FILM COATED ORAL at 20:50

## 2019-10-25 RX ADMIN — ALLOPURINOL 200 MG: 100 TABLET ORAL at 20:49

## 2019-10-25 RX ADMIN — BUMETANIDE 1 MG/HR: 0.25 INJECTION INTRAMUSCULAR; INTRAVENOUS at 14:08

## 2019-10-25 RX ADMIN — Medication 400 MG: at 09:26

## 2019-10-25 RX ADMIN — METOPROLOL SUCCINATE 50 MG: 50 TABLET, FILM COATED, EXTENDED RELEASE ORAL at 09:26

## 2019-10-25 RX ADMIN — ALLOPURINOL 200 MG: 100 TABLET ORAL at 09:26

## 2019-10-25 RX ADMIN — GABAPENTIN 100 MG: 100 CAPSULE ORAL at 20:49

## 2019-10-25 RX ADMIN — ISOSORBIDE DINITRATE 5 MG: 10 TABLET ORAL at 14:06

## 2019-10-25 RX ADMIN — POTASSIUM CHLORIDE 40 MEQ: 20 TABLET, EXTENDED RELEASE ORAL at 09:33

## 2019-10-25 RX ADMIN — INSULIN LISPRO 3 UNITS: 100 INJECTION, SOLUTION INTRAVENOUS; SUBCUTANEOUS at 16:47

## 2019-10-25 RX ADMIN — INSULIN GLARGINE 25 UNITS: 100 INJECTION, SOLUTION SUBCUTANEOUS at 09:33

## 2019-10-25 RX ADMIN — INSULIN LISPRO 5 UNITS: 100 INJECTION, SOLUTION INTRAVENOUS; SUBCUTANEOUS at 11:21

## 2019-10-25 RX ADMIN — METOLAZONE 2.5 MG: 2.5 TABLET ORAL at 14:06

## 2019-10-25 RX ADMIN — MILRINONE LACTATE IN DEXTROSE 0.2 MCG/KG/MIN: 200 INJECTION, SOLUTION INTRAVENOUS at 19:34

## 2019-10-25 RX ADMIN — HYDRALAZINE HYDROCHLORIDE 25 MG: 25 TABLET, FILM COATED ORAL at 00:00

## 2019-10-25 RX ADMIN — ISOSORBIDE DINITRATE 5 MG: 10 TABLET ORAL at 20:49

## 2019-10-25 RX ADMIN — POTASSIUM CHLORIDE 40 MEQ: 20 TABLET, EXTENDED RELEASE ORAL at 00:08

## 2019-10-25 RX ADMIN — INSULIN LISPRO 6 UNITS: 100 INJECTION, SOLUTION INTRAVENOUS; SUBCUTANEOUS at 06:50

## 2019-10-25 RX ADMIN — APIXABAN 5 MG: 5 TABLET, FILM COATED ORAL at 09:26

## 2019-10-25 RX ADMIN — HYDRALAZINE HYDROCHLORIDE 25 MG: 25 TABLET, FILM COATED ORAL at 16:47

## 2019-10-25 RX ADMIN — Medication 10 ML: at 06:04

## 2019-10-25 RX ADMIN — GABAPENTIN 100 MG: 100 CAPSULE ORAL at 09:26

## 2019-10-25 RX ADMIN — POTASSIUM CHLORIDE 40 MEQ: 20 TABLET, EXTENDED RELEASE ORAL at 18:36

## 2019-10-25 RX ADMIN — Medication 10 ML: at 20:56

## 2019-10-25 RX ADMIN — BUMETANIDE 2 MG/HR: 0.25 INJECTION INTRAMUSCULAR; INTRAVENOUS at 05:56

## 2019-10-25 RX ADMIN — MILRINONE LACTATE IN DEXTROSE 0.2 MCG/KG/MIN: 200 INJECTION, SOLUTION INTRAVENOUS at 06:50

## 2019-10-25 RX ADMIN — ISOSORBIDE DINITRATE 5 MG: 10 TABLET ORAL at 09:26

## 2019-10-25 RX ADMIN — INSULIN LISPRO 4 UNITS: 100 INJECTION, SOLUTION INTRAVENOUS; SUBCUTANEOUS at 06:48

## 2019-10-25 RX ADMIN — PANTOPRAZOLE SODIUM 40 MG: 40 TABLET, DELAYED RELEASE ORAL at 18:37

## 2019-10-25 RX ADMIN — GABAPENTIN 100 MG: 100 CAPSULE ORAL at 14:06

## 2019-10-25 RX ADMIN — DOCUSATE SODIUM 100 MG: 100 CAPSULE, LIQUID FILLED ORAL at 09:26

## 2019-10-25 RX ADMIN — Medication 300 UNITS: at 20:50

## 2019-10-25 RX ADMIN — INSULIN LISPRO 8 UNITS: 100 INJECTION, SOLUTION INTRAVENOUS; SUBCUTANEOUS at 16:50

## 2019-10-25 RX ADMIN — HYDRALAZINE HYDROCHLORIDE 25 MG: 25 TABLET, FILM COATED ORAL at 23:14

## 2019-10-25 RX ADMIN — APIXABAN 5 MG: 5 TABLET, FILM COATED ORAL at 20:50

## 2019-10-25 RX ADMIN — PANTOPRAZOLE SODIUM 40 MG: 40 TABLET, DELAYED RELEASE ORAL at 06:47

## 2019-10-25 RX ADMIN — Medication 10 ML: at 09:27

## 2019-10-25 RX ADMIN — HYDRALAZINE HYDROCHLORIDE 25 MG: 25 TABLET, FILM COATED ORAL at 06:47

## 2019-10-25 ASSESSMENT — PAIN SCALES - GENERAL
PAINLEVEL_OUTOF10: 0

## 2019-10-26 LAB
ALBUMIN SERPL-MCNC: 3.5 G/DL (ref 3.5–5.2)
ALP BLD-CCNC: 129 U/L (ref 40–129)
ALT SERPL-CCNC: 16 U/L (ref 0–40)
ANION GAP SERPL CALCULATED.3IONS-SCNC: 14 MMOL/L (ref 7–16)
AST SERPL-CCNC: 21 U/L (ref 0–39)
BILIRUB SERPL-MCNC: 1 MG/DL (ref 0–1.2)
BILIRUBIN DIRECT: 0.3 MG/DL (ref 0–0.3)
BILIRUBIN, INDIRECT: 0.7 MG/DL (ref 0–1)
BUN BLDV-MCNC: 76 MG/DL (ref 8–23)
CALCIUM SERPL-MCNC: 9.7 MG/DL (ref 8.6–10.2)
CHLORIDE BLD-SCNC: 88 MMOL/L (ref 98–107)
CO2: 32 MMOL/L (ref 22–29)
CREAT SERPL-MCNC: 2 MG/DL (ref 0.7–1.2)
GFR AFRICAN AMERICAN: 41
GFR NON-AFRICAN AMERICAN: 41 ML/MIN/1.73
GLUCOSE BLD-MCNC: 305 MG/DL (ref 74–99)
MAGNESIUM: 1.9 MG/DL (ref 1.6–2.6)
METER GLUCOSE: 234 MG/DL (ref 74–99)
METER GLUCOSE: 240 MG/DL (ref 74–99)
METER GLUCOSE: 266 MG/DL (ref 74–99)
METER GLUCOSE: 393 MG/DL (ref 74–99)
PHOSPHORUS: 3.5 MG/DL (ref 2.5–4.5)
POTASSIUM REFLEX MAGNESIUM: 2.9 MMOL/L (ref 3.5–5)
SODIUM BLD-SCNC: 134 MMOL/L (ref 132–146)
TOTAL PROTEIN: 7.9 G/DL (ref 6.4–8.3)

## 2019-10-26 PROCEDURE — 2140000000 HC CCU INTERMEDIATE R&B

## 2019-10-26 PROCEDURE — 6370000000 HC RX 637 (ALT 250 FOR IP): Performed by: INTERNAL MEDICINE

## 2019-10-26 PROCEDURE — 36415 COLL VENOUS BLD VENIPUNCTURE: CPT

## 2019-10-26 PROCEDURE — 6370000000 HC RX 637 (ALT 250 FOR IP): Performed by: NURSE PRACTITIONER

## 2019-10-26 PROCEDURE — 6360000002 HC RX W HCPCS: Performed by: NURSE PRACTITIONER

## 2019-10-26 PROCEDURE — 6370000000 HC RX 637 (ALT 250 FOR IP): Performed by: FAMILY MEDICINE

## 2019-10-26 PROCEDURE — 2500000003 HC RX 250 WO HCPCS: Performed by: INTERNAL MEDICINE

## 2019-10-26 PROCEDURE — 82962 GLUCOSE BLOOD TEST: CPT

## 2019-10-26 PROCEDURE — 80053 COMPREHEN METABOLIC PANEL: CPT

## 2019-10-26 PROCEDURE — 83735 ASSAY OF MAGNESIUM: CPT

## 2019-10-26 PROCEDURE — 80076 HEPATIC FUNCTION PANEL: CPT

## 2019-10-26 PROCEDURE — 2580000003 HC RX 258: Performed by: INTERNAL MEDICINE

## 2019-10-26 PROCEDURE — 6360000002 HC RX W HCPCS: Performed by: INTERNAL MEDICINE

## 2019-10-26 PROCEDURE — 84100 ASSAY OF PHOSPHORUS: CPT

## 2019-10-26 RX ORDER — INSULIN GLARGINE 100 [IU]/ML
30 INJECTION, SOLUTION SUBCUTANEOUS 2 TIMES DAILY
Status: DISCONTINUED | OUTPATIENT
Start: 2019-10-26 | End: 2019-10-27

## 2019-10-26 RX ORDER — POTASSIUM CHLORIDE 20 MEQ/1
40 TABLET, EXTENDED RELEASE ORAL
Status: DISCONTINUED | OUTPATIENT
Start: 2019-10-26 | End: 2019-10-27

## 2019-10-26 RX ADMIN — BUMETANIDE 1 MG/HR: 0.25 INJECTION INTRAMUSCULAR; INTRAVENOUS at 14:00

## 2019-10-26 RX ADMIN — POTASSIUM CHLORIDE 40 MEQ: 20 TABLET, EXTENDED RELEASE ORAL at 08:39

## 2019-10-26 RX ADMIN — INSULIN LISPRO 5 UNITS: 100 INJECTION, SOLUTION INTRAVENOUS; SUBCUTANEOUS at 07:06

## 2019-10-26 RX ADMIN — SENNOSIDES 8.6 MG: 8.6 TABLET, FILM COATED ORAL at 20:44

## 2019-10-26 RX ADMIN — POTASSIUM CHLORIDE 10 MEQ: 7.46 INJECTION, SOLUTION INTRAVENOUS at 09:15

## 2019-10-26 RX ADMIN — POTASSIUM CHLORIDE 10 MEQ: 7.46 INJECTION, SOLUTION INTRAVENOUS at 10:23

## 2019-10-26 RX ADMIN — MILRINONE LACTATE IN DEXTROSE 0.2 MCG/KG/MIN: 200 INJECTION, SOLUTION INTRAVENOUS at 07:23

## 2019-10-26 RX ADMIN — ISOSORBIDE DINITRATE 5 MG: 10 TABLET ORAL at 15:23

## 2019-10-26 RX ADMIN — POTASSIUM CHLORIDE 40 MEQ: 20 TABLET, EXTENDED RELEASE ORAL at 16:54

## 2019-10-26 RX ADMIN — BUMETANIDE 1 MG/HR: 0.25 INJECTION INTRAMUSCULAR; INTRAVENOUS at 01:05

## 2019-10-26 RX ADMIN — ALLOPURINOL 200 MG: 100 TABLET ORAL at 08:38

## 2019-10-26 RX ADMIN — INSULIN LISPRO 4 UNITS: 100 INJECTION, SOLUTION INTRAVENOUS; SUBCUTANEOUS at 11:34

## 2019-10-26 RX ADMIN — PANTOPRAZOLE SODIUM 40 MG: 40 TABLET, DELAYED RELEASE ORAL at 06:16

## 2019-10-26 RX ADMIN — GABAPENTIN 100 MG: 100 CAPSULE ORAL at 15:23

## 2019-10-26 RX ADMIN — HYDRALAZINE HYDROCHLORIDE 25 MG: 25 TABLET, FILM COATED ORAL at 23:08

## 2019-10-26 RX ADMIN — ATORVASTATIN CALCIUM 80 MG: 40 TABLET, FILM COATED ORAL at 20:43

## 2019-10-26 RX ADMIN — INSULIN LISPRO 8 UNITS: 100 INJECTION, SOLUTION INTRAVENOUS; SUBCUTANEOUS at 16:52

## 2019-10-26 RX ADMIN — ACETAMINOPHEN 650 MG: 325 TABLET, FILM COATED ORAL at 08:39

## 2019-10-26 RX ADMIN — POTASSIUM CHLORIDE 10 MEQ: 7.46 INJECTION, SOLUTION INTRAVENOUS at 13:59

## 2019-10-26 RX ADMIN — DOCUSATE SODIUM 100 MG: 100 CAPSULE, LIQUID FILLED ORAL at 20:44

## 2019-10-26 RX ADMIN — POTASSIUM CHLORIDE 10 MEQ: 7.46 INJECTION, SOLUTION INTRAVENOUS at 15:22

## 2019-10-26 RX ADMIN — POTASSIUM CHLORIDE 40 MEQ: 20 TABLET, EXTENDED RELEASE ORAL at 11:32

## 2019-10-26 RX ADMIN — HYDRALAZINE HYDROCHLORIDE 25 MG: 25 TABLET, FILM COATED ORAL at 15:23

## 2019-10-26 RX ADMIN — INSULIN LISPRO 8 UNITS: 100 INJECTION, SOLUTION INTRAVENOUS; SUBCUTANEOUS at 11:32

## 2019-10-26 RX ADMIN — PANTOPRAZOLE SODIUM 40 MG: 40 TABLET, DELAYED RELEASE ORAL at 15:23

## 2019-10-26 RX ADMIN — POTASSIUM CHLORIDE 10 MEQ: 7.46 INJECTION, SOLUTION INTRAVENOUS at 12:45

## 2019-10-26 RX ADMIN — APIXABAN 5 MG: 5 TABLET, FILM COATED ORAL at 08:39

## 2019-10-26 RX ADMIN — GABAPENTIN 100 MG: 100 CAPSULE ORAL at 20:44

## 2019-10-26 RX ADMIN — POTASSIUM CHLORIDE 10 MEQ: 7.46 INJECTION, SOLUTION INTRAVENOUS at 11:48

## 2019-10-26 RX ADMIN — Medication 400 MG: at 08:38

## 2019-10-26 RX ADMIN — GABAPENTIN 100 MG: 100 CAPSULE ORAL at 08:39

## 2019-10-26 RX ADMIN — INSULIN LISPRO 3 UNITS: 100 INJECTION, SOLUTION INTRAVENOUS; SUBCUTANEOUS at 20:50

## 2019-10-26 RX ADMIN — METOPROLOL SUCCINATE 50 MG: 50 TABLET, FILM COATED, EXTENDED RELEASE ORAL at 20:44

## 2019-10-26 RX ADMIN — BUSPIRONE HYDROCHLORIDE 30 MG: 10 TABLET ORAL at 08:38

## 2019-10-26 RX ADMIN — ALLOPURINOL 200 MG: 100 TABLET ORAL at 20:43

## 2019-10-26 RX ADMIN — INSULIN GLARGINE 25 UNITS: 100 INJECTION, SOLUTION SUBCUTANEOUS at 08:39

## 2019-10-26 RX ADMIN — INSULIN LISPRO 5 UNITS: 100 INJECTION, SOLUTION INTRAVENOUS; SUBCUTANEOUS at 08:41

## 2019-10-26 RX ADMIN — INSULIN LISPRO 8 UNITS: 100 INJECTION, SOLUTION INTRAVENOUS; SUBCUTANEOUS at 07:08

## 2019-10-26 RX ADMIN — MILRINONE LACTATE IN DEXTROSE 0.2 MCG/KG/MIN: 200 INJECTION, SOLUTION INTRAVENOUS at 18:07

## 2019-10-26 RX ADMIN — DOCUSATE SODIUM 100 MG: 100 CAPSULE, LIQUID FILLED ORAL at 08:39

## 2019-10-26 RX ADMIN — INSULIN LISPRO 4 UNITS: 100 INJECTION, SOLUTION INTRAVENOUS; SUBCUTANEOUS at 16:53

## 2019-10-26 RX ADMIN — APIXABAN 5 MG: 5 TABLET, FILM COATED ORAL at 20:44

## 2019-10-26 RX ADMIN — INSULIN GLARGINE 30 UNITS: 100 INJECTION, SOLUTION SUBCUTANEOUS at 20:50

## 2019-10-26 RX ADMIN — ISOSORBIDE DINITRATE 5 MG: 10 TABLET ORAL at 20:47

## 2019-10-26 ASSESSMENT — PAIN SCALES - GENERAL
PAINLEVEL_OUTOF10: 0
PAINLEVEL_OUTOF10: 3
PAINLEVEL_OUTOF10: 5
PAINLEVEL_OUTOF10: 0

## 2019-10-27 LAB
ALBUMIN SERPL-MCNC: 3.4 G/DL (ref 3.5–5.2)
ALP BLD-CCNC: 117 U/L (ref 40–129)
ALT SERPL-CCNC: 16 U/L (ref 0–40)
ANION GAP SERPL CALCULATED.3IONS-SCNC: 13 MMOL/L (ref 7–16)
AST SERPL-CCNC: 23 U/L (ref 0–39)
BILIRUB SERPL-MCNC: 1.1 MG/DL (ref 0–1.2)
BILIRUBIN DIRECT: 0.4 MG/DL (ref 0–0.3)
BILIRUBIN, INDIRECT: 0.7 MG/DL (ref 0–1)
BUN BLDV-MCNC: 71 MG/DL (ref 8–23)
CALCIUM SERPL-MCNC: 9.6 MG/DL (ref 8.6–10.2)
CHLORIDE BLD-SCNC: 90 MMOL/L (ref 98–107)
CO2: 32 MMOL/L (ref 22–29)
CREAT SERPL-MCNC: 2 MG/DL (ref 0.7–1.2)
GFR AFRICAN AMERICAN: 41
GFR NON-AFRICAN AMERICAN: 41 ML/MIN/1.73
GLUCOSE BLD-MCNC: 207 MG/DL (ref 74–99)
MAGNESIUM: 1.9 MG/DL (ref 1.6–2.6)
METER GLUCOSE: 220 MG/DL (ref 74–99)
METER GLUCOSE: 225 MG/DL (ref 74–99)
METER GLUCOSE: 285 MG/DL (ref 74–99)
METER GLUCOSE: 309 MG/DL (ref 74–99)
POTASSIUM REFLEX MAGNESIUM: 3 MMOL/L (ref 3.5–5)
SODIUM BLD-SCNC: 135 MMOL/L (ref 132–146)
TOTAL PROTEIN: 7.8 G/DL (ref 6.4–8.3)

## 2019-10-27 PROCEDURE — 6360000002 HC RX W HCPCS: Performed by: INTERNAL MEDICINE

## 2019-10-27 PROCEDURE — 6370000000 HC RX 637 (ALT 250 FOR IP): Performed by: INTERNAL MEDICINE

## 2019-10-27 PROCEDURE — 2580000003 HC RX 258: Performed by: INTERNAL MEDICINE

## 2019-10-27 PROCEDURE — 6360000002 HC RX W HCPCS: Performed by: NURSE PRACTITIONER

## 2019-10-27 PROCEDURE — 80053 COMPREHEN METABOLIC PANEL: CPT

## 2019-10-27 PROCEDURE — 6370000000 HC RX 637 (ALT 250 FOR IP): Performed by: FAMILY MEDICINE

## 2019-10-27 PROCEDURE — 36415 COLL VENOUS BLD VENIPUNCTURE: CPT

## 2019-10-27 PROCEDURE — 2500000003 HC RX 250 WO HCPCS: Performed by: INTERNAL MEDICINE

## 2019-10-27 PROCEDURE — 6370000000 HC RX 637 (ALT 250 FOR IP): Performed by: NURSE PRACTITIONER

## 2019-10-27 PROCEDURE — 82962 GLUCOSE BLOOD TEST: CPT

## 2019-10-27 PROCEDURE — 80076 HEPATIC FUNCTION PANEL: CPT

## 2019-10-27 PROCEDURE — 83735 ASSAY OF MAGNESIUM: CPT

## 2019-10-27 PROCEDURE — 2140000000 HC CCU INTERMEDIATE R&B

## 2019-10-27 PROCEDURE — 36592 COLLECT BLOOD FROM PICC: CPT

## 2019-10-27 RX ORDER — POTASSIUM CHLORIDE 20 MEQ/1
40 TABLET, EXTENDED RELEASE ORAL
Status: DISCONTINUED | OUTPATIENT
Start: 2019-10-27 | End: 2019-10-29

## 2019-10-27 RX ORDER — INSULIN GLARGINE 100 [IU]/ML
35 INJECTION, SOLUTION SUBCUTANEOUS 2 TIMES DAILY
Status: DISCONTINUED | OUTPATIENT
Start: 2019-10-28 | End: 2019-10-31 | Stop reason: HOSPADM

## 2019-10-27 RX ADMIN — POTASSIUM CHLORIDE 40 MEQ: 20 TABLET, EXTENDED RELEASE ORAL at 20:52

## 2019-10-27 RX ADMIN — PANTOPRAZOLE SODIUM 40 MG: 40 TABLET, DELAYED RELEASE ORAL at 07:00

## 2019-10-27 RX ADMIN — GABAPENTIN 100 MG: 100 CAPSULE ORAL at 15:07

## 2019-10-27 RX ADMIN — POTASSIUM CHLORIDE 10 MEQ: 7.46 INJECTION, SOLUTION INTRAVENOUS at 15:06

## 2019-10-27 RX ADMIN — PANTOPRAZOLE SODIUM 40 MG: 40 TABLET, DELAYED RELEASE ORAL at 16:48

## 2019-10-27 RX ADMIN — Medication 10 ML: at 04:24

## 2019-10-27 RX ADMIN — Medication 10 ML: at 08:53

## 2019-10-27 RX ADMIN — HYDRALAZINE HYDROCHLORIDE 25 MG: 25 TABLET, FILM COATED ORAL at 23:23

## 2019-10-27 RX ADMIN — POTASSIUM CHLORIDE 10 MEQ: 7.46 INJECTION, SOLUTION INTRAVENOUS at 13:36

## 2019-10-27 RX ADMIN — GABAPENTIN 100 MG: 100 CAPSULE ORAL at 08:54

## 2019-10-27 RX ADMIN — BUSPIRONE HYDROCHLORIDE 30 MG: 10 TABLET ORAL at 08:53

## 2019-10-27 RX ADMIN — INSULIN LISPRO 4 UNITS: 100 INJECTION, SOLUTION INTRAVENOUS; SUBCUTANEOUS at 21:00

## 2019-10-27 RX ADMIN — ISOSORBIDE DINITRATE 5 MG: 10 TABLET ORAL at 08:54

## 2019-10-27 RX ADMIN — HYDRALAZINE HYDROCHLORIDE 25 MG: 25 TABLET, FILM COATED ORAL at 06:27

## 2019-10-27 RX ADMIN — ALLOPURINOL 200 MG: 100 TABLET ORAL at 08:53

## 2019-10-27 RX ADMIN — POTASSIUM CHLORIDE 10 MEQ: 7.46 INJECTION, SOLUTION INTRAVENOUS at 08:51

## 2019-10-27 RX ADMIN — INSULIN LISPRO 8 UNITS: 100 INJECTION, SOLUTION INTRAVENOUS; SUBCUTANEOUS at 16:48

## 2019-10-27 RX ADMIN — METOPROLOL SUCCINATE 50 MG: 50 TABLET, FILM COATED, EXTENDED RELEASE ORAL at 20:52

## 2019-10-27 RX ADMIN — APIXABAN 5 MG: 5 TABLET, FILM COATED ORAL at 20:52

## 2019-10-27 RX ADMIN — HYDRALAZINE HYDROCHLORIDE 25 MG: 25 TABLET, FILM COATED ORAL at 15:07

## 2019-10-27 RX ADMIN — APIXABAN 5 MG: 5 TABLET, FILM COATED ORAL at 08:54

## 2019-10-27 RX ADMIN — POTASSIUM CHLORIDE 40 MEQ: 20 TABLET, EXTENDED RELEASE ORAL at 16:48

## 2019-10-27 RX ADMIN — INSULIN GLARGINE 30 UNITS: 100 INJECTION, SOLUTION SUBCUTANEOUS at 20:59

## 2019-10-27 RX ADMIN — Medication 400 MG: at 08:53

## 2019-10-27 RX ADMIN — POTASSIUM CHLORIDE 40 MEQ: 20 TABLET, EXTENDED RELEASE ORAL at 11:40

## 2019-10-27 RX ADMIN — BUMETANIDE 1 MG/HR: 0.25 INJECTION INTRAMUSCULAR; INTRAVENOUS at 15:07

## 2019-10-27 RX ADMIN — INSULIN GLARGINE 30 UNITS: 100 INJECTION, SOLUTION SUBCUTANEOUS at 08:46

## 2019-10-27 RX ADMIN — INSULIN LISPRO 8 UNITS: 100 INJECTION, SOLUTION INTRAVENOUS; SUBCUTANEOUS at 11:42

## 2019-10-27 RX ADMIN — POTASSIUM CHLORIDE 10 MEQ: 7.46 INJECTION, SOLUTION INTRAVENOUS at 09:51

## 2019-10-27 RX ADMIN — INSULIN LISPRO 4 UNITS: 100 INJECTION, SOLUTION INTRAVENOUS; SUBCUTANEOUS at 16:49

## 2019-10-27 RX ADMIN — BUMETANIDE 1 MG/HR: 0.25 INJECTION INTRAMUSCULAR; INTRAVENOUS at 02:41

## 2019-10-27 RX ADMIN — POTASSIUM CHLORIDE 10 MEQ: 7.46 INJECTION, SOLUTION INTRAVENOUS at 12:26

## 2019-10-27 RX ADMIN — POTASSIUM CHLORIDE 40 MEQ: 20 TABLET, EXTENDED RELEASE ORAL at 08:54

## 2019-10-27 RX ADMIN — MILRINONE LACTATE IN DEXTROSE 0.2 MCG/KG/MIN: 200 INJECTION, SOLUTION INTRAVENOUS at 05:40

## 2019-10-27 RX ADMIN — ISOSORBIDE DINITRATE 5 MG: 10 TABLET ORAL at 20:53

## 2019-10-27 RX ADMIN — GABAPENTIN 100 MG: 100 CAPSULE ORAL at 20:52

## 2019-10-27 RX ADMIN — ATORVASTATIN CALCIUM 80 MG: 40 TABLET, FILM COATED ORAL at 20:52

## 2019-10-27 RX ADMIN — ISOSORBIDE DINITRATE 5 MG: 10 TABLET ORAL at 13:40

## 2019-10-27 RX ADMIN — INSULIN LISPRO 6 UNITS: 100 INJECTION, SOLUTION INTRAVENOUS; SUBCUTANEOUS at 11:40

## 2019-10-27 RX ADMIN — INSULIN LISPRO 8 UNITS: 100 INJECTION, SOLUTION INTRAVENOUS; SUBCUTANEOUS at 08:47

## 2019-10-27 RX ADMIN — POTASSIUM CHLORIDE 10 MEQ: 7.46 INJECTION, SOLUTION INTRAVENOUS at 11:10

## 2019-10-27 RX ADMIN — MILRINONE LACTATE IN DEXTROSE 0.2 MCG/KG/MIN: 200 INJECTION, SOLUTION INTRAVENOUS at 17:41

## 2019-10-27 RX ADMIN — DOCUSATE SODIUM 100 MG: 100 CAPSULE, LIQUID FILLED ORAL at 08:55

## 2019-10-27 RX ADMIN — ALLOPURINOL 200 MG: 100 TABLET ORAL at 20:51

## 2019-10-27 RX ADMIN — INSULIN LISPRO 4 UNITS: 100 INJECTION, SOLUTION INTRAVENOUS; SUBCUTANEOUS at 06:29

## 2019-10-27 ASSESSMENT — PAIN SCALES - GENERAL
PAINLEVEL_OUTOF10: 0

## 2019-10-28 LAB
ALBUMIN SERPL-MCNC: 3.5 G/DL (ref 3.5–5.2)
ALP BLD-CCNC: 140 U/L (ref 40–129)
ALT SERPL-CCNC: 17 U/L (ref 0–40)
ANION GAP SERPL CALCULATED.3IONS-SCNC: 15 MMOL/L (ref 7–16)
ANION GAP SERPL CALCULATED.3IONS-SCNC: 16 MMOL/L (ref 7–16)
AST SERPL-CCNC: 24 U/L (ref 0–39)
BILIRUB SERPL-MCNC: 0.8 MG/DL (ref 0–1.2)
BILIRUBIN DIRECT: 0.3 MG/DL (ref 0–0.3)
BILIRUBIN, INDIRECT: 0.5 MG/DL (ref 0–1)
BUN BLDV-MCNC: 70 MG/DL (ref 8–23)
BUN BLDV-MCNC: 71 MG/DL (ref 8–23)
CALCIUM SERPL-MCNC: 9.4 MG/DL (ref 8.6–10.2)
CALCIUM SERPL-MCNC: 9.6 MG/DL (ref 8.6–10.2)
CHLORIDE BLD-SCNC: 90 MMOL/L (ref 98–107)
CHLORIDE BLD-SCNC: 92 MMOL/L (ref 98–107)
CO2: 28 MMOL/L (ref 22–29)
CO2: 29 MMOL/L (ref 22–29)
CREAT SERPL-MCNC: 1.9 MG/DL (ref 0.7–1.2)
CREAT SERPL-MCNC: 1.9 MG/DL (ref 0.7–1.2)
GFR AFRICAN AMERICAN: 43
GFR AFRICAN AMERICAN: 43
GFR NON-AFRICAN AMERICAN: 43 ML/MIN/1.73
GFR NON-AFRICAN AMERICAN: 43 ML/MIN/1.73
GLUCOSE BLD-MCNC: 237 MG/DL (ref 74–99)
GLUCOSE BLD-MCNC: 318 MG/DL (ref 74–99)
HCT VFR BLD CALC: 39.9 % (ref 37–54)
HEMOGLOBIN: 12.9 G/DL (ref 12.5–16.5)
MAGNESIUM: 1.8 MG/DL (ref 1.6–2.6)
MAGNESIUM: 1.9 MG/DL (ref 1.6–2.6)
MCH RBC QN AUTO: 29.6 PG (ref 26–35)
MCHC RBC AUTO-ENTMCNC: 32.3 % (ref 32–34.5)
MCV RBC AUTO: 91.5 FL (ref 80–99.9)
METER GLUCOSE: 192 MG/DL (ref 74–99)
METER GLUCOSE: 254 MG/DL (ref 74–99)
METER GLUCOSE: 284 MG/DL (ref 74–99)
METER GLUCOSE: 298 MG/DL (ref 74–99)
PDW BLD-RTO: 15.6 FL (ref 11.5–15)
PHOSPHORUS: 3.1 MG/DL (ref 2.5–4.5)
PLATELET # BLD: 143 E9/L (ref 130–450)
PMV BLD AUTO: 12.5 FL (ref 7–12)
POTASSIUM REFLEX MAGNESIUM: 3.8 MMOL/L (ref 3.5–5)
POTASSIUM SERPL-SCNC: 3.6 MMOL/L (ref 3.5–5)
POTASSIUM SERPL-SCNC: 3.8 MMOL/L (ref 3.5–5)
RBC # BLD: 4.36 E12/L (ref 3.8–5.8)
SODIUM BLD-SCNC: 135 MMOL/L (ref 132–146)
SODIUM BLD-SCNC: 135 MMOL/L (ref 132–146)
TOTAL PROTEIN: 8 G/DL (ref 6.4–8.3)
WBC # BLD: 6.2 E9/L (ref 4.5–11.5)

## 2019-10-28 PROCEDURE — 80053 COMPREHEN METABOLIC PANEL: CPT

## 2019-10-28 PROCEDURE — 2500000003 HC RX 250 WO HCPCS: Performed by: INTERNAL MEDICINE

## 2019-10-28 PROCEDURE — 6370000000 HC RX 637 (ALT 250 FOR IP): Performed by: FAMILY MEDICINE

## 2019-10-28 PROCEDURE — 97530 THERAPEUTIC ACTIVITIES: CPT

## 2019-10-28 PROCEDURE — 6370000000 HC RX 637 (ALT 250 FOR IP): Performed by: INTERNAL MEDICINE

## 2019-10-28 PROCEDURE — 99233 SBSQ HOSP IP/OBS HIGH 50: CPT | Performed by: INTERNAL MEDICINE

## 2019-10-28 PROCEDURE — 80076 HEPATIC FUNCTION PANEL: CPT

## 2019-10-28 PROCEDURE — 6370000000 HC RX 637 (ALT 250 FOR IP): Performed by: NURSE PRACTITIONER

## 2019-10-28 PROCEDURE — 6360000002 HC RX W HCPCS: Performed by: INTERNAL MEDICINE

## 2019-10-28 PROCEDURE — 36415 COLL VENOUS BLD VENIPUNCTURE: CPT

## 2019-10-28 PROCEDURE — 82962 GLUCOSE BLOOD TEST: CPT

## 2019-10-28 PROCEDURE — 2580000003 HC RX 258: Performed by: INTERNAL MEDICINE

## 2019-10-28 PROCEDURE — 6360000002 HC RX W HCPCS: Performed by: NURSE PRACTITIONER

## 2019-10-28 PROCEDURE — 84100 ASSAY OF PHOSPHORUS: CPT

## 2019-10-28 PROCEDURE — 2140000000 HC CCU INTERMEDIATE R&B

## 2019-10-28 PROCEDURE — 83735 ASSAY OF MAGNESIUM: CPT

## 2019-10-28 PROCEDURE — 80048 BASIC METABOLIC PNL TOTAL CA: CPT

## 2019-10-28 PROCEDURE — 85027 COMPLETE CBC AUTOMATED: CPT

## 2019-10-28 RX ORDER — BUMETANIDE 0.25 MG/ML
2 INJECTION, SOLUTION INTRAMUSCULAR; INTRAVENOUS 3 TIMES DAILY
Status: DISCONTINUED | OUTPATIENT
Start: 2019-10-28 | End: 2019-10-29

## 2019-10-28 RX ORDER — METOLAZONE 2.5 MG/1
2.5 TABLET ORAL DAILY
Status: DISCONTINUED | OUTPATIENT
Start: 2019-10-28 | End: 2019-10-29

## 2019-10-28 RX ADMIN — ISOSORBIDE DINITRATE 5 MG: 10 TABLET ORAL at 21:18

## 2019-10-28 RX ADMIN — METOPROLOL SUCCINATE 50 MG: 50 TABLET, FILM COATED, EXTENDED RELEASE ORAL at 21:19

## 2019-10-28 RX ADMIN — SENNOSIDES 8.6 MG: 8.6 TABLET, FILM COATED ORAL at 21:18

## 2019-10-28 RX ADMIN — Medication 10 ML: at 21:25

## 2019-10-28 RX ADMIN — ALLOPURINOL 200 MG: 100 TABLET ORAL at 09:59

## 2019-10-28 RX ADMIN — INSULIN LISPRO 8 UNITS: 100 INJECTION, SOLUTION INTRAVENOUS; SUBCUTANEOUS at 07:01

## 2019-10-28 RX ADMIN — ISOSORBIDE DINITRATE 5 MG: 10 TABLET ORAL at 14:03

## 2019-10-28 RX ADMIN — INSULIN GLARGINE 35 UNITS: 100 INJECTION, SOLUTION SUBCUTANEOUS at 21:30

## 2019-10-28 RX ADMIN — POTASSIUM CHLORIDE 40 MEQ: 20 TABLET, EXTENDED RELEASE ORAL at 17:26

## 2019-10-28 RX ADMIN — Medication 300 UNITS: at 10:06

## 2019-10-28 RX ADMIN — Medication 10 ML: at 17:43

## 2019-10-28 RX ADMIN — ATORVASTATIN CALCIUM 80 MG: 40 TABLET, FILM COATED ORAL at 21:17

## 2019-10-28 RX ADMIN — BUMETANIDE 2 MG: 0.25 INJECTION INTRAMUSCULAR; INTRAVENOUS at 21:19

## 2019-10-28 RX ADMIN — INSULIN LISPRO 8 UNITS: 100 INJECTION, SOLUTION INTRAVENOUS; SUBCUTANEOUS at 17:28

## 2019-10-28 RX ADMIN — METOPROLOL SUCCINATE 50 MG: 50 TABLET, FILM COATED, EXTENDED RELEASE ORAL at 10:03

## 2019-10-28 RX ADMIN — Medication 400 MG: at 09:59

## 2019-10-28 RX ADMIN — Medication 10 ML: at 10:04

## 2019-10-28 RX ADMIN — GABAPENTIN 100 MG: 100 CAPSULE ORAL at 14:02

## 2019-10-28 RX ADMIN — Medication 300 UNITS: at 21:27

## 2019-10-28 RX ADMIN — BUMETANIDE 1 MG/HR: 0.25 INJECTION INTRAMUSCULAR; INTRAVENOUS at 03:46

## 2019-10-28 RX ADMIN — BUSPIRONE HYDROCHLORIDE 30 MG: 10 TABLET ORAL at 09:59

## 2019-10-28 RX ADMIN — HYDRALAZINE HYDROCHLORIDE 25 MG: 25 TABLET, FILM COATED ORAL at 14:02

## 2019-10-28 RX ADMIN — BUMETANIDE 2 MG: 0.25 INJECTION INTRAMUSCULAR; INTRAVENOUS at 17:43

## 2019-10-28 RX ADMIN — METOLAZONE 2.5 MG: 2.5 TABLET ORAL at 17:26

## 2019-10-28 RX ADMIN — APIXABAN 5 MG: 5 TABLET, FILM COATED ORAL at 21:17

## 2019-10-28 RX ADMIN — POTASSIUM CHLORIDE 40 MEQ: 20 TABLET, EXTENDED RELEASE ORAL at 10:00

## 2019-10-28 RX ADMIN — INSULIN LISPRO 8 UNITS: 100 INJECTION, SOLUTION INTRAVENOUS; SUBCUTANEOUS at 11:29

## 2019-10-28 RX ADMIN — MILRINONE LACTATE IN DEXTROSE 0.2 MCG/KG/MIN: 200 INJECTION, SOLUTION INTRAVENOUS at 03:46

## 2019-10-28 RX ADMIN — DOCUSATE SODIUM 100 MG: 100 CAPSULE, LIQUID FILLED ORAL at 09:59

## 2019-10-28 RX ADMIN — INSULIN LISPRO 6 UNITS: 100 INJECTION, SOLUTION INTRAVENOUS; SUBCUTANEOUS at 11:28

## 2019-10-28 RX ADMIN — GABAPENTIN 100 MG: 100 CAPSULE ORAL at 21:18

## 2019-10-28 RX ADMIN — INSULIN LISPRO 1 UNITS: 100 INJECTION, SOLUTION INTRAVENOUS; SUBCUTANEOUS at 21:30

## 2019-10-28 RX ADMIN — ISOSORBIDE DINITRATE 5 MG: 10 TABLET ORAL at 09:59

## 2019-10-28 RX ADMIN — POTASSIUM CHLORIDE 40 MEQ: 20 TABLET, EXTENDED RELEASE ORAL at 21:18

## 2019-10-28 RX ADMIN — GABAPENTIN 100 MG: 100 CAPSULE ORAL at 09:59

## 2019-10-28 RX ADMIN — SACUBITRIL AND VALSARTAN 1 TABLET: 24; 26 TABLET, FILM COATED ORAL at 21:17

## 2019-10-28 RX ADMIN — INSULIN LISPRO 6 UNITS: 100 INJECTION, SOLUTION INTRAVENOUS; SUBCUTANEOUS at 17:32

## 2019-10-28 RX ADMIN — PANTOPRAZOLE SODIUM 40 MG: 40 TABLET, DELAYED RELEASE ORAL at 05:50

## 2019-10-28 RX ADMIN — INSULIN LISPRO 6 UNITS: 100 INJECTION, SOLUTION INTRAVENOUS; SUBCUTANEOUS at 07:00

## 2019-10-28 RX ADMIN — POTASSIUM CHLORIDE 40 MEQ: 20 TABLET, EXTENDED RELEASE ORAL at 11:28

## 2019-10-28 RX ADMIN — HYDRALAZINE HYDROCHLORIDE 25 MG: 25 TABLET, FILM COATED ORAL at 05:51

## 2019-10-28 RX ADMIN — DOCUSATE SODIUM 100 MG: 100 CAPSULE, LIQUID FILLED ORAL at 21:18

## 2019-10-28 RX ADMIN — APIXABAN 5 MG: 5 TABLET, FILM COATED ORAL at 09:59

## 2019-10-28 RX ADMIN — ALLOPURINOL 200 MG: 100 TABLET ORAL at 21:17

## 2019-10-28 RX ADMIN — INSULIN GLARGINE 35 UNITS: 100 INJECTION, SOLUTION SUBCUTANEOUS at 10:06

## 2019-10-28 RX ADMIN — Medication 10 ML: at 03:46

## 2019-10-28 RX ADMIN — PANTOPRAZOLE SODIUM 40 MG: 40 TABLET, DELAYED RELEASE ORAL at 17:25

## 2019-10-28 ASSESSMENT — PAIN SCALES - GENERAL
PAINLEVEL_OUTOF10: 0

## 2019-10-28 ASSESSMENT — PAIN DESCRIPTION - PROGRESSION: CLINICAL_PROGRESSION: GRADUALLY WORSENING

## 2019-10-29 LAB
ALBUMIN SERPL-MCNC: 3.5 G/DL (ref 3.5–5.2)
ALP BLD-CCNC: 113 U/L (ref 40–129)
ALT SERPL-CCNC: 19 U/L (ref 0–40)
ANION GAP SERPL CALCULATED.3IONS-SCNC: 14 MMOL/L (ref 7–16)
AST SERPL-CCNC: 26 U/L (ref 0–39)
BILIRUB SERPL-MCNC: 0.9 MG/DL (ref 0–1.2)
BILIRUBIN DIRECT: 0.3 MG/DL (ref 0–0.3)
BILIRUBIN, INDIRECT: 0.6 MG/DL (ref 0–1)
BUN BLDV-MCNC: 74 MG/DL (ref 8–23)
CALCIUM SERPL-MCNC: 9.3 MG/DL (ref 8.6–10.2)
CHLORIDE BLD-SCNC: 91 MMOL/L (ref 98–107)
CO2: 29 MMOL/L (ref 22–29)
CREAT SERPL-MCNC: 2.1 MG/DL (ref 0.7–1.2)
GFR AFRICAN AMERICAN: 38
GFR NON-AFRICAN AMERICAN: 38 ML/MIN/1.73
GLUCOSE BLD-MCNC: 261 MG/DL (ref 74–99)
MAGNESIUM: 1.8 MG/DL (ref 1.6–2.6)
METER GLUCOSE: 232 MG/DL (ref 74–99)
METER GLUCOSE: 243 MG/DL (ref 74–99)
METER GLUCOSE: 243 MG/DL (ref 74–99)
METER GLUCOSE: 260 MG/DL (ref 74–99)
POTASSIUM REFLEX MAGNESIUM: 4.3 MMOL/L (ref 3.5–5)
PRO-BNP: 5231 PG/ML (ref 0–125)
SODIUM BLD-SCNC: 134 MMOL/L (ref 132–146)
TOTAL PROTEIN: 7.5 G/DL (ref 6.4–8.3)

## 2019-10-29 PROCEDURE — 99233 SBSQ HOSP IP/OBS HIGH 50: CPT | Performed by: INTERNAL MEDICINE

## 2019-10-29 PROCEDURE — 6370000000 HC RX 637 (ALT 250 FOR IP): Performed by: INTERNAL MEDICINE

## 2019-10-29 PROCEDURE — 6360000002 HC RX W HCPCS: Performed by: INTERNAL MEDICINE

## 2019-10-29 PROCEDURE — 80076 HEPATIC FUNCTION PANEL: CPT

## 2019-10-29 PROCEDURE — 2580000003 HC RX 258: Performed by: INTERNAL MEDICINE

## 2019-10-29 PROCEDURE — 80053 COMPREHEN METABOLIC PANEL: CPT

## 2019-10-29 PROCEDURE — 83735 ASSAY OF MAGNESIUM: CPT

## 2019-10-29 PROCEDURE — 6370000000 HC RX 637 (ALT 250 FOR IP): Performed by: FAMILY MEDICINE

## 2019-10-29 PROCEDURE — 80048 BASIC METABOLIC PNL TOTAL CA: CPT

## 2019-10-29 PROCEDURE — 2140000000 HC CCU INTERMEDIATE R&B

## 2019-10-29 PROCEDURE — 36415 COLL VENOUS BLD VENIPUNCTURE: CPT

## 2019-10-29 PROCEDURE — 83880 ASSAY OF NATRIURETIC PEPTIDE: CPT

## 2019-10-29 PROCEDURE — 82962 GLUCOSE BLOOD TEST: CPT

## 2019-10-29 PROCEDURE — 2700000000 HC OXYGEN THERAPY PER DAY

## 2019-10-29 RX ORDER — POTASSIUM CHLORIDE 20 MEQ/1
40 TABLET, EXTENDED RELEASE ORAL 2 TIMES DAILY WITH MEALS
Status: DISCONTINUED | OUTPATIENT
Start: 2019-10-30 | End: 2019-10-31 | Stop reason: HOSPADM

## 2019-10-29 RX ORDER — BUMETANIDE 1 MG/1
2 TABLET ORAL DAILY
Status: DISCONTINUED | OUTPATIENT
Start: 2019-10-30 | End: 2019-10-31 | Stop reason: HOSPADM

## 2019-10-29 RX ORDER — METOLAZONE 2.5 MG/1
2.5 TABLET ORAL EVERY OTHER DAY
Status: DISCONTINUED | OUTPATIENT
Start: 2019-10-30 | End: 2019-10-31 | Stop reason: HOSPADM

## 2019-10-29 RX ADMIN — INSULIN GLARGINE 35 UNITS: 100 INJECTION, SOLUTION SUBCUTANEOUS at 21:36

## 2019-10-29 RX ADMIN — DOCUSATE SODIUM 100 MG: 100 CAPSULE, LIQUID FILLED ORAL at 21:33

## 2019-10-29 RX ADMIN — Medication 300 UNITS: at 21:31

## 2019-10-29 RX ADMIN — SACUBITRIL AND VALSARTAN 1 TABLET: 24; 26 TABLET, FILM COATED ORAL at 21:33

## 2019-10-29 RX ADMIN — ALLOPURINOL 200 MG: 100 TABLET ORAL at 21:32

## 2019-10-29 RX ADMIN — GABAPENTIN 100 MG: 100 CAPSULE ORAL at 15:06

## 2019-10-29 RX ADMIN — INSULIN LISPRO 8 UNITS: 100 INJECTION, SOLUTION INTRAVENOUS; SUBCUTANEOUS at 11:35

## 2019-10-29 RX ADMIN — ONDANSETRON 4 MG: 2 INJECTION INTRAMUSCULAR; INTRAVENOUS at 21:33

## 2019-10-29 RX ADMIN — BUSPIRONE HYDROCHLORIDE 30 MG: 10 TABLET ORAL at 09:05

## 2019-10-29 RX ADMIN — POTASSIUM CHLORIDE 40 MEQ: 20 TABLET, EXTENDED RELEASE ORAL at 09:05

## 2019-10-29 RX ADMIN — Medication 10 ML: at 21:35

## 2019-10-29 RX ADMIN — INSULIN LISPRO 8 UNITS: 100 INJECTION, SOLUTION INTRAVENOUS; SUBCUTANEOUS at 06:13

## 2019-10-29 RX ADMIN — Medication 400 MG: at 09:05

## 2019-10-29 RX ADMIN — Medication 10 ML: at 03:12

## 2019-10-29 RX ADMIN — ATORVASTATIN CALCIUM 80 MG: 40 TABLET, FILM COATED ORAL at 21:33

## 2019-10-29 RX ADMIN — ACETAMINOPHEN 650 MG: 325 TABLET, FILM COATED ORAL at 21:33

## 2019-10-29 RX ADMIN — INSULIN LISPRO 2 UNITS: 100 INJECTION, SOLUTION INTRAVENOUS; SUBCUTANEOUS at 21:45

## 2019-10-29 RX ADMIN — SENNOSIDES 8.6 MG: 8.6 TABLET, FILM COATED ORAL at 21:33

## 2019-10-29 RX ADMIN — Medication 300 UNITS: at 09:06

## 2019-10-29 RX ADMIN — INSULIN LISPRO 4 UNITS: 100 INJECTION, SOLUTION INTRAVENOUS; SUBCUTANEOUS at 11:31

## 2019-10-29 RX ADMIN — Medication 10 ML: at 09:05

## 2019-10-29 RX ADMIN — INSULIN LISPRO 2 UNITS: 100 INJECTION, SOLUTION INTRAVENOUS; SUBCUTANEOUS at 06:17

## 2019-10-29 RX ADMIN — APIXABAN 5 MG: 5 TABLET, FILM COATED ORAL at 09:04

## 2019-10-29 RX ADMIN — PANTOPRAZOLE SODIUM 40 MG: 40 TABLET, DELAYED RELEASE ORAL at 06:04

## 2019-10-29 RX ADMIN — DOCUSATE SODIUM 100 MG: 100 CAPSULE, LIQUID FILLED ORAL at 09:04

## 2019-10-29 RX ADMIN — GABAPENTIN 100 MG: 100 CAPSULE ORAL at 21:33

## 2019-10-29 RX ADMIN — Medication 300 UNITS: at 23:49

## 2019-10-29 RX ADMIN — APIXABAN 5 MG: 5 TABLET, FILM COATED ORAL at 21:32

## 2019-10-29 RX ADMIN — POTASSIUM CHLORIDE 40 MEQ: 20 TABLET, EXTENDED RELEASE ORAL at 11:31

## 2019-10-29 RX ADMIN — ONDANSETRON 4 MG: 2 INJECTION INTRAMUSCULAR; INTRAVENOUS at 03:11

## 2019-10-29 RX ADMIN — METOPROLOL SUCCINATE 50 MG: 50 TABLET, FILM COATED, EXTENDED RELEASE ORAL at 21:33

## 2019-10-29 RX ADMIN — ALLOPURINOL 200 MG: 100 TABLET ORAL at 09:04

## 2019-10-29 ASSESSMENT — PAIN DESCRIPTION - PAIN TYPE: TYPE: ACUTE PAIN

## 2019-10-29 ASSESSMENT — PAIN DESCRIPTION - PROGRESSION: CLINICAL_PROGRESSION: NOT CHANGED

## 2019-10-29 ASSESSMENT — PAIN DESCRIPTION - ONSET: ONSET: GRADUAL

## 2019-10-29 ASSESSMENT — PAIN DESCRIPTION - FREQUENCY: FREQUENCY: INTERMITTENT

## 2019-10-29 ASSESSMENT — PAIN SCALES - GENERAL
PAINLEVEL_OUTOF10: 0
PAINLEVEL_OUTOF10: 7

## 2019-10-29 ASSESSMENT — PAIN - FUNCTIONAL ASSESSMENT: PAIN_FUNCTIONAL_ASSESSMENT: PREVENTS OR INTERFERES SOME ACTIVE ACTIVITIES AND ADLS

## 2019-10-29 ASSESSMENT — PAIN DESCRIPTION - LOCATION: LOCATION: HEAD;ABDOMEN

## 2019-10-29 ASSESSMENT — PAIN DESCRIPTION - DESCRIPTORS: DESCRIPTORS: HEADACHE

## 2019-10-30 VITALS
WEIGHT: 315 LBS | HEIGHT: 71 IN | DIASTOLIC BLOOD PRESSURE: 52 MMHG | RESPIRATION RATE: 15 BRPM | BODY MASS INDEX: 44.1 KG/M2 | OXYGEN SATURATION: 98 % | TEMPERATURE: 97.3 F | HEART RATE: 84 BPM | SYSTOLIC BLOOD PRESSURE: 89 MMHG

## 2019-10-30 LAB
ALBUMIN SERPL-MCNC: 3.1 G/DL (ref 3.5–5.2)
ALP BLD-CCNC: 102 U/L (ref 40–129)
ALT SERPL-CCNC: 20 U/L (ref 0–40)
ANION GAP SERPL CALCULATED.3IONS-SCNC: 10 MMOL/L (ref 7–16)
AST SERPL-CCNC: 28 U/L (ref 0–39)
BILIRUB SERPL-MCNC: 1.1 MG/DL (ref 0–1.2)
BILIRUBIN DIRECT: 0.4 MG/DL (ref 0–0.3)
BILIRUBIN, INDIRECT: 0.7 MG/DL (ref 0–1)
BUN BLDV-MCNC: 77 MG/DL (ref 8–23)
CALCIUM SERPL-MCNC: 9 MG/DL (ref 8.6–10.2)
CHLORIDE BLD-SCNC: 88 MMOL/L (ref 98–107)
CO2: 29 MMOL/L (ref 22–29)
CREAT SERPL-MCNC: 2.2 MG/DL (ref 0.7–1.2)
GFR AFRICAN AMERICAN: 36
GFR NON-AFRICAN AMERICAN: 36 ML/MIN/1.73
GLUCOSE BLD-MCNC: 239 MG/DL (ref 74–99)
MAGNESIUM: 2 MG/DL (ref 1.6–2.6)
METER GLUCOSE: 162 MG/DL (ref 74–99)
METER GLUCOSE: 168 MG/DL (ref 74–99)
METER GLUCOSE: 175 MG/DL (ref 74–99)
POTASSIUM SERPL-SCNC: 5.3 MMOL/L (ref 3.5–5)
PRO-BNP: 4572 PG/ML (ref 0–125)
SODIUM BLD-SCNC: 127 MMOL/L (ref 132–146)
TOTAL PROTEIN: 7.4 G/DL (ref 6.4–8.3)

## 2019-10-30 PROCEDURE — 2700000000 HC OXYGEN THERAPY PER DAY

## 2019-10-30 PROCEDURE — 2580000003 HC RX 258: Performed by: INTERNAL MEDICINE

## 2019-10-30 PROCEDURE — 83735 ASSAY OF MAGNESIUM: CPT

## 2019-10-30 PROCEDURE — 6370000000 HC RX 637 (ALT 250 FOR IP): Performed by: INTERNAL MEDICINE

## 2019-10-30 PROCEDURE — 6360000002 HC RX W HCPCS: Performed by: INTERNAL MEDICINE

## 2019-10-30 PROCEDURE — 83880 ASSAY OF NATRIURETIC PEPTIDE: CPT

## 2019-10-30 PROCEDURE — 6370000000 HC RX 637 (ALT 250 FOR IP): Performed by: FAMILY MEDICINE

## 2019-10-30 PROCEDURE — 80076 HEPATIC FUNCTION PANEL: CPT

## 2019-10-30 PROCEDURE — 82962 GLUCOSE BLOOD TEST: CPT

## 2019-10-30 PROCEDURE — G0008 ADMIN INFLUENZA VIRUS VAC: HCPCS | Performed by: INTERNAL MEDICINE

## 2019-10-30 PROCEDURE — 36415 COLL VENOUS BLD VENIPUNCTURE: CPT

## 2019-10-30 PROCEDURE — 99233 SBSQ HOSP IP/OBS HIGH 50: CPT | Performed by: INTERNAL MEDICINE

## 2019-10-30 PROCEDURE — 90653 IIV ADJUVANT VACCINE IM: CPT | Performed by: INTERNAL MEDICINE

## 2019-10-30 RX ORDER — 0.9 % SODIUM CHLORIDE 0.9 %
500 INTRAVENOUS SOLUTION INTRAVENOUS ONCE
Status: COMPLETED | OUTPATIENT
Start: 2019-10-30 | End: 2019-10-30

## 2019-10-30 RX ORDER — POTASSIUM CHLORIDE 20 MEQ/1
40 TABLET, EXTENDED RELEASE ORAL 2 TIMES DAILY WITH MEALS
Qty: 120 TABLET | Refills: 0 | Status: ON HOLD | OUTPATIENT
Start: 2019-10-30 | End: 2019-11-19 | Stop reason: HOSPADM

## 2019-10-30 RX ORDER — METOPROLOL SUCCINATE 50 MG/1
50 TABLET, EXTENDED RELEASE ORAL 2 TIMES DAILY
Qty: 30 TABLET | Refills: 0 | Status: ON HOLD | OUTPATIENT
Start: 2019-10-30 | End: 2019-11-19 | Stop reason: HOSPADM

## 2019-10-30 RX ORDER — PSEUDOEPHEDRINE HCL 30 MG
100 TABLET ORAL 2 TIMES DAILY
Qty: 60 CAPSULE | Refills: 0 | Status: ON HOLD | OUTPATIENT
Start: 2019-10-30 | End: 2019-11-19 | Stop reason: HOSPADM

## 2019-10-30 RX ORDER — METOLAZONE 2.5 MG/1
2.5 TABLET ORAL EVERY OTHER DAY
Qty: 15 TABLET | Refills: 0 | Status: ON HOLD | OUTPATIENT
Start: 2019-11-01 | End: 2019-11-19 | Stop reason: SDUPTHER

## 2019-10-30 RX ORDER — PANTOPRAZOLE SODIUM 40 MG/1
40 TABLET, DELAYED RELEASE ORAL
Qty: 60 TABLET | Refills: 0 | Status: ON HOLD | OUTPATIENT
Start: 2019-10-30 | End: 2019-11-19 | Stop reason: HOSPADM

## 2019-10-30 RX ORDER — SENNA PLUS 8.6 MG/1
1 TABLET ORAL NIGHTLY
Qty: 30 TABLET | Refills: 0 | Status: ON HOLD | OUTPATIENT
Start: 2019-10-30 | End: 2019-11-19 | Stop reason: HOSPADM

## 2019-10-30 RX ADMIN — APIXABAN 5 MG: 5 TABLET, FILM COATED ORAL at 11:12

## 2019-10-30 RX ADMIN — PANTOPRAZOLE SODIUM 40 MG: 40 TABLET, DELAYED RELEASE ORAL at 16:30

## 2019-10-30 RX ADMIN — INSULIN LISPRO 8 UNITS: 100 INJECTION, SOLUTION INTRAVENOUS; SUBCUTANEOUS at 11:28

## 2019-10-30 RX ADMIN — Medication 400 MG: at 11:10

## 2019-10-30 RX ADMIN — BUSPIRONE HYDROCHLORIDE 30 MG: 10 TABLET ORAL at 11:11

## 2019-10-30 RX ADMIN — BUMETANIDE 2 MG: 1 TABLET ORAL at 11:12

## 2019-10-30 RX ADMIN — INFLUENZA VACCINE, ADJUVANTED 0.5 ML: 15; 15; 15 INJECTION, SUSPENSION INTRAMUSCULAR at 20:17

## 2019-10-30 RX ADMIN — ALLOPURINOL 200 MG: 100 TABLET ORAL at 11:11

## 2019-10-30 RX ADMIN — SODIUM CHLORIDE 500 ML: 9 INJECTION, SOLUTION INTRAVENOUS at 16:31

## 2019-10-30 RX ADMIN — SACUBITRIL AND VALSARTAN 1 TABLET: 24; 26 TABLET, FILM COATED ORAL at 11:12

## 2019-10-30 RX ADMIN — GABAPENTIN 100 MG: 100 CAPSULE ORAL at 11:10

## 2019-10-30 RX ADMIN — METOLAZONE 2.5 MG: 2.5 TABLET ORAL at 11:11

## 2019-10-30 RX ADMIN — METOPROLOL SUCCINATE 50 MG: 50 TABLET, FILM COATED, EXTENDED RELEASE ORAL at 11:11

## 2019-10-30 RX ADMIN — INSULIN LISPRO 2 UNITS: 100 INJECTION, SOLUTION INTRAVENOUS; SUBCUTANEOUS at 11:26

## 2019-10-30 RX ADMIN — Medication 10 ML: at 11:13

## 2019-10-30 RX ADMIN — Medication 300 UNITS: at 11:10

## 2019-10-30 RX ADMIN — DOCUSATE SODIUM 100 MG: 100 CAPSULE, LIQUID FILLED ORAL at 11:10

## 2019-10-30 RX ADMIN — GABAPENTIN 100 MG: 100 CAPSULE ORAL at 15:29

## 2019-10-30 RX ADMIN — INSULIN LISPRO 2 UNITS: 100 INJECTION, SOLUTION INTRAVENOUS; SUBCUTANEOUS at 17:51

## 2019-10-30 RX ADMIN — INSULIN LISPRO 8 UNITS: 100 INJECTION, SOLUTION INTRAVENOUS; SUBCUTANEOUS at 17:54

## 2019-10-30 RX ADMIN — INSULIN LISPRO 2 UNITS: 100 INJECTION, SOLUTION INTRAVENOUS; SUBCUTANEOUS at 06:17

## 2019-10-30 RX ADMIN — INSULIN LISPRO 8 UNITS: 100 INJECTION, SOLUTION INTRAVENOUS; SUBCUTANEOUS at 06:20

## 2019-10-30 RX ADMIN — PANTOPRAZOLE SODIUM 40 MG: 40 TABLET, DELAYED RELEASE ORAL at 06:12

## 2019-10-30 RX ADMIN — INSULIN GLARGINE 35 UNITS: 100 INJECTION, SOLUTION SUBCUTANEOUS at 11:25

## 2019-10-30 ASSESSMENT — PAIN SCALES - GENERAL
PAINLEVEL_OUTOF10: 0
PAINLEVEL_OUTOF10: 0

## 2019-10-31 ENCOUNTER — CARE COORDINATION (OUTPATIENT)
Dept: CARE COORDINATION | Age: 69
End: 2019-10-31

## 2019-10-31 ENCOUNTER — TELEPHONE (OUTPATIENT)
Dept: NON INVASIVE DIAGNOSTICS | Age: 69
End: 2019-10-31

## 2019-11-01 ENCOUNTER — TELEPHONE (OUTPATIENT)
Dept: FAMILY MEDICINE CLINIC | Age: 69
End: 2019-11-01

## 2019-11-01 ENCOUNTER — TELEPHONE (OUTPATIENT)
Dept: CARDIOLOGY CLINIC | Age: 69
End: 2019-11-01

## 2019-11-02 ENCOUNTER — HOSPITAL ENCOUNTER (INPATIENT)
Age: 69
LOS: 17 days | Discharge: HOME | DRG: 286 | End: 2019-11-19
Attending: EMERGENCY MEDICINE | Admitting: INTERNAL MEDICINE
Payer: COMMERCIAL

## 2019-11-02 ENCOUNTER — APPOINTMENT (OUTPATIENT)
Dept: GENERAL RADIOLOGY | Age: 69
DRG: 286 | End: 2019-11-02
Payer: COMMERCIAL

## 2019-11-02 DIAGNOSIS — N17.9 AKI (ACUTE KIDNEY INJURY) (HCC): ICD-10-CM

## 2019-11-02 DIAGNOSIS — E87.5 HYPERKALEMIA: ICD-10-CM

## 2019-11-02 DIAGNOSIS — I50.9 ACUTE ON CHRONIC CONGESTIVE HEART FAILURE, UNSPECIFIED HEART FAILURE TYPE (HCC): Primary | ICD-10-CM

## 2019-11-02 DIAGNOSIS — E87.1 HYPONATREMIA: ICD-10-CM

## 2019-11-02 DIAGNOSIS — R74.01 TRANSAMINITIS: ICD-10-CM

## 2019-11-02 PROBLEM — R57.0 CARDIOGENIC SHOCK (HCC): Status: ACTIVE | Noted: 2019-11-02

## 2019-11-02 LAB
ALBUMIN SERPL-MCNC: 3.6 G/DL (ref 3.5–5.2)
ALP BLD-CCNC: 153 U/L (ref 40–129)
ALT SERPL-CCNC: 157 U/L (ref 0–40)
ANION GAP SERPL CALCULATED.3IONS-SCNC: 17 MMOL/L (ref 7–16)
AST SERPL-CCNC: 223 U/L (ref 0–39)
B.E.: -4.1 MMOL/L (ref -3–3)
BASOPHILS ABSOLUTE: 0.03 E9/L (ref 0–0.2)
BASOPHILS RELATIVE PERCENT: 0.3 % (ref 0–2)
BILIRUB SERPL-MCNC: 1.3 MG/DL (ref 0–1.2)
BUN BLDV-MCNC: 90 MG/DL (ref 8–23)
CALCIUM SERPL-MCNC: 9 MG/DL (ref 8.6–10.2)
CHLORIDE BLD-SCNC: 89 MMOL/L (ref 98–107)
CO2: 22 MMOL/L (ref 22–29)
COHB: 1.1 % (ref 0–1.5)
CREAT SERPL-MCNC: 2.9 MG/DL (ref 0.7–1.2)
CRITICAL: ABNORMAL
DATE ANALYZED: ABNORMAL
DATE OF COLLECTION: ABNORMAL
EOSINOPHILS ABSOLUTE: 0.03 E9/L (ref 0.05–0.5)
EOSINOPHILS RELATIVE PERCENT: 0.3 % (ref 0–6)
GFR AFRICAN AMERICAN: 26
GFR NON-AFRICAN AMERICAN: 26 ML/MIN/1.73
GLUCOSE BLD-MCNC: 94 MG/DL (ref 74–99)
HCO3: 18.9 MMOL/L (ref 22–26)
HCT VFR BLD CALC: 44.5 % (ref 37–54)
HEMOGLOBIN: 14.4 G/DL (ref 12.5–16.5)
HHB: 1.5 % (ref 0–5)
IMMATURE GRANULOCYTES #: 0.08 E9/L
IMMATURE GRANULOCYTES %: 0.9 % (ref 0–5)
INR BLD: 2.7
LAB: ABNORMAL
LACTIC ACID: 2.1 MMOL/L (ref 0.5–2.2)
LACTIC ACID: 2.4 MMOL/L (ref 0.5–2.2)
LYMPHOCYTES ABSOLUTE: 1.93 E9/L (ref 1.5–4)
LYMPHOCYTES RELATIVE PERCENT: 21 % (ref 20–42)
Lab: ABNORMAL
MCH RBC QN AUTO: 29.8 PG (ref 26–35)
MCHC RBC AUTO-ENTMCNC: 32.4 % (ref 32–34.5)
MCV RBC AUTO: 91.9 FL (ref 80–99.9)
METER GLUCOSE: 87 MG/DL (ref 74–99)
METER GLUCOSE: 96 MG/DL (ref 74–99)
METHB: 0.3 % (ref 0–1.5)
MODE: ABNORMAL
MONOCYTES ABSOLUTE: 0.72 E9/L (ref 0.1–0.95)
MONOCYTES RELATIVE PERCENT: 7.9 % (ref 2–12)
NEUTROPHILS ABSOLUTE: 6.38 E9/L (ref 1.8–7.3)
NEUTROPHILS RELATIVE PERCENT: 69.6 % (ref 43–80)
O2 CONTENT: 20.3 ML/DL
O2 SATURATION: 98.5 % (ref 92–98.5)
O2HB: 97.1 % (ref 94–97)
OPERATOR ID: 1394
PATIENT TEMP: 37 C
PCO2: 29.3 MMHG (ref 35–45)
PDW BLD-RTO: 15.9 FL (ref 11.5–15)
PH BLOOD GAS: 7.43 (ref 7.35–7.45)
PLATELET # BLD: 185 E9/L (ref 130–450)
PMV BLD AUTO: 13.9 FL (ref 7–12)
PO2: 138.1 MMHG (ref 60–100)
POTASSIUM SERPL-SCNC: 4.5 MMOL/L (ref 3.5–5)
POTASSIUM SERPL-SCNC: 6.3 MMOL/L (ref 3.5–5)
PRO-BNP: 6087 PG/ML (ref 0–125)
PROTHROMBIN TIME: 30.9 SEC (ref 9.3–12.4)
RBC # BLD: 4.84 E12/L (ref 3.8–5.8)
REASON FOR REJECTION: NORMAL
REJECTED TEST: NORMAL
SODIUM BLD-SCNC: 128 MMOL/L (ref 132–146)
SOURCE, BLOOD GAS: ABNORMAL
THB: 14.7 G/DL (ref 11.5–16.5)
TIME ANALYZED: 1328
TOTAL PROTEIN: 8.3 G/DL (ref 6.4–8.3)
TROPONIN: 0.04 NG/ML (ref 0–0.03)
WBC # BLD: 9.2 E9/L (ref 4.5–11.5)

## 2019-11-02 PROCEDURE — 82805 BLOOD GASES W/O2 SATURATION: CPT

## 2019-11-02 PROCEDURE — 2580000003 HC RX 258: Performed by: INTERNAL MEDICINE

## 2019-11-02 PROCEDURE — 82962 GLUCOSE BLOOD TEST: CPT

## 2019-11-02 PROCEDURE — 76937 US GUIDE VASCULAR ACCESS: CPT

## 2019-11-02 PROCEDURE — 36415 COLL VENOUS BLD VENIPUNCTURE: CPT

## 2019-11-02 PROCEDURE — 99285 EMERGENCY DEPT VISIT HI MDM: CPT

## 2019-11-02 PROCEDURE — 83880 ASSAY OF NATRIURETIC PEPTIDE: CPT

## 2019-11-02 PROCEDURE — 2000000000 HC ICU R&B

## 2019-11-02 PROCEDURE — 85025 COMPLETE CBC W/AUTO DIFF WBC: CPT

## 2019-11-02 PROCEDURE — 85610 PROTHROMBIN TIME: CPT

## 2019-11-02 PROCEDURE — 84132 ASSAY OF SERUM POTASSIUM: CPT

## 2019-11-02 PROCEDURE — 83605 ASSAY OF LACTIC ACID: CPT

## 2019-11-02 PROCEDURE — 99223 1ST HOSP IP/OBS HIGH 75: CPT | Performed by: INTERNAL MEDICINE

## 2019-11-02 PROCEDURE — 51702 INSERT TEMP BLADDER CATH: CPT

## 2019-11-02 PROCEDURE — 02HV33Z INSERTION OF INFUSION DEVICE INTO SUPERIOR VENA CAVA, PERCUTANEOUS APPROACH: ICD-10-PCS | Performed by: EMERGENCY MEDICINE

## 2019-11-02 PROCEDURE — 6370000000 HC RX 637 (ALT 250 FOR IP): Performed by: EMERGENCY MEDICINE

## 2019-11-02 PROCEDURE — 80053 COMPREHEN METABOLIC PANEL: CPT

## 2019-11-02 PROCEDURE — 94660 CPAP INITIATION&MGMT: CPT

## 2019-11-02 PROCEDURE — 6360000002 HC RX W HCPCS: Performed by: INTERNAL MEDICINE

## 2019-11-02 PROCEDURE — C1751 CATH, INF, PER/CENT/MIDLINE: HCPCS

## 2019-11-02 PROCEDURE — 36569 INSJ PICC 5 YR+ W/O IMAGING: CPT

## 2019-11-02 PROCEDURE — 71045 X-RAY EXAM CHEST 1 VIEW: CPT

## 2019-11-02 PROCEDURE — 84484 ASSAY OF TROPONIN QUANT: CPT

## 2019-11-02 PROCEDURE — 2500000003 HC RX 250 WO HCPCS: Performed by: INTERNAL MEDICINE

## 2019-11-02 PROCEDURE — 6370000000 HC RX 637 (ALT 250 FOR IP): Performed by: INTERNAL MEDICINE

## 2019-11-02 PROCEDURE — 93005 ELECTROCARDIOGRAM TRACING: CPT | Performed by: EMERGENCY MEDICINE

## 2019-11-02 RX ORDER — NICOTINE POLACRILEX 4 MG
15 LOZENGE BUCCAL PRN
Status: DISCONTINUED | OUTPATIENT
Start: 2019-11-02 | End: 2019-11-19 | Stop reason: HOSPADM

## 2019-11-02 RX ORDER — MILRINONE LACTATE 0.2 MG/ML
0.38 INJECTION, SOLUTION INTRAVENOUS CONTINUOUS
Status: DISCONTINUED | OUTPATIENT
Start: 2019-11-02 | End: 2019-11-04

## 2019-11-02 RX ORDER — BUMETANIDE 0.25 MG/ML
2 INJECTION, SOLUTION INTRAMUSCULAR; INTRAVENOUS ONCE
Status: COMPLETED | OUTPATIENT
Start: 2019-11-02 | End: 2019-11-02

## 2019-11-02 RX ORDER — METOLAZONE 5 MG/1
5 TABLET ORAL DAILY
Status: DISCONTINUED | OUTPATIENT
Start: 2019-11-02 | End: 2019-11-02 | Stop reason: SDUPTHER

## 2019-11-02 RX ORDER — HEPARIN SODIUM (PORCINE) LOCK FLUSH IV SOLN 100 UNIT/ML 100 UNIT/ML
3 SOLUTION INTRAVENOUS EVERY 12 HOURS SCHEDULED
Status: DISCONTINUED | OUTPATIENT
Start: 2019-11-02 | End: 2019-11-13

## 2019-11-02 RX ORDER — LORAZEPAM 2 MG/ML
1 INJECTION INTRAMUSCULAR ONCE
Status: DISCONTINUED | OUTPATIENT
Start: 2019-11-02 | End: 2019-11-19 | Stop reason: HOSPADM

## 2019-11-02 RX ORDER — ASPIRIN 325 MG
325 TABLET ORAL ONCE
Status: DISCONTINUED | OUTPATIENT
Start: 2019-11-02 | End: 2019-11-02

## 2019-11-02 RX ORDER — METOLAZONE 5 MG/1
5 TABLET ORAL DAILY
Status: DISCONTINUED | OUTPATIENT
Start: 2019-11-03 | End: 2019-11-02

## 2019-11-02 RX ORDER — METOLAZONE 2.5 MG/1
2.5 TABLET ORAL DAILY
Status: DISCONTINUED | OUTPATIENT
Start: 2019-11-02 | End: 2019-11-02

## 2019-11-02 RX ORDER — ASPIRIN 325 MG
325 TABLET ORAL ONCE
Status: COMPLETED | OUTPATIENT
Start: 2019-11-02 | End: 2019-11-02

## 2019-11-02 RX ORDER — LIDOCAINE HYDROCHLORIDE 10 MG/ML
5 INJECTION, SOLUTION EPIDURAL; INFILTRATION; INTRACAUDAL; PERINEURAL ONCE
Status: DISCONTINUED | OUTPATIENT
Start: 2019-11-02 | End: 2019-11-19 | Stop reason: HOSPADM

## 2019-11-02 RX ORDER — SODIUM CHLORIDE 0.9 % (FLUSH) 0.9 %
10 SYRINGE (ML) INJECTION PRN
Status: DISCONTINUED | OUTPATIENT
Start: 2019-11-02 | End: 2019-11-19 | Stop reason: HOSPADM

## 2019-11-02 RX ORDER — DEXTROSE MONOHYDRATE 50 MG/ML
100 INJECTION, SOLUTION INTRAVENOUS PRN
Status: DISCONTINUED | OUTPATIENT
Start: 2019-11-02 | End: 2019-11-19 | Stop reason: HOSPADM

## 2019-11-02 RX ORDER — HEPARIN SODIUM (PORCINE) LOCK FLUSH IV SOLN 100 UNIT/ML 100 UNIT/ML
3 SOLUTION INTRAVENOUS PRN
Status: DISCONTINUED | OUTPATIENT
Start: 2019-11-02 | End: 2019-11-19 | Stop reason: HOSPADM

## 2019-11-02 RX ORDER — METOLAZONE 5 MG/1
5 TABLET ORAL DAILY
Status: DISCONTINUED | OUTPATIENT
Start: 2019-11-02 | End: 2019-11-07

## 2019-11-02 RX ORDER — MORPHINE SULFATE 2 MG/ML
1 INJECTION, SOLUTION INTRAMUSCULAR; INTRAVENOUS EVERY 4 HOURS PRN
Status: DISCONTINUED | OUTPATIENT
Start: 2019-11-02 | End: 2019-11-19 | Stop reason: HOSPADM

## 2019-11-02 RX ORDER — DEXTROSE MONOHYDRATE 25 G/50ML
12.5 INJECTION, SOLUTION INTRAVENOUS PRN
Status: DISCONTINUED | OUTPATIENT
Start: 2019-11-02 | End: 2019-11-19 | Stop reason: HOSPADM

## 2019-11-02 RX ADMIN — MILRINONE LACTATE IN DEXTROSE 0.38 MCG/KG/MIN: 200 INJECTION, SOLUTION INTRAVENOUS at 19:50

## 2019-11-02 RX ADMIN — MILRINONE LACTATE IN DEXTROSE 0.2 MCG/KG/MIN: 200 INJECTION, SOLUTION INTRAVENOUS at 14:49

## 2019-11-02 RX ADMIN — ASPIRIN 325 MG: 325 TABLET, FILM COATED ORAL at 12:44

## 2019-11-02 RX ADMIN — BUMETANIDE 1 MG/HR: 0.25 INJECTION INTRAMUSCULAR; INTRAVENOUS at 15:29

## 2019-11-02 RX ADMIN — METOLAZONE 5 MG: 5 TABLET ORAL at 17:21

## 2019-11-02 RX ADMIN — BUMETANIDE 0.5 MG/HR: 0.25 INJECTION INTRAMUSCULAR; INTRAVENOUS at 22:05

## 2019-11-02 RX ADMIN — SODIUM NITROPRUSSIDE 0.25 MCG/KG/MIN: 25 INJECTION, SOLUTION, CONCENTRATE INTRAVENOUS at 17:45

## 2019-11-02 RX ADMIN — BUMETANIDE 2 MG: 0.25 INJECTION INTRAMUSCULAR; INTRAVENOUS at 15:04

## 2019-11-02 ASSESSMENT — PAIN SCALES - GENERAL
PAINLEVEL_OUTOF10: 0
PAINLEVEL_OUTOF10: 7
PAINLEVEL_OUTOF10: 0
PAINLEVEL_OUTOF10: 0

## 2019-11-02 ASSESSMENT — PAIN DESCRIPTION - PAIN TYPE: TYPE: ACUTE PAIN

## 2019-11-02 ASSESSMENT — PAIN DESCRIPTION - DESCRIPTORS: DESCRIPTORS: TIGHTNESS

## 2019-11-02 ASSESSMENT — PAIN DESCRIPTION - LOCATION: LOCATION: CHEST

## 2019-11-03 LAB
ALBUMIN SERPL-MCNC: 3 G/DL (ref 3.5–5.2)
ALP BLD-CCNC: 126 U/L (ref 40–129)
ALT SERPL-CCNC: 152 U/L (ref 0–40)
ANION GAP SERPL CALCULATED.3IONS-SCNC: 16 MMOL/L (ref 7–16)
ANION GAP SERPL CALCULATED.3IONS-SCNC: 17 MMOL/L (ref 7–16)
AST SERPL-CCNC: 171 U/L (ref 0–39)
BILIRUB SERPL-MCNC: 1.3 MG/DL (ref 0–1.2)
BUN BLDV-MCNC: 91 MG/DL (ref 8–23)
BUN BLDV-MCNC: 93 MG/DL (ref 8–23)
CALCIUM SERPL-MCNC: 9.1 MG/DL (ref 8.6–10.2)
CALCIUM SERPL-MCNC: 9.1 MG/DL (ref 8.6–10.2)
CHLORIDE BLD-SCNC: 89 MMOL/L (ref 98–107)
CHLORIDE BLD-SCNC: 89 MMOL/L (ref 98–107)
CO2: 25 MMOL/L (ref 22–29)
CO2: 26 MMOL/L (ref 22–29)
CREAT SERPL-MCNC: 2.7 MG/DL (ref 0.7–1.2)
CREAT SERPL-MCNC: 2.7 MG/DL (ref 0.7–1.2)
EKG ATRIAL RATE: 82 BPM
EKG P AXIS: 124 DEGREES
EKG Q-T INTERVAL: 434 MS
EKG QRS DURATION: 128 MS
EKG QTC CALCULATION (BAZETT): 504 MS
EKG R AXIS: -48 DEGREES
EKG T AXIS: 136 DEGREES
EKG VENTRICULAR RATE: 81 BPM
GFR AFRICAN AMERICAN: 29
GFR AFRICAN AMERICAN: 29
GFR NON-AFRICAN AMERICAN: 29 ML/MIN/1.73
GFR NON-AFRICAN AMERICAN: 29 ML/MIN/1.73
GLUCOSE BLD-MCNC: 239 MG/DL (ref 74–99)
GLUCOSE BLD-MCNC: 97 MG/DL (ref 74–99)
LACTIC ACID: 1.7 MMOL/L (ref 0.5–2.2)
LACTIC ACID: 1.8 MMOL/L (ref 0.5–2.2)
MAGNESIUM: 1.9 MG/DL (ref 1.6–2.6)
METER GLUCOSE: 237 MG/DL (ref 74–99)
METER GLUCOSE: 278 MG/DL (ref 74–99)
PHOSPHORUS: 3.4 MG/DL (ref 2.5–4.5)
POTASSIUM SERPL-SCNC: 4 MMOL/L (ref 3.5–5)
POTASSIUM SERPL-SCNC: 4.1 MMOL/L (ref 3.5–5)
PRO-BNP: 4024 PG/ML (ref 0–125)
SODIUM BLD-SCNC: 131 MMOL/L (ref 132–146)
SODIUM BLD-SCNC: 131 MMOL/L (ref 132–146)
TOTAL PROTEIN: 7 G/DL (ref 6.4–8.3)

## 2019-11-03 PROCEDURE — 6370000000 HC RX 637 (ALT 250 FOR IP): Performed by: INTERNAL MEDICINE

## 2019-11-03 PROCEDURE — 2700000000 HC OXYGEN THERAPY PER DAY

## 2019-11-03 PROCEDURE — 83735 ASSAY OF MAGNESIUM: CPT

## 2019-11-03 PROCEDURE — 82962 GLUCOSE BLOOD TEST: CPT

## 2019-11-03 PROCEDURE — 83880 ASSAY OF NATRIURETIC PEPTIDE: CPT

## 2019-11-03 PROCEDURE — 93010 ELECTROCARDIOGRAM REPORT: CPT | Performed by: INTERNAL MEDICINE

## 2019-11-03 PROCEDURE — 36415 COLL VENOUS BLD VENIPUNCTURE: CPT

## 2019-11-03 PROCEDURE — 83605 ASSAY OF LACTIC ACID: CPT

## 2019-11-03 PROCEDURE — 94660 CPAP INITIATION&MGMT: CPT

## 2019-11-03 PROCEDURE — 2580000003 HC RX 258: Performed by: INTERNAL MEDICINE

## 2019-11-03 PROCEDURE — 84100 ASSAY OF PHOSPHORUS: CPT

## 2019-11-03 PROCEDURE — 2500000003 HC RX 250 WO HCPCS: Performed by: INTERNAL MEDICINE

## 2019-11-03 PROCEDURE — 2000000000 HC ICU R&B

## 2019-11-03 PROCEDURE — 80048 BASIC METABOLIC PNL TOTAL CA: CPT

## 2019-11-03 PROCEDURE — 6360000002 HC RX W HCPCS: Performed by: INTERNAL MEDICINE

## 2019-11-03 PROCEDURE — 80053 COMPREHEN METABOLIC PANEL: CPT

## 2019-11-03 PROCEDURE — 2580000003 HC RX 258: Performed by: EMERGENCY MEDICINE

## 2019-11-03 RX ORDER — METOPROLOL SUCCINATE 25 MG/1
12.5 TABLET, EXTENDED RELEASE ORAL ONCE
Status: COMPLETED | OUTPATIENT
Start: 2019-11-03 | End: 2019-11-03

## 2019-11-03 RX ORDER — MAGNESIUM SULFATE IN WATER 40 MG/ML
2 INJECTION, SOLUTION INTRAVENOUS ONCE
Status: COMPLETED | OUTPATIENT
Start: 2019-11-03 | End: 2019-11-03

## 2019-11-03 RX ORDER — BUMETANIDE 0.25 MG/ML
2 INJECTION, SOLUTION INTRAMUSCULAR; INTRAVENOUS ONCE
Status: COMPLETED | OUTPATIENT
Start: 2019-11-03 | End: 2019-11-03

## 2019-11-03 RX ORDER — POTASSIUM CHLORIDE 20 MEQ/1
40 TABLET, EXTENDED RELEASE ORAL ONCE
Status: COMPLETED | OUTPATIENT
Start: 2019-11-03 | End: 2019-11-03

## 2019-11-03 RX ADMIN — Medication 10 ML: at 20:27

## 2019-11-03 RX ADMIN — MILRINONE LACTATE IN DEXTROSE 0.38 MCG/KG/MIN: 200 INJECTION, SOLUTION INTRAVENOUS at 06:46

## 2019-11-03 RX ADMIN — BUMETANIDE 2 MG: 0.25 INJECTION INTRAMUSCULAR; INTRAVENOUS at 15:06

## 2019-11-03 RX ADMIN — MILRINONE LACTATE IN DEXTROSE 0.38 MCG/KG/MIN: 200 INJECTION, SOLUTION INTRAVENOUS at 01:43

## 2019-11-03 RX ADMIN — MAGNESIUM SULFATE 2 G: 2 INJECTION INTRAVENOUS at 14:53

## 2019-11-03 RX ADMIN — MILRINONE LACTATE IN DEXTROSE 0.38 MCG/KG/MIN: 200 INJECTION, SOLUTION INTRAVENOUS at 13:07

## 2019-11-03 RX ADMIN — INSULIN LISPRO 3 UNITS: 100 INJECTION, SOLUTION INTRAVENOUS; SUBCUTANEOUS at 20:25

## 2019-11-03 RX ADMIN — METOPROLOL SUCCINATE 12.5 MG: 25 TABLET, EXTENDED RELEASE ORAL at 18:56

## 2019-11-03 RX ADMIN — POTASSIUM CHLORIDE 40 MEQ: 20 TABLET, EXTENDED RELEASE ORAL at 15:06

## 2019-11-03 RX ADMIN — SODIUM NITROPRUSSIDE 0.25 MCG/KG/MIN: 25 INJECTION, SOLUTION, CONCENTRATE INTRAVENOUS at 17:01

## 2019-11-03 RX ADMIN — INSULIN LISPRO 4 UNITS: 100 INJECTION, SOLUTION INTRAVENOUS; SUBCUTANEOUS at 17:15

## 2019-11-03 RX ADMIN — METOLAZONE 5 MG: 5 TABLET ORAL at 08:18

## 2019-11-03 ASSESSMENT — ENCOUNTER SYMPTOMS
NAUSEA: 0
ABDOMINAL DISTENTION: 1
COLOR CHANGE: 0
ABDOMINAL PAIN: 0
EYES NEGATIVE: 1
VOMITING: 0
SHORTNESS OF BREATH: 1

## 2019-11-03 ASSESSMENT — PAIN SCALES - GENERAL
PAINLEVEL_OUTOF10: 0
PAINLEVEL_OUTOF10: 0

## 2019-11-04 ENCOUNTER — NURSE ONLY (OUTPATIENT)
Dept: NON INVASIVE DIAGNOSTICS | Age: 69
End: 2019-11-04
Payer: COMMERCIAL

## 2019-11-04 DIAGNOSIS — I48.19 PERSISTENT ATRIAL FIBRILLATION (HCC): Primary | ICD-10-CM

## 2019-11-04 DIAGNOSIS — Z95.810 BIVENTRICULAR IMPLANTABLE CARDIOVERTER-DEFIBRILLATOR IN SITU: ICD-10-CM

## 2019-11-04 LAB
ALBUMIN SERPL-MCNC: 3 G/DL (ref 3.5–5.2)
ALP BLD-CCNC: 125 U/L (ref 40–129)
ALT SERPL-CCNC: 130 U/L (ref 0–40)
ANION GAP SERPL CALCULATED.3IONS-SCNC: 10 MMOL/L (ref 7–16)
ANION GAP SERPL CALCULATED.3IONS-SCNC: 16 MMOL/L (ref 7–16)
AST SERPL-CCNC: 105 U/L (ref 0–39)
B.E.: 6.1 MMOL/L (ref -3–0)
B.E.: 7.3 MMOL/L (ref -3–0)
BILIRUB SERPL-MCNC: 1 MG/DL (ref 0–1.2)
BUN BLDV-MCNC: 91 MG/DL (ref 8–23)
BUN BLDV-MCNC: 94 MG/DL (ref 8–23)
CALCIUM SERPL-MCNC: 9.1 MG/DL (ref 8.6–10.2)
CALCIUM SERPL-MCNC: 9.7 MG/DL (ref 8.6–10.2)
CHLORIDE BLD-SCNC: 89 MMOL/L (ref 98–107)
CHLORIDE BLD-SCNC: 91 MMOL/L (ref 98–107)
CO2: 27 MMOL/L (ref 22–29)
CO2: 35 MMOL/L (ref 22–29)
CREAT SERPL-MCNC: 2.1 MG/DL (ref 0.7–1.2)
CREAT SERPL-MCNC: 2.4 MG/DL (ref 0.7–1.2)
DELIVERY SYSTEMS: ABNORMAL
DEVICE: ABNORMAL
DEVICE: ABNORMAL
FERRITIN: 545 NG/ML
GFR AFRICAN AMERICAN: 33
GFR AFRICAN AMERICAN: 38
GFR NON-AFRICAN AMERICAN: 33 ML/MIN/1.73
GFR NON-AFRICAN AMERICAN: 38 ML/MIN/1.73
GLUCOSE BLD-MCNC: 208 MG/DL (ref 74–99)
GLUCOSE BLD-MCNC: 327 MG/DL (ref 74–99)
HCO3 ARTERIAL: 31.5 MMOL/L (ref 22–26)
HCO3 ARTERIAL: 32.5 MMOL/L (ref 22–26)
HCT (EST): 44 % (ref 37–54)
HCT (EST): 44 % (ref 37–54)
HGB, (EST): 14.8 G/DL (ref 12.5–15.5)
HGB, (EST): 15 G/DL (ref 12.5–15.5)
IRON SATURATION: 27 % (ref 20–55)
IRON: 54 MCG/DL (ref 59–158)
LACTIC ACID: 2.2 MMOL/L (ref 0.5–2.2)
LACTIC ACID: 2.2 MMOL/L (ref 0.5–2.2)
MAGNESIUM: 2.1 MG/DL (ref 1.6–2.6)
METER GLUCOSE: 165 MG/DL (ref 74–99)
METER GLUCOSE: 196 MG/DL (ref 74–99)
METER GLUCOSE: 236 MG/DL (ref 74–99)
METER GLUCOSE: 290 MG/DL (ref 74–99)
O2 SATURATION: 44.9 % (ref 92–98.5)
O2 SATURATION: 57.1 % (ref 92–98.5)
OPERATOR ID: 1868
OPERATOR ID: 2577
PATIENT TEMP: 37
PCO2 (TEMP CORRECTED): 46.6 MMHG (ref 35–45)
PCO2 ARTERIAL: 46.3 MMHG (ref 35–45)
PH (TEMPERATURE CORRECTED): 7.44 (ref 7.35–7.45)
PH BLOOD GAS: 7.46 (ref 7.35–7.45)
PO2 (TEMP CORRECTED): 29.2 MMHG (ref 60–80)
PO2 ARTERIAL: 23.9 MMHG (ref 60–80)
POTASSIUM REFLEX MAGNESIUM: 3.5 MMOL/L (ref 3.5–5)
POTASSIUM SERPL-SCNC: 4.2 MMOL/L (ref 3.5–5)
SODIUM BLD-SCNC: 134 MMOL/L (ref 132–146)
SODIUM BLD-SCNC: 134 MMOL/L (ref 132–146)
SOURCE, BLOOD GAS: ABNORMAL
SOURCE, BLOOD GAS: ABNORMAL
TOTAL IRON BINDING CAPACITY: 203 MCG/DL (ref 250–450)
TOTAL PROTEIN: 6.8 G/DL (ref 6.4–8.3)
TRANSFERRIN: 165 MG/DL (ref 200–360)

## 2019-11-04 PROCEDURE — 83735 ASSAY OF MAGNESIUM: CPT

## 2019-11-04 PROCEDURE — 6360000002 HC RX W HCPCS

## 2019-11-04 PROCEDURE — 2500000003 HC RX 250 WO HCPCS

## 2019-11-04 PROCEDURE — 84466 ASSAY OF TRANSFERRIN: CPT

## 2019-11-04 PROCEDURE — C1894 INTRO/SHEATH, NON-LASER: HCPCS

## 2019-11-04 PROCEDURE — 80048 BASIC METABOLIC PNL TOTAL CA: CPT

## 2019-11-04 PROCEDURE — 2580000003 HC RX 258: Performed by: EMERGENCY MEDICINE

## 2019-11-04 PROCEDURE — 36592 COLLECT BLOOD FROM PICC: CPT

## 2019-11-04 PROCEDURE — 2700000000 HC OXYGEN THERAPY PER DAY

## 2019-11-04 PROCEDURE — 2000000000 HC ICU R&B

## 2019-11-04 PROCEDURE — 93451 RIGHT HEART CATH: CPT | Performed by: INTERNAL MEDICINE

## 2019-11-04 PROCEDURE — 83540 ASSAY OF IRON: CPT

## 2019-11-04 PROCEDURE — 83550 IRON BINDING TEST: CPT

## 2019-11-04 PROCEDURE — 36591 DRAW BLOOD OFF VENOUS DEVICE: CPT

## 2019-11-04 PROCEDURE — 82728 ASSAY OF FERRITIN: CPT

## 2019-11-04 PROCEDURE — 2500000003 HC RX 250 WO HCPCS: Performed by: INTERNAL MEDICINE

## 2019-11-04 PROCEDURE — 4A023N6 MEASUREMENT OF CARDIAC SAMPLING AND PRESSURE, RIGHT HEART, PERCUTANEOUS APPROACH: ICD-10-PCS | Performed by: INTERNAL MEDICINE

## 2019-11-04 PROCEDURE — 83605 ASSAY OF LACTIC ACID: CPT

## 2019-11-04 PROCEDURE — 82803 BLOOD GASES ANY COMBINATION: CPT

## 2019-11-04 PROCEDURE — 94660 CPAP INITIATION&MGMT: CPT

## 2019-11-04 PROCEDURE — 93296 REM INTERROG EVL PM/IDS: CPT | Performed by: INTERNAL MEDICINE

## 2019-11-04 PROCEDURE — 2709999900 HC NON-CHARGEABLE SUPPLY

## 2019-11-04 PROCEDURE — 82962 GLUCOSE BLOOD TEST: CPT

## 2019-11-04 PROCEDURE — 80053 COMPREHEN METABOLIC PANEL: CPT

## 2019-11-04 PROCEDURE — 36415 COLL VENOUS BLD VENIPUNCTURE: CPT

## 2019-11-04 PROCEDURE — 93295 DEV INTERROG REMOTE 1/2/MLT: CPT | Performed by: INTERNAL MEDICINE

## 2019-11-04 PROCEDURE — 6360000002 HC RX W HCPCS: Performed by: INTERNAL MEDICINE

## 2019-11-04 PROCEDURE — 6370000000 HC RX 637 (ALT 250 FOR IP): Performed by: INTERNAL MEDICINE

## 2019-11-04 PROCEDURE — C1751 CATH, INF, PER/CENT/MIDLINE: HCPCS

## 2019-11-04 PROCEDURE — 76937 US GUIDE VASCULAR ACCESS: CPT | Performed by: INTERNAL MEDICINE

## 2019-11-04 RX ORDER — DIMETHICONE, OXYBENZONE, AND PADIMATE O 2; 2.5; 6.6 G/100G; G/100G; G/100G
STICK TOPICAL
Status: DISPENSED
Start: 2019-11-04 | End: 2019-11-05

## 2019-11-04 RX ORDER — HYDRALAZINE HYDROCHLORIDE 50 MG/1
100 TABLET, FILM COATED ORAL EVERY 8 HOURS SCHEDULED
Status: DISCONTINUED | OUTPATIENT
Start: 2019-11-04 | End: 2019-11-07

## 2019-11-04 RX ORDER — BUMETANIDE 0.25 MG/ML
2 INJECTION, SOLUTION INTRAMUSCULAR; INTRAVENOUS 3 TIMES DAILY
Status: DISCONTINUED | OUTPATIENT
Start: 2019-11-04 | End: 2019-11-04 | Stop reason: ALTCHOICE

## 2019-11-04 RX ORDER — BUMETANIDE 0.25 MG/ML
2 INJECTION, SOLUTION INTRAMUSCULAR; INTRAVENOUS ONCE
Status: COMPLETED | OUTPATIENT
Start: 2019-11-04 | End: 2019-11-04

## 2019-11-04 RX ORDER — POTASSIUM CHLORIDE 20 MEQ/1
40 TABLET, EXTENDED RELEASE ORAL
Status: DISCONTINUED | OUTPATIENT
Start: 2019-11-04 | End: 2019-11-11

## 2019-11-04 RX ORDER — BUMETANIDE 0.25 MG/ML
2 INJECTION, SOLUTION INTRAMUSCULAR; INTRAVENOUS 3 TIMES DAILY
Status: DISCONTINUED | OUTPATIENT
Start: 2019-11-04 | End: 2019-11-05

## 2019-11-04 RX ORDER — MAGNESIUM SULFATE 1 G/100ML
1 INJECTION INTRAVENOUS ONCE
Status: COMPLETED | OUTPATIENT
Start: 2019-11-04 | End: 2019-11-04

## 2019-11-04 RX ADMIN — BUMETANIDE 2 MG: 0.25 INJECTION INTRAMUSCULAR; INTRAVENOUS at 21:07

## 2019-11-04 RX ADMIN — METOLAZONE 5 MG: 5 TABLET ORAL at 10:05

## 2019-11-04 RX ADMIN — INSULIN LISPRO 3 UNITS: 100 INJECTION, SOLUTION INTRAVENOUS; SUBCUTANEOUS at 21:08

## 2019-11-04 RX ADMIN — BUMETANIDE 2 MG: 0.25 INJECTION INTRAMUSCULAR; INTRAVENOUS at 02:31

## 2019-11-04 RX ADMIN — Medication 10 ML: at 08:00

## 2019-11-04 RX ADMIN — INSULIN LISPRO 2 UNITS: 100 INJECTION, SOLUTION INTRAVENOUS; SUBCUTANEOUS at 11:55

## 2019-11-04 RX ADMIN — POTASSIUM CHLORIDE 40 MEQ: 20 TABLET, EXTENDED RELEASE ORAL at 14:30

## 2019-11-04 RX ADMIN — INSULIN LISPRO 2 UNITS: 100 INJECTION, SOLUTION INTRAVENOUS; SUBCUTANEOUS at 08:06

## 2019-11-04 RX ADMIN — Medication 10 ML: at 21:07

## 2019-11-04 RX ADMIN — BUMETANIDE 2 MG: 0.25 INJECTION INTRAMUSCULAR; INTRAVENOUS at 10:07

## 2019-11-04 RX ADMIN — MAGNESIUM SULFATE 1 G: 1 INJECTION INTRAVENOUS at 01:15

## 2019-11-04 RX ADMIN — INSULIN LISPRO 4 UNITS: 100 INJECTION, SOLUTION INTRAVENOUS; SUBCUTANEOUS at 16:52

## 2019-11-04 RX ADMIN — BUMETANIDE 2 MG: 0.25 INJECTION INTRAMUSCULAR; INTRAVENOUS at 15:38

## 2019-11-04 RX ADMIN — HYDRALAZINE HYDROCHLORIDE 100 MG: 50 TABLET, FILM COATED ORAL at 22:13

## 2019-11-04 RX ADMIN — HYDRALAZINE HYDROCHLORIDE 100 MG: 50 TABLET, FILM COATED ORAL at 14:44

## 2019-11-04 ASSESSMENT — ENCOUNTER SYMPTOMS
ABDOMINAL DISTENTION: 1
COLOR CHANGE: 0
ABDOMINAL PAIN: 0
VOMITING: 0
NAUSEA: 0
EYES NEGATIVE: 1
SHORTNESS OF BREATH: 1

## 2019-11-04 ASSESSMENT — PAIN SCALES - GENERAL
PAINLEVEL_OUTOF10: 0

## 2019-11-05 ENCOUNTER — APPOINTMENT (OUTPATIENT)
Dept: GENERAL RADIOLOGY | Age: 69
DRG: 286 | End: 2019-11-05
Payer: COMMERCIAL

## 2019-11-05 LAB
ABO/RH: NORMAL
ANION GAP SERPL CALCULATED.3IONS-SCNC: 16 MMOL/L (ref 7–16)
ANION GAP SERPL CALCULATED.3IONS-SCNC: 9 MMOL/L (ref 7–16)
ANTIBODY SCREEN: NORMAL
B.E.: 5.3 MMOL/L (ref -3–0)
B.E.: 5.8 MMOL/L (ref -3–0)
B.E.: 7.5 MMOL/L (ref -3–0)
BUN BLDV-MCNC: 79 MG/DL (ref 8–23)
BUN BLDV-MCNC: 83 MG/DL (ref 8–23)
CALCIUM SERPL-MCNC: 9.5 MG/DL (ref 8.6–10.2)
CALCIUM SERPL-MCNC: 9.5 MG/DL (ref 8.6–10.2)
CHLORIDE BLD-SCNC: 87 MMOL/L (ref 98–107)
CHLORIDE BLD-SCNC: 90 MMOL/L (ref 98–107)
CO2: 29 MMOL/L (ref 22–29)
CO2: 34 MMOL/L (ref 22–29)
CREAT SERPL-MCNC: 1.7 MG/DL (ref 0.7–1.2)
CREAT SERPL-MCNC: 1.8 MG/DL (ref 0.7–1.2)
DELIVERY SYSTEMS: ABNORMAL
DELIVERY SYSTEMS: ABNORMAL
DEVICE: ABNORMAL
FIO2 ARTERIAL: 2
FIO2 ARTERIAL: 2
GFR AFRICAN AMERICAN: 46
GFR AFRICAN AMERICAN: 49
GFR NON-AFRICAN AMERICAN: 46 ML/MIN/1.73
GFR NON-AFRICAN AMERICAN: 49 ML/MIN/1.73
GLUCOSE BLD-MCNC: 198 MG/DL (ref 74–99)
GLUCOSE BLD-MCNC: 343 MG/DL (ref 74–99)
HCO3 ARTERIAL: 30.4 MMOL/L (ref 22–26)
HCO3 ARTERIAL: 30.5 MMOL/L (ref 22–26)
HCO3 ARTERIAL: 32.6 MMOL/L (ref 22–26)
HCT (EST): 39 % (ref 37–54)
HCT (EST): 42 % (ref 37–54)
HCT (EST): 44 % (ref 37–54)
HGB, (EST): 13.2 G/DL (ref 12.5–15.5)
HGB, (EST): 14.3 G/DL (ref 12.5–15.5)
HGB, (EST): 14.8 G/DL (ref 12.5–15.5)
MAGNESIUM: 1.9 MG/DL (ref 1.6–2.6)
METER GLUCOSE: 224 MG/DL (ref 74–99)
METER GLUCOSE: 247 MG/DL (ref 74–99)
METER GLUCOSE: 279 MG/DL (ref 74–99)
METER GLUCOSE: 326 MG/DL (ref 74–99)
O2 SATURATION: 58.4 % (ref 92–98.5)
O2 SATURATION: 64.9 % (ref 92–98.5)
O2 SATURATION: 69.7 % (ref 92–98.5)
OPERATOR ID: 2577
OPERATOR ID: 5721
OPERATOR ID: 5721
PCO2 ARTERIAL: 43.2 MMHG (ref 35–45)
PCO2 ARTERIAL: 45.8 MMHG (ref 35–45)
PCO2 ARTERIAL: 45.9 MMHG (ref 35–45)
PH BLOOD GAS: 7.43 (ref 7.35–7.45)
PH BLOOD GAS: 7.46 (ref 7.35–7.45)
PH BLOOD GAS: 7.46 (ref 7.35–7.45)
PO2 ARTERIAL: 29.2 MMHG (ref 60–80)
PO2 ARTERIAL: 33 MMHG (ref 60–80)
PO2 ARTERIAL: 34.8 MMHG (ref 60–80)
POTASSIUM REFLEX MAGNESIUM: 3.1 MMOL/L (ref 3.5–5)
POTASSIUM REFLEX MAGNESIUM: 3.6 MMOL/L (ref 3.5–5)
POTASSIUM REFLEX MAGNESIUM: 4 MMOL/L (ref 3.5–5)
SODIUM BLD-SCNC: 132 MMOL/L (ref 132–146)
SODIUM BLD-SCNC: 133 MMOL/L (ref 132–146)
SOURCE, BLOOD GAS: ABNORMAL

## 2019-11-05 PROCEDURE — 82803 BLOOD GASES ANY COMBINATION: CPT

## 2019-11-05 PROCEDURE — 36592 COLLECT BLOOD FROM PICC: CPT

## 2019-11-05 PROCEDURE — 80048 BASIC METABOLIC PNL TOTAL CA: CPT

## 2019-11-05 PROCEDURE — 2500000003 HC RX 250 WO HCPCS: Performed by: INTERNAL MEDICINE

## 2019-11-05 PROCEDURE — 6370000000 HC RX 637 (ALT 250 FOR IP): Performed by: INTERNAL MEDICINE

## 2019-11-05 PROCEDURE — 84132 ASSAY OF SERUM POTASSIUM: CPT

## 2019-11-05 PROCEDURE — 94660 CPAP INITIATION&MGMT: CPT

## 2019-11-05 PROCEDURE — 82962 GLUCOSE BLOOD TEST: CPT

## 2019-11-05 PROCEDURE — 86703 HIV-1/HIV-2 1 RESULT ANTBDY: CPT

## 2019-11-05 PROCEDURE — 86900 BLOOD TYPING SEROLOGIC ABO: CPT

## 2019-11-05 PROCEDURE — 99291 CRITICAL CARE FIRST HOUR: CPT | Performed by: INTERNAL MEDICINE

## 2019-11-05 PROCEDURE — 2700000000 HC OXYGEN THERAPY PER DAY

## 2019-11-05 PROCEDURE — 2580000003 HC RX 258: Performed by: EMERGENCY MEDICINE

## 2019-11-05 PROCEDURE — 86901 BLOOD TYPING SEROLOGIC RH(D): CPT

## 2019-11-05 PROCEDURE — 83735 ASSAY OF MAGNESIUM: CPT

## 2019-11-05 PROCEDURE — 2000000000 HC ICU R&B

## 2019-11-05 PROCEDURE — 6360000002 HC RX W HCPCS: Performed by: INTERNAL MEDICINE

## 2019-11-05 PROCEDURE — 36415 COLL VENOUS BLD VENIPUNCTURE: CPT

## 2019-11-05 PROCEDURE — 71045 X-RAY EXAM CHEST 1 VIEW: CPT

## 2019-11-05 PROCEDURE — 86850 RBC ANTIBODY SCREEN: CPT

## 2019-11-05 RX ORDER — BENZONATATE 100 MG/1
100 CAPSULE ORAL 3 TIMES DAILY PRN
Status: DISCONTINUED | OUTPATIENT
Start: 2019-11-05 | End: 2019-11-19 | Stop reason: HOSPADM

## 2019-11-05 RX ORDER — METOPROLOL SUCCINATE 25 MG/1
12.5 TABLET, EXTENDED RELEASE ORAL 2 TIMES DAILY
Status: DISCONTINUED | OUTPATIENT
Start: 2019-11-05 | End: 2019-11-06

## 2019-11-05 RX ORDER — POTASSIUM CHLORIDE 7.45 MG/ML
10 INJECTION INTRAVENOUS PRN
Status: DISCONTINUED | OUTPATIENT
Start: 2019-11-05 | End: 2019-11-19 | Stop reason: HOSPADM

## 2019-11-05 RX ORDER — SPIRONOLACTONE 25 MG/1
50 TABLET ORAL DAILY
Status: DISCONTINUED | OUTPATIENT
Start: 2019-11-05 | End: 2019-11-19 | Stop reason: HOSPADM

## 2019-11-05 RX ORDER — POTASSIUM CHLORIDE 20 MEQ/1
40 TABLET, EXTENDED RELEASE ORAL PRN
Status: DISCONTINUED | OUTPATIENT
Start: 2019-11-05 | End: 2019-11-19 | Stop reason: HOSPADM

## 2019-11-05 RX ORDER — ISOSORBIDE DINITRATE 10 MG/1
10 TABLET ORAL 3 TIMES DAILY
Status: DISCONTINUED | OUTPATIENT
Start: 2019-11-05 | End: 2019-11-07

## 2019-11-05 RX ORDER — POTASSIUM CHLORIDE 29.8 MG/ML
20 INJECTION INTRAVENOUS PRN
Status: DISCONTINUED | OUTPATIENT
Start: 2019-11-05 | End: 2019-11-19 | Stop reason: HOSPADM

## 2019-11-05 RX ORDER — MAGNESIUM SULFATE IN WATER 40 MG/ML
2 INJECTION, SOLUTION INTRAVENOUS ONCE
Status: COMPLETED | OUTPATIENT
Start: 2019-11-05 | End: 2019-11-05

## 2019-11-05 RX ORDER — BUMETANIDE 0.25 MG/ML
2 INJECTION, SOLUTION INTRAMUSCULAR; INTRAVENOUS 2 TIMES DAILY
Status: DISCONTINUED | OUTPATIENT
Start: 2019-11-05 | End: 2019-11-07

## 2019-11-05 RX ADMIN — HYDRALAZINE HYDROCHLORIDE 100 MG: 50 TABLET, FILM COATED ORAL at 15:45

## 2019-11-05 RX ADMIN — ISOSORBIDE DINITRATE 10 MG: 10 TABLET ORAL at 20:40

## 2019-11-05 RX ADMIN — POTASSIUM CHLORIDE 40 MEQ: 20 TABLET, EXTENDED RELEASE ORAL at 08:19

## 2019-11-05 RX ADMIN — BENZONATATE 100 MG: 100 CAPSULE ORAL at 08:17

## 2019-11-05 RX ADMIN — BUMETANIDE 2 MG: 0.25 INJECTION INTRAMUSCULAR; INTRAVENOUS at 08:19

## 2019-11-05 RX ADMIN — METOPROLOL SUCCINATE 12.5 MG: 25 TABLET, EXTENDED RELEASE ORAL at 20:40

## 2019-11-05 RX ADMIN — MAGNESIUM SULFATE 2 G: 2 INJECTION INTRAVENOUS at 14:40

## 2019-11-05 RX ADMIN — BENZONATATE 100 MG: 100 CAPSULE ORAL at 01:58

## 2019-11-05 RX ADMIN — Medication 10 ML: at 20:40

## 2019-11-05 RX ADMIN — INSULIN LISPRO 6 UNITS: 100 INJECTION, SOLUTION INTRAVENOUS; SUBCUTANEOUS at 17:34

## 2019-11-05 RX ADMIN — INSULIN LISPRO 4 UNITS: 100 INJECTION, SOLUTION INTRAVENOUS; SUBCUTANEOUS at 07:55

## 2019-11-05 RX ADMIN — METOLAZONE 5 MG: 5 TABLET ORAL at 08:17

## 2019-11-05 RX ADMIN — INSULIN LISPRO 4 UNITS: 100 INJECTION, SOLUTION INTRAVENOUS; SUBCUTANEOUS at 12:02

## 2019-11-05 RX ADMIN — BUMETANIDE 2 MG: 0.25 INJECTION INTRAMUSCULAR; INTRAVENOUS at 20:40

## 2019-11-05 RX ADMIN — POTASSIUM CHLORIDE 40 MEQ: 20 TABLET, EXTENDED RELEASE ORAL at 17:33

## 2019-11-05 RX ADMIN — INSULIN LISPRO 4 UNITS: 100 INJECTION, SOLUTION INTRAVENOUS; SUBCUTANEOUS at 20:38

## 2019-11-05 RX ADMIN — POTASSIUM CHLORIDE 20 MEQ: 29.8 INJECTION, SOLUTION INTRAVENOUS at 11:53

## 2019-11-05 RX ADMIN — HYDRALAZINE HYDROCHLORIDE 50 MG: 50 TABLET, FILM COATED ORAL at 08:18

## 2019-11-05 RX ADMIN — SPIRONOLACTONE 50 MG: 25 TABLET ORAL at 12:54

## 2019-11-05 RX ADMIN — HYDRALAZINE HYDROCHLORIDE 100 MG: 50 TABLET, FILM COATED ORAL at 06:22

## 2019-11-05 RX ADMIN — METOPROLOL SUCCINATE 12.5 MG: 25 TABLET, EXTENDED RELEASE ORAL at 14:39

## 2019-11-05 RX ADMIN — HYDRALAZINE HYDROCHLORIDE 100 MG: 50 TABLET, FILM COATED ORAL at 22:01

## 2019-11-05 RX ADMIN — POTASSIUM CHLORIDE 20 MEQ: 29.8 INJECTION, SOLUTION INTRAVENOUS at 12:46

## 2019-11-05 RX ADMIN — ISOSORBIDE DINITRATE 10 MG: 10 TABLET ORAL at 12:53

## 2019-11-05 ASSESSMENT — PAIN SCALES - GENERAL
PAINLEVEL_OUTOF10: 0

## 2019-11-06 LAB
ANION GAP SERPL CALCULATED.3IONS-SCNC: 11 MMOL/L (ref 7–16)
ANION GAP SERPL CALCULATED.3IONS-SCNC: 11 MMOL/L (ref 7–16)
ANION GAP SERPL CALCULATED.3IONS-SCNC: 15 MMOL/L (ref 7–16)
B.E.: 7.8 MMOL/L (ref -3–0)
BUN BLDV-MCNC: 66 MG/DL (ref 8–23)
BUN BLDV-MCNC: 68 MG/DL (ref 8–23)
BUN BLDV-MCNC: 70 MG/DL (ref 8–23)
CALCIUM SERPL-MCNC: 9.5 MG/DL (ref 8.6–10.2)
CALCIUM SERPL-MCNC: 9.7 MG/DL (ref 8.6–10.2)
CALCIUM SERPL-MCNC: 9.8 MG/DL (ref 8.6–10.2)
CHLORIDE BLD-SCNC: 90 MMOL/L (ref 98–107)
CHLORIDE BLD-SCNC: 92 MMOL/L (ref 98–107)
CHLORIDE BLD-SCNC: 92 MMOL/L (ref 98–107)
CO2: 29 MMOL/L (ref 22–29)
CO2: 29 MMOL/L (ref 22–29)
CO2: 30 MMOL/L (ref 22–29)
CREAT SERPL-MCNC: 1.5 MG/DL (ref 0.7–1.2)
CREAT SERPL-MCNC: 1.8 MG/DL (ref 0.7–1.2)
CREAT SERPL-MCNC: 1.8 MG/DL (ref 0.7–1.2)
DEVICE: ABNORMAL
GFR AFRICAN AMERICAN: 46
GFR AFRICAN AMERICAN: 46
GFR AFRICAN AMERICAN: 56
GFR NON-AFRICAN AMERICAN: 46 ML/MIN/1.73
GFR NON-AFRICAN AMERICAN: 46 ML/MIN/1.73
GFR NON-AFRICAN AMERICAN: 56 ML/MIN/1.73
GLUCOSE BLD-MCNC: 211 MG/DL (ref 74–99)
GLUCOSE BLD-MCNC: 285 MG/DL (ref 74–99)
GLUCOSE BLD-MCNC: 287 MG/DL (ref 74–99)
HCO3 ARTERIAL: 32.6 MMOL/L (ref 22–26)
HCT (EST): 45 % (ref 37–54)
HGB, (EST): 15.2 G/DL (ref 12.5–15.5)
HIV-1 AND HIV-2 ANTIBODIES: NORMAL
METER GLUCOSE: 209 MG/DL (ref 74–99)
METER GLUCOSE: 231 MG/DL (ref 74–99)
METER GLUCOSE: 293 MG/DL (ref 74–99)
METER GLUCOSE: 299 MG/DL (ref 74–99)
O2 SATURATION: 60.2 % (ref 92–98.5)
OPERATOR ID: 1868
PATIENT TEMP: 37
PCO2 (TEMP CORRECTED): 44.6 MMHG (ref 35–45)
PH (TEMPERATURE CORRECTED): 7.47 (ref 7.35–7.45)
PO2 (TEMP CORRECTED): 29.6 MMHG (ref 60–80)
POTASSIUM REFLEX MAGNESIUM: 3.7 MMOL/L (ref 3.5–5)
POTASSIUM REFLEX MAGNESIUM: 4.7 MMOL/L (ref 3.5–5)
POTASSIUM SERPL-SCNC: 4.7 MMOL/L (ref 3.5–5)
SODIUM BLD-SCNC: 132 MMOL/L (ref 132–146)
SODIUM BLD-SCNC: 133 MMOL/L (ref 132–146)
SODIUM BLD-SCNC: 134 MMOL/L (ref 132–146)
SOURCE, BLOOD GAS: ABNORMAL

## 2019-11-06 PROCEDURE — 2700000000 HC OXYGEN THERAPY PER DAY

## 2019-11-06 PROCEDURE — 2140000000 HC CCU INTERMEDIATE R&B

## 2019-11-06 PROCEDURE — 94660 CPAP INITIATION&MGMT: CPT

## 2019-11-06 PROCEDURE — 6370000000 HC RX 637 (ALT 250 FOR IP): Performed by: INTERNAL MEDICINE

## 2019-11-06 PROCEDURE — 6360000002 HC RX W HCPCS: Performed by: INTERNAL MEDICINE

## 2019-11-06 PROCEDURE — 82962 GLUCOSE BLOOD TEST: CPT

## 2019-11-06 PROCEDURE — 99291 CRITICAL CARE FIRST HOUR: CPT | Performed by: INTERNAL MEDICINE

## 2019-11-06 PROCEDURE — 2500000003 HC RX 250 WO HCPCS: Performed by: INTERNAL MEDICINE

## 2019-11-06 PROCEDURE — 36415 COLL VENOUS BLD VENIPUNCTURE: CPT

## 2019-11-06 PROCEDURE — 2580000003 HC RX 258: Performed by: EMERGENCY MEDICINE

## 2019-11-06 PROCEDURE — 82803 BLOOD GASES ANY COMBINATION: CPT

## 2019-11-06 PROCEDURE — 80048 BASIC METABOLIC PNL TOTAL CA: CPT

## 2019-11-06 PROCEDURE — 2580000003 HC RX 258: Performed by: INTERNAL MEDICINE

## 2019-11-06 RX ORDER — MAGNESIUM SULFATE 1 G/100ML
1 INJECTION INTRAVENOUS ONCE
Status: COMPLETED | OUTPATIENT
Start: 2019-11-06 | End: 2019-11-06

## 2019-11-06 RX ORDER — METOPROLOL SUCCINATE 25 MG/1
25 TABLET, EXTENDED RELEASE ORAL 2 TIMES DAILY
Status: DISCONTINUED | OUTPATIENT
Start: 2019-11-06 | End: 2019-11-07

## 2019-11-06 RX ORDER — INSULIN GLARGINE 100 [IU]/ML
40 INJECTION, SOLUTION SUBCUTANEOUS NIGHTLY
Status: DISCONTINUED | OUTPATIENT
Start: 2019-11-06 | End: 2019-11-19 | Stop reason: HOSPADM

## 2019-11-06 RX ADMIN — METOLAZONE 5 MG: 5 TABLET ORAL at 08:16

## 2019-11-06 RX ADMIN — HYDRALAZINE HYDROCHLORIDE 100 MG: 50 TABLET, FILM COATED ORAL at 21:04

## 2019-11-06 RX ADMIN — BUMETANIDE 2 MG: 0.25 INJECTION INTRAMUSCULAR; INTRAVENOUS at 21:03

## 2019-11-06 RX ADMIN — HYDRALAZINE HYDROCHLORIDE 100 MG: 50 TABLET, FILM COATED ORAL at 14:26

## 2019-11-06 RX ADMIN — ISOSORBIDE DINITRATE 10 MG: 10 TABLET ORAL at 21:04

## 2019-11-06 RX ADMIN — INSULIN LISPRO 3 UNITS: 100 INJECTION, SOLUTION INTRAVENOUS; SUBCUTANEOUS at 21:00

## 2019-11-06 RX ADMIN — Medication 10 ML: at 21:01

## 2019-11-06 RX ADMIN — AMIODARONE HYDROCHLORIDE 150 MG: 50 INJECTION, SOLUTION INTRAVENOUS at 18:06

## 2019-11-06 RX ADMIN — INSULIN LISPRO 4 UNITS: 100 INJECTION, SOLUTION INTRAVENOUS; SUBCUTANEOUS at 11:21

## 2019-11-06 RX ADMIN — HYDRALAZINE HYDROCHLORIDE 100 MG: 50 TABLET, FILM COATED ORAL at 04:48

## 2019-11-06 RX ADMIN — MORPHINE SULFATE 1 MG: 2 INJECTION, SOLUTION INTRAMUSCULAR; INTRAVENOUS at 18:32

## 2019-11-06 RX ADMIN — INSULIN LISPRO 4 UNITS: 100 INJECTION, SOLUTION INTRAVENOUS; SUBCUTANEOUS at 08:18

## 2019-11-06 RX ADMIN — METOPROLOL SUCCINATE 12.5 MG: 25 TABLET, EXTENDED RELEASE ORAL at 08:38

## 2019-11-06 RX ADMIN — MAGNESIUM SULFATE 1 G: 1 INJECTION INTRAVENOUS at 17:44

## 2019-11-06 RX ADMIN — INSULIN GLARGINE 40 UNITS: 100 INJECTION, SOLUTION SUBCUTANEOUS at 21:00

## 2019-11-06 RX ADMIN — BUMETANIDE 2 MG: 0.25 INJECTION INTRAMUSCULAR; INTRAVENOUS at 08:38

## 2019-11-06 RX ADMIN — MORPHINE SULFATE 1 MG: 2 INJECTION, SOLUTION INTRAMUSCULAR; INTRAVENOUS at 23:55

## 2019-11-06 RX ADMIN — SPIRONOLACTONE 50 MG: 25 TABLET ORAL at 08:39

## 2019-11-06 RX ADMIN — BENZONATATE 100 MG: 100 CAPSULE ORAL at 21:04

## 2019-11-06 RX ADMIN — INSULIN LISPRO 9 UNITS: 100 INJECTION, SOLUTION INTRAVENOUS; SUBCUTANEOUS at 16:06

## 2019-11-06 RX ADMIN — POTASSIUM CHLORIDE 40 MEQ: 20 TABLET, EXTENDED RELEASE ORAL at 11:21

## 2019-11-06 RX ADMIN — POTASSIUM CHLORIDE 40 MEQ: 20 TABLET, EXTENDED RELEASE ORAL at 16:06

## 2019-11-06 RX ADMIN — ISOSORBIDE DINITRATE 10 MG: 10 TABLET ORAL at 15:30

## 2019-11-06 RX ADMIN — POTASSIUM CHLORIDE 40 MEQ: 20 TABLET, EXTENDED RELEASE ORAL at 08:15

## 2019-11-06 RX ADMIN — ISOSORBIDE DINITRATE 10 MG: 10 TABLET ORAL at 08:16

## 2019-11-06 RX ADMIN — METOPROLOL SUCCINATE 25 MG: 25 TABLET, EXTENDED RELEASE ORAL at 17:43

## 2019-11-06 ASSESSMENT — PAIN SCALES - GENERAL
PAINLEVEL_OUTOF10: 0
PAINLEVEL_OUTOF10: 7
PAINLEVEL_OUTOF10: 0
PAINLEVEL_OUTOF10: 8

## 2019-11-06 ASSESSMENT — PAIN DESCRIPTION - PROGRESSION: CLINICAL_PROGRESSION: NOT CHANGED

## 2019-11-06 ASSESSMENT — PAIN DESCRIPTION - PAIN TYPE: TYPE: ACUTE PAIN

## 2019-11-06 ASSESSMENT — PAIN DESCRIPTION - LOCATION: LOCATION: HIP

## 2019-11-06 ASSESSMENT — PAIN DESCRIPTION - ONSET: ONSET: ON-GOING

## 2019-11-06 ASSESSMENT — PAIN DESCRIPTION - DESCRIPTORS: DESCRIPTORS: ACHING;DISCOMFORT;SHARP

## 2019-11-06 ASSESSMENT — PAIN DESCRIPTION - FREQUENCY: FREQUENCY: INTERMITTENT

## 2019-11-06 ASSESSMENT — PAIN DESCRIPTION - ORIENTATION: ORIENTATION: RIGHT

## 2019-11-06 ASSESSMENT — PAIN - FUNCTIONAL ASSESSMENT: PAIN_FUNCTIONAL_ASSESSMENT: PREVENTS OR INTERFERES SOME ACTIVE ACTIVITIES AND ADLS

## 2019-11-07 LAB
ANION GAP SERPL CALCULATED.3IONS-SCNC: 18 MMOL/L (ref 7–16)
ANION GAP SERPL CALCULATED.3IONS-SCNC: 8 MMOL/L (ref 7–16)
ANION GAP SERPL CALCULATED.3IONS-SCNC: 9 MMOL/L (ref 7–16)
BUN BLDV-MCNC: 61 MG/DL (ref 8–23)
BUN BLDV-MCNC: 61 MG/DL (ref 8–23)
BUN BLDV-MCNC: 74 MG/DL (ref 8–23)
CALCIUM SERPL-MCNC: 10 MG/DL (ref 8.6–10.2)
CALCIUM SERPL-MCNC: 10 MG/DL (ref 8.6–10.2)
CALCIUM SERPL-MCNC: 9.5 MG/DL (ref 8.6–10.2)
CHLORIDE BLD-SCNC: 89 MMOL/L (ref 98–107)
CHLORIDE BLD-SCNC: 90 MMOL/L (ref 98–107)
CHLORIDE BLD-SCNC: 90 MMOL/L (ref 98–107)
CO2: 25 MMOL/L (ref 22–29)
CO2: 33 MMOL/L (ref 22–29)
CO2: 34 MMOL/L (ref 22–29)
CREAT SERPL-MCNC: 1.6 MG/DL (ref 0.7–1.2)
CREAT SERPL-MCNC: 1.6 MG/DL (ref 0.7–1.2)
CREAT SERPL-MCNC: 1.7 MG/DL (ref 0.7–1.2)
GFR AFRICAN AMERICAN: 49
GFR AFRICAN AMERICAN: 52
GFR AFRICAN AMERICAN: 52
GFR NON-AFRICAN AMERICAN: 49 ML/MIN/1.73
GFR NON-AFRICAN AMERICAN: 52 ML/MIN/1.73
GFR NON-AFRICAN AMERICAN: 52 ML/MIN/1.73
GLUCOSE BLD-MCNC: 216 MG/DL (ref 74–99)
GLUCOSE BLD-MCNC: 237 MG/DL (ref 74–99)
GLUCOSE BLD-MCNC: 238 MG/DL (ref 74–99)
METER GLUCOSE: 214 MG/DL (ref 74–99)
METER GLUCOSE: 229 MG/DL (ref 74–99)
METER GLUCOSE: 301 MG/DL (ref 74–99)
POTASSIUM REFLEX MAGNESIUM: 3.8 MMOL/L (ref 3.5–5)
POTASSIUM REFLEX MAGNESIUM: 4.1 MMOL/L (ref 3.5–5)
POTASSIUM SERPL-SCNC: 4 MMOL/L (ref 3.5–5)
SODIUM BLD-SCNC: 132 MMOL/L (ref 132–146)

## 2019-11-07 PROCEDURE — 6360000004 HC RX CONTRAST MEDICATION: Performed by: INTERNAL MEDICINE

## 2019-11-07 PROCEDURE — 93306 TTE W/DOPPLER COMPLETE: CPT

## 2019-11-07 PROCEDURE — 80048 BASIC METABOLIC PNL TOTAL CA: CPT

## 2019-11-07 PROCEDURE — 6370000000 HC RX 637 (ALT 250 FOR IP): Performed by: INTERNAL MEDICINE

## 2019-11-07 PROCEDURE — 6360000002 HC RX W HCPCS: Performed by: INTERNAL MEDICINE

## 2019-11-07 PROCEDURE — 36415 COLL VENOUS BLD VENIPUNCTURE: CPT

## 2019-11-07 PROCEDURE — 82962 GLUCOSE BLOOD TEST: CPT

## 2019-11-07 PROCEDURE — 2580000003 HC RX 258: Performed by: EMERGENCY MEDICINE

## 2019-11-07 PROCEDURE — 2140000000 HC CCU INTERMEDIATE R&B

## 2019-11-07 PROCEDURE — 99233 SBSQ HOSP IP/OBS HIGH 50: CPT | Performed by: INTERNAL MEDICINE

## 2019-11-07 PROCEDURE — 2580000003 HC RX 258: Performed by: INTERNAL MEDICINE

## 2019-11-07 PROCEDURE — 94660 CPAP INITIATION&MGMT: CPT

## 2019-11-07 RX ORDER — METOPROLOL SUCCINATE 25 MG/1
25 TABLET, EXTENDED RELEASE ORAL 2 TIMES DAILY
Status: DISCONTINUED | OUTPATIENT
Start: 2019-11-07 | End: 2019-11-08

## 2019-11-07 RX ORDER — BUMETANIDE 1 MG/1
2 TABLET ORAL 2 TIMES DAILY
Status: DISCONTINUED | OUTPATIENT
Start: 2019-11-07 | End: 2019-11-08

## 2019-11-07 RX ORDER — MAGNESIUM SULFATE IN WATER 40 MG/ML
2 INJECTION, SOLUTION INTRAVENOUS ONCE
Status: COMPLETED | OUTPATIENT
Start: 2019-11-07 | End: 2019-11-07

## 2019-11-07 RX ORDER — INSULIN GLARGINE 100 [IU]/ML
10 INJECTION, SOLUTION SUBCUTANEOUS ONCE
Status: COMPLETED | OUTPATIENT
Start: 2019-11-07 | End: 2019-11-07

## 2019-11-07 RX ORDER — METOLAZONE 5 MG/1
5 TABLET ORAL DAILY
Status: DISCONTINUED | OUTPATIENT
Start: 2019-11-09 | End: 2019-11-12

## 2019-11-07 RX ORDER — 0.9 % SODIUM CHLORIDE 0.9 %
250 INTRAVENOUS SOLUTION INTRAVENOUS ONCE
Status: COMPLETED | OUTPATIENT
Start: 2019-11-07 | End: 2019-11-07

## 2019-11-07 RX ORDER — HYDRALAZINE HYDROCHLORIDE 50 MG/1
100 TABLET, FILM COATED ORAL 2 TIMES DAILY
Status: DISCONTINUED | OUTPATIENT
Start: 2019-11-07 | End: 2019-11-09

## 2019-11-07 RX ADMIN — HYDRALAZINE HYDROCHLORIDE 100 MG: 50 TABLET, FILM COATED ORAL at 22:01

## 2019-11-07 RX ADMIN — INSULIN LISPRO 12 UNITS: 100 INJECTION, SOLUTION INTRAVENOUS; SUBCUTANEOUS at 16:57

## 2019-11-07 RX ADMIN — INSULIN LISPRO 6 UNITS: 100 INJECTION, SOLUTION INTRAVENOUS; SUBCUTANEOUS at 06:21

## 2019-11-07 RX ADMIN — BUMETANIDE 2 MG: 1 TABLET ORAL at 18:14

## 2019-11-07 RX ADMIN — SODIUM CHLORIDE 250 ML: 9 INJECTION, SOLUTION INTRAVENOUS at 10:08

## 2019-11-07 RX ADMIN — MAGNESIUM SULFATE 2 G: 2 INJECTION INTRAVENOUS at 11:57

## 2019-11-07 RX ADMIN — POTASSIUM CHLORIDE 40 MEQ: 20 TABLET, EXTENDED RELEASE ORAL at 16:56

## 2019-11-07 RX ADMIN — SACUBITRIL AND VALSARTAN 1 TABLET: 49; 51 TABLET, FILM COATED ORAL at 15:54

## 2019-11-07 RX ADMIN — METOPROLOL SUCCINATE 25 MG: 25 TABLET, EXTENDED RELEASE ORAL at 22:02

## 2019-11-07 RX ADMIN — Medication 10 ML: at 13:59

## 2019-11-07 RX ADMIN — PERFLUTREN 1.65 MG: 6.52 INJECTION, SUSPENSION INTRAVENOUS at 11:57

## 2019-11-07 RX ADMIN — INSULIN GLARGINE 10 UNITS: 100 INJECTION, SOLUTION SUBCUTANEOUS at 22:04

## 2019-11-07 RX ADMIN — Medication 10 ML: at 22:02

## 2019-11-07 RX ADMIN — POTASSIUM CHLORIDE 40 MEQ: 20 TABLET, EXTENDED RELEASE ORAL at 11:57

## 2019-11-07 RX ADMIN — INSULIN LISPRO 2 UNITS: 100 INJECTION, SOLUTION INTRAVENOUS; SUBCUTANEOUS at 22:03

## 2019-11-07 ASSESSMENT — PAIN SCALES - GENERAL
PAINLEVEL_OUTOF10: 0

## 2019-11-08 ENCOUNTER — APPOINTMENT (OUTPATIENT)
Dept: CARDIAC CATH/INVASIVE PROCEDURES | Age: 69
DRG: 286 | End: 2019-11-08
Payer: COMMERCIAL

## 2019-11-08 LAB
ANION GAP SERPL CALCULATED.3IONS-SCNC: 19 MMOL/L (ref 7–16)
BUN BLDV-MCNC: 63 MG/DL (ref 8–23)
CALCIUM SERPL-MCNC: 9.5 MG/DL (ref 8.6–10.2)
CHLORIDE BLD-SCNC: 91 MMOL/L (ref 98–107)
CO2: 24 MMOL/L (ref 22–29)
CREAT SERPL-MCNC: 1.4 MG/DL (ref 0.7–1.2)
GFR AFRICAN AMERICAN: >60
GFR NON-AFRICAN AMERICAN: >60 ML/MIN/1.73
GLUCOSE BLD-MCNC: 188 MG/DL (ref 74–99)
INR BLD: 1.2
METER GLUCOSE: 176 MG/DL (ref 74–99)
METER GLUCOSE: 192 MG/DL (ref 74–99)
METER GLUCOSE: 244 MG/DL (ref 74–99)
POTASSIUM REFLEX MAGNESIUM: 3.7 MMOL/L (ref 3.5–5)
PROTHROMBIN TIME: 13.7 SEC (ref 9.3–12.4)
SODIUM BLD-SCNC: 134 MMOL/L (ref 132–146)

## 2019-11-08 PROCEDURE — B2151ZZ FLUOROSCOPY OF LEFT HEART USING LOW OSMOLAR CONTRAST: ICD-10-PCS | Performed by: INTERNAL MEDICINE

## 2019-11-08 PROCEDURE — 2500000003 HC RX 250 WO HCPCS

## 2019-11-08 PROCEDURE — C1887 CATHETER, GUIDING: HCPCS

## 2019-11-08 PROCEDURE — 94660 CPAP INITIATION&MGMT: CPT

## 2019-11-08 PROCEDURE — 2580000003 HC RX 258: Performed by: INTERNAL MEDICINE

## 2019-11-08 PROCEDURE — 6370000000 HC RX 637 (ALT 250 FOR IP): Performed by: INTERNAL MEDICINE

## 2019-11-08 PROCEDURE — C1894 INTRO/SHEATH, NON-LASER: HCPCS

## 2019-11-08 PROCEDURE — B2111ZZ FLUOROSCOPY OF MULTIPLE CORONARY ARTERIES USING LOW OSMOLAR CONTRAST: ICD-10-PCS | Performed by: INTERNAL MEDICINE

## 2019-11-08 PROCEDURE — 82962 GLUCOSE BLOOD TEST: CPT

## 2019-11-08 PROCEDURE — 4A023N7 MEASUREMENT OF CARDIAC SAMPLING AND PRESSURE, LEFT HEART, PERCUTANEOUS APPROACH: ICD-10-PCS | Performed by: INTERNAL MEDICINE

## 2019-11-08 PROCEDURE — 80048 BASIC METABOLIC PNL TOTAL CA: CPT

## 2019-11-08 PROCEDURE — 36415 COLL VENOUS BLD VENIPUNCTURE: CPT

## 2019-11-08 PROCEDURE — 6360000002 HC RX W HCPCS

## 2019-11-08 PROCEDURE — 93458 L HRT ARTERY/VENTRICLE ANGIO: CPT | Performed by: INTERNAL MEDICINE

## 2019-11-08 PROCEDURE — 99233 SBSQ HOSP IP/OBS HIGH 50: CPT | Performed by: INTERNAL MEDICINE

## 2019-11-08 PROCEDURE — 85610 PROTHROMBIN TIME: CPT

## 2019-11-08 PROCEDURE — C1769 GUIDE WIRE: HCPCS

## 2019-11-08 PROCEDURE — 2140000000 HC CCU INTERMEDIATE R&B

## 2019-11-08 PROCEDURE — 2709999900 HC NON-CHARGEABLE SUPPLY

## 2019-11-08 RX ORDER — METOPROLOL SUCCINATE 50 MG/1
50 TABLET, EXTENDED RELEASE ORAL 2 TIMES DAILY
Status: DISCONTINUED | OUTPATIENT
Start: 2019-11-08 | End: 2019-11-09

## 2019-11-08 RX ORDER — BUMETANIDE 1 MG/1
2 TABLET ORAL 2 TIMES DAILY
Status: DISCONTINUED | OUTPATIENT
Start: 2019-11-09 | End: 2019-11-09

## 2019-11-08 RX ORDER — SODIUM CHLORIDE 9 MG/ML
INJECTION, SOLUTION INTRAVENOUS ONCE
Status: COMPLETED | OUTPATIENT
Start: 2019-11-08 | End: 2019-11-08

## 2019-11-08 RX ORDER — ACETAMINOPHEN 325 MG/1
650 TABLET ORAL EVERY 4 HOURS PRN
Status: DISCONTINUED | OUTPATIENT
Start: 2019-11-08 | End: 2019-11-19 | Stop reason: HOSPADM

## 2019-11-08 RX ORDER — ASPIRIN 325 MG
325 TABLET ORAL ONCE
Status: COMPLETED | OUTPATIENT
Start: 2019-11-08 | End: 2019-11-08

## 2019-11-08 RX ADMIN — METOPROLOL SUCCINATE 50 MG: 50 TABLET, EXTENDED RELEASE ORAL at 20:59

## 2019-11-08 RX ADMIN — POTASSIUM CHLORIDE 40 MEQ: 20 TABLET, EXTENDED RELEASE ORAL at 13:24

## 2019-11-08 RX ADMIN — POTASSIUM CHLORIDE 40 MEQ: 20 TABLET, EXTENDED RELEASE ORAL at 16:49

## 2019-11-08 RX ADMIN — SPIRONOLACTONE 50 MG: 25 TABLET ORAL at 13:24

## 2019-11-08 RX ADMIN — SODIUM CHLORIDE: 9 INJECTION, SOLUTION INTRAVENOUS at 08:57

## 2019-11-08 RX ADMIN — HYDRALAZINE HYDROCHLORIDE 100 MG: 50 TABLET, FILM COATED ORAL at 20:59

## 2019-11-08 RX ADMIN — INSULIN GLARGINE 40 UNITS: 100 INJECTION, SOLUTION SUBCUTANEOUS at 21:11

## 2019-11-08 RX ADMIN — ASPIRIN 325 MG: 325 TABLET, FILM COATED ORAL at 08:57

## 2019-11-08 RX ADMIN — SACUBITRIL AND VALSARTAN 1 TABLET: 49; 51 TABLET, FILM COATED ORAL at 16:49

## 2019-11-08 RX ADMIN — INSULIN LISPRO 6 UNITS: 100 INJECTION, SOLUTION INTRAVENOUS; SUBCUTANEOUS at 16:51

## 2019-11-08 RX ADMIN — INSULIN LISPRO 1 UNITS: 100 INJECTION, SOLUTION INTRAVENOUS; SUBCUTANEOUS at 21:11

## 2019-11-08 RX ADMIN — SODIUM CHLORIDE: 9 INJECTION, SOLUTION INTRAVENOUS at 08:54

## 2019-11-08 ASSESSMENT — PAIN SCALES - GENERAL: PAINLEVEL_OUTOF10: 0

## 2019-11-09 ENCOUNTER — APPOINTMENT (OUTPATIENT)
Dept: GENERAL RADIOLOGY | Age: 69
DRG: 286 | End: 2019-11-09
Payer: COMMERCIAL

## 2019-11-09 LAB
ALBUMIN SERPL-MCNC: 3.1 G/DL (ref 3.5–5.2)
ALBUMIN SERPL-MCNC: 3.5 G/DL (ref 3.5–5.2)
ALP BLD-CCNC: 109 U/L (ref 40–129)
ALP BLD-CCNC: 139 U/L (ref 40–129)
ALT SERPL-CCNC: 39 U/L (ref 0–40)
ALT SERPL-CCNC: 48 U/L (ref 0–40)
ANION GAP SERPL CALCULATED.3IONS-SCNC: 14 MMOL/L (ref 7–16)
ANION GAP SERPL CALCULATED.3IONS-SCNC: 14 MMOL/L (ref 7–16)
ANION GAP SERPL CALCULATED.3IONS-SCNC: 17 MMOL/L (ref 7–16)
AST SERPL-CCNC: 25 U/L (ref 0–39)
AST SERPL-CCNC: 34 U/L (ref 0–39)
BASOPHILS ABSOLUTE: 0.03 E9/L (ref 0–0.2)
BASOPHILS RELATIVE PERCENT: 0.4 % (ref 0–2)
BILIRUB SERPL-MCNC: 1.3 MG/DL (ref 0–1.2)
BILIRUB SERPL-MCNC: 1.5 MG/DL (ref 0–1.2)
BILIRUBIN DIRECT: 0.4 MG/DL (ref 0–0.3)
BILIRUBIN, INDIRECT: 1.1 MG/DL (ref 0–1)
BUN BLDV-MCNC: 59 MG/DL (ref 8–23)
BUN BLDV-MCNC: 59 MG/DL (ref 8–23)
BUN BLDV-MCNC: 61 MG/DL (ref 8–23)
CALCIUM SERPL-MCNC: 8.8 MG/DL (ref 8.6–10.2)
CALCIUM SERPL-MCNC: 9.4 MG/DL (ref 8.6–10.2)
CALCIUM SERPL-MCNC: 9.5 MG/DL (ref 8.6–10.2)
CHLORIDE BLD-SCNC: 87 MMOL/L (ref 98–107)
CHLORIDE BLD-SCNC: 90 MMOL/L (ref 98–107)
CHLORIDE BLD-SCNC: 93 MMOL/L (ref 98–107)
CO2: 22 MMOL/L (ref 22–29)
CO2: 24 MMOL/L (ref 22–29)
CO2: 26 MMOL/L (ref 22–29)
CREAT SERPL-MCNC: 1.4 MG/DL (ref 0.7–1.2)
CREAT SERPL-MCNC: 1.5 MG/DL (ref 0.7–1.2)
CREAT SERPL-MCNC: 1.7 MG/DL (ref 0.7–1.2)
EOSINOPHILS ABSOLUTE: 0.19 E9/L (ref 0.05–0.5)
EOSINOPHILS RELATIVE PERCENT: 2.4 % (ref 0–6)
GFR AFRICAN AMERICAN: 49
GFR AFRICAN AMERICAN: 56
GFR AFRICAN AMERICAN: >60
GFR NON-AFRICAN AMERICAN: 49 ML/MIN/1.73
GFR NON-AFRICAN AMERICAN: 56 ML/MIN/1.73
GFR NON-AFRICAN AMERICAN: >60 ML/MIN/1.73
GLUCOSE BLD-MCNC: 155 MG/DL (ref 74–99)
GLUCOSE BLD-MCNC: 236 MG/DL (ref 74–99)
GLUCOSE BLD-MCNC: 237 MG/DL (ref 74–99)
HCT VFR BLD CALC: 39.2 % (ref 37–54)
HEMOGLOBIN: 12.6 G/DL (ref 12.5–16.5)
IMMATURE GRANULOCYTES #: 0.04 E9/L
IMMATURE GRANULOCYTES %: 0.5 % (ref 0–5)
LYMPHOCYTES ABSOLUTE: 1.43 E9/L (ref 1.5–4)
LYMPHOCYTES RELATIVE PERCENT: 18 % (ref 20–42)
MAGNESIUM: 1.9 MG/DL (ref 1.6–2.6)
MCH RBC QN AUTO: 29.4 PG (ref 26–35)
MCHC RBC AUTO-ENTMCNC: 32.1 % (ref 32–34.5)
MCV RBC AUTO: 91.6 FL (ref 80–99.9)
METER GLUCOSE: 141 MG/DL (ref 74–99)
METER GLUCOSE: 149 MG/DL (ref 74–99)
METER GLUCOSE: 208 MG/DL (ref 74–99)
METER GLUCOSE: 225 MG/DL (ref 74–99)
METER GLUCOSE: 263 MG/DL (ref 74–99)
MONOCYTES ABSOLUTE: 0.92 E9/L (ref 0.1–0.95)
MONOCYTES RELATIVE PERCENT: 11.6 % (ref 2–12)
NEUTROPHILS ABSOLUTE: 5.32 E9/L (ref 1.8–7.3)
NEUTROPHILS RELATIVE PERCENT: 67.1 % (ref 43–80)
PDW BLD-RTO: 16.2 FL (ref 11.5–15)
PLATELET # BLD: 144 E9/L (ref 130–450)
PMV BLD AUTO: 11.8 FL (ref 7–12)
POTASSIUM REFLEX MAGNESIUM: 4.6 MMOL/L (ref 3.5–5)
POTASSIUM REFLEX MAGNESIUM: 4.6 MMOL/L (ref 3.5–5)
POTASSIUM SERPL-SCNC: 4.6 MMOL/L (ref 3.5–5)
RBC # BLD: 4.28 E12/L (ref 3.8–5.8)
SODIUM BLD-SCNC: 127 MMOL/L (ref 132–146)
SODIUM BLD-SCNC: 129 MMOL/L (ref 132–146)
SODIUM BLD-SCNC: 131 MMOL/L (ref 132–146)
TOTAL PROTEIN: 7.1 G/DL (ref 6.4–8.3)
TOTAL PROTEIN: 8.2 G/DL (ref 6.4–8.3)
TSH SERPL DL<=0.05 MIU/L-ACNC: 3.76 UIU/ML (ref 0.27–4.2)
WBC # BLD: 7.9 E9/L (ref 4.5–11.5)

## 2019-11-09 PROCEDURE — 83735 ASSAY OF MAGNESIUM: CPT

## 2019-11-09 PROCEDURE — 02HV33Z INSERTION OF INFUSION DEVICE INTO SUPERIOR VENA CAVA, PERCUTANEOUS APPROACH: ICD-10-PCS | Performed by: INTERNAL MEDICINE

## 2019-11-09 PROCEDURE — 6370000000 HC RX 637 (ALT 250 FOR IP): Performed by: INTERNAL MEDICINE

## 2019-11-09 PROCEDURE — 93005 ELECTROCARDIOGRAM TRACING: CPT | Performed by: INTERNAL MEDICINE

## 2019-11-09 PROCEDURE — 36415 COLL VENOUS BLD VENIPUNCTURE: CPT

## 2019-11-09 PROCEDURE — 99233 SBSQ HOSP IP/OBS HIGH 50: CPT | Performed by: INTERNAL MEDICINE

## 2019-11-09 PROCEDURE — 94660 CPAP INITIATION&MGMT: CPT

## 2019-11-09 PROCEDURE — 80053 COMPREHEN METABOLIC PANEL: CPT

## 2019-11-09 PROCEDURE — 85025 COMPLETE CBC W/AUTO DIFF WBC: CPT

## 2019-11-09 PROCEDURE — 93005 ELECTROCARDIOGRAM TRACING: CPT | Performed by: NURSE PRACTITIONER

## 2019-11-09 PROCEDURE — 2700000000 HC OXYGEN THERAPY PER DAY

## 2019-11-09 PROCEDURE — 6360000002 HC RX W HCPCS: Performed by: INTERNAL MEDICINE

## 2019-11-09 PROCEDURE — 36620 INSERTION CATHETER ARTERY: CPT | Performed by: INTERNAL MEDICINE

## 2019-11-09 PROCEDURE — 80048 BASIC METABOLIC PNL TOTAL CA: CPT

## 2019-11-09 PROCEDURE — 99223 1ST HOSP IP/OBS HIGH 75: CPT | Performed by: INTERNAL MEDICINE

## 2019-11-09 PROCEDURE — 99291 CRITICAL CARE FIRST HOUR: CPT | Performed by: INTERNAL MEDICINE

## 2019-11-09 PROCEDURE — 6370000000 HC RX 637 (ALT 250 FOR IP): Performed by: NURSE PRACTITIONER

## 2019-11-09 PROCEDURE — 2500000003 HC RX 250 WO HCPCS

## 2019-11-09 PROCEDURE — 71045 X-RAY EXAM CHEST 1 VIEW: CPT

## 2019-11-09 PROCEDURE — 84443 ASSAY THYROID STIM HORMONE: CPT

## 2019-11-09 PROCEDURE — 6360000002 HC RX W HCPCS: Performed by: NURSE PRACTITIONER

## 2019-11-09 PROCEDURE — 82962 GLUCOSE BLOOD TEST: CPT

## 2019-11-09 PROCEDURE — 2000000000 HC ICU R&B

## 2019-11-09 PROCEDURE — 2580000003 HC RX 258: Performed by: INTERNAL MEDICINE

## 2019-11-09 PROCEDURE — 93284 PRGRMG EVAL IMPLANTABLE DFB: CPT | Performed by: INTERNAL MEDICINE

## 2019-11-09 PROCEDURE — 80076 HEPATIC FUNCTION PANEL: CPT

## 2019-11-09 RX ORDER — LIDOCAINE HYDROCHLORIDE 10 MG/ML
INJECTION, SOLUTION INFILTRATION; PERINEURAL
Status: COMPLETED
Start: 2019-11-09 | End: 2019-11-09

## 2019-11-09 RX ORDER — MILRINONE LACTATE 0.2 MG/ML
0.2 INJECTION, SOLUTION INTRAVENOUS CONTINUOUS
Status: DISCONTINUED | OUTPATIENT
Start: 2019-11-09 | End: 2019-11-09

## 2019-11-09 RX ORDER — MAGNESIUM SULFATE IN WATER 40 MG/ML
2 INJECTION, SOLUTION INTRAVENOUS ONCE
Status: COMPLETED | OUTPATIENT
Start: 2019-11-09 | End: 2019-11-09

## 2019-11-09 RX ORDER — DOBUTAMINE HYDROCHLORIDE 400 MG/100ML
2.5 INJECTION INTRAVENOUS CONTINUOUS
Status: DISCONTINUED | OUTPATIENT
Start: 2019-11-09 | End: 2019-11-09

## 2019-11-09 RX ORDER — DOBUTAMINE HYDROCHLORIDE 400 MG/100ML
2.5 INJECTION INTRAVENOUS CONTINUOUS
Status: DISCONTINUED | OUTPATIENT
Start: 2019-11-09 | End: 2019-11-14

## 2019-11-09 RX ORDER — AMIODARONE HYDROCHLORIDE 200 MG/1
200 TABLET ORAL 2 TIMES DAILY
Status: DISCONTINUED | OUTPATIENT
Start: 2019-11-16 | End: 2019-11-19 | Stop reason: HOSPADM

## 2019-11-09 RX ORDER — AMIODARONE HYDROCHLORIDE 200 MG/1
200 TABLET ORAL DAILY
Status: DISCONTINUED | OUTPATIENT
Start: 2019-11-23 | End: 2019-11-19 | Stop reason: HOSPADM

## 2019-11-09 RX ORDER — 0.9 % SODIUM CHLORIDE 0.9 %
500 INTRAVENOUS SOLUTION INTRAVENOUS ONCE
Status: COMPLETED | OUTPATIENT
Start: 2019-11-09 | End: 2019-11-09

## 2019-11-09 RX ORDER — AMIODARONE HYDROCHLORIDE 200 MG/1
400 TABLET ORAL 2 TIMES DAILY
Status: COMPLETED | OUTPATIENT
Start: 2019-11-09 | End: 2019-11-15

## 2019-11-09 RX ADMIN — INSULIN LISPRO 3 UNITS: 100 INJECTION, SOLUTION INTRAVENOUS; SUBCUTANEOUS at 17:21

## 2019-11-09 RX ADMIN — INSULIN GLARGINE 40 UNITS: 100 INJECTION, SOLUTION SUBCUTANEOUS at 20:46

## 2019-11-09 RX ADMIN — METOLAZONE 5 MG: 5 TABLET ORAL at 09:25

## 2019-11-09 RX ADMIN — LIDOCAINE HYDROCHLORIDE 200 MG: 10 INJECTION, SOLUTION INFILTRATION; PERINEURAL at 13:57

## 2019-11-09 RX ADMIN — INSULIN LISPRO 3 UNITS: 100 INJECTION, SOLUTION INTRAVENOUS; SUBCUTANEOUS at 20:46

## 2019-11-09 RX ADMIN — AMIODARONE HYDROCHLORIDE 400 MG: 200 TABLET ORAL at 15:24

## 2019-11-09 RX ADMIN — AMIODARONE HYDROCHLORIDE 400 MG: 200 TABLET ORAL at 20:46

## 2019-11-09 RX ADMIN — MAGNESIUM SULFATE 2 G: 2 INJECTION INTRAVENOUS at 13:44

## 2019-11-09 RX ADMIN — POTASSIUM CHLORIDE 40 MEQ: 20 TABLET, EXTENDED RELEASE ORAL at 09:25

## 2019-11-09 RX ADMIN — BUMETANIDE 2 MG: 1 TABLET ORAL at 09:24

## 2019-11-09 RX ADMIN — DOBUTAMINE IN DEXTROSE 2.5 MCG/KG/MIN: 400 INJECTION, SOLUTION INTRAVENOUS at 23:36

## 2019-11-09 RX ADMIN — POTASSIUM CHLORIDE 40 MEQ: 20 TABLET, EXTENDED RELEASE ORAL at 15:24

## 2019-11-09 RX ADMIN — SPIRONOLACTONE 50 MG: 25 TABLET ORAL at 09:24

## 2019-11-09 RX ADMIN — INSULIN LISPRO 6 UNITS: 100 INJECTION, SOLUTION INTRAVENOUS; SUBCUTANEOUS at 09:25

## 2019-11-09 RX ADMIN — MILRINONE LACTATE IN DEXTROSE 0.2 MCG/KG/MIN: 200 INJECTION, SOLUTION INTRAVENOUS at 23:25

## 2019-11-09 RX ADMIN — APIXABAN 5 MG: 5 TABLET, FILM COATED ORAL at 15:24

## 2019-11-09 RX ADMIN — HYDRALAZINE HYDROCHLORIDE 100 MG: 50 TABLET, FILM COATED ORAL at 09:24

## 2019-11-09 RX ADMIN — APIXABAN 5 MG: 5 TABLET, FILM COATED ORAL at 20:45

## 2019-11-09 RX ADMIN — METOPROLOL SUCCINATE 50 MG: 50 TABLET, EXTENDED RELEASE ORAL at 07:48

## 2019-11-09 RX ADMIN — SODIUM CHLORIDE 500 ML: 9 INJECTION, SOLUTION INTRAVENOUS at 10:42

## 2019-11-09 RX ADMIN — SACUBITRIL AND VALSARTAN 1 TABLET: 49; 51 TABLET, FILM COATED ORAL at 18:35

## 2019-11-09 ASSESSMENT — PAIN SCALES - GENERAL
PAINLEVEL_OUTOF10: 0

## 2019-11-10 ENCOUNTER — APPOINTMENT (OUTPATIENT)
Dept: GENERAL RADIOLOGY | Age: 69
DRG: 286 | End: 2019-11-10
Payer: COMMERCIAL

## 2019-11-10 LAB
ANION GAP SERPL CALCULATED.3IONS-SCNC: 14 MMOL/L (ref 7–16)
BASOPHILS ABSOLUTE: 0.02 E9/L (ref 0–0.2)
BASOPHILS RELATIVE PERCENT: 0.3 % (ref 0–2)
BUN BLDV-MCNC: 56 MG/DL (ref 8–23)
C-REACTIVE PROTEIN: 1.3 MG/DL (ref 0–0.4)
CALCIUM SERPL-MCNC: 8.5 MG/DL (ref 8.6–10.2)
CHLORIDE BLD-SCNC: 96 MMOL/L (ref 98–107)
CHLORIDE URINE RANDOM: 38 MMOL/L
CO2: 24 MMOL/L (ref 22–29)
CREAT SERPL-MCNC: 1.6 MG/DL (ref 0.7–1.2)
CREATININE URINE: 63 MG/DL (ref 40–278)
EKG ATRIAL RATE: 91 BPM
EKG Q-T INTERVAL: 416 MS
EKG QRS DURATION: 128 MS
EKG QTC CALCULATION (BAZETT): 495 MS
EKG R AXIS: -56 DEGREES
EKG T AXIS: 124 DEGREES
EKG VENTRICULAR RATE: 85 BPM
EOSINOPHILS ABSOLUTE: 0.21 E9/L (ref 0.05–0.5)
EOSINOPHILS RELATIVE PERCENT: 3.3 % (ref 0–6)
GFR AFRICAN AMERICAN: 52
GFR NON-AFRICAN AMERICAN: 52 ML/MIN/1.73
GLUCOSE BLD-MCNC: 111 MG/DL (ref 74–99)
HCT VFR BLD CALC: 37.2 % (ref 37–54)
HEMOGLOBIN: 12 G/DL (ref 12.5–16.5)
IMMATURE GRANULOCYTES #: 0.03 E9/L
IMMATURE GRANULOCYTES %: 0.5 % (ref 0–5)
LYMPHOCYTES ABSOLUTE: 1.75 E9/L (ref 1.5–4)
LYMPHOCYTES RELATIVE PERCENT: 27.3 % (ref 20–42)
MAGNESIUM: 2 MG/DL (ref 1.6–2.6)
MCH RBC QN AUTO: 29.4 PG (ref 26–35)
MCHC RBC AUTO-ENTMCNC: 32.3 % (ref 32–34.5)
MCV RBC AUTO: 91.2 FL (ref 80–99.9)
METER GLUCOSE: 160 MG/DL (ref 74–99)
METER GLUCOSE: 174 MG/DL (ref 74–99)
METER GLUCOSE: 178 MG/DL (ref 74–99)
METER GLUCOSE: 99 MG/DL (ref 74–99)
MONOCYTES ABSOLUTE: 0.81 E9/L (ref 0.1–0.95)
MONOCYTES RELATIVE PERCENT: 12.6 % (ref 2–12)
NEUTROPHILS ABSOLUTE: 3.6 E9/L (ref 1.8–7.3)
NEUTROPHILS RELATIVE PERCENT: 56 % (ref 43–80)
OSMOLALITY URINE: 465 MOSM/KG (ref 300–900)
PDW BLD-RTO: 16.1 FL (ref 11.5–15)
PLATELET # BLD: 124 E9/L (ref 130–450)
PMV BLD AUTO: 12.6 FL (ref 7–12)
POTASSIUM SERPL-SCNC: 4.3 MMOL/L (ref 3.5–5)
POTASSIUM, UR: 23.6 MMOL/L
PROCALCITONIN: 0.11 NG/ML (ref 0–0.08)
RBC # BLD: 4.08 E12/L (ref 3.8–5.8)
SEDIMENTATION RATE, ERYTHROCYTE: 50 MM/HR (ref 0–15)
SODIUM BLD-SCNC: 134 MMOL/L (ref 132–146)
SODIUM URINE: 71 MMOL/L
UREA NITROGEN, UR: 839 MG/DL (ref 800–1666)
WBC # BLD: 6.4 E9/L (ref 4.5–11.5)

## 2019-11-10 PROCEDURE — 6370000000 HC RX 637 (ALT 250 FOR IP): Performed by: INTERNAL MEDICINE

## 2019-11-10 PROCEDURE — 87040 BLOOD CULTURE FOR BACTERIA: CPT

## 2019-11-10 PROCEDURE — 84540 ASSAY OF URINE/UREA-N: CPT

## 2019-11-10 PROCEDURE — 94660 CPAP INITIATION&MGMT: CPT

## 2019-11-10 PROCEDURE — 36415 COLL VENOUS BLD VENIPUNCTURE: CPT

## 2019-11-10 PROCEDURE — 99291 CRITICAL CARE FIRST HOUR: CPT | Performed by: INTERNAL MEDICINE

## 2019-11-10 PROCEDURE — 93010 ELECTROCARDIOGRAM REPORT: CPT | Performed by: INTERNAL MEDICINE

## 2019-11-10 PROCEDURE — 83935 ASSAY OF URINE OSMOLALITY: CPT

## 2019-11-10 PROCEDURE — 86140 C-REACTIVE PROTEIN: CPT

## 2019-11-10 PROCEDURE — 87186 SC STD MICRODIL/AGAR DIL: CPT

## 2019-11-10 PROCEDURE — 71045 X-RAY EXAM CHEST 1 VIEW: CPT

## 2019-11-10 PROCEDURE — 83735 ASSAY OF MAGNESIUM: CPT

## 2019-11-10 PROCEDURE — 87077 CULTURE AEROBIC IDENTIFY: CPT

## 2019-11-10 PROCEDURE — 87088 URINE BACTERIA CULTURE: CPT

## 2019-11-10 PROCEDURE — 84145 PROCALCITONIN (PCT): CPT

## 2019-11-10 PROCEDURE — 6360000002 HC RX W HCPCS: Performed by: INTERNAL MEDICINE

## 2019-11-10 PROCEDURE — 2580000003 HC RX 258: Performed by: INTERNAL MEDICINE

## 2019-11-10 PROCEDURE — 82436 ASSAY OF URINE CHLORIDE: CPT

## 2019-11-10 PROCEDURE — 85651 RBC SED RATE NONAUTOMATED: CPT

## 2019-11-10 PROCEDURE — P9047 ALBUMIN (HUMAN), 25%, 50ML: HCPCS | Performed by: INTERNAL MEDICINE

## 2019-11-10 PROCEDURE — 82962 GLUCOSE BLOOD TEST: CPT

## 2019-11-10 PROCEDURE — 2700000000 HC OXYGEN THERAPY PER DAY

## 2019-11-10 PROCEDURE — 84133 ASSAY OF URINE POTASSIUM: CPT

## 2019-11-10 PROCEDURE — 84300 ASSAY OF URINE SODIUM: CPT

## 2019-11-10 PROCEDURE — 99233 SBSQ HOSP IP/OBS HIGH 50: CPT | Performed by: INTERNAL MEDICINE

## 2019-11-10 PROCEDURE — 2000000000 HC ICU R&B

## 2019-11-10 PROCEDURE — 80048 BASIC METABOLIC PNL TOTAL CA: CPT

## 2019-11-10 PROCEDURE — 82570 ASSAY OF URINE CREATININE: CPT

## 2019-11-10 PROCEDURE — 85025 COMPLETE CBC W/AUTO DIFF WBC: CPT

## 2019-11-10 PROCEDURE — 6370000000 HC RX 637 (ALT 250 FOR IP): Performed by: NURSE PRACTITIONER

## 2019-11-10 PROCEDURE — 99232 SBSQ HOSP IP/OBS MODERATE 35: CPT | Performed by: INTERNAL MEDICINE

## 2019-11-10 RX ORDER — SODIUM CHLORIDE 9 MG/ML
INJECTION, SOLUTION INTRAVENOUS CONTINUOUS
Status: DISCONTINUED | OUTPATIENT
Start: 2019-11-10 | End: 2019-11-12

## 2019-11-10 RX ORDER — 0.9 % SODIUM CHLORIDE 0.9 %
250 INTRAVENOUS SOLUTION INTRAVENOUS ONCE
Status: COMPLETED | OUTPATIENT
Start: 2019-11-10 | End: 2019-11-10

## 2019-11-10 RX ORDER — 0.9 % SODIUM CHLORIDE 0.9 %
150 INTRAVENOUS SOLUTION INTRAVENOUS ONCE
Status: COMPLETED | OUTPATIENT
Start: 2019-11-10 | End: 2019-11-10

## 2019-11-10 RX ORDER — ALBUMIN (HUMAN) 12.5 G/50ML
25 SOLUTION INTRAVENOUS ONCE
Status: COMPLETED | OUTPATIENT
Start: 2019-11-10 | End: 2019-11-10

## 2019-11-10 RX ORDER — 0.9 % SODIUM CHLORIDE 0.9 %
250 INTRAVENOUS SOLUTION INTRAVENOUS ONCE
Status: DISCONTINUED | OUTPATIENT
Start: 2019-11-10 | End: 2019-11-10

## 2019-11-10 RX ORDER — HYDRALAZINE HYDROCHLORIDE 50 MG/1
50 TABLET, FILM COATED ORAL 2 TIMES DAILY
Status: DISCONTINUED | OUTPATIENT
Start: 2019-11-10 | End: 2019-11-12

## 2019-11-10 RX ORDER — 0.9 % SODIUM CHLORIDE 0.9 %
500 INTRAVENOUS SOLUTION INTRAVENOUS ONCE
Status: COMPLETED | OUTPATIENT
Start: 2019-11-10 | End: 2019-11-10

## 2019-11-10 RX ADMIN — DOBUTAMINE IN DEXTROSE 11.5 MCG/KG/MIN: 400 INJECTION, SOLUTION INTRAVENOUS at 10:24

## 2019-11-10 RX ADMIN — INSULIN GLARGINE 40 UNITS: 100 INJECTION, SOLUTION SUBCUTANEOUS at 20:34

## 2019-11-10 RX ADMIN — ALBUMIN (HUMAN) 25 G: 0.25 INJECTION, SOLUTION INTRAVENOUS at 10:45

## 2019-11-10 RX ADMIN — SODIUM CHLORIDE 250 ML: 9 INJECTION, SOLUTION INTRAVENOUS at 03:50

## 2019-11-10 RX ADMIN — APIXABAN 5 MG: 5 TABLET, FILM COATED ORAL at 20:33

## 2019-11-10 RX ADMIN — POTASSIUM CHLORIDE 40 MEQ: 20 TABLET, EXTENDED RELEASE ORAL at 17:33

## 2019-11-10 RX ADMIN — POTASSIUM CHLORIDE 40 MEQ: 20 TABLET, EXTENDED RELEASE ORAL at 12:04

## 2019-11-10 RX ADMIN — SPIRONOLACTONE 50 MG: 25 TABLET ORAL at 08:59

## 2019-11-10 RX ADMIN — Medication 10 ML: at 20:34

## 2019-11-10 RX ADMIN — DOBUTAMINE IN DEXTROSE 11.5 MCG/KG/MIN: 400 INJECTION, SOLUTION INTRAVENOUS at 21:13

## 2019-11-10 RX ADMIN — APIXABAN 5 MG: 5 TABLET, FILM COATED ORAL at 08:52

## 2019-11-10 RX ADMIN — INSULIN LISPRO 1 UNITS: 100 INJECTION, SOLUTION INTRAVENOUS; SUBCUTANEOUS at 20:39

## 2019-11-10 RX ADMIN — INSULIN LISPRO 3 UNITS: 100 INJECTION, SOLUTION INTRAVENOUS; SUBCUTANEOUS at 17:01

## 2019-11-10 RX ADMIN — HYDRALAZINE HYDROCHLORIDE 75 MG: 50 TABLET, FILM COATED ORAL at 08:59

## 2019-11-10 RX ADMIN — AMIODARONE HYDROCHLORIDE 400 MG: 200 TABLET ORAL at 20:33

## 2019-11-10 RX ADMIN — INSULIN LISPRO 3 UNITS: 100 INJECTION, SOLUTION INTRAVENOUS; SUBCUTANEOUS at 12:20

## 2019-11-10 RX ADMIN — SODIUM CHLORIDE 150 ML: 9 INJECTION, SOLUTION INTRAVENOUS at 11:20

## 2019-11-10 RX ADMIN — AMIODARONE HYDROCHLORIDE 400 MG: 200 TABLET ORAL at 08:53

## 2019-11-10 RX ADMIN — SODIUM CHLORIDE 500 ML: 9 INJECTION, SOLUTION INTRAVENOUS at 10:46

## 2019-11-10 RX ADMIN — POTASSIUM CHLORIDE 40 MEQ: 20 TABLET, EXTENDED RELEASE ORAL at 08:52

## 2019-11-10 ASSESSMENT — PAIN SCALES - GENERAL
PAINLEVEL_OUTOF10: 0

## 2019-11-11 ENCOUNTER — APPOINTMENT (OUTPATIENT)
Dept: GENERAL RADIOLOGY | Age: 69
DRG: 286 | End: 2019-11-11
Payer: COMMERCIAL

## 2019-11-11 ENCOUNTER — TELEPHONE (OUTPATIENT)
Dept: CARDIOLOGY CLINIC | Age: 69
End: 2019-11-11

## 2019-11-11 LAB
ANION GAP SERPL CALCULATED.3IONS-SCNC: 13 MMOL/L (ref 7–16)
ANION GAP SERPL CALCULATED.3IONS-SCNC: 5 MMOL/L (ref 7–16)
ANION GAP SERPL CALCULATED.3IONS-SCNC: 7 MMOL/L (ref 7–16)
B.E.: -3.7 MMOL/L (ref -3–0)
BASOPHILS ABSOLUTE: 0.03 E9/L (ref 0–0.2)
BASOPHILS RELATIVE PERCENT: 0.5 % (ref 0–2)
BUN BLDV-MCNC: 36 MG/DL (ref 8–23)
BUN BLDV-MCNC: 37 MG/DL (ref 8–23)
BUN BLDV-MCNC: 39 MG/DL (ref 8–23)
CALCIUM SERPL-MCNC: 8.2 MG/DL (ref 8.6–10.2)
CALCIUM SERPL-MCNC: 8.6 MG/DL (ref 8.6–10.2)
CALCIUM SERPL-MCNC: 8.8 MG/DL (ref 8.6–10.2)
CHLORIDE BLD-SCNC: 96 MMOL/L (ref 98–107)
CHLORIDE BLD-SCNC: 97 MMOL/L (ref 98–107)
CHLORIDE BLD-SCNC: 99 MMOL/L (ref 98–107)
CO2: 20 MMOL/L (ref 22–29)
CO2: 22 MMOL/L (ref 22–29)
CO2: 25 MMOL/L (ref 22–29)
CREAT SERPL-MCNC: 1.3 MG/DL (ref 0.7–1.2)
CREAT SERPL-MCNC: 1.3 MG/DL (ref 0.7–1.2)
CREAT SERPL-MCNC: 1.4 MG/DL (ref 0.7–1.2)
DEVICE: ABNORMAL
EOSINOPHILS ABSOLUTE: 0.19 E9/L (ref 0.05–0.5)
EOSINOPHILS RELATIVE PERCENT: 2.9 % (ref 0–6)
GFR AFRICAN AMERICAN: >60
GFR NON-AFRICAN AMERICAN: >60 ML/MIN/1.73
GLUCOSE BLD-MCNC: 140 MG/DL (ref 74–99)
GLUCOSE BLD-MCNC: 160 MG/DL (ref 74–99)
GLUCOSE BLD-MCNC: 220 MG/DL (ref 74–99)
HCO3 ARTERIAL: 20.7 MMOL/L (ref 22–26)
HCT (EST): 40 % (ref 37–54)
HCT VFR BLD CALC: 36.5 % (ref 37–54)
HEMOGLOBIN: 11.7 G/DL (ref 12.5–16.5)
HGB, (EST): 13.6 G/DL (ref 12.5–15.5)
IMMATURE GRANULOCYTES #: 0.03 E9/L
IMMATURE GRANULOCYTES %: 0.5 % (ref 0–5)
LYMPHOCYTES ABSOLUTE: 1.19 E9/L (ref 1.5–4)
LYMPHOCYTES RELATIVE PERCENT: 17.9 % (ref 20–42)
MAGNESIUM: 2 MG/DL (ref 1.6–2.6)
MCH RBC QN AUTO: 29.5 PG (ref 26–35)
MCHC RBC AUTO-ENTMCNC: 32.1 % (ref 32–34.5)
MCV RBC AUTO: 91.9 FL (ref 80–99.9)
METER GLUCOSE: 135 MG/DL (ref 74–99)
METER GLUCOSE: 152 MG/DL (ref 74–99)
METER GLUCOSE: 180 MG/DL (ref 74–99)
METER GLUCOSE: 204 MG/DL (ref 74–99)
MONOCYTES ABSOLUTE: 0.73 E9/L (ref 0.1–0.95)
MONOCYTES RELATIVE PERCENT: 11 % (ref 2–12)
NEUTROPHILS ABSOLUTE: 4.48 E9/L (ref 1.8–7.3)
NEUTROPHILS RELATIVE PERCENT: 67.2 % (ref 43–80)
O2 SATURATION: 69.5 % (ref 92–98.5)
OPERATOR ID: 2086
PCO2 ARTERIAL: 34.7 MMHG (ref 35–45)
PDW BLD-RTO: 16.2 FL (ref 11.5–15)
PH BLOOD GAS: 7.38 (ref 7.35–7.45)
PLATELET # BLD: 122 E9/L (ref 130–450)
PMV BLD AUTO: 13 FL (ref 7–12)
PO2 ARTERIAL: 36.5 MMHG (ref 60–80)
POTASSIUM SERPL-SCNC: 5.5 MMOL/L (ref 3.5–5)
POTASSIUM SERPL-SCNC: 6.2 MMOL/L (ref 3.5–5)
POTASSIUM SERPL-SCNC: 6.3 MMOL/L (ref 3.5–5)
RBC # BLD: 3.97 E12/L (ref 3.8–5.8)
SODIUM BLD-SCNC: 126 MMOL/L (ref 132–146)
SODIUM BLD-SCNC: 128 MMOL/L (ref 132–146)
SODIUM BLD-SCNC: 130 MMOL/L (ref 132–146)
SOURCE, BLOOD GAS: ABNORMAL
WBC # BLD: 6.7 E9/L (ref 4.5–11.5)

## 2019-11-11 PROCEDURE — 2000000000 HC ICU R&B

## 2019-11-11 PROCEDURE — 6360000002 HC RX W HCPCS: Performed by: INTERNAL MEDICINE

## 2019-11-11 PROCEDURE — 36415 COLL VENOUS BLD VENIPUNCTURE: CPT

## 2019-11-11 PROCEDURE — 2500000003 HC RX 250 WO HCPCS: Performed by: INTERNAL MEDICINE

## 2019-11-11 PROCEDURE — 6370000000 HC RX 637 (ALT 250 FOR IP): Performed by: NURSE PRACTITIONER

## 2019-11-11 PROCEDURE — 6370000000 HC RX 637 (ALT 250 FOR IP): Performed by: INTERNAL MEDICINE

## 2019-11-11 PROCEDURE — 83735 ASSAY OF MAGNESIUM: CPT

## 2019-11-11 PROCEDURE — 99233 SBSQ HOSP IP/OBS HIGH 50: CPT | Performed by: INTERNAL MEDICINE

## 2019-11-11 PROCEDURE — 82962 GLUCOSE BLOOD TEST: CPT

## 2019-11-11 PROCEDURE — 97530 THERAPEUTIC ACTIVITIES: CPT

## 2019-11-11 PROCEDURE — 37799 UNLISTED PX VASCULAR SURGERY: CPT

## 2019-11-11 PROCEDURE — 2700000000 HC OXYGEN THERAPY PER DAY

## 2019-11-11 PROCEDURE — 85025 COMPLETE CBC W/AUTO DIFF WBC: CPT

## 2019-11-11 PROCEDURE — 97535 SELF CARE MNGMENT TRAINING: CPT

## 2019-11-11 PROCEDURE — 97166 OT EVAL MOD COMPLEX 45 MIN: CPT

## 2019-11-11 PROCEDURE — 71045 X-RAY EXAM CHEST 1 VIEW: CPT

## 2019-11-11 PROCEDURE — 2580000003 HC RX 258: Performed by: INTERNAL MEDICINE

## 2019-11-11 PROCEDURE — 97162 PT EVAL MOD COMPLEX 30 MIN: CPT

## 2019-11-11 PROCEDURE — 80048 BASIC METABOLIC PNL TOTAL CA: CPT

## 2019-11-11 PROCEDURE — 82803 BLOOD GASES ANY COMBINATION: CPT

## 2019-11-11 PROCEDURE — 94660 CPAP INITIATION&MGMT: CPT

## 2019-11-11 RX ORDER — PANTOPRAZOLE SODIUM 40 MG/1
40 TABLET, DELAYED RELEASE ORAL
Status: DISCONTINUED | OUTPATIENT
Start: 2019-11-12 | End: 2019-11-19 | Stop reason: HOSPADM

## 2019-11-11 RX ORDER — BUMETANIDE 0.25 MG/ML
2 INJECTION, SOLUTION INTRAMUSCULAR; INTRAVENOUS ONCE
Status: DISCONTINUED | OUTPATIENT
Start: 2019-11-11 | End: 2019-11-11

## 2019-11-11 RX ORDER — ONDANSETRON 2 MG/ML
4 INJECTION INTRAMUSCULAR; INTRAVENOUS EVERY 6 HOURS PRN
Status: DISCONTINUED | OUTPATIENT
Start: 2019-11-11 | End: 2019-11-19 | Stop reason: HOSPADM

## 2019-11-11 RX ORDER — BUMETANIDE 0.25 MG/ML
2 INJECTION, SOLUTION INTRAMUSCULAR; INTRAVENOUS ONCE
Status: COMPLETED | OUTPATIENT
Start: 2019-11-11 | End: 2019-11-11

## 2019-11-11 RX ADMIN — INSULIN LISPRO 3 UNITS: 100 INJECTION, SOLUTION INTRAVENOUS; SUBCUTANEOUS at 17:31

## 2019-11-11 RX ADMIN — BUMETANIDE 2 MG: 0.25 INJECTION INTRAMUSCULAR; INTRAVENOUS at 22:43

## 2019-11-11 RX ADMIN — AMIODARONE HYDROCHLORIDE 400 MG: 200 TABLET ORAL at 21:01

## 2019-11-11 RX ADMIN — APIXABAN 5 MG: 5 TABLET, FILM COATED ORAL at 08:26

## 2019-11-11 RX ADMIN — INSULIN LISPRO 6 UNITS: 100 INJECTION, SOLUTION INTRAVENOUS; SUBCUTANEOUS at 13:40

## 2019-11-11 RX ADMIN — VANCOMYCIN HYDROCHLORIDE 2000 MG: 1 INJECTION, POWDER, LYOPHILIZED, FOR SOLUTION INTRAVENOUS at 21:08

## 2019-11-11 RX ADMIN — POTASSIUM CHLORIDE 40 MEQ: 20 TABLET, EXTENDED RELEASE ORAL at 08:25

## 2019-11-11 RX ADMIN — SACUBITRIL AND VALSARTAN 1 TABLET: 49; 51 TABLET, FILM COATED ORAL at 17:31

## 2019-11-11 RX ADMIN — APIXABAN 5 MG: 5 TABLET, FILM COATED ORAL at 21:01

## 2019-11-11 RX ADMIN — HYDRALAZINE HYDROCHLORIDE 50 MG: 50 TABLET, FILM COATED ORAL at 08:40

## 2019-11-11 RX ADMIN — AMIODARONE HYDROCHLORIDE 400 MG: 200 TABLET ORAL at 08:26

## 2019-11-11 RX ADMIN — DOBUTAMINE IN DEXTROSE 5 MCG/KG/MIN: 400 INJECTION, SOLUTION INTRAVENOUS at 08:28

## 2019-11-11 RX ADMIN — HYDRALAZINE HYDROCHLORIDE 50 MG: 50 TABLET, FILM COATED ORAL at 20:00

## 2019-11-11 RX ADMIN — SPIRONOLACTONE 50 MG: 25 TABLET ORAL at 08:26

## 2019-11-11 RX ADMIN — ONDANSETRON HYDROCHLORIDE 4 MG: 2 INJECTION, SOLUTION INTRAMUSCULAR; INTRAVENOUS at 18:38

## 2019-11-11 ASSESSMENT — PAIN SCALES - GENERAL
PAINLEVEL_OUTOF10: 0
PAINLEVEL_OUTOF10: 0

## 2019-11-12 ENCOUNTER — APPOINTMENT (OUTPATIENT)
Dept: GENERAL RADIOLOGY | Age: 69
DRG: 286 | End: 2019-11-12
Payer: COMMERCIAL

## 2019-11-12 LAB
ALBUMIN SERPL-MCNC: 3.1 G/DL (ref 3.5–5.2)
ALBUMIN SERPL-MCNC: 3.4 G/DL (ref 3.5–5.2)
ALP BLD-CCNC: 88 U/L (ref 40–129)
ALP BLD-CCNC: 97 U/L (ref 40–129)
ALT SERPL-CCNC: 23 U/L (ref 0–40)
ALT SERPL-CCNC: 28 U/L (ref 0–40)
ANION GAP SERPL CALCULATED.3IONS-SCNC: 12 MMOL/L (ref 7–16)
ANION GAP SERPL CALCULATED.3IONS-SCNC: 13 MMOL/L (ref 7–16)
ANION GAP SERPL CALCULATED.3IONS-SCNC: 15 MMOL/L (ref 7–16)
AST SERPL-CCNC: 20 U/L (ref 0–39)
AST SERPL-CCNC: 29 U/L (ref 0–39)
B.E.: -0.9 MMOL/L
BASOPHILS ABSOLUTE: 0.03 E9/L (ref 0–0.2)
BASOPHILS RELATIVE PERCENT: 0.5 % (ref 0–2)
BILIRUB SERPL-MCNC: 1.4 MG/DL (ref 0–1.2)
BILIRUB SERPL-MCNC: 1.5 MG/DL (ref 0–1.2)
BUN BLDV-MCNC: 34 MG/DL (ref 8–23)
BUN BLDV-MCNC: 34 MG/DL (ref 8–23)
BUN BLDV-MCNC: 36 MG/DL (ref 8–23)
CALCIUM SERPL-MCNC: 8.4 MG/DL (ref 8.6–10.2)
CALCIUM SERPL-MCNC: 8.5 MG/DL (ref 8.6–10.2)
CALCIUM SERPL-MCNC: 8.7 MG/DL (ref 8.6–10.2)
CHLORIDE BLD-SCNC: 96 MMOL/L (ref 98–107)
CHLORIDE BLD-SCNC: 96 MMOL/L (ref 98–107)
CHLORIDE BLD-SCNC: 97 MMOL/L (ref 98–107)
CHLORIDE URINE RANDOM: 30 MMOL/L
CO2: 19 MMOL/L (ref 22–29)
CO2: 19 MMOL/L (ref 22–29)
CO2: 20 MMOL/L (ref 22–29)
COHB: 1.8 % (ref 0–1.5)
COMMENT: ABNORMAL
CREAT SERPL-MCNC: 1.4 MG/DL (ref 0.7–1.2)
CREAT SERPL-MCNC: 1.4 MG/DL (ref 0.7–1.2)
CREAT SERPL-MCNC: 1.5 MG/DL (ref 0.7–1.2)
CREATININE URINE: 121 MG/DL (ref 40–278)
CRITICAL: ABNORMAL
DATE ANALYZED: ABNORMAL
DATE OF COLLECTION: ABNORMAL
EOSINOPHILS ABSOLUTE: 0.2 E9/L (ref 0.05–0.5)
EOSINOPHILS RELATIVE PERCENT: 3.5 % (ref 0–6)
GFR AFRICAN AMERICAN: 56
GFR AFRICAN AMERICAN: >60
GFR AFRICAN AMERICAN: >60
GFR NON-AFRICAN AMERICAN: 56 ML/MIN/1.73
GFR NON-AFRICAN AMERICAN: >60 ML/MIN/1.73
GFR NON-AFRICAN AMERICAN: >60 ML/MIN/1.73
GLUCOSE BLD-MCNC: 120 MG/DL (ref 74–99)
GLUCOSE BLD-MCNC: 133 MG/DL (ref 74–99)
GLUCOSE BLD-MCNC: 156 MG/DL (ref 74–99)
HCO3: 23.6 MMOL/L
HCT VFR BLD CALC: 36.1 % (ref 37–54)
HEMOGLOBIN: 11.9 G/DL (ref 12.5–16.5)
HHB: 39.5 %
IMMATURE GRANULOCYTES #: 0.02 E9/L
IMMATURE GRANULOCYTES %: 0.3 % (ref 0–5)
LAB: ABNORMAL
LYMPHOCYTES ABSOLUTE: 1.52 E9/L (ref 1.5–4)
LYMPHOCYTES RELATIVE PERCENT: 26.3 % (ref 20–42)
Lab: ABNORMAL
MAGNESIUM: 1.8 MG/DL (ref 1.6–2.6)
MCH RBC QN AUTO: 30.1 PG (ref 26–35)
MCHC RBC AUTO-ENTMCNC: 33 % (ref 32–34.5)
MCV RBC AUTO: 91.4 FL (ref 80–99.9)
METER GLUCOSE: 124 MG/DL (ref 74–99)
METER GLUCOSE: 137 MG/DL (ref 74–99)
METER GLUCOSE: 154 MG/DL (ref 74–99)
METER GLUCOSE: 177 MG/DL (ref 74–99)
METHB: 0.1 % (ref 0–1.5)
MODE: ABNORMAL
MONOCYTES ABSOLUTE: 0.66 E9/L (ref 0.1–0.95)
MONOCYTES RELATIVE PERCENT: 11.4 % (ref 2–12)
NEUTROPHILS ABSOLUTE: 3.35 E9/L (ref 1.8–7.3)
NEUTROPHILS RELATIVE PERCENT: 58 % (ref 43–80)
O2 CONTENT: 10.8 ML/DL
O2 SATURATION: 59.7 %
O2HB: 58.6 %
OPERATOR ID: ABNORMAL
ORGANISM: ABNORMAL
PATIENT TEMP: 27 C
PCO2: 38.4 MMHG
PDW BLD-RTO: 16.3 FL (ref 11.5–15)
PH BLOOD GAS: 7.41 (ref 7.3–7.42)
PLATELET # BLD: 129 E9/L (ref 130–450)
PMV BLD AUTO: 11.9 FL (ref 7–12)
PO2: 29.8 MMHG
POTASSIUM SERPL-SCNC: 4.8 MMOL/L (ref 3.5–5)
POTASSIUM SERPL-SCNC: 5.1 MMOL/L (ref 3.5–5)
POTASSIUM SERPL-SCNC: 5.2 MMOL/L (ref 3.5–5)
PRO-BNP: 3510 PG/ML (ref 0–125)
RBC # BLD: 3.95 E12/L (ref 3.8–5.8)
SODIUM BLD-SCNC: 127 MMOL/L (ref 132–146)
SODIUM BLD-SCNC: 129 MMOL/L (ref 132–146)
SODIUM BLD-SCNC: 131 MMOL/L (ref 132–146)
SODIUM URINE: 44 MMOL/L
SOURCE, BLOOD GAS: ABNORMAL
THB: 13.2 G/DL (ref 11.5–16.5)
TIME ANALYZED: 1503
TOTAL PROTEIN: 6.7 G/DL (ref 6.4–8.3)
TOTAL PROTEIN: 7.3 G/DL (ref 6.4–8.3)
URINE CULTURE, ROUTINE: ABNORMAL
WBC # BLD: 5.8 E9/L (ref 4.5–11.5)

## 2019-11-12 PROCEDURE — 94660 CPAP INITIATION&MGMT: CPT

## 2019-11-12 PROCEDURE — 82805 BLOOD GASES W/O2 SATURATION: CPT

## 2019-11-12 PROCEDURE — 6360000002 HC RX W HCPCS: Performed by: INTERNAL MEDICINE

## 2019-11-12 PROCEDURE — 97530 THERAPEUTIC ACTIVITIES: CPT

## 2019-11-12 PROCEDURE — 6370000000 HC RX 637 (ALT 250 FOR IP): Performed by: INTERNAL MEDICINE

## 2019-11-12 PROCEDURE — 84300 ASSAY OF URINE SODIUM: CPT

## 2019-11-12 PROCEDURE — 87116 MYCOBACTERIA CULTURE: CPT

## 2019-11-12 PROCEDURE — 2580000003 HC RX 258: Performed by: INTERNAL MEDICINE

## 2019-11-12 PROCEDURE — 83735 ASSAY OF MAGNESIUM: CPT

## 2019-11-12 PROCEDURE — 82962 GLUCOSE BLOOD TEST: CPT

## 2019-11-12 PROCEDURE — 2700000000 HC OXYGEN THERAPY PER DAY

## 2019-11-12 PROCEDURE — 97535 SELF CARE MNGMENT TRAINING: CPT

## 2019-11-12 PROCEDURE — 82570 ASSAY OF URINE CREATININE: CPT

## 2019-11-12 PROCEDURE — 87040 BLOOD CULTURE FOR BACTERIA: CPT

## 2019-11-12 PROCEDURE — 80048 BASIC METABOLIC PNL TOTAL CA: CPT

## 2019-11-12 PROCEDURE — 83880 ASSAY OF NATRIURETIC PEPTIDE: CPT

## 2019-11-12 PROCEDURE — 99233 SBSQ HOSP IP/OBS HIGH 50: CPT | Performed by: INTERNAL MEDICINE

## 2019-11-12 PROCEDURE — 71045 X-RAY EXAM CHEST 1 VIEW: CPT

## 2019-11-12 PROCEDURE — 82436 ASSAY OF URINE CHLORIDE: CPT

## 2019-11-12 PROCEDURE — 36415 COLL VENOUS BLD VENIPUNCTURE: CPT

## 2019-11-12 PROCEDURE — 2000000000 HC ICU R&B

## 2019-11-12 PROCEDURE — 85025 COMPLETE CBC W/AUTO DIFF WBC: CPT

## 2019-11-12 PROCEDURE — 99291 CRITICAL CARE FIRST HOUR: CPT | Performed by: INTERNAL MEDICINE

## 2019-11-12 PROCEDURE — 80053 COMPREHEN METABOLIC PANEL: CPT

## 2019-11-12 PROCEDURE — 6360000002 HC RX W HCPCS: Performed by: NURSE PRACTITIONER

## 2019-11-12 PROCEDURE — 6370000000 HC RX 637 (ALT 250 FOR IP): Performed by: NURSE PRACTITIONER

## 2019-11-12 RX ORDER — SODIUM CHLORIDE 9 MG/ML
INJECTION, SOLUTION INTRAVENOUS CONTINUOUS
Status: DISCONTINUED | OUTPATIENT
Start: 2019-11-12 | End: 2019-11-13

## 2019-11-12 RX ORDER — MAGNESIUM SULFATE IN WATER 40 MG/ML
2 INJECTION, SOLUTION INTRAVENOUS ONCE
Status: COMPLETED | OUTPATIENT
Start: 2019-11-12 | End: 2019-11-12

## 2019-11-12 RX ORDER — BUMETANIDE 0.25 MG/ML
2 INJECTION, SOLUTION INTRAMUSCULAR; INTRAVENOUS ONCE
Status: DISCONTINUED | OUTPATIENT
Start: 2019-11-12 | End: 2019-11-19 | Stop reason: HOSPADM

## 2019-11-12 RX ADMIN — SACUBITRIL AND VALSARTAN 1 TABLET: 49; 51 TABLET, FILM COATED ORAL at 20:47

## 2019-11-12 RX ADMIN — DOBUTAMINE IN DEXTROSE 5 MCG/KG/MIN: 400 INJECTION, SOLUTION INTRAVENOUS at 10:54

## 2019-11-12 RX ADMIN — SPIRONOLACTONE 50 MG: 25 TABLET ORAL at 08:28

## 2019-11-12 RX ADMIN — INSULIN LISPRO 3 UNITS: 100 INJECTION, SOLUTION INTRAVENOUS; SUBCUTANEOUS at 15:29

## 2019-11-12 RX ADMIN — Medication 300 UNITS: at 20:49

## 2019-11-12 RX ADMIN — INSULIN GLARGINE 40 UNITS: 100 INJECTION, SOLUTION SUBCUTANEOUS at 20:48

## 2019-11-12 RX ADMIN — APIXABAN 5 MG: 5 TABLET, FILM COATED ORAL at 20:47

## 2019-11-12 RX ADMIN — INSULIN LISPRO 1 UNITS: 100 INJECTION, SOLUTION INTRAVENOUS; SUBCUTANEOUS at 20:48

## 2019-11-12 RX ADMIN — HYDRALAZINE HYDROCHLORIDE 50 MG: 50 TABLET, FILM COATED ORAL at 08:28

## 2019-11-12 RX ADMIN — AMIODARONE HYDROCHLORIDE 400 MG: 200 TABLET ORAL at 08:25

## 2019-11-12 RX ADMIN — PANTOPRAZOLE SODIUM 40 MG: 40 TABLET, DELAYED RELEASE ORAL at 06:04

## 2019-11-12 RX ADMIN — BENZONATATE 100 MG: 100 CAPSULE ORAL at 08:25

## 2019-11-12 RX ADMIN — SODIUM CHLORIDE: 9 INJECTION, SOLUTION INTRAVENOUS at 20:49

## 2019-11-12 RX ADMIN — MAGNESIUM SULFATE HEPTAHYDRATE 2 G: 40 INJECTION, SOLUTION INTRAVENOUS at 11:14

## 2019-11-12 RX ADMIN — APIXABAN 5 MG: 5 TABLET, FILM COATED ORAL at 08:25

## 2019-11-12 RX ADMIN — AMIODARONE HYDROCHLORIDE 400 MG: 200 TABLET ORAL at 20:47

## 2019-11-12 ASSESSMENT — PAIN SCALES - GENERAL: PAINLEVEL_OUTOF10: 0

## 2019-11-13 ENCOUNTER — APPOINTMENT (OUTPATIENT)
Dept: GENERAL RADIOLOGY | Age: 69
DRG: 286 | End: 2019-11-13
Payer: COMMERCIAL

## 2019-11-13 LAB
ALBUMIN SERPL-MCNC: 3.2 G/DL (ref 3.5–5.2)
ALP BLD-CCNC: 91 U/L (ref 40–129)
ALT SERPL-CCNC: 25 U/L (ref 0–40)
ANION GAP SERPL CALCULATED.3IONS-SCNC: 15 MMOL/L (ref 7–16)
AST SERPL-CCNC: 25 U/L (ref 0–39)
BASOPHILS ABSOLUTE: 0.02 E9/L (ref 0–0.2)
BASOPHILS RELATIVE PERCENT: 0.4 % (ref 0–2)
BILIRUB SERPL-MCNC: 1.5 MG/DL (ref 0–1.2)
BUN BLDV-MCNC: 32 MG/DL (ref 8–23)
CALCIUM SERPL-MCNC: 8.5 MG/DL (ref 8.6–10.2)
CHLORIDE BLD-SCNC: 94 MMOL/L (ref 98–107)
CO2: 18 MMOL/L (ref 22–29)
CREAT SERPL-MCNC: 1.4 MG/DL (ref 0.7–1.2)
EKG ATRIAL RATE: 78 BPM
EKG Q-T INTERVAL: 424 MS
EKG QRS DURATION: 128 MS
EKG QTC CALCULATION (BAZETT): 507 MS
EKG R AXIS: -61 DEGREES
EKG T AXIS: 119 DEGREES
EKG VENTRICULAR RATE: 86 BPM
EOSINOPHILS ABSOLUTE: 0.25 E9/L (ref 0.05–0.5)
EOSINOPHILS RELATIVE PERCENT: 4.5 % (ref 0–6)
GFR AFRICAN AMERICAN: >60
GFR NON-AFRICAN AMERICAN: >60 ML/MIN/1.73
GLUCOSE BLD-MCNC: 103 MG/DL (ref 74–99)
HCT VFR BLD CALC: 36.9 % (ref 37–54)
HEMOGLOBIN: 11.7 G/DL (ref 12.5–16.5)
IMMATURE GRANULOCYTES #: 0.02 E9/L
IMMATURE GRANULOCYTES %: 0.4 % (ref 0–5)
LYMPHOCYTES ABSOLUTE: 1.79 E9/L (ref 1.5–4)
LYMPHOCYTES RELATIVE PERCENT: 32.5 % (ref 20–42)
MAGNESIUM: 2.2 MG/DL (ref 1.6–2.6)
MCH RBC QN AUTO: 29.3 PG (ref 26–35)
MCHC RBC AUTO-ENTMCNC: 31.7 % (ref 32–34.5)
MCV RBC AUTO: 92.5 FL (ref 80–99.9)
METER GLUCOSE: 100 MG/DL (ref 74–99)
METER GLUCOSE: 159 MG/DL (ref 74–99)
METER GLUCOSE: 176 MG/DL (ref 74–99)
METER GLUCOSE: 197 MG/DL (ref 74–99)
MONOCYTES ABSOLUTE: 0.63 E9/L (ref 0.1–0.95)
MONOCYTES RELATIVE PERCENT: 11.4 % (ref 2–12)
NEUTROPHILS ABSOLUTE: 2.8 E9/L (ref 1.8–7.3)
NEUTROPHILS RELATIVE PERCENT: 50.8 % (ref 43–80)
PDW BLD-RTO: 16.2 FL (ref 11.5–15)
PLATELET # BLD: 130 E9/L (ref 130–450)
PMV BLD AUTO: 11.6 FL (ref 7–12)
POTASSIUM SERPL-SCNC: 4.8 MMOL/L (ref 3.5–5)
PRO-BNP: 2151 PG/ML (ref 0–125)
RBC # BLD: 3.99 E12/L (ref 3.8–5.8)
SODIUM BLD-SCNC: 127 MMOL/L (ref 132–146)
TOTAL PROTEIN: 7 G/DL (ref 6.4–8.3)
WBC # BLD: 5.5 E9/L (ref 4.5–11.5)

## 2019-11-13 PROCEDURE — 2500000003 HC RX 250 WO HCPCS: Performed by: INTERNAL MEDICINE

## 2019-11-13 PROCEDURE — 97530 THERAPEUTIC ACTIVITIES: CPT

## 2019-11-13 PROCEDURE — 93005 ELECTROCARDIOGRAM TRACING: CPT | Performed by: NURSE PRACTITIONER

## 2019-11-13 PROCEDURE — 2580000003 HC RX 258: Performed by: INTERNAL MEDICINE

## 2019-11-13 PROCEDURE — 6370000000 HC RX 637 (ALT 250 FOR IP): Performed by: INTERNAL MEDICINE

## 2019-11-13 PROCEDURE — 99233 SBSQ HOSP IP/OBS HIGH 50: CPT | Performed by: INTERNAL MEDICINE

## 2019-11-13 PROCEDURE — 84132 ASSAY OF SERUM POTASSIUM: CPT

## 2019-11-13 PROCEDURE — 36415 COLL VENOUS BLD VENIPUNCTURE: CPT

## 2019-11-13 PROCEDURE — 82962 GLUCOSE BLOOD TEST: CPT

## 2019-11-13 PROCEDURE — 97535 SELF CARE MNGMENT TRAINING: CPT

## 2019-11-13 PROCEDURE — 80053 COMPREHEN METABOLIC PANEL: CPT

## 2019-11-13 PROCEDURE — 99291 CRITICAL CARE FIRST HOUR: CPT | Performed by: INTERNAL MEDICINE

## 2019-11-13 PROCEDURE — 83880 ASSAY OF NATRIURETIC PEPTIDE: CPT

## 2019-11-13 PROCEDURE — 71045 X-RAY EXAM CHEST 1 VIEW: CPT

## 2019-11-13 PROCEDURE — 6370000000 HC RX 637 (ALT 250 FOR IP): Performed by: NURSE PRACTITIONER

## 2019-11-13 PROCEDURE — 85025 COMPLETE CBC W/AUTO DIFF WBC: CPT

## 2019-11-13 PROCEDURE — 83735 ASSAY OF MAGNESIUM: CPT

## 2019-11-13 PROCEDURE — 2000000000 HC ICU R&B

## 2019-11-13 RX ORDER — BUMETANIDE 0.25 MG/ML
2 INJECTION, SOLUTION INTRAMUSCULAR; INTRAVENOUS ONCE
Status: COMPLETED | OUTPATIENT
Start: 2019-11-13 | End: 2019-11-13

## 2019-11-13 RX ADMIN — BUMETANIDE 2 MG: 0.25 INJECTION INTRAMUSCULAR; INTRAVENOUS at 20:41

## 2019-11-13 RX ADMIN — INSULIN LISPRO 3 UNITS: 100 INJECTION, SOLUTION INTRAVENOUS; SUBCUTANEOUS at 16:39

## 2019-11-13 RX ADMIN — INSULIN LISPRO 1 UNITS: 100 INJECTION, SOLUTION INTRAVENOUS; SUBCUTANEOUS at 20:05

## 2019-11-13 RX ADMIN — INSULIN LISPRO 3 UNITS: 100 INJECTION, SOLUTION INTRAVENOUS; SUBCUTANEOUS at 11:40

## 2019-11-13 RX ADMIN — SACUBITRIL AND VALSARTAN 1 TABLET: 49; 51 TABLET, FILM COATED ORAL at 20:06

## 2019-11-13 RX ADMIN — PANTOPRAZOLE SODIUM 40 MG: 40 TABLET, DELAYED RELEASE ORAL at 06:55

## 2019-11-13 RX ADMIN — APIXABAN 5 MG: 5 TABLET, FILM COATED ORAL at 08:15

## 2019-11-13 RX ADMIN — SODIUM CHLORIDE: 9 INJECTION, SOLUTION INTRAVENOUS at 19:24

## 2019-11-13 RX ADMIN — AMIODARONE HYDROCHLORIDE 400 MG: 200 TABLET ORAL at 20:06

## 2019-11-13 RX ADMIN — INSULIN GLARGINE 40 UNITS: 100 INJECTION, SOLUTION SUBCUTANEOUS at 20:05

## 2019-11-13 RX ADMIN — SPIRONOLACTONE 50 MG: 25 TABLET ORAL at 08:15

## 2019-11-13 RX ADMIN — SODIUM CHLORIDE: 9 INJECTION, SOLUTION INTRAVENOUS at 06:58

## 2019-11-13 RX ADMIN — AMIODARONE HYDROCHLORIDE 400 MG: 200 TABLET ORAL at 08:15

## 2019-11-13 RX ADMIN — Medication 10 ML: at 20:06

## 2019-11-13 RX ADMIN — APIXABAN 5 MG: 5 TABLET, FILM COATED ORAL at 20:06

## 2019-11-13 RX ADMIN — ACETAMINOPHEN 650 MG: 325 TABLET, FILM COATED ORAL at 23:07

## 2019-11-13 RX ADMIN — SACUBITRIL AND VALSARTAN 1 TABLET: 49; 51 TABLET, FILM COATED ORAL at 08:15

## 2019-11-13 ASSESSMENT — PAIN SCALES - GENERAL
PAINLEVEL_OUTOF10: 0
PAINLEVEL_OUTOF10: 0
PAINLEVEL_OUTOF10: 3
PAINLEVEL_OUTOF10: 0
PAINLEVEL_OUTOF10: 0

## 2019-11-13 ASSESSMENT — PAIN DESCRIPTION - ONSET: ONSET: ON-GOING

## 2019-11-13 ASSESSMENT — PAIN DESCRIPTION - PAIN TYPE: TYPE: ACUTE PAIN

## 2019-11-13 ASSESSMENT — PAIN DESCRIPTION - DESCRIPTORS: DESCRIPTORS: TENDER

## 2019-11-13 ASSESSMENT — PAIN DESCRIPTION - FREQUENCY: FREQUENCY: INTERMITTENT

## 2019-11-13 ASSESSMENT — PAIN DESCRIPTION - LOCATION: LOCATION: CHEST;BACK

## 2019-11-13 ASSESSMENT — PAIN DESCRIPTION - ORIENTATION: ORIENTATION: RIGHT

## 2019-11-13 ASSESSMENT — PAIN DESCRIPTION - PROGRESSION: CLINICAL_PROGRESSION: NOT CHANGED

## 2019-11-13 ASSESSMENT — PAIN - FUNCTIONAL ASSESSMENT: PAIN_FUNCTIONAL_ASSESSMENT: ACTIVITIES ARE NOT PREVENTED

## 2019-11-14 ENCOUNTER — TELEPHONE (OUTPATIENT)
Dept: CARDIOLOGY CLINIC | Age: 69
End: 2019-11-14

## 2019-11-14 ENCOUNTER — APPOINTMENT (OUTPATIENT)
Dept: GENERAL RADIOLOGY | Age: 69
DRG: 286 | End: 2019-11-14
Payer: COMMERCIAL

## 2019-11-14 PROBLEM — Z67.40 BLOOD TYPE O+: Status: ACTIVE | Noted: 2019-11-14

## 2019-11-14 LAB
ALBUMIN SERPL-MCNC: 2.7 G/DL (ref 3.5–5.2)
ALP BLD-CCNC: 85 U/L (ref 40–129)
ALT SERPL-CCNC: 23 U/L (ref 0–40)
ANION GAP SERPL CALCULATED.3IONS-SCNC: 13 MMOL/L (ref 7–16)
AST SERPL-CCNC: 20 U/L (ref 0–39)
B.E.: -3.7 MMOL/L
BASOPHILS ABSOLUTE: 0.04 E9/L (ref 0–0.2)
BASOPHILS RELATIVE PERCENT: 0.8 % (ref 0–2)
BILIRUB SERPL-MCNC: 0.9 MG/DL (ref 0–1.2)
BUN BLDV-MCNC: 41 MG/DL (ref 8–23)
C-REACTIVE PROTEIN: 0.7 MG/DL (ref 0–0.4)
CALCIUM SERPL-MCNC: 8.4 MG/DL (ref 8.6–10.2)
CHLORIDE BLD-SCNC: 97 MMOL/L (ref 98–107)
CO2: 19 MMOL/L (ref 22–29)
COHB: 1.6 % (ref 0–1.5)
CREAT SERPL-MCNC: 1.7 MG/DL (ref 0.7–1.2)
CRITICAL: ABNORMAL
DATE ANALYZED: ABNORMAL
DATE OF COLLECTION: ABNORMAL
EOSINOPHILS ABSOLUTE: 0.3 E9/L (ref 0.05–0.5)
EOSINOPHILS RELATIVE PERCENT: 6 % (ref 0–6)
GFR AFRICAN AMERICAN: 49
GFR NON-AFRICAN AMERICAN: 49 ML/MIN/1.73
GLUCOSE BLD-MCNC: 99 MG/DL (ref 74–99)
HCO3: 21.7 MMOL/L
HCT VFR BLD CALC: 35.7 % (ref 37–54)
HEMOGLOBIN: 11.5 G/DL (ref 12.5–16.5)
HHB: 49.1 %
IMMATURE GRANULOCYTES #: 0.01 E9/L
IMMATURE GRANULOCYTES %: 0.2 % (ref 0–5)
LAB: ABNORMAL
LYMPHOCYTES ABSOLUTE: 1.81 E9/L (ref 1.5–4)
LYMPHOCYTES RELATIVE PERCENT: 35.9 % (ref 20–42)
Lab: ABNORMAL
MAGNESIUM: 2 MG/DL (ref 1.6–2.6)
MCH RBC QN AUTO: 29.6 PG (ref 26–35)
MCHC RBC AUTO-ENTMCNC: 32.2 % (ref 32–34.5)
MCV RBC AUTO: 91.8 FL (ref 80–99.9)
METER GLUCOSE: 119 MG/DL (ref 74–99)
METER GLUCOSE: 138 MG/DL (ref 74–99)
METER GLUCOSE: 161 MG/DL (ref 74–99)
METER GLUCOSE: 204 MG/DL (ref 74–99)
METHB: 0.3 % (ref 0–1.5)
MONOCYTES ABSOLUTE: 0.61 E9/L (ref 0.1–0.95)
MONOCYTES RELATIVE PERCENT: 12.1 % (ref 2–12)
NEUTROPHILS ABSOLUTE: 2.27 E9/L (ref 1.8–7.3)
NEUTROPHILS RELATIVE PERCENT: 45 % (ref 43–80)
O2 CONTENT: 10 ML/DL
O2 SATURATION: 49.9 %
O2HB: 49 %
OPERATOR ID: ABNORMAL
PATIENT TEMP: 37 C
PCO2: 40.7 MMHG
PDW BLD-RTO: 15.9 FL (ref 11.5–15)
PH BLOOD GAS: 7.34 (ref 7.3–7.42)
PLATELET # BLD: 142 E9/L (ref 130–450)
PMV BLD AUTO: 12.5 FL (ref 7–12)
PO2: 28.3 MMHG
POTASSIUM SERPL-SCNC: 4.7 MMOL/L (ref 3.5–5)
POTASSIUM SERPL-SCNC: 4.9 MMOL/L (ref 3.5–5)
RBC # BLD: 3.89 E12/L (ref 3.8–5.8)
SEDIMENTATION RATE, ERYTHROCYTE: 45 MM/HR (ref 0–15)
SODIUM BLD-SCNC: 129 MMOL/L (ref 132–146)
SOURCE, BLOOD GAS: ABNORMAL
THB: 14.5 G/DL (ref 11.5–16.5)
TIME ANALYZED: 1321
TOTAL PROTEIN: 6.3 G/DL (ref 6.4–8.3)
WBC # BLD: 5 E9/L (ref 4.5–11.5)

## 2019-11-14 PROCEDURE — 6370000000 HC RX 637 (ALT 250 FOR IP): Performed by: INTERNAL MEDICINE

## 2019-11-14 PROCEDURE — 36415 COLL VENOUS BLD VENIPUNCTURE: CPT

## 2019-11-14 PROCEDURE — 6370000000 HC RX 637 (ALT 250 FOR IP): Performed by: NURSE PRACTITIONER

## 2019-11-14 PROCEDURE — 82805 BLOOD GASES W/O2 SATURATION: CPT

## 2019-11-14 PROCEDURE — 2580000003 HC RX 258: Performed by: INTERNAL MEDICINE

## 2019-11-14 PROCEDURE — 85651 RBC SED RATE NONAUTOMATED: CPT

## 2019-11-14 PROCEDURE — 99291 CRITICAL CARE FIRST HOUR: CPT | Performed by: INTERNAL MEDICINE

## 2019-11-14 PROCEDURE — 85025 COMPLETE CBC W/AUTO DIFF WBC: CPT

## 2019-11-14 PROCEDURE — 83735 ASSAY OF MAGNESIUM: CPT

## 2019-11-14 PROCEDURE — 97535 SELF CARE MNGMENT TRAINING: CPT

## 2019-11-14 PROCEDURE — 86140 C-REACTIVE PROTEIN: CPT

## 2019-11-14 PROCEDURE — 97530 THERAPEUTIC ACTIVITIES: CPT

## 2019-11-14 PROCEDURE — 2140000000 HC CCU INTERMEDIATE R&B

## 2019-11-14 PROCEDURE — 6360000002 HC RX W HCPCS: Performed by: INTERNAL MEDICINE

## 2019-11-14 PROCEDURE — 80053 COMPREHEN METABOLIC PANEL: CPT

## 2019-11-14 PROCEDURE — 71045 X-RAY EXAM CHEST 1 VIEW: CPT

## 2019-11-14 PROCEDURE — 82962 GLUCOSE BLOOD TEST: CPT

## 2019-11-14 PROCEDURE — 99233 SBSQ HOSP IP/OBS HIGH 50: CPT | Performed by: INTERNAL MEDICINE

## 2019-11-14 RX ORDER — MILRINONE LACTATE 0.2 MG/ML
0.2 INJECTION, SOLUTION INTRAVENOUS CONTINUOUS
Status: DISCONTINUED | OUTPATIENT
Start: 2019-11-14 | End: 2019-11-19 | Stop reason: HOSPADM

## 2019-11-14 RX ADMIN — Medication 10 ML: at 09:17

## 2019-11-14 RX ADMIN — SPIRONOLACTONE 50 MG: 25 TABLET ORAL at 09:15

## 2019-11-14 RX ADMIN — AMIODARONE HYDROCHLORIDE 400 MG: 200 TABLET ORAL at 09:16

## 2019-11-14 RX ADMIN — PANTOPRAZOLE SODIUM 40 MG: 40 TABLET, DELAYED RELEASE ORAL at 06:01

## 2019-11-14 RX ADMIN — MILRINONE LACTATE IN DEXTROSE 0.25 MCG/KG/MIN: 200 INJECTION, SOLUTION INTRAVENOUS at 14:22

## 2019-11-14 RX ADMIN — INSULIN GLARGINE 40 UNITS: 100 INJECTION, SOLUTION SUBCUTANEOUS at 20:21

## 2019-11-14 RX ADMIN — INSULIN LISPRO 2 UNITS: 100 INJECTION, SOLUTION INTRAVENOUS; SUBCUTANEOUS at 20:24

## 2019-11-14 RX ADMIN — APIXABAN 5 MG: 5 TABLET, FILM COATED ORAL at 09:16

## 2019-11-14 RX ADMIN — INSULIN LISPRO 3 UNITS: 100 INJECTION, SOLUTION INTRAVENOUS; SUBCUTANEOUS at 11:54

## 2019-11-14 RX ADMIN — SACUBITRIL AND VALSARTAN 1 TABLET: 49; 51 TABLET, FILM COATED ORAL at 22:08

## 2019-11-14 RX ADMIN — SACUBITRIL AND VALSARTAN 1 TABLET: 49; 51 TABLET, FILM COATED ORAL at 09:16

## 2019-11-14 RX ADMIN — AMIODARONE HYDROCHLORIDE 400 MG: 200 TABLET ORAL at 20:21

## 2019-11-14 ASSESSMENT — PAIN SCALES - GENERAL
PAINLEVEL_OUTOF10: 0

## 2019-11-15 ENCOUNTER — APPOINTMENT (OUTPATIENT)
Dept: GENERAL RADIOLOGY | Age: 69
DRG: 286 | End: 2019-11-15
Payer: COMMERCIAL

## 2019-11-15 LAB
ALBUMIN SERPL-MCNC: 3.4 G/DL (ref 3.5–5.2)
ALP BLD-CCNC: 101 U/L (ref 40–129)
ALT SERPL-CCNC: 26 U/L (ref 0–40)
ANION GAP SERPL CALCULATED.3IONS-SCNC: 11 MMOL/L (ref 7–16)
ANION GAP SERPL CALCULATED.3IONS-SCNC: 13 MMOL/L (ref 7–16)
ANION GAP SERPL CALCULATED.3IONS-SCNC: 16 MMOL/L (ref 7–16)
AST SERPL-CCNC: 23 U/L (ref 0–39)
B.E.: -4.3 MMOL/L
BASOPHILS ABSOLUTE: 0.04 E9/L (ref 0–0.2)
BASOPHILS RELATIVE PERCENT: 0.6 % (ref 0–2)
BILIRUB SERPL-MCNC: 1 MG/DL (ref 0–1.2)
BLOOD CULTURE, ROUTINE: ABNORMAL
BUN BLDV-MCNC: 47 MG/DL (ref 8–23)
BUN BLDV-MCNC: 48 MG/DL (ref 8–23)
BUN BLDV-MCNC: 51 MG/DL (ref 8–23)
CALCIUM SERPL-MCNC: 8.8 MG/DL (ref 8.6–10.2)
CALCIUM SERPL-MCNC: 9 MG/DL (ref 8.6–10.2)
CALCIUM SERPL-MCNC: 9.2 MG/DL (ref 8.6–10.2)
CHLORIDE BLD-SCNC: 94 MMOL/L (ref 98–107)
CHLORIDE BLD-SCNC: 96 MMOL/L (ref 98–107)
CHLORIDE BLD-SCNC: 96 MMOL/L (ref 98–107)
CO2: 19 MMOL/L (ref 22–29)
CO2: 19 MMOL/L (ref 22–29)
CO2: 21 MMOL/L (ref 22–29)
COHB: 1.3 % (ref 0–1.5)
CREAT SERPL-MCNC: 1.9 MG/DL (ref 0.7–1.2)
CREAT SERPL-MCNC: 1.9 MG/DL (ref 0.7–1.2)
CREAT SERPL-MCNC: 2.3 MG/DL (ref 0.7–1.2)
CRITICAL: NORMAL
DATE ANALYZED: NORMAL
DATE OF COLLECTION: NORMAL
EOSINOPHILS ABSOLUTE: 0.3 E9/L (ref 0.05–0.5)
EOSINOPHILS RELATIVE PERCENT: 4.6 % (ref 0–6)
GFR AFRICAN AMERICAN: 34
GFR AFRICAN AMERICAN: 43
GFR AFRICAN AMERICAN: 43
GFR NON-AFRICAN AMERICAN: 34 ML/MIN/1.73
GFR NON-AFRICAN AMERICAN: 43 ML/MIN/1.73
GFR NON-AFRICAN AMERICAN: 43 ML/MIN/1.73
GLUCOSE BLD-MCNC: 182 MG/DL (ref 74–99)
GLUCOSE BLD-MCNC: 202 MG/DL (ref 74–99)
GLUCOSE BLD-MCNC: 97 MG/DL (ref 74–99)
HCO3: 21.3 MMOL/L
HCT VFR BLD CALC: 37.3 % (ref 37–54)
HEMOGLOBIN: 12.5 G/DL (ref 12.5–16.5)
HHB: 27.1 %
IMMATURE GRANULOCYTES #: 0.03 E9/L
IMMATURE GRANULOCYTES %: 0.5 % (ref 0–5)
LAB: NORMAL
LYMPHOCYTES ABSOLUTE: 1.62 E9/L (ref 1.5–4)
LYMPHOCYTES RELATIVE PERCENT: 24.6 % (ref 20–42)
Lab: NORMAL
MAGNESIUM: 2 MG/DL (ref 1.6–2.6)
MCH RBC QN AUTO: 30.3 PG (ref 26–35)
MCHC RBC AUTO-ENTMCNC: 33.5 % (ref 32–34.5)
MCV RBC AUTO: 90.3 FL (ref 80–99.9)
METER GLUCOSE: 114 MG/DL (ref 74–99)
METER GLUCOSE: 174 MG/DL (ref 74–99)
METER GLUCOSE: 176 MG/DL (ref 74–99)
METER GLUCOSE: 177 MG/DL (ref 74–99)
METHB: 0.1 % (ref 0–1.5)
MONOCYTES ABSOLUTE: 0.63 E9/L (ref 0.1–0.95)
MONOCYTES RELATIVE PERCENT: 9.6 % (ref 2–12)
NEUTROPHILS ABSOLUTE: 3.96 E9/L (ref 1.8–7.3)
NEUTROPHILS RELATIVE PERCENT: 60.1 % (ref 43–80)
O2 CONTENT: 14 ML/DL
O2 SATURATION: 72.5 %
O2HB: 71.5 %
OPERATOR ID: 7278
ORGANISM: ABNORMAL
PATIENT TEMP: 37 C
PCO2: 41.1 MMHG
PDW BLD-RTO: 15.8 FL (ref 11.5–15)
PH BLOOD GAS: 7.33 (ref 7.3–7.42)
PLATELET # BLD: 153 E9/L (ref 130–450)
PMV BLD AUTO: 12.1 FL (ref 7–12)
PO2: 40 MMHG
POTASSIUM SERPL-SCNC: 4.6 MMOL/L (ref 3.5–5)
POTASSIUM SERPL-SCNC: 4.6 MMOL/L (ref 3.5–5)
POTASSIUM SERPL-SCNC: 4.7 MMOL/L (ref 3.5–5)
PRO-BNP: 1730 PG/ML (ref 0–125)
PRO-BNP: 2125 PG/ML (ref 0–125)
RBC # BLD: 4.13 E12/L (ref 3.8–5.8)
SODIUM BLD-SCNC: 128 MMOL/L (ref 132–146)
SODIUM BLD-SCNC: 128 MMOL/L (ref 132–146)
SODIUM BLD-SCNC: 129 MMOL/L (ref 132–146)
SOURCE, BLOOD GAS: NORMAL
THB: 14 G/DL (ref 11.5–16.5)
TIME ANALYZED: 1824
TOTAL PROTEIN: 7.2 G/DL (ref 6.4–8.3)
WBC # BLD: 6.6 E9/L (ref 4.5–11.5)

## 2019-11-15 PROCEDURE — 6370000000 HC RX 637 (ALT 250 FOR IP): Performed by: NURSE PRACTITIONER

## 2019-11-15 PROCEDURE — 82962 GLUCOSE BLOOD TEST: CPT

## 2019-11-15 PROCEDURE — 6370000000 HC RX 637 (ALT 250 FOR IP): Performed by: INTERNAL MEDICINE

## 2019-11-15 PROCEDURE — 83880 ASSAY OF NATRIURETIC PEPTIDE: CPT

## 2019-11-15 PROCEDURE — 80053 COMPREHEN METABOLIC PANEL: CPT

## 2019-11-15 PROCEDURE — 99233 SBSQ HOSP IP/OBS HIGH 50: CPT | Performed by: INTERNAL MEDICINE

## 2019-11-15 PROCEDURE — 2060000000 HC ICU INTERMEDIATE R&B

## 2019-11-15 PROCEDURE — 82805 BLOOD GASES W/O2 SATURATION: CPT

## 2019-11-15 PROCEDURE — 85025 COMPLETE CBC W/AUTO DIFF WBC: CPT

## 2019-11-15 PROCEDURE — 36592 COLLECT BLOOD FROM PICC: CPT

## 2019-11-15 PROCEDURE — 80048 BASIC METABOLIC PNL TOTAL CA: CPT

## 2019-11-15 PROCEDURE — 71045 X-RAY EXAM CHEST 1 VIEW: CPT

## 2019-11-15 PROCEDURE — 6360000002 HC RX W HCPCS: Performed by: INTERNAL MEDICINE

## 2019-11-15 PROCEDURE — 2500000003 HC RX 250 WO HCPCS: Performed by: INTERNAL MEDICINE

## 2019-11-15 PROCEDURE — 36415 COLL VENOUS BLD VENIPUNCTURE: CPT

## 2019-11-15 PROCEDURE — 83735 ASSAY OF MAGNESIUM: CPT

## 2019-11-15 RX ORDER — METOLAZONE 2.5 MG/1
2.5 TABLET ORAL DAILY
Status: DISCONTINUED | OUTPATIENT
Start: 2019-11-15 | End: 2019-11-15

## 2019-11-15 RX ORDER — BUMETANIDE 0.25 MG/ML
1 INJECTION, SOLUTION INTRAMUSCULAR; INTRAVENOUS ONCE
Status: COMPLETED | OUTPATIENT
Start: 2019-11-15 | End: 2019-11-15

## 2019-11-15 RX ORDER — MAGNESIUM SULFATE 1 G/100ML
1 INJECTION INTRAVENOUS ONCE
Status: COMPLETED | OUTPATIENT
Start: 2019-11-15 | End: 2019-11-15

## 2019-11-15 RX ADMIN — INSULIN GLARGINE 40 UNITS: 100 INJECTION, SOLUTION SUBCUTANEOUS at 20:56

## 2019-11-15 RX ADMIN — INSULIN LISPRO 3 UNITS: 100 INJECTION, SOLUTION INTRAVENOUS; SUBCUTANEOUS at 18:25

## 2019-11-15 RX ADMIN — INSULIN LISPRO 1 UNITS: 100 INJECTION, SOLUTION INTRAVENOUS; SUBCUTANEOUS at 20:55

## 2019-11-15 RX ADMIN — INSULIN LISPRO 3 UNITS: 100 INJECTION, SOLUTION INTRAVENOUS; SUBCUTANEOUS at 12:39

## 2019-11-15 RX ADMIN — AMIODARONE HYDROCHLORIDE 400 MG: 200 TABLET ORAL at 09:35

## 2019-11-15 RX ADMIN — BUMETANIDE 1 MG: 0.25 INJECTION INTRAMUSCULAR; INTRAVENOUS at 12:36

## 2019-11-15 RX ADMIN — AMIODARONE HYDROCHLORIDE 400 MG: 200 TABLET ORAL at 20:59

## 2019-11-15 RX ADMIN — PANTOPRAZOLE SODIUM 40 MG: 40 TABLET, DELAYED RELEASE ORAL at 05:37

## 2019-11-15 RX ADMIN — MILRINONE LACTATE IN DEXTROSE 0.25 MCG/KG/MIN: 200 INJECTION, SOLUTION INTRAVENOUS at 18:01

## 2019-11-15 RX ADMIN — MILRINONE LACTATE IN DEXTROSE 0.25 MCG/KG/MIN: 200 INJECTION, SOLUTION INTRAVENOUS at 09:08

## 2019-11-15 RX ADMIN — ACETAMINOPHEN 650 MG: 325 TABLET, FILM COATED ORAL at 04:44

## 2019-11-15 RX ADMIN — METOLAZONE 2.5 MG: 2.5 TABLET ORAL at 13:36

## 2019-11-15 RX ADMIN — SACUBITRIL AND VALSARTAN 1 TABLET: 49; 51 TABLET, FILM COATED ORAL at 20:59

## 2019-11-15 RX ADMIN — MAGNESIUM SULFATE 1 G: 1 INJECTION INTRAVENOUS at 20:56

## 2019-11-15 RX ADMIN — ACETAMINOPHEN 650 MG: 325 TABLET, FILM COATED ORAL at 09:34

## 2019-11-15 ASSESSMENT — PAIN SCALES - GENERAL
PAINLEVEL_OUTOF10: 0
PAINLEVEL_OUTOF10: 3
PAINLEVEL_OUTOF10: 0

## 2019-11-16 ENCOUNTER — APPOINTMENT (OUTPATIENT)
Dept: GENERAL RADIOLOGY | Age: 69
DRG: 286 | End: 2019-11-16
Payer: COMMERCIAL

## 2019-11-16 LAB
ALBUMIN SERPL-MCNC: 3.5 G/DL (ref 3.5–5.2)
ALP BLD-CCNC: 96 U/L (ref 40–129)
ALT SERPL-CCNC: 22 U/L (ref 0–40)
ANION GAP SERPL CALCULATED.3IONS-SCNC: 10 MMOL/L (ref 7–16)
ANION GAP SERPL CALCULATED.3IONS-SCNC: 11 MMOL/L (ref 7–16)
ANION GAP SERPL CALCULATED.3IONS-SCNC: 16 MMOL/L (ref 7–16)
AST SERPL-CCNC: 23 U/L (ref 0–39)
BASOPHILS ABSOLUTE: 0.04 E9/L (ref 0–0.2)
BASOPHILS RELATIVE PERCENT: 0.6 % (ref 0–2)
BILIRUB SERPL-MCNC: 1 MG/DL (ref 0–1.2)
BUN BLDV-MCNC: 45 MG/DL (ref 8–23)
BUN BLDV-MCNC: 49 MG/DL (ref 8–23)
BUN BLDV-MCNC: 49 MG/DL (ref 8–23)
CALCIUM SERPL-MCNC: 8.9 MG/DL (ref 8.6–10.2)
CALCIUM SERPL-MCNC: 9 MG/DL (ref 8.6–10.2)
CALCIUM SERPL-MCNC: 9.2 MG/DL (ref 8.6–10.2)
CHLORIDE BLD-SCNC: 95 MMOL/L (ref 98–107)
CHLORIDE BLD-SCNC: 96 MMOL/L (ref 98–107)
CHLORIDE BLD-SCNC: 96 MMOL/L (ref 98–107)
CHLORIDE URINE RANDOM: 56 MMOL/L
CO2: 18 MMOL/L (ref 22–29)
CO2: 22 MMOL/L (ref 22–29)
CO2: 22 MMOL/L (ref 22–29)
CREAT SERPL-MCNC: 2 MG/DL (ref 0.7–1.2)
CREATININE URINE: 54 MG/DL (ref 40–278)
EKG ATRIAL RATE: 88 BPM
EKG Q-T INTERVAL: 426 MS
EKG QRS DURATION: 134 MS
EKG QTC CALCULATION (BAZETT): 521 MS
EKG R AXIS: -56 DEGREES
EKG T AXIS: 121 DEGREES
EKG VENTRICULAR RATE: 90 BPM
EOSINOPHILS ABSOLUTE: 0.39 E9/L (ref 0.05–0.5)
EOSINOPHILS RELATIVE PERCENT: 6.3 % (ref 0–6)
GFR AFRICAN AMERICAN: 41
GFR NON-AFRICAN AMERICAN: 41 ML/MIN/1.73
GLUCOSE BLD-MCNC: 164 MG/DL (ref 74–99)
GLUCOSE BLD-MCNC: 84 MG/DL (ref 74–99)
GLUCOSE BLD-MCNC: 84 MG/DL (ref 74–99)
HCT VFR BLD CALC: 39.3 % (ref 37–54)
HEMOGLOBIN: 12.9 G/DL (ref 12.5–16.5)
IMMATURE GRANULOCYTES #: 0.02 E9/L
IMMATURE GRANULOCYTES %: 0.3 % (ref 0–5)
LYMPHOCYTES ABSOLUTE: 1.81 E9/L (ref 1.5–4)
LYMPHOCYTES RELATIVE PERCENT: 29.3 % (ref 20–42)
MAGNESIUM: 2 MG/DL (ref 1.6–2.6)
MCH RBC QN AUTO: 29.5 PG (ref 26–35)
MCHC RBC AUTO-ENTMCNC: 32.8 % (ref 32–34.5)
MCV RBC AUTO: 89.9 FL (ref 80–99.9)
METER GLUCOSE: 123 MG/DL (ref 74–99)
METER GLUCOSE: 145 MG/DL (ref 74–99)
METER GLUCOSE: 182 MG/DL (ref 74–99)
METER GLUCOSE: 83 MG/DL (ref 74–99)
METER GLUCOSE: 85 MG/DL (ref 74–99)
MONOCYTES ABSOLUTE: 0.73 E9/L (ref 0.1–0.95)
MONOCYTES RELATIVE PERCENT: 11.8 % (ref 2–12)
NEUTROPHILS ABSOLUTE: 3.19 E9/L (ref 1.8–7.3)
NEUTROPHILS RELATIVE PERCENT: 51.7 % (ref 43–80)
PDW BLD-RTO: 15.7 FL (ref 11.5–15)
PLATELET # BLD: 169 E9/L (ref 130–450)
PMV BLD AUTO: 12 FL (ref 7–12)
POTASSIUM SERPL-SCNC: 4.4 MMOL/L (ref 3.5–5)
POTASSIUM SERPL-SCNC: 4.5 MMOL/L (ref 3.5–5)
POTASSIUM SERPL-SCNC: 4.7 MMOL/L (ref 3.5–5)
PRO-BNP: 1037 PG/ML (ref 0–125)
RBC # BLD: 4.37 E12/L (ref 3.8–5.8)
SODIUM BLD-SCNC: 128 MMOL/L (ref 132–146)
SODIUM BLD-SCNC: 129 MMOL/L (ref 132–146)
SODIUM BLD-SCNC: 129 MMOL/L (ref 132–146)
SODIUM URINE: 70 MMOL/L
TOTAL PROTEIN: 7.2 G/DL (ref 6.4–8.3)
WBC # BLD: 6.2 E9/L (ref 4.5–11.5)

## 2019-11-16 PROCEDURE — 2060000000 HC ICU INTERMEDIATE R&B

## 2019-11-16 PROCEDURE — 99232 SBSQ HOSP IP/OBS MODERATE 35: CPT | Performed by: INTERNAL MEDICINE

## 2019-11-16 PROCEDURE — 36415 COLL VENOUS BLD VENIPUNCTURE: CPT

## 2019-11-16 PROCEDURE — 82962 GLUCOSE BLOOD TEST: CPT

## 2019-11-16 PROCEDURE — 84300 ASSAY OF URINE SODIUM: CPT

## 2019-11-16 PROCEDURE — 71045 X-RAY EXAM CHEST 1 VIEW: CPT

## 2019-11-16 PROCEDURE — 6370000000 HC RX 637 (ALT 250 FOR IP): Performed by: INTERNAL MEDICINE

## 2019-11-16 PROCEDURE — 82436 ASSAY OF URINE CHLORIDE: CPT

## 2019-11-16 PROCEDURE — 80053 COMPREHEN METABOLIC PANEL: CPT

## 2019-11-16 PROCEDURE — 2500000003 HC RX 250 WO HCPCS: Performed by: INTERNAL MEDICINE

## 2019-11-16 PROCEDURE — 85025 COMPLETE CBC W/AUTO DIFF WBC: CPT

## 2019-11-16 PROCEDURE — 6360000002 HC RX W HCPCS: Performed by: INTERNAL MEDICINE

## 2019-11-16 PROCEDURE — 2580000003 HC RX 258: Performed by: INTERNAL MEDICINE

## 2019-11-16 PROCEDURE — 83880 ASSAY OF NATRIURETIC PEPTIDE: CPT

## 2019-11-16 PROCEDURE — 80048 BASIC METABOLIC PNL TOTAL CA: CPT

## 2019-11-16 PROCEDURE — 82570 ASSAY OF URINE CREATININE: CPT

## 2019-11-16 PROCEDURE — 6370000000 HC RX 637 (ALT 250 FOR IP): Performed by: NURSE PRACTITIONER

## 2019-11-16 PROCEDURE — 83735 ASSAY OF MAGNESIUM: CPT

## 2019-11-16 RX ORDER — METOLAZONE 2.5 MG/1
2.5 TABLET ORAL DAILY
Status: DISCONTINUED | OUTPATIENT
Start: 2019-11-16 | End: 2019-11-16

## 2019-11-16 RX ORDER — BUMETANIDE 0.25 MG/ML
2 INJECTION, SOLUTION INTRAMUSCULAR; INTRAVENOUS ONCE
Status: COMPLETED | OUTPATIENT
Start: 2019-11-16 | End: 2019-11-16

## 2019-11-16 RX ORDER — 0.9 % SODIUM CHLORIDE 0.9 %
500 INTRAVENOUS SOLUTION INTRAVENOUS ONCE
Status: COMPLETED | OUTPATIENT
Start: 2019-11-16 | End: 2019-11-16

## 2019-11-16 RX ORDER — METOLAZONE 2.5 MG/1
2.5 TABLET ORAL ONCE
Status: COMPLETED | OUTPATIENT
Start: 2019-11-16 | End: 2019-11-16

## 2019-11-16 RX ORDER — METOPROLOL SUCCINATE 25 MG/1
12.5 TABLET, EXTENDED RELEASE ORAL DAILY
Status: DISCONTINUED | OUTPATIENT
Start: 2019-11-17 | End: 2019-11-17

## 2019-11-16 RX ORDER — SODIUM CHLORIDE 9 MG/ML
INJECTION, SOLUTION INTRAVENOUS CONTINUOUS
Status: DISCONTINUED | OUTPATIENT
Start: 2019-11-16 | End: 2019-11-19

## 2019-11-16 RX ORDER — 0.9 % SODIUM CHLORIDE 0.9 %
500 INTRAVENOUS SOLUTION INTRAVENOUS ONCE
Status: DISCONTINUED | OUTPATIENT
Start: 2019-11-16 | End: 2019-11-19 | Stop reason: HOSPADM

## 2019-11-16 RX ORDER — MIDODRINE HYDROCHLORIDE 5 MG/1
5 TABLET ORAL
Status: DISCONTINUED | OUTPATIENT
Start: 2019-11-16 | End: 2019-11-19 | Stop reason: HOSPADM

## 2019-11-16 RX ADMIN — SODIUM CHLORIDE 500 ML: 9 INJECTION, SOLUTION INTRAVENOUS at 14:22

## 2019-11-16 RX ADMIN — SACUBITRIL AND VALSARTAN 1 TABLET: 49; 51 TABLET, FILM COATED ORAL at 22:01

## 2019-11-16 RX ADMIN — AMIODARONE HYDROCHLORIDE 200 MG: 200 TABLET ORAL at 22:01

## 2019-11-16 RX ADMIN — MILRINONE LACTATE IN DEXTROSE 0.25 MCG/KG/MIN: 200 INJECTION, SOLUTION INTRAVENOUS at 04:39

## 2019-11-16 RX ADMIN — APIXABAN 5 MG: 5 TABLET, FILM COATED ORAL at 22:01

## 2019-11-16 RX ADMIN — SACUBITRIL AND VALSARTAN 1 TABLET: 49; 51 TABLET, FILM COATED ORAL at 09:13

## 2019-11-16 RX ADMIN — BUMETANIDE 2 MG: 0.25 INJECTION INTRAMUSCULAR; INTRAVENOUS at 04:15

## 2019-11-16 RX ADMIN — SODIUM CHLORIDE: 9 INJECTION, SOLUTION INTRAVENOUS at 04:05

## 2019-11-16 RX ADMIN — MIDODRINE HYDROCHLORIDE 5 MG: 5 TABLET ORAL at 15:37

## 2019-11-16 RX ADMIN — AMIODARONE HYDROCHLORIDE 200 MG: 200 TABLET ORAL at 09:13

## 2019-11-16 RX ADMIN — ACETAMINOPHEN 650 MG: 325 TABLET, FILM COATED ORAL at 13:32

## 2019-11-16 RX ADMIN — INSULIN GLARGINE 40 UNITS: 100 INJECTION, SOLUTION SUBCUTANEOUS at 22:08

## 2019-11-16 RX ADMIN — METOLAZONE 2.5 MG: 2.5 TABLET ORAL at 04:16

## 2019-11-16 RX ADMIN — PANTOPRAZOLE SODIUM 40 MG: 40 TABLET, DELAYED RELEASE ORAL at 06:54

## 2019-11-16 RX ADMIN — MILRINONE LACTATE IN DEXTROSE 0.12 MCG/KG/MIN: 200 INJECTION, SOLUTION INTRAVENOUS at 21:59

## 2019-11-16 RX ADMIN — INSULIN LISPRO 1 UNITS: 100 INJECTION, SOLUTION INTRAVENOUS; SUBCUTANEOUS at 22:07

## 2019-11-16 ASSESSMENT — PAIN - FUNCTIONAL ASSESSMENT: PAIN_FUNCTIONAL_ASSESSMENT: PREVENTS OR INTERFERES SOME ACTIVE ACTIVITIES AND ADLS

## 2019-11-16 ASSESSMENT — PAIN DESCRIPTION - DESCRIPTORS: DESCRIPTORS: ACHING;DULL

## 2019-11-16 ASSESSMENT — PAIN DESCRIPTION - ONSET: ONSET: ON-GOING

## 2019-11-16 ASSESSMENT — PAIN DESCRIPTION - PAIN TYPE: TYPE: ACUTE PAIN

## 2019-11-16 ASSESSMENT — PAIN SCALES - GENERAL
PAINLEVEL_OUTOF10: 0
PAINLEVEL_OUTOF10: 10
PAINLEVEL_OUTOF10: 0

## 2019-11-16 ASSESSMENT — PAIN DESCRIPTION - LOCATION: LOCATION: HEAD

## 2019-11-16 ASSESSMENT — PAIN DESCRIPTION - FREQUENCY: FREQUENCY: INTERMITTENT

## 2019-11-17 LAB
ALBUMIN SERPL-MCNC: 3.1 G/DL (ref 3.5–5.2)
ALP BLD-CCNC: 90 U/L (ref 40–129)
ALT SERPL-CCNC: 19 U/L (ref 0–40)
ANION GAP SERPL CALCULATED.3IONS-SCNC: 14 MMOL/L (ref 7–16)
AST SERPL-CCNC: 20 U/L (ref 0–39)
BASOPHILS ABSOLUTE: 0.04 E9/L (ref 0–0.2)
BASOPHILS RELATIVE PERCENT: 0.8 % (ref 0–2)
BILIRUB SERPL-MCNC: 0.7 MG/DL (ref 0–1.2)
BLOOD CULTURE, ROUTINE: NORMAL
BUN BLDV-MCNC: 42 MG/DL (ref 8–23)
CALCIUM SERPL-MCNC: 8.7 MG/DL (ref 8.6–10.2)
CHLORIDE BLD-SCNC: 97 MMOL/L (ref 98–107)
CO2: 20 MMOL/L (ref 22–29)
CREAT SERPL-MCNC: 1.8 MG/DL (ref 0.7–1.2)
CULTURE, BLOOD 2: NORMAL
EOSINOPHILS ABSOLUTE: 0.35 E9/L (ref 0.05–0.5)
EOSINOPHILS RELATIVE PERCENT: 7.3 % (ref 0–6)
GFR AFRICAN AMERICAN: 46
GFR NON-AFRICAN AMERICAN: 46 ML/MIN/1.73
GLUCOSE BLD-MCNC: 66 MG/DL (ref 74–99)
HCT VFR BLD CALC: 36.9 % (ref 37–54)
HEMOGLOBIN: 12.2 G/DL (ref 12.5–16.5)
IMMATURE GRANULOCYTES #: 0.02 E9/L
IMMATURE GRANULOCYTES %: 0.4 % (ref 0–5)
LYMPHOCYTES ABSOLUTE: 1.61 E9/L (ref 1.5–4)
LYMPHOCYTES RELATIVE PERCENT: 33.7 % (ref 20–42)
MAGNESIUM: 1.9 MG/DL (ref 1.6–2.6)
MCH RBC QN AUTO: 29.8 PG (ref 26–35)
MCHC RBC AUTO-ENTMCNC: 33.1 % (ref 32–34.5)
MCV RBC AUTO: 90.2 FL (ref 80–99.9)
METER GLUCOSE: 109 MG/DL (ref 74–99)
METER GLUCOSE: 126 MG/DL (ref 74–99)
METER GLUCOSE: 141 MG/DL (ref 74–99)
METER GLUCOSE: 73 MG/DL (ref 74–99)
MONOCYTES ABSOLUTE: 0.6 E9/L (ref 0.1–0.95)
MONOCYTES RELATIVE PERCENT: 12.6 % (ref 2–12)
NEUTROPHILS ABSOLUTE: 2.16 E9/L (ref 1.8–7.3)
NEUTROPHILS RELATIVE PERCENT: 45.2 % (ref 43–80)
PDW BLD-RTO: 16.1 FL (ref 11.5–15)
PLATELET # BLD: 163 E9/L (ref 130–450)
PMV BLD AUTO: 12.1 FL (ref 7–12)
POTASSIUM SERPL-SCNC: 4.3 MMOL/L (ref 3.5–5)
PRO-BNP: 925 PG/ML (ref 0–125)
RBC # BLD: 4.09 E12/L (ref 3.8–5.8)
SODIUM BLD-SCNC: 131 MMOL/L (ref 132–146)
TOTAL PROTEIN: 6.5 G/DL (ref 6.4–8.3)
WBC # BLD: 4.8 E9/L (ref 4.5–11.5)

## 2019-11-17 PROCEDURE — 6360000002 HC RX W HCPCS: Performed by: INTERNAL MEDICINE

## 2019-11-17 PROCEDURE — 85025 COMPLETE CBC W/AUTO DIFF WBC: CPT

## 2019-11-17 PROCEDURE — 36415 COLL VENOUS BLD VENIPUNCTURE: CPT

## 2019-11-17 PROCEDURE — 6370000000 HC RX 637 (ALT 250 FOR IP): Performed by: INTERNAL MEDICINE

## 2019-11-17 PROCEDURE — 82962 GLUCOSE BLOOD TEST: CPT

## 2019-11-17 PROCEDURE — 2580000003 HC RX 258: Performed by: INTERNAL MEDICINE

## 2019-11-17 PROCEDURE — 80053 COMPREHEN METABOLIC PANEL: CPT

## 2019-11-17 PROCEDURE — 83735 ASSAY OF MAGNESIUM: CPT

## 2019-11-17 PROCEDURE — 83880 ASSAY OF NATRIURETIC PEPTIDE: CPT

## 2019-11-17 PROCEDURE — 2060000000 HC ICU INTERMEDIATE R&B

## 2019-11-17 PROCEDURE — 6370000000 HC RX 637 (ALT 250 FOR IP): Performed by: NURSE PRACTITIONER

## 2019-11-17 RX ADMIN — SACUBITRIL AND VALSARTAN 1 TABLET: 24; 26 TABLET, FILM COATED ORAL at 21:07

## 2019-11-17 RX ADMIN — Medication 10 ML: at 21:07

## 2019-11-17 RX ADMIN — MIDODRINE HYDROCHLORIDE 5 MG: 5 TABLET ORAL at 14:23

## 2019-11-17 RX ADMIN — AMIODARONE HYDROCHLORIDE 200 MG: 200 TABLET ORAL at 14:22

## 2019-11-17 RX ADMIN — SACUBITRIL AND VALSARTAN 1 TABLET: 24; 26 TABLET, FILM COATED ORAL at 14:22

## 2019-11-17 RX ADMIN — MIDODRINE HYDROCHLORIDE 5 MG: 5 TABLET ORAL at 16:13

## 2019-11-17 RX ADMIN — PANTOPRAZOLE SODIUM 40 MG: 40 TABLET, DELAYED RELEASE ORAL at 05:54

## 2019-11-17 RX ADMIN — SODIUM CHLORIDE: 9 INJECTION, SOLUTION INTRAVENOUS at 14:22

## 2019-11-17 RX ADMIN — MILRINONE LACTATE IN DEXTROSE 0.12 MCG/KG/MIN: 200 INJECTION, SOLUTION INTRAVENOUS at 17:39

## 2019-11-17 RX ADMIN — AMIODARONE HYDROCHLORIDE 200 MG: 200 TABLET ORAL at 21:07

## 2019-11-17 RX ADMIN — MIDODRINE HYDROCHLORIDE 5 MG: 5 TABLET ORAL at 10:02

## 2019-11-17 ASSESSMENT — PAIN SCALES - GENERAL
PAINLEVEL_OUTOF10: 0

## 2019-11-18 ENCOUNTER — APPOINTMENT (OUTPATIENT)
Dept: INTERVENTIONAL RADIOLOGY/VASCULAR | Age: 69
DRG: 286 | End: 2019-11-18
Payer: COMMERCIAL

## 2019-11-18 ENCOUNTER — TELEPHONE (OUTPATIENT)
Dept: CARDIOLOGY CLINIC | Age: 69
End: 2019-11-18

## 2019-11-18 LAB
ALBUMIN SERPL-MCNC: 3.3 G/DL (ref 3.5–5.2)
ALP BLD-CCNC: 93 U/L (ref 40–129)
ALT SERPL-CCNC: 19 U/L (ref 0–40)
ANION GAP SERPL CALCULATED.3IONS-SCNC: 15 MMOL/L (ref 7–16)
AST SERPL-CCNC: 22 U/L (ref 0–39)
BASOPHILS ABSOLUTE: 0.04 E9/L (ref 0–0.2)
BASOPHILS RELATIVE PERCENT: 0.8 % (ref 0–2)
BILIRUB SERPL-MCNC: 0.8 MG/DL (ref 0–1.2)
BUN BLDV-MCNC: 34 MG/DL (ref 8–23)
CALCIUM SERPL-MCNC: 8.9 MG/DL (ref 8.6–10.2)
CHLORIDE BLD-SCNC: 100 MMOL/L (ref 98–107)
CO2: 19 MMOL/L (ref 22–29)
CREAT SERPL-MCNC: 1.4 MG/DL (ref 0.7–1.2)
EOSINOPHILS ABSOLUTE: 0.27 E9/L (ref 0.05–0.5)
EOSINOPHILS RELATIVE PERCENT: 5.6 % (ref 0–6)
GFR AFRICAN AMERICAN: >60
GFR NON-AFRICAN AMERICAN: >60 ML/MIN/1.73
GLUCOSE BLD-MCNC: 92 MG/DL (ref 74–99)
HCT VFR BLD CALC: 38.2 % (ref 37–54)
HEMOGLOBIN: 12.4 G/DL (ref 12.5–16.5)
IMMATURE GRANULOCYTES #: 0.01 E9/L
IMMATURE GRANULOCYTES %: 0.2 % (ref 0–5)
LYMPHOCYTES ABSOLUTE: 1.92 E9/L (ref 1.5–4)
LYMPHOCYTES RELATIVE PERCENT: 40.1 % (ref 20–42)
MAGNESIUM: 1.9 MG/DL (ref 1.6–2.6)
MCH RBC QN AUTO: 29.4 PG (ref 26–35)
MCHC RBC AUTO-ENTMCNC: 32.5 % (ref 32–34.5)
MCV RBC AUTO: 90.5 FL (ref 80–99.9)
METER GLUCOSE: 138 MG/DL (ref 74–99)
METER GLUCOSE: 145 MG/DL (ref 74–99)
METER GLUCOSE: 89 MG/DL (ref 74–99)
METER GLUCOSE: 94 MG/DL (ref 74–99)
MONOCYTES ABSOLUTE: 0.6 E9/L (ref 0.1–0.95)
MONOCYTES RELATIVE PERCENT: 12.5 % (ref 2–12)
NEUTROPHILS ABSOLUTE: 1.95 E9/L (ref 1.8–7.3)
NEUTROPHILS RELATIVE PERCENT: 40.8 % (ref 43–80)
PDW BLD-RTO: 16.2 FL (ref 11.5–15)
PLATELET # BLD: 166 E9/L (ref 130–450)
PMV BLD AUTO: 12.1 FL (ref 7–12)
POTASSIUM SERPL-SCNC: 4.6 MMOL/L (ref 3.5–5)
RBC # BLD: 4.22 E12/L (ref 3.8–5.8)
SODIUM BLD-SCNC: 134 MMOL/L (ref 132–146)
TOTAL PROTEIN: 6.8 G/DL (ref 6.4–8.3)
WBC # BLD: 4.8 E9/L (ref 4.5–11.5)

## 2019-11-18 PROCEDURE — 6370000000 HC RX 637 (ALT 250 FOR IP): Performed by: INTERNAL MEDICINE

## 2019-11-18 PROCEDURE — C1751 CATH, INF, PER/CENT/MIDLINE: HCPCS

## 2019-11-18 PROCEDURE — 82962 GLUCOSE BLOOD TEST: CPT

## 2019-11-18 PROCEDURE — 99233 SBSQ HOSP IP/OBS HIGH 50: CPT | Performed by: INTERNAL MEDICINE

## 2019-11-18 PROCEDURE — 02HV33Z INSERTION OF INFUSION DEVICE INTO SUPERIOR VENA CAVA, PERCUTANEOUS APPROACH: ICD-10-PCS | Performed by: RADIOLOGY

## 2019-11-18 PROCEDURE — 83735 ASSAY OF MAGNESIUM: CPT

## 2019-11-18 PROCEDURE — 2500000003 HC RX 250 WO HCPCS: Performed by: PHYSICIAN ASSISTANT

## 2019-11-18 PROCEDURE — 36415 COLL VENOUS BLD VENIPUNCTURE: CPT

## 2019-11-18 PROCEDURE — APPSS45 APP SPLIT SHARED TIME 31-45 MINUTES: Performed by: NURSE PRACTITIONER

## 2019-11-18 PROCEDURE — 6360000002 HC RX W HCPCS: Performed by: INTERNAL MEDICINE

## 2019-11-18 PROCEDURE — 6370000000 HC RX 637 (ALT 250 FOR IP): Performed by: NURSE PRACTITIONER

## 2019-11-18 PROCEDURE — 36573 INSJ PICC RS&I 5 YR+: CPT

## 2019-11-18 PROCEDURE — 80053 COMPREHEN METABOLIC PANEL: CPT

## 2019-11-18 PROCEDURE — 85025 COMPLETE CBC W/AUTO DIFF WBC: CPT

## 2019-11-18 PROCEDURE — 2580000003 HC RX 258: Performed by: INTERNAL MEDICINE

## 2019-11-18 PROCEDURE — 2060000000 HC ICU INTERMEDIATE R&B

## 2019-11-18 RX ORDER — LIDOCAINE HYDROCHLORIDE 20 MG/ML
INJECTION, SOLUTION INFILTRATION; PERINEURAL
Status: COMPLETED | OUTPATIENT
Start: 2019-11-18 | End: 2019-11-18

## 2019-11-18 RX ORDER — BUMETANIDE 1 MG/1
2 TABLET ORAL EVERY OTHER DAY
Status: DISCONTINUED | OUTPATIENT
Start: 2019-11-19 | End: 2019-11-19 | Stop reason: HOSPADM

## 2019-11-18 RX ORDER — METOPROLOL SUCCINATE 100 MG/1
TABLET, EXTENDED RELEASE ORAL
Qty: 180 TABLET | Refills: 4 | Status: SHIPPED | OUTPATIENT
Start: 2019-11-18 | End: 2019-11-19 | Stop reason: HOSPADM

## 2019-11-18 RX ADMIN — MIDODRINE HYDROCHLORIDE 5 MG: 5 TABLET ORAL at 11:16

## 2019-11-18 RX ADMIN — AMIODARONE HYDROCHLORIDE 200 MG: 200 TABLET ORAL at 21:26

## 2019-11-18 RX ADMIN — SACUBITRIL AND VALSARTAN 1 TABLET: 49; 51 TABLET, FILM COATED ORAL at 21:25

## 2019-11-18 RX ADMIN — AMIODARONE HYDROCHLORIDE 200 MG: 200 TABLET ORAL at 09:39

## 2019-11-18 RX ADMIN — LIDOCAINE HYDROCHLORIDE 12 ML: 20 INJECTION, SOLUTION INFILTRATION; PERINEURAL at 13:24

## 2019-11-18 RX ADMIN — INSULIN LISPRO 3 UNITS: 100 INJECTION, SOLUTION INTRAVENOUS; SUBCUTANEOUS at 17:12

## 2019-11-18 RX ADMIN — SODIUM CHLORIDE: 9 INJECTION, SOLUTION INTRAVENOUS at 09:40

## 2019-11-18 RX ADMIN — PANTOPRAZOLE SODIUM 40 MG: 40 TABLET, DELAYED RELEASE ORAL at 06:36

## 2019-11-18 RX ADMIN — SACUBITRIL AND VALSARTAN 1 TABLET: 49; 51 TABLET, FILM COATED ORAL at 11:13

## 2019-11-18 RX ADMIN — MILRINONE LACTATE IN DEXTROSE 0.12 MCG/KG/MIN: 200 INJECTION, SOLUTION INTRAVENOUS at 11:17

## 2019-11-18 RX ADMIN — APIXABAN 5 MG: 5 TABLET, FILM COATED ORAL at 21:26

## 2019-11-18 RX ADMIN — MIDODRINE HYDROCHLORIDE 5 MG: 5 TABLET ORAL at 17:12

## 2019-11-18 RX ADMIN — MIDODRINE HYDROCHLORIDE 5 MG: 5 TABLET ORAL at 09:39

## 2019-11-18 ASSESSMENT — PAIN SCALES - GENERAL
PAINLEVEL_OUTOF10: 0

## 2019-11-19 ENCOUNTER — TELEPHONE (OUTPATIENT)
Dept: CARDIOLOGY CLINIC | Age: 69
End: 2019-11-19

## 2019-11-19 VITALS
TEMPERATURE: 97.6 F | SYSTOLIC BLOOD PRESSURE: 130 MMHG | RESPIRATION RATE: 18 BRPM | WEIGHT: 312 LBS | OXYGEN SATURATION: 99 % | HEIGHT: 70 IN | DIASTOLIC BLOOD PRESSURE: 71 MMHG | HEART RATE: 91 BPM | BODY MASS INDEX: 44.67 KG/M2

## 2019-11-19 LAB
ALBUMIN SERPL-MCNC: 3.3 G/DL (ref 3.5–5.2)
ALP BLD-CCNC: 94 U/L (ref 40–129)
ALT SERPL-CCNC: 18 U/L (ref 0–40)
ANION GAP SERPL CALCULATED.3IONS-SCNC: 14 MMOL/L (ref 7–16)
AST SERPL-CCNC: 18 U/L (ref 0–39)
BASOPHILS ABSOLUTE: 0.03 E9/L (ref 0–0.2)
BASOPHILS RELATIVE PERCENT: 0.6 % (ref 0–2)
BILIRUB SERPL-MCNC: 0.8 MG/DL (ref 0–1.2)
BUN BLDV-MCNC: 32 MG/DL (ref 8–23)
CALCIUM SERPL-MCNC: 8.9 MG/DL (ref 8.6–10.2)
CHLORIDE BLD-SCNC: 102 MMOL/L (ref 98–107)
CO2: 19 MMOL/L (ref 22–29)
CREAT SERPL-MCNC: 1.5 MG/DL (ref 0.7–1.2)
EOSINOPHILS ABSOLUTE: 0.23 E9/L (ref 0.05–0.5)
EOSINOPHILS RELATIVE PERCENT: 4.5 % (ref 0–6)
GFR AFRICAN AMERICAN: 56
GFR NON-AFRICAN AMERICAN: 56 ML/MIN/1.73
GLUCOSE BLD-MCNC: 117 MG/DL (ref 74–99)
HCT VFR BLD CALC: 36 % (ref 37–54)
HEMOGLOBIN: 11.7 G/DL (ref 12.5–16.5)
IMMATURE GRANULOCYTES #: 0.02 E9/L
IMMATURE GRANULOCYTES %: 0.4 % (ref 0–5)
LYMPHOCYTES ABSOLUTE: 2.54 E9/L (ref 1.5–4)
LYMPHOCYTES RELATIVE PERCENT: 50 % (ref 20–42)
MAGNESIUM: 1.9 MG/DL (ref 1.6–2.6)
MCH RBC QN AUTO: 30 PG (ref 26–35)
MCHC RBC AUTO-ENTMCNC: 32.5 % (ref 32–34.5)
MCV RBC AUTO: 92.3 FL (ref 80–99.9)
METER GLUCOSE: 121 MG/DL (ref 74–99)
METER GLUCOSE: 135 MG/DL (ref 74–99)
METER GLUCOSE: 234 MG/DL (ref 74–99)
MONOCYTES ABSOLUTE: 0.55 E9/L (ref 0.1–0.95)
MONOCYTES RELATIVE PERCENT: 10.8 % (ref 2–12)
NEUTROPHILS ABSOLUTE: 1.71 E9/L (ref 1.8–7.3)
NEUTROPHILS RELATIVE PERCENT: 33.7 % (ref 43–80)
PDW BLD-RTO: 16.5 FL (ref 11.5–15)
PLATELET # BLD: 161 E9/L (ref 130–450)
PMV BLD AUTO: 12.1 FL (ref 7–12)
POTASSIUM SERPL-SCNC: 4.7 MMOL/L (ref 3.5–5)
RBC # BLD: 3.9 E12/L (ref 3.8–5.8)
SODIUM BLD-SCNC: 135 MMOL/L (ref 132–146)
TOTAL PROTEIN: 6.6 G/DL (ref 6.4–8.3)
WBC # BLD: 5.1 E9/L (ref 4.5–11.5)

## 2019-11-19 PROCEDURE — 36592 COLLECT BLOOD FROM PICC: CPT

## 2019-11-19 PROCEDURE — 6370000000 HC RX 637 (ALT 250 FOR IP): Performed by: INTERNAL MEDICINE

## 2019-11-19 PROCEDURE — 83735 ASSAY OF MAGNESIUM: CPT

## 2019-11-19 PROCEDURE — 85025 COMPLETE CBC W/AUTO DIFF WBC: CPT

## 2019-11-19 PROCEDURE — 82962 GLUCOSE BLOOD TEST: CPT

## 2019-11-19 PROCEDURE — 36415 COLL VENOUS BLD VENIPUNCTURE: CPT

## 2019-11-19 PROCEDURE — 97535 SELF CARE MNGMENT TRAINING: CPT

## 2019-11-19 PROCEDURE — 6360000002 HC RX W HCPCS: Performed by: INTERNAL MEDICINE

## 2019-11-19 PROCEDURE — 99239 HOSP IP/OBS DSCHRG MGMT >30: CPT | Performed by: INTERNAL MEDICINE

## 2019-11-19 PROCEDURE — 6370000000 HC RX 637 (ALT 250 FOR IP): Performed by: NURSE PRACTITIONER

## 2019-11-19 PROCEDURE — 2580000003 HC RX 258: Performed by: INTERNAL MEDICINE

## 2019-11-19 PROCEDURE — 80053 COMPREHEN METABOLIC PANEL: CPT

## 2019-11-19 RX ORDER — AMIODARONE HYDROCHLORIDE 200 MG/1
200 TABLET ORAL 2 TIMES DAILY
Qty: 30 TABLET | Refills: 3 | Status: SHIPPED | OUTPATIENT
Start: 2019-11-19 | End: 2019-12-17 | Stop reason: SDUPTHER

## 2019-11-19 RX ORDER — ALLOPURINOL 100 MG/1
200 TABLET ORAL DAILY
Qty: 30 TABLET | Refills: 3 | Status: SHIPPED | OUTPATIENT
Start: 2019-11-19 | End: 2019-12-03

## 2019-11-19 RX ORDER — MILRINONE LACTATE 0.2 MG/ML
0.2 INJECTION, SOLUTION INTRAVENOUS CONTINUOUS
Qty: 100 ML | Refills: 3 | Status: SHIPPED | OUTPATIENT
Start: 2019-11-19 | End: 2020-01-31

## 2019-11-19 RX ORDER — MIDODRINE HYDROCHLORIDE 5 MG/1
5 TABLET ORAL
Qty: 90 TABLET | Refills: 3 | Status: SHIPPED | OUTPATIENT
Start: 2019-11-20 | End: 2019-12-13

## 2019-11-19 RX ORDER — AMIODARONE HYDROCHLORIDE 200 MG/1
200 TABLET ORAL DAILY
Qty: 30 TABLET | Refills: 3 | Status: SHIPPED | OUTPATIENT
Start: 2019-11-23 | End: 2019-11-25

## 2019-11-19 RX ORDER — METOLAZONE 2.5 MG/1
2.5 TABLET ORAL EVERY OTHER DAY
Qty: 60 TABLET | Refills: 3 | Status: SHIPPED | OUTPATIENT
Start: 2019-11-19 | End: 2019-11-25

## 2019-11-19 RX ORDER — PANTOPRAZOLE SODIUM 40 MG/1
40 TABLET, DELAYED RELEASE ORAL
Qty: 30 TABLET | Refills: 3 | Status: SHIPPED | OUTPATIENT
Start: 2019-11-20 | End: 2019-12-17 | Stop reason: SDUPTHER

## 2019-11-19 RX ORDER — METOLAZONE 2.5 MG/1
2.5 TABLET ORAL EVERY OTHER DAY
Status: DISCONTINUED | OUTPATIENT
Start: 2019-11-19 | End: 2019-11-19 | Stop reason: HOSPADM

## 2019-11-19 RX ORDER — BUMETANIDE 2 MG/1
2 TABLET ORAL EVERY OTHER DAY
Qty: 30 TABLET | Refills: 3 | Status: SHIPPED | OUTPATIENT
Start: 2019-11-21 | End: 2019-12-03

## 2019-11-19 RX ADMIN — MIDODRINE HYDROCHLORIDE 5 MG: 5 TABLET ORAL at 16:22

## 2019-11-19 RX ADMIN — PANTOPRAZOLE SODIUM 40 MG: 40 TABLET, DELAYED RELEASE ORAL at 05:00

## 2019-11-19 RX ADMIN — METOLAZONE 2.5 MG: 2.5 TABLET ORAL at 11:59

## 2019-11-19 RX ADMIN — BUMETANIDE 2 MG: 1 TABLET ORAL at 09:39

## 2019-11-19 RX ADMIN — SACUBITRIL AND VALSARTAN 1 TABLET: 49; 51 TABLET, FILM COATED ORAL at 09:39

## 2019-11-19 RX ADMIN — MILRINONE LACTATE IN DEXTROSE 0.12 MCG/KG/MIN: 200 INJECTION, SOLUTION INTRAVENOUS at 07:48

## 2019-11-19 RX ADMIN — SODIUM CHLORIDE: 9 INJECTION, SOLUTION INTRAVENOUS at 04:49

## 2019-11-19 RX ADMIN — INSULIN LISPRO 6 UNITS: 100 INJECTION, SOLUTION INTRAVENOUS; SUBCUTANEOUS at 11:09

## 2019-11-19 RX ADMIN — APIXABAN 5 MG: 5 TABLET, FILM COATED ORAL at 09:38

## 2019-11-19 RX ADMIN — AMIODARONE HYDROCHLORIDE 200 MG: 200 TABLET ORAL at 09:38

## 2019-11-19 RX ADMIN — MIDODRINE HYDROCHLORIDE 5 MG: 5 TABLET ORAL at 11:09

## 2019-11-19 RX ADMIN — MIDODRINE HYDROCHLORIDE 5 MG: 5 TABLET ORAL at 09:39

## 2019-11-19 ASSESSMENT — PAIN SCALES - GENERAL
PAINLEVEL_OUTOF10: 0

## 2019-11-21 ENCOUNTER — TELEPHONE (OUTPATIENT)
Dept: FAMILY MEDICINE CLINIC | Age: 69
End: 2019-11-21

## 2019-11-21 ENCOUNTER — TELEPHONE (OUTPATIENT)
Dept: CARDIOLOGY CLINIC | Age: 69
End: 2019-11-21

## 2019-11-22 ENCOUNTER — OFFICE VISIT (OUTPATIENT)
Dept: CARDIOLOGY CLINIC | Age: 69
End: 2019-11-22
Payer: COMMERCIAL

## 2019-11-22 VITALS
SYSTOLIC BLOOD PRESSURE: 100 MMHG | WEIGHT: 312 LBS | BODY MASS INDEX: 43.68 KG/M2 | TEMPERATURE: 97.5 F | DIASTOLIC BLOOD PRESSURE: 58 MMHG | OXYGEN SATURATION: 98 % | HEART RATE: 110 BPM | RESPIRATION RATE: 20 BRPM | HEIGHT: 71 IN

## 2019-11-22 DIAGNOSIS — I47.1 AVNRT (AV NODAL RE-ENTRY TACHYCARDIA) (HCC): ICD-10-CM

## 2019-11-22 DIAGNOSIS — I42.8 NICM (NONISCHEMIC CARDIOMYOPATHY) (HCC): Chronic | ICD-10-CM

## 2019-11-22 DIAGNOSIS — I47.29 NONSUSTAINED VENTRICULAR TACHYCARDIA: ICD-10-CM

## 2019-11-22 DIAGNOSIS — I48.0 PAF (PAROXYSMAL ATRIAL FIBRILLATION) (HCC): Primary | ICD-10-CM

## 2019-11-22 DIAGNOSIS — I48.19 OTHER PERSISTENT ATRIAL FIBRILLATION (HCC): Chronic | ICD-10-CM

## 2019-11-22 PROBLEM — N17.9 ACUTE KIDNEY INJURY SUPERIMPOSED ON CHRONIC KIDNEY DISEASE (HCC): Status: RESOLVED | Noted: 2019-09-18 | Resolved: 2019-11-22

## 2019-11-22 PROBLEM — N18.9 ACUTE KIDNEY INJURY SUPERIMPOSED ON CHRONIC KIDNEY DISEASE (HCC): Status: RESOLVED | Noted: 2019-09-18 | Resolved: 2019-11-22

## 2019-11-22 PROBLEM — R57.0 CARDIOGENIC SHOCK (HCC): Status: RESOLVED | Noted: 2019-11-02 | Resolved: 2019-11-22

## 2019-11-22 PROBLEM — I49.9 CARDIAC ARRHYTHMIA: Status: RESOLVED | Noted: 2019-10-02 | Resolved: 2019-11-22

## 2019-11-22 PROBLEM — I44.7 LBBB (LEFT BUNDLE BRANCH BLOCK): Chronic | Status: RESOLVED | Noted: 2019-09-18 | Resolved: 2019-11-22

## 2019-11-22 PROBLEM — Z45.02 AICD DISCHARGE: Status: RESOLVED | Noted: 2019-09-21 | Resolved: 2019-11-22

## 2019-11-22 PROBLEM — I49.9 ARRHYTHMIA: Status: RESOLVED | Noted: 2019-09-23 | Resolved: 2019-11-22

## 2019-11-22 PROBLEM — I50.43 CHF (CONGESTIVE HEART FAILURE), NYHA CLASS I, ACUTE ON CHRONIC, COMBINED (HCC): Status: RESOLVED | Noted: 2019-10-21 | Resolved: 2019-11-22

## 2019-11-22 PROBLEM — I50.23 CHF (CONGESTIVE HEART FAILURE), NYHA CLASS III, ACUTE ON CHRONIC, SYSTOLIC (HCC): Status: RESOLVED | Noted: 2019-09-03 | Resolved: 2019-11-22

## 2019-11-22 PROBLEM — I50.43 ACUTE ON CHRONIC COMBINED SYSTOLIC AND DIASTOLIC CONGESTIVE HEART FAILURE (HCC): Status: RESOLVED | Noted: 2019-02-07 | Resolved: 2019-11-22

## 2019-11-22 PROBLEM — I50.9 HEART FAILURE (HCC): Status: RESOLVED | Noted: 2019-11-02 | Resolved: 2019-11-22

## 2019-11-22 PROCEDURE — 1123F ACP DISCUSS/DSCN MKR DOCD: CPT | Performed by: INTERNAL MEDICINE

## 2019-11-22 PROCEDURE — 93000 ELECTROCARDIOGRAM COMPLETE: CPT | Performed by: INTERNAL MEDICINE

## 2019-11-22 PROCEDURE — G8417 CALC BMI ABV UP PARAM F/U: HCPCS | Performed by: INTERNAL MEDICINE

## 2019-11-22 PROCEDURE — G8482 FLU IMMUNIZE ORDER/ADMIN: HCPCS | Performed by: INTERNAL MEDICINE

## 2019-11-22 PROCEDURE — 1111F DSCHRG MED/CURRENT MED MERGE: CPT | Performed by: INTERNAL MEDICINE

## 2019-11-22 PROCEDURE — 4040F PNEUMOC VAC/ADMIN/RCVD: CPT | Performed by: INTERNAL MEDICINE

## 2019-11-22 PROCEDURE — 3017F COLORECTAL CA SCREEN DOC REV: CPT | Performed by: INTERNAL MEDICINE

## 2019-11-22 PROCEDURE — G8427 DOCREV CUR MEDS BY ELIG CLIN: HCPCS | Performed by: INTERNAL MEDICINE

## 2019-11-22 PROCEDURE — 99215 OFFICE O/P EST HI 40 MIN: CPT | Performed by: INTERNAL MEDICINE

## 2019-11-22 PROCEDURE — 1036F TOBACCO NON-USER: CPT | Performed by: INTERNAL MEDICINE

## 2019-11-25 ENCOUNTER — HOSPITAL ENCOUNTER (OUTPATIENT)
Age: 69
Discharge: HOME | End: 2019-11-27
Payer: COMMERCIAL

## 2019-11-25 ENCOUNTER — OFFICE VISIT (OUTPATIENT)
Dept: CARDIOLOGY CLINIC | Age: 69
End: 2019-11-25
Payer: COMMERCIAL

## 2019-11-25 VITALS
SYSTOLIC BLOOD PRESSURE: 114 MMHG | HEIGHT: 71 IN | TEMPERATURE: 97.8 F | DIASTOLIC BLOOD PRESSURE: 60 MMHG | RESPIRATION RATE: 26 BRPM | WEIGHT: 315 LBS | BODY MASS INDEX: 44.1 KG/M2 | HEART RATE: 97 BPM | OXYGEN SATURATION: 97 %

## 2019-11-25 DIAGNOSIS — I42.8 NICM (NONISCHEMIC CARDIOMYOPATHY) (HCC): Primary | Chronic | ICD-10-CM

## 2019-11-25 LAB
ANION GAP SERPL CALCULATED.3IONS-SCNC: 16 MMOL/L (ref 7–16)
BUN BLDV-MCNC: 44 MG/DL (ref 8–23)
CALCIUM SERPL-MCNC: 9.3 MG/DL (ref 8.6–10.2)
CHLORIDE BLD-SCNC: 96 MMOL/L (ref 98–107)
CO2: 24 MMOL/L (ref 22–29)
CREAT SERPL-MCNC: 1.7 MG/DL (ref 0.7–1.2)
GFR AFRICAN AMERICAN: 49
GFR NON-AFRICAN AMERICAN: 49 ML/MIN/1.73
GLUCOSE BLD-MCNC: 112 MG/DL (ref 74–99)
MAGNESIUM: 1.7 MG/DL (ref 1.6–2.6)
POTASSIUM SERPL-SCNC: 3.9 MMOL/L (ref 3.5–5)
PRO-BNP: 3786 PG/ML (ref 0–125)
SODIUM BLD-SCNC: 136 MMOL/L (ref 132–146)

## 2019-11-25 PROCEDURE — G8482 FLU IMMUNIZE ORDER/ADMIN: HCPCS | Performed by: INTERNAL MEDICINE

## 2019-11-25 PROCEDURE — 99215 OFFICE O/P EST HI 40 MIN: CPT | Performed by: INTERNAL MEDICINE

## 2019-11-25 PROCEDURE — 1111F DSCHRG MED/CURRENT MED MERGE: CPT | Performed by: INTERNAL MEDICINE

## 2019-11-25 PROCEDURE — G8417 CALC BMI ABV UP PARAM F/U: HCPCS | Performed by: INTERNAL MEDICINE

## 2019-11-25 PROCEDURE — 1036F TOBACCO NON-USER: CPT | Performed by: INTERNAL MEDICINE

## 2019-11-25 PROCEDURE — 4040F PNEUMOC VAC/ADMIN/RCVD: CPT | Performed by: INTERNAL MEDICINE

## 2019-11-25 PROCEDURE — 1123F ACP DISCUSS/DSCN MKR DOCD: CPT | Performed by: INTERNAL MEDICINE

## 2019-11-25 PROCEDURE — 83735 ASSAY OF MAGNESIUM: CPT

## 2019-11-25 PROCEDURE — 3017F COLORECTAL CA SCREEN DOC REV: CPT | Performed by: INTERNAL MEDICINE

## 2019-11-25 PROCEDURE — 83880 ASSAY OF NATRIURETIC PEPTIDE: CPT

## 2019-11-25 PROCEDURE — 80048 BASIC METABOLIC PNL TOTAL CA: CPT

## 2019-11-25 PROCEDURE — 93000 ELECTROCARDIOGRAM COMPLETE: CPT | Performed by: INTERNAL MEDICINE

## 2019-11-25 PROCEDURE — G8427 DOCREV CUR MEDS BY ELIG CLIN: HCPCS | Performed by: INTERNAL MEDICINE

## 2019-11-25 RX ORDER — METOLAZONE 2.5 MG/1
2.5 TABLET ORAL EVERY OTHER DAY
Qty: 90 TABLET | Refills: 3 | Status: SHIPPED | OUTPATIENT
Start: 2019-11-25 | End: 2019-12-03

## 2019-11-25 RX ORDER — METOLAZONE 2.5 MG/1
2.5 TABLET ORAL EVERY OTHER DAY
Qty: 30 TABLET | Refills: 3 | Status: SHIPPED | OUTPATIENT
Start: 2019-11-25 | End: 2019-11-25

## 2019-11-26 ENCOUNTER — TELEPHONE (OUTPATIENT)
Dept: CARDIOLOGY CLINIC | Age: 69
End: 2019-11-26

## 2019-12-02 ENCOUNTER — HOSPITAL ENCOUNTER (OUTPATIENT)
Age: 69
Discharge: HOME | End: 2019-12-04
Payer: COMMERCIAL

## 2019-12-02 LAB
ANION GAP SERPL CALCULATED.3IONS-SCNC: 16 MMOL/L (ref 7–16)
BUN BLDV-MCNC: 48 MG/DL (ref 8–23)
CALCIUM SERPL-MCNC: 9.7 MG/DL (ref 8.6–10.2)
CHLORIDE BLD-SCNC: 97 MMOL/L (ref 98–107)
CO2: 26 MMOL/L (ref 22–29)
CREAT SERPL-MCNC: 1.7 MG/DL (ref 0.7–1.2)
GFR AFRICAN AMERICAN: 49
GFR NON-AFRICAN AMERICAN: 49 ML/MIN/1.73
GLUCOSE BLD-MCNC: 44 MG/DL (ref 74–99)
MAGNESIUM: 1.8 MG/DL (ref 1.6–2.6)
POTASSIUM SERPL-SCNC: 3.4 MMOL/L (ref 3.5–5)
PRO-BNP: 2910 PG/ML (ref 0–125)
SODIUM BLD-SCNC: 139 MMOL/L (ref 132–146)

## 2019-12-02 PROCEDURE — 80048 BASIC METABOLIC PNL TOTAL CA: CPT

## 2019-12-02 PROCEDURE — 83735 ASSAY OF MAGNESIUM: CPT

## 2019-12-02 PROCEDURE — 83880 ASSAY OF NATRIURETIC PEPTIDE: CPT

## 2019-12-03 ENCOUNTER — TELEPHONE (OUTPATIENT)
Dept: CARDIOLOGY CLINIC | Age: 69
End: 2019-12-03

## 2019-12-03 ENCOUNTER — OFFICE VISIT (OUTPATIENT)
Dept: CARDIOLOGY CLINIC | Age: 69
End: 2019-12-03
Payer: COMMERCIAL

## 2019-12-03 VITALS
OXYGEN SATURATION: 97 % | BODY MASS INDEX: 44.1 KG/M2 | WEIGHT: 315 LBS | DIASTOLIC BLOOD PRESSURE: 60 MMHG | TEMPERATURE: 97.5 F | SYSTOLIC BLOOD PRESSURE: 116 MMHG | HEIGHT: 71 IN | HEART RATE: 99 BPM | RESPIRATION RATE: 24 BRPM

## 2019-12-03 DIAGNOSIS — I42.8 NONISCHEMIC CARDIOMYOPATHY (HCC): ICD-10-CM

## 2019-12-03 DIAGNOSIS — I50.22 CHRONIC HFREF (HEART FAILURE WITH REDUCED EJECTION FRACTION) (HCC): Primary | ICD-10-CM

## 2019-12-03 DIAGNOSIS — E87.6 HYPOKALEMIA: Primary | ICD-10-CM

## 2019-12-03 PROCEDURE — 1111F DSCHRG MED/CURRENT MED MERGE: CPT | Performed by: INTERNAL MEDICINE

## 2019-12-03 PROCEDURE — G8427 DOCREV CUR MEDS BY ELIG CLIN: HCPCS | Performed by: INTERNAL MEDICINE

## 2019-12-03 PROCEDURE — 1036F TOBACCO NON-USER: CPT | Performed by: INTERNAL MEDICINE

## 2019-12-03 PROCEDURE — 4040F PNEUMOC VAC/ADMIN/RCVD: CPT | Performed by: INTERNAL MEDICINE

## 2019-12-03 PROCEDURE — 99215 OFFICE O/P EST HI 40 MIN: CPT | Performed by: INTERNAL MEDICINE

## 2019-12-03 PROCEDURE — G8417 CALC BMI ABV UP PARAM F/U: HCPCS | Performed by: INTERNAL MEDICINE

## 2019-12-03 PROCEDURE — G8482 FLU IMMUNIZE ORDER/ADMIN: HCPCS | Performed by: INTERNAL MEDICINE

## 2019-12-03 PROCEDURE — 1123F ACP DISCUSS/DSCN MKR DOCD: CPT | Performed by: INTERNAL MEDICINE

## 2019-12-03 PROCEDURE — 3017F COLORECTAL CA SCREEN DOC REV: CPT | Performed by: INTERNAL MEDICINE

## 2019-12-03 RX ORDER — POTASSIUM CHLORIDE 20 MEQ/1
40 TABLET, EXTENDED RELEASE ORAL DAILY
Qty: 180 TABLET | Refills: 1 | Status: SHIPPED | OUTPATIENT
Start: 2019-12-03 | End: 2019-12-10 | Stop reason: DRUGHIGH

## 2019-12-03 RX ORDER — ALLOPURINOL 100 MG/1
100 TABLET ORAL DAILY
Qty: 30 TABLET | Refills: 3 | Status: SHIPPED | OUTPATIENT
Start: 2019-12-03

## 2019-12-03 RX ORDER — METOLAZONE 2.5 MG/1
2.5 TABLET ORAL SEE ADMIN INSTRUCTIONS
Qty: 90 TABLET | Refills: 3 | Status: SHIPPED | OUTPATIENT
Start: 2019-12-03 | End: 2019-12-13

## 2019-12-03 RX ORDER — BUMETANIDE 2 MG/1
2 TABLET ORAL DAILY
Qty: 90 TABLET | Refills: 3 | Status: SHIPPED | OUTPATIENT
Start: 2019-12-03

## 2019-12-05 ENCOUNTER — HOSPITAL ENCOUNTER (OUTPATIENT)
Age: 69
Discharge: HOME | End: 2019-12-07
Payer: COMMERCIAL

## 2019-12-05 LAB — POTASSIUM SERPL-SCNC: 3.5 MMOL/L (ref 3.5–5)

## 2019-12-05 PROCEDURE — 84132 ASSAY OF SERUM POTASSIUM: CPT

## 2019-12-06 ENCOUNTER — TELEPHONE (OUTPATIENT)
Dept: CARDIOLOGY CLINIC | Age: 69
End: 2019-12-06

## 2019-12-09 ENCOUNTER — HOSPITAL ENCOUNTER (OUTPATIENT)
Age: 69
Discharge: HOME | End: 2019-12-11
Payer: COMMERCIAL

## 2019-12-09 ENCOUNTER — OFFICE VISIT (OUTPATIENT)
Dept: FAMILY MEDICINE CLINIC | Age: 69
End: 2019-12-09
Payer: COMMERCIAL

## 2019-12-09 VITALS
BODY MASS INDEX: 43.54 KG/M2 | HEART RATE: 100 BPM | TEMPERATURE: 98.6 F | DIASTOLIC BLOOD PRESSURE: 56 MMHG | OXYGEN SATURATION: 98 % | WEIGHT: 311 LBS | HEIGHT: 71 IN | SYSTOLIC BLOOD PRESSURE: 90 MMHG

## 2019-12-09 DIAGNOSIS — Z91.81 AT HIGH RISK FOR FALLS: ICD-10-CM

## 2019-12-09 DIAGNOSIS — E78.2 MIXED HYPERLIPIDEMIA: ICD-10-CM

## 2019-12-09 DIAGNOSIS — I42.8 NICM (NONISCHEMIC CARDIOMYOPATHY) (HCC): ICD-10-CM

## 2019-12-09 DIAGNOSIS — N28.9 RENAL INSUFFICIENCY: ICD-10-CM

## 2019-12-09 DIAGNOSIS — I10 HTN (HYPERTENSION), BENIGN: ICD-10-CM

## 2019-12-09 DIAGNOSIS — Z13.31 POSITIVE DEPRESSION SCREENING: ICD-10-CM

## 2019-12-09 DIAGNOSIS — I50.22 CHRONIC SYSTOLIC CHF (CONGESTIVE HEART FAILURE) (HCC): Primary | ICD-10-CM

## 2019-12-09 LAB
ANION GAP SERPL CALCULATED.3IONS-SCNC: 19 MMOL/L (ref 7–16)
BUN BLDV-MCNC: 70 MG/DL (ref 8–23)
CALCIUM SERPL-MCNC: 9.6 MG/DL (ref 8.6–10.2)
CHLORIDE BLD-SCNC: 92 MMOL/L (ref 98–107)
CO2: 24 MMOL/L (ref 22–29)
CREAT SERPL-MCNC: 2.1 MG/DL (ref 0.7–1.2)
GFR AFRICAN AMERICAN: 38
GFR NON-AFRICAN AMERICAN: 38 ML/MIN/1.73
GLUCOSE BLD-MCNC: 145 MG/DL (ref 74–99)
MAGNESIUM: 1.5 MG/DL (ref 1.6–2.6)
POTASSIUM SERPL-SCNC: 3.7 MMOL/L (ref 3.5–5)
PRO-BNP: 1840 PG/ML (ref 0–125)
SODIUM BLD-SCNC: 135 MMOL/L (ref 132–146)

## 2019-12-09 PROCEDURE — G8417 CALC BMI ABV UP PARAM F/U: HCPCS | Performed by: FAMILY MEDICINE

## 2019-12-09 PROCEDURE — 83880 ASSAY OF NATRIURETIC PEPTIDE: CPT

## 2019-12-09 PROCEDURE — 4040F PNEUMOC VAC/ADMIN/RCVD: CPT | Performed by: FAMILY MEDICINE

## 2019-12-09 PROCEDURE — 1111F DSCHRG MED/CURRENT MED MERGE: CPT | Performed by: FAMILY MEDICINE

## 2019-12-09 PROCEDURE — 83735 ASSAY OF MAGNESIUM: CPT

## 2019-12-09 PROCEDURE — 80048 BASIC METABOLIC PNL TOTAL CA: CPT

## 2019-12-09 PROCEDURE — 99214 OFFICE O/P EST MOD 30 MIN: CPT | Performed by: FAMILY MEDICINE

## 2019-12-09 PROCEDURE — G8482 FLU IMMUNIZE ORDER/ADMIN: HCPCS | Performed by: FAMILY MEDICINE

## 2019-12-09 PROCEDURE — 1123F ACP DISCUSS/DSCN MKR DOCD: CPT | Performed by: FAMILY MEDICINE

## 2019-12-09 PROCEDURE — G8431 POS CLIN DEPRES SCRN F/U DOC: HCPCS | Performed by: FAMILY MEDICINE

## 2019-12-09 PROCEDURE — 1036F TOBACCO NON-USER: CPT | Performed by: FAMILY MEDICINE

## 2019-12-09 PROCEDURE — 3017F COLORECTAL CA SCREEN DOC REV: CPT | Performed by: FAMILY MEDICINE

## 2019-12-09 PROCEDURE — G8427 DOCREV CUR MEDS BY ELIG CLIN: HCPCS | Performed by: FAMILY MEDICINE

## 2019-12-09 SDOH — ECONOMIC STABILITY: TRANSPORTATION INSECURITY
IN THE PAST 12 MONTHS, HAS LACK OF TRANSPORTATION KEPT YOU FROM MEETINGS, WORK, OR FROM GETTING THINGS NEEDED FOR DAILY LIVING?: NO

## 2019-12-09 SDOH — ECONOMIC STABILITY: INCOME INSECURITY: HOW HARD IS IT FOR YOU TO PAY FOR THE VERY BASICS LIKE FOOD, HOUSING, MEDICAL CARE, AND HEATING?: NOT HARD AT ALL

## 2019-12-09 SDOH — ECONOMIC STABILITY: FOOD INSECURITY: WITHIN THE PAST 12 MONTHS, YOU WORRIED THAT YOUR FOOD WOULD RUN OUT BEFORE YOU GOT MONEY TO BUY MORE.: NEVER TRUE

## 2019-12-09 SDOH — ECONOMIC STABILITY: TRANSPORTATION INSECURITY
IN THE PAST 12 MONTHS, HAS THE LACK OF TRANSPORTATION KEPT YOU FROM MEDICAL APPOINTMENTS OR FROM GETTING MEDICATIONS?: NO

## 2019-12-09 SDOH — ECONOMIC STABILITY: FOOD INSECURITY: WITHIN THE PAST 12 MONTHS, THE FOOD YOU BOUGHT JUST DIDN'T LAST AND YOU DIDN'T HAVE MONEY TO GET MORE.: NEVER TRUE

## 2019-12-09 ASSESSMENT — LIFESTYLE VARIABLES: HOW OFTEN DO YOU HAVE A DRINK CONTAINING ALCOHOL: 0

## 2019-12-09 ASSESSMENT — PATIENT HEALTH QUESTIONNAIRE - PHQ9: SUM OF ALL RESPONSES TO PHQ QUESTIONS 1-9: 24

## 2019-12-10 ENCOUNTER — TELEPHONE (OUTPATIENT)
Dept: CARDIOLOGY CLINIC | Age: 69
End: 2019-12-10

## 2019-12-10 DIAGNOSIS — I50.22 CHRONIC HFREF (HEART FAILURE WITH REDUCED EJECTION FRACTION) (HCC): ICD-10-CM

## 2019-12-10 DIAGNOSIS — E87.6 HYPOKALEMIA: Primary | ICD-10-CM

## 2019-12-10 RX ORDER — POTASSIUM CHLORIDE 20 MEQ/1
40 TABLET, EXTENDED RELEASE ORAL 2 TIMES DAILY
Qty: 120 TABLET | Refills: 3 | Status: SHIPPED | OUTPATIENT
Start: 2019-12-10 | End: 2019-12-17 | Stop reason: SDUPTHER

## 2019-12-11 ENCOUNTER — TELEPHONE (OUTPATIENT)
Dept: CARDIOLOGY CLINIC | Age: 69
End: 2019-12-11

## 2019-12-12 ENCOUNTER — HOSPITAL ENCOUNTER (OUTPATIENT)
Age: 69
Discharge: HOME | End: 2019-12-14
Payer: COMMERCIAL

## 2019-12-12 ENCOUNTER — TELEPHONE (OUTPATIENT)
Dept: NON INVASIVE DIAGNOSTICS | Age: 69
End: 2019-12-12

## 2019-12-12 ENCOUNTER — TELEPHONE (OUTPATIENT)
Dept: FAMILY MEDICINE CLINIC | Age: 69
End: 2019-12-12

## 2019-12-12 LAB
ALBUMIN SERPL-MCNC: 3.6 G/DL (ref 3.5–5.2)
ALP BLD-CCNC: 106 U/L (ref 40–129)
ALT SERPL-CCNC: 13 U/L (ref 0–40)
ANION GAP SERPL CALCULATED.3IONS-SCNC: 20 MMOL/L (ref 7–16)
AST SERPL-CCNC: 19 U/L (ref 0–39)
BILIRUB SERPL-MCNC: 0.9 MG/DL (ref 0–1.2)
BUN BLDV-MCNC: 79 MG/DL (ref 8–23)
CALCIUM SERPL-MCNC: 8.8 MG/DL (ref 8.6–10.2)
CHLORIDE BLD-SCNC: 91 MMOL/L (ref 98–107)
CO2: 20 MMOL/L (ref 22–29)
CREAT SERPL-MCNC: 3.4 MG/DL (ref 0.7–1.2)
FERRITIN: 258 NG/ML
GFR AFRICAN AMERICAN: 22
GFR NON-AFRICAN AMERICAN: 22 ML/MIN/1.73
GLUCOSE BLD-MCNC: 223 MG/DL (ref 74–99)
IRON SATURATION: 14 % (ref 20–55)
IRON: 31 MCG/DL (ref 59–158)
MAGNESIUM: 1.4 MG/DL (ref 1.6–2.6)
POTASSIUM SERPL-SCNC: 4.6 MMOL/L (ref 3.5–5)
PRO-BNP: 1855 PG/ML (ref 0–125)
SODIUM BLD-SCNC: 131 MMOL/L (ref 132–146)
TOTAL IRON BINDING CAPACITY: 224 MCG/DL (ref 250–450)
TOTAL PROTEIN: 7.7 G/DL (ref 6.4–8.3)

## 2019-12-12 PROCEDURE — 83735 ASSAY OF MAGNESIUM: CPT

## 2019-12-12 PROCEDURE — 83540 ASSAY OF IRON: CPT

## 2019-12-12 PROCEDURE — 83550 IRON BINDING TEST: CPT

## 2019-12-12 PROCEDURE — 80053 COMPREHEN METABOLIC PANEL: CPT

## 2019-12-12 PROCEDURE — 83880 ASSAY OF NATRIURETIC PEPTIDE: CPT

## 2019-12-12 PROCEDURE — 82728 ASSAY OF FERRITIN: CPT

## 2019-12-12 RX ORDER — CEPHALEXIN 500 MG/1
500 CAPSULE ORAL 3 TIMES DAILY
Qty: 21 CAPSULE | Refills: 0 | Status: SHIPPED | OUTPATIENT
Start: 2019-12-12 | End: 2019-12-13

## 2019-12-13 ENCOUNTER — TELEPHONE (OUTPATIENT)
Dept: FAMILY MEDICINE CLINIC | Age: 69
End: 2019-12-13

## 2019-12-13 ENCOUNTER — OFFICE VISIT (OUTPATIENT)
Dept: CARDIOLOGY CLINIC | Age: 69
End: 2019-12-13
Payer: COMMERCIAL

## 2019-12-13 VITALS
BODY MASS INDEX: 44.1 KG/M2 | TEMPERATURE: 97.4 F | HEART RATE: 96 BPM | OXYGEN SATURATION: 100 % | HEIGHT: 71 IN | WEIGHT: 315 LBS | RESPIRATION RATE: 20 BRPM | SYSTOLIC BLOOD PRESSURE: 90 MMHG

## 2019-12-13 DIAGNOSIS — I42.8 NONISCHEMIC CARDIOMYOPATHY (HCC): ICD-10-CM

## 2019-12-13 DIAGNOSIS — I48.19 OTHER PERSISTENT ATRIAL FIBRILLATION (HCC): Primary | Chronic | ICD-10-CM

## 2019-12-13 PROCEDURE — 93000 ELECTROCARDIOGRAM COMPLETE: CPT | Performed by: INTERNAL MEDICINE

## 2019-12-13 PROCEDURE — 99215 OFFICE O/P EST HI 40 MIN: CPT | Performed by: INTERNAL MEDICINE

## 2019-12-13 PROCEDURE — G8417 CALC BMI ABV UP PARAM F/U: HCPCS | Performed by: INTERNAL MEDICINE

## 2019-12-13 PROCEDURE — G8427 DOCREV CUR MEDS BY ELIG CLIN: HCPCS | Performed by: INTERNAL MEDICINE

## 2019-12-13 PROCEDURE — 1036F TOBACCO NON-USER: CPT | Performed by: INTERNAL MEDICINE

## 2019-12-13 PROCEDURE — 3017F COLORECTAL CA SCREEN DOC REV: CPT | Performed by: INTERNAL MEDICINE

## 2019-12-13 PROCEDURE — 1111F DSCHRG MED/CURRENT MED MERGE: CPT | Performed by: INTERNAL MEDICINE

## 2019-12-13 PROCEDURE — 4040F PNEUMOC VAC/ADMIN/RCVD: CPT | Performed by: INTERNAL MEDICINE

## 2019-12-13 PROCEDURE — G8482 FLU IMMUNIZE ORDER/ADMIN: HCPCS | Performed by: INTERNAL MEDICINE

## 2019-12-13 PROCEDURE — 1123F ACP DISCUSS/DSCN MKR DOCD: CPT | Performed by: INTERNAL MEDICINE

## 2019-12-13 RX ORDER — METOLAZONE 2.5 MG/1
2.5 TABLET ORAL
Qty: 90 TABLET | Refills: 3 | Status: SHIPPED | OUTPATIENT
Start: 2019-12-13 | End: 2019-12-18

## 2019-12-16 ENCOUNTER — TELEPHONE (OUTPATIENT)
Dept: ADMINISTRATIVE | Age: 69
End: 2019-12-16

## 2019-12-16 ENCOUNTER — HOSPITAL ENCOUNTER (OUTPATIENT)
Age: 69
Discharge: HOME | End: 2019-12-18
Payer: COMMERCIAL

## 2019-12-16 LAB
ANION GAP SERPL CALCULATED.3IONS-SCNC: 18 MMOL/L (ref 7–16)
BUN BLDV-MCNC: 88 MG/DL (ref 8–23)
CALCIUM SERPL-MCNC: 9.6 MG/DL (ref 8.6–10.2)
CHLORIDE BLD-SCNC: 94 MMOL/L (ref 98–107)
CO2: 22 MMOL/L (ref 22–29)
CREAT SERPL-MCNC: 2.4 MG/DL (ref 0.7–1.2)
GFR AFRICAN AMERICAN: 33
GFR NON-AFRICAN AMERICAN: 33 ML/MIN/1.73
GLUCOSE BLD-MCNC: 248 MG/DL (ref 74–99)
MAGNESIUM: 1.5 MG/DL (ref 1.6–2.6)
POTASSIUM SERPL-SCNC: 4.2 MMOL/L (ref 3.5–5)
PRO-BNP: 2285 PG/ML (ref 0–125)
SODIUM BLD-SCNC: 134 MMOL/L (ref 132–146)

## 2019-12-16 PROCEDURE — 80048 BASIC METABOLIC PNL TOTAL CA: CPT

## 2019-12-16 PROCEDURE — 83735 ASSAY OF MAGNESIUM: CPT

## 2019-12-16 PROCEDURE — 83880 ASSAY OF NATRIURETIC PEPTIDE: CPT

## 2019-12-17 DIAGNOSIS — E87.6 HYPOKALEMIA: ICD-10-CM

## 2019-12-17 DIAGNOSIS — I50.22 CHRONIC HFREF (HEART FAILURE WITH REDUCED EJECTION FRACTION) (HCC): ICD-10-CM

## 2019-12-17 RX ORDER — PANTOPRAZOLE SODIUM 40 MG/1
40 TABLET, DELAYED RELEASE ORAL
Qty: 90 TABLET | Refills: 3 | Status: SHIPPED | OUTPATIENT
Start: 2019-12-17 | End: 2019-12-17 | Stop reason: SDUPTHER

## 2019-12-17 RX ORDER — PANTOPRAZOLE SODIUM 40 MG/1
40 TABLET, DELAYED RELEASE ORAL
Qty: 90 TABLET | Refills: 3 | Status: SHIPPED | OUTPATIENT
Start: 2019-12-17

## 2019-12-17 RX ORDER — POTASSIUM CHLORIDE 20 MEQ/1
40 TABLET, EXTENDED RELEASE ORAL 2 TIMES DAILY
Qty: 360 TABLET | Refills: 3 | Status: SHIPPED | OUTPATIENT
Start: 2019-12-17 | End: 2020-01-31

## 2019-12-17 RX ORDER — AMIODARONE HYDROCHLORIDE 200 MG/1
200 TABLET ORAL 2 TIMES DAILY
Qty: 180 TABLET | Refills: 3 | Status: SHIPPED | OUTPATIENT
Start: 2019-12-17 | End: 2020-01-31

## 2019-12-18 DIAGNOSIS — I50.22 CHRONIC HFREF (HEART FAILURE WITH REDUCED EJECTION FRACTION) (HCC): Primary | ICD-10-CM

## 2019-12-18 RX ORDER — METOLAZONE 2.5 MG/1
2.5 TABLET ORAL
Qty: 48 TABLET | Refills: 3 | Status: SHIPPED | OUTPATIENT
Start: 2019-12-19 | End: 2020-01-31

## 2019-12-20 ENCOUNTER — TELEPHONE (OUTPATIENT)
Dept: CARDIOLOGY CLINIC | Age: 69
End: 2019-12-20

## 2019-12-23 ENCOUNTER — TELEPHONE (OUTPATIENT)
Dept: CARDIOLOGY CLINIC | Age: 69
End: 2019-12-23

## 2019-12-31 LAB — CULTURE, AFB BLOOD: NORMAL

## 2020-01-08 ENCOUNTER — TELEPHONE (OUTPATIENT)
Dept: CARDIOLOGY CLINIC | Age: 70
End: 2020-01-08

## 2020-01-21 ENCOUNTER — TELEPHONE (OUTPATIENT)
Dept: CARDIOLOGY CLINIC | Age: 70
End: 2020-01-21

## 2020-01-21 NOTE — TELEPHONE ENCOUNTER
Received LVAD 12/23/19,  LVAD coordinator Massimo Zepeda  [phone]   Left voice message. Requested update on hospital stay/ discharge summary. Any needs from local cardiology office since discharged to home post LVAD. Left my direct # and fax.   Dalila Byrd RN   1/21/2020 4:06 PM

## 2020-01-21 NOTE — TELEPHONE ENCOUNTER
Wonderful. 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 Call [facility] and see if they need us to do anything or not? Nobody reached out to me and usually in this post operative period they will very closely monitor him and manage everything so we do not necessarily need to see him soon if he has appointments in [place].

## 2020-01-23 NOTE — TELEPHONE ENCOUNTER
Darby at [facility] LVAD DEPT called , udpated that Angie Elaine has apt this coming Tuesday at [facility]. No needs for [facility] cardiology at present.  She will update office If [facility] needs anything done locally for RAUL Bermudez

## 2020-01-27 ENCOUNTER — TELEPHONE (OUTPATIENT)
Dept: FAMILY MEDICINE CLINIC | Age: 70
End: 2020-01-27

## 2020-01-27 ENCOUNTER — TELEPHONE (OUTPATIENT)
Dept: NON INVASIVE DIAGNOSTICS | Age: 70
End: 2020-01-27

## 2020-01-27 RX ORDER — SULFAMETHOXAZOLE AND TRIMETHOPRIM 800; 160 MG/1; MG/1
1 TABLET ORAL 2 TIMES DAILY
Qty: 20 TABLET | Refills: 0 | Status: SHIPPED | OUTPATIENT
Start: 2020-01-27 | End: 2020-02-06

## 2020-01-27 NOTE — TELEPHONE ENCOUNTER
Alert on merlin remote monitoring for ATP episodes. Episodes since 12-26-19:     >99% AF (mostly in AF since July). V-rates 75- 150 bpm     1 VT-1, 18 seconds     7 VT episodes falling into VT-2 zone with successful ATP, one episode with 3 ATP, all successful. All on 1-5-2020. Multiple NST episodes - most recent on 1-, 4 - 18 seconds, no EGMS. History of persistent AF. Was in hospital in December for CHF. Corvue elevated since early January. Battery on advisory, stable. Presenting EGM: AF with VS @ 70- 100 bpm.Last remote monitoring period ended on 11-4-2020. On Amiodarone and Eliquis. Will review with Dr. Tamra Javier.      Christianne Fang RN, BSN  [facility]

## 2020-01-28 ENCOUNTER — TELEPHONE (OUTPATIENT)
Dept: NON INVASIVE DIAGNOSTICS | Age: 70
End: 2020-01-28

## 2020-01-29 ENCOUNTER — TELEPHONE (OUTPATIENT)
Dept: CARDIOLOGY CLINIC | Age: 70
End: 2020-01-29

## 2020-01-29 NOTE — TELEPHONE ENCOUNTER
[facility] called 1/29/2020 1042am  DR Kathleen Singletary  [phone]  Update from Tuesday visit with Genny Sears LVAD   bp was low in office, med changes made  Needs bp check locally next week. and  Fluid check with Dr Mavis Broderick next week. Will call Genny Sears to set dr visit next week. (pending Dr Mavis Broderick to review and say where to set     Hypotensive 40s'  Bradycardia 50's  Hydrated in office. Holding afternoon diuretic  To resume bid tomorrow  1-. Doppler 65 this am home (wife checks)      Stopped hydralazine and digoxin completely     Is on entresto 49-51mg twice daily    Core biospy is positive sarcoid. Started inpatient On high dose steroid. Prednisone 30mg for 30 days  (start date 1-)   Then 20mg for 30 days  Then 10mg daily   Will then do pet scan to reevaluate [facility]. Currently on 30mg prednisone since 1- at hospital discharge . ( was on 40mg in patient prednisone). Will fax over  Dr Kathleen Singletary note when completed. Wife is off work FMLA to drive. Home pt/ot    INR in home, [facility] will send repeat bmp to [facility]. DR Mavis Broderick had Coordinator [facility] set visit for next Monday.

## 2020-01-31 ENCOUNTER — OFFICE VISIT (OUTPATIENT)
Dept: FAMILY MEDICINE CLINIC | Age: 70
End: 2020-01-31
Payer: COMMERCIAL

## 2020-01-31 VITALS — WEIGHT: 285 LBS | OXYGEN SATURATION: 100 % | BODY MASS INDEX: 39.75 KG/M2

## 2020-01-31 PROCEDURE — G8427 DOCREV CUR MEDS BY ELIG CLIN: HCPCS | Performed by: FAMILY MEDICINE

## 2020-01-31 PROCEDURE — 1123F ACP DISCUSS/DSCN MKR DOCD: CPT | Performed by: FAMILY MEDICINE

## 2020-01-31 PROCEDURE — 3017F COLORECTAL CA SCREEN DOC REV: CPT | Performed by: FAMILY MEDICINE

## 2020-01-31 PROCEDURE — G8482 FLU IMMUNIZE ORDER/ADMIN: HCPCS | Performed by: FAMILY MEDICINE

## 2020-01-31 PROCEDURE — 99214 OFFICE O/P EST MOD 30 MIN: CPT | Performed by: FAMILY MEDICINE

## 2020-01-31 PROCEDURE — 2022F DILAT RTA XM EVC RTNOPTHY: CPT | Performed by: FAMILY MEDICINE

## 2020-01-31 PROCEDURE — 4040F PNEUMOC VAC/ADMIN/RCVD: CPT | Performed by: FAMILY MEDICINE

## 2020-01-31 PROCEDURE — G8417 CALC BMI ABV UP PARAM F/U: HCPCS | Performed by: FAMILY MEDICINE

## 2020-01-31 PROCEDURE — 3046F HEMOGLOBIN A1C LEVEL >9.0%: CPT | Performed by: FAMILY MEDICINE

## 2020-01-31 PROCEDURE — 1036F TOBACCO NON-USER: CPT | Performed by: FAMILY MEDICINE

## 2020-01-31 RX ORDER — SPIRONOLACTONE 50 MG/1
25 TABLET, FILM COATED ORAL DAILY
COMMUNITY
End: 2021-11-02 | Stop reason: ALTCHOICE

## 2020-01-31 RX ORDER — CEPHALEXIN 500 MG/1
500 CAPSULE ORAL 3 TIMES DAILY
COMMUNITY
End: 2020-07-13 | Stop reason: ALTCHOICE

## 2020-01-31 RX ORDER — TRAMADOL HYDROCHLORIDE 50 MG/1
50 TABLET ORAL EVERY 6 HOURS PRN
COMMUNITY
End: 2021-08-16

## 2020-01-31 RX ORDER — PREDNISONE 10 MG/1
5 TABLET ORAL DAILY
COMMUNITY
End: 2021-11-02 | Stop reason: ALTCHOICE

## 2020-01-31 RX ORDER — WARFARIN SODIUM 5 MG/1
10 TABLET ORAL
COMMUNITY

## 2020-01-31 NOTE — PROGRESS NOTES
cephALEXin (KEFLEX) 500 MG capsule  Take 500 mg by mouth 3 times daily             HUMULIN R 500 UNIT/ML injection  See Admin Instructions Indications: 150u in the am, 120u in afternoon, 30u at night if needed 25u bid             Magnesium 400 MG CAPS  Take by mouth             pantoprazole (PROTONIX) 40 MG tablet  Take 1 tablet by mouth every morning (before breakfast)             predniSONE (DELTASONE) 10 MG tablet  Take 10 mg by mouth daily 3 tab daily for 30 days             sacubitril-valsartan (ENTRESTO) 24-26 MG per tablet  Take 1 tablet by mouth 2 times daily             spironolactone (ALDACTONE) 50 MG tablet  Take 50 mg by mouth daily             sulfamethoxazole-trimethoprim (BACTRIM DS) 800-160 MG per tablet  Take 1 tablet by mouth 2 times daily for 10 days             traMADol (ULTRAM) 50 MG tablet  Take 50 mg by mouth every 6 hours as needed for Pain.             warfarin (COUMADIN) 5 MG tablet  Take 5 mg by mouth                   Medications marked \"taking\" at this time  Outpatient Medications Marked as Taking for the 1/31/20 encounter (Office Visit) with Neelam Carlos MD   Medication Sig Dispense Refill    cephALEXin (KEFLEX) 500 MG capsule Take 500 mg by mouth 3 times daily      Magnesium 400 MG CAPS Take by mouth      predniSONE (DELTASONE) 10 MG tablet Take 10 mg by mouth daily 3 tab daily for 30 days      spironolactone (ALDACTONE) 50 MG tablet Take 50 mg by mouth daily      traMADol (ULTRAM) 50 MG tablet Take 50 mg by mouth every 6 hours as needed for Pain.       warfarin (COUMADIN) 5 MG tablet Take 5 mg by mouth      sulfamethoxazole-trimethoprim (BACTRIM DS) 800-160 MG per tablet Take 1 tablet by mouth 2 times daily for 10 days 20 tablet 0    sacubitril-valsartan (ENTRESTO) 24-26 MG per tablet Take 1 tablet by mouth 2 times daily (Patient taking differently: Take 1 tablet by mouth 2 times daily Taking 49-51mg twice daily (DR Kiah Guillen 1/28/2020)) 180 tablet 3    pantoprazole (PROTONIX) 40 MG tablet Take 1 tablet by mouth every morning (before breakfast) 90 tablet 3    bumetanide (BUMEX) 2 MG tablet Take 1 tablet by mouth daily (Patient taking differently: Take by mouth 2mg am, 1mg pm) 90 tablet 3    allopurinol (ZYLOPRIM) 100 MG tablet Take 1 tablet by mouth daily 30 tablet 3    HUMULIN R 500 UNIT/ML injection See Admin Instructions Indications: 150u in the am, 120u in afternoon, 30u at night if needed 25u bid          Medications patient taking as of now reconciled against medications ordered at time of hospital discharge: Yes    Chief Complaint   Patient presents with    Follow-Up from Hospital       History of Present illness - Follow up of Hospital diagnosis(es): CHF    Inpatient course: Discharge summary reviewed- see chart. Interval history/Current status: improved with LVAD; continues follow up with Candace martinez. A comprehensive review of systems was negative except for what was noted in the HPI. Vitals:    01/31/20 1346   SpO2: 100%   Weight: 285 lb (129.3 kg)     Body mass index is 39.75 kg/m².    Wt Readings from Last 3 Encounters:   01/31/20 285 lb (129.3 kg)   12/13/19 (!) 317 lb (143.8 kg)   12/09/19 (!) 311 lb (141.1 kg)     BP Readings from Last 3 Encounters:   12/13/19 (!) 90/0   12/09/19 (!) 90/56   12/03/19 116/60        Physical Exam:  General Appearance: alert and oriented to person, place and time, well developed and well- nourished, in no acute distress  Skin: warm and dry, no rash or erythema  Head: normocephalic and atraumatic  Eyes: pupils equal, round, and reactive to light, extraocular eye movements intact, conjunctivae normal  ENT: tympanic membrane, external ear and ear canal normal bilaterally, nose without deformity, nasal mucosa and turbinates normal without polyps  Neck: supple and non-tender without mass, no thyromegaly or thyroid nodules, no cervical lymphadenopathy  Pulmonary/Chest: clear to auscultation bilaterally- no wheezes, rales or

## 2020-02-04 NOTE — TELEPHONE ENCOUNTER
Moved Mondays apt with Dr Shawn Ortez to Tuesday per DR Cherry Garcia from [facility] called Tuesday 1249pm. Genny Sears is in ER at [facility].    Will not be seeing DR Shawn Ortez today    Bam Jolley RN

## 2020-02-18 ENCOUNTER — TELEPHONE (OUTPATIENT)
Dept: CARDIOLOGY CLINIC | Age: 70
End: 2020-02-18

## 2020-02-18 NOTE — TELEPHONE ENCOUNTER
Corina Claude 1950 [id number]   Patient Demographics  - 81 y.o. Male; born [birth year] [address], 1950   Patient Address Communication Language Race / Ethnicity Marital Status   [address] [facility] [phone] [facility])  [email or web address] [place] - Written (Preferred) Black or  / Not  or         Post LVAD:     12-    HM3 placed as DT  DR Shruthi Villar at [facility]   1-28-20 office visit DR Josafat Ryder  Readmitted 2-4-2020  [facility]     [facility] 8-10-14  Permanent atrial fib. Nguyen Leyva, [phone] (home)  11:07 AM 2/18/2020 for update on how he is doing. He is now home. Had apt 2-13 with [facility], DR Josafat Ryder. Says he was dehydrated and was given fluids,  Feet have been swollen since. [place] home care nurse is active with [facility]. Changed dressing yesterday to toes. [place] [facility]iatrist was called for blisters on his toes. Says he was drained at [facility], pending in home podiatry visit. Weight coming down. Weight today 292#.  (up 3 # from yesterday)  Still have swollen feet. Is on steroids currently. [facility] visit in 2 weeks. Denies fever, has thermometer. Via wifi he sends readings to [facility]. And VAD information. Self monitors BS and running good. Today fasting 131. Hard to get out of the house. Lyric Lee drives him. She has returned to work. [facility]   LVAD coordinator. [phone]    Pressure better controlled 70-80, rehab program.   March 24 next visit with DR Josafat Ryder. 2-20 decreasing prednisone 20mg daily. 3-20-20 decreasing prednisone to 10mg  Daily. Viri will let [facility] cardiology know if/when to set f/u in office after she speaks with Dr Josafat Ryder. Octavio Rebollar will update office every few weeks how he feels.      Damon Noonan RN

## 2020-03-23 ENCOUNTER — HOSPITAL ENCOUNTER (OUTPATIENT)
Age: 70
Discharge: HOME | End: 2020-03-23
Payer: COMMERCIAL

## 2020-03-23 LAB
ALBUMIN SERPL-MCNC: 3.8 G/DL (ref 3.5–5.2)
ALP BLD-CCNC: 84 U/L (ref 40–129)
ALT SERPL-CCNC: 22 U/L (ref 0–40)
ANION GAP SERPL CALCULATED.3IONS-SCNC: 14 MMOL/L (ref 7–16)
AST SERPL-CCNC: 24 U/L (ref 0–39)
BASOPHILS ABSOLUTE: 0.02 E9/L (ref 0–0.2)
BASOPHILS RELATIVE PERCENT: 0.3 % (ref 0–2)
BILIRUB SERPL-MCNC: 0.4 MG/DL (ref 0–1.2)
BUN BLDV-MCNC: 20 MG/DL (ref 8–23)
CALCIUM SERPL-MCNC: 9 MG/DL (ref 8.6–10.2)
CHLORIDE BLD-SCNC: 96 MMOL/L (ref 98–107)
CO2: 28 MMOL/L (ref 22–29)
CREAT SERPL-MCNC: 1.5 MG/DL (ref 0.7–1.2)
EOSINOPHILS ABSOLUTE: 0.06 E9/L (ref 0.05–0.5)
EOSINOPHILS RELATIVE PERCENT: 0.8 % (ref 0–6)
GFR AFRICAN AMERICAN: 56
GFR NON-AFRICAN AMERICAN: 56 ML/MIN/1.73
GLUCOSE BLD-MCNC: 100 MG/DL (ref 74–99)
HCT VFR BLD CALC: 39.9 % (ref 37–54)
HEMOGLOBIN: 12.2 G/DL (ref 12.5–16.5)
IMMATURE GRANULOCYTES #: 0.09 E9/L
IMMATURE GRANULOCYTES %: 1.2 % (ref 0–5)
INR BLD: 2.3
LACTATE DEHYDROGENASE: 465 U/L (ref 135–225)
LYMPHOCYTES ABSOLUTE: 1.7 E9/L (ref 1.5–4)
LYMPHOCYTES RELATIVE PERCENT: 21.9 % (ref 20–42)
MCH RBC QN AUTO: 27.1 PG (ref 26–35)
MCHC RBC AUTO-ENTMCNC: 30.6 % (ref 32–34.5)
MCV RBC AUTO: 88.7 FL (ref 80–99.9)
MONOCYTES ABSOLUTE: 0.71 E9/L (ref 0.1–0.95)
MONOCYTES RELATIVE PERCENT: 9.2 % (ref 2–12)
NEUTROPHILS ABSOLUTE: 5.17 E9/L (ref 1.8–7.3)
NEUTROPHILS RELATIVE PERCENT: 66.6 % (ref 43–80)
PDW BLD-RTO: 18.6 FL (ref 11.5–15)
PLATELET # BLD: 224 E9/L (ref 130–450)
PMV BLD AUTO: 10.2 FL (ref 7–12)
POTASSIUM SERPL-SCNC: 3.5 MMOL/L (ref 3.5–5)
PREALBUMIN: 22 MG/DL (ref 20–40)
PROTHROMBIN TIME: 27.2 SEC (ref 9.3–12.4)
RBC # BLD: 4.5 E12/L (ref 3.8–5.8)
SODIUM BLD-SCNC: 138 MMOL/L (ref 132–146)
TOTAL PROTEIN: 7.5 G/DL (ref 6.4–8.3)
WBC # BLD: 7.8 E9/L (ref 4.5–11.5)

## 2020-03-23 PROCEDURE — 83051 HEMOGLOBIN PLASMA: CPT

## 2020-03-23 PROCEDURE — 85610 PROTHROMBIN TIME: CPT

## 2020-03-23 PROCEDURE — 85025 COMPLETE CBC W/AUTO DIFF WBC: CPT

## 2020-03-23 PROCEDURE — 84134 ASSAY OF PREALBUMIN: CPT

## 2020-03-23 PROCEDURE — 83615 LACTATE (LD) (LDH) ENZYME: CPT

## 2020-03-23 PROCEDURE — 80053 COMPREHEN METABOLIC PANEL: CPT

## 2020-03-23 PROCEDURE — 36415 COLL VENOUS BLD VENIPUNCTURE: CPT

## 2020-03-26 LAB
Lab: NORMAL
REPORT: NORMAL
THIS TEST SENT TO: NORMAL

## 2020-06-09 NOTE — LETTER
[facility]  [address] [place]  Phone: [phone]  Fax: [phone]    Jaleel Patel RN        June 9, 2020    Nivianataliia Boo  [address] [place] [id number]      Dear Hang Nurse:    DR Lucy Barrow Advanced Heart Failure Specialist is no longer employed with  [facility]; therefore,  Please call and utilize your  [facility] LV[facility] Coordinator [facility] [phone] for all your cardiac needs.          Sincerely,        Jaleel Patel RN

## 2020-06-09 NOTE — PROGRESS NOTES
Letter sent to patient and fax to [facility] regarding [facility] to assume total cardiac care need s for Ninoia Ybarra   At present [facility] does not have Advanced Heart Failure services

## 2020-06-12 ENCOUNTER — HOSPITAL ENCOUNTER (EMERGENCY)
Age: 70
Discharge: HOME | End: 2020-06-12
Attending: EMERGENCY MEDICINE
Payer: COMMERCIAL

## 2020-06-12 VITALS
TEMPERATURE: 96.4 F | OXYGEN SATURATION: 100 % | HEART RATE: 97 BPM | RESPIRATION RATE: 18 BRPM | BODY MASS INDEX: 39.06 KG/M2 | WEIGHT: 279 LBS | HEIGHT: 71 IN

## 2020-06-12 LAB
ANION GAP SERPL CALCULATED.3IONS-SCNC: 12 MMOL/L (ref 7–16)
BACTERIA: ABNORMAL /HPF
BASOPHILS ABSOLUTE: 0.03 E9/L (ref 0–0.2)
BASOPHILS RELATIVE PERCENT: 0.4 % (ref 0–2)
BILIRUBIN URINE: NEGATIVE
BLOOD, URINE: ABNORMAL
BUN BLDV-MCNC: 34 MG/DL (ref 8–23)
CALCIUM SERPL-MCNC: 9.4 MG/DL (ref 8.6–10.2)
CHLORIDE BLD-SCNC: 93 MMOL/L (ref 98–107)
CLARITY: CLEAR
CO2: 24 MMOL/L (ref 22–29)
COLOR: YELLOW
CREAT SERPL-MCNC: 1.8 MG/DL (ref 0.7–1.2)
EOSINOPHILS ABSOLUTE: 0.05 E9/L (ref 0.05–0.5)
EOSINOPHILS RELATIVE PERCENT: 0.7 % (ref 0–6)
EPITHELIAL CELLS, UA: ABNORMAL /HPF
GFR AFRICAN AMERICAN: 46
GFR NON-AFRICAN AMERICAN: 46 ML/MIN/1.73
GLUCOSE BLD-MCNC: 305 MG/DL (ref 74–99)
GLUCOSE URINE: NEGATIVE MG/DL
HCT VFR BLD CALC: 42.3 % (ref 37–54)
HEMOGLOBIN: 13.2 G/DL (ref 12.5–16.5)
HYALINE CASTS: ABNORMAL /LPF (ref 0–2)
IMMATURE GRANULOCYTES #: 0.04 E9/L
IMMATURE GRANULOCYTES %: 0.6 % (ref 0–5)
KETONES, URINE: NEGATIVE MG/DL
LEUKOCYTE ESTERASE, URINE: NEGATIVE
LYMPHOCYTES ABSOLUTE: 0.73 E9/L (ref 1.5–4)
LYMPHOCYTES RELATIVE PERCENT: 10.3 % (ref 20–42)
MCH RBC QN AUTO: 25.8 PG (ref 26–35)
MCHC RBC AUTO-ENTMCNC: 31.2 % (ref 32–34.5)
MCV RBC AUTO: 82.6 FL (ref 80–99.9)
MONOCYTES ABSOLUTE: 0.54 E9/L (ref 0.1–0.95)
MONOCYTES RELATIVE PERCENT: 7.6 % (ref 2–12)
NEUTROPHILS ABSOLUTE: 5.68 E9/L (ref 1.8–7.3)
NEUTROPHILS RELATIVE PERCENT: 80.4 % (ref 43–80)
NITRITE, URINE: NEGATIVE
PDW BLD-RTO: 18.9 FL (ref 11.5–15)
PH UA: 6.5 (ref 5–9)
PLATELET # BLD: 198 E9/L (ref 130–450)
PMV BLD AUTO: 10.8 FL (ref 7–12)
POTASSIUM REFLEX MAGNESIUM: 5.1 MMOL/L (ref 3.5–5)
PROTEIN UA: ABNORMAL MG/DL
RBC # BLD: 5.12 E12/L (ref 3.8–5.8)
RBC UA: ABNORMAL /HPF (ref 0–2)
SODIUM BLD-SCNC: 129 MMOL/L (ref 132–146)
SPECIFIC GRAVITY UA: 1.01 (ref 1–1.03)
UROBILINOGEN, URINE: 0.2 E.U./DL
WBC # BLD: 7.1 E9/L (ref 4.5–11.5)
WBC UA: ABNORMAL /HPF (ref 0–5)

## 2020-06-12 PROCEDURE — 85025 COMPLETE CBC W/AUTO DIFF WBC: CPT

## 2020-06-12 PROCEDURE — 80048 BASIC METABOLIC PNL TOTAL CA: CPT

## 2020-06-12 PROCEDURE — 99283 EMERGENCY DEPT VISIT LOW MDM: CPT

## 2020-06-12 PROCEDURE — 81001 URINALYSIS AUTO W/SCOPE: CPT

## 2020-06-12 RX ORDER — POLYETHYLENE GLYCOL 3350 17 G/17G
17 POWDER, FOR SOLUTION ORAL DAILY PRN
Qty: 3 EACH | Refills: 0 | Status: SHIPPED | OUTPATIENT
Start: 2020-06-12 | End: 2020-06-15

## 2020-06-12 NOTE — ED PROVIDER NOTES
Glucose, Ur Negative Negative mg/dL    Bilirubin Urine Negative Negative    Ketones, Urine Negative Negative mg/dL    Specific Gravity, UA 1.010 1.005 - 1.030    Blood, Urine MODERATE (A) Negative    pH, UA 6.5 5.0 - 9.0    Protein, UA TRACE Negative mg/dL    Urobilinogen, Urine 0.2 <2.0 E.U./dL    Nitrite, Urine Negative Negative    Leukocyte Esterase, Urine Negative Negative   CBC Auto Differential   Result Value Ref Range    WBC 7.1 4.5 - 11.5 E9/L    RBC 5.12 3.80 - 5.80 E12/L    Hemoglobin 13.2 12.5 - 16.5 g/dL    Hematocrit 42.3 37.0 - 54.0 %    MCV 82.6 80.0 - 99.9 fL    MCH 25.8 (L) 26.0 - 35.0 pg    MCHC 31.2 (L) 32.0 - 34.5 %    RDW 18.9 (H) 11.5 - 15.0 fL    Platelets 281 382 - 746 E9/L    MPV 10.8 7.0 - 12.0 fL    Neutrophils % 80.4 (H) 43.0 - 80.0 %    Immature Granulocytes % 0.6 0.0 - 5.0 %    Lymphocytes % 10.3 (L) 20.0 - 42.0 %    Monocytes % 7.6 2.0 - 12.0 %    Eosinophils % 0.7 0.0 - 6.0 %    Basophils % 0.4 0.0 - 2.0 %    Neutrophils Absolute 5.68 1.80 - 7.30 E9/L    Immature Granulocytes # 0.04 E9/L    Lymphocytes Absolute 0.73 (L) 1.50 - 4.00 E9/L    Monocytes Absolute 0.54 0.10 - 0.95 E9/L    Eosinophils Absolute 0.05 0.05 - 0.50 E9/L    Basophils Absolute 0.03 0.00 - 0.20 K2/Y   Basic Metabolic Panel w/ Reflex to MG   Result Value Ref Range    Sodium 129 (L) 132 - 146 mmol/L    Potassium reflex Magnesium 5.1 (H) 3.5 - 5.0 mmol/L    Chloride 93 (L) 98 - 107 mmol/L    CO2 24 22 - 29 mmol/L    Anion Gap 12 7 - 16 mmol/L    Glucose 305 (H) 74 - 99 mg/dL    BUN 34 (H) 8 - 23 mg/dL    CREATININE 1.8 (H) 0.7 - 1.2 mg/dL    GFR Non-African American 46 >=60 mL/min/1.73    GFR African American 46     Calcium 9.4 8.6 - 10.2 mg/dL   Microscopic Urinalysis   Result Value Ref Range    Hyaline Casts, UA 0-2 0 - 2 /LPF    WBC, UA 1-3 0 - 5 /HPF    RBC, UA 10-20 (A) 0 - 2 /HPF    Epithelial Cells, UA RARE /HPF    Bacteria, UA RARE (A) None Seen /HPF       Radiology:  No orders to display

## 2020-06-13 ENCOUNTER — CARE COORDINATION (OUTPATIENT)
Dept: CARE COORDINATION | Age: 70
End: 2020-06-13

## 2020-06-13 ASSESSMENT — ENCOUNTER SYMPTOMS
SHORTNESS OF BREATH: 0
CHOKING: 0
NAUSEA: 0
CHEST TIGHTNESS: 0
DIARRHEA: 0
ABDOMINAL PAIN: 1
EYE REDNESS: 0
BLOOD IN STOOL: 0
APNEA: 0
SINUS PRESSURE: 0
ABDOMINAL DISTENTION: 0
CONSTIPATION: 1
BACK PAIN: 0
WHEEZING: 0
VOMITING: 0
SORE THROAT: 0
EYE PAIN: 0
EYE DISCHARGE: 0
COUGH: 0
ANAL BLEEDING: 0

## 2020-06-26 ENCOUNTER — CARE COORDINATION (OUTPATIENT)
Dept: CARE COORDINATION | Age: 70
End: 2020-06-26

## 2020-06-29 ENCOUNTER — TELEPHONE (OUTPATIENT)
Dept: ADMINISTRATIVE | Age: 70
End: 2020-06-29

## 2020-06-29 ENCOUNTER — NURSE TRIAGE (OUTPATIENT)
Dept: OTHER | Facility: CLINIC | Age: 70
End: 2020-06-29

## 2020-06-29 NOTE — TELEPHONE ENCOUNTER
Reason for Disposition   General information question, no triage required and triager able to answer question    Protocols used: INFORMATION ONLY CALL-ADULT-    Pt calling to inform his pcp office that he has been directed to the hospital for an abnormal INR and he will have the hospital staff look at his leg while at the hospital.  This RN instructed pt to call back to schedule an appointment, as necessary, per hospital staff's recommendation - pt verbalizes understanding. Please do not respond to the triage nurse through this encounter. Any subsequent communication should be directly with the patient.

## 2020-06-29 NOTE — TELEPHONE ENCOUNTER
Reason for Disposition   Message left on identified voicemail    Protocols used: NO CONTACT OR DUPLICATE CONTACT CALL-ADULT-[place]    Retrieved VM - called pt back , but no answer - left message for pt to call us back for triage. Please do not respond to the triage nurse through this encounter. Any subsequent communication should be directly with the patient.

## 2020-07-13 ENCOUNTER — HOSPITAL ENCOUNTER (OUTPATIENT)
Dept: GENERAL RADIOLOGY | Age: 70
Discharge: HOME | End: 2020-07-15
Payer: COMMERCIAL

## 2020-07-13 ENCOUNTER — HOSPITAL ENCOUNTER (OUTPATIENT)
Age: 70
Discharge: HOME | End: 2020-07-13
Payer: COMMERCIAL

## 2020-07-13 ENCOUNTER — HOSPITAL ENCOUNTER (OUTPATIENT)
Age: 70
Discharge: HOME | End: 2020-07-15
Payer: COMMERCIAL

## 2020-07-13 ENCOUNTER — HOSPITAL ENCOUNTER (OUTPATIENT)
Dept: WOUND CARE | Age: 70
Discharge: HOME | End: 2020-07-13
Payer: COMMERCIAL

## 2020-07-13 VITALS
HEIGHT: 71 IN | SYSTOLIC BLOOD PRESSURE: 120 MMHG | BODY MASS INDEX: 39.2 KG/M2 | RESPIRATION RATE: 18 BRPM | WEIGHT: 280 LBS | DIASTOLIC BLOOD PRESSURE: 70 MMHG | TEMPERATURE: 97.3 F

## 2020-07-13 PROBLEM — I73.9 PERIPHERAL VASCULAR DISEASE, UNSPECIFIED (HCC): Status: ACTIVE | Noted: 2020-07-13

## 2020-07-13 PROBLEM — L97.922 NON-PRESSURE CHRONIC ULCER OF LEFT LOWER LEG WITH FAT LAYER EXPOSED (HCC): Status: ACTIVE | Noted: 2020-07-13

## 2020-07-13 PROBLEM — R60.0 LOCALIZED EDEMA: Status: ACTIVE | Noted: 2020-07-13

## 2020-07-13 PROBLEM — E11.621 DIABETIC ULCER OF TOE OF RIGHT FOOT ASSOCIATED WITH TYPE 2 DIABETES MELLITUS, WITH NECROSIS OF BONE (HCC): Status: ACTIVE | Noted: 2020-07-13

## 2020-07-13 PROBLEM — L97.514 DIABETIC ULCER OF TOE OF RIGHT FOOT ASSOCIATED WITH TYPE 2 DIABETES MELLITUS, WITH NECROSIS OF BONE (HCC): Status: ACTIVE | Noted: 2020-07-13

## 2020-07-13 LAB
ALBUMIN SERPL-MCNC: 3.8 G/DL (ref 3.5–5.2)
ALP BLD-CCNC: 96 U/L (ref 40–129)
ALT SERPL-CCNC: 15 U/L (ref 0–40)
ANION GAP SERPL CALCULATED.3IONS-SCNC: 14 MMOL/L (ref 7–16)
AST SERPL-CCNC: 21 U/L (ref 0–39)
BASOPHILS ABSOLUTE: 0.03 E9/L (ref 0–0.2)
BASOPHILS RELATIVE PERCENT: 0.4 % (ref 0–2)
BILIRUB SERPL-MCNC: 0.4 MG/DL (ref 0–1.2)
BUN BLDV-MCNC: 26 MG/DL (ref 8–23)
C-REACTIVE PROTEIN: 0.7 MG/DL (ref 0–0.4)
CALCIUM SERPL-MCNC: 9.7 MG/DL (ref 8.6–10.2)
CHLORIDE BLD-SCNC: 96 MMOL/L (ref 98–107)
CO2: 26 MMOL/L (ref 22–29)
CREAT SERPL-MCNC: 1.4 MG/DL (ref 0.7–1.2)
EOSINOPHILS ABSOLUTE: 0.06 E9/L (ref 0.05–0.5)
EOSINOPHILS RELATIVE PERCENT: 0.8 % (ref 0–6)
GFR AFRICAN AMERICAN: >60
GFR NON-AFRICAN AMERICAN: >60 ML/MIN/1.73
GLUCOSE BLD-MCNC: 187 MG/DL (ref 74–99)
HCT VFR BLD CALC: 42.5 % (ref 37–54)
HEMOGLOBIN: 13.2 G/DL (ref 12.5–16.5)
IMMATURE GRANULOCYTES #: 0.08 E9/L
IMMATURE GRANULOCYTES %: 1 % (ref 0–5)
LYMPHOCYTES ABSOLUTE: 1.57 E9/L (ref 1.5–4)
LYMPHOCYTES RELATIVE PERCENT: 20.3 % (ref 20–42)
MCH RBC QN AUTO: 25.6 PG (ref 26–35)
MCHC RBC AUTO-ENTMCNC: 31.1 % (ref 32–34.5)
MCV RBC AUTO: 82.4 FL (ref 80–99.9)
MONOCYTES ABSOLUTE: 0.71 E9/L (ref 0.1–0.95)
MONOCYTES RELATIVE PERCENT: 9.2 % (ref 2–12)
NEUTROPHILS ABSOLUTE: 5.3 E9/L (ref 1.8–7.3)
NEUTROPHILS RELATIVE PERCENT: 68.3 % (ref 43–80)
PDW BLD-RTO: 19 FL (ref 11.5–15)
PLATELET # BLD: 218 E9/L (ref 130–450)
PMV BLD AUTO: 10.8 FL (ref 7–12)
POTASSIUM SERPL-SCNC: 4.8 MMOL/L (ref 3.5–5)
RBC # BLD: 5.16 E12/L (ref 3.8–5.8)
SEDIMENTATION RATE, ERYTHROCYTE: 70 MM/HR (ref 0–15)
SODIUM BLD-SCNC: 136 MMOL/L (ref 132–146)
TOTAL PROTEIN: 8 G/DL (ref 6.4–8.3)
WBC # BLD: 7.8 E9/L (ref 4.5–11.5)

## 2020-07-13 PROCEDURE — 36415 COLL VENOUS BLD VENIPUNCTURE: CPT

## 2020-07-13 PROCEDURE — 73630 X-RAY EXAM OF FOOT: CPT

## 2020-07-13 PROCEDURE — 87075 CULTR BACTERIA EXCEPT BLOOD: CPT

## 2020-07-13 PROCEDURE — 86140 C-REACTIVE PROTEIN: CPT

## 2020-07-13 PROCEDURE — 11044 DBRDMT BONE 1ST 20 SQ CM/<: CPT

## 2020-07-13 PROCEDURE — 87077 CULTURE AEROBIC IDENTIFY: CPT

## 2020-07-13 PROCEDURE — 87070 CULTURE OTHR SPECIMN AEROBIC: CPT

## 2020-07-13 PROCEDURE — 11042 DBRDMT SUBQ TIS 1ST 20SQCM/<: CPT

## 2020-07-13 PROCEDURE — 85651 RBC SED RATE NONAUTOMATED: CPT

## 2020-07-13 PROCEDURE — 87186 SC STD MICRODIL/AGAR DIL: CPT

## 2020-07-13 PROCEDURE — 99213 OFFICE O/P EST LOW 20 MIN: CPT

## 2020-07-13 PROCEDURE — 85025 COMPLETE CBC W/AUTO DIFF WBC: CPT

## 2020-07-13 PROCEDURE — 80053 COMPREHEN METABOLIC PANEL: CPT

## 2020-07-13 PROCEDURE — 88311 DECALCIFY TISSUE: CPT

## 2020-07-13 PROCEDURE — 88307 TISSUE EXAM BY PATHOLOGIST: CPT

## 2020-07-13 RX ORDER — ERGOCALCIFEROL 1.25 MG/1
50000 CAPSULE ORAL WEEKLY
COMMUNITY
End: 2021-11-02 | Stop reason: SDUPTHER

## 2020-07-13 RX ORDER — LIDOCAINE HYDROCHLORIDE 40 MG/ML
SOLUTION TOPICAL ONCE
Status: DISCONTINUED | OUTPATIENT
Start: 2020-07-13 | End: 2020-07-14 | Stop reason: HOSPADM

## 2020-07-13 RX ORDER — ASPIRIN 325 MG
325 TABLET ORAL DAILY
COMMUNITY
End: 2021-05-27 | Stop reason: DRUGHIGH

## 2020-07-13 RX ORDER — DIGOXIN 125 MCG
125 TABLET ORAL EVERY OTHER DAY
COMMUNITY

## 2020-07-13 NOTE — PROGRESS NOTES
[facility]  History and Physical/Consultation  Podiatry    Referring Physician : Cameron Muhammad MD  [address] [place] [address] RECORD NUMBER:  [id number]  AGE: 81 y.o. GENDER: male  : 1950  EPISODE DATE:  2020  Subjective:     Chief Complaint   Patient presents with    Wound Check     right great toe and left leg         HISTORY of PRESENT ILLNESS HPI     Rosanna Meeks is a 81 y.o. male who presents today for wound/ulcer evaluation. History of Wound Context:  The patient has had a wound of his right foot great toe as well as left lower leg which was first noted approximately 2020 as far as the right great toe, over the last month or so as far as the left lower leg. Patient has been a diabetic for many years with signs of neuropathy. He has been experiencing some lower extremity swelling. Patient relates when he developed a wound on his right foot/great toe home health was following, he did not make an appointment due to [place] due to a lot of medical problems. At that time he was evaluated and being followed by home visiting physician Dr Omari Virgen. He was getting periodic debridements as well as local care. There was no work-up as far as x-ray or cultures. On their initial visit to the wound healing center, 20 ,  the patient has noted that the wound has been improving. The patient has had similar previous wounds in the past.  (LVAD, left ventricular assist device, 2019 [facility])  Relates possible placement on transplant list.    Pt is on abx at time of initial visit for LLE, has 1 more day.       Wound/Ulcer Pain Timing/Severity: none  Quality of pain:   Severity:   / 10   Modifying Factors:   Associated Signs/Symptoms:     Ulcer Identification:  Ulcer Type: venous, arterial, diabetic, pressure and neuropathic  Contributing Factors: edema, venous stasis and diabetes    Diabetic/Pressure/Non Pressure Ulcers onl y:  Ulcer: N/A    If patient has diabetic lower extremity wounds  Martinez Classification of diabetic lower extremity wounds:    Grade Description   []  0 No open wound   []  1 Superficial ulcer involving the full skin thickness   []  2 Deep ulcer involves ligament, tendon, joint capsule, or fascia  No bone involvement or abscess presence   [x]  3 Deep Ulcer with abcess formation and/or osteomyelitis   []  4 Localized gangrene   []  5 Extensive gangrene of the foot     Wound: N/A        PAST MEDICAL HISTORY      Diagnosis Date    Anxiety     Arthritis     Atrial fibrillation (HCC)     AVNRT (AV teddy re-entry tachycardia) ([address]     Blood type O+ 11/14/2019    CAD (coronary artery disease)     [facility]est[facility]     Diabetes mellitus ([address]     Diabetic neuropathy ([address]     Diastolic dysfunction 55/62/4670    stage 3    Fall     right knee    Gout     chemically induced    History of blood transfusion     Hx of blood clots 1976    left leg    Hyperlipidemia     Hypertension     LBBB (left bundle branch block)     Left ventricular assist device (LVAD) complication 1273    Moderate mitral regurgitation 04/19/2016    [facility]     Obesity     SNEHA (obstructive sleep apnea)     treated with CPAP    Sarcoidosis 2019    Severe tricuspid regurgitation 04/19/2016    SVT (supraventricular tachycardia) ([facility])     Viral cardiomyopathy ([address]      Past Surgical History:   Procedure Laterality Date    BACK SURGERY      CARDIAC CATHETERIZATION  11/21/2011    Dr. Parmjit Denton  11/08/2019    Dr. Martita Perales  08/20/2014    BiV/ICD  (Hiram Ford)    Dr. Arlette Walker CATH LAB PROCEDURE      ECHO COMPL W DOP COLOR FLOW  11/19/2011         ECHO COMPLETE  12/14/2013         EYE SURGERY Right 08/2019    FEMUR SURGERY      rt; compound frx right femur at 3years old.     JOINT REPLACEMENT Left     knee    KNEE SURGERY      7 on right/1 on left/ total replacement on R    PICC LINE INSERTION NURSE  10/21/2019          Family History   Problem Relation Age of Onset    Alzheimer's Disease Mother     Diabetes Mother     No Known Problems Father         l[facility] in [facility]    No Known Problems Brother     Other Brother         aneurysm behind his eye    Arrhythmia Brother          age 61    No Known Problems Brother     No Known Problems Brother      Social History     Tobacco Use    Smoking status: Former Smoker     Packs/day: 0.00     Years: 2.00     Pack years: 0.00     Types: Cigars     Last attempt to quit: 1980     Years since quittin.5    Smokeless tobacco: Never Used    Tobacco comment: smoked 1 cigar a week    Substance Use Topics    Alcohol use: Not Currently    Drug use: Never     Allergies   Allergen Reactions    Food Hives     Strawberries      Soap & Cleansers Rash     TIDE detergent     Current Outpatient Medications on File Prior to Encounter   Medication Sig Dispense Refill    aspirin 325 MG tablet Take 325 mg by mouth daily      digoxin (LANOXIN) 125 MCG tablet Take 125 mcg by mouth daily      vitamin D (ERGOCALCIFEROL) 1.25 MG (36669 UT) CAPS capsule Take 50,000 Units by mouth once a week      Magnesium 400 MG CAPS Take by mouth      predniSONE (DELTASONE) 10 MG tablet Take 5 mg by mouth daily 3 tab daily for 30 days       spironolactone (ALDACTONE) 50 MG tablet Take 25 mg by mouth daily       warfarin (COUMADIN) 5 MG tablet Take 5 mg by mouth      pantoprazole (PROTONIX) 40 MG tablet Take 1 tablet by mouth every morning (before breakfast) 90 tablet 3    bumetanide (BUMEX) 2 MG tablet Take 1 tablet by mouth daily (Patient taking differently: Take by mouth 2mg am, 1mg pm) 90 tablet 3    allopurinol (ZYLOPRIM) 100 MG tablet Take 1 tablet by mouth daily 30 tablet 3    HUMULIN R 500 UNIT/ML injection 4 times daily (before meals and nightly) Sliding scale      traMADol (ULTRAM) 50 MG tablet Take 50 mg by mouth every 6 hours as needed for Pain. No current facility-administered medications on file prior to encounter. REVIEW OF SYSTEMS   ROS : All others Negative if blank [], Positive if [x]  General Vascular   [] Fevers [] Claudication   [] Chills [] Rest Pain   Skin Neurologic   [x] Tissue Loss [] Lower extremity neuropathy     Objective:    /70   Temp 97.3 °F (36.3 °C) (Temporal)   Resp 18   Ht 5' 11\" (1.803 m)   Wt 280 lb (127 kg)   BMI 39.05 kg/m²   Wt Readings from Last 3 Encounters:   07/13/20 280 lb (127 kg)   06/12/20 279 lb (126.6 kg)   01/31/20 285 lb (129.3 kg)       PHYSICAL EXAM   CONSTITUTIONAL:   Awake, alert, cooperative   PSYCHIATRIC :  Oriented to time, place and person      limited insight to disease process  EXTREMITIES:   R LE Open wounds are noted, right foot great toe with devitalized nonviable tissue including bone. There is no purulence or odor. No surrounding erythema or increase in temperature. Skin color is normal   Edema is  noted   Sensation deficit noted -    Palpation of the foot does not cause pain   5/5 strength DF/PF  L LE Open wounds are noted, left lower leg with 50% devitalized nonviable tissue. No purulence or odor. No surrounding erythema or increase in temperature.  Skin color is normal   Edema is  noted   Sensation deficit noted -    Palpation of the foot does not cause pain   5/5 strength DF/PF    Hand held doppler  R dorsalis pedis + L dorsalis pedis +   R posterior tibial + L posterior tibial +     Assessment:     Problem List Items Addressed This Visit     Diabetic ulcer of toe of right foot associated with type 2 diabetes mellitus, with necrosis of bone (HCC)    Relevant Orders    CBC WITH AUTO DIFFERENTIAL    BASIC METABOLIC PANEL    C-REACTIVE PROTEIN    SEDIMENTATION RATE    XR FOOT RIGHT (MIN 3 VIEWS)    Non-pressure chronic ulcer of left lower leg with fat layer exposed ([address]    Localized edema    Peripheral vascular disease, unspecified ([facility]    Relevant Orders    VL LOWER EXTREMITY ARTERIAL SEGMENTAL PRESSURES W PPG          Pre Debridement Measurements:  Are located in the [facility]  Documentation Flow Sheet  Post Debridement Measurements:  Wound/Ulcer Descriptions are Pre Debridement except measurements:     Wound 07/13/20 Toe (Comment  which one) Anterior;Right;Medial #1 (feb 24, 2020)great toe. Martinez III (Active)   Wound Image   07/13/20 1534   Wound Diabetic Martinez 3 07/13/20 1534   Wound Length (cm) 2.7 cm 07/13/20 1534   Wound Width (cm) 1.5 cm 07/13/20 1534   Wound Depth (cm) 0.4 cm 07/13/20 1534   Wound Surface Area (cm^2) 4.05 cm^2 07/13/20 1534   Wound Volume (cm^3) 1.62 cm^3 07/13/20 1534   Post-Procedure Length (cm) 2.7 cm 07/13/20 1549   Post-Procedure Width (cm) 1.5 cm 07/13/20 1549   Post-Procedure Depth (cm) 0.4 cm 07/13/20 1549   Post-Procedure Surface Area (cm^2) 4.05 cm^2 07/13/20 1549   Post-Procedure Volume (cm^3) 1.62 cm^3 07/13/20 1549   Undermining Starts ___ O'Clock 7 07/13/20 1534   Undermining Ends___ O'Clock 12 07/13/20 1534   Undermining Maxium Distance (cm) 0.4 07/13/20 1534   Wound Assessment Pink; White;Yellow 07/13/20 1534   Drainage Amount Moderate 07/13/20 1534   Drainage Description Serosanguinous; Yellow 07/13/20 1534   Odor None 07/13/20 1534   Exposed structure Bone 07/13/20 1534   Ricarda-wound Assessment Calloused; Maceration 07/13/20 1534   Number of days: 0       Wound 07/13/20 Pretibial Left #2 (acquired 6/20/2020) venous (Active)   Wound Image   07/13/20 1534   Wound Venous 07/13/20 1534   Wound Length (cm) 3.8 cm 07/13/20 1534   Wound Width (cm) 3.5 cm 07/13/20 1534   Wound Depth (cm) 0.2 cm 07/13/20 1534   Wound Surface Area (cm^2) 13.3 cm^2 07/13/20 1534   Wound Volume (cm^3) 2.66 cm^3 07/13/20 1534   Post-Procedure Length (cm) 3.8 cm 07/13/20 1549   Post-Procedure Width (cm) 3.5 cm 07/13/20 1549   Post-Procedure Depth (cm) 0.2 cm 07/13/20 1549   Post-Procedure Surface Area (cm^2) 13.3 cm^2 07/13/20 1549   Post-Procedure Volume (cm^3) 2.66 cm^3 07/13/20 1549   Wound Assessment Red;Yellow 07/13/20 1534   Drainage Amount Moderate 07/13/20 1534   Drainage Description Serosanguinous 07/13/20 1534   Odor None 07/13/20 1534   Ricarda-wound Assessment Intact 07/13/20 1534   Number of days: 0          Procedure Note  Indications:  Based on my examination of this patient's wound(s)/ulcer(s) today, debridement is required to promote healing and evaluate the wound base. Performed by: Sudarshan Brito DPM    Consent obtained:  Yes    Time out taken:  Yes    Pain Control: Anesthetic  Anesthetic: 4% Lidocaine Liquid Topical     Debridement:Excisional Debridement    Using curette and rongeur the wound(s)/ulcer(s) was/were sharply debrided down through and including the removal of subcutaneous tissue and bone. Subcu left lower extremity bone, right foot great toe    Devitalized Tissue Debrided:  fibrin, biofilm, slough and necrotic/eschar to stimulate bleeding to promote healing, post debridement good bleeding base and wound edges noted    Wound/Ulcer #: 1 and 2    Percent of Wound/Ulcer Debrided: 100%    Total Surface Area Debrided:  17.35 sq cm     Estimated Blood Loss:  Minimal  Hemostasis Achieved:  by pressure    Procedural Pain:  0  / 10   Post Procedural Pain:  0 / 10     Response to treatment:  Well tolerated by patient. A culture was done. Bone of the right great toe, specimen to pathology as well. Plan:     Pt is not a smoker   - Discussed relationship of smoking and negative affects on wound healing     In my professional opinion and based off the information that is available at this time this patient has appropriate indication for HBO Therapy: No    Treatment Note please see attached Discharge Instructions    Written patient dismissal instructions given to patient and signed by patient or POA.          Discharge Instructions       Visit Discharge/Physician Orders    Discharge condition: Stable    Assessment of pain at discharge: none    Anesthetic used:  4% lidocaine solution    Discharge to: Home    Left via:Private automobile    Accompanied by: accompanied by self    ECF/[facility]: [facility]    Dressing Orders:  Clean right great toe and left pretibial ulcers with normal saline. Apply silver alginate to both ulcers. Secure with dry dressing. Change every day. Apply compression garment to left leg and spandagrip to right leg. Treatment Orders:  Tissue C&S taken of right great toe ulcer  Bone biopsy taken of right great toe  Patient to get blood work drawn as ordered  Patient to get xray of right foot    [facility] followup visit ________one week with Dr. Parnell_____________________  (Please note your next appointment above and if you are unable to keep, kindly give a 24 hour notice. Thank you.)    Physician signature:__________________________      If you experience any of the following, please call the [address] [place] [address] during business hours:    * Increase in Pain  * Temperature over 101  * Increase in drainage from your wound  * Drainage with a foul odor  * Bleeding  * Increase in swelling  * Need for compression bandage changes due to slippage, breakthrough drainage. If you need medical attention outside of the business hours of the [address] [place] [address] [employer] please contact your PCP or go to the nearest emergency room. Contact your doctor if you have a temperature above 100.4 with any of the following:                         Persistent cough             Shortness of breath            Chills            Fatigue            Chest pain or pressure            Difficulty breathing            Decreased consciousness of confusion             Headache    Recommend:                       Wash hands often and for 20 seconds or more             Avoid touching eyes, nose, mouth and face             Social distance ( 6 feet)             Stay at home as much as possible             Call health care provider with any concerns          Electronically signed by Priscilla Alexandra DPM on 7/13/2020 at 4:04 PM

## 2020-07-15 ENCOUNTER — OFFICE VISIT (OUTPATIENT)
Dept: FAMILY MEDICINE CLINIC | Age: 70
End: 2020-07-15
Payer: COMMERCIAL

## 2020-07-15 VITALS — TEMPERATURE: 97 F | BODY MASS INDEX: 39.9 KG/M2 | HEIGHT: 71 IN | WEIGHT: 285 LBS

## 2020-07-15 LAB — ANAEROBIC CULTURE: NORMAL

## 2020-07-15 PROCEDURE — 99214 OFFICE O/P EST MOD 30 MIN: CPT | Performed by: FAMILY MEDICINE

## 2020-07-15 NOTE — PROGRESS NOTES
CC: Jamila Jarrell is a 81 y.o. yo male here for evaluation of the following medical concerns: New Patient      HPI:    Establish care    Heart disease with LVAD - follows with Joey; will be on heart transplant list if he can lose weight. Feels great today. Gets doppler checks from home for BP and have been ok and sends those in to [facility]    Sarcoid - stable at this time; was on 30mg prednisone; down to 5 or 2.5mg    Chronic pain - ultram PRN; same rx since january    DMII - on sliding scale - down to 20 or 25U humulin before meals    Leg swelling and foot swelling - resolving; follows with podiatry    G[facility] from [facility].       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 tachycardia) ([address]     Blood type O+ 11/14/2019    CAD (coronary artery disease)     CHF (congestive heart failure) ([address]     Diabetes mellitus ([address]     Diabetic neuropathy ([address]     Diastolic dysfunction 91/60/2073    stage 3    Fall     right knee    Gout     chemically induced    History of blood transfusion     Hx of blood clots 1976    left leg    Hyperlipidemia     Hypertension     LBBB (left bundle branch block)     Left ventricular assist device (LVAD) complication 4337    Moderate mitral regurgitation 04/19/2016    Nonischemic cardiomyopathy ([address]     Obesity     SNEHA (obstructive sleep apnea)     treated with CPAP    Sarcoidosis 2019    Severe tricuspid regurgitation 04/19/2016    [facility]     Viral cardiomyopathy ([address]      Past Surgical History:   Procedure Laterality Date    BACK SURGERY      CARDIAC CATHETERIZATION  11/21/2011    Dr. Najera Ba  11/08/2019    Dr. Irineo Gan  08/20/2014    BiV/ICD  (Bhaskar Combs)    Dr. Meryln Man CATH LAB PROCEDURE      ECHO COMPL W DOP COLOR FLOW  2011         ECHO COMPLETE  2013         EYE SURGERY Right 2019    FEMUR SURGERY      rt; compound frx right femur at 3years old.  JOINT REPLACEMENT Left     knee    KNEE SURGERY      7 on right/1 on left/ total replacement on R    PICC LINE INSERTION NURSE  10/21/2019          Family History   Problem Relation Age of Onset    Alzheimer's Disease Mother     Diabetes Mother     No Known Problems Father         l[facility]    No Known Problems Brother     Other Brother         aneurysm behind his eye    Arrhythmia Brother          age 61    No Known Problems Brother     No Known Problems Brother      Social History     Tobacco Use    Smoking status: Former Smoker     Packs/day: 0.00     Years: 2.00     Pack years: 0.00     Types: Cigars     Last attempt to quit: 1980     Years since quittin.5    Smokeless tobacco: Never Used    Tobacco comment: smoked 1 cigar a week    Substance Use Topics    Alcohol use: Not Currently    Drug use: Never     Allergies   Allergen Reactions    Food Hives     Strawberries      Soap & Cleansers Rash     TIDE detergent     Prior to Admission medications    Medication Sig Start Date End Date Taking? Authorizing Provider   aspirin 325 MG tablet Take 325 mg by mouth daily   Yes Historical Provider, MD   digoxin (LANOXIN) 125 MCG tablet Take 125 mcg by mouth daily   Yes Historical Provider, MD   vitamin D (ERGOCALCIFEROL) 1.25 MG (33884 UT) CAPS capsule Take 50,000 Units by mouth once a week   Yes Historical Provider, MD   Magnesium 400 MG CAPS Take by mouth   Yes Historical Provider, MD   predniSONE (DELTASONE) 10 MG tablet Take 5 mg by mouth daily 3 tab daily for 30 days    Yes Historical Provider, MD   spironolactone (ALDACTONE) 50 MG tablet Take 25 mg by mouth daily    Yes Historical Provider, MD   traMADol (ULTRAM) 50 MG tablet Take 50 mg by mouth every 6 hours as needed for Pain.    Yes Historical Provider, MD warfarin (COUMADIN) 5 MG tablet Take 5 mg by mouth   Yes Historical Provider, MD   pantoprazole (PROTONIX) 40 MG tablet Take 1 tablet by mouth every morning (before breakfast) 12/17/19  Yes Lachelle Em MD   bumetanide (BUMEX) 2 MG tablet Take 1 tablet by mouth daily  Patient taking differently: Take by mouth 2mg am, 1mg pm 12/3/19  Yes Nola Byrne MD   allopurinol (ZYLOPRIM) 100 MG tablet Take 1 tablet by mouth daily 12/3/19  Yes Nola Byrne MD   HUMULIN R 500 UNIT/ML injection 4 times daily (before meals and nightly) Sliding scale 5/28/15  Yes Historical Provider, MD       ROS:  General: no new fever, chills, night sweats, weight changes      Ophthalmic: no new vision changes  ENT: no new headaches, sinus problems, URI symptoms  Cardiovascular: no new chest pain or dyspnea on exertion, palpitations  Respiratory: no new cough, shortness of breath  Gastrointestinal: no new abdominal pain, change in bowel habits, or black or bloody stools  Genito-Urinary: no new dysuria, trouble voiding, or hematuria  Endocrine: no new hot flashes, malaise/lethargy, polydipsia/polyuria, skin changes, temperature intolerance and unexpected weight changes   Musculoskeletal: no new  joint pain, muscle pain  Hematological and Lymphatic: no new bleeding problems  Dermatological: no new rash and skin lesion changes  Neurological: no new TIA or stroke symptoms  Psychological: no current depression or anxiety  Allergy and Immunology: no new nasal congestion, postnasal drip and seasonal allergies    Vitals:  Temp 97 °F (36.1 °C)   Ht 5' 11\" (1.803 m)   Wt 285 lb (129.3 kg)   BMI 39.75 kg/m²   Wt Readings from Last 3 Encounters:   07/15/20 285 lb (129.3 kg)   07/13/20 280 lb (127 kg)   06/12/20 279 lb (126.6 kg)       Physical Exam:  Constitutional - alert, well appearing, and in no distress  Eyes - pupils equal and reactive, extraocular eye movements intact, left eye normal, right eye normal, no conjunctivitis noted  ENT - right ear normal, left ear normal; nose normal and patent, no erythema, discharge; mouth/throat mucous membranes moist, pharynx normal without lesions  Neck - supple, no significant adenopathy; thyroid exam: thyroid is normal in size without nodules or tenderness  Respiratory- clear to auscultation, no wheezes, rales or rhonchi, symmetric air entry; no increased work of breathing  Cardiovascular - normal rate, regular rhythm, normal S1, S2, mechanical heart sounds, rubs, clicks or gallops; Extremities +edema b/l with compression stockings, no clubbing or cyanosis  Abdomen - soft, nontender, nondistended, no masses or organomegaly  Musculoskeletal - gait normal; no clubbing, cyanosis of extremities noted; no joint deformity or swelling noted; full active range of motion noted without pain  Skin - normal coloration and turgor, no rashes, no suspicious skin lesions noted  Neurological - alert, oriented; no obvious CN deficits, normal speech, no focal findings or movement disorder noted  Psychiatric - alert, oriented to person, place, and time, normal mood, behavior, speech, dress, motor activity, and thought processes, affect appropriate to mood    Labs:  Pertinent labs, imaging, other diagnostic data and other clinician documentation reviewed in electronic medical record. Assessment / Plan   Diagnosis Orders   1. Encounter to establish care     2. NICM (nonischemic cardiomyopathy) ([facility]     3. Uncontrolled type 2 diabetes mellitus with hyperglycemia (HCC)       RTO: Return in about 3 months (around 10/15/2020) for Medicare AWV.       An electronic signature was used to authenticate this note.  ---- Elaine Puente MD on 7/15/2020 at 12:29 PM

## 2020-07-16 LAB
ORGANISM: ABNORMAL
WOUND/ABSCESS: ABNORMAL
WOUND/ABSCESS: ABNORMAL

## 2020-07-20 ENCOUNTER — HOSPITAL ENCOUNTER (OUTPATIENT)
Dept: WOUND CARE | Age: 70
Discharge: HOME | End: 2020-07-20
Payer: COMMERCIAL

## 2020-07-20 VITALS
BODY MASS INDEX: 39.9 KG/M2 | HEART RATE: 72 BPM | HEIGHT: 71 IN | SYSTOLIC BLOOD PRESSURE: 110 MMHG | TEMPERATURE: 97.9 F | WEIGHT: 285 LBS | RESPIRATION RATE: 22 BRPM

## 2020-07-20 PROBLEM — M86.171 ACUTE OSTEOMYELITIS OF METATARSAL BONE OF RIGHT FOOT (HCC): Status: ACTIVE | Noted: 2020-07-20

## 2020-07-20 PROCEDURE — 11042 DBRDMT SUBQ TIS 1ST 20SQCM/<: CPT

## 2020-07-20 RX ORDER — LIDOCAINE HYDROCHLORIDE 40 MG/ML
SOLUTION TOPICAL ONCE
Status: DISCONTINUED | OUTPATIENT
Start: 2020-07-20 | End: 2020-07-21 | Stop reason: HOSPADM

## 2020-07-20 NOTE — PROGRESS NOTES
FINAL SURGICAL PATHOLOGY REPORT     NAME:           BECKY Whitlock      Date of       07/13/2020                                            Collection:   Medical Record   [id number]              Date of       07/14/2020   Number:                                  Receipt:   Age:  74 Y        Sex:  M                Date          07/16/2020 16:08                                            Reported:   Date Of Birth:   1950   Financial        [id number]             Admitting     Dana Prieto   Number:                                  Physician:   Patient          LISA                   Ordering      HELENA DOOLEY   Location:                                Physician:     Accession Number:  [id number]-             Diagnosis:   Right great toe, bone biopsy: Acute osteomyelitis       7/13/2020  WOUND/ABSCESS  Abnormal     Mixed kiran also isolated, including:   Corynebacteria     Organism  Pseudomonas aeruginosaAbnormal       WOUND/ABSCESS  Light growth     Resulting Agency  [facility]    Susceptibility      Pseudomonas aeruginosa      BACTERIAL SUSCEPTIBILITY PANEL BY TWYLA      cefepime  ^1 mcg/mL  Sensitive      gentamicin  <=^1 mcg/mL  Sensitive      levofloxacin  ^2 mcg/mL  Sensitive      piperacillin-tazobactam  <=^4 mcg/mL  Sensitive      tobramycin  <=^1 mcg/mL  Sensitive          Labs 7/13/2020  B/C 26/1.4 CRP 0.7. WBC 7.8.  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) [facility]     Blood type O+ 11/14/2019    CAD (coronary artery disease)     CHF (congestive heart failure) ([facility]     Diabetes mellitus ([facility]     Diabetic neuropathy ([facility]     Diastolic dysfunction     stage 3    Fall     right knee    Gout     chemically induced    History of blood transfusion     Hx of blood clots     left leg    Hyperlipidemia     Hypertension     LBBB (left bundle branch block)     Left ventricular assist device (LVAD) complication 09    Moderate mitral regurgitation 2016    Nonischemic cardiomyopathy ([facility]     Obesity     SNEHA (obstructive sleep apnea)     treated with CPAP    Sarcoidosis     Severe tricuspid regurgitation 2016    SVT (supraventricular tachycardia) (HCC)     Viral cardiomyopathy ([facility]      Past Surgical History:   Procedure Laterality Date    BACK SURGERY      CARDIAC CATHETERIZATION  2011    Dr. Vasquez Gamboa  2019    Dr. Chuck Ibrahim  2014    BiV/ICD  (Sarah Rincon)    Dr. Alfreda Hopper CATH LAB PROCEDURE      ECHO COMPL W DOP COLOR FLOW  2011         ECHO COMPLETE  2013         EYE SURGERY Right 2019    FEMUR SURGERY      rt; compound frx right femur at 3years old.     JOINT REPLACEMENT Left     knee    KNEE SURGERY      7 on right/1 on left/ total replacement on R    PICC LINE INSERTION NURSE  10/21/2019          Family History   Problem Relation Age of Onset    Alzheimer's Disease Mother     Diabetes Mother     No Known Problems Father         lives in [facility]    No Known Problems Brother     Other Brother         aneurysm behind his eye    Arrhythmia Brother          age 61    No Known Problems Brother     No Known Problems Brother      Social History     Tobacco Use    Smoking status: Former Smoker     Packs/day: 0.00     Years: 2.00     Pack years: 0.00     Types: Cigars     Last attempt to quit: 1980     Years since quittin.5    Smokeless tobacco: Never Used    Tobacco comment: smoked 1 cigar a week    Substance Use Topics    Alcohol use: Not Currently    Drug use: Never     Allergies   Allergen Reactions    Food Hives     Strawberries      Soap & Cleansers Rash     TIDE detergent     Current Outpatient Medications on File Prior to Encounter   Medication Sig Dispense Refill    aspirin 325 MG tablet Take 325 mg by mouth daily      digoxin (LANOXIN) 125 MCG tablet Take 125 mcg by mouth daily      vitamin D (ERGOCALCIFEROL) 1.25 MG (92838 UT) CAPS capsule Take 50,000 Units by mouth once a week      Magnesium 400 MG CAPS Take by mouth      predniSONE (DELTASONE) 10 MG tablet Take 5 mg by mouth daily 3 tab daily for 30 days       spironolactone (ALDACTONE) 50 MG tablet Take 25 mg by mouth daily       traMADol (ULTRAM) 50 MG tablet Take 50 mg by mouth every 6 hours as needed for Pain.  warfarin (COUMADIN) 5 MG tablet Take 5 mg by mouth      pantoprazole (PROTONIX) 40 MG tablet Take 1 tablet by mouth every morning (before breakfast) 90 tablet 3    bumetanide (BUMEX) 2 MG tablet Take 1 tablet by mouth daily (Patient taking differently: Take by mouth 2mg am, 1mg pm) 90 tablet 3    HUMULIN R 500 UNIT/ML injection 4 times daily (before meals and nightly) Sliding scale      allopurinol (ZYLOPRIM) 100 MG tablet Take 1 tablet by mouth daily 30 tablet 3     No current facility-administered medications on file prior to encounter.         REVIEW OF SYSTEMS See HPI    Objective:    BP (!) 110/0 Comment: used doppler  Pulse 72   Temp 97.9 °F (36.6 °C) (Temporal)   Resp 22   Ht 5' 11\" (1.803 m)   Wt 285 lb (129.3 kg)   BMI 39.75 kg/m²   Wt Readings from Last 3 Encounters:   07/20/20 285 lb (129.3 kg)   07/15/20 285 lb (129.3 kg)   07/13/20 280 lb (127 kg)     PHYSICAL EXAM  CONSTITUTIONAL:   Awake, alert, cooperative   EYES:  lids and lashes normal   ENT: external ears and nose without lesions   NECK:  supple, symmetrical, trachea midline   SKIN:  Open wounds  right foot great toe with 50% devitalized nonviable tissue, no exposed bone at present. There is no purulence or odor. No surrounding erythema or increase in temperature. left lower leg with 20% devitalized nonviable tissue. No purulence or odor. No surrounding erythema or increase in temperature    Assessment:     Problem List Items Addressed This Visit     Diabetic ulcer of toe of right foot associated with type 2 diabetes mellitus, with necrosis of bone ([facility] - Primary    Acute osteomyelitis of metatarsal bone of right foot ([facility]          Pre Debridement Measurements:  Are located in the [place]  Documentation Flow Sheet  Post Debridement Measurements:  Wound/Ulcer Descriptions are Pre Debridement except measurements:     Wound 07/13/20 Toe (Comment  which one) Anterior;Right;Medial #1 (feb 24, 2020)great toe. Martinez III (Active)   Wound Image   07/13/20 1534   Wound Diabetic Martinez 3 07/13/20 1534   Wound Length (cm) 1.3 cm 07/20/20 1358   Wound Width (cm) 1 cm 07/20/20 1358   Wound Depth (cm) 0.3 cm 07/20/20 1358   Wound Surface Area (cm^2) 1.3 cm^2 07/20/20 1358   Change in Wound Size % (l*w) 67.9 07/20/20 1358   Wound Volume (cm^3) 0.39 cm^3 07/20/20 1358   Wound Healing % 76 07/20/20 1358   Post-Procedure Length (cm) 1.3 cm 07/20/20 1410   Post-Procedure Width (cm) 1 cm 07/20/20 1410   Post-Procedure Depth (cm) 0.3 cm 07/20/20 1410   Post-Procedure Surface Area (cm^2) 1.3 cm^2 07/20/20 1410   Post-Procedure Volume (cm^3) 0.39 cm^3 07/20/20 1410   Undermining Starts ___ O'Clock 7 07/13/20 1534   Undermining Ends___ O'Clock 12 07/13/20 1534   Undermining Maxium Distance (cm) 0 07/20/20 1358   Wound Assessment Pink; White;Yellow 07/20/20 1358   Drainage Amount Moderate 07/20/20 1358   Drainage Description Serosanguinous; Yellow 07/20/20 1358   Odor None 07/20/20 1358   Exposed structure Bone 07/20/20 1358   Ricarda-wound Assessment Srinivasan; Intact;Dry;Calloused 07/20/20 1358   Number of days: 6       Wound 07/13/20 Pretibial Left #2 (acquired 6/20/2020) venous (Active)   Wound Image   07/13/20 1534   Wound Venous 07/13/20 1534   Wound Length (cm) 3.5 cm 07/20/20 1358   Wound Width (cm) 3 cm 07/20/20 1358   Wound Depth (cm) 0.2 cm 07/20/20 1358   Wound Surface Area (cm^2) 10.5 cm^2 07/20/20 1358   Change in Wound Size % (l*w) 21.05 07/20/20 1358   Wound Volume (cm^3) 2.1 cm^3 07/20/20 1358   Wound Healing % 21 07/20/20 1358   Post-Procedure Length (cm) 3.5 cm 07/20/20 1410   Post-Procedure Width (cm) 3 cm 07/20/20 1410   Post-Procedure Depth (cm) 0.2 cm 07/20/20 1410   Post-Procedure Surface Area (cm^2) 10.5 cm^2 07/20/20 1410   Post-Procedure Volume (cm^3) 2.1 cm^3 07/20/20 1410   Wound Assessment Red 07/20/20 1358   Drainage Amount Moderate 07/20/20 1358   Drainage Description Serosanguinous 07/20/20 1358   Odor None 07/20/20 1358   Ricarda-wound Assessment Intact 07/20/20 1358   Number of days: 6          Procedure Note  Indications:  Based on my examination of this patient's wound(s)/ulcer(s) today, debridement is required to promote healing and evaluate the wound base. Performed by: Dung Chaney DPM    Consent obtained:  Yes    Time out taken:  Yes    Pain Control: Anesthetic  Anesthetic: 4% Lidocaine Liquid Topical     Debridement:Excisional Debridement    Using curette and tissue nippers the wound(s)/ulcer(s) was/were sharply debrided down through and including the removal of subcutaneous tissue. Devitalized Tissue Debrided:  fibrin, biofilm, slough and necrotic/eschar to stimulate bleeding to promote healing, post debridement good bleeding base and wound edges noted    Wound/Ulcer #: 1 and 2    Percent of Wound/Ulcer Debrided: 100%    Total Surface Area Debrided:  11.8 sq cm     Estimated Blood Loss:  Minimal  Hemostasis Achieved:  by pressure    Procedural Pain:  0  / 10   Post Procedural Pain:  0 / 10     Response to treatment:  Well tolerated by patient. Plan:   Treatment Note please see attached Discharge Instructions. Discussed treatment options in detail with patient. Patient multiple medical problems. We will send him for evaluation and treatment infectious disease for the above. Questions or concerns contact wound care center otherwise follow-up 1 week    Written patient dismissal instructions given to patient and signed by patient or POA. Discharge Instructions       Visit Discharge/Physician Orders    Discharge condition: Stable     Assessment of pain at discharge: none     Anesthetic used:  4% lidocaine solution     Discharge to: Home     Left via:Private automobile     Accompanied by: accompanied by self     ECF/HHA: [place] [facility]     Dressing Orders:  Clean right great toe and left pretibial ulcers with normal saline. Apply silver alginate to both ulcers. Secure with dry dressing. Change every day. Apply compression garment to left leg and spandagrip to right leg.     Treatment Orders:    Consult infectious disease    Arterial bilateral lower extremities  scheduled August 6th at 2:30 pm     [facility] followup visit ________one week with Dr. Parnell_____________________  (Please note your next appointment above and if you are unable to keep, kindly give a 24 hour notice. Thank you.)    Physician signature:__________________________      If you experience any of the following, please call the [address] [place] [address] Road during business hours:    * Increase in Pain  * Temperature over 101  * Increase in drainage from your wound  * Drainage with a foul odor  * Bleeding  * Increase in swelling  * Need for compression bandage changes due to slippage, breakthrough drainage. If you need medical attention outside of the business hours of the [address] [place] [address] Road please contact your PCP or go to the nearest emergency room.         Electronically signed by Divina Garcia DPM on 7/20/2020 at 2:47 PM

## 2020-07-27 ENCOUNTER — HOSPITAL ENCOUNTER (OUTPATIENT)
Dept: WOUND CARE | Age: 70
Discharge: HOME | End: 2020-07-27
Payer: COMMERCIAL

## 2020-07-27 VITALS
SYSTOLIC BLOOD PRESSURE: 110 MMHG | RESPIRATION RATE: 28 BRPM | HEART RATE: 64 BPM | WEIGHT: 285 LBS | HEIGHT: 71 IN | TEMPERATURE: 96.9 F | BODY MASS INDEX: 39.9 KG/M2

## 2020-07-27 PROCEDURE — 11042 DBRDMT SUBQ TIS 1ST 20SQCM/<: CPT

## 2020-07-27 RX ORDER — LIDOCAINE HYDROCHLORIDE 40 MG/ML
SOLUTION TOPICAL ONCE
Status: DISCONTINUED | OUTPATIENT
Start: 2020-07-27 | End: 2020-07-28 | Stop reason: HOSPADM

## 2020-07-27 NOTE — PROGRESS NOTES
Wound Healing Center Followup Visit Note    Referring Physician : Faraz Burt MD  [address] [place] [address] RECORD NUMBER:  [id number]  AGE: 81 y.o. GENDER: male  : 1950  EPISODE DATE:  2020    Subjective:     Chief Complaint   Patient presents with    Wound Check      toe ulcers of right foot and left lower extremity ulcers      HISTORY of PRESENT ILLNESS HPI   Quinn Palacios is a 81 y.o. male who presents today in regards to follow up evaluation and treatment of wound/ulcer. That patient's past medical, family and social hx were reviewed and changes were made if present. History of Wound Context:  The patient has had a wound of his right foot great toe as well as left lower leg which was first noted approximately 2020 as far as the right great toe, over the last month or so as far as the left lower leg. Patient has been a diabetic for many years with signs of neuropathy. He has been experiencing some lower extremity swelling. Patient relates when he developed a wound on his right foot/great toe home health was following, he did not make an appointment due to [place] due to a lot of medical problems. At that time he was evaluated and being followed by home visiting physician Dr Kia Bay. He was getting periodic debridements as well as local care. There was no work-up as far as x-ray or cultures. On their initial visit to the wound healing center, 20 ,  the patient has noted that the wound has been improving. The patient has had similar previous wounds in the past.  (LVAD, left ventricular assist device, 2019 [facility])  Relates possible placement on transplant list.     Pt is on abx at time of initial visit for LLE, has 1 more day. Right foot 2020  Cortical indistinctness and irregularity at the tuft of the right    great toe distal phalanx suspicious for osteomyelitis.  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) [facility]     Blood type O+ 2019    CAD (coronary artery disease)     [facility]estive [facility]     Diabetes mellitus ([facility]     Diabetic neuropathy ([facility]     Diastolic dysfunction     stage 3    Fall     right knee    Gout     chemically induced    History of blood transfusion     Hx of blood clots     left leg    Hyperlipidemia     Hypertension     LBBB (left bundle branch block)     Left ventricular assist device (LVAD) complication 0016    Moderate mitral regurgitation 2016    Nonischemic cardiomyopathy ([facility]     Obesity     SNEHA (obstructive sleep apnea)     treated with CPAP    Sarcoidosis     Severe tricuspid regurgitation 2016    SVT [facility]supraventricular tachycardia) ([facility])     Viral cardiomyopathy ([facility]      Past Surgical History:   Procedure Laterality Date    BACK SURGERY      CARDIAC CATHETERIZATION  2011    Dr. Dominick Adam  2019    Dr. Terri Navarro  2014    [facility]V/ICD  [facility])    Dr. Mely Alicea CATH LAB PROCEDURE      ECHO COMPL W DOP COLOR FLOW  2011         ECHO COMPLETE  2013         EYE SURGERY Right 2019    FEMUR SURGERY      rt; compound frx right femur at 3years old.     JOINT REPLACEMENT Left     knee    KNEE SURGERY      7 on right/1 on left/ total replacement on R    PICC LINE INSERTION NURSE  10/21/2019          Family History   Problem Relation Age of Onset    Alzheimer's Disease Mother     Diabetes Mother     No Known Problems Father         lives in [facility]    No Known Problems Brother     Other Brother         aneurysm behind his eye    Arrhythmia Brother          age 61    No Known Problems Brother     No Known Problems Brother      Social History     Tobacco Use    Smoking status: Former Smoker     Packs/day: 0.00     Years: 2.00     Pack years: 0.00     Types: Cigars     Last attempt to quit: 1980     Years since quittin.5    Smokeless tobacco: Never Used    Tobacco comment: smoked 1 cigar a week    Substance Use Topics    Alcohol use: Not Currently    Drug use: Never     Allergies   Allergen Reactions    Food Hives     Strawberries      Soap & Cleansers Rash     TIDE detergent     Current Outpatient Medications on File Prior to Encounter   Medication Sig Dispense Refill    aspirin 325 MG tablet Take 325 mg by mouth daily      digoxin (LANOXIN) 125 MCG tablet Take 125 mcg by mouth daily      vitamin D (ERGOCALCIFEROL) 1.25 MG (20771 UT) CAPS capsule Take 50,000 Units by mouth once a week      Magnesium 400 MG CAPS Take by mouth      predniSONE (DELTASONE) 10 MG tablet Take 5 mg by mouth daily 3 tab daily for 30 days       spironolactone (ALDACTONE) 50 MG tablet Take 25 mg by mouth daily       warfarin (COUMADIN) 5 MG tablet Take 5 mg by mouth      pantoprazole (PROTONIX) 40 MG tablet Take 1 tablet by mouth every morning (before breakfast) 90 tablet 3    bumetanide (BUMEX) 2 MG tablet Take 1 tablet by mouth daily (Patient taking differently: Take by mouth 2mg am, 1mg pm) 90 tablet 3    allopurinol (ZYLOPRIM) 100 MG tablet Take 1 tablet by mouth daily 30 tablet 3    HUMULIN R 500 UNIT/ML injection 4 times daily (before meals and nightly) Sliding scale      traMADol (ULTRAM) 50 MG tablet Take 50 mg by mouth every 6 hours as needed for Pain. No current facility-administered medications on file prior to encounter.         REVIEW OF SYSTEMS See HPI    Objective:    BP (!) 110/0 Comment: per doppler  Pulse 64   Temp 96.9 °F (36.1 °C) (Temporal)   Resp 28   Ht 5' 11\" (1.803 m)   Wt 285 lb (129.3 kg)   BMI 39.75 kg/m²   Wt Readings from Last 3 Encounters:   20 285 lb (129.3 kg)   20 285 lb (129.3 kg)   07/15/20 285 lb (129.3 kg) PHYSICAL EXAM  CONSTITUTIONAL:   Awake, alert, cooperative   EYES:  lids and lashes normal   ENT: external ears and nose without lesions   NECK:  supple, symmetrical, trachea midline   SKIN:  Open wounds  right foot great toe with 30% devitalized nonviable tissue, no exposed bone at present. There is no purulence or odor. No surrounding erythema or increase in temperature. left lower leg with 30% devitalized nonviable tissue. No purulence or odor. No surrounding erythema or increase in temperature. Healed islands. Assessment:     Problem List Items Addressed This Visit     Diabetic ulcer of toe of right foot associated with type 2 diabetes mellitus, with necrosis of bone ([facility] - Primary          Pre Debridement Measurements:  Are located in the [place]  Documentation Flow Sheet  Post Debridement Measurements:  Wound/Ulcer Descriptions are Pre Debridement except measurements:     Wound 07/13/20 Toe (Comment  which one) Anterior;Right;Medial #1 (feb 24, 2020)great toe. Martinez III (Active)   Wound Image   07/13/20 1534   Wound Diabetic Martinez 3 07/13/20 1534   Dressing Status Clean;Dry; Intact 07/20/20 1506   Dressing Changed Changed/New 07/20/20 1506   Dressing/Treatment Alginate with Ag;Dry dressing 07/20/20 1506   Wound Cleansed Rinsed/Irrigated with saline 07/20/20 1506   Wound Length (cm) 0.8 cm 07/27/20 1403   Wound Width (cm) 0.8 cm 07/27/20 1403   Wound Depth (cm) 0.3 cm 07/27/20 1403   Wound Surface Area (cm^2) 0.64 cm^2 07/27/20 1403   Change in Wound Size % (l*w) 84.2 07/27/20 1403   Wound Volume (cm^3) 0.19 cm^3 07/27/20 1403   Wound Healing % 88 07/27/20 1403   Post-Procedure Length (cm) 0.8 cm 07/27/20 1422   Post-Procedure Width (cm) 0.8 cm 07/27/20 1422   Post-Procedure Depth (cm) 0.3 cm 07/27/20 1422   Post-Procedure Surface Area (cm^2) 0.64 cm^2 07/27/20 1422   Post-Procedure Volume (cm^3) 0.19 cm^3 07/27/20 1422   Undermining Starts ___ O'Clock 7 07/13/20 1534   Undermining Ends___ O'Clock 12 07/13/20 1534   Undermining Maxium Distance (cm) 0 07/20/20 1358   Wound Assessment Red;Yellow 07/27/20 1403   Drainage Amount Moderate 07/27/20 1403   Drainage Description Serosanguinous 07/27/20 1403   Odor None 07/27/20 1403   Exposed structure Bone 07/20/20 1358   Ricarda-wound Assessment Tan; Intact;Dry;Calloused;Dark edges 07/27/20 1403   Number of days: 13       Wound 07/13/20 Pretibial Left #2 (acquired 6/20/2020) venous (Active)   Wound Image   07/13/20 1534   Wound Venous 07/13/20 1534   Dressing Status Clean;Dry; Intact 07/20/20 1506   Dressing Changed Changed/New 07/20/20 1506   Dressing/Treatment Alginate with Ag;Dry dressing 07/20/20 1506   Wound Cleansed Rinsed/Irrigated with saline 07/20/20 1506   Wound Length (cm) 3.8 cm 07/27/20 1403   Wound Width (cm) 6.5 cm 07/27/20 1403   Wound Depth (cm) 0.1 cm 07/27/20 1403   Wound Surface Area (cm^2) 24.7 cm^2 07/27/20 1403   Change in Wound Size % (l*w) -85.71 07/27/20 1403   Wound Volume (cm^3) 2.47 cm^3 07/27/20 1403   Wound Healing % 7 07/27/20 1403   Post-Procedure Length (cm) 3.8 cm 07/27/20 1422   Post-Procedure Width (cm) 6.5 cm 07/27/20 1422   Post-Procedure Depth (cm) 0.2 cm 07/27/20 1422   Post-Procedure Surface Area (cm^2) 24.7 cm^2 07/27/20 1422   Post-Procedure Volume (cm^3) 4.94 cm^3 07/27/20 1422   Wound Assessment Red;Yellow 07/27/20 1403   Drainage Amount Moderate 07/27/20 1403   Drainage Description Serosanguinous 07/27/20 1403   Odor None 07/27/20 1403   Ricarda-wound Assessment Intact 07/27/20 1403   Number of days: 13          Procedure Note  Indications:  Based on my examination of this patient's wound(s)/ulcer(s) today, debridement is required to promote healing and evaluate the wound base.     Performed by: Francheska Mello DPM    Consent obtained:  Yes    Time out taken:  Yes    Pain Control: Anesthetic  Anesthetic: 4% Lidocaine Liquid Topical     Debridement:Excisional Debridement    Using curette and tissue nippers the wound(s)/ulcer(s) was/were sharply debrided down through and including the removal of subcutaneous tissue. Devitalized Tissue Debrided:  fibrin, biofilm, slough and necrotic/eschar to stimulate bleeding to promote healing, post debridement good bleeding base and wound edges noted    Wound/Ulcer #: 1 and 2    Percent of Wound/Ulcer Debrided: 50%    Total Surface Area Debrided:  12.5 sq cm     Estimated Blood Loss:  Minimal  Hemostasis Achieved:  by pressure    Procedural Pain:  0  / 10   Post Procedural Pain:  0 / 10     Response to treatment:  Well tolerated by patient. Plan:   Treatment Note please see attached Discharge Instructions. Discussed with patient in detail with due to his overall status is very important that he sees infectious diseases soon as possible to initiate proper treatment. Written patient dismissal instructions given to patient and signed by patient or POA. Discharge Instructions       Visit Discharge/Physician Orders     Discharge condition: Stable     Assessment of pain at discharge: none     Anesthetic used:  4% lidocaine solution     Discharge to: Home     Left via:Private automobile     Accompanied by: accompanied by self     ECF/HHA: [facility]     Dressing Orders: Yoanna Mustapha right great toe and left pretibial ulcers with normal saline.  Apply silver alginate to both ulcers.  Secure with dry dressing. Change every day. Apply compression garment to left leg and spandagrip to right leg.     Treatment Orders:    PATIENT TO SEE INFECTIOUS DISEASE   Arterial bilateral lower extremities  scheduled August 6th at 2:30 pm     [facility] followup visit ________one week with Dr. Parnell_____________________  (Please note your next appointment above and if you are unable to keep, kindly give a 24 hour notice.  Thank you.)     Physician signature:__________________________        If you experience any of the following, please call the [facility] [address] during business hours:     * Increase in Pain  * Temperature over 101  * Increase in drainage from your wound  * Drainage with a foul odor  * Bleeding  * Increase in swelling  * Need for compression bandage changes due to slippage, breakthrough drainage.     If you need medical attention outside of the business hours of the [address] [place] [address] Road please contact your PCP or go to the nearest emergency room.                   Electronically signed by Allison Jackson DPM on 7/27/2020 at 2:31 PM

## 2020-08-03 ENCOUNTER — HOSPITAL ENCOUNTER (OUTPATIENT)
Dept: WOUND CARE | Age: 70
Discharge: HOME | End: 2020-08-03
Payer: COMMERCIAL

## 2020-08-03 VITALS
HEIGHT: 71 IN | TEMPERATURE: 96.1 F | HEART RATE: 64 BPM | SYSTOLIC BLOOD PRESSURE: 110 MMHG | BODY MASS INDEX: 39.34 KG/M2 | WEIGHT: 281 LBS | RESPIRATION RATE: 20 BRPM

## 2020-08-03 PROCEDURE — 11042 DBRDMT SUBQ TIS 1ST 20SQCM/<: CPT

## 2020-08-03 RX ORDER — LIDOCAINE HYDROCHLORIDE 40 MG/ML
SOLUTION TOPICAL ONCE
Status: DISCONTINUED | OUTPATIENT
Start: 2020-08-03 | End: 2020-08-04 | Stop reason: HOSPADM

## 2020-08-03 NOTE — PROGRESS NOTES
FINAL SURGICAL PATHOLOGY REPORT     NAME:           BECKY Bello      Date of       07/13/2020                                            Collection:   Medical Record   [id number]              Date of       07/14/2020   Number:                                  Receipt:   Age:  74 Y        Sex:  M                Date          07/16/2020 16:08                                            Reported:   Date Of Birth:   1950   Financial        [id number]             Admitting     Tess Terry   Number:                                  Physician:   Patient          LISA                   Ordering      HELENA DOOLEY   Location:                                Physician:     Accession Number:  [id number]             Diagnosis:   Right great toe, bone biopsy: Acute osteomyelitis       7/13/2020  WOUND/ABSCESS  Abnormal     Mixed kiran also isolated, including:   Corynebacteria     Organism  Pseudomonas aeruginosaAbnormal       WOUND/ABSCESS  Light growth     Resulting Agency  [facility]    Susceptibility      Pseudomonas aeruginosa      BACTERIAL SUSCEPTIBILITY PANEL BY TWYLA      cefepime  ^1 mcg/mL  Sensitive      gentamicin  <=^1 mcg/mL  Sensitive      levofloxacin  ^2 mcg/mL  Sensitive      piperacillin-tazobactam  <=^4 mcg/mL  Sensitive      tobramycin  <=^1 mcg/mL  Sensitive          Labs 7/13/2020  B/C 26/1.4 CRP 0.7. WBC 7.8. SED RATE 70,     7/27/2020 Patient has appt with Dr. Janene Abbott tomorrow for evaluation for the osteo-. However he declined. States he is going to follow with infectious disease at [facility], his  will be setting him up.     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) [facility]     Blood type O+ 2019    CAD (coronary artery disease)     CHF (congestive heart failure) (HCC)     Diabetes mellitus ([facility]     Diabetic neuropathy ([facility]     Diastolic dysfunction     stage 3    Fall     right knee    Gout     chemically induced    History of blood transfusion     Hx of blood clots     left leg    Hyperlipidemia     Hypertension     LBBB (left bundle branch block)     Left ventricular assist device (LVAD) complication 8461    Moderate mitral regurgitation 2016    Nonischemic cardiomyopathy ([facility]     Obesity     SNEHA (obstructive sleep apnea)     treated with CPAP    Sarcoidosis     Severe tricuspid regurgitation 2016    SV[facility]     Viral cardiomyopathy ([facility]      Past Surgical History:   Procedure Laterality Date    BACK SURGERY      CARDIAC CATHETERIZATION  2011    Dr. Rayna Squires  2019    Dr. Mikhail Garza  2014    BiV/ICD  ([facility])    Dr. Any Jeong CATH LAB PROCEDURE      ECHO COMPL W DOP COLOR FLOW  2011         ECHO COMPLETE  2013         EYE SURGERY Right 2019    FEMUR SURGERY      rt; compound frx right femur at 3years old.     JOINT REPLACEMENT Left     knee    KNEE SURGERY      7 on right/1 on left/ total replacement on R    PICC LINE INSERTION NURSE  10/21/2019          Family History   Problem Relation Age of Onset    Alzheimer's Disease Mother     Diabetes Mother     No Known Problems Father         lives in [facility]    No Known Problems Brother     Other Brother         aneurysm behind his eye    Arrhythmia Brother          age 63    No Known Problems Brother     No Known Problems Brother      Social History     Tobacco Use    Smoking status: Former Smoker     Packs/day: 0.00     Years: 2.00     Pack years: 0.00     Types: Cigars     Last attempt to quit: 1980     Years since quittin.6    Smokeless tobacco: Never Used    Tobacco comment: smoked 1 cigar a week    Substance Use Topics    Alcohol use: Not Currently    Drug use: Never     Allergies   Allergen Reactions    Food Hives     Strawberries      Soap & Cleansers Rash     TIDE detergent     Current Outpatient Medications on File Prior to Encounter   Medication Sig Dispense Refill    aspirin 325 MG tablet Take 325 mg by mouth daily      digoxin (LANOXIN) 125 MCG tablet Take 125 mcg by mouth daily      vitamin D (ERGOCALCIFEROL) 1.25 MG (56292 UT) CAPS capsule Take 50,000 Units by mouth once a week On       Magnesium 400 MG CAPS Take by mouth      predniSONE (DELTASONE) 10 MG tablet Take 5 mg by mouth daily 3 tab daily for 30 days       spironolactone (ALDACTONE) 50 MG tablet Take 25 mg by mouth daily       traMADol (ULTRAM) 50 MG tablet Take 50 mg by mouth every 6 hours as needed for Pain.  warfarin (COUMADIN) 5 MG tablet Take 5 mg by mouth      pantoprazole (PROTONIX) 40 MG tablet Take 1 tablet by mouth every morning (before breakfast) 90 tablet 3    bumetanide (BUMEX) 2 MG tablet Take 1 tablet by mouth daily (Patient taking differently: Take by mouth 2mg am, 1mg pm) 90 tablet 3    allopurinol (ZYLOPRIM) 100 MG tablet Take 1 tablet by mouth daily 30 tablet 3    HUMULIN R 500 UNIT/ML injection 4 times daily (before meals and nightly) Sliding scale       No current facility-administered medications on file prior to encounter.         REVIEW OF SYSTEMS See HPI    Objective:    BP (!) 110/0 Comment: via doppler  Pulse 64   Temp 96.1 °F (35.6 °C) (Temporal)   Resp 20   Ht 5' 11\" (1.803 m)   Wt 281 lb (127.5 kg)   BMI 39.19 kg/m²   Wt Readings from Last 3 Encounters:   08/03/20 281 lb (127.5 kg)   07/27/20 285 lb (129.3 kg)   07/20/20 285 lb (129.3 kg)     PHYSICAL EXAM  CONSTITUTIONAL:   Awake, alert, cooperative   EYES:  lids and lashes normal   ENT: external ears and nose without lesions   NECK:  supple, symmetrical, trachea midline   SKIN:  Open wounds  right foot great toe with covered with devitalized nonviable tissue, no exposed bone. There is no purulence or odor. No surrounding erythema or increase in temperature. left lower leg with 50% devitalized nonviable tissue. No purulence or odor. No surrounding erythema or increase in temperature. Healed islands. Assessment:     Problem List Items Addressed This Visit     Diabetic ulcer of toe of right foot associated with type 2 diabetes mellitus, with necrosis of bone ([address] - Primary    Non-pressure chronic ulcer of left lower leg with fat layer exposed ([address]          Pre Debridement Measurements:  Are located in the [place]  Documentation Flow Sheet  Post Debridement Measurements:  Wound/Ulcer Descriptions are Pre Debridement except measurements:     Wound 07/13/20 Toe (Comment  which one) Anterior;Right;Medial #1 (feb 24, 2020)great toe. Martinez III (Active)   Wound Image   07/13/20 1534   Wound Diabetic Martinez 3 07/13/20 1534   Dressing Status Clean;Dry; Intact 07/27/20 1425   Dressing Changed Changed/New 07/27/20 1425   Dressing/Treatment Alginate with Ag;Dry dressing 07/27/20 1425   Wound Cleansed Rinsed/Irrigated with saline 07/27/20 1425   Wound Length (cm) 0.7 cm 08/03/20 1317   Wound Width (cm) 0.5 cm 08/03/20 1317   Wound Depth (cm) 0.2 cm 08/03/20 1317   Wound Surface Area (cm^2) 0.35 cm^2 08/03/20 1317   Change in Wound Size % (l*w) 91.36 08/03/20 1317   Wound Volume (cm^3) 0.07 cm^3 08/03/20 1317   Wound Healing % 96 08/03/20 1317   Post-Procedure Length (cm) 0.8 cm 08/03/20 1329   Post-Procedure Width (cm) 0.5 cm 08/03/20 1329   Post-Procedure Depth (cm) 0.3 cm 08/03/20 3147 obtained:  Yes    Time out taken:  Yes    Pain Control: Anesthetic  Anesthetic: 4% Lidocaine Liquid Topical     Debridement:Excisional Debridement    Using curette and tissue nippers the wound(s)/ulcer(s) was/were sharply debrided down through and including the removal of subcutaneous tissue. Devitalized Tissue Debrided:  fibrin, biofilm, slough and necrotic/eschar to stimulate bleeding to promote healing, post debridement good bleeding base and wound edges noted    Wound/Ulcer #: 1 and 2    Percent of Wound/Ulcer Debrided: 100%( toe, 50% leg)    Total Surface Area Debrided:  12 sq cm     Estimated Blood Loss:  Minimal  Hemostasis Achieved:  by pressure    Procedural Pain:  0  / 10   Post Procedural Pain:  0 / 10     Response to treatment:  Well tolerated by patient. Plan:   Treatment Note please see attached Discharge Instructions. Again discussed with patient in detail with due to his overall status is very important that he sees infectious diseases soon as possible to initiate proper treatment. Written patient dismissal instructions given to patient and signed by patient or POA. Discharge Instructions          Visit Discharge/Physician Orders     Discharge condition: Stable     Assessment of pain at discharge: none     Anesthetic used:  4% lidocaine solution     Discharge to: Home     Left via:Private automobile     Accompanied by: accompanied by self     ECF/HHA: [facility]     Dressing Orders: Virl Lieu right great toe and left pretibial ulcers with normal saline.  Apply silver alginate to both ulcers.  Secure with dry dressing. Change every day.   Apply compression garment to left leg and spandagrip to right leg.     Treatment Orders:    PATIENT TO SEE INFECTIOUS DISEASE   Arterial bilateral lower extremities  scheduled August 6th at 2:30 pm     [facility] followup visit ________one week with Dr. Parnell_____________________  (Please note your next appointment above and if you are unable to keep, kindly give a 24 hour notice.  Thank you.)     Physician signature:__________________________        If you experience any of the following, please call the [address] during business hours:     * Increase in Pain  * Temperature over 101  * Increase in drainage from your wound  * Drainage with a foul odor  * Bleeding  * Increase in swelling  * Need for compression bandage changes due to slippage, breakthrough drainage.     If you need medical attention outside of the business hours of the [address] please contact your PCP or go to the nearest emergency room.                                    Electronically signed by Hang Casanova DPM on 8/3/2020 at 1:38 PM

## 2020-08-06 ENCOUNTER — HOSPITAL ENCOUNTER (OUTPATIENT)
Dept: INTERVENTIONAL RADIOLOGY/VASCULAR | Age: 70
Discharge: HOME | End: 2020-08-08
Payer: COMMERCIAL

## 2020-08-06 PROCEDURE — 93923 UPR/LXTR ART STDY 3+ LVLS: CPT

## 2020-08-07 LAB
AVERAGE GLUCOSE: NORMAL
HBA1C MFR BLD: 8.4 %

## 2020-08-10 ENCOUNTER — HOSPITAL ENCOUNTER (OUTPATIENT)
Dept: WOUND CARE | Age: 70
Discharge: HOME | End: 2020-08-10
Payer: COMMERCIAL

## 2020-08-10 VITALS — TEMPERATURE: 98.2 F | RESPIRATION RATE: 18 BRPM | HEIGHT: 71 IN | BODY MASS INDEX: 39.34 KG/M2 | WEIGHT: 281 LBS

## 2020-08-10 PROCEDURE — 11042 DBRDMT SUBQ TIS 1ST 20SQCM/<: CPT

## 2020-08-10 RX ORDER — LIDOCAINE HYDROCHLORIDE 40 MG/ML
SOLUTION TOPICAL ONCE
Status: DISCONTINUED | OUTPATIENT
Start: 2020-08-10 | End: 2020-08-11 | Stop reason: HOSPADM

## 2020-08-10 NOTE — PROGRESS NOTES
[facility]aling Center Followup Visit Note    Referring Physician : Etienne Kahn MD  [address] [place] [address] RECORD NUMBER:  [id number]  AGE: 81 y.o. GENDER: male  : 1950  EPISODE DATE:  8/10/2020    Subjective:     Chief Complaint   Patient presents with    Wound Check     right toe and left leg wound      HISTORY of PRESENT ILLNESS CLAUDETTE Moilna is a 81 y.o. male who presents today in regards to follow up evaluation and treatment of wound/ulcer. That patient's past medical, family and social hx were reviewed and changes were made if present. History of Wound Context:  The patient has had a wound of his right foot great toe as well as left lower leg which was first noted approximately 2020 as far as the right great toe, over the last month or so as far as the left lower leg. Patient has been a diabetic for many years with signs of neuropathy. He has been experiencing some lower extremity swelling. Patient relates when he developed a wound on his right foot/great toe home health was following, he did not make an appointment due to [place] due to a lot of medical problems. At that time he was evaluated and being followed by home visiting physician Dr Alejandra Cabezas. He was getting periodic debridements as well as local care. There was no work-up as far as x-ray or cultures. On their initial visit to the wound healing center, 20 ,  the patient has noted that the wound has been improving. The patient has had similar previous wounds in the past.  (LVAD, left ventricular assist device, 2019 [facility])  Relates possible placement on transplant list.     Pt is on abx at time of initial visit for LLE, has 1 more day. Right foot 2020  Cortical indistinctness and irregularity at the tuft of the right    great toe distal phalanx suspicious for osteomyelitis. As mentioned in    the body of the report, the distal toes are flexed limiting    visualization.      FINAL SURGICAL PATHOLOGY REPORT     NAME:           Raymond Taveras      Date of       07/13/2020                                            Collection:   Medical Record   [id number]              Date of       07/14/2020   Number:                                  Receipt:   Age:  74 Y        Sex:  M                Date          07/16/2020 16:08                                            Reported:   Date Of Birth:   1950   Financial        [id number]             Admitting     Barbara Harvey   Number:                                  Physician:   Patient          LISA                   Ordering      HELENA DOOLEY   Location:                                Physician:     Accession Number:  [id number]             Diagnosis:   Right great toe, bone biopsy: Acute osteomyelitis       7/13/2020  WOUND/ABSCESS  Abnormal     Mixed kiran also isolated, including:   Corynebacteria     Organism  Pseudomonas aeruginosaAbnormal       WOUND/ABSCESS  Light growth     Resulting Agency  [facility]    Susceptibility      Pseudomonas aeruginosa      BACTERIAL SUSCEPTIBILITY PANEL BY TWYLA      cefepime  ^1 mcg/mL  Sensitive      gentamicin  <=^1 mcg/mL  Sensitive      levofloxacin  ^2 mcg/mL  Sensitive      piperacillin-tazobactam  <=^4 mcg/mL  Sensitive      tobramycin  <=^1 mcg/mL  Sensitive          Labs 7/13/2020  B/C 26/1.4 CRP 0.7. WBC 7.8. SED RATE 70,     7/27/2020 Patient has appt with Dr. Chapincito Coronado tomorrow for evaluation for the osteo-. However he declined. States he is going to follow with infectious disease at [facility], his  will be setting him up. 8/3/2020 has not seen ID in [facility] as of yet, discussed in detail importance, at risk for sepsis, loss of limb    8/10/2020 Relates has aapt with ID in [facility] Sept 4th. Had to push it out due to wife not being able to get off.     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     Blood type O+ 11/14/2019    CAD (coronary artery disease)     CHF (congestive heart failure) (HCC)     Diabetes mellitus ([facility]     Diabetic neuropathy ([facility]     Diastolic dysfunction 12/75/2645    stage 3    Fall     right knee    Gout     chemically induced    History of blood transfusion     Hx of blood clots 1976    left leg    Hyperlipidemia     Hypertension     LBBB (left bundle branch block)     Left ventricular assist device (LVAD) complication 7157    Moderate mitral regurgitation 04/19/2016    Nonischemic cardiomyopathy ([facility]     Obesity     SNEHA (obstructive sleep apnea)     treated with CPAP    Sarcoidosis 2019    Severe tricuspid regurgitation 04/19/2016    [facility]     Viral cardiomyopathy ([facility]      Past Surgical History:   Procedure Laterality Date    BACK SURGERY      CARDIAC CATHETERIZATION  11/21/2011    Dr. Hattie Powell  11/08/2019    Dr. Jameel Norton  08/20/2014    BiV/ICD  ([facility])    Dr. Haresh Cabrera CATH LAB PROCEDURE      ECHO COMPL W DOP COLOR FLOW  11/19/2011         ECHO COMPLETE  12/14/2013         EYE SURGERY Right 08/2019    FEMUR SURGERY      rt; compound frx right femur at 3years old.     JOINT REPLACEMENT Left     knee    KNEE SURGERY      7 on right/1 on left/ total replacement on R    PICC LINE INSERTION NURSE  10/21/2019          Family History   Problem Relation Age of Onset    Alzheimer's Disease Mother     Diabetes Mother     No Known Problems Father         l[facility] in [facility] No Known Problems Brother     Other Brother         aneurysm behind his eye    Arrhythmia Brother          age 61    No Known Problems Brother     No Known Problems Brother      Social History     Tobacco Use    Smoking status: Former Smoker     Packs/day: 0.00     Years: 2.00     Pack years: 0.00     Types: Cigars     Last attempt to quit: 1980     Years since quittin.6    Smokeless tobacco: Never Used    Tobacco comment: smoked 1 cigar a week    Substance Use Topics    Alcohol use: Not Currently    Drug use: Never     Allergies   Allergen Reactions    Food Hives     Strawberries      Soap & Cleansers Rash     TIDE detergent     Current Outpatient Medications on File Prior to Encounter   Medication Sig Dispense Refill    aspirin 325 MG tablet Take 325 mg by mouth daily      digoxin (LANOXIN) 125 MCG tablet Take 125 mcg by mouth daily      vitamin D (ERGOCALCIFEROL) 1.25 MG (54648 UT) CAPS capsule Take 50,000 Units by mouth once a week On       Magnesium 400 MG CAPS Take by mouth      predniSONE (DELTASONE) 10 MG tablet Take 5 mg by mouth daily 3 tab daily for 30 days       spironolactone (ALDACTONE) 50 MG tablet Take 25 mg by mouth daily       traMADol (ULTRAM) 50 MG tablet Take 50 mg by mouth every 6 hours as needed for Pain.  warfarin (COUMADIN) 5 MG tablet Take 5 mg by mouth      pantoprazole (PROTONIX) 40 MG tablet Take 1 tablet by mouth every morning (before breakfast) 90 tablet 3    bumetanide (BUMEX) 2 MG tablet Take 1 tablet by mouth daily (Patient taking differently: Take by mouth 2mg am, 1mg pm) 90 tablet 3    allopurinol (ZYLOPRIM) 100 MG tablet Take 1 tablet by mouth daily 30 tablet 3    HUMULIN R 500 UNIT/ML injection 4 times daily (before meals and nightly) Sliding scale       No current facility-administered medications on file prior to encounter.         REVIEW OF SYSTEMS See HPI    Objective:    Temp 98.2 °F (36.8 °C)   Resp 18   Ht 5' 11\" (1.803 m)   Wt 281 lb (127.5 kg)   BMI 39.19 kg/m²   Wt Readings from Last 3 Encounters:   08/10/20 281 lb (127.5 kg)   08/03/20 281 lb (127.5 kg)   07/27/20 285 lb (129.3 kg)     PHYSICAL EXAM  CONSTITUTIONAL:   Awake, alert, cooperative   EYES:  lids and lashes normal   ENT: external ears and nose without lesions   NECK:  supple, symmetrical, trachea midline   SKIN:  Open wounds  right foot great toe with covered with devitalized nonviable tissue, no exposed bone. There is no purulence or odor. No surrounding erythema or increase in temperature. left lower leg with 30% devitalized nonviable tissue. No purulence or odor. No surrounding erythema or increase in temperature. Healed islands. Assessment:     Ulcer right foot, LLE. Pre Debridement Measurements:  Are located in the [facility]  Documentation Flow Sheet  Post Debridement Measurements:  Wound/Ulcer Descriptions are Pre Debridement except measurements:     Wound 07/13/20 Toe (Comment  which one) Anterior;Right;Medial #1 (feb 24, 2020)great toe. Martinez III (Active)   Wound Image   07/13/20 1534   Wound Diabetic Martinez 3 07/13/20 1534   Dressing Status Clean;Dry; Intact 07/27/20 1425   Dressing Changed Changed/New 08/03/20 1603   Dressing/Treatment Alginate with Ag 08/03/20 1603   Wound Cleansed Rinsed/Irrigated with saline 08/03/20 1603   Wound Length (cm) 0.5 cm 08/10/20 1434   Wound Width (cm) 0.6 cm 08/10/20 1434   Wound Depth (cm) 0.5 cm 08/10/20 1434   Wound Surface Area (cm^2) 0.3 cm^2 08/10/20 1434   Change in Wound Size % (l*w) 92.59 08/10/20 1434   Wound Volume (cm^3) 0.15 cm^3 08/10/20 1434   Wound Healing % 91 08/10/20 1434   Post-Procedure Length (cm) 0.8 cm 08/03/20 1329   Post-Procedure Width (cm) 0.5 cm 08/03/20 1329   Post-Procedure Depth (cm) 0.3 cm 08/03/20 1329   Post-Procedure Surface Area (cm^2) 0.4 cm^2 08/03/20 1329   Post-Procedure Volume (cm^3) 0.12 cm^3 08/03/20 1329   Undermining Starts ___ O'Clock 7 07/13/20 1534   Undermining Ends___ through and including the removal of subcutaneous tissue. Devitalized Tissue Debrided:  fibrin, biofilm, slough and necrotic/eschar to stimulate bleeding to promote healing, post debridement good bleeding base and wound edges noted    Wound/Ulcer #: 1 and 2    Percent of Wound/Ulcer Debrided: 100%( toe, 50% leg)    Total Surface Area Debrided:  10 sq cm     Estimated Blood Loss:  Minimal  Hemostasis Achieved:  by pressure    Procedural Pain:  0  / 10   Post Procedural Pain:  0 / 10     Response to treatment:  Well tolerated by patient. Plan:   Treatment Note please see attached Discharge Instructions. Again discussed with patient in detail with due to his overall status is very important that he sees infectious diseases soon as possible to initiate proper treatment. Written patient dismissal instructions given to patient and signed by patient or POA. Discharge Instructions       Visit Discharge/Physician Orders    Discharge condition: Stable     Assessment of pain at discharge: none     Anesthetic used:  4% lidocaine solution     Discharge to: Home     Left via:Private automobile     Accompanied by: accompanied by self     ECF/HHA: [facility]     Dressing Orders: Bradley Signs right great toe and left pretibial ulcers with normal saline.  Apply silver alginate to both ulcers.  Secure with dry dressing. Change every day. Apply compression garment to left leg and spandagrip to right leg.     Treatment Orders:    PATIENT TO SEE INFECTIOUS DISEASE   Arterial bilateral lower extremities  scheduled August 6th at 2:30 pm reviewed 8-     [facility] followup visit ________one to two weeks with Dr. Arvind WU NEXT AVAILABLE :) __________________  (Please note your next appointment above and if you are unable to keep, kindly give a 24 hour notice.  Thank you.)    Physician signature:__________________________      If you experience any of the following, please call the [facility] during business hours:    * Increase in Pain  * Temperature over 101  * Increase in drainage from your wound  * Drainage with a foul odor  * Bleeding  * Increase in swelling  * Need for compression bandage changes due to slippage, breakthrough drainage. If you need medical attention outside of the business hours of the [address] [place] [address] Road please contact your PCP or go to the nearest emergency room.         Electronically signed by Alyssa Ferrer DPM on 8/10/2020 at 2:57 PM

## 2020-08-17 ENCOUNTER — HOSPITAL ENCOUNTER (OUTPATIENT)
Dept: WOUND CARE | Age: 70
Discharge: HOME | End: 2020-08-17
Payer: COMMERCIAL

## 2020-08-17 VITALS — SYSTOLIC BLOOD PRESSURE: 100 MMHG | TEMPERATURE: 97.2 F | HEART RATE: 72 BPM | RESPIRATION RATE: 18 BRPM

## 2020-08-17 PROCEDURE — 11042 DBRDMT SUBQ TIS 1ST 20SQCM/<: CPT

## 2020-08-17 RX ORDER — LIDOCAINE HYDROCHLORIDE 40 MG/ML
SOLUTION TOPICAL ONCE
Status: DISCONTINUED | OUTPATIENT
Start: 2020-08-17 | End: 2020-08-18 | Stop reason: HOSPADM

## 2020-08-17 NOTE — PROGRESS NOTES
FINAL SURGICAL PATHOLOGY REPORT     NAME:           BECKY De La Cruz      Date of       07/13/2020                                            Collection:   Medical Record   [id number]              Date of       07/14/2020   Number:                                  Receipt:   Age:  74 Y        Sex:  M                Date          07/16/2020 16:08                                            Reported:   Date Of Birth:   1950   Financial        [id number]             Admitting     Jerrica Antunez   Number:                                  Physician:   Patient          LISA                   Ordering      HELENA DOOLEY   Location:                                Physician:     Accession Number:  [id number]             Diagnosis:   Right great toe, bone biopsy: Acute osteomyelitis       7/13/2020  WOUND/ABSCESS  Abnormal     Mixed kiran also isolated, including:   Corynebacteria     Organism  Pseudomonas aeruginosaAbnormal       WOUND/ABSCESS  Light growth     Resulting Agency  [facility]    Susceptibility      Pseudomonas aeruginosa      BACTERIAL SUSCEPTIBILITY PANEL BY TWYLA      cefepime  ^1 mcg/mL  Sensitive      gentamicin  <=^1 mcg/mL  Sensitive      levofloxacin  ^2 mcg/mL  Sensitive      piperacillin-tazobactam  <=^4 mcg/mL  Sensitive      tobramycin  <=^1 mcg/mL  Sensitive          Labs 7/13/2020  B/C 26/1.4 CRP 0.7. WBC 7.8. SED RATE 70,     7/27/2020 Patient has appt with Dr. Zee Messer tomorrow for evaluation for the osteo-. However he declined. States he is going to follow with infectious disease at [facility], his  will be setting him up. 8/3/2020 has not seen ID in [facility] as of yet, discussed in detail importance, at risk for sepsis, loss of limb    8/10/2020 Relates has aapt with ID in [facility] Sept 4th. Had to push it out due to wife not being able to get off.     Wound/Ulcer Pain Timing/Severity: none  Quality of pain:   Severity:   / 10   Modifying Factors:   Associated Signs/Symptoms:      Ulcer Identification:  Ulcer Type: venous, arterial, diabetic, pressure and neuropathic  Contributing Factors: edema, venous stasis and diabetes        PAST MEDICAL HISTORY      Diagnosis Date    Anxiety     Arthritis     Atrial fibrillation (HCC)     AVNRT (AV teddy re-entry tachycardia) [facility] [address]     Blood type O+ 11/14/2019    CAD (coronary artery disease)     CHF (congestive heart failure) (HCC)     Diabetes mellitus ([address]     Diabetic neuropathy ([facility] [address]     Diastolic dysfunction 94/23/8479    stage 3    Fall     right knee    Gout     chemically induced    History of blood transfusion     Hx of blood clots 1976    left leg    Hyperlipidemia     Hypertension     LBBB (left bundle branch block)     Left ventricular assist device (LVAD) complication 8324    Moderate mitral regurgitation 04/19/2016    Nonischemic cardiomyopathy ([facility] [address]     Obesity     SNEHA (obstructive sleep apnea)     treated with CPAP    Sarcoidosis 2019    Severe tricuspid regurgitation 04/19/2016    SVT (supraventricular tachycardia) (HCC)     Viral cardiomyopathy ([address]      Past Surgical History:   Procedure Laterality Date    BACK SURGERY      CARDIAC CATHETERIZATION  11/21/2011    Dr. Andrzej Ang  11/08/2019    Dr. Rell Urbano  08/20/2014    BiV/ICD  ([facility])    Dr. Mccoy Fortuna CATH LAB PROCEDURE      ECHO COMPL W DOP COLOR FLOW  11/19/2011         ECHO COMPLETE  12/14/2013         EYE SURGERY Right 08/2019    FEMUR SURGERY      rt; compound frx right femur at 3years old.     JOINT REPLACEMENT Left     knee    KNEE SURGERY      7 on right/1 on left/ total replacement on R    PICC LINE INSERTION NURSE  10/21/2019          Family History   Problem Relation Age of Onset    Alzheimer's Disease Mother     Diabetes Mother     No Known Problems Father         l[facility] in [facility] No Known Problems Brother     Other Brother         aneurysm behind his eye    Arrhythmia Brother          age 61    No Known Problems Brother     No Known Problems Brother      Social History     Tobacco Use    Smoking status: Former Smoker     Packs/day: 0.00     Years: 2.00     Pack years: 0.00     Types: Cigars     Last attempt to quit: 1980     Years since quittin.6    Smokeless tobacco: Never Used    Tobacco comment: smoked 1 cigar a week    Substance Use Topics    Alcohol use: Not Currently    Drug use: Never     Allergies   Allergen Reactions    Food Hives     Strawberries      Soap & Cleansers Rash     TIDE detergent     Current Outpatient Medications on File Prior to Encounter   Medication Sig Dispense Refill    aspirin 325 MG tablet Take 325 mg by mouth daily      digoxin (LANOXIN) 125 MCG tablet Take 125 mcg by mouth daily      vitamin D (ERGOCALCIFEROL) 1.25 MG (81648 UT) CAPS capsule Take 50,000 Units by mouth once a week On       Magnesium 400 MG CAPS Take by mouth      predniSONE (DELTASONE) 10 MG tablet Take 5 mg by mouth daily 3 tab daily for 30 days       spironolactone (ALDACTONE) 50 MG tablet Take 25 mg by mouth daily       traMADol (ULTRAM) 50 MG tablet Take 50 mg by mouth every 6 hours as needed for Pain.  warfarin (COUMADIN) 5 MG tablet Take 5 mg by mouth      pantoprazole (PROTONIX) 40 MG tablet Take 1 tablet by mouth every morning (before breakfast) 90 tablet 3    bumetanide (BUMEX) 2 MG tablet Take 1 tablet by mouth daily (Patient taking differently: Take by mouth 2mg am, 1mg pm) 90 tablet 3    allopurinol (ZYLOPRIM) 100 MG tablet Take 1 tablet by mouth daily 30 tablet 3    HUMULIN R 500 UNIT/ML injection 4 times daily (before meals and nightly) Sliding scale       No current facility-administered medications on file prior to encounter.         REVIEW OF SYSTEMS See HPI    Objective:    BP (!) 100/0 Comment: used doppler  Pulse 72   Temp 97.2 °F (36.2 °C) (Temporal)   Resp 18   Wt Readings from Last 3 Encounters:   08/10/20 281 lb (127.5 kg)   08/03/20 281 lb (127.5 kg)   07/27/20 285 lb (129.3 kg)     PHYSICAL EXAM  CONSTITUTIONAL:   Awake, alert, cooperative   EYES:  lids and lashes normal   ENT: external ears and nose without lesions   NECK:  supple, symmetrical, trachea midline   SKIN:  Open wounds  right foot great toe with covered with devitalized nonviable tissue, no exposed bone. There is no purulence or odor. No surrounding erythema or increase in temperature. left lower leg with 20% devitalized nonviable tissue. No purulence or odor. No surrounding erythema or increase in temperature. Assessment:     Ulcer right foot, LLE. Pre Debridement Measurements:  Are located in the [place]  Documentation Flow Sheet  Post Debridement Measurements:  Wound/Ulcer Descriptions are Pre Debridement except measurements:     Wound 07/13/20 Toe (Comment  which one) Anterior;Right;Medial #1 (feb 24, 2020)great toe. Martinez III (Active)   Wound Image   07/13/20 1534   Wound Diabetic Martinez 3 07/13/20 1534   Dressing Status Clean;Dry; Intact 07/27/20 1425   Dressing Changed Changed/New 08/17/20 1512   Dressing/Treatment Alginate with Ag 08/17/20 1512   Wound Cleansed Rinsed/Irrigated with saline 08/17/20 1512   Wound Length (cm) 0.4 cm 08/17/20 1414   Wound Width (cm) 0.5 cm 08/17/20 1414   Wound Depth (cm) 0.1 cm 08/17/20 1414   Wound Surface Area (cm^2) 0.2 cm^2 08/17/20 1414   Change in Wound Size % (l*w) 95.06 08/17/20 1414   Wound Volume (cm^3) 0.02 cm^3 08/17/20 1414   Wound Healing % 99 08/17/20 1414   Post-Procedure Length (cm) 0.4 cm 08/17/20 1437   Post-Procedure Width (cm) 0.5 cm 08/17/20 1437   Post-Procedure Depth (cm) 0.2 cm 08/17/20 1437   Post-Procedure Surface Area (cm^2) 0.2 cm^2 08/17/20 1437   Post-Procedure Volume (cm^3) 0.04 cm^3 08/17/20 1437   Undermining Starts ___ O'Clock 7 07/13/20 1534   Undermining Ends___ O'Clock 12 07/13/20 1534   Undermining Maxium Distance (cm) 0 07/20/20 1358   Wound Assessment Pink;Yellow 08/17/20 1414   Drainage Amount Small 08/17/20 1414   Drainage Description Yellow 08/17/20 1414   Odor None 08/17/20 1414   Exposed structure Bone 07/20/20 1358   Ricarda-wound Assessment Fragile; Maceration; White 08/17/20 1414   Number of days: 34       Wound 07/13/20 Pretibial Left #2 (acquired 6/20/2020) venous (Active)   Wound Image   07/13/20 1534   Wound Venous 07/13/20 1534   Dressing Status Clean;Dry; Intact 07/27/20 1425   Dressing Changed Changed/New 08/17/20 1512   Dressing/Treatment Alginate with Ag 08/17/20 1512   Wound Cleansed Rinsed/Irrigated with saline 08/17/20 1512   Wound Length (cm) 0.3 cm 08/17/20 1414   Wound Width (cm) 1 cm 08/17/20 1414   Wound Depth (cm) 0.1 cm 08/17/20 1414   Wound Surface Area (cm^2) 0.3 cm^2 08/17/20 1414   Change in Wound Size % (l*w) 97.74 08/17/20 1414   Wound Volume (cm^3) 0.03 cm^3 08/17/20 1414   Wound Healing % 99 08/17/20 1414   Post-Procedure Length (cm) 0.3 cm 08/17/20 1437   Post-Procedure Width (cm) 1 cm 08/17/20 1437   Post-Procedure Depth (cm) 0.2 cm 08/17/20 1437   Post-Procedure Surface Area (cm^2) 0.3 cm^2 08/17/20 1437   Post-Procedure Volume (cm^3) 0.06 cm^3 08/17/20 1437   Wound Assessment Red;Yellow 08/17/20 1414   Drainage Amount Scant 08/17/20 1414   Drainage Description Serosanguinous 08/17/20 1414   Odor None 08/17/20 1414   Ricarda-wound Assessment Intact 08/17/20 1414   Number of days: 34          Procedure Note  Indications:  Based on my examination of this patient's wound(s)/ulcer(s) today, debridement is required to promote healing and evaluate the wound base.     Performed by: Osbaldo Casas DPM    Consent obtained:  Yes    Time out taken:  Yes    Pain Control: Anesthetic  Anesthetic: 4% Lidocaine Liquid Topical     Debridement:Excisional Debridement    Using curette and tissue nippers the wound(s)/ulcer(s) was/were sharply debrided down through and including the removal of subcutaneous tissue. Devitalized Tissue Debrided:  fibrin, biofilm, slough and necrotic/eschar to stimulate bleeding to promote healing, post debridement good bleeding base and wound edges noted    Wound/Ulcer #: 1 and 2    Percent of Wound/Ulcer Debrided: 100%    Total Surface Area Debrided:  0.5 sq cm     Estimated Blood Loss:  Minimal  Hemostasis Achieved:  by pressure    Procedural Pain:  0  / 10   Post Procedural Pain:  0 / 10     Response to treatment:  Well tolerated by patient. Plan:   Treatment Note please see attached Discharge Instructions. Written patient dismissal instructions given to patient and signed by patient or POA. Discharge Instructions       Visit Discharge/Physician Orders    Discharge condition: Stable     Assessment of pain at discharge: none     Anesthetic used:  4% lidocaine solution     Discharge to: Home     Left via:Private automobile     Accompanied by: accompanied by self     ECF/HHA: [facility] care     Dressing Orders:       Clean right great toe and left pretibial ulcers with normal saline.  Apply silver alginate to both ulcers.  Secure with dry dressing. Apply compression garment to left leg and spandagrip to right leg. Treatment Orders:    PATIENT TO SEE INFECTIOUS DISEASE September 4TH [facility]   Arterial bilateral lower extremities  scheduled August 6th at 2:30 pm reviewed 8-     [facility] followup visit ________one  week with Dr. Wolf Wally DR. WU NEXT AVAILABLE ???__________________  (Please note your next appointment above and if you are unable to keep, kindly give a 24 hour notice.  Thank you.)    Physician signature:__________________________      If you experience any of the following, please call the [address] [place] [address] Road during business hours:    * Increase in Pain  * Temperature over 101  * Increase in drainage from your wound  * Drainage with a foul odor  * Bleeding  * Increase in swelling  * Need for compression bandage changes due to slippage, breakthrough drainage. If you need medical attention outside of the business hours of the [address] [place] [address] Road please contact your PCP or go to the nearest emergency room.         Electronically signed by Francheska Mello DPM on 8/17/2020 at 3:27 PM

## 2020-08-24 ENCOUNTER — HOSPITAL ENCOUNTER (OUTPATIENT)
Dept: WOUND CARE | Age: 70
Discharge: HOME | End: 2020-08-24
Payer: COMMERCIAL

## 2020-08-24 VITALS — WEIGHT: 281 LBS | RESPIRATION RATE: 18 BRPM | TEMPERATURE: 97.7 F | BODY MASS INDEX: 39.34 KG/M2 | HEIGHT: 71 IN

## 2020-08-24 PROBLEM — L97.512 RIGHT FOOT ULCER, WITH FAT LAYER EXPOSED (HCC): Status: ACTIVE | Noted: 2020-08-24

## 2020-08-24 PROCEDURE — 11042 DBRDMT SUBQ TIS 1ST 20SQCM/<: CPT

## 2020-08-24 RX ORDER — LIDOCAINE HYDROCHLORIDE 40 MG/ML
SOLUTION TOPICAL ONCE
Status: DISCONTINUED | OUTPATIENT
Start: 2020-08-24 | End: 2020-08-25 | Stop reason: HOSPADM

## 2020-08-24 NOTE — PROGRESS NOTES
Wound Healing Center Followup Visit Note    Referring Physician : Ronnie Menjivar MD  [address] [place] [address] RECORD NUMBER:  [id number]  AGE: 81 y.o. GENDER: male  : 1950  EPISODE DATE:  2020    Subjective:     Chief Complaint   Patient presents with    Wound Check     right toe and left leg wounds      HISTORY of PRESENT ILLNESS HPI   Carmen Ham is a 81 y.o. male who presents today in regards to follow up evaluation and treatment of wound/ulcer. That patient's past medical, family and social hx were reviewed and changes were made if present. History of Wound Context:  The patient has had a wound of his right foot great toe as well as left lower leg which was first noted approximately 2020 as far as the right great toe, over the last month or so as far as the left lower leg. Patient has been a diabetic for many years with signs of neuropathy. He has been experiencing some lower extremity swelling. Patient relates when he developed a wound on his right foot/great toe home health was following, he did not make an appointment due to [place] due to a lot of medical problems. At that time he was evaluated and being followed by home visiting physician Dr Chanel Cardona. He was getting periodic debridements as well as local care. There was no work-up as far as x-ray or cultures. On their initial visit to the wound healing center, 20 ,  the patient has noted that the wound has been improving. The patient has had similar previous wounds in the past.  (LVAD, left ventricular assist device, 2019 [facility])  Relates possible placement on transplant list.     Pt is on abx at time of initial visit for LLE, has 1 more day. Right foot 2020  Cortical indistinctness and irregularity at the tuft of the right    great toe distal phalanx suspicious for osteomyelitis. As mentioned in    the body of the report, the distal toes are flexed limiting    visualization.      FINAL SURGICAL PATHOLOGY REPORT     NAME:           Aj Dennis      Date of       07/13/2020                                            Collection:   Medical Record   [id number]              Date of       07/14/2020   Number:                                  Receipt:   Age:  74 Y        Sex:  M                Date          07/16/2020 16:08                                            Reported:   Date Of Birth:   1950   Financial        [id number]             Admitting     Diego Carty   Number:                                  Physician:   Patient          LISA                   Ordering      HELENA DOOLEY   Location:                                Physician:     Accession Number:  [id number]             Diagnosis:   Right great toe, bone biopsy: Acute osteomyelitis       7/13/2020  WOUND/ABSCESS  Abnormal     Mixed kiran also isolated, including:   Corynebacteria     Organism  Pseudomonas aeruginosaAbnormal       WOUND/ABSCESS  Light growth     Resulting Agency  [facility]    Susceptibility      Pseudomonas aeruginosa      BACTERIAL SUSCEPTIBILITY PANEL BY TWYLA      cefepime  ^1 mcg/mL  Sensitive      gentamicin  <=^1 mcg/mL  Sensitive      levofloxacin  ^2 mcg/mL  Sensitive      piperacillin-tazobactam  <=^4 mcg/mL  Sensitive      tobramycin  <=^1 mcg/mL  Sensitive          Labs 7/13/2020  B/C 26/1.4 CRP 0.7. WBC 7.8. SED RATE 70,     7/27/2020 Patient has appt with Dr. Josette Colorado tomorrow for evaluation for the osteo-. However he declined. States he is [facility], his  will be setting him up. 8/3/2020 has not seen ID in [facility] as of yet, discussed in detail importance, at risk for sepsis, loss of limb    8/10/2020 Relates has aapt with ID in [facility]. Had to push it out due to wife not being able to get off.     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) [facility]     Blood type O+ 11/14/2019    CAD (coronary artery disease)     CHF (congestive heart failure) (HCC)     Diabetes mellitus ([address]     Diabetic neuropathy ([facility]     Diastolic dysfunction 96/32/5509    stage 3    Fall     right knee    Gout     chemically induced    History of blood transfusion     Hx of blood clots 1976    left leg    Hyperlipidemia     Hypertension     LBBB (left bundle branch block)     Left ventricular assist device (LVAD) complication 6794    Moderate mitral regurgitation 04/19/2016    Nonischemic cardiomyopathy ([facility]     Obesity     SNEHA (obstructive sleep apnea)     treated with CPAP    Sarcoidosis 2019    Severe tricuspid regurgitation 04/19/2016    SV[facility]ra[facility]ricular tachycardia) [facility]     Viral cardiomyopathy ([facility]      Past Surgical History:   Procedure Laterality Date    BACK SURGERY      CARDIAC CATHETERIZATION  11/21/2011    Dr. Katheryn Fleming  11/08/2019    Dr. Yimi Abreu  08/20/2014    BiV/ICD  (Lorilee Pencil)    Dr. Reed Perales CATH LAB PROCEDURE      ECHO COMPL W DOP COLOR FLOW  11/19/2011         ECHO COMPLETE  12/14/2013         EYE SURGERY Right 08/2019    FEMUR SURGERY      rt; compound frx right femur at 3years old.     JOINT REPLACEMENT Left     knee    KNEE SURGERY      7 on right/1 on left/ total replacement on R    PICC LINE INSERTION NURSE  10/21/2019          Family History   Problem Relation Age of Onset    Alzheimer's Disease Mother     Diabetes Mother     No Known Problems Father         l[facility] in [facility] No Known Problems Brother     Other Brother         aneurysm behind his eye    Arrhythmia Brother          age 61    No Known Problems Brother     No Known Problems Brother      Social History     Tobacco Use    Smoking status: Former Smoker     Packs/day: 0.00     Years: 2.00     Pack years: 0.00     Types: Cigars     Last attempt to quit: 1980     Years since quittin.6    Smokeless tobacco: Never Used    Tobacco comment: smoked 1 cigar a week    Substance Use Topics    Alcohol use: Not Currently    Drug use: Never     Allergies   Allergen Reactions    Food Hives     Strawberries      Soap & Cleansers Rash     TIDE detergent     Current Outpatient Medications on File Prior to Encounter   Medication Sig Dispense Refill    aspirin 325 MG tablet Take 325 mg by mouth daily      digoxin (LANOXIN) 125 MCG tablet Take 125 mcg by mouth daily      vitamin D (ERGOCALCIFEROL) 1.25 MG (23711 UT) CAPS capsule Take 50,000 Units by mouth once a week On       Magnesium 400 MG CAPS Take by mouth      predniSONE (DELTASONE) 10 MG tablet Take 5 mg by mouth daily 3 tab daily for 30 days       spironolactone (ALDACTONE) 50 MG tablet Take 25 mg by mouth daily       traMADol (ULTRAM) 50 MG tablet Take 50 mg by mouth every 6 hours as needed for Pain.  warfarin (COUMADIN) 5 MG tablet Take 5 mg by mouth      pantoprazole (PROTONIX) 40 MG tablet Take 1 tablet by mouth every morning (before breakfast) 90 tablet 3    bumetanide (BUMEX) 2 MG tablet Take 1 tablet by mouth daily (Patient taking differently: Take by mouth 2mg am, 1mg pm) 90 tablet 3    allopurinol (ZYLOPRIM) 100 MG tablet Take 1 tablet by mouth daily 30 tablet 3    HUMULIN R 500 UNIT/ML injection 4 times daily (before meals and nightly) Sliding scale       No current facility-administered medications on file prior to encounter.         REVIEW OF SYSTEMS See HPI    Objective:    Temp 97.7 °F (36.5 °C) (Temporal)   Resp 18   Ht 5' 11\" (1.803 m) Wt 281 lb (127.5 kg)   BMI 39.19 kg/m²   Wt Readings from Last 3 Encounters:   08/24/20 281 lb (127.5 kg)   08/10/20 281 lb (127.5 kg)   08/03/20 281 lb (127.5 kg)     PHYSICAL EXAM  CONSTITUTIONAL:   Awake, alert, cooperative   EYES:  lids and lashes normal   ENT: external ears and nose without lesions   NECK:  supple, symmetrical, trachea midline   SKIN:  Open wounds  right foot great toe with covered with devitalized nonviable tissue, through sub q. There is no purulence or odor. No surrounding erythema or increase in temperature. left lower leg, wound healed. Assessment:     Ulcer right foot, LLE. Pre Debridement Measurements:  Are located in the [place]  Documentation Flow Sheet  Post Debridement Measurements:  Wound/Ulcer Descriptions are Pre Debridement except measurements:     Wound 07/13/20 Toe (Comment  which one) Anterior;Right;Medial #1 (feb 24, 2020)great toe. Martinez III (Active)   Wound Image   07/13/20 1534   Wound Diabetic Martinez 3 07/13/20 1534   Dressing Status Clean;Dry; Intact 07/27/20 1425   Dressing Changed Changed/New 08/17/20 1512   Dressing/Treatment Alginate with Ag 08/17/20 1512   Wound Cleansed Rinsed/Irrigated with saline 08/17/20 1512   Wound Length (cm) 0.2 cm 08/24/20 1348   Wound Width (cm) 0.2 cm 08/24/20 1348   Wound Depth (cm) 0.2 cm 08/24/20 1348   Wound Surface Area (cm^2) 0.04 cm^2 08/24/20 1348   Change in Wound Size % (l*w) 99.01 08/24/20 1348   Wound Volume (cm^3) 0.01 cm^3 08/24/20 1348   Wound Healing % 99 08/24/20 1348   Post-Procedure Length (cm) 0.2 cm 08/24/20 1407   Post-Procedure Width (cm) 0.4 cm 08/24/20 1407   Post-Procedure Depth (cm) 0.2 cm 08/24/20 1407   Post-Procedure Surface Area (cm^2) 0.08 cm^2 08/24/20 1407   Post-Procedure Volume (cm^3) 0.02 cm^3 08/24/20 1407   Undermining Starts ___ O'Clock 7 07/13/20 1534   Undermining Ends___ O'Clock 12 07/13/20 1534   Undermining Maxium Distance (cm) 0 07/20/20 1358   Wound Assessment Pink;Yellow 08/24/20 1348   Drainage Amount Small 08/24/20 1348   Drainage Description Serosanguinous 08/24/20 1348   Odor None 08/24/20 1348   Exposed structure Bone 07/20/20 1358   Ricarda-wound Assessment Calloused 08/24/20 1348   Number of days: 41       Wound 07/13/20 Pretibial Left #2 (acquired 6/20/2020) venous (Active)   Wound Image   08/24/20 1348   Wound Venous 07/13/20 1534   Dressing Status Clean;Dry; Intact 07/27/20 1425   Dressing Changed Changed/New 08/17/20 1512   Dressing/Treatment Alginate with Ag 08/17/20 1512   Wound Cleansed Rinsed/Irrigated with saline 08/17/20 1512   Wound Length (cm) 0 cm 08/24/20 1348   Wound Width (cm) 0 cm 08/24/20 1348   Wound Depth (cm) 0 cm 08/24/20 1348   Wound Surface Area (cm^2) 0 cm^2 08/24/20 1348   Change in Wound Size % (l*w) 100 08/24/20 1348   Wound Volume (cm^3) 0 cm^3 08/24/20 1348   Wound Healing % 100 08/24/20 1348   Post-Procedure Length (cm) 0.3 cm 08/17/20 1437   Post-Procedure Width (cm) 1 cm 08/17/20 1437   Post-Procedure Depth (cm) 0.2 cm 08/17/20 1437   Post-Procedure Surface Area (cm^2) 0.3 cm^2 08/17/20 1437   Post-Procedure Volume (cm^3) 0.06 cm^3 08/17/20 1437   Wound Assessment Red;Yellow 08/17/20 1414   Drainage Amount Scant 08/17/20 1414   Drainage Description Serosanguinous 08/17/20 1414   Odor None 08/17/20 1414   Ricarda-wound Assessment Intact 08/17/20 1414   Number of days: 41          Procedure Note  Indications:  Based on my examination of this patient's wound(s)/ulcer(s) today, debridement is required to promote healing and evaluate the wound base. Performed by: Corinne Montane, DPM    Consent obtained:  Yes    Time out taken:  Yes    Pain Control: Anesthetic  Anesthetic: 4% Lidocaine Liquid Topical     Debridement:Excisional Debridement    Using curette and tissue nippers the wound(s)/ulcer(s) was/were sharply debrided down through and including the removal of subcutaneous tissue.         Devitalized Tissue Debrided:  fibrin, biofilm, slough and necrotic/eschar to stimulate bleeding to promote healing, post debridement good bleeding base and wound edges noted    Wound/Ulcer #: 1     Percent of Wound/Ulcer Debrided: 100%    Total Surface Area Debrided:  0.04 sq cm     Estimated Blood Loss:  Minimal  Hemostasis Achieved:  by pressure    Procedural Pain:  0  / 10   Post Procedural Pain:  0 / 10     Response to treatment:  Well tolerated by patient. Plan:   Treatment Note please see attached Discharge Instructions. Written patient dismissal instructions given to patient and signed by patient or POA. Discharge Instructions       Visit Discharge/Physician Orders     Discharge condition: Stable     Assessment of pain at discharge: none     Anesthetic used:  4% lidocaine solution     Discharge to: Home     Left via:Private automobile     Accompanied by: accompanied by self     ECF/HHA: [facility]     Dressing Orders:       Clean right great toe l ulcer with normal saline.  Apply silver alginate   Secure with dry dressing. Apply compression garment to left leg and spandagrip to right leg.     Treatment Orders:    PATIENT TO SEE INFECTIOUS DISEASE September 4TH IN [facility] bilateral lower extremities  scheduled August 6th at 2:30 pm reviewed 8-     [facility] followup visit ________one  week with Dr. Collins Speak DR. WU NEXT AVAILABLE ???__________________  (Please note your next appointment above and if you are unable to keep, kindly give a 24 hour notice.  Thank you.)     Physician signature:__________________________        If you experience any of the following, please call the [facility] [address] during business hours:     * Increase in Pain  * Temperature over 101  * Increase in drainage from your wound  * Drainage with a foul odor  * Bleeding  * Increase in swelling  * Need for compression bandage changes due to slippage, breakthrough drainage.     If you need medical attention outside of the business hours of the [address] please contact your PCP or go to the nearest emergency room.                   Electronically signed by Noemy Guerrero DPM on 8/24/2020 at 2:24 PM

## 2020-08-26 NOTE — CARE COORDINATION
Vaccine Information Statement(s) was given today. This has been reviewed, questions answered, and verbal consent given by Patient for injection(s) and administration of Influenza (Inactivated).    1. Does the patient have a moderate to severe fever?  No  2. Has the patient had a serious reaction to a flu shot before?   No  3. Has the patient ever had Guillian [place] Syndrome within 6 weeks of a previous flu shot?  No  4. Is the patient less than 6 months of age?  No    Patient is eligible to receive the vaccine based on all questions being answered as 'No'.    Patient tolerated without incident. See immunization grid for documentation.     all cruise travel and non-essential air travel.  Call your healthcare professional if you have concerns about COVID-19 and your underlying condition or if you are sick. For more information on steps you can take to protect yourself, see CDC's How to [id number] [place] [address] for follow-up call in 7-14 days based on severity of symptoms and risk factors.

## 2020-08-31 ENCOUNTER — HOSPITAL ENCOUNTER (OUTPATIENT)
Dept: WOUND CARE | Age: 70
Discharge: HOME | End: 2020-08-31
Payer: COMMERCIAL

## 2020-08-31 VITALS
HEART RATE: 58 BPM | TEMPERATURE: 98.6 F | RESPIRATION RATE: 22 BRPM | WEIGHT: 280 LBS | SYSTOLIC BLOOD PRESSURE: 110 MMHG | BODY MASS INDEX: 39.05 KG/M2

## 2020-08-31 PROBLEM — I70.0 AORTO-ILIAC ATHEROSCLEROSIS (HCC): Status: ACTIVE | Noted: 2020-08-31

## 2020-08-31 PROBLEM — I73.9 PERIPHERAL VASCULAR DISEASE, UNSPECIFIED (HCC): Status: RESOLVED | Noted: 2020-07-13 | Resolved: 2020-08-31

## 2020-08-31 PROBLEM — I73.9 PVD (PERIPHERAL VASCULAR DISEASE) WITH CLAUDICATION (HCC): Status: ACTIVE | Noted: 2020-08-31

## 2020-08-31 PROBLEM — I70.8 AORTO-ILIAC ATHEROSCLEROSIS (HCC): Status: ACTIVE | Noted: 2020-08-31

## 2020-08-31 PROBLEM — I73.9 PVD (PERIPHERAL VASCULAR DISEASE) (HCC): Status: ACTIVE | Noted: 2020-08-31

## 2020-08-31 PROBLEM — L97.512 RIGHT FOOT ULCER, WITH FAT LAYER EXPOSED (HCC): Status: RESOLVED | Noted: 2020-08-24 | Resolved: 2020-08-31

## 2020-08-31 PROCEDURE — 99214 OFFICE O/P EST MOD 30 MIN: CPT

## 2020-08-31 PROCEDURE — 99204 OFFICE O/P NEW MOD 45 MIN: CPT | Performed by: SURGERY

## 2020-08-31 NOTE — PROGRESS NOTES
Chief Complaint: Patient seen for evaluation of vascular status of the legs      HPI: This patient, vascular cardiomyopathy, that 2D echo done in 2019 revealed ejection fraction 25 to 30%, subsequently did undergo cardiac catheterization, history of AICD insertion in 2014, in December of last year, patient was transferred to [facility], underwent Impella procedure, stabilizing, subsequently underwent implantable LVAD, through a median sternotomy with external tubing connected to the external pump, this was done to bridge the patient, for potential heart transplant in the future, and he follows up with [facility], for all his cardiac issues, the LVAD was inserted on 23 December    Recently was seen by his podiatrist here, did undergo a lower extremity artery Doppler study that was abnormal as is patient was referred here for vascular opinion    At the present time patient tells me that the ulcer has completely healed over the right great toe    He does have deformity of the right ankle joint, in the past, the [facility] recommended fusion which she declined    Patient can walk 2 to 3 minutes only slowly, after which the legs get tired but denies any symptoms of rest pain      Patient denies any focal lateralizing neurological symptoms like loss of speech, vision or loss of function of extremity        Allergies   Allergen Reactions    Food Hives     Strawberries      Soap & Cleansers Rash     TIDE detergent       Current Outpatient Medications   Medication Sig Dispense Refill    aspirin 325 MG tablet Take 325 mg by mouth daily      digoxin (LANOXIN) 125 MCG tablet Take 125 mcg by mouth daily      vitamin D (ERGOCALCIFEROL) 1.25 MG (48292 UT) CAPS capsule Take 50,000 Units by mouth once a week On fridays      Magnesium 400 MG CAPS Take by mouth      predniSONE (DELTASONE) 10 MG tablet Take 5 mg by mouth daily 3 tab daily for 30 days       spironolactone (ALDACTONE) 50 MG tablet Take 25 mg by mouth daily       traMADol (ULTRAM) 50 MG tablet Take 50 mg by mouth every 6 hours as needed for Pain.  warfarin (COUMADIN) 5 MG tablet Take 5 mg by mouth      pantoprazole (PROTONIX) 40 MG tablet Take 1 tablet by mouth every morning (before breakfast) 90 tablet 3    bumetanide (BUMEX) 2 MG tablet Take 1 tablet by mouth daily (Patient taking differently: Take by mouth 2mg am, 1mg pm) 90 tablet 3    allopurinol (ZYLOPRIM) 100 MG tablet Take 1 tablet by mouth daily 30 tablet 3    HUMULIN R 500 UNIT/ML injection 4 times daily (before meals and nightly) Sliding scale       No current facility-administered medications for this encounter.         Past Medical History:   Diagnosis Date    Anxiety     Aorto-iliac atherosclerosis ([facility] [address] 8/31/2020    Arthritis     Atrial fibrillation (HCC)     AVNRT (AV teddy re-entry tachycardia) ([address]     Blood type O+ 11/14/2019    CAD (coronary artery disease)     [facility]est[facility]     Diabetes mellitus ([address]     Diabetic neuropathy ([address]     Diastolic dysfunction 87/98/8267    stage 3    Fall     right knee    Gout     chemically induced    History of blood transfusion     Hx of blood clots 1976    left leg    Hyperlipidemia     Hypertension     LBBB (left bundle branch block)     Left ventricular assist device (LVAD) complication 5915    Moderate mitral regurgitation 04/19/2016    Nonischemic cardiomyopathy ([address]     Obesity     SNEHA (obstructive sleep apnea)     treated with CPAP    PVD (peripheral vascular disease) with claudication ([address] 8/31/2020    Sarcoidosis 2019    Severe tricuspid regurgitation 04/19/2016    SVT (supraventricular tachycardia) ([facility])     Viral cardiomyopathy ([address]        Past Surgical History:   Procedure Laterality Date    BACK SURGERY      CARDIAC CATHETERIZATION  11/21/2011    Dr. Vasquez Gamboa  11/08/2019    Dr. Chuck Ibrahim  08/20/2014 BiV/ICD  ([facility])    Dr. Bertha Tang CATH LAB PROCEDURE      ECHO COMPL W DOP COLOR FLOW  2011         ECHO COMPLETE  2013         EYE SURGERY Right 2019    FEMUR SURGERY      rt; compound frx right femur at 3years old.  JOINT REPLACEMENT Left     knee    KNEE SURGERY      7 on right/1 on left/ total replacement on R    PICC LINE INSERTION NURSE  10/21/2019            Family History   Problem Relation Age of Onset    Alzheimer's Disease Mother     Diabetes Mother     No Known Problems Father         lives in 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    Occupation:    Social Needs    Financial resource strain: Not hard at all   Ruthie-Ana insecurity     Worry: Never true     Inability: Never true    Transportation needs     Medical: No     Non-medical: No   Tobacco Use    Smoking status: Former Smoker     Packs/day: 0.00     Years: 2.00     Pack years: 0.00     Types: Cigars     Last attempt to quit: 1980     Years since quittin.6    Smokeless tobacco: Never Used    Tobacco comment: smoked 1 cigar a week    Substance and Sexual Activity    Alcohol use: Not Currently    Drug use: Never    Sexual activity: Not on file   Lifestyle    Physical activity     Days per week: Not on file     Minutes per session: Not on file    Stress: Not on file   Relationships    Social connections     Talks on phone: Not on file     Gets together: Not on file     Attends Rastafari service: Not on file     Active member of club or organization: Not on file     Attends meetings of clubs or organizations: Not on file     Relationship status: Not on file    Intimate partner violence     Fear of current or ex partner: Not on file     Emotionally abused: Not on file     Physically abused: Not on file     Forced sexual activity: Not on file   Other Topics Concern    Not on file   Social History Narrative    Drinks 0-1 cup of decaf coffee/tea daily. Review of Systems:  Skin:  No abnormal pigmentation or rash. Eyes:  No blurring, diplopia or vision loss. Ears/Nose/Throat:  No hearing loss or vertigo. Respiratory:  No cough, pleuritic chest pain, dyspnea, or wheezing. Cardiovascular: No angina, palpitations . Gastrointestinal:  No nausea or vomiting; no abdominal pain or rectal bleeding. Musculoskeletal:  No arthritis or weakness. Neurologic:  No paralysis, paresis, seizures or headaches. Hematologic/Lymphatic/Immunologic:  No anemia, abnormal bleeding/bruising. Endocrine:  No heat or cold intolerance. No polyphagia, polydipsia or polyuria. Physical Exam:  BP (!) 110/0 Comment: UNABLE TO D/T LVAD  Pulse 58   Temp 98.6 °F (37 °C) (Temporal)   Resp 22   Wt 280 lb (127 kg)   BMI 39.05 kg/m²   General appearance:  Alert, awake, oriented x 3. No distress. Skin:  Warm and dry. Head:  Normocephalic. No masses, lesions or tenderness. Eyes:  Conjunctivae appear normal; PERRL. Ears:  External ears normal.  Nose/Sinuses:  Septum midline, mucosa normal; no drainage. Oropharynx:  Clear, no exudate noted. Neck:  No jugular venous distention, lymphadenopathy or thyromegaly. No evidence of carotid bruit    Chest: Patient does have a defibrillator of the left subclavian fossa      Lungs:  Clear to ausculation bilaterally. No rhonchi, crackles, wheezes. Heart:  Regular rate and rhythm.   No rub or murmur. .    Abdomen:  Soft, non-tender. No masses, organomegaly. Patient does have tubing exiting subcutaneously, over the left upper quadrant abdomen connected to a pump that the patient carries, tells me he underwent insertion of a LVAD through a median sternotomy on 23 December at [facility]    Musculoskeletal: No joint effusions, tenderness swelling or warmth. Neuro: Speech is intact. Moving all extremities. No focal motor or sensory deficits. Extremities:  Both feet are warm to touch. The color of both feet is normal.    Patient has a deformity of the ankle joint with a callus of the right big toe without any ulceration      Pulses Right  Left    Brachial 3 3    Radial    3=normal   Femoral 1-2 1-2  2=diminished   Popliteal    1=barely palpable   Dorsalis pedis    0=absent   Posterior tibial 0 0  4=aneurysmal           Other pertinent information:1. The past medical records were reviewed. 2.    Lab Results   Component Value Date    WBC 7.8 07/13/2020    HGB 13.2 07/13/2020    HCT 42.5 07/13/2020    MCV 82.4 07/13/2020     07/13/2020      Lab Results   Component Value Date     07/13/2020    K 4.8 07/13/2020    CL 96 (L) 07/13/2020    CO2 26 07/13/2020    BUN 26 (H) 07/13/2020    CREATININE 1.4 (H) 07/13/2020    GLUCOSE 187 (H) 07/13/2020    CALCIUM 9.7 07/13/2020    PROT 8.0 07/13/2020    LABALBU 3.8 07/13/2020    BILITOT 0.4 07/13/2020    ALKPHOS 96 07/13/2020    AST 21 07/13/2020    ALT 15 07/13/2020    LABGLOM >60 07/13/2020    GFRAA >60 07/13/2020     Lab Results   Component Value Date    APTT 33.5 10/02/2019      Lab Results   Component Value Date    INR 2.3 03/23/2020    INR 1.2 11/08/2019    INR 2.7 11/02/2019    PROTIME 27.2 (H) 03/23/2020    PROTIME 13.7 (H) 11/08/2019    PROTIME 30.9 (H) 11/02/2019        3. The last 2D echo of the heart revealed an ejection fraction of 25 to 30%, done in 2019      4.   Cardiac catheterization was reviewed, revealed no evidence of any significant coronary artery disease, basically has nonischemic cardiomyopathy    5. The lower extremity arterial Doppler is personally reviewed by me, the ankle-brachial index is falsely elevated due to calcification of the tibial arteries, patient does have Doppler evidence of a significant iliac and femoral-popliteal arterial occlusive disease      Assessment:    1. Significant clinical and lower extremity arterial Doppler evidence of iliac and femoral-popliteal arterial occlusive disease with heel ulcer over the tip of the right big toe with callus formation    2.   Cardiomyopathy with left ventricle dysfunction status post insertion of implantable LVAD through a median sternotomy done at [facility]:       I had a long detailed discussion with patient, all options, risks benefits and alternatives were explained to the patient    Patient was explained to the results of the lower extremity artery Doppler study, informed him, because of his multiple comorbid risk factors particularly cardiac perspective, that he is best served with consultation with the vascular surgeon at [facility].  He does follow-up regarding the implantable LVAD and potentially future cardiac transplant as well    He was also instructed, once appointment is made today, to contact the wound care center here, so that the appropriate records can be faxed to his vascular surgeon in [facility], [facility].    All his questions were answered    Thank you for letting us participate in the care of your patient            Electronically signed by Deep Vick MD on 8/31/2020 at 2:44 PM

## 2020-09-29 ENCOUNTER — OFFICE VISIT (OUTPATIENT)
Dept: FAMILY MEDICINE CLINIC | Age: 70
End: 2020-09-29
Payer: COMMERCIAL

## 2020-09-29 VITALS
HEART RATE: 60 BPM | WEIGHT: 287 LBS | OXYGEN SATURATION: 98 % | RESPIRATION RATE: 18 BRPM | TEMPERATURE: 97.2 F | HEIGHT: 71 IN | BODY MASS INDEX: 40.18 KG/M2

## 2020-09-29 PROBLEM — F33.41 RECURRENT MAJOR DEPRESSIVE DISORDER, IN PARTIAL REMISSION (HCC): Status: ACTIVE | Noted: 2020-09-29

## 2020-09-29 PROCEDURE — 99213 OFFICE O/P EST LOW 20 MIN: CPT | Performed by: FAMILY MEDICINE

## 2020-09-29 PROCEDURE — 90471 IMMUNIZATION ADMIN: CPT | Performed by: FAMILY MEDICINE

## 2020-09-29 PROCEDURE — 90694 VACC AIIV4 NO PRSRV 0.5ML IM: CPT | Performed by: FAMILY MEDICINE

## 2020-09-29 NOTE — PROGRESS NOTES
CC: Tani Conte is a 78 y.o. yo male is here for evaluation evaluation for the following acute medical concerns: Joint Swelling (Rt elbow pain)      HPI:    R elbow pain:  On warfarin; bumped elbow about 1-2 weeks ago  Swelled and seems to have come to a head  He put warm compress and blood came out; no pus  He has no fever, chills, sweats  He is tender in the R elbow; not worsening but not improving   He has an LVAD and already follows with ID for osteomyelitis and is currently on levaquin      Prior to Admission medications    Medication Sig Start Date End Date Taking? Authorizing Provider   aspirin 325 MG tablet Take 325 mg by mouth daily   Yes Historical Provider, MD   digoxin (LANOXIN) 125 MCG tablet Take 125 mcg by mouth daily   Yes Historical Provider, MD   vitamin D (ERGOCALCIFEROL) 1.25 MG (62181 UT) CAPS capsule Take 50,000 Units by mouth once a week On fridays   Yes Historical Provider, MD   Magnesium 400 MG CAPS Take by mouth   Yes Historical Provider, MD   predniSONE (DELTASONE) 10 MG tablet Take 5 mg by mouth daily 3 tab daily for 30 days    Yes Historical Provider, MD   spironolactone (ALDACTONE) 50 MG tablet Take 25 mg by mouth daily    Yes Historical Provider, MD   traMADol (ULTRAM) 50 MG tablet Take 50 mg by mouth every 6 hours as needed for Pain.    Yes Historical Provider, MD   warfarin (COUMADIN) 5 MG tablet Take 5 mg by mouth   Yes Historical Provider, MD   pantoprazole (PROTONIX) 40 MG tablet Take 1 tablet by mouth every morning (before breakfast) 12/17/19  Yes Holden Schwab MD   bumetanide (BUMEX) 2 MG tablet Take 1 tablet by mouth daily  Patient taking differently: Take by mouth 2mg am, 1mg pm 12/3/19  Yes Lauri Lazaro MD   allopurinol (ZYLOPRIM) 100 MG tablet Take 1 tablet by mouth daily 12/3/19  Yes Lauri Lazaro MD   HUMULIN R 500 UNIT/ML injection 4 times daily (before meals and nightly) Sliding scale 5/28/15  Yes Historical Provider, MD       Social hx: Cortez Puga  reports that he quit smoking about 40 years ago. His smoking use included cigars. He smoked 0.00 packs per day for 2.00 years. He has never used smokeless tobacco. He reports previous alcohol use. He reports that he does not use drugs. I reviewed the patient's past past medical history, past surgical history, family history, social history, medications and allergies during this visit    Vitals:   Pulse 60   Temp 97.2 °F (36.2 °C) (Temporal)   Resp 18   Ht 5' 11\" (1.803 m)   Wt 287 lb (130.2 kg)   SpO2 98%   BMI 40.03 kg/m²   Wt Readings from Last 3 Encounters:   09/29/20 287 lb (130.2 kg)   08/31/20 280 lb (127 kg)   08/24/20 281 lb (127.5 kg)       PE:  Constitutional - alert, well appearing, and in no distress  Musculoskeletal - R elbow with erythema and fluctuance about 2cm in diameter; erythema only near the 2cm diameter indurated area; no surrounding warmth or erythema; ttp in the indurated region   Skin - normal coloration and turgor, no rashes, no suspicious skin lesions noted  Neurological - no obvious CN deficits; normal speech, no focal findings or movement disorder noted  Psychiatric - alert, oriented; normal mood, behavior, speech, dress; affect appropriate to mood    Data:  Pertinent labs, imaging, other diagnostic data and other clinician documentation reviewed in electronic medical record. A / P:   Diagnosis Orders   1. Olecranon bursitis of right elbow         We discussed at length treatment options. I offered abx v orth for possible aspiration; this doesn't seem to be infected but we cannot rule this out given hx and pain in site of concern. Pt declined treatment and would like to call his lvad physician and ID physician to discuss treatment plan. He is already on levaquin  He can call me if he changes his mind  We discuss red flag symptoms; pt understands        RTO: Return if symptoms worsen or fail to improve.       An electronic signature was used to authenticate this note.  ---- Anisha Dickens MD on 9/29/2020 at 4:26 PM

## 2020-09-29 NOTE — PROGRESS NOTES
Vaccine Information Sheet, \"Influenza - Inactivated\"  given to Low Luu, or parent/legal guardian of  Low Luu and verbalized understanding. Patient responses:    Have you ever had a reaction to a flu vaccine? No  Do you have any current illness? No  Have you ever had Guillian Sanford Syndrome? No  Do you have a serious allergy to any of the follow: Neomycin, Polymyxin, Thimerosal, eggs or egg products? No    Flu vaccine given per order. Please see immunization tab. Risks and benefits explained. Current VIS given.

## 2020-10-12 ENCOUNTER — TELEPHONE (OUTPATIENT)
Dept: ADMINISTRATIVE | Age: 70
End: 2020-10-12

## 2020-10-12 NOTE — TELEPHONE ENCOUNTER
Pt was cancelling his 10/16 appt due to having other doctor appts out of town. He wasn't sure if he should r/s or not since he was going to the other doctor as she suggested from couple of weeks ago. He asked to be contacted if she felt he needed to be seen by her again. Thank you.

## 2020-11-03 PROBLEM — I73.9 PVD (PERIPHERAL VASCULAR DISEASE) (HCC): Status: RESOLVED | Noted: 2020-08-31 | Resolved: 2020-11-03

## 2020-11-24 ENCOUNTER — HOSPITAL ENCOUNTER (OUTPATIENT)
Age: 70
Discharge: HOME | End: 2020-11-24
Payer: COMMERCIAL

## 2021-01-26 ENCOUNTER — OFFICE VISIT (OUTPATIENT)
Dept: FAMILY MEDICINE CLINIC | Age: 71
End: 2021-01-26
Payer: COMMERCIAL

## 2021-01-26 VITALS — WEIGHT: 260 LBS | BODY MASS INDEX: 36.4 KG/M2 | HEIGHT: 71 IN | TEMPERATURE: 96.4 F

## 2021-01-26 DIAGNOSIS — Z12.11 SCREEN FOR COLON CANCER: ICD-10-CM

## 2021-01-26 DIAGNOSIS — Z00.00 ROUTINE GENERAL MEDICAL EXAMINATION AT A HEALTH CARE FACILITY: Primary | ICD-10-CM

## 2021-01-26 DIAGNOSIS — Z00.00 ENCOUNTER FOR ANNUAL WELLNESS VISIT (AWV) IN MEDICARE PATIENT: ICD-10-CM

## 2021-01-26 PROCEDURE — 3017F COLORECTAL CA SCREEN DOC REV: CPT | Performed by: FAMILY MEDICINE

## 2021-01-26 PROCEDURE — G0438 PPPS, INITIAL VISIT: HCPCS | Performed by: FAMILY MEDICINE

## 2021-01-26 PROCEDURE — G8484 FLU IMMUNIZE NO ADMIN: HCPCS | Performed by: FAMILY MEDICINE

## 2021-01-26 PROCEDURE — 4040F PNEUMOC VAC/ADMIN/RCVD: CPT | Performed by: FAMILY MEDICINE

## 2021-01-26 PROCEDURE — 1123F ACP DISCUSS/DSCN MKR DOCD: CPT | Performed by: FAMILY MEDICINE

## 2021-01-26 RX ORDER — ALLOPURINOL 100 MG/1
100 TABLET ORAL DAILY
COMMUNITY
Start: 2021-01-25 | End: 2021-08-25 | Stop reason: SDUPTHER

## 2021-01-26 RX ORDER — ASPIRIN 81 MG/1
81 TABLET ORAL DAILY
COMMUNITY

## 2021-01-26 RX ORDER — ERGOCALCIFEROL (VITAMIN D2) 1250 MCG
50000 CAPSULE ORAL WEEKLY
COMMUNITY
Start: 2020-06-19 | End: 2022-10-21

## 2021-01-26 RX ORDER — DIGOXIN 125 MCG
125 TABLET ORAL DAILY
COMMUNITY
Start: 2020-07-31 | End: 2021-08-23 | Stop reason: SDUPTHER

## 2021-01-26 RX ORDER — TRIAMCINOLONE ACETONIDE 1 MG/G
CREAM TOPICAL 2 TIMES DAILY PRN
COMMUNITY
Start: 2020-11-06 | End: 2021-11-06

## 2021-01-26 RX ORDER — CEPHALEXIN 500 MG/1
1000 CAPSULE ORAL 3 TIMES DAILY
COMMUNITY
Start: 2021-01-25 | End: 2021-02-24

## 2021-01-26 RX ORDER — METOPROLOL SUCCINATE 25 MG/1
25 TABLET, EXTENDED RELEASE ORAL DAILY
COMMUNITY
Start: 2020-12-31 | End: 2021-11-02 | Stop reason: ALTCHOICE

## 2021-01-26 ASSESSMENT — PATIENT HEALTH QUESTIONNAIRE - PHQ9
SUM OF ALL RESPONSES TO PHQ QUESTIONS 1-9: 2
1. LITTLE INTEREST OR PLEASURE IN DOING THINGS: 1
SUM OF ALL RESPONSES TO PHQ QUESTIONS 1-9: 2
SUM OF ALL RESPONSES TO PHQ9 QUESTIONS 1 & 2: 2

## 2021-01-26 ASSESSMENT — LIFESTYLE VARIABLES: HOW OFTEN DO YOU HAVE A DRINK CONTAINING ALCOHOL: 0

## 2021-01-26 NOTE — PROGRESS NOTES
Medicare Annual Wellness Visit  Name: Freda Arriaga Date: 2021   MRN: [id number] Sex: Male   Age: 78 y.o. Ethnicity: Non-/Non    : 1950 Race: Truett Hammans is here for Medicare AWV and Health Maintenance    Screenings for behavioral, psychosocial and functional/safety risks, and cognitive dysfunction are all negative except as indicated below. These results, as well as other patient data from the [address] form, are documented in Flowsheets linked to this Encounter. Allergies   Allergen Reactions    Food Hives     Strawberries      Soap & Cleansers Rash     TIDE detergent         Prior to Visit Medications    Medication Sig Taking? Authorizing Provider   cephALEXin (KEFLEX) 500 MG capsule Take 1,000 mg by mouth 3 times daily Yes Historical Provider, MD   triamcinolone (KENALOG) 0.1 % cream Apply topically 2 times daily Yes Historical Provider, MD   metoprolol succinate (TOPROL XL) 25 MG extended release tablet Take 25 mg by mouth daily Yes Historical Provider, MD   digoxin (LANOXIN) 125 MCG tablet Take 125 mcg by mouth daily Yes Historical Provider, MD   ergocalciferol (ERGOCALCIFEROL) 1.25 MG (11091 UT) capsule Take 50,000 Units by mouth once a week Yes Historical Provider, MD   aspirin 325 MG tablet Take 325 mg by mouth daily Yes Historical Provider, MD   digoxin (LANOXIN) 125 MCG tablet Take 125 mcg by mouth daily Yes Historical Provider, MD   vitamin D (ERGOCALCIFEROL) 1.25 MG (97490 UT) CAPS capsule Take 50,000 Units by mouth once a week On  Yes Historical Provider, MD   Magnesium 400 MG CAPS Take by mouth Yes Historical Provider, MD   predniSONE (DELTASONE) 10 MG tablet Take 5 mg by mouth daily 3 tab daily for 30 days  Yes Historical Provider, MD   spironolactone (ALDACTONE) 50 MG tablet Take 25 mg by mouth daily  Yes Historical Provider, MD   traMADol (ULTRAM) 50 MG tablet Take 50 mg by mouth every 6 hours as needed for Pain. Yes Historical Provider, MD   warfarin (COUMADIN) 5 MG tablet Take 5 mg by mouth Yes Historical Provider, MD   pantoprazole (PROTONIX) 40 MG tablet Take 1 tablet by mouth every morning (before breakfast) Yes Jamie Navarro MD   bumetanide (BUMEX) 2 MG tablet Take 1 tablet by mouth daily  Patient taking differently: Take by mouth 2mg am, 1mg pm Yes Iglesia Bhatti MD   allopurinol (ZYLOPRIM) 100 MG tablet Take 1 tablet by mouth daily Yes Iglesia Bhatti MD   HUMULIN R 500 UNIT/ML injection 4 times daily (before meals and nightly) Sliding scale Yes Historical Provider, MD   allopurinol (ZYLOPRIM) 100 MG tablet Take 100 mg by mouth daily  Historical Provider, MD   aspirin 81 MG EC tablet Take 81 mg by mouth daily  Historical Provider, MD         Past Medical History:   Diagnosis Date    Anxiety     Aorto-iliac atherosclerosis ([facility] 8/31/2020    Arthritis     Atrial fibrillation ([facility]     AVNRT (AV teddy re-entry tachycardia) ([facility]     Blood type O+ 11/14/2019    CAD (coronary artery disease)     CHF (congestive heart failure) ([facility]     Diabetes mellitus ([address]     Diabetic neuropathy ([facility]     Diastolic dysfunction 23/77/9763    stage 3    Fall     right knee    Gout     chemically induced    History of blood transfusion     Hx of blood clots 1976    left leg    Hyperlipidemia     Hypertension     LBBB (left bundle branch block)     Left ventricular assist device (LVAD) complication 5498    Moderate mitral regurgitation 04/19/2016    Nonischemic cardiomyopathy ([facility]     Obesity     SNEHA (obstructive sleep apnea)     treated with CPAP    PVD (peripheral vascular disease) with claudication ([facility] 8/31/2020    Sarcoidosis 2019    Severe tricuspid regurgitation 04/19/2016    SVT (supraventricular tachycardia) (HCC)     Viral cardiomyopathy ([facility]        Past Surgical History:   Procedure Laterality Date    BACK SURGERY      CARDIAC CATHETERIZATION  11/21/2011    Dr. Duane Shorten CATHETERIZATION  2019    Dr. Marsha Goldberg  2014    BiV/ICD  (Beto Rawls)    Dr. Ochoa Safe CATH LAB PROCEDURE      ECHO COMPL W DOP COLOR FLOW  2011         ECHO COMPLETE  2013         EYE SURGERY Right 2019    FEMUR SURGERY      rt; compound frx right femur at 3years old.  JOINT REPLACEMENT Left     knee    KNEE SURGERY      7 on right/1 on left/ total replacement on R    PICC LINE INSERTION NURSE  10/21/2019              Family History   Problem Relation Age of Onset    Alzheimer's Disease Mother     Diabetes Mother     No Known Problems Father         lives in [facility]    No Known Problems Brother     Other Brother         aneurysm behind his eye    Arrhythmia Brother          age 61    No Known Problems Brother     No Known Problems Brother        CareTeam (Including outside providers/suppliers regularly involved in providing care):   Patient Care Team:  Peng Callahan MD as PCP - General (Family Medicine)  Peng Callahan MD as PCP - [facility] Empaneled Provider    Wt Readings from Last 3 Encounters:   21 260 lb (117.9 kg)   20 287 lb (130.2 kg)   20 280 lb (127 kg)     Vitals:    21 0813   Temp: 96.4 °F (35.8 °C)   Weight: 260 lb (117.9 kg)   Height: 5' 11\" (1.803 m)     Body mass index is 36.26 kg/m². Based upon direct observation of the patient, evaluation of cognition reveals recent and remote memory intact.     General Appearance: alert and oriented to person, place and time, well developed and well- nourished, in no acute distress  Skin: warm and dry, no rash or erythema  Head: normocephalic and atraumatic  Eyes: extraocular eye movements intact, conjunctivae normal  Neck: supple and non-tender without mass, no thyromegaly  Pulmonary/Chest: clear to auscultation bilaterally- no wheezes, rales or rhonchi, normal air movement, no respiratory distress  Cardiovascular: LVAD in place  Abdomen: soft, non-tender, non-distended  Neurologic: no focal deficits    Patient's complete Health Risk Assessment and screening values have been reviewed and are found in Flowsheets. The following problems were reviewed today and where indicated follow up appointments were made and/or referrals ordered. Positive Risk Factor Screenings with Interventions:            General Health and ACP:  General  In general, how would you say your health is?: Fair  In the past 7 days, have you experienced any of the following?  New or Increased Pain, New or Increased Fatigue, Loneliness, Social Isolation, Stress or Anger?: (!) Stress, Anger  Do you get the social and emotional support that you need?: Yes  Do you have a Living Will?: Yes  Advance Directives     Power of  Living Will ACP-Advance Directive ACP-Power of     Not on File Filed on 11/22/11 Filed Not on File      General Health Risk Interventions:  · stress related to condition and chronic infection    Health Habits/Nutrition:  Health Habits/Nutrition  Do you exercise for at least 20 minutes 2-3 times per week?: Yes  Have you lost any weight without trying in the past 3 months?: No  Do you eat fewer than 2 meals per day?: No  Have you seen a dentist within the past year?: Yes  Body mass index: (!) 36.26  Health Habits/Nutrition Interventions:  · working on diet and weight loss in preparation for heart replacement    Hearing/Vision:  No exam data present  Hearing/Vision  Do you or your family notice any trouble with your hearing?: (!) Yes  Do you have difficulty driving, watching TV, or doing any of your daily activities because of your eyesight?: No  Have you had an eye exam within the past year?: (!) No  Hearing/Vision Interventions:  · Hearing concerns:  hearing aides broke; need to replace them  · Vision concerns:  patient encouraged to make appointment with his/her eye specialist     ADL:  ADLs  In the past 7 days, did you need help from others to perform any of the following everyday activities? Eating, dressing, grooming, bathing, toileting, or walking/balance?: None  In the past 7 days, did you need help from others to take care of any of the following?  Laundry, housekeeping, banking/finances, shopping, telephone use, food preparation, transportation, or taking medications?: (!) Shopping  ADL Interventions:  · wife does all the shopping    Personalized Preventive Plan   Current Health Maintenance Status  Immunization History   Administered Date(s) Administered    Influenza A ([id number]) Vaccine IM 12/16/2009    Influenza Vaccine, unspecified formulation 11/14/2002, 11/05/2003, 11/16/2004, 11/18/2005, 10/23/2006, 10/23/2006, 01/22/2008, 10/17/2008, 11/05/2008, 10/02/2009, 10/18/2010, 11/01/2011, 11/08/2012, 12/02/2013, 10/20/2014, 11/05/2015, 10/06/2016, 09/25/2018    Influenza Virus Vaccine 11/14/2002, 11/05/2003, 11/16/2004, 11/18/2005, 10/23/2006, 10/27/2006, 01/22/2008, 10/17/2008, 11/05/2008, 10/02/2009, 10/18/2010, 11/18/2011, 11/08/2012, 12/02/2013, 10/20/2014, 10/15/2015, 10/04/2016    Influenza, Quadv, IM, (6 mo and older Fluzone, Flulaval, Fluarix and 3 yrs and older Afluria) 09/25/2018    Influenza, Quadv, adjuvanted, 65 yrs +, IM, PF (Fluad) 09/29/2020    Influenza, Triv, inactivated, subunit, adjuvanted, IM (Fluad 65 yrs and older) 10/30/2019    Pneumococcal Conjugate 13-valent (Orlean Barban) 01/01/2009, 03/13/2018    Pneumococcal Polysaccharide ([id number]) 06/28/2000, 05/13/2013, 05/13/2013, 03/12/2019    Td, unspecified formulation 01/01/1997    Tdap (Boostrix, Adacel) 05/13/2013    Zoster Live (Zostavax) 02/16/2017, 05/15/2017        Health Maintenance   Topic Date Due    Shingles Vaccine (2 of 3) 07/10/2017    Diabetic foot exam  02/09/2018    Annual Wellness Visit (AWV)  05/29/2019    Lipid screen  02/08/2020    Colon Cancer Screen FIT/FOBT  03/08/2020    Diabetic retinal exam 05/22/2020    Diabetic microalbuminuria test  09/22/2020    Potassium monitoring  07/13/2021    Creatinine monitoring  07/13/2021    A1C test (Diabetic or Prediabetic)  08/07/2021    DTaP/Tdap/Td vaccine (2 - Td) 05/13/2023    Flu vaccine  Completed    Pneumococcal 65+ years Vaccine  Completed    AAA screen  Completed    Hepatitis C screen  Completed    Hepatitis A vaccine  Aged Out    Hib vaccine  Aged Out    Meningococcal (ACWY) vaccine  Aged Out     Recommendations for Peer.im Due: see orders and patient instructions/AVS.  . Recommended screening schedule for the next 5-10 years is provided to the patient in written form: see Patient Instructions/AVS.    Ana Cristina De La Vega was seen today for medicare awv and health maintenance. Diagnoses and all orders for this visit:    Routine general medical examination at a health care facility    Encounter for annual wellness visit (AWV) in Medicare patient    Screen for colon cancer             Return in about 4 months (around 5/26/2021) for routine f/u + 1 year AWV.     Electronically signed by Claudell Sermon, MD on 1/26/2021 at 9:47 AM

## 2021-01-26 NOTE — PATIENT INSTRUCTIONS
Personalized Preventive Plan for Mateusz Villa - 1/26/2021  Medicare offers a range of preventive health benefits. Some of the tests and screenings are paid in full while other may be subject to a deductible, co-insurance, and/or copay. Some of these benefits include a comprehensive review of your medical history including lifestyle, illnesses that may run in your family, and various assessments and screenings as appropriate. After reviewing your medical record and screening and assessments performed today your provider may have ordered immunizations, labs, imaging, and/or referrals for you. A list of these orders (if applicable) as well as your Preventive Care list are included within your After Visit Summary for your review. Other Preventive Recommendations:    · A preventive eye exam performed by an eye specialist is recommended every 1-2 years to screen for glaucoma; cataracts, macular degeneration, and other eye disorders. · A preventive dental visit is recommended every 6 months. · Try to get at least 150 minutes of exercise per week or 10,000 steps per day on a pedometer . · Order or download the FREE \"Exercise & Physical Activity: Your Everyday Guide\" from [employer]. Call 9-645.407.8316 or search [employer] online. · You need 0886-9478 mg of calcium and 3652-1923 IU of vitamin D per day. It is possible to meet your calcium requirement with diet alone, but a vitamin D supplement is usually necessary to meet this goal.  · When exposed to the sun, use a sunscreen that protects against both UVA and UVB radiation with an SPF of 30 or greater. Reapply every 2 to 3 hours or after sweating, drying off with a towel, or swimming. · Always wear a seat belt when traveling in a car. Always wear a helmet when riding a bicycle or motorcycle.

## 2021-01-27 ENCOUNTER — TELEPHONE (OUTPATIENT)
Dept: FAMILY MEDICINE CLINIC | Age: 71
End: 2021-01-27

## 2021-01-27 NOTE — TELEPHONE ENCOUNTER
Patient called and stated that he called the list of places that had the Covid vaccine, and all locations do not have appointments. Wanted you to be aware.

## 2021-01-30 ENCOUNTER — HOSPITAL ENCOUNTER (EMERGENCY)
Age: 71
Discharge: HOME | End: 2021-01-30
Payer: COMMERCIAL

## 2021-01-30 VITALS
OXYGEN SATURATION: 97 % | DIASTOLIC BLOOD PRESSURE: 64 MMHG | WEIGHT: 260 LBS | RESPIRATION RATE: 16 BRPM | HEART RATE: 68 BPM | TEMPERATURE: 97.3 F | HEIGHT: 71 IN | SYSTOLIC BLOOD PRESSURE: 114 MMHG | BODY MASS INDEX: 36.4 KG/M2

## 2021-01-30 DIAGNOSIS — K59.00 CONSTIPATION, UNSPECIFIED CONSTIPATION TYPE: Primary | ICD-10-CM

## 2021-01-30 DIAGNOSIS — R33.9 URINE RETENTION: ICD-10-CM

## 2021-01-30 PROCEDURE — 99283 EMERGENCY DEPT VISIT LOW MDM: CPT

## 2021-01-30 PROCEDURE — 51702 INSERT TEMP BLADDER CATH: CPT

## 2021-01-30 PROCEDURE — 87088 URINE BACTERIA CULTURE: CPT

## 2021-01-31 NOTE — ED NOTES
Patient tolerated 1/4 of a bag. Patient bed rail lowered to use bathroom. Patient understood to hold as long as possible.      Terrance Mohr, MARIKA  [birth year] [id number]

## 2021-01-31 NOTE — ED PROVIDER NOTES
[facility]  Department of Emergency Medicine   ED  Encounter Note  Admit Date/RoomTime: 2021  7:04 PM  ED Room:     NAME: Genet Rosas  : 1950  MRN: [id number]     Chief Complaint:  Constipation (pt states that he has had this issue in the past with constipation. he states he is so constipated he is unable to urinate. Pt has LVAD) and Urinary Retention    History of Present Illness        Genet Rosas is a 78 y.o. old male who presents to the emergency department by private vehicle, for difficulty moving bowels, which began 2 day(s) prior to arrival.  Since onset the symptoms have been no BM for 2  days. Stools have been Firm with no pain with moving bowels. There has been additional symptoms of urinary retention since this morning. There has been NO abdominal pain, flank pain, nausea, vomiting, diarrhea, black stool, blood in stool or fever. Patient states that he has had recurrence of constipation and urinary retention in the past.  He states that a Groves catheter was placed and he followed up with urology. He states that laxatives at home helped him move his bowels. He states nothing is currently making his symptoms better or worse. History of:    []   Constipation     []   Narcotic Use     []   Iron Supplement     []   Laxative Use     []   Recent Surgery      ROS   Pertinent positives and negatives are stated within HPI, all other systems reviewed and are negative.     Past Medical History:  has a past medical history of Anxiety, Aorto-iliac atherosclerosis ([address] Arthritis, Atrial fibrillation ([address] AVNRT (AV teddy re-entry tachycardia) ([address] Blood type O+, CAD (coronary artery disease), CHF (congestive heart failure) ([address] Diabetes mellitus ([address] Diabetic neuropathy ([address] Diastolic dysfunction, Fall, Gout, History of blood transfusion, Hx of blood clots, Hyperlipidemia, Hypertension, LBBB (left bundle branch block), Left ventricular assist device (LVAD) complication, Moderate mitral regurgitation, Nonischemic cardiomyopathy ([address] Obesity, SNEHA (obstructive sleep apnea), PVD (peripheral vascular disease) with claudication ([address] Sarcoidosis, Severe tricuspid regurgitation, SVT (supraventricular tachycardia) ([address] and Viral cardiomyopathy ([address] Surgical History:  has a past surgical history that includes Diagnostic Cardiac Cath Lab Procedure; knee surgery; Femur Surgery; ECHO Compl W Dop Color Flow (11/19/2011); Cardiac catheterization (11/21/2011); Echo Complete (12/14/2013); back surgery; joint replacement (Left); Cardiac defibrillator placement (08/20/2014); eye surgery (Right, 08/2019); picc line insertion nurse (10/21/2019); Cardiac catheterization (11/08/2019); and Colonoscopy. Social History:  reports that he quit smoking about 41 years ago. His smoking use included cigars. He smoked 0.00 packs per day for 2.00 years. He has never used smokeless tobacco. He reports previous alcohol use. He reports that he does not use drugs. Family History: family history includes Alzheimer's Disease in his mother; Arrhythmia in his brother; Diabetes in his mother; No Known Problems in his brother, brother, brother, and father; Other in his brother. Allergies: Food and Soap & cleansers    Physical Exam   Oxygen Saturation Interpretation: Normal.        ED Triage Vitals   BP Temp Temp src Pulse Resp SpO2 Height Weight   -- 01/30/21 1903 -- 01/30/21 1910 -- 01/30/21 1910 -- --    96.9 °F (36.1 °C)  76  97 %           Constitutional:  Alert, development consistent with age. HEENT:  NC/NT. Airway patent. Neck:  Normal ROM. Supple. Respiratory:  Clear to auscultation and breath sounds equal.  CV:  Regular rate and rhythm, normal heart sounds, without pathological murmurs, ectopy, gallops, or rubs. GI:  normal appearing, non-distended with no visible hernias. Bowel sounds: normal bowel sounds.            Tenderness: No abdominal tenderness, guarding, rebound, rigidity or pulsatile mass. .        Liver: non-tender. Spleen:  non-tender and non-palpable. /Rectal: (chaperone present during examination)        Fecal impaction present with no gross blood and tan stool. Back: CVA Tenderness: No.  Integument:  Normal turgor. Warm, dry, without visible rash, unless noted elsewhere. Lymphatic: no lymphadenopathy noted  Neurological:  Oriented. Motor functions intact. Lab / Imaging Results   (All laboratory and radiology results have been personally reviewed by myself)  Labs:  No results found for this visit on 01/30/21. Imaging: All Radiology results interpreted by Radiologist unless otherwise noted. No orders to display       ED Course / Medical Decision Making   Medications - No data to display     Re-examination:  1/30/21       Time: 2110  Patients condition is improving after treatment. Patient is relieved after Groves catheter drained his bladder. Approximately 600 cc clear yellow urine drained. Patient had a large bowel movement after soapsuds enema and feels much better. Consult(s):   None    Procedure(s):   1920: Attempted digital fecal disimpaction. Bladder intense stool in the rectal vault. Only a small amount of firm tan stool able to be disimpacted. Patient tolerated fair. Will attempt a soapsuds enema. MDM:   Patient presented with concerns of constipation and urinary retention. States he had these symptoms in the past.  Patient's bladder was drained for 600 cc of clear yellow urine. I attempted a digital fecal disimpaction, but was unable to reach the impaction to make much of improvement. Patient did have a large bowel movement after soapsuds enema. Patient states that his symptoms are resolved. Spoke to the patient about leaving the Groves catheter and you are taking it out. Through shared decision making, we will take the Groves catheter out. A urine culture was sent.   Patient is asymptomatic and appropriate for discharge and outpatient follow-up. He was instructed on diet and constipation. He states he has MiraLAX at home and I instructed him to take it daily and follow-up with his primary care provider. He has no other complaints. He is instructed to return to the emergency department immediately with any new or worsening symptoms. Plan of Care/Counseling:  I reviewed today's visit with the patient and wife in addition to providing specific details for the plan of care and counseling regarding the diagnosis and prognosis. Questions are answered at this time and are agreeable with the plan. Assessment      1. Constipation, unspecified constipation type    2. Urine retention      Plan   Discharge home. Patient condition is good    New Medications     New Prescriptions    No medications on file     Electronically signed by PILAR Merlos CNP   DD: 1/30/21  **This report was transcribed using voice recognition software. Every effort was made to ensure accuracy; however, inadvertent computerized transcription errors may be present.   END OF ED PROVIDER NOTE     PILAR Cruz CNP  01/30/21 [id number]

## 2021-02-01 ENCOUNTER — TELEPHONE (OUTPATIENT)
Dept: FAMILY MEDICINE CLINIC | Age: 71
End: 2021-02-01

## 2021-02-02 LAB — URINE CULTURE, ROUTINE: NORMAL

## 2021-02-03 ENCOUNTER — IMMUNIZATION (OUTPATIENT)
Dept: PRIMARY CARE CLINIC | Age: 71
End: 2021-02-03
Payer: COMMERCIAL

## 2021-02-03 PROCEDURE — 91300 COVID-19, PFIZER VACCINE 30MCG/0.3ML DOSE: CPT | Performed by: NURSE PRACTITIONER

## 2021-02-03 PROCEDURE — 0001A COVID-19, PFIZER VACCINE 30MCG/0.3ML DOSE: CPT | Performed by: NURSE PRACTITIONER

## 2021-02-25 ENCOUNTER — IMMUNIZATION (OUTPATIENT)
Dept: PRIMARY CARE CLINIC | Age: 71
End: 2021-02-25
Payer: COMMERCIAL

## 2021-02-25 PROCEDURE — 91300 COVID-19, PFIZER VACCINE 30MCG/0.3ML DOSE: CPT | Performed by: NURSE PRACTITIONER

## 2021-02-25 PROCEDURE — 0002A COVID-19, PFIZER VACCINE 30MCG/0.3ML DOSE: CPT | Performed by: NURSE PRACTITIONER

## 2021-03-09 ENCOUNTER — HOSPITAL ENCOUNTER (OUTPATIENT)
Age: 71
Discharge: HOME | End: 2021-03-09
Payer: COMMERCIAL

## 2021-03-09 LAB
ALBUMIN SERPL-MCNC: 3.6 G/DL (ref 3.5–5.2)
ALP BLD-CCNC: 114 U/L (ref 40–129)
ALT SERPL-CCNC: 17 U/L (ref 0–40)
ANION GAP SERPL CALCULATED.3IONS-SCNC: 10 MMOL/L (ref 7–16)
AST SERPL-CCNC: 26 U/L (ref 0–39)
BASOPHILS ABSOLUTE: 0.04 E9/L (ref 0–0.2)
BASOPHILS RELATIVE PERCENT: 0.5 % (ref 0–2)
BILIRUB SERPL-MCNC: 0.6 MG/DL (ref 0–1.2)
BUN BLDV-MCNC: 32 MG/DL (ref 8–23)
CALCIUM SERPL-MCNC: 9.6 MG/DL (ref 8.6–10.2)
CHLORIDE BLD-SCNC: 99 MMOL/L (ref 98–107)
CO2: 25 MMOL/L (ref 22–29)
CREAT SERPL-MCNC: 1.3 MG/DL (ref 0.7–1.2)
EOSINOPHILS ABSOLUTE: 0.11 E9/L (ref 0.05–0.5)
EOSINOPHILS RELATIVE PERCENT: 1.5 % (ref 0–6)
GFR AFRICAN AMERICAN: >60
GFR NON-AFRICAN AMERICAN: >60 ML/MIN/1.73
GLUCOSE BLD-MCNC: 158 MG/DL (ref 74–99)
HCT VFR BLD CALC: 43.8 % (ref 37–54)
HEMOGLOBIN: 13.7 G/DL (ref 12.5–16.5)
IMMATURE GRANULOCYTES #: 0.02 E9/L
IMMATURE GRANULOCYTES %: 0.3 % (ref 0–5)
INR BLD: 3
LACTATE DEHYDROGENASE: 276 U/L (ref 135–225)
LYMPHOCYTES ABSOLUTE: 1.26 E9/L (ref 1.5–4)
LYMPHOCYTES RELATIVE PERCENT: 17.2 % (ref 20–42)
MAGNESIUM: 1.9 MG/DL (ref 1.6–2.6)
MCH RBC QN AUTO: 27.1 PG (ref 26–35)
MCHC RBC AUTO-ENTMCNC: 31.3 % (ref 32–34.5)
MCV RBC AUTO: 86.6 FL (ref 80–99.9)
MONOCYTES ABSOLUTE: 0.67 E9/L (ref 0.1–0.95)
MONOCYTES RELATIVE PERCENT: 9.1 % (ref 2–12)
NEUTROPHILS ABSOLUTE: 5.24 E9/L (ref 1.8–7.3)
NEUTROPHILS RELATIVE PERCENT: 71.4 % (ref 43–80)
PDW BLD-RTO: 15.9 FL (ref 11.5–15)
PLATELET # BLD: 193 E9/L (ref 130–450)
PMV BLD AUTO: 10.5 FL (ref 7–12)
POTASSIUM SERPL-SCNC: 4 MMOL/L (ref 3.5–5)
PREALBUMIN: 19 MG/DL (ref 20–40)
PROTHROMBIN TIME: 32.6 SEC (ref 9.3–12.4)
RBC # BLD: 5.06 E12/L (ref 3.8–5.8)
SODIUM BLD-SCNC: 134 MMOL/L (ref 132–146)
TOTAL PROTEIN: 7.7 G/DL (ref 6.4–8.3)
WBC # BLD: 7.3 E9/L (ref 4.5–11.5)

## 2021-03-09 PROCEDURE — 36415 COLL VENOUS BLD VENIPUNCTURE: CPT

## 2021-03-09 PROCEDURE — 85025 COMPLETE CBC W/AUTO DIFF WBC: CPT

## 2021-03-09 PROCEDURE — 84134 ASSAY OF PREALBUMIN: CPT

## 2021-03-09 PROCEDURE — 83051 HEMOGLOBIN PLASMA: CPT

## 2021-03-09 PROCEDURE — 83615 LACTATE (LD) (LDH) ENZYME: CPT

## 2021-03-09 PROCEDURE — 85610 PROTHROMBIN TIME: CPT

## 2021-03-09 PROCEDURE — 80053 COMPREHEN METABOLIC PANEL: CPT

## 2021-03-09 PROCEDURE — 83735 ASSAY OF MAGNESIUM: CPT

## 2021-03-15 LAB
Lab: NORMAL
REPORT: NORMAL
THIS TEST SENT TO: NORMAL

## 2021-04-05 ENCOUNTER — HOSPITAL ENCOUNTER (OUTPATIENT)
Age: 71
Discharge: HOME | End: 2021-04-05
Payer: COMMERCIAL

## 2021-04-05 LAB
ALBUMIN SERPL-MCNC: 3.9 G/DL (ref 3.5–5.2)
ALP BLD-CCNC: 112 U/L (ref 40–129)
ALT SERPL-CCNC: 33 U/L (ref 0–40)
ANION GAP SERPL CALCULATED.3IONS-SCNC: 13 MMOL/L (ref 7–16)
AST SERPL-CCNC: 44 U/L (ref 0–39)
BASOPHILS ABSOLUTE: 0.06 E9/L (ref 0–0.2)
BASOPHILS RELATIVE PERCENT: 0.9 % (ref 0–2)
BILIRUB SERPL-MCNC: 0.9 MG/DL (ref 0–1.2)
BUN BLDV-MCNC: 50 MG/DL (ref 8–23)
CALCIUM SERPL-MCNC: 10.2 MG/DL (ref 8.6–10.2)
CHLORIDE BLD-SCNC: 92 MMOL/L (ref 98–107)
CO2: 24 MMOL/L (ref 22–29)
CREAT SERPL-MCNC: 2.3 MG/DL (ref 0.7–1.2)
EOSINOPHILS ABSOLUTE: 0.08 E9/L (ref 0.05–0.5)
EOSINOPHILS RELATIVE PERCENT: 1.2 % (ref 0–6)
GFR AFRICAN AMERICAN: 34
GFR NON-AFRICAN AMERICAN: 34 ML/MIN/1.73
GLUCOSE BLD-MCNC: 179 MG/DL (ref 74–99)
HCT VFR BLD CALC: 46.8 % (ref 37–54)
HEMOGLOBIN: 15.6 G/DL (ref 12.5–16.5)
IMMATURE GRANULOCYTES #: 0.03 E9/L
IMMATURE GRANULOCYTES %: 0.5 % (ref 0–5)
INR BLD: 2.4
LACTATE DEHYDROGENASE: 313 U/L (ref 135–225)
LYMPHOCYTES ABSOLUTE: 1.51 E9/L (ref 1.5–4)
LYMPHOCYTES RELATIVE PERCENT: 23.4 % (ref 20–42)
MCH RBC QN AUTO: 28.2 PG (ref 26–35)
MCHC RBC AUTO-ENTMCNC: 33.3 % (ref 32–34.5)
MCV RBC AUTO: 84.5 FL (ref 80–99.9)
MONOCYTES ABSOLUTE: 0.55 E9/L (ref 0.1–0.95)
MONOCYTES RELATIVE PERCENT: 8.5 % (ref 2–12)
NEUTROPHILS ABSOLUTE: 4.22 E9/L (ref 1.8–7.3)
NEUTROPHILS RELATIVE PERCENT: 65.5 % (ref 43–80)
PDW BLD-RTO: 16.5 FL (ref 11.5–15)
PLATELET # BLD: 192 E9/L (ref 130–450)
PMV BLD AUTO: 12.3 FL (ref 7–12)
POTASSIUM SERPL-SCNC: 4.6 MMOL/L (ref 3.5–5)
PREALBUMIN: 24 MG/DL (ref 20–40)
PROTHROMBIN TIME: 26 SEC (ref 9.3–12.4)
RBC # BLD: 5.54 E12/L (ref 3.8–5.8)
SODIUM BLD-SCNC: 129 MMOL/L (ref 132–146)
TOTAL PROTEIN: 8.1 G/DL (ref 6.4–8.3)
WBC # BLD: 6.5 E9/L (ref 4.5–11.5)

## 2021-04-05 PROCEDURE — 83021 HEMOGLOBIN CHROMOTOGRAPHY: CPT

## 2021-04-05 PROCEDURE — 85610 PROTHROMBIN TIME: CPT

## 2021-04-05 PROCEDURE — 80053 COMPREHEN METABOLIC PANEL: CPT

## 2021-04-05 PROCEDURE — 83615 LACTATE (LD) (LDH) ENZYME: CPT

## 2021-04-05 PROCEDURE — 83020 HEMOGLOBIN ELECTROPHORESIS: CPT

## 2021-04-05 PROCEDURE — 85025 COMPLETE CBC W/AUTO DIFF WBC: CPT

## 2021-04-05 PROCEDURE — 36415 COLL VENOUS BLD VENIPUNCTURE: CPT

## 2021-04-05 PROCEDURE — 84134 ASSAY OF PREALBUMIN: CPT

## 2021-04-12 LAB
Lab: NORMAL
REPORT: NORMAL
THIS TEST SENT TO: NORMAL

## 2021-05-20 ENCOUNTER — HOSPITAL ENCOUNTER (EMERGENCY)
Age: 71
Discharge: HOME | End: 2021-05-20
Attending: EMERGENCY MEDICINE
Payer: COMMERCIAL

## 2021-05-20 ENCOUNTER — APPOINTMENT (OUTPATIENT)
Dept: GENERAL RADIOLOGY | Age: 71
End: 2021-05-20
Payer: COMMERCIAL

## 2021-05-20 ENCOUNTER — APPOINTMENT (OUTPATIENT)
Dept: CT IMAGING | Age: 71
End: 2021-05-20
Payer: COMMERCIAL

## 2021-05-20 VITALS
OXYGEN SATURATION: 97 % | TEMPERATURE: 98.1 F | RESPIRATION RATE: 16 BRPM | WEIGHT: 260 LBS | HEART RATE: 79 BPM | BODY MASS INDEX: 36.26 KG/M2

## 2021-05-20 DIAGNOSIS — S06.329A INTRAPARENCHYMAL HEMATOMA OF BRAIN, LEFT, WITH LOSS OF CONSCIOUSNESS, INITIAL ENCOUNTER (HCC): ICD-10-CM

## 2021-05-20 DIAGNOSIS — R55 SYNCOPE AND COLLAPSE: Primary | ICD-10-CM

## 2021-05-20 DIAGNOSIS — S09.90XA INJURY OF HEAD, INITIAL ENCOUNTER: ICD-10-CM

## 2021-05-20 DIAGNOSIS — R77.8 ELEVATED TROPONIN: ICD-10-CM

## 2021-05-20 DIAGNOSIS — Z95.811 LVAD (LEFT VENTRICULAR ASSIST DEVICE) PRESENT (HCC): ICD-10-CM

## 2021-05-20 LAB
ANION GAP SERPL CALCULATED.3IONS-SCNC: 15 MMOL/L (ref 7–16)
APTT: 31.5 SEC (ref 24.5–35.1)
BASOPHILS ABSOLUTE: 0.03 E9/L (ref 0–0.2)
BASOPHILS RELATIVE PERCENT: 0.4 % (ref 0–2)
BUN BLDV-MCNC: 47 MG/DL (ref 6–23)
CALCIUM SERPL-MCNC: 9.5 MG/DL (ref 8.6–10.2)
CHLORIDE BLD-SCNC: 103 MMOL/L (ref 98–107)
CO2: 18 MMOL/L (ref 22–29)
CREAT SERPL-MCNC: 1.7 MG/DL (ref 0.7–1.2)
EOSINOPHILS ABSOLUTE: 0.08 E9/L (ref 0.05–0.5)
EOSINOPHILS RELATIVE PERCENT: 1.1 % (ref 0–6)
GFR AFRICAN AMERICAN: 49
GFR NON-AFRICAN AMERICAN: 49 ML/MIN/1.73
GLUCOSE BLD-MCNC: 144 MG/DL (ref 74–99)
HCT VFR BLD CALC: 42 % (ref 37–54)
HEMOGLOBIN: 14.3 G/DL (ref 12.5–16.5)
IMMATURE GRANULOCYTES #: 0.03 E9/L
IMMATURE GRANULOCYTES %: 0.4 % (ref 0–5)
INR BLD: 2.6
LYMPHOCYTES ABSOLUTE: 1.19 E9/L (ref 1.5–4)
LYMPHOCYTES RELATIVE PERCENT: 15.8 % (ref 20–42)
MCH RBC QN AUTO: 29.2 PG (ref 26–35)
MCHC RBC AUTO-ENTMCNC: 34 % (ref 32–34.5)
MCV RBC AUTO: 85.9 FL (ref 80–99.9)
MONOCYTES ABSOLUTE: 0.63 E9/L (ref 0.1–0.95)
MONOCYTES RELATIVE PERCENT: 8.4 % (ref 2–12)
NEUTROPHILS ABSOLUTE: 5.57 E9/L (ref 1.8–7.3)
NEUTROPHILS RELATIVE PERCENT: 73.9 % (ref 43–80)
PDW BLD-RTO: 17.3 FL (ref 11.5–15)
PLATELET # BLD: 142 E9/L (ref 130–450)
PMV BLD AUTO: 12.5 FL (ref 7–12)
POTASSIUM REFLEX MAGNESIUM: 4.9 MMOL/L (ref 3.5–5)
PROTHROMBIN TIME: 28.7 SEC (ref 9.3–12.4)
RBC # BLD: 4.89 E12/L (ref 3.8–5.8)
SODIUM BLD-SCNC: 136 MMOL/L (ref 132–146)
TROPONIN: 0.28 NG/ML (ref 0–0.03)
WBC # BLD: 7.5 E9/L (ref 4.5–11.5)

## 2021-05-20 PROCEDURE — 90471 IMMUNIZATION ADMIN: CPT | Performed by: STUDENT IN AN ORGANIZED HEALTH CARE EDUCATION/TRAINING PROGRAM

## 2021-05-20 PROCEDURE — 73130 X-RAY EXAM OF HAND: CPT

## 2021-05-20 PROCEDURE — 71250 CT THORAX DX C-: CPT

## 2021-05-20 PROCEDURE — 85025 COMPLETE CBC W/AUTO DIFF WBC: CPT

## 2021-05-20 PROCEDURE — 73090 X-RAY EXAM OF FOREARM: CPT

## 2021-05-20 PROCEDURE — 72125 CT NECK SPINE W/O DYE: CPT

## 2021-05-20 PROCEDURE — 74176 CT ABD & PELVIS W/O CONTRAST: CPT

## 2021-05-20 PROCEDURE — 80048 BASIC METABOLIC PNL TOTAL CA: CPT

## 2021-05-20 PROCEDURE — 6360000002 HC RX W HCPCS: Performed by: STUDENT IN AN ORGANIZED HEALTH CARE EDUCATION/TRAINING PROGRAM

## 2021-05-20 PROCEDURE — 99285 EMERGENCY DEPT VISIT HI MDM: CPT

## 2021-05-20 PROCEDURE — 73060 X-RAY EXAM OF HUMERUS: CPT

## 2021-05-20 PROCEDURE — 85610 PROTHROMBIN TIME: CPT

## 2021-05-20 PROCEDURE — 70450 CT HEAD/BRAIN W/O DYE: CPT

## 2021-05-20 PROCEDURE — 85730 THROMBOPLASTIN TIME PARTIAL: CPT

## 2021-05-20 PROCEDURE — 90715 TDAP VACCINE 7 YRS/> IM: CPT | Performed by: STUDENT IN AN ORGANIZED HEALTH CARE EDUCATION/TRAINING PROGRAM

## 2021-05-20 PROCEDURE — 84484 ASSAY OF TROPONIN QUANT: CPT

## 2021-05-20 PROCEDURE — 73030 X-RAY EXAM OF SHOULDER: CPT

## 2021-05-20 PROCEDURE — 93005 ELECTROCARDIOGRAM TRACING: CPT | Performed by: STUDENT IN AN ORGANIZED HEALTH CARE EDUCATION/TRAINING PROGRAM

## 2021-05-20 PROCEDURE — 71045 X-RAY EXAM CHEST 1 VIEW: CPT

## 2021-05-20 RX ORDER — MORPHINE SULFATE 2 MG/ML
2 INJECTION, SOLUTION INTRAMUSCULAR; INTRAVENOUS
Status: DISCONTINUED | OUTPATIENT
Start: 2021-05-20 | End: 2021-05-21 | Stop reason: HOSPADM

## 2021-05-20 RX ORDER — SODIUM CHLORIDE 9 MG/ML
INJECTION, SOLUTION INTRAVENOUS CONTINUOUS
Status: DISCONTINUED | OUTPATIENT
Start: 2021-05-20 | End: 2021-05-21 | Stop reason: HOSPADM

## 2021-05-20 RX ORDER — ACETAMINOPHEN 500 MG
1000 TABLET ORAL ONCE
Status: DISCONTINUED | OUTPATIENT
Start: 2021-05-20 | End: 2021-05-21 | Stop reason: HOSPADM

## 2021-05-20 RX ADMIN — TETANUS TOXOID, REDUCED DIPHTHERIA TOXOID AND ACELLULAR PERTUSSIS VACCINE, ADSORBED 0.5 ML: 5; 2.5; 8; 8; 2.5 SUSPENSION INTRAMUSCULAR at 21:24

## 2021-05-20 ASSESSMENT — ENCOUNTER SYMPTOMS
SINUS PRESSURE: 0
COUGH: 0
BACK PAIN: 0
SHORTNESS OF BREATH: 0
VOMITING: 0
EYE DISCHARGE: 0
NAUSEA: 0
EYE PAIN: 0
SORE THROAT: 0
DIARRHEA: 0
EYE REDNESS: 0
ABDOMINAL PAIN: 0
WHEEZING: 0

## 2021-05-20 NOTE — ED PROVIDER NOTES
LVAD present, he states his spare batteries with his wife who is on the way. Hum of LVAD present. Pulmonary:      Effort: Pulmonary effort is normal. No respiratory distress. Breath sounds: Normal breath sounds. No wheezing or rales. Abdominal:      General: Bowel sounds are normal.      Palpations: Abdomen is soft. Tenderness: There is no abdominal tenderness. There is no guarding or rebound. Musculoskeletal:         General: No tenderness or deformity. Cervical back: Normal range of motion and neck supple. Skin:     General: Skin is warm and dry. Neurological:      General: No focal deficit present. Mental Status: He is alert and oriented to person, place, and time. Cranial Nerves: No cranial nerve deficit. Sensory: No sensory deficit. Motor: No weakness. Procedures     MDM     ED Course as of May 21 0014   Thu May 20, 2021   1710 EKG: This EKG is signed by emergency department physician. Rate: 71  Rhythm: Bundle branch block present, wide QRS  Interpretation: non-specific EKG  Comparison: changes compared to previous EKG         [JG]   1845 Patient appears unchanged, no noted fractures on his x-rays, did note a radiopaque object on his left humerus x-ray, I examined patient thoroughly on a skin check, did not find any entry points or radiopaque foreign body sticking to his skin. [JG]   1902 Spoke to Dr. West Amel has a comminuted nasal fracture as well as a small focal area of intraparenchymal hemorrhage. [JG]   1919 Patient appears to have a traumatic head bleed, consulted trauma surgery    [JG]   2004 Spoke to the LVAD coordinator, they want him to be life flighted to [facility]     [JG]   2030 Denise Garcia, will send for patient by air he remains neurologically intact at this time. [JG]   2125 [facility] is here to transfer patient, he is stable at this time, neurologically intact.     [JG]      ED Course User Index  [JG] Sid Cesar MD Patient presented emergency department after a syncopal fall. He states he fell from height, and came on with exertion. He was found to have an elevated troponin more than his baseline. Patient was not having chest pain, EKG did not show any acute ST changes. He was on Coumadin, INR was 2.6, CT head was obtained due to fall with head trauma, was found to have a small left frontal intraparenchymal hemorrhage, also showed a small amount of pneumocephalus, however he did not have any signs of fracture anywhere, he was transferred to [facility] where his LVAD team is located for further work-up and management.  --------------------------------------------- PAST HISTORY ---------------------------------------------  Past Medical History:  has a past medical history of Anxiety, Aorto-iliac atherosclerosis ([address] Arthritis, Atrial fibrillation ([address] AVNRT (AV teddy re-entry tachycardia) ([address] Blood type O+, CAD (coronary artery disease), CHF (congestive heart failure) ([address] Diabetes mellitus ([address] Diabetic neuropathy ([address] Diastolic dysfunction, Fall, Gout, History of blood transfusion, Hx of blood clots, Hyperlipidemia, Hypertension, LBBB (left bundle branch block), Left ventricular assist device (LVAD) complication, Moderate mitral regurgitation, Nonischemic cardiomyopathy ([address] Obesity, SNEHA (obstructive sleep apnea), PVD (peripheral vascular disease) with claudication ([address] Sarcoidosis, Severe tricuspid regurgitation, SVT (supraventricular tachycardia) ([address] and Viral cardiomyopathy ([address] Past Surgical History:  has a past surgical history that includes Diagnostic Cardiac Cath Lab Procedure; knee surgery; Femur Surgery; ECHO Compl W Dop Color Flow (11/19/2011); Cardiac catheterization (11/21/2011);  Echo Complete (12/14/2013); back surgery; joint replacement (Left); Cardiac defibrillator placement (08/20/2014); eye surgery (Right, 08/2019); picc line insertion nurse (10/21/2019); Cardiac catheterization (11/08/2019); and Colonoscopy. Social History:  reports that he quit smoking about 41 years ago. His smoking use included cigars. He smoked 0.00 packs per day for 2.00 years. He has never used smokeless tobacco. He reports previous alcohol use. He reports that he does not use drugs. Family History: family history includes Alzheimer's Disease in his mother; Arrhythmia in his brother; Diabetes in his mother; No Known Problems in his brother, brother, brother, and father; Other in his brother. The patients home medications have been reviewed.     Allergies: Food, Chlorhexidine, and Soap & cleansers    -------------------------------------------------- RESULTS -------------------------------------------------    Lab  Results for orders placed or performed during the hospital encounter of 05/20/21   CBC Auto Differential   Result Value Ref Range    WBC 7.5 4.5 - 11.5 E9/L    RBC 4.89 3.80 - 5.80 E12/L    Hemoglobin 14.3 12.5 - 16.5 g/dL    Hematocrit 42.0 37.0 - 54.0 %    MCV 85.9 80.0 - 99.9 fL    MCH 29.2 26.0 - 35.0 pg    MCHC 34.0 32.0 - 34.5 %    RDW 17.3 (H) 11.5 - 15.0 fL    Platelets 793 293 - 925 E9/L    MPV 12.5 (H) 7.0 - 12.0 fL    Neutrophils % 73.9 43.0 - 80.0 %    Immature Granulocytes % 0.4 0.0 - 5.0 %    Lymphocytes % 15.8 (L) 20.0 - 42.0 %    Monocytes % 8.4 2.0 - 12.0 %    Eosinophils % 1.1 0.0 - 6.0 %    Basophils % 0.4 0.0 - 2.0 %    Neutrophils Absolute 5.57 1.80 - 7.30 E9/L    Immature Granulocytes # 0.03 E9/L    Lymphocytes Absolute 1.19 (L) 1.50 - 4.00 E9/L    Monocytes Absolute 0.63 0.10 - 0.95 E9/L    Eosinophils Absolute 0.08 0.05 - 0.50 E9/L    Basophils Absolute 0.03 0.00 - 0.20 K1/P   Basic Metabolic Panel w/ Reflex to MG   Result Value Ref Range    Sodium 136 132 - 146 mmol/L    Potassium reflex Magnesium 4.9 3.5 - 5.0 mmol/L    Chloride 103 98 - 107 mmol/L    CO2 18 (L) 22 - 29 mmol/L    Anion Gap 15 7 - 16 mmol/L    Glucose 144 (H) 74 - 99 mg/dL    BUN 47 (H) 6 - 23 mg/dL    CREATININE 1.7 (H) 0.7 - 1.2 mg/dL    GFR Non-African American 49 >=60 mL/min/1.73    GFR African American 49     Calcium 9.5 8.6 - 10.2 mg/dL   Troponin   Result Value Ref Range    Troponin 0.28 (H) 0.00 - 0.03 ng/mL   Protime-INR   Result Value Ref Range    Protime 28.7 (H) 9.3 - 12.4 sec    INR 2.6    APTT   Result Value Ref Range    aPTT 31.5 24.5 - 35.1 sec       Radiology  CT Head WO Contrast   Final Result   Subcentimeter focus of intraparenchymal hemorrhage left frontal lobe. .      Possible small focus of pneumocephalus left frontal lobe also best   demonstrated on sagittal image 59. No definitive evidence for fracture   posterior wall left frontal sinus. Left frontal scalp hematoma. No fractures. Findings discussed on 05/20/2021 at 7 1 p.m. with Dr. Tex Ibrahim.         CT Cervical Spine WO Contrast   Final Result   No acute abnormality of the cervical spine. CT CHEST WO CONTRAST   Final Result   Atraumatic appearance of the chest.      Cardiomegaly. Small hiatal hernia. CT ABDOMEN PELVIS WO CONTRAST Additional Contrast? None   Final Result   No acute inflammation, bowel obstruction or hemorrhage. Scattered diverticulosis of the descending and sigmoid colon with mild   thickening of the sigmoid colon concerning for mild uncomplicated   diverticulitis. XR SHOULDER LEFT (MIN 2 VIEWS)   Final Result   No evidence of shoulder fracture or dislocation. XR HUMERUS LEFT (MIN 2 VIEWS)   Final Result   2.5 cm osseous appearing fragment in the soft tissues medial to the proximal   to mid humerus. No obvious donor site. No prior exams for comparison. XR RADIUS ULNA LEFT (2 VIEWS)   Final Result   No acute osseous abnormality. XR HAND LEFT (MIN 3 VIEWS)   Final Result   No acute osseous abnormality. XR CHEST PORTABLE   Final Result   No acute process. Cardiomegaly.                  ------------------------- NURSING NOTES AND VITALS REVIEWED ---------------------------  Date / Time Roomed:  5/20/2021  4:30 PM  ED Bed Assignment:  SONAL/SONAL    The nursing notes within the ED encounter and vital signs as below have been reviewed. Patient Vitals for the past 24 hrs:   Temp Temp src Pulse Resp SpO2 Weight   05/20/21 2044 98.1 °F (36.7 °C) -- 79 16 97 % --   05/20/21 1951 -- -- 77 20 96 % --   05/20/21 1629 97.6 °F (36.4 °C) Tympanic 70 20 98 % 260 lb (117.9 kg)       Oxygen Saturation Interpretation: Normal      ------------------------------------------ PROGRESS NOTES ------------------------------------------      I have spoken with the patient and discussed todays results, in addition to providing specific details for the plan of care and counseling regarding the diagnosis and prognosis. Their questions are answered at this time and they are agreeable with the plan. I have discussed the risks and benefits of transfer and they wish to proceed with the transfer. --------------------------------- ADDITIONAL PROVIDER NOTES ---------------------------------  Consultations:  Spoke with Dr. eDnise Mcmahon (Emergency Medicine). Discussed case. They will come to the ED to evaluate this patient. Spoke with LVAD Coordinator. Discussed case. They will admit this patient. Reason for transfer: Transfer to a trauma center with higher level of care role of taking care of patient's LVAD and transfer to facility where patient's LVAD team.    This patient's ED course included: a personal history and physicial examination, re-evaluation prior to disposition, multiple bedside re-evaluations, IV medications, cardiac monitoring, continuous pulse oximetry and complex medical decision making and emergency management    This patient has remained hemodynamically stable, remained unchanged and been closely monitored during their ED course.     Please note that the withdrawal or failure to initiate urgent interventions for this patient would likely result in a life threatening deterioration or permanent disability. Accordingly this patient received 39 minutes of critical care time, excluding separately billable procedures. Clinical Impression  1. Syncope and collapse    2. Injury of head, initial encounter    3. Intraparenchymal hematoma of brain, left, with loss of consciousness, initial encounter ([facility] [address]    4. Elevated troponin    5. LVAD (left ventricular assist device) present [facility])          Disposition  Patient's disposition: Transfer to [facility].  Transferred by: [facility]. Patient's condition is stable.           Elfida Halsted, MD  Resident  05/21/21 6404

## 2021-05-20 NOTE — ED NOTES
Bed: 20  Expected date:   Expected time:   Means of arrival:   Comments:  Angela Grace RN  05/20/21 7624

## 2021-05-21 LAB
EKG ATRIAL RATE: 69 BPM
EKG Q-T INTERVAL: 472 MS
EKG QRS DURATION: 162 MS
EKG QTC CALCULATION (BAZETT): 512 MS
EKG R AXIS: -126 DEGREES
EKG T AXIS: 137 DEGREES
EKG VENTRICULAR RATE: 71 BPM

## 2021-05-21 PROCEDURE — 93010 ELECTROCARDIOGRAM REPORT: CPT | Performed by: INTERNAL MEDICINE

## 2021-05-21 NOTE — ED NOTES
Per Manas Strickland , pt is being transferred to [facility] ER via flight. Nurse to Nurse # [phone]. Accepting physician is Dr Shayy Collado .      Hortencia Casarez RN  05/20/21 2036

## 2021-05-26 ENCOUNTER — HOSPITAL ENCOUNTER (OUTPATIENT)
Age: 71
Discharge: HOME | End: 2021-05-26
Payer: COMMERCIAL

## 2021-05-26 LAB
ANION GAP SERPL CALCULATED.3IONS-SCNC: 9 MMOL/L (ref 7–16)
BUN BLDV-MCNC: 31 MG/DL (ref 6–23)
CALCIUM SERPL-MCNC: 9.5 MG/DL (ref 8.6–10.2)
CHLORIDE BLD-SCNC: 103 MMOL/L (ref 98–107)
CO2: 26 MMOL/L (ref 22–29)
CREAT SERPL-MCNC: 1.3 MG/DL (ref 0.7–1.2)
GFR AFRICAN AMERICAN: >60
GFR NON-AFRICAN AMERICAN: >60 ML/MIN/1.73
GLUCOSE BLD-MCNC: 149 MG/DL (ref 74–99)
HCT VFR BLD CALC: 42.8 % (ref 37–54)
HEMOGLOBIN: 14.4 G/DL (ref 12.5–16.5)
MCH RBC QN AUTO: 29.6 PG (ref 26–35)
MCHC RBC AUTO-ENTMCNC: 33.6 % (ref 32–34.5)
MCV RBC AUTO: 88.1 FL (ref 80–99.9)
PDW BLD-RTO: 17.3 FL (ref 11.5–15)
PLATELET # BLD: 175 E9/L (ref 130–450)
PMV BLD AUTO: 12.3 FL (ref 7–12)
POTASSIUM SERPL-SCNC: 4.6 MMOL/L (ref 3.5–5)
RBC # BLD: 4.86 E12/L (ref 3.8–5.8)
SODIUM BLD-SCNC: 138 MMOL/L (ref 132–146)
WBC # BLD: 7.4 E9/L (ref 4.5–11.5)

## 2021-05-26 PROCEDURE — 85027 COMPLETE CBC AUTOMATED: CPT

## 2021-05-26 PROCEDURE — 36415 COLL VENOUS BLD VENIPUNCTURE: CPT

## 2021-05-26 PROCEDURE — 80048 BASIC METABOLIC PNL TOTAL CA: CPT

## 2021-05-27 ENCOUNTER — OFFICE VISIT (OUTPATIENT)
Dept: FAMILY MEDICINE CLINIC | Age: 71
End: 2021-05-27
Payer: COMMERCIAL

## 2021-05-27 VITALS
TEMPERATURE: 97.1 F | BODY MASS INDEX: 32.48 KG/M2 | WEIGHT: 232 LBS | HEIGHT: 71 IN | OXYGEN SATURATION: 99 % | RESPIRATION RATE: 16 BRPM | HEART RATE: 50 BPM

## 2021-05-27 DIAGNOSIS — E86.0 DEHYDRATION: ICD-10-CM

## 2021-05-27 DIAGNOSIS — T82.9XXD COMPLICATION INVOLVING LEFT VENTRICULAR ASSIST DEVICE (LVAD), SUBSEQUENT ENCOUNTER: ICD-10-CM

## 2021-05-27 DIAGNOSIS — S02.92XD CLOSED FRACTURE OF FACIAL BONE WITH ROUTINE HEALING, UNSPECIFIED FACIAL BONE, SUBSEQUENT ENCOUNTER: ICD-10-CM

## 2021-05-27 DIAGNOSIS — Z09 HOSPITAL DISCHARGE FOLLOW-UP: Primary | ICD-10-CM

## 2021-05-27 PROCEDURE — 1036F TOBACCO NON-USER: CPT | Performed by: FAMILY MEDICINE

## 2021-05-27 PROCEDURE — 4040F PNEUMOC VAC/ADMIN/RCVD: CPT | Performed by: FAMILY MEDICINE

## 2021-05-27 PROCEDURE — 3017F COLORECTAL CA SCREEN DOC REV: CPT | Performed by: FAMILY MEDICINE

## 2021-05-27 PROCEDURE — 1123F ACP DISCUSS/DSCN MKR DOCD: CPT | Performed by: FAMILY MEDICINE

## 2021-05-27 PROCEDURE — G8417 CALC BMI ABV UP PARAM F/U: HCPCS | Performed by: FAMILY MEDICINE

## 2021-05-27 PROCEDURE — G8427 DOCREV CUR MEDS BY ELIG CLIN: HCPCS | Performed by: FAMILY MEDICINE

## 2021-05-27 PROCEDURE — 99214 OFFICE O/P EST MOD 30 MIN: CPT | Performed by: FAMILY MEDICINE

## 2021-05-27 PROCEDURE — 1111F DSCHRG MED/CURRENT MED MERGE: CPT | Performed by: FAMILY MEDICINE

## 2021-05-27 RX ORDER — AMIODARONE HYDROCHLORIDE 200 MG/1
200 TABLET ORAL DAILY
COMMUNITY
Start: 2021-04-12 | End: 2022-04-12

## 2021-05-27 RX ORDER — VALSARTAN 40 MG/1
40 TABLET ORAL DAILY
COMMUNITY
End: 2021-11-02 | Stop reason: ALTCHOICE

## 2021-05-27 RX ORDER — CEPHALEXIN 500 MG/1
500 CAPSULE ORAL 3 TIMES DAILY
COMMUNITY
End: 2021-09-20 | Stop reason: ALTCHOICE

## 2021-05-27 RX ORDER — GINSENG 100 MG
CAPSULE ORAL 2 TIMES DAILY
COMMUNITY

## 2021-05-27 RX ORDER — ACETAMINOPHEN 325 MG/1
650 TABLET ORAL EVERY 6 HOURS PRN
COMMUNITY
End: 2021-08-25

## 2021-05-27 RX ORDER — SENNA PLUS 8.6 MG/1
1 TABLET ORAL NIGHTLY
COMMUNITY

## 2021-05-27 SDOH — ECONOMIC STABILITY: FOOD INSECURITY: WITHIN THE PAST 12 MONTHS, YOU WORRIED THAT YOUR FOOD WOULD RUN OUT BEFORE YOU GOT MONEY TO BUY MORE.: NEVER TRUE

## 2021-05-27 SDOH — ECONOMIC STABILITY: FOOD INSECURITY: WITHIN THE PAST 12 MONTHS, THE FOOD YOU BOUGHT JUST DIDN'T LAST AND YOU DIDN'T HAVE MONEY TO GET MORE.: NEVER TRUE

## 2021-05-27 ASSESSMENT — SOCIAL DETERMINANTS OF HEALTH (SDOH): HOW HARD IS IT FOR YOU TO PAY FOR THE VERY BASICS LIKE FOOD, HOUSING, MEDICAL CARE, AND HEATING?: NOT HARD AT ALL

## 2021-05-27 NOTE — PROGRESS NOTES
CC: Miguel Ewing is a 78 y.o. yo male is here for evaluation evaluation for the following acute medical concerns: Congestive Heart Failure (4 month follow-up)      HPI:    Hospital dc f/u LOC  Fall from standing height / LOC due to LVAD malfunction (low flow and poor alarm)  [facility] [place] [facility]   He is planning for heart transplant - had plans for PET scan and c-scope and prostate biopsy  He did not take his medication on time due to planned PET; then he was dehydrated  He has to drink fluids every 15minutes due to hx of bariatric surgery  He was dehydrated and had low flow with LVAD  subsequent facial fracture / broken orbital bone and the L side and brain hemorrhage   He has plans to f/u with ENT, NS, oral surgeon, LVAD team  He is back on asa and coumadin  He is weighing self      ROS negative unless otherwise noted    Vitals:   Pulse 50   Temp 97.1 °F (36.2 °C)   Resp 16   Ht 5' 11\" (1.803 m)   Wt 232 lb (105.2 kg)   SpO2 99%   BMI 32.36 kg/m²   Wt Readings from Last 3 Encounters:   05/27/21 232 lb (105.2 kg)   05/20/21 260 lb (117.9 kg)   01/30/21 260 lb (117.9 kg)       PE:  Constitutional - alert, well appearing, and in no distress  Eyes - extraocular eye movements intact, left eye normal, right eye normal, no conjunctivitis noted  Neck - symmetric, no obvious masses noted  Respiratory- clear to auscultation, no wheezes, rales or rhonchi, symmetric air entry; no increased work of breathing  Cardiovascular - normal rate, regular rhythm, normal S1, S2,mechanical sounds; Extremities - no edema noted  Abdomen - soft, nontender, nondistended  Skin - normal coloration and turgor, no rashes, no suspicious skin lesions noted  Neurological - no obvious CN deficits or focal neurological deficits  Psychiatric - alert, oriented; normal mood, behavior, speech, dress    A / P:     Diagnosis Orders   1.  Hospital discharge follow-up  [place] DISCHARGE MEDS RECONCILED W/ CURRENT OUTPATIENT MED LIST   2. Dehydration     3. Complication involving left ventricular assist device (LVAD), subsequent encounter     4. Closed fracture of facial bone with routine healing, unspecified facial bone, subsequent encounter       Continue with plans to f/u with NS, oral surgeon, ENT  Continue with routine f/u for LVAD team and plans for transplant  Call for worsening symptoms    RTO: Return in about 2 months (around 7/27/2021) for routine f/u.       An electronic signature was used to authenticate this note.  ---- Garret Winslow MD on 6/14/2021 at 5:19 PM

## 2021-05-28 LAB
CREATININE, URINE: NORMAL
MICROALBUMIN/CREAT 24H UR: NORMAL MG/G{CREAT}
MICROALBUMIN/CREAT UR-RTO: NORMAL

## 2021-07-16 ENCOUNTER — HOSPITAL ENCOUNTER (OUTPATIENT)
Age: 71
Discharge: HOME | End: 2021-07-16
Payer: COMMERCIAL

## 2021-07-16 LAB
ALBUMIN SERPL-MCNC: 3.7 G/DL (ref 3.5–5.2)
ALP BLD-CCNC: 122 U/L (ref 40–129)
ALT SERPL-CCNC: 46 U/L (ref 0–40)
ANION GAP SERPL CALCULATED.3IONS-SCNC: 14 MMOL/L (ref 7–16)
AST SERPL-CCNC: 45 U/L (ref 0–39)
BILIRUB SERPL-MCNC: 0.5 MG/DL (ref 0–1.2)
BUN BLDV-MCNC: 38 MG/DL (ref 6–23)
CALCIUM SERPL-MCNC: 9.4 MG/DL (ref 8.6–10.2)
CHLORIDE BLD-SCNC: 103 MMOL/L (ref 98–107)
CO2: 23 MMOL/L (ref 22–29)
CREAT SERPL-MCNC: 1.4 MG/DL (ref 0.7–1.2)
GFR AFRICAN AMERICAN: >60
GFR NON-AFRICAN AMERICAN: >60 ML/MIN/1.73
GLUCOSE BLD-MCNC: 134 MG/DL (ref 74–99)
HCT VFR BLD CALC: 38 % (ref 37–54)
HEMOGLOBIN: 12.5 G/DL (ref 12.5–16.5)
INR BLD: 2.5
LACTATE DEHYDROGENASE: 306 U/L (ref 135–225)
MCH RBC QN AUTO: 30.5 PG (ref 26–35)
MCHC RBC AUTO-ENTMCNC: 32.9 % (ref 32–34.5)
MCV RBC AUTO: 92.7 FL (ref 80–99.9)
PDW BLD-RTO: 15 FL (ref 11.5–15)
PLATELET # BLD: 150 E9/L (ref 130–450)
PMV BLD AUTO: 11.4 FL (ref 7–12)
POTASSIUM SERPL-SCNC: 4 MMOL/L (ref 3.5–5)
PROTHROMBIN TIME: 27.3 SEC (ref 9.3–12.4)
RBC # BLD: 4.1 E12/L (ref 3.8–5.8)
SODIUM BLD-SCNC: 140 MMOL/L (ref 132–146)
TOTAL PROTEIN: 7.2 G/DL (ref 6.4–8.3)
WBC # BLD: 6.5 E9/L (ref 4.5–11.5)

## 2021-07-16 PROCEDURE — 36415 COLL VENOUS BLD VENIPUNCTURE: CPT

## 2021-07-16 PROCEDURE — 85027 COMPLETE CBC AUTOMATED: CPT

## 2021-07-16 PROCEDURE — 85610 PROTHROMBIN TIME: CPT

## 2021-07-16 PROCEDURE — 83615 LACTATE (LD) (LDH) ENZYME: CPT

## 2021-07-16 PROCEDURE — 80053 COMPREHEN METABOLIC PANEL: CPT

## 2021-08-09 ENCOUNTER — HOSPITAL ENCOUNTER (OUTPATIENT)
Dept: WOUND CARE | Age: 71
Discharge: HOME | End: 2021-08-09
Payer: COMMERCIAL

## 2021-08-16 ENCOUNTER — HOSPITAL ENCOUNTER (OUTPATIENT)
Dept: WOUND CARE | Age: 71
Discharge: HOME | End: 2021-08-16
Payer: COMMERCIAL

## 2021-08-16 VITALS
TEMPERATURE: 97.8 F | WEIGHT: 221 LBS | HEIGHT: 71 IN | HEART RATE: 64 BPM | BODY MASS INDEX: 30.94 KG/M2 | RESPIRATION RATE: 20 BRPM

## 2021-08-16 DIAGNOSIS — L97.512 DIABETIC ULCER OF TOE OF RIGHT FOOT ASSOCIATED WITH TYPE 2 DIABETES MELLITUS, WITH FAT LAYER EXPOSED (HCC): Primary | ICD-10-CM

## 2021-08-16 DIAGNOSIS — E11.621 DIABETIC ULCER OF TOE OF RIGHT FOOT ASSOCIATED WITH TYPE 2 DIABETES MELLITUS, WITH FAT LAYER EXPOSED (HCC): Primary | ICD-10-CM

## 2021-08-16 PROCEDURE — 6370000000 HC RX 637 (ALT 250 FOR IP): Performed by: PODIATRIST

## 2021-08-16 PROCEDURE — 11042 DBRDMT SUBQ TIS 1ST 20SQCM/<: CPT

## 2021-08-16 RX ORDER — LIDOCAINE HYDROCHLORIDE 20 MG/ML
JELLY TOPICAL ONCE
Status: CANCELLED | OUTPATIENT
Start: 2021-08-16 | End: 2021-08-16

## 2021-08-16 RX ORDER — BETAMETHASONE DIPROPIONATE 0.05 %
OINTMENT (GRAM) TOPICAL ONCE
Status: CANCELLED | OUTPATIENT
Start: 2021-08-16 | End: 2021-08-16

## 2021-08-16 RX ORDER — BACITRACIN, NEOMYCIN, POLYMYXIN B 400; 3.5; 5 [USP'U]/G; MG/G; [USP'U]/G
OINTMENT TOPICAL ONCE
Status: CANCELLED | OUTPATIENT
Start: 2021-08-16 | End: 2021-08-16

## 2021-08-16 RX ORDER — BACITRACIN ZINC AND POLYMYXIN B SULFATE 500; 1000 [USP'U]/G; [USP'U]/G
OINTMENT TOPICAL ONCE
Status: CANCELLED | OUTPATIENT
Start: 2021-08-16 | End: 2021-08-16

## 2021-08-16 RX ORDER — GINSENG 100 MG
CAPSULE ORAL ONCE
Status: CANCELLED | OUTPATIENT
Start: 2021-08-16 | End: 2021-08-16

## 2021-08-16 RX ORDER — GENTAMICIN SULFATE 1 MG/G
OINTMENT TOPICAL ONCE
Status: CANCELLED | OUTPATIENT
Start: 2021-08-16 | End: 2021-08-16

## 2021-08-16 RX ORDER — LIDOCAINE HYDROCHLORIDE 40 MG/ML
SOLUTION TOPICAL ONCE
Status: COMPLETED | OUTPATIENT
Start: 2021-08-16 | End: 2021-08-16

## 2021-08-16 RX ADMIN — LIDOCAINE HYDROCHLORIDE 5 ML: 40 SOLUTION TOPICAL at 15:15

## 2021-08-16 NOTE — PROGRESS NOTES
[facility]  History and Physical/Consultation  Podiatry    Referring Physician : Soledad Garcia MD  Yani Broussard Sr. MEDICAL RECORD NUMBER:  [id number]  AGE: 78 y.o. GENDER: male  : 1950  EPISODE DATE:  2021  Subjective:     Chief Complaint   Patient presents with    Wound Check     RIGHT/LEFT GREAT TOES         HISTORY of PRESENT ILLNESS HPI     Yani Broussard Sr. is a 78 y.o. male who presents today for wound/ulcer evaluation. History of Wound Context:  The patient has had a wound of great toes, right which was first noted approximately 2 weeks ago, left recently. This has been treated by wife. On their initial visit to the wound healing center, 21 ,  the patient has noted that the wound has been improving. The patient has had similar previous wounds in the past.      Pt is not on abx at time of initial visit.     21 local care as instructed, no pressure      Wound/Ulcer Pain Timing/Severity: none  Quality of pain:   Severity:   / 10   Modifying Factors:   Associated Signs/Symptoms:     Ulcer Identification:  Ulcer Type: diabetic  Contributing Factors: diabetes    Diabetic/Pressure/Non Pressure Ulcers onl y:  Ulcer: N/A    If patient has diabetic lower extremity wounds  Martinez Classification of diabetic lower extremity wounds:    Grade Description   []  0 No open wound   []  1 Superficial ulcer involving the full skin thickness   []  2 Deep ulcer involves ligament, tendon, joint capsule, or fascia  No bone involvement or abscess presence   []  3 Deep Ulcer with abcess formation and/or osteomyelitis   []  4 Localized gangrene   []  5 Extensive gangrene of the foot     Wound: N/A        PAST MEDICAL HISTORY      Diagnosis Date    Anxiety     Aorto-iliac atherosclerosis [facility] 2020    Arthritis     Atrial fibrillation (HCC)     AVNRT (AV teddy re-entry tachycardia) ([facility]     Blood type O+ 2019    CAD (coronary artery disease)     CHF (congestive heart failure) ([address]     Diabetes mellitus ([address]     Diabetic neuropathy ([address]     Diastolic dysfunction     stage 3    Fall     right knee    Gout     chemically induced    History of blood transfusion     Hx of blood clots     left leg    Hyperlipidemia     Hypertension     LBBB (left bundle branch block)     Left ventricular assist device (LVAD) complication 8265    Moderate mitral regurgitation 2016    Nonischemic cardiomyopathy ([address]     Obesity     SNEHA (obstructive sleep apnea)     treated with CPAP    PVD (peripheral vascular disease) with claudication ([address] 2020    Sarcoidosis 2019    Severe tricuspid regurgitation 2016    SVT (supraventricular tachycardia) (HCC)     Viral cardiomyopathy ([address]      Past Surgical History:   Procedure Laterality Date    BACK SURGERY      CARDIAC CATHETERIZATION  2011    Dr. Tom Wagner  2019    Dr. Mason Cassidy  2014    BiV/ICD  (Laney Carmen)    Dr. Maria Alejandra Hernandes CATH LAB PROCEDURE      ECHO COMPL W DOP COLOR FLOW  2011         ECHO COMPLETE  2013         EYE SURGERY Right 2019    FEMUR SURGERY      rt; compound frx right femur at 3years old.     JOINT REPLACEMENT Left     knee    KNEE SURGERY      7 on right/1 on left/ total replacement on R    PICC LINE INSERTION NURSE  10/21/2019          Family History   Problem Relation Age of Onset    Alzheimer's Disease Mother     Diabetes Mother     No Known Problems Father         l[facility] in [facility]    No Known Problems Brother     Other Brother         aneurysm behind his eye    Arrhythmia Brother          age 61    No Known Problems Brother     No Known Problems Brother      Social History     Tobacco Use    Smoking status: Former Smoker     Packs/day: 0.00     Years: 2.00     Pack years: 0.00     Types: Cigars     Quit date: 1980     Years since quittin.6    Smokeless tobacco: Never Used    Tobacco comment: smoked 1 cigar a week    Vaping Use    Vaping Use: Never used   Substance Use Topics    Alcohol use: Not Currently    Drug use: Never     Allergies   Allergen Reactions    Food Hives     Strawberries      Chlorhexidine Rash    Soap & Cleansers Rash     TIDE detergent     Current Outpatient Medications on File Prior to Encounter   Medication Sig Dispense Refill    amiodarone (CORDARONE) 200 MG tablet Take 200 mg by mouth daily      acetaminophen (TYLENOL) 325 MG tablet Take 650 mg by mouth every 6 hours as needed for Pain      bacitracin 500 UNIT/GM ointment Apply topically 2 times daily Apply topically 2 times daily.       cephALEXin (KEFLEX) 500 MG capsule Take 500 mg by mouth 3 times daily Takes 2 capsules TID      valsartan (DIOVAN) 40 MG tablet Take 40 mg by mouth daily      senna (SENOKOT) 8.6 MG tablet Take 1 tablet by mouth daily      triamcinolone (KENALOG) 0.1 % cream Apply topically 2 times daily      metoprolol succinate (TOPROL XL) 25 MG extended release tablet Take 25 mg by mouth daily      allopurinol (ZYLOPRIM) 100 MG tablet Take 100 mg by mouth daily      aspirin 81 MG EC tablet Take 81 mg by mouth daily      digoxin (LANOXIN) 125 MCG tablet Take 125 mcg by mouth daily      ergocalciferol (ERGOCALCIFEROL) 1.25 MG (62918 UT) capsule Take 50,000 Units by mouth once a week      digoxin (LANOXIN) 125 MCG tablet Take 125 mcg by mouth daily      vitamin D (ERGOCALCIFEROL) 1.25 MG (05483 UT) CAPS capsule Take 50,000 Units by mouth once a week On       Magnesium 400 MG CAPS Take by mouth      predniSONE (DELTASONE) 10 MG tablet Take 5 mg by mouth daily 3 tab daily for 30 days       spironolactone (ALDACTONE) 50 MG tablet Take 25 mg by mouth daily       warfarin (COUMADIN) 5 MG tablet Take 5 mg by mouth      pantoprazole (PROTONIX) 40 MG tablet Take 1 tablet by mouth every morning (before breakfast) 90 tablet 3    bumetanide (BUMEX) 2 MG tablet Take 1 tablet by mouth daily (Patient taking differently: Take by mouth 2mg am, 1mg pm) 90 tablet 3    allopurinol (ZYLOPRIM) 100 MG tablet Take 1 tablet by mouth daily 30 tablet 3    HUMULIN R 500 UNIT/ML injection 4 times daily (before meals and nightly) Sliding scale      psyllium (KONSYL) 28.3 % PACK Take 1 packet by mouth daily      sodium chloride (OCEAN) 0.65 % nasal spray 1 spray by Nasal route as needed for Congestion       No current facility-administered medications on file prior to encounter.        REVIEW OF SYSTEMS   ROS : All others Negative if blank [], Positive if [x]  General Vascular   [] Fevers [] Claudication   [] Chills [] Rest Pain   Skin Neurologic   [x] Tissue Loss [] Lower extremity neuropathy     Objective:    Pulse 64   Temp 97.8 °F (36.6 °C) (Temporal)   Resp 20   Ht 5' 11\" (1.803 m)   Wt 221 lb (100.2 kg)   BMI 30.82 kg/m²   Wt Readings from Last 3 Encounters:   08/16/21 221 lb (100.2 kg)   05/27/21 232 lb (105.2 kg)   05/20/21 260 lb (117.9 kg)       PHYSICAL EXAM   CONSTITUTIONAL:   Awake, alert, cooperative   PSYCHIATRIC :  Oriented to time, place and person      normal insight to disease process  EXTREMITIES:   R LE Open wound noted with devitalized nonviable tissue   Skin color is normal   Edema is not noted   Sensation deficit noted -    Palpation of the foot does not cause pain   5/5 strength DF/PF  L LE Open wound noted with devitalized nonviable tissue   Skin color is normal   Edema is not noted   Sensation deficit noted -    Palpation of the foot does not cause pain              5/5 strength DF/PF  Intact pedal pulses b/l    Assessment:     Problem List Items Addressed This Visit     Diabetic ulcer of right foot associated with type 2 diabetes mellitus, with fat layer exposed ([address] - Primary    Relevant Orders    Initiate Outpatient Wound Care Protocol          Pre Debridement Measurements:  Are located in the [place]  Documentation Flow Sheet  Post Debridement Measurements:  Wound/Ulcer Descriptions are Pre Debridement except measurements:     Wound 07/13/20 Toe (Comment  which one) Anterior;Right;Medial #1 (feb 24, 2020)great toe.   Martinez III (Active)   Wound Image   08/31/20 1354   Wound Etiology Diabetic Martinez 3 07/13/20 1534   Wound Cleansed Rinsed/Irrigated with saline 08/24/20 1432   Dressing/Treatment Alginate with Ag 08/24/20 1432   Wound Length (cm) 0 cm 08/31/20 1354   Wound Width (cm) 0 cm 08/31/20 1354   Wound Depth (cm) 0 cm 08/31/20 1354   Wound Surface Area (cm^2) 0 cm^2 08/31/20 1354   Change in Wound Size % (l*w) 100 08/31/20 1354   Wound Volume (cm^3) 0 cm^3 08/31/20 1354   Wound Healing % 100 08/31/20 1354   Post-Procedure Length (cm) 0.2 cm 08/24/20 1407   Post-Procedure Width (cm) 0.4 cm 08/24/20 1407   Post-Procedure Depth (cm) 0.2 cm 08/24/20 1407   Post-Procedure Surface Area (cm^2) 0.08 cm^2 08/24/20 1407   Post-Procedure Volume (cm^3) 0.02 cm^3 08/24/20 1407   Undermining Starts ___ O'Clock 7 07/13/20 1534   Undermining Ends___ O'Clock 12 07/13/20 1534   Undermining Maxium Distance (cm) 0 07/20/20 1358   Number of days: 398       Wound 08/16/21 Toe (Comment  which one) Right #1 right great toe (Active)   Wound Image   08/16/21 1353   Wound Etiology Diabetic Martinez 2 08/16/21 1353   Dressing Status New dressing applied 08/16/21 1440   Wound Cleansed Irrigated with saline 08/16/21 1440   Dressing/Treatment Alginate;Dry dressing 08/16/21 1440   Wound Length (cm) 1 cm 08/16/21 1353   Wound Width (cm) 0.6 cm 08/16/21 1353   Wound Depth (cm) 0.2 cm 08/16/21 1353   Wound Surface Area (cm^2) 0.6 cm^2 08/16/21 1353   Wound Volume (cm^3) 0.12 cm^3 08/16/21 1353   Post-Procedure Length (cm) 1 cm 08/16/21 1416   Post-Procedure Width (cm) 0.7 cm 08/16/21 1416   Post-Procedure Depth (cm) 0.3 cm 08/16/21 1416   Post-Procedure Surface Area (cm^2) 0.7 cm^2 08/16/21 1416   Post-Procedure Volume (cm^3) 0.21 cm^3 08/16/21 1416   Wound Assessment Granulation tissue 08/16/21 1353   Drainage Amount Moderate 08/16/21 1416   Drainage Description Serosanguinous 08/16/21 1416   Odor None 08/16/21 1353   Ricarda-wound Assessment Hyperkeratosis (callous) 08/16/21 1353   Number of days: 0       Wound 08/16/21 Toe (Comment  which one) Left #2 left great toe (Active)   Wound Image   08/16/21 1353   Wound Etiology Diabetic Martinez 2 08/16/21 1353   Dressing Status New dressing applied 08/16/21 1440   Wound Cleansed Cleansed with saline 08/16/21 1440   Dressing/Treatment Alginate;Dry dressing 08/16/21 1440   Wound Length (cm) 0.2 cm 08/16/21 1353   Wound Width (cm) 0.2 cm 08/16/21 1353   Wound Depth (cm) 0.3 cm 08/16/21 1353   Wound Surface Area (cm^2) 0.04 cm^2 08/16/21 1353   Wound Volume (cm^3) 0.012 cm^3 08/16/21 1353   Post-Procedure Length (cm) 0.2 cm 08/16/21 1416   Post-Procedure Width (cm) 0.3 cm 08/16/21 1416   Post-Procedure Depth (cm) 0.4 cm 08/16/21 1416   Post-Procedure Surface Area (cm^2) 0.06 cm^2 08/16/21 1416   Post-Procedure Volume (cm^3) 0.024 cm^3 08/16/21 1416   Wound Assessment Pale granulation tissue;Fibrin 08/16/21 1353   Drainage Amount Moderate 08/16/21 1416   Drainage Description Serosanguinous 08/16/21 1416   Odor None 08/16/21 1353   Ricarda-wound Assessment Hyperkeratosis (callous) 08/16/21 1353   Number of days: 0          Procedure Note  Indications:  Based on my examination of this patient's wound(s)/ulcer(s) today, debridement is required to promote healing and evaluate the wound base. Performed by: Gurvinder Castellanos DPM    Consent obtained:  Yes    Time out taken:  Yes    Pain Control: Anesthetic  Anesthetic: 4% Lidocaine Liquid Topical     Debridement:Excisional Debridement    Using curette and #15 blade scalpel the wound(s)/ulcer(s) was/were sharply debrided down through and including the removal of subcutaneous tissue.         Devitalized Tissue Debrided:  fibrin, biofilm, slough and necrotic/eschar to stimulate bleeding to promote healing, post debridement good bleeding base and wound edges noted    Wound/Ulcer #: 1 and 2    Percent of Wound/Ulcer Debrided: 100%    Total Surface Area Debrided:  0.64 sq cm     Estimated Blood Loss:  Minimal  Hemostasis Achieved:  by pressure    Procedural Pain:  0  / 10   Post Procedural Pain:  0 / 10     Response to treatment:  Well tolerated by patient. A culture was not done. Plan:     Pt is not a smoker   - Discussed relationship of smoking and negative affects on wound healing   - Emphasized importance of tobacco avoidace/cessation     In my professional opinion and based off the information that is available at this time this patient has appropriate indication for HBO Therapy: No    Treatment Note please see attached Discharge Instructions    Written patient dismissal instructions given to patient and signed by patient or POA. Discharge Instructions       Visit Discharge/Physician Orders    Discharge condition: Stable    Assessment of pain at discharge:  none    Anesthetic used: 4% lidocaine solution    Discharge to: Home    Left via:Private automobile    Accompanied by: accompanied by self    ECF/HHA: [facility]    Dressing Orders:    Right and left great toes - cleanse with normal saline, apply plain alginate, dry dressing, change daily. Treatment Orders:    FOLLOW NUTRITIOUS DIET. CHOOSE FOODS HIGH IN PROTEIN -CHICKEN- FISH-AND EGGS,  CHOOSE FOODS HIGH IN VITAMIN C.   MULTIVITAMIN DAILY. Watch blood sugar levels. [address] [facility] followup visit ______one week with Dr. Parnell_______________________  (Please note your next appointment above and if you are unable to keep, kindly give a 24 hour notice.  Thank you.)    Physician signature:__________________________      If you experience any of the following, please call the [address] [place] [address] during business hours:    * Increase in Pain  * Temperature over 101  * Increase in drainage from your wound  * Drainage with a foul odor  * Bleeding  * Increase in swelling  * Need for compression bandage changes due to slippage, breakthrough drainage. If you need medical attention outside of the business hours of the [address] [place] [address] Road please contact your PCP or go to the nearest emergency room.         Electronically signed by Carlos Casas DPM on 8/16/2021 at 3:25 PM

## 2021-08-16 NOTE — PROGRESS NOTES
[address]:     [facility] [address] [facility] Cayden Garcia  B:[phone] f: Carolyn [address]:     [address]  [address] Dept: [address] [place] [address] [phone]    Patient Information:      Margarito Fernandez [id number]   [phone]   : 1950  AGE: 78 y.o. GENDER: male   EPISODE DATE: 2021    Insurance:      PRIMARY INSURANCE:  Plan: [employer] [address]  Coverage: [employer]  Effective Date: 2015  Group Number: [unfilled]  Subscriber Number: [id number] - (Commercial)    Payor/Plan Subscr  Sex Relation Sub. Ins. ID Effective Group Num   1. 3 Cll Font Keeley* 1956 Female Spouse [id number] 1/1/[birth year] [id number]                                   [address]       Patient Wound Information:      Problem List Items Addressed This Visit     Diabetic ulcer of right foot associated with type 2 diabetes mellitus, with fat layer exposed ([facility] - Primary    Relevant Orders    Initiate Outpatient Wound Care Protocol          WOUNDS REQUIRING DRESSING SUPPLIES:     Wound 20 Toe (Comment  which one) Anterior;Right;Medial #1 (2020)great toe.   Martinez III (Active)   Wound Image   20 1354   Wound Etiology Diabetic Martinez 3 20 1534   Wound Cleansed Rinsed/Irrigated with saline 20 1432   Dressing/Treatment Alginate with Ag 20 1432   Wound Length (cm) 0 cm 20 1354   Wound Width (cm) 0 cm 20 1354   Wound Depth (cm) 0 cm 20 1354   Wound Surface Area (cm^2) 0 cm^2 20 1354   Change in Wound Size % (l*w) 100 20 1354   Wound Volume (cm^3) 0 cm^3 20 1354   Wound Healing % 100 20 1354   Post-Procedure Length (cm) 0.2 cm 20 1407   Post-Procedure Width (cm) 0.4 cm 20 1407   Post-Procedure Depth (cm) 0.2 cm 20 1407   Post-Procedure Surface Area (cm^2) 0.08 cm^2 08/24/20 1407   Post-Procedure Volume (cm^3) 0.02 cm^3 08/24/20 1407   Undermining Starts ___ O'Clock 7 07/13/20 1534   Undermining Ends___ O'Clock 12 07/13/20 1534   Undermining Maxium Distance (cm) 0 07/20/20 1358   Number of days: 399       Wound 08/16/21 Toe (Comment  which one) Right #1 right great toe (Active)   Wound Image   08/16/21 1353   Wound Etiology Diabetic Martinez 2 08/16/21 1353   Dressing Status New dressing applied 08/16/21 1440   Wound Cleansed Irrigated with saline 08/16/21 1440   Dressing/Treatment Alginate;Dry dressing 08/16/21 1440   Wound Length (cm) 1 cm 08/16/21 1353   Wound Width (cm) 0.6 cm 08/16/21 1353   Wound Depth (cm) 0.2 cm 08/16/21 1353   Wound Surface Area (cm^2) 0.6 cm^2 08/16/21 1353   Wound Volume (cm^3) 0.12 cm^3 08/16/21 1353   Post-Procedure Length (cm) 1 cm 08/16/21 1416   Post-Procedure Width (cm) 0.7 cm 08/16/21 1416   Post-Procedure Depth (cm) 0.3 cm 08/16/21 1416   Post-Procedure Surface Area (cm^2) 0.7 cm^2 08/16/21 1416   Post-Procedure Volume (cm^3) 0.21 cm^3 08/16/21 1416   Wound Assessment Granulation tissue 08/16/21 1353   Drainage Amount Moderate 08/16/21 1416   Drainage Description Serosanguinous 08/16/21 1416   Odor None 08/16/21 1353   Ricarda-wound Assessment Hyperkeratosis (callous) 08/16/21 1353   Number of days: 0       Wound 08/16/21 Toe (Comment  which one) Left #2 left great toe (Active)   Wound Image   08/16/21 1353   Wound Etiology Diabetic Martinez 2 08/16/21 1353   Dressing Status New dressing applied 08/16/21 1440   Wound Cleansed Cleansed with saline 08/16/21 1440   Dressing/Treatment Alginate;Dry dressing 08/16/21 1440   Wound Length (cm) 0.2 cm 08/16/21 1353   Wound Width (cm) 0.2 cm 08/16/21 1353   Wound Depth (cm) 0.3 cm 08/16/21 1353   Wound Surface Area (cm^2) 0.04 cm^2 08/16/21 1353   Wound Volume (cm^3) 0.012 cm^3 08/16/21 1353   Post-Procedure Length (cm) 0.2 cm 08/16/21 1416   Post-Procedure Width (cm) 0.3 cm 08/16/21 1416   Post-Procedure Depth (cm) 0.4 cm 08/16/21 1416   Post-Procedure Surface Area (cm^2) 0.06 cm^2 08/16/21 1416   Post-Procedure Volume (cm^3) 0.024 cm^3 08/16/21 1416   Wound Assessment Pale granulation tissue;Fibrin 08/16/21 1353   Drainage Amount Moderate 08/16/21 1416   Drainage Description Serosanguinous 08/16/21 1416   Odor None 08/16/21 1353   Ricarda-wound Assessment Hyperkeratosis (callous) 08/16/21 1353   Number of days: 0          Supplies Requested :      WOUND #: 1 and 2   PRIMARY DRESSING:  Alginate pad   Cover and Secure with: 4X4 gauze pad  Bulky roll gauze     FREQUENCY OF DRESSING CHANGES:  Daily     ADDITIONAL ITEMS:  [] Gloves Small  [x] Gloves Medium [] Gloves Large [] Gloves XLarge  [] Tape 1\" [x] Tape 2\" [] Tape 3\"  [] Medipore Tape  [x] Saline  [] Skin Prep   [] Adhesive Remover   [] Cotton Tip Applicators   [] Other:    Patient Wound(s) Debrided: [x] Yes if yes please add date 08/16/21   [] No    Debribement Type: Excisional/Sharp    Patient currently being seen by Home Health: [] Yes   [x] No    Duration for needed supplies:  []15  []30  []60  [x]90 Days    Electronically signed by Sena Torres on 8/16/2021 at 3:37 PM     Provider Information:      PROVIDER'S NAME: Dr Kalyan Collado DPM    NPI: [id number]

## 2021-08-24 ENCOUNTER — HOSPITAL ENCOUNTER (OUTPATIENT)
Dept: NUCLEAR MEDICINE | Age: 71
Discharge: HOME | End: 2021-08-24
Payer: COMMERCIAL

## 2021-08-24 DIAGNOSIS — C61 MALIGNANT NEOPLASM OF PROSTATE (HCC): ICD-10-CM

## 2021-08-24 PROCEDURE — 3430000000 HC RX DIAGNOSTIC RADIOPHARMACEUTICAL: Performed by: RADIOLOGY

## 2021-08-24 PROCEDURE — A9503 TC99M MEDRONATE: HCPCS | Performed by: RADIOLOGY

## 2021-08-24 PROCEDURE — 78306 BONE IMAGING WHOLE BODY: CPT

## 2021-08-24 RX ORDER — TC 99M MEDRONATE 20 MG/10ML
25 INJECTION, POWDER, LYOPHILIZED, FOR SOLUTION INTRAVENOUS
Status: COMPLETED | OUTPATIENT
Start: 2021-08-24 | End: 2021-08-24

## 2021-08-24 RX ADMIN — TC 99M MEDRONATE 25 MILLICURIE: 20 INJECTION, POWDER, LYOPHILIZED, FOR SOLUTION INTRAVENOUS at 10:45

## 2021-08-25 ENCOUNTER — OFFICE VISIT (OUTPATIENT)
Dept: FAMILY MEDICINE CLINIC | Age: 71
End: 2021-08-25
Payer: COMMERCIAL

## 2021-08-25 VITALS — HEIGHT: 71 IN | TEMPERATURE: 97.3 F | WEIGHT: 232 LBS | BODY MASS INDEX: 32.48 KG/M2 | RESPIRATION RATE: 16 BRPM

## 2021-08-25 DIAGNOSIS — I10 ESSENTIAL HYPERTENSION: Chronic | ICD-10-CM

## 2021-08-25 DIAGNOSIS — I50.21 ACUTE HFREF (HEART FAILURE WITH REDUCED EJECTION FRACTION) (HCC): Primary | ICD-10-CM

## 2021-08-25 DIAGNOSIS — Z79.01 CHRONIC ANTICOAGULATION: Chronic | ICD-10-CM

## 2021-08-25 DIAGNOSIS — I48.91 ATRIAL FIBRILLATION, UNSPECIFIED TYPE (HCC): Chronic | ICD-10-CM

## 2021-08-25 DIAGNOSIS — E78.5 HYPERLIPIDEMIA, UNSPECIFIED HYPERLIPIDEMIA TYPE: Chronic | ICD-10-CM

## 2021-08-25 DIAGNOSIS — I73.9 PVD (PERIPHERAL VASCULAR DISEASE) WITH CLAUDICATION (HCC): ICD-10-CM

## 2021-08-25 DIAGNOSIS — F32.9 MAJOR DEPRESSIVE DISORDER, REMISSION STATUS UNSPECIFIED, UNSPECIFIED WHETHER RECURRENT: Chronic | ICD-10-CM

## 2021-08-25 PROCEDURE — 1036F TOBACCO NON-USER: CPT | Performed by: FAMILY MEDICINE

## 2021-08-25 PROCEDURE — G8427 DOCREV CUR MEDS BY ELIG CLIN: HCPCS | Performed by: FAMILY MEDICINE

## 2021-08-25 PROCEDURE — G8417 CALC BMI ABV UP PARAM F/U: HCPCS | Performed by: FAMILY MEDICINE

## 2021-08-25 PROCEDURE — 1123F ACP DISCUSS/DSCN MKR DOCD: CPT | Performed by: FAMILY MEDICINE

## 2021-08-25 PROCEDURE — 3017F COLORECTAL CA SCREEN DOC REV: CPT | Performed by: FAMILY MEDICINE

## 2021-08-25 PROCEDURE — 4040F PNEUMOC VAC/ADMIN/RCVD: CPT | Performed by: FAMILY MEDICINE

## 2021-08-25 PROCEDURE — 99213 OFFICE O/P EST LOW 20 MIN: CPT | Performed by: FAMILY MEDICINE

## 2021-08-30 ENCOUNTER — HOSPITAL ENCOUNTER (OUTPATIENT)
Dept: WOUND CARE | Age: 71
Discharge: HOME | End: 2021-08-30
Payer: COMMERCIAL

## 2021-08-31 ENCOUNTER — TELEPHONE (OUTPATIENT)
Dept: FAMILY MEDICINE CLINIC | Age: 71
End: 2021-08-31

## 2021-08-31 NOTE — TELEPHONE ENCOUNTER
Received referral from [facility] for IV antibiotics for skilled nursing has questions needs to know if seen within last 90 days by Dr. Jo Rubio if Dr. Jo Rubio will follow CPA and med teaching.   ID will follow for antibx

## 2021-09-06 LAB
ALBUMIN SERPL-MCNC: 3.7 G/DL (ref 3.5–5.2)
ALP BLD-CCNC: 109 U/L (ref 40–129)
ALT SERPL-CCNC: 32 U/L (ref 0–40)
ANION GAP SERPL CALCULATED.3IONS-SCNC: 13 MMOL/L (ref 7–16)
AST SERPL-CCNC: 32 U/L (ref 0–39)
BASOPHILS ABSOLUTE: 0.04 E9/L (ref 0–0.2)
BASOPHILS RELATIVE PERCENT: 0.6 % (ref 0–2)
BILIRUB SERPL-MCNC: 0.5 MG/DL (ref 0–1.2)
BUN BLDV-MCNC: 38 MG/DL (ref 6–23)
CALCIUM SERPL-MCNC: 9.3 MG/DL (ref 8.6–10.2)
CHLORIDE BLD-SCNC: 95 MMOL/L (ref 98–107)
CO2: 26 MMOL/L (ref 22–29)
CREAT SERPL-MCNC: 1.4 MG/DL (ref 0.7–1.2)
EOSINOPHILS ABSOLUTE: 0.12 E9/L (ref 0.05–0.5)
EOSINOPHILS RELATIVE PERCENT: 1.8 % (ref 0–6)
GFR AFRICAN AMERICAN: >60
GFR NON-AFRICAN AMERICAN: >60 ML/MIN/1.73
GLUCOSE BLD-MCNC: 104 MG/DL (ref 74–99)
HCT VFR BLD CALC: 37 % (ref 37–54)
HEMOGLOBIN: 12.1 G/DL (ref 12.5–16.5)
IMMATURE GRANULOCYTES #: 0.04 E9/L
IMMATURE GRANULOCYTES %: 0.6 % (ref 0–5)
LYMPHOCYTES ABSOLUTE: 1.67 E9/L (ref 1.5–4)
LYMPHOCYTES RELATIVE PERCENT: 24.9 % (ref 20–42)
MCH RBC QN AUTO: 30.7 PG (ref 26–35)
MCHC RBC AUTO-ENTMCNC: 32.7 % (ref 32–34.5)
MCV RBC AUTO: 93.9 FL (ref 80–99.9)
MONOCYTES ABSOLUTE: 0.68 E9/L (ref 0.1–0.95)
MONOCYTES RELATIVE PERCENT: 10.1 % (ref 2–12)
NEUTROPHILS ABSOLUTE: 4.16 E9/L (ref 1.8–7.3)
NEUTROPHILS RELATIVE PERCENT: 62 % (ref 43–80)
PDW BLD-RTO: 13.5 FL (ref 11.5–15)
PLATELET # BLD: 216 E9/L (ref 130–450)
PMV BLD AUTO: 11.7 FL (ref 7–12)
POTASSIUM SERPL-SCNC: 4.6 MMOL/L (ref 3.5–5)
RBC # BLD: 3.94 E12/L (ref 3.8–5.8)
SODIUM BLD-SCNC: 134 MMOL/L (ref 132–146)
TOTAL PROTEIN: 6.8 G/DL (ref 6.4–8.3)
WBC # BLD: 6.7 E9/L (ref 4.5–11.5)

## 2021-09-13 ENCOUNTER — HOSPITAL ENCOUNTER (OUTPATIENT)
Dept: RADIATION ONCOLOGY | Age: 71
Discharge: HOME | End: 2021-09-13
Payer: COMMERCIAL

## 2021-09-13 ENCOUNTER — TELEPHONE (OUTPATIENT)
Dept: CASE MANAGEMENT | Age: 71
End: 2021-09-13

## 2021-09-13 VITALS
HEART RATE: 53 BPM | BODY MASS INDEX: 31.8 KG/M2 | RESPIRATION RATE: 20 BRPM | OXYGEN SATURATION: 100 % | TEMPERATURE: 96.1 F | WEIGHT: 228 LBS

## 2021-09-13 DIAGNOSIS — C61 PROSTATE CANCER (HCC): Primary | ICD-10-CM

## 2021-09-13 PROCEDURE — 99205 OFFICE O/P NEW HI 60 MIN: CPT

## 2021-09-13 PROCEDURE — 99245 OFF/OP CONSLTJ NEW/EST HI 55: CPT | Performed by: RADIOLOGY

## 2021-09-13 SDOH — ECONOMIC STABILITY: HOUSING INSECURITY: PLEASE ASSESS YOUR PATIENT'S LEVEL OF DISTRESS CONCERNING HOUSING (SCALE FROM 1-10): 0 (NONE)

## 2021-09-13 ASSESSMENT — PAIN DESCRIPTION - DESCRIPTORS: DESCRIPTORS: SHARP

## 2021-09-13 ASSESSMENT — PAIN DESCRIPTION - FREQUENCY: FREQUENCY: CONTINUOUS

## 2021-09-13 ASSESSMENT — PAIN DESCRIPTION - PAIN TYPE: TYPE: CHRONIC PAIN

## 2021-09-13 ASSESSMENT — PAIN SCALES - GENERAL: PAINLEVEL_OUTOF10: 4

## 2021-09-13 NOTE — PROGRESS NOTES
Maira Lamb.  1950 73 y.o. Referring Physician: Tricia Martin    PCP: Lauren Alexis MD     Vitals:    09/13/21 0755   Pulse: 53   Resp: 20   Temp: 96.1 °F (35.6 °C)   SpO2: 100%        Wt Readings from Last 3 Encounters:   09/13/21 228 lb (103.4 kg)   08/25/21 232 lb (105.2 kg)   08/23/21 221 lb (100.2 kg)        Body mass index is 31.8 kg/m². Chief Complaint: No chief complaint on file. Cancer Staging  No matching staging information was found for the patient. Prior Radiation Therapy? NO    Concurrent Chemo/radiation? NO    Prior Chemotherapy? NO    Prior Hormonal Therapy? YES: Site Treated: [facility] [place]: [address]          Date: 8/171/21    Head and Neck Cancer? No, patient does NOT have HN cancer. Current Outpatient Medications   Medication Sig Dispense Refill    amiodarone (CORDARONE) 200 MG tablet Take 200 mg by mouth daily      bacitracin 500 UNIT/GM ointment Apply topically 2 times daily Apply topically 2 times daily.       cephALEXin (KEFLEX) 500 MG capsule Take 500 mg by mouth 3 times daily Takes 2 capsules TID      valsartan (DIOVAN) 40 MG tablet Take 40 mg by mouth daily      senna (SENOKOT) 8.6 MG tablet Take 1 tablet by mouth daily      psyllium (KONSYL) 28.3 % PACK Take 1 packet by mouth daily      sodium chloride (OCEAN) 0.65 % nasal spray 1 spray by Nasal route as needed for Congestion      triamcinolone (KENALOG) 0.1 % cream Apply topically 2 times daily      metoprolol succinate (TOPROL XL) 25 MG extended release tablet Take 25 mg by mouth daily      aspirin 81 MG EC tablet Take 81 mg by mouth daily      ergocalciferol (ERGOCALCIFEROL) 1.25 MG (79992 UT) capsule Take 50,000 Units by mouth once a week      digoxin (LANOXIN) 125 MCG tablet Take 125 mcg by mouth daily      vitamin D (ERGOCALCIFEROL) 1.25 MG (07719 UT) CAPS capsule Take 50,000 Units by mouth once a week On fridays      Magnesium 400 MG CAPS Take by mouth  predniSONE (DELTASONE) 10 MG tablet Take 5 mg by mouth daily 3 tab daily for 30 days       spironolactone (ALDACTONE) 50 MG tablet Take 25 mg by mouth daily       warfarin (COUMADIN) 5 MG tablet Take 5 mg by mouth      pantoprazole (PROTONIX) 40 MG tablet Take 1 tablet by mouth every morning (before breakfast) 90 tablet 3    bumetanide (BUMEX) 2 MG tablet Take 1 tablet by mouth daily (Patient taking differently: Take by mouth 2mg am, 1mg pm) 90 tablet 3    allopurinol (ZYLOPRIM) 100 MG tablet Take 1 tablet by mouth daily 30 tablet 3    HUMULIN R 500 UNIT/ML injection 4 times daily (before meals and nightly) Sliding scale       No current facility-administered medications for this encounter.        Past Medical History:   Diagnosis Date    Anxiety     Aorto-iliac atherosclerosis ([address] 8/31/2020    Arthritis     Atrial fibrillation (HCC)     AVNRT (AV teddy re-entry tachycardia) ([address]     Blood type O+ 11/14/2019    CAD (coronary artery disease)     Cancer (HCC)     CHF (congestive heart failure) ([address]     Diabetes mellitus ([address]     Diabetic neuropathy ([address]     Diastolic dysfunction 09/00/2369    stage 3    Fall     right knee    Gout     chemically induced    History of blood transfusion     Hx of blood clots 1976    left leg    Hyperlipidemia     Hypertension     LBBB (left bundle branch block)     Left ventricular assist device (LVAD) complication 5035    Moderate mitral regurgitation 04/19/2016    Nonischemic cardiomyopathy ([address]     Obesity     SNEHA (obstructive sleep apnea)     treated with CPAP    PVD (peripheral vascular disease) with claudication ([address] 8/31/2020    Sarcoidosis 2019    Severe tricuspid regurgitation 04/19/2016    SV[facility]supraventricular tachycardia) ([facility]     Viral cardiomyopathy ([address]        Past Surgical History:   Procedure Laterality Date    BACK SURGERY      CARDIAC CATHETERIZATION  11/21/2011    Dr. Rand Clay  11/08/2019 Dr. Yuan Hernandez  2014    BiV/ICD  (Eulice Aver)    Dr. Ortega Tidwell CATH LAB PROCEDURE      ECHO COMPL W DOP COLOR FLOW  2011         ECHO COMPLETE  2013         EYE SURGERY Right 2019    FEMUR SURGERY      rt; compound frx right femur at 3years old.  JOINT REPLACEMENT Left     knee    KNEE SURGERY      7 on right/1 on left/ total replacement on R    PICC LINE INSERTION NURSE  10/21/2019         PROSTATE BIOPSY  2021       Family History   Problem Relation Age of Onset    Alzheimer's Disease Mother     Diabetes Mother     No Known Problems Father         lives in [facility]    No Known Problems Brother     Other Brother         aneurysm behind his eye    Arrhythmia Brother          age 61    No Known Problems Brother     No Known Problems Brother        Social History     Socioeconomic History    Marital status:      Spouse name: Brian Ness Number of children: 3    Years of education: 15    Highest education level: Bachelor's degree (e.g., BA, AB, BS)   Occupational History    Occupation: [employer]     Comment: [employer]; medical discharge    Occupation: teacher     Comment: [id number] handicapped    Occupation:    Tobacco Use    Smoking status: Former Smoker     Packs/day: 0.00     Years: 2.00     Pack years: 0.00     Types: Cigars     Quit date: 1980     Years since quittin.7    Smokeless tobacco: Never Used    Tobacco comment: smoked 1 cigar a week    Vaping Use    Vaping Use: Never used   Substance and Sexual Activity    Alcohol use: Not Currently    Drug use: Never    Sexual activity: Not on file   Other Topics Concern    Not on file   Social History Narrative    Drinks 0-1 cup of decaf coffee/tea daily.      Social Determinants of Health     Financial Resource Strain: Low Risk     Difficulty of Paying Living Expenses: Not hard at all   Food Insecurity: No Food Insecurity    Worried About Running Out of Food in the Last Year: Never true    Ran Out of Food in the Last Year: Never true   Transportation Needs:     Lack of Transportation (Medical):  Lack of Transportation (Non-Medical):    Physical Activity:     Days of Exercise per Week:     Minutes of Exercise per Session:    Stress:     Feeling of Stress :    Social Connections:     Frequency of Communication with Friends and Family:     Frequency of Social Gatherings with Friends and Family:     Attends Buddhism Services:     Active Member of Clubs or Organizations:     Attends Club or Organization Meetings:     Marital Status:    Intimate Partner Violence:     Fear of Current or Ex-Partner:     Emotionally Abused:     Physically Abused:     Sexually Abused:            Occupation: Teacher  Retired:  YES: Patient is retired from [employer]. REVIEW OF SYSTEMS: <<For Level 5, 10 or more systems>> Li Alicea is a very pleasant 73years old male, he is here today to discuss receiving radiation therapy R/T prostate cancer. He is alert and oriented x 4, he wears hearing aids bilaterally, ambulatory with a cane, c/o pain at 4/10. Patient had an elevated PSA in the 50's sometime in 2019.  7/09/21  He underwent prostate biopsy :  Right apex [place]: 5+4= 9; Right mid 4+5=9; right base 4+5=9; Left apex 4+5=9; Left mid 5+4=9; left base 5+4=9, cT2B high volume  GS 9 Adenocarcinoma prostate cancer. He is following Dr Lisseth Hanna Urology from [facility] (last appointment was 8/17/21), and Dr Rohan Angeles also with [facility]. They both agreed with recommendations that the best treatment would be ADT times 36 months and full course XRT starting in the month or two after the Lupron. He had his last PSA level 8/30/21  Which was 60.8.    Bone scan was completed 8/24/21 which showed Indeterminate focus of radiotracer activity seen in the left calvarium, just to the left of midline. Patient states his first dose of Lupron was 8/17/21 and he also states he was started on Casodex per Urology recommendation 8/18/21. Approximately spent >20 minutes with patient answered all questions based on nursing perspective, he verbalizes understanding. Pacemaker/Defibulator/ICD:  Yes    Mediport: No/ PICC line right arm        FALLS RISK SCREENING ASSESSMENT    Instructions:  Assess the patient and enter the appropriate indicators that are present for fall risk identification. Total the numbers entered and assign a fall risk score from Table 2.  Reassess patient at a minimum every 12 weeks or with status change. Assessment   Date  9/13/2021     1. Mental Ability: confusion/cognitively impaired No - 0       2. Elimination Issues: incontinence, frequency No - 0       3. Ambulatory: use of assistive devices (walker, cane, off-loading devices), attached to equipment (IV pole, oxygen) Yes - 2/ cane     4. Sensory Limitations: dizziness, vertigo, impaired vision No - 0       5. Age 72 years or greater - 1       10. Medication: diuretics, strong analgesics, hypnotics, sedatives, antihypertensive agents   Yes - 3   7. Falls:  recent history of falls within the last 3 months (not to include slipping or tripping)   Yes - 7/off balance   TOTAL 13    If score of 4 or greater was education given? Yes       TABLE 2   Risk Score Risk Level Plan of Care   0-3 Little or  No Risk 1. Provide assistance as indicated for ambulation activities  2. Reorient confused/cognitively impaired patient  3. Call-light/bell within patient's reach  4. Chair/bed in low position, stretcher/bed with siderails up except when performing patient care activities  5. Educate patient/family/caregiver on falls prevention  6.  Reassess in 12 weeks or with any noted change in patient condition which places them at a risk for a fall   4-6 Moderate Risk 1.   Provide assistance as indicated for ambulation activities  2. Reorient confused/cognitively impaired patient  3. Call-light/bell within patient's reach  4. Chair/bed in low position, stretcher/bed with siderails up except when performing patient care activities  5. Educate patient/family/caregiver on falls prevention  6. Falls risk precaution (Yellow sticker Level II) placed on patient chart   7 or   Higher High Risk 1. Place patient in easily observable treatment room  2. Patient attended at all times by family member or staff  3. Provide assistance as indicated for ambulation activities  4. Reorient confused/cognitively impaired patient  5. Call-light/bell within patient's reach  6. Chair/bed in low position, stretcher/bed with siderails up except when performing patient care activities  7. Educate patient/family/caregiver on falls prevention  8. Falls risk precaution (Yellow sticker Level III) placed on patient chart           MALNUTRITION RISK SCREENING ASSESSMENT    Instructions:  Assess the patient and enter the appropriate indicators that are present for nutrition risk identification. Total the numbers entered and assign a risk score. Follow the appropriate action for total score listed below. Assessment   Date  9/13/2021     1. Have you lost weight without trying? 0- No     2. Have you been eating poorly because of a decreased appetite? 0- No   3. Do you have a diagnosis of head and neck cancer?       0- No                                                                                    TOTAL 0          Score of 0-1: No action  Score 2 or greater:  · For Non-Diabetic Patient: Recommend adding Ensure Complete 2 x daily and provide patient with Ensure wellness bag with coupons  · For Diabetic Patient: Recommend adding Glucerna Shake 2 x daily and provide patient with Glucerna Wellness bag with coupons  · Route to the dietitian via [address]    · Are you having difficulty performing daily routine tasks due to fatigue or weakness (ie: bathing/showering, dressing, housework, meal prep, work, , etc): Yes     · Do you have any arm flexibility/ROM restrictions, swelling or pain that limit activity: Yes     · Any changes in memory, attention/focus that impact daily activities: No     · Do you avoid participation in leisure/social activity due to weakness, fatigue or pain: Yes     ARE ANY OF THE ABOVE ARE ANSWERED YES: Yes - but NO OT referral request sent due to patient refusal.          PT ASSESSMENT FOR REFERRAL    · Have you had any recent falls in the past 2 months: Yes     · Do you have difficulty going up/down stairs: Yes     · Are you having difficulty walking: Yes     · Do you often hold onto furniture/environmental supports or feel off balance when you are walking: Yes     · Do you need to take rest breaks when you are walking: Yes     · Any pain on a scale of 1-10 that limits your mobility: Yes 4/10    ARE ANY OF THE ABOVE ARE ANSWERED YES: Yes - but NO PT referral request sent due to patient refusal.                 Kadie Benítez    - Is patient planned to receive Cisplatin? No. This patient is not planned to start Cisplatin. - Is patient planned to receive radiation therapy that may be directed toward auditory canals or nerves? No. Patient is not planned to start radiation therapy to auditory canals or nerves. - Is patient complaining of new onset hearing loss? No. Patient is not complaining of new onset hearing loss. Patient education given on radiation therapy, side effects and fall safety prevention utilizing slides and handouts. The patient expresses understanding and acceptance of instructions.  Dagoberto Howe RN 9/13/2021 8:08 AM           Dagoberto Howe RN

## 2021-09-13 NOTE — PROGRESS NOTES
Radiation Oncology      Verena Zazueta. Carlos Cunningham 52      Referring Physician: Dr. Jessica Sexton      Primary Care Kashif Ward MD   Primary Oncologist: -      Diagnosis: cT2b cNo cMo   -GS 5+4 =9   -iPSA: 60.8 (8/30/21)      Service:  Radiation Oncology consultation performed on 9/16/21        HPI:        Hiral Thayer is a pleasant and articulate 73year old with very high risk prostate cancer in the setting of multiple medical comorbidities. Patient had an elevated PSA in the 50's sometime in 2019.  7/09/21  He underwent prostate biopsy :  Right apex [place]: 5+4= 9; Right mid 4+5=9; right base 4+5=9; Left apex 4+5=9; Left mid 5+4=9; left base 5+4=9, cT2B high volume  GS 9 Adenocarcinoma prostate cancer. He is following Dr Katya Donaldson Urology from [facility] (last appointment was 8/17/21), and Dr Negrito Cochran also with [facility]. They both agreed with recommendations that the best treatment would be ADT times 36 months and full course XRT starting in the month or two after the Lupron. He had his last PSA level 8/30/21  Which was 60.8. Bone scan was completed 8/24/21 which showed Indeterminate focus of radiotracer activity seen in the left calvarium, just to the left of midline. Patient states his first dose of Lupron was 8/17/21 and he also states he was started on Casodex per Urology recommendation 8/18/21. The patient presents today to discuss fractionated external beam radiation therapy as a component of multidisciplinary, definative management. We reviewed the available medical records including the complete medical history of this pt today prior to consultation. Epic -CE and available scanned documents per the Epic Media tab were reviewed PRN. A complete ROS was also performed today and is noted below.   During consultation today I personally discussed the pts workup to date; including but not limited to applicable imaging studies, Pathology reports, and interventions. The NCCN guidelines, as pertaining to the above diagnosis were also recapped for the pt today in brief. Today, Rekha Poole Sr.  notes Sx that include freq, fatigue, SOB.    KPS 70.           -----    Pathology reviewed:      8/16/21:  -high volume 4+5=9 and 5+4=9 in multiple cores      -----      Past Medical History:   Diagnosis Date    Anxiety     Aorto-iliac atherosclerosis ([address] 8/31/2020    Arthritis     Atrial fibrillation ([address]     AVNRT (AV teddy re-entry tachycardia) ([address]     Blood type O+ 11/14/2019    CAD (coronary artery disease)     Cancer ([address]     CHF (congestive heart failure) ([address]     Diabetes mellitus ([address]     Diabetic neuropathy ([address]     Diastolic dysfunction 35/39/8689    stage 3    Fall     right knee    Gout     chemically induced    History of blood transfusion     Hx of blood clots 1976    left leg    Hyperlipidemia     Hypertension     LBBB (left bundle branch block)     Left ventricular assist device (LVAD) complication 2179    Moderate mitral regurgitation 04/19/2016    Nonischemic cardiomyopathy ([address]     Obesity     SNEHA (obstructive sleep apnea)     treated with CPAP    PVD (peripheral vascular disease) with claudication ([address] 8/31/2020    Sarcoidosis 2019    Severe tricuspid regurgitation 04/19/2016    SVT (supraventricular tachycardia) ([address]     Viral cardiomyopathy ([address]        Past Surgical History:   Procedure Laterality Date    BACK SURGERY      CARDIAC CATHETERIZATION  11/21/2011    Dr. Tom Wagner  11/08/2019    Dr. Mason Cassidy  08/20/2014    BiV/ICD  (Laney Carmen)    Dr. Maria Alejandra Hernandes CATH LAB PROCEDURE      ECHO COMPL W DOP COLOR FLOW  11/19/2011         ECHO COMPLETE  12/14/2013         EYE SURGERY Right 08/2019    FEMUR SURGERY      rt; compound frx right femur at 3years old.  JOINT REPLACEMENT Left     knee    KNEE SURGERY      7 on right/1 on left/ total replacement on R    PICC LINE INSERTION NURSE  10/21/2019         PROSTATE BIOPSY  2021       Family History   Problem Relation Age of Onset    Alzheimer's Disease Mother     Diabetes Mother     No Known Problems Father         l[facility] in [facility]    No Known Problems Brother     Other Brother         aneurysm behind his eye    Arrhythmia Brother          age 61    No Known Problems Brother     No Known Problems Brother        Current Outpatient Medications   Medication Sig Dispense Refill    amiodarone (CORDARONE) 200 MG tablet Take 200 mg by mouth daily      bacitracin 500 UNIT/GM ointment Apply topically 2 times daily Apply topically 2 times daily.       cephALEXin (KEFLEX) 500 MG capsule Take 500 mg by mouth 3 times daily Takes 2 capsules TID      valsartan (DIOVAN) 40 MG tablet Take 40 mg by mouth daily      senna (SENOKOT) 8.6 MG tablet Take 1 tablet by mouth daily      psyllium (KONSYL) 28.3 % PACK Take 1 packet by mouth daily      sodium chloride (OCEAN) 0.65 % nasal spray 1 spray by Nasal route as needed for Congestion      triamcinolone (KENALOG) 0.1 % cream Apply topically 2 times daily      metoprolol succinate (TOPROL XL) 25 MG extended release tablet Take 25 mg by mouth daily      aspirin 81 MG EC tablet Take 81 mg by mouth daily      ergocalciferol (ERGOCALCIFEROL) 1.25 MG (89188 UT) capsule Take 50,000 Units by mouth once a week      digoxin (LANOXIN) 125 MCG tablet Take 125 mcg by mouth daily      vitamin D (ERGOCALCIFEROL) 1.25 MG (78962 UT) CAPS capsule Take 50,000 Units by mouth once a week On       Magnesium 400 MG CAPS Take by mouth      predniSONE (DELTASONE) 10 MG tablet Take 5 mg by mouth daily 3 tab daily for 30 days       spironolactone (ALDACTONE) 50 MG tablet Take 25 mg by mouth daily       warfarin (COUMADIN) 5 MG tablet Take 5 mg by mouth      pantoprazole (PROTONIX) 40 MG tablet Take 1 tablet by mouth every morning (before breakfast) 90 tablet 3    bumetanide (BUMEX) 2 MG tablet Take 1 tablet by mouth daily (Patient taking differently: Take by mouth 2mg am, 1mg pm) 90 tablet 3    allopurinol (ZYLOPRIM) 100 MG tablet Take 1 tablet by mouth daily 30 tablet 3    HUMULIN R 500 UNIT/ML injection 4 times daily (before meals and nightly) Sliding scale       No current facility-administered medications for this encounter. Allergies   Allergen Reactions    Food Hives     Strawberries      Chlorhexidine Rash    Soap & Cleansers Rash     TIDE detergent       Social History     Socioeconomic History    Marital status:      Spouse name: Shu Fong Number of children: 3    Years of education: 13    Highest education level: Bachelor's degree (e.g., BA, AB, BS)   Occupational History    Occupation: [employer]     Comment: [employer]; medical discharge    Occupation: teacher     Comment: [id number]    Occupation:    Tobacco Use    Smoking status: Former Smoker     Packs/day: 0.00     Years: 2.00     Pack years: 0.00     Types: Cigars     Quit date: 1980     Years since quittin.7    Smokeless tobacco: Never Used    Tobacco comment: smoked 1 cigar a week    Vaping Use    Vaping Use: Never used   Substance and Sexual Activity    Alcohol use: Not Currently    Drug use: Never    Sexual activity: None   Other Topics Concern    None   Social History Narrative    Drinks 0-1 cup of decaf coffee/tea daily. Social Determinants of Health     Financial Resource Strain: Low Risk     Difficulty of Paying Living Expenses: Not hard at all   Food Insecurity: No Food Insecurity    Worried About Running Out of Food in the Last Year: Never true    Satish of Food in the Last Year: Never true   Transportation Needs:     Lack of Transportation (Medical):      Lack of Transportation (Non-Medical): and dry. Neurological:      General: No focal deficit present. Mental Status: He is alert and oriented to person, place, and time. Psychiatric:         Mood and Affect: Mood normal.         Behavior: Behavior normal.         Thought Content: Thought content normal.         Judgment: Judgment normal.             Imaging reviewed:      NMBS 8/24/21 - negative, will follow calvarium. Radiation Safety and Treatment Support:  -previous Radiation history: No  -history of connective tissue disease: No  -history of autoimmune disease:yes, sarcoidosis   -pregnant: not applicable  -fertility conservation and /or contraception discussed: no  -nutrition consult prior to [address]: Yes  -PEG: No  -Dental evaluation prior to treatment: No  -Social Work requested: Yes  -Oncology Nurse Navigator requested: Yes  -pre + post treatment PT / Rehab / PM+R evaluation considered: Yes  -ICD: No   -ICD brand: -  -[facility] patient navigator: Willian Jarrell  -Nurse Practitioners for Radiation Oncology:    ---Gabriela Winkler, MSN, RN, FNP-C   ---Monica Grace, MSN, RN, FNP-BC        Assessment and Plan: Jodi Lord is a pleasant and cooperative 73year old with a recent diagnosis of cT2b  - NCCN very high risk prostate cancer. This patient has cT2 , iPSA 60+, GS 5+4=9 - high risk prostate adenocarcinoma. He has a life expectancy > 5 years pending cardiac management and desires definitve management. Today, we discussed the treatment paradigm of prostate cancer in general including options such as surgery, primary androgen deprivation therapy,  observation (active surveillance), cryotherapy, HIFU, brachytherapy, combination brachytherapy-EBRT, HDR radiotherapy, and external beam radiation therapy (using IMRT with daily image guidance, either with CBCT or [place] radiofrequency fiducial beacons). Included in this discussion was a brief overview of each.  Our discussion focused on IG-IMRT and detailed the risks, benefits, and alternatives of this component of definitive management. Specifically included were the acute risks of ED, fatigue, loose stools, hematuria, hematochezia and urinary frequency changes. There is also a small risk of skin changes with IMRT and hair loss. Chronic effects that were specifically mentioned include but are not limited to: second malignancy, proctitis, enteritis, incontinence (and retention), hematuria, dysuria, frequency, erectile dysfunction, and small bowl obstruction (we also noted that there can be no guarantee of cure or a specific disease free interval). The patient verbalized an understanding of these items. Included in this conversation was a description of side effect rates noting that grade 2-3 late complications are less common with modern techniques. It was a pleasure meeting Roberto Frances today and we appreciate the referral and opportunity to be involved in his care. We had an extensive discussion today regarding the course to date (including a focused review of theapplicable radiographic and laboratory information), multidisciplinary approach to cancer care, and indications for external beam radiation therapy as a component therein. A literature review and multidisciplinary discussion was performed after seeing this patient due to the complexity of the medical decision making in this case. I personally spent greater than 70 minutes on this case and with this patient. I performed the complete history and physical as above at today's visit, at least 45 minutes was in direct discussion and  regarding disease management. -ADT start 8/17/21.  -sim 10/1/21.  -70 / 5040 hypofrac  -SpaceOAR this month  -long course ADT, rec 24-36 mo. Chad Alonso.  Elvira Verduzco MD [place] [address] Oncology  Cell: [phone]    [facility]:  [facility] [address]: [phone]  [address] [place] [address] Street:  [phone]   FAX:    [phone]  [facility]:  [phone]   FAX:  [phone]        NOTE: This report was transcribed using voice recognition software. Every effort was made to ensure accuracy; however, inadvertent computerized transcription errors may be present.

## 2021-09-13 NOTE — TELEPHONE ENCOUNTER
Met with patient during his initial consultation with Dr. Lisa Mcginnis for his recent Prostate cancer diagnosis. Introduced myself and explained my role with patients receiving treatment at our center. Patient was friendly and receptive. He had his firstLupron injection on 8/17/21. He denies any questions or needs at this time. He states he will review the information provided today and call with any questions that he has. Copy of his pathology findings given including cancer type. Instructed on next steps including Radiation treatments per Dr. Viviana Mcmullen recommendations and follow up care. Provided patient with  literature  on ACS Prostate Cancer, Prostate Cancer Support Group handout out and [place]. Reviewed resources available including Social Work, Dietician, and Financial Navigator. Provided with my contact information and instructed patient to call me with questions or concerns. Verbalizes understanding. Patient appreciative of visit. Will continue to follow.

## 2021-09-14 ENCOUNTER — TELEPHONE (OUTPATIENT)
Dept: RADIATION ONCOLOGY | Age: 71
End: 2021-09-14

## 2021-09-14 NOTE — TELEPHONE ENCOUNTER
This Rad/Onc RN called Dr Samantha Barry office (Urology), was connected to Dr Kinga Olvera phone, no answer I left a VM re:  Pt need a SpaceOAR. I also faxed Dr Antonio Degroot consult note from yesterday and my contact # to contact me if she has any questions.

## 2021-09-20 ENCOUNTER — HOSPITAL ENCOUNTER (OUTPATIENT)
Dept: RADIATION ONCOLOGY | Age: 71
Discharge: HOME | End: 2021-09-20
Attending: RADIOLOGY
Payer: COMMERCIAL

## 2021-09-20 ENCOUNTER — HOSPITAL ENCOUNTER (OUTPATIENT)
Dept: WOUND CARE | Age: 71
Discharge: HOME | End: 2021-09-20
Payer: COMMERCIAL

## 2021-09-20 ENCOUNTER — HOSPITAL ENCOUNTER (OUTPATIENT)
Dept: RADIATION ONCOLOGY | Age: 71
Discharge: HOME | End: 2021-09-20
Payer: COMMERCIAL

## 2021-09-20 VITALS
WEIGHT: 228 LBS | HEIGHT: 71 IN | BODY MASS INDEX: 31.92 KG/M2 | TEMPERATURE: 97.2 F | RESPIRATION RATE: 18 BRPM | HEART RATE: 74 BPM

## 2021-09-20 DIAGNOSIS — L97.512 DIABETIC ULCER OF TOE OF RIGHT FOOT ASSOCIATED WITH TYPE 2 DIABETES MELLITUS, WITH FAT LAYER EXPOSED (HCC): Primary | ICD-10-CM

## 2021-09-20 DIAGNOSIS — E11.621 DIABETIC ULCER OF TOE OF RIGHT FOOT ASSOCIATED WITH TYPE 2 DIABETES MELLITUS, WITH FAT LAYER EXPOSED (HCC): Primary | ICD-10-CM

## 2021-09-20 DIAGNOSIS — C79.51 SECONDARY CANCER OF BONE (HCC): Primary | ICD-10-CM

## 2021-09-20 PROCEDURE — 77263 THER RADIOLOGY TX PLNG CPLX: CPT | Performed by: RADIOLOGY

## 2021-09-20 PROCEDURE — 11042 DBRDMT SUBQ TIS 1ST 20SQCM/<: CPT | Performed by: NURSE PRACTITIONER

## 2021-09-20 PROCEDURE — 77334 RADIATION TREATMENT AID(S): CPT | Performed by: RADIOLOGY

## 2021-09-20 RX ORDER — GINSENG 100 MG
CAPSULE ORAL ONCE
Status: CANCELLED | OUTPATIENT
Start: 2021-09-20 | End: 2021-09-20

## 2021-09-20 RX ORDER — BACITRACIN, NEOMYCIN, POLYMYXIN B 400; 3.5; 5 [USP'U]/G; MG/G; [USP'U]/G
OINTMENT TOPICAL ONCE
Status: CANCELLED | OUTPATIENT
Start: 2021-09-20 | End: 2021-09-20

## 2021-09-20 RX ORDER — BETAMETHASONE DIPROPIONATE 0.05 %
OINTMENT (GRAM) TOPICAL ONCE
Status: CANCELLED | OUTPATIENT
Start: 2021-09-20 | End: 2021-09-20

## 2021-09-20 RX ORDER — GENTAMICIN SULFATE 1 MG/G
OINTMENT TOPICAL ONCE
Status: CANCELLED | OUTPATIENT
Start: 2021-09-20 | End: 2021-09-20

## 2021-09-20 RX ORDER — LIDOCAINE HYDROCHLORIDE 20 MG/ML
JELLY TOPICAL ONCE
Status: CANCELLED | OUTPATIENT
Start: 2021-09-20 | End: 2021-09-20

## 2021-09-20 RX ORDER — AMPICILLIN AND SULBACTAM 2; 1 G/1; G/1
INJECTION, POWDER, FOR SOLUTION INTRAMUSCULAR; INTRAVENOUS EVERY 6 HOURS
COMMUNITY
End: 2021-11-02 | Stop reason: ALTCHOICE

## 2021-09-20 RX ORDER — BACITRACIN ZINC AND POLYMYXIN B SULFATE 500; 1000 [USP'U]/G; [USP'U]/G
OINTMENT TOPICAL ONCE
Status: CANCELLED | OUTPATIENT
Start: 2021-09-20 | End: 2021-09-20

## 2021-09-20 NOTE — PROGRESS NOTES
 AVNRT (AV teddy re-entry tachycardia) ([address]     Blood type O+ 2019    CAD (coronary artery disease)     Cancer (HCC)     CHF (congestive heart failure) ([facility]     Diabetes mellitus ([address]     Diabetic neuropathy ([address]     Diastolic dysfunction     stage 3    Fall     right knee    Gout     chemically induced    History of blood transfusion     Hx of blood clots 1976    left leg    Hyperlipidemia     Hypertension     LBBB (left bundle branch block)     Left ventricular assist device (LVAD) complication 5651    Moderate mitral regurgitation 2016    Nonischemic cardiomyopathy ([address]     Obesity     SNEHA (obstructive sleep apnea)     treated with CPAP    PVD (peripheral vascular disease) with claudication ([facility] 2020    Sarcoidosis 2019    Severe tricuspid regurgitation 2016    SVT (supraventricular tachycardia) ([facility])     Viral cardiomyopathy ([address]      Past Surgical History:   Procedure Laterality Date    BACK SURGERY      CARDIAC CATHETERIZATION  2011    Dr. Maylin Lang  2019    Dr. Lia Barragan  2014    BiV/ICD  (Emanuel Dickerson)    Dr. Swanson [facility] CATH LAB PROCEDURE      ECHO COMPL W DOP COLOR FLOW  2011         ECHO COMPLETE  2013         EYE SURGERY Right 2019    FEMUR SURGERY      rt; compound frx right femur at 3years old.     JOINT REPLACEMENT Left     knee    KNEE SURGERY      7 on right/1 on left/ total replacement on R    PICC LINE INSERTION NURSE  10/21/2019         PROSTATE BIOPSY  2021     Family History   Problem Relation Age of Onset    Alzheimer's Disease Mother     Diabetes Mother     No Known Problems Father         l[facility] [place] [facility]    No Known Problems Brother     Other Brother         aneurysm behind his eye    Arrhythmia Brother          age 61    No Known Problems Brother     No Known Problems daily (before meals and nightly) Sliding scale      bacitracin 500 UNIT/GM ointment Apply topically 2 times daily Apply topically 2 times daily.  sodium chloride (OCEAN) 0.65 % nasal spray 1 spray by Nasal route as needed for Congestion      triamcinolone (KENALOG) 0.1 % cream Apply topically 2 times daily      ergocalciferol (ERGOCALCIFEROL) 1.25 MG (21056 UT) capsule Take 50,000 Units by mouth once a week       No current facility-administered medications on file prior to encounter. REVIEW OF SYSTEMS See HPI    Objective:    Pulse 74   Temp 97.2 °F (36.2 °C) (Temporal)   Resp 18   Ht 5' 11\" (1.803 m)   Wt 228 lb (103.4 kg)   BMI 31.80 kg/m²   Wt Readings from Last 3 Encounters:   09/20/21 228 lb (103.4 kg)   09/13/21 228 lb (103.4 kg)   08/25/21 232 lb (105.2 kg)     PHYSICAL EXAM  CONSTITUTIONAL:   Awake, alert, cooperative   EYES:  lids and lashes normal   ENT: external ears and nose without lesions   NECK:  supple, symmetrical, trachea midline   SKIN:  Open wound     Assessment:     Problem List Items Addressed This Visit     Diabetic ulcer of right foot associated with type 2 diabetes mellitus, with fat layer exposed ([facility] - Primary    Relevant Orders    Initiate Outpatient Wound Care Protocol          Pre Debridement Measurements:  Are located in the [place]  Documentation Flow Sheet  Post Debridement Measurements:  Wound/Ulcer Descriptions are Pre Debridement except measurements:     Wound 07/13/20 Toe (Comment  which one) Anterior;Right;Medial #1 (feb 24, 2020)great toe.   Martinez III (Active)   Wound Image   08/31/20 1354   Wound Etiology Diabetic Martinez 3 07/13/20 1534   Wound Cleansed Rinsed/Irrigated with saline 08/24/20 1432   Dressing/Treatment Alginate with Ag 08/24/20 1432   Wound Length (cm) 0 cm 08/31/20 1354   Wound Width (cm) 0 cm 08/31/20 1354   Wound Depth (cm) 0 cm 08/31/20 1354   Wound Surface Area (cm^2) 0 cm^2 08/31/20 1354   Change in Wound Size % (l*w) 100 08/31/20 1354   Wound Volume (cm^3) 0 cm^3 08/31/20 1354   Wound Healing % 100 08/31/20 1354   Post-Procedure Length (cm) 0.2 cm 08/24/20 1407   Post-Procedure Width (cm) 0.4 cm 08/24/20 1407   Post-Procedure Depth (cm) 0.2 cm 08/24/20 1407   Post-Procedure Surface Area (cm^2) 0.08 cm^2 08/24/20 1407   Post-Procedure Volume (cm^3) 0.02 cm^3 08/24/20 1407   Undermining Starts ___ O'Clock 7 07/13/20 1534   Undermining Ends___ O'Clock 12 07/13/20 1534   Undermining Maxium Distance (cm) 0 07/20/20 1358   Number of days: 433       Wound 08/16/21 Toe (Comment  which one) Right #1 right great toe (Active)   Wound Image   09/20/21 1259   Wound Etiology Diabetic Martinez 2 08/16/21 1353   Dressing Status New dressing applied 08/23/21 1525   Wound Cleansed Cleansed with saline 08/23/21 1525   Dressing/Treatment Alginate;Dry dressing 08/23/21 1525   Wound Length (cm) 1.2 cm 09/20/21 1259   Wound Width (cm) 0.5 cm 09/20/21 1259   Wound Depth (cm) 0.2 cm 09/20/21 1259   Wound Surface Area (cm^2) 0.6 cm^2 09/20/21 1259   Change in Wound Size % (l*w) 0 09/20/21 1259   Wound Volume (cm^3) 0.12 cm^3 09/20/21 1259   Wound Healing % 0 09/20/21 1259   Post-Procedure Length (cm) 1.2 cm 09/20/21 1310   Post-Procedure Width (cm) 0.5 cm 09/20/21 1310   Post-Procedure Depth (cm) 0.2 cm 09/20/21 1310   Post-Procedure Surface Area (cm^2) 0.6 cm^2 09/20/21 1310   Post-Procedure Volume (cm^3) 0.12 cm^3 09/20/21 1310   Wound Assessment Pale granulation tissue;Pink/red 09/20/21 1259   Drainage Amount Small 09/20/21 1259   Drainage Description Serosanguinous 09/20/21 1259   Odor None 09/20/21 1259   Ricarda-wound Assessment Hyperkeratosis (callous) 09/20/21 1259   Number of days: 34       Wound 08/16/21 Toe (Comment  which one) Left #2 left great toe (Active)   Wound Image   09/20/21 1259   Wound Etiology Diabetic Martinez 2 08/16/21 1353   Dressing Status New dressing applied 08/23/21 1525   Wound Cleansed Cleansed with saline 08/23/21 1525   Dressing/Treatment Alginate;Dry dressing 08/23/21 1525   Wound Length (cm) 0.5 cm 09/20/21 1259   Wound Width (cm) 0.5 cm 09/20/21 1259   Wound Depth (cm) 0.1 cm 09/20/21 1259   Wound Surface Area (cm^2) 0.25 cm^2 09/20/21 1259   Change in Wound Size % (l*w) -525 09/20/21 1259   Wound Volume (cm^3) 0.025 cm^3 09/20/21 1259   Wound Healing % -108 09/20/21 1259   Post-Procedure Length (cm) 0.5 cm 09/20/21 1310   Post-Procedure Width (cm) 0.5 cm 09/20/21 1310   Post-Procedure Depth (cm) 0.2 cm 09/20/21 1310   Post-Procedure Surface Area (cm^2) 0.25 cm^2 09/20/21 1310   Post-Procedure Volume (cm^3) 0.05 cm^3 09/20/21 1310   Wound Assessment Fibrinous;Pink/red 09/20/21 1259   Drainage Amount Small 09/20/21 1259   Drainage Description Serosanguinous 09/20/21 1259   Odor None 09/20/21 1259   Ricarda-wound Assessment Hyperkeratosis (callous) 09/20/21 1259   Number of days: 34       Procedure Note  Indications:  Based on my examination of this patient's wound(s)/ulcer(s) today, debridement is required to promote healing and evaluate the wound base.     Performed by: Ady Meeks DPM     Consent obtained: Yes     Time out taken: Yes     Pain Control: Anesthetic  Anesthetic: 4% Lidocaine Liquid Topical      Debridement:Excisional Debridement     Using curette and #15 blade scalpel the wound(s)/ulcer(s) was/were sharply debrided down through and including the removal of subcutaneous tissue.         Devitalized Tissue Debrided:  fibrin, biofilm, slough and necrotic/eschar to stimulate bleeding to promote healing, post debridement good bleeding base and wound edges noted     Wound/Ulcer #: 1 and 2     Percent of Wound/Ulcer Debrided: 100%     Total Surface Area Debrided:  0.85 sq cm      Estimated Blood Loss:  Minimal  Hemostasis Achieved:  by pressure     Procedural Pain:  0  / 10   Post Procedural Pain:  0 / 10      Response to treatment:  Well tolerated by patient.          Plan:   Treatment Note please see attached Discharge Instructions    Written patient dismissal instructions given to patient and signed by patient or POA. Discharge Instructions        Visit Discharge/Physician Orders     Discharge condition: Stable     Assessment of pain at discharge:  none     Anesthetic used: 4% lidocaine solution     Discharge to: Home     Left via:Private automobile     Accompanied by: accompanied by self     ECF/HHA: [facility]     Dressing Orders:     Right and left great toes - cleanse with normal saline, apply plain alginate, dry dressing, change daily.      Treatment Orders:     FOLLOW NUTRITIOUS DIET. CHOOSE FOODS HIGH IN PROTEIN -CHICKEN- FISH-AND EGGS,  CHOOSE FOODS HIGH IN VITAMIN C.   MULTIVITAMIN DAILY.       Watch blood sugar levels.      [facility] followup visit ______one week with Dr. Parnell_______________________  (Please note your next appointment above and if you are unable to keep, kindly give a 24 hour notice.  Thank you.)     Physician signature:__________________________        If you experience any of the following, please call the [facility] during business hours:     * Increase in Pain  * Temperature over 101  * Increase in drainage from your wound  * Drainage with a foul odor  * Bleeding  * Increase in swelling  * Need for compression bandage changes due to slippage, breakthrough drainage.     If you need medical attention outside of the business hours of the [employer] please contact your PCP or go to the nearest emergency room.                                Electronically signed by Kang Norton DPM on 9/20/2021 at 1:22 PM

## 2021-09-21 NOTE — PROGRESS NOTES
Radiation Oncology          -chart reviewed  -imaging reviewed  -cont RT  -compliance indicated for optimal outcome        Deandre Pabon MD [place] [address] Oncology  Cell: [phone]    [facility]:  [phone]   FAX: [phone] [address] [facility] Street:  [address] [place] [address] Avenue:    [phone]  [address] [place] [address] Road:  [address] [place] [address] Road:  [phone]

## 2021-09-21 NOTE — PATIENT INSTRUCTIONS
Continue daily fractionated radiation therapy as scheduled. Please see weekly OTV note and intial consultation letter in [facility] for clinical details. Olivia Kahn. Denise Figueroa MD, MS [place]:  [phone]   FAX: [phone]  [facility]:  [phone]   FAX:    [phone]  [facility] [place]:  [phone]   FAX:  [phone]  Email: [email or web address]

## 2021-09-26 LAB
ALBUMIN SERPL-MCNC: 3.9 G/DL (ref 3.5–5.2)
ALP BLD-CCNC: 111 U/L (ref 40–129)
ALT SERPL-CCNC: 27 U/L (ref 0–40)
ANION GAP SERPL CALCULATED.3IONS-SCNC: 10 MMOL/L (ref 7–16)
AST SERPL-CCNC: 30 U/L (ref 0–39)
BASOPHILS ABSOLUTE: 0.03 E9/L (ref 0–0.2)
BASOPHILS RELATIVE PERCENT: 0.5 % (ref 0–2)
BILIRUB SERPL-MCNC: 0.5 MG/DL (ref 0–1.2)
BUN BLDV-MCNC: 23 MG/DL (ref 6–23)
CALCIUM SERPL-MCNC: 9.4 MG/DL (ref 8.6–10.2)
CHLORIDE BLD-SCNC: 97 MMOL/L (ref 98–107)
CO2: 30 MMOL/L (ref 22–29)
CREAT SERPL-MCNC: 1.3 MG/DL (ref 0.7–1.2)
EOSINOPHILS ABSOLUTE: 0.18 E9/L (ref 0.05–0.5)
EOSINOPHILS RELATIVE PERCENT: 2.8 % (ref 0–6)
GFR AFRICAN AMERICAN: >60
GFR NON-AFRICAN AMERICAN: >60 ML/MIN/1.73
GLUCOSE BLD-MCNC: 200 MG/DL (ref 74–99)
HCT VFR BLD CALC: 38.4 % (ref 37–54)
HEMOGLOBIN: 12.6 G/DL (ref 12.5–16.5)
IMMATURE GRANULOCYTES #: 0.03 E9/L
IMMATURE GRANULOCYTES %: 0.5 % (ref 0–5)
LYMPHOCYTES ABSOLUTE: 1.63 E9/L (ref 1.5–4)
LYMPHOCYTES RELATIVE PERCENT: 25.8 % (ref 20–42)
MCH RBC QN AUTO: 30.9 PG (ref 26–35)
MCHC RBC AUTO-ENTMCNC: 32.8 % (ref 32–34.5)
MCV RBC AUTO: 94.1 FL (ref 80–99.9)
MONOCYTES ABSOLUTE: 0.6 E9/L (ref 0.1–0.95)
MONOCYTES RELATIVE PERCENT: 9.5 % (ref 2–12)
NEUTROPHILS ABSOLUTE: 3.85 E9/L (ref 1.8–7.3)
NEUTROPHILS RELATIVE PERCENT: 60.9 % (ref 43–80)
PDW BLD-RTO: 14.4 FL (ref 11.5–15)
PLATELET # BLD: 167 E9/L (ref 130–450)
PMV BLD AUTO: 11.4 FL (ref 7–12)
POTASSIUM SERPL-SCNC: 4.2 MMOL/L (ref 3.5–5)
RBC # BLD: 4.08 E12/L (ref 3.8–5.8)
SODIUM BLD-SCNC: 137 MMOL/L (ref 132–146)
TOTAL PROTEIN: 6.7 G/DL (ref 6.4–8.3)
WBC # BLD: 6.3 E9/L (ref 4.5–11.5)

## 2021-09-27 ENCOUNTER — HOSPITAL ENCOUNTER (OUTPATIENT)
Dept: WOUND CARE | Age: 71
Discharge: HOME | End: 2021-09-27
Payer: COMMERCIAL

## 2021-09-27 VITALS
HEIGHT: 71 IN | TEMPERATURE: 97.7 F | RESPIRATION RATE: 18 BRPM | HEART RATE: 82 BPM | WEIGHT: 228 LBS | BODY MASS INDEX: 31.92 KG/M2

## 2021-09-27 DIAGNOSIS — E11.621 DIABETIC ULCER OF TOE OF RIGHT FOOT ASSOCIATED WITH TYPE 2 DIABETES MELLITUS, WITH FAT LAYER EXPOSED (HCC): Primary | ICD-10-CM

## 2021-09-27 DIAGNOSIS — L97.512 DIABETIC ULCER OF TOE OF RIGHT FOOT ASSOCIATED WITH TYPE 2 DIABETES MELLITUS, WITH FAT LAYER EXPOSED (HCC): Primary | ICD-10-CM

## 2021-09-27 PROCEDURE — 11042 DBRDMT SUBQ TIS 1ST 20SQCM/<: CPT

## 2021-09-27 RX ORDER — GENTAMICIN SULFATE 1 MG/G
OINTMENT TOPICAL ONCE
Status: CANCELLED | OUTPATIENT
Start: 2021-09-27 | End: 2021-09-27

## 2021-09-27 RX ORDER — BETAMETHASONE DIPROPIONATE 0.05 %
OINTMENT (GRAM) TOPICAL ONCE
Status: CANCELLED | OUTPATIENT
Start: 2021-09-27 | End: 2021-09-27

## 2021-09-27 RX ORDER — LIDOCAINE HYDROCHLORIDE 20 MG/ML
JELLY TOPICAL ONCE
Status: CANCELLED | OUTPATIENT
Start: 2021-09-27 | End: 2021-09-27

## 2021-09-27 RX ORDER — BACITRACIN ZINC AND POLYMYXIN B SULFATE 500; 1000 [USP'U]/G; [USP'U]/G
OINTMENT TOPICAL ONCE
Status: CANCELLED | OUTPATIENT
Start: 2021-09-27 | End: 2021-09-27

## 2021-09-27 RX ORDER — GINSENG 100 MG
CAPSULE ORAL ONCE
Status: CANCELLED | OUTPATIENT
Start: 2021-09-27 | End: 2021-09-27

## 2021-09-27 RX ORDER — LIDOCAINE HYDROCHLORIDE 40 MG/ML
SOLUTION TOPICAL ONCE
Status: DISCONTINUED | OUTPATIENT
Start: 2021-09-27 | End: 2021-09-28 | Stop reason: HOSPADM

## 2021-09-27 RX ORDER — BACITRACIN, NEOMYCIN, POLYMYXIN B 400; 3.5; 5 [USP'U]/G; MG/G; [USP'U]/G
OINTMENT TOPICAL ONCE
Status: CANCELLED | OUTPATIENT
Start: 2021-09-27 | End: 2021-09-27

## 2021-09-27 ASSESSMENT — PAIN SCALES - GENERAL: PAINLEVEL_OUTOF10: 0

## 2021-09-27 NOTE — PROGRESS NOTES
Wound Healing Center Followup Visit Note    Referring Physician : Gerri Phalen, MD  Cuca Virgen Sr. MEDICAL RECORD NUMBER:  [id number]  AGE: 73 y.o. GENDER: male  : 1950  EPISODE DATE:  2021    Subjective:     Chief Complaint   Patient presents with    Wound Check     right and left great toe      HISTORY of PRESENT ILLNESS HPI   Cuca Virgen Sr. is a 73 y.o. male who presents today in regards to follow up evaluation and treatment of wound/ulcer. That patient's past medical, family and social hx were reviewed and changes were made if present. History of Wound Context:  The patient has had a wound of great toes, right which was first noted approximately 2 weeks ago, left recently. This has been treated by wife. On their initial visit to the wound healing center, 21 ,  the patient has noted that the wound has been improving. The patient has had similar previous wounds in the past.       Pt is not on abx at time of initial visit.     21 local care as instructed, no pressure    21 seen in follow-up. No complaints. Wounds bilateral feet/great toes with 50% devitalized nonviable tissue. No purulence or odor. No surrounding erythema. With debridement subcutaneous tissue. Vascular intact. Loss of protective sensation. 27 Since last seen ,was in hospital for infection, unrelated to his wounds. Wounds bilateral feet/great toes with 25% devitalized nonviable tissue. No purulence or odor. No surrounding erythema. With debridement subcutaneous tissue. Vascular intact. Loss of protective sensation. 21 Wounds bilateral feet/great toes with 50% devitalized nonviable tissue. No purulence or odor. No surrounding erythema. With debridement subcutaneous tissue. Vascular intact. Loss of protective sensation.         Wound/Ulcer Pain Timing/Severity: none  Quality of pain:   Severity:   / 10   Modifying Factors:   Associated Signs/Symptoms: No Known Problems Father         lives in [facility] No Known Problems Brother     Other Brother         aneurysm behind his eye    Arrhythmia Brother          age 61    No Known Problems Brother     No Known Problems Brother      Social History     Tobacco Use    Smoking status: Former Smoker     Packs/day: 0.00     Years: 2.00     Pack years: 0.00     Types: Cigars     Quit date: 1980     Years since quittin.7    Smokeless tobacco: Never Used    Tobacco comment: smoked 1 cigar a week    Vaping Use    Vaping Use: Never used   Substance Use Topics    Alcohol use: Not Currently    Drug use: Never     Allergies   Allergen Reactions    Food Hives     Strawberries      Chlorhexidine Rash    Soap & Cleansers Rash     TIDE detergent     Current Outpatient Medications on File Prior to Encounter   Medication Sig Dispense Refill    ampicillin-sulbactam (UNASYN) 3 (2-1) g SOLR Infuse intravenously every 6 hours      amiodarone (CORDARONE) 200 MG tablet Take 200 mg by mouth daily      bacitracin 500 UNIT/GM ointment Apply topically 2 times daily Apply topically 2 times daily.       valsartan (DIOVAN) 40 MG tablet Take 40 mg by mouth daily      senna (SENOKOT) 8.6 MG tablet Take 1 tablet by mouth daily      psyllium (KONSYL) 28.3 % PACK Take 1 packet by mouth daily      sodium chloride (OCEAN) 0.65 % nasal spray 1 spray by Nasal route as needed for Congestion      triamcinolone (KENALOG) 0.1 % cream Apply topically 2 times daily      metoprolol succinate (TOPROL XL) 25 MG extended release tablet Take 25 mg by mouth daily      aspirin 81 MG EC tablet Take 81 mg by mouth daily      digoxin (LANOXIN) 125 MCG tablet Take 125 mcg by mouth daily      vitamin D (ERGOCALCIFEROL) 1.25 MG (06261 UT) CAPS capsule Take 50,000 Units by mouth once a week On       Magnesium 400 MG CAPS Take by mouth      predniSONE (DELTASONE) 10 MG tablet Take 5 mg by mouth daily 3 tab daily for 30 days  spironolactone (ALDACTONE) 50 MG tablet Take 25 mg by mouth daily       warfarin (COUMADIN) 5 MG tablet Take 5 mg by mouth      pantoprazole (PROTONIX) 40 MG tablet Take 1 tablet by mouth every morning (before breakfast) 90 tablet 3    bumetanide (BUMEX) 2 MG tablet Take 1 tablet by mouth daily (Patient taking differently: Take by mouth 2mg am, 1mg pm) 90 tablet 3    allopurinol (ZYLOPRIM) 100 MG tablet Take 1 tablet by mouth daily 30 tablet 3    HUMULIN R 500 UNIT/ML injection 4 times daily (before meals and nightly) Sliding scale      ergocalciferol (ERGOCALCIFEROL) 1.25 MG (52518 UT) capsule Take 50,000 Units by mouth once a week       No current facility-administered medications on file prior to encounter. REVIEW OF SYSTEMS See HPI    Objective:    Pulse 82 Comment: doppler per patient  Temp 97.7 °F (36.5 °C) (Temporal)   Resp 18   Ht 5' 11\" (1.803 m)   Wt 228 lb (103.4 kg)   BMI 31.80 kg/m²   Wt Readings from Last 3 Encounters:   09/27/21 228 lb (103.4 kg)   09/20/21 228 lb (103.4 kg)   09/13/21 228 lb (103.4 kg)     PHYSICAL EXAM  CONSTITUTIONAL:   Awake, alert, cooperative   EYES:  lids and lashes normal   ENT: external ears and nose without lesions   NECK:  supple, symmetrical, trachea midline   SKIN:  Open wound     Assessment:     Problem List Items Addressed This Visit     Diabetic ulcer of right foot associated with type 2 diabetes mellitus, with fat layer exposed ([facility] [address] - Primary    Relevant Orders    Initiate Outpatient Wound Care Protocol          Pre Debridement Measurements:  Are located in the [place]  Documentation Flow Sheet  Post Debridement Measurements:  Wound/Ulcer Descriptions are Pre Debridement except measurements:     Wound 07/13/20 Toe (Comment  which one) Anterior;Right;Medial #1 (feb 24, 2020)great toe.   Martinez III (Active)   Wound Image   08/31/20 1354   Wound Etiology Diabetic Martinez 3 07/13/20 1534   Wound Cleansed Rinsed/Irrigated with saline 08/24/20 9430 Dressing/Treatment Alginate with Ag 08/24/20 1432   Wound Length (cm) 0 cm 08/31/20 1354   Wound Width (cm) 0 cm 08/31/20 1354   Wound Depth (cm) 0 cm 08/31/20 1354   Wound Surface Area (cm^2) 0 cm^2 08/31/20 1354   Change in Wound Size % (l*w) 100 08/31/20 1354   Wound Volume (cm^3) 0 cm^3 08/31/20 1354   Wound Healing % 100 08/31/20 1354   Post-Procedure Length (cm) 0.2 cm 08/24/20 1407   Post-Procedure Width (cm) 0.4 cm 08/24/20 1407   Post-Procedure Depth (cm) 0.2 cm 08/24/20 1407   Post-Procedure Surface Area (cm^2) 0.08 cm^2 08/24/20 1407   Post-Procedure Volume (cm^3) 0.02 cm^3 08/24/20 1407   Undermining Starts ___ O'Clock 7 07/13/20 1534   Undermining Ends___ O'Clock 12 07/13/20 1534   Undermining Maxium Distance (cm) 0 07/20/20 1358   Number of days: 440       Wound 08/16/21 Toe (Comment  which one) Right #1 right great toe (Active)   Wound Image   09/20/21 1259   Wound Etiology Diabetic Martinez 2 08/16/21 1353   Dressing Status New dressing applied 09/27/21 1432   Wound Cleansed Cleansed with saline 09/27/21 1432   Dressing/Treatment Alginate;Dry dressing 09/27/21 1432   Offloading for Diabetic Foot Ulcers Other (comment) 09/27/21 1432   Wound Length (cm) 0.7 cm 09/27/21 1400   Wound Width (cm) 0.8 cm 09/27/21 1400   Wound Depth (cm) 0.2 cm 09/27/21 1400   Wound Surface Area (cm^2) 0.56 cm^2 09/27/21 1400   Change in Wound Size % (l*w) 6.67 09/27/21 1400   Wound Volume (cm^3) 0.112 cm^3 09/27/21 1400   Wound Healing % 7 09/27/21 1400   Post-Procedure Length (cm) 0.8 cm 09/27/21 1412   Post-Procedure Width (cm) 0.8 cm 09/27/21 1412   Post-Procedure Depth (cm) 0.2 cm 09/27/21 1412   Post-Procedure Surface Area (cm^2) 0.64 cm^2 09/27/21 1412   Post-Procedure Volume (cm^3) 0.128 cm^3 09/27/21 1412   Wound Assessment Granulation tissue;Fibrin 09/27/21 1400   Drainage Amount Small 09/27/21 1400   Drainage Description Serosanguinous 09/27/21 1400   Odor None 09/27/21 1400   Ricarda-wound Assessment Hyperkeratosis (callous) 09/27/21 1400   Number of days: 42       Wound 08/16/21 Toe (Comment  which one) Left #2 left great toe (Active)   Wound Image   09/20/21 1259   Wound Etiology Diabetic Martinez 2 08/16/21 1353   Dressing Status New dressing applied 09/27/21 1432   Wound Cleansed Cleansed with saline 09/27/21 1432   Dressing/Treatment Alginate;Dry dressing 09/27/21 1432   Offloading for Diabetic Foot Ulcers Diabetic shoes/inserts 09/27/21 1432   Wound Length (cm) 0.2 cm 09/27/21 1400   Wound Width (cm) 0.2 cm 09/27/21 1400   Wound Depth (cm) 0.3 cm 09/27/21 1400   Wound Surface Area (cm^2) 0.04 cm^2 09/27/21 1400   Change in Wound Size % (l*w) 0 09/27/21 1400   Wound Volume (cm^3) 0.012 cm^3 09/27/21 1400   Wound Healing % 0 09/27/21 1400   Post-Procedure Length (cm) 0.3 cm 09/27/21 1412   Post-Procedure Width (cm) 0.3 cm 09/27/21 1412   Post-Procedure Depth (cm) 0.3 cm 09/27/21 1412   Post-Procedure Surface Area (cm^2) 0.09 cm^2 09/27/21 1412   Post-Procedure Volume (cm^3) 0.027 cm^3 09/27/21 1412   Wound Assessment Granulation tissue;Fibrin 09/27/21 1400   Drainage Amount Small 09/27/21 1400   Drainage Description Serosanguinous 09/27/21 1400   Odor None 09/27/21 1400   Ricarda-wound Assessment Hyperkeratosis (callous) 09/27/21 1400   Number of days: 42       Procedure Note  Indications:  Based on my examination of this patient's wound(s)/ulcer(s) today, debridement is required to promote healing and evaluate the wound base.     Performed by: Marry Sweeney DPM     Consent obtained: Yes     Time out taken:   Yes     Pain Control: Anesthetic  Anesthetic: 4% Lidocaine Liquid Topical      Debridement:Excisional Debridement     Using curette and #15 blade scalpel the wound(s)/ulcer(s) was/were sharply debrided down through and including the removal of subcutaneous tissue.         Devitalized Tissue Debrided:  fibrin, biofilm, slough and necrotic/eschar to stimulate bleeding to promote healing, post debridement good bleeding base and wound edges noted     Wound/Ulcer #: 1 and 2     Percent of Wound/Ulcer Debrided: 100%     Total Surface Area Debrided:  0.6 sq cm      Estimated Blood Loss:  Minimal  Hemostasis Achieved:  by pressure     Procedural Pain:  0  / 10   Post Procedural Pain:  0 / 10      Response to treatment:  Well tolerated by patient. Plan:   Treatment Note please see attached Discharge Instructions    Written patient dismissal instructions given to patient and signed by patient or POA. Discharge Instructions          Visit Discharge/Physician Orders     Discharge condition: Stable     Assessment of pain at discharge:  none     Anesthetic used: 4% lidocaine solution     Discharge to: Home     Left via:Private automobile     Accompanied by: accompanied by self     ECF/HHA: [facility]     Dressing Orders:     Right and left great toes - cleanse with normal saline, apply plain alginate, dry dressing, change daily.      Treatment Orders:     FOLLOW NUTRITIOUS DIET. CHOOSE FOODS HIGH IN PROTEIN -CHICKEN- FISH-AND EGGS,  CHOOSE FOODS HIGH IN VITAMIN C.   MULTIVITAMIN DAILY.       Watch blood sugar levels.      [facility] followup visit ______one week with Dr. Parnell_______________________  (Please note your next appointment above and if you are unable to keep, kindly give a 24 hour notice.  Thank you.)     Physician signature:__________________________        If you experience any of the following, please call the [facility] during business hours:     * Increase in Pain  * Temperature over 101  * Increase in drainage from your wound  * Drainage with a foul odor  * Bleeding  * Increase in swelling  * Need for compression bandage changes due to slippage, breakthrough drainage.     If you need medical attention outside of the business hours of the [facility] please contact your PCP or go to the nearest emergency room.                                                     Electronically signed by Robert Tai DPM on 9/27/2021 at 2:52 PM

## 2021-10-03 LAB
ALBUMIN SERPL-MCNC: 3.6 G/DL (ref 3.5–5.2)
ALP BLD-CCNC: 103 U/L (ref 40–129)
ALT SERPL-CCNC: 34 U/L (ref 0–40)
ANION GAP SERPL CALCULATED.3IONS-SCNC: 13 MMOL/L (ref 7–16)
AST SERPL-CCNC: 38 U/L (ref 0–39)
BASOPHILS ABSOLUTE: 0.04 E9/L (ref 0–0.2)
BASOPHILS RELATIVE PERCENT: 0.6 % (ref 0–2)
BILIRUB SERPL-MCNC: 0.4 MG/DL (ref 0–1.2)
BUN BLDV-MCNC: 25 MG/DL (ref 6–23)
CALCIUM SERPL-MCNC: 8.8 MG/DL (ref 8.6–10.2)
CHLORIDE BLD-SCNC: 102 MMOL/L (ref 98–107)
CO2: 25 MMOL/L (ref 22–29)
CREAT SERPL-MCNC: 1.3 MG/DL (ref 0.7–1.2)
EOSINOPHILS ABSOLUTE: 0.25 E9/L (ref 0.05–0.5)
EOSINOPHILS RELATIVE PERCENT: 3.5 % (ref 0–6)
GFR AFRICAN AMERICAN: >60
GFR NON-AFRICAN AMERICAN: >60 ML/MIN/1.73
GLUCOSE BLD-MCNC: 128 MG/DL (ref 74–99)
HCT VFR BLD CALC: 35 % (ref 37–54)
HEMOGLOBIN: 11.3 G/DL (ref 12.5–16.5)
IMMATURE GRANULOCYTES #: 0.03 E9/L
IMMATURE GRANULOCYTES %: 0.4 % (ref 0–5)
LYMPHOCYTES ABSOLUTE: 1.68 E9/L (ref 1.5–4)
LYMPHOCYTES RELATIVE PERCENT: 23.5 % (ref 20–42)
MCH RBC QN AUTO: 30.3 PG (ref 26–35)
MCHC RBC AUTO-ENTMCNC: 32.3 % (ref 32–34.5)
MCV RBC AUTO: 93.8 FL (ref 80–99.9)
MONOCYTES ABSOLUTE: 0.63 E9/L (ref 0.1–0.95)
MONOCYTES RELATIVE PERCENT: 8.8 % (ref 2–12)
NEUTROPHILS ABSOLUTE: 4.51 E9/L (ref 1.8–7.3)
NEUTROPHILS RELATIVE PERCENT: 63.2 % (ref 43–80)
PDW BLD-RTO: 14 FL (ref 11.5–15)
PLATELET # BLD: 163 E9/L (ref 130–450)
PMV BLD AUTO: 11.7 FL (ref 7–12)
POTASSIUM SERPL-SCNC: 4.3 MMOL/L (ref 3.5–5)
RBC # BLD: 3.73 E12/L (ref 3.8–5.8)
SODIUM BLD-SCNC: 140 MMOL/L (ref 132–146)
TOTAL PROTEIN: 6.6 G/DL (ref 6.4–8.3)
WBC # BLD: 7.1 E9/L (ref 4.5–11.5)

## 2021-10-04 ENCOUNTER — HOSPITAL ENCOUNTER (OUTPATIENT)
Dept: WOUND CARE | Age: 71
Discharge: HOME | End: 2021-10-04
Payer: COMMERCIAL

## 2021-10-04 VITALS — RESPIRATION RATE: 18 BRPM | HEIGHT: 71 IN | WEIGHT: 228 LBS | BODY MASS INDEX: 31.92 KG/M2 | TEMPERATURE: 97.1 F

## 2021-10-04 DIAGNOSIS — E11.621 DIABETIC ULCER OF TOE OF RIGHT FOOT ASSOCIATED WITH TYPE 2 DIABETES MELLITUS, WITH FAT LAYER EXPOSED (HCC): Primary | ICD-10-CM

## 2021-10-04 DIAGNOSIS — L97.512 DIABETIC ULCER OF TOE OF RIGHT FOOT ASSOCIATED WITH TYPE 2 DIABETES MELLITUS, WITH FAT LAYER EXPOSED (HCC): Primary | ICD-10-CM

## 2021-10-04 PROCEDURE — 11042 DBRDMT SUBQ TIS 1ST 20SQCM/<: CPT

## 2021-10-04 RX ORDER — GINSENG 100 MG
CAPSULE ORAL ONCE
Status: CANCELLED | OUTPATIENT
Start: 2021-10-04 | End: 2021-10-04

## 2021-10-04 RX ORDER — BACITRACIN ZINC AND POLYMYXIN B SULFATE 500; 1000 [USP'U]/G; [USP'U]/G
OINTMENT TOPICAL ONCE
Status: CANCELLED | OUTPATIENT
Start: 2021-10-04 | End: 2021-10-04

## 2021-10-04 RX ORDER — LIDOCAINE HYDROCHLORIDE 20 MG/ML
JELLY TOPICAL ONCE
Status: CANCELLED | OUTPATIENT
Start: 2021-10-04 | End: 2021-10-04

## 2021-10-04 RX ORDER — GENTAMICIN SULFATE 1 MG/G
OINTMENT TOPICAL ONCE
Status: CANCELLED | OUTPATIENT
Start: 2021-10-04 | End: 2021-10-04

## 2021-10-04 RX ORDER — BETAMETHASONE DIPROPIONATE 0.05 %
OINTMENT (GRAM) TOPICAL ONCE
Status: CANCELLED | OUTPATIENT
Start: 2021-10-04 | End: 2021-10-04

## 2021-10-04 RX ORDER — LIDOCAINE HYDROCHLORIDE 40 MG/ML
SOLUTION TOPICAL ONCE
Status: DISCONTINUED | OUTPATIENT
Start: 2021-10-04 | End: 2021-10-05 | Stop reason: HOSPADM

## 2021-10-04 RX ORDER — BACITRACIN, NEOMYCIN, POLYMYXIN B 400; 3.5; 5 [USP'U]/G; MG/G; [USP'U]/G
OINTMENT TOPICAL ONCE
Status: CANCELLED | OUTPATIENT
Start: 2021-10-04 | End: 2021-10-04

## 2021-10-04 ASSESSMENT — PAIN DESCRIPTION - LOCATION: LOCATION: ANKLE;KNEE;HAND

## 2021-10-04 ASSESSMENT — PAIN DESCRIPTION - PAIN TYPE: TYPE: CHRONIC PAIN

## 2021-10-04 ASSESSMENT — PAIN SCALES - GENERAL: PAINLEVEL_OUTOF10: 4

## 2021-10-04 NOTE — PROGRESS NOTES
Wound Healing Center Followup Visit Note    Referring Physician : Jose Montalvo MD  Bernard Rolle Sr. MEDICAL RECORD NUMBER:  [id number]  AGE: 73 y.o. GENDER: male  : 1950  EPISODE DATE:  10/4/2021    Subjective:     Chief Complaint   Patient presents with    Wound Check      HISTORY of PRESENT ILLNESS HPI   Bernard Rolle Sr. is a 73 y.o. male who presents today in regards to follow up evaluation and treatment of wound/ulcer. That patient's past medical, family and social hx were reviewed and changes were made if present. History of Wound Context:  The patient has had a wound of great toes, right which was first noted approximately 2 weeks ago, left recently. This has been treated by wife. On their initial visit to the wound healing center, 21 ,  the patient has noted that the wound has been improving. The patient has had similar previous wounds in the past.       Pt is not on abx at time of initial visit.     21 local care as instructed, no pressure    21 seen in follow-up. No complaints. Wounds bilateral feet/great toes with 50% devitalized nonviable tissue. No purulence or odor. No surrounding erythema. With debridement subcutaneous tissue. Vascular intact. Loss of protective sensation. 27 Since last seen ,was in hospital for infection, unrelated to his wounds. Wounds bilateral feet/great toes with 25% devitalized nonviable tissue. No purulence or odor. No surrounding erythema. With debridement subcutaneous tissue. Vascular intact. Loss of protective sensation. 21 Wounds bilateral feet/great toes with 50% devitalized nonviable tissue. No purulence or odor. No surrounding erythema. With debridement subcutaneous tissue. Vascular intact. Loss of protective sensation. 10-4-21 Wounds bilateral feet/great toes with 25% devitalized nonviable tissue. No purulence or odor. No surrounding erythema.   With debridement subcutaneous tissue. Vascular intact. Loss of protective sensation. Wound/Ulcer Pain Timing/Severity: none  Quality of pain:   Severity:   / 10   Modifying Factors:   Associated Signs/Symptoms:      Ulcer Identification:  Ulcer Type: diabetic  Contributing Factors: diabetes        PAST MEDICAL HISTORY      Diagnosis Date    Anxiety     Aorto-iliac atherosclerosis ([address] 8/31/2020    Arthritis     Atrial fibrillation (HCC)     AVNRT (AV teddy re-entry tachycardia) ([address]     Blood type O+ 11/14/2019    CAD (coronary artery disease)     Cancer (HCC)     CHF (congestive heart failure) (HCC)     Diabetes mellitus ([address]     Diabetic neuropathy ([address]     Diastolic dysfunction 48/72/5969    stage 3    Fall     right knee    Gout     chemically induced    History of blood transfusion     Hx of blood clots 1976    left leg    Hyperlipidemia     Hypertension     LBBB (left bundle branch block)     Left ventricular assist device (LVAD) complication 1831    Moderate mitral regurgitation 04/19/2016    Nonischemic cardiomyopathy ([address]     Obesity     SNEHA (obstructive sleep apnea)     treated with CPAP    PVD (peripheral vascular disease) with claudication ([address] 8/31/2020    Sarcoidosis 2019    Severe tricuspid regurgitation 04/19/2016    SV[facility]supraventricular tachycardia) ([facility])     Viral cardiomyopathy ([address]      Past Surgical History:   Procedure Laterality Date    BACK SURGERY      CARDIAC CATHETERIZATION  11/21/2011    Dr. Deb Joiner  11/08/2019    Dr. Kimberley Foster  08/20/2014    BiV/ICD  ([place]em[place] Abdul)    Dr. Otis Delvalle CATH LAB PROCEDURE      ECHO COMPL W DOP COLOR FLOW  11/19/2011         ECHO COMPLETE  12/14/2013         EYE SURGERY Right 08/2019    FEMUR SURGERY      rt; compound frx right femur at 3years old.     JOINT REPLACEMENT Left     knee    KNEE SURGERY      7 on right/1 on left/ total replacement capsule Take 50,000 Units by mouth once a week On fridays      Magnesium 400 MG CAPS Take by mouth      predniSONE (DELTASONE) 10 MG tablet Take 5 mg by mouth daily 3 tab daily for 30 days       spironolactone (ALDACTONE) 50 MG tablet Take 25 mg by mouth daily       warfarin (COUMADIN) 5 MG tablet Take 5 mg by mouth      pantoprazole (PROTONIX) 40 MG tablet Take 1 tablet by mouth every morning (before breakfast) 90 tablet 3    bumetanide (BUMEX) 2 MG tablet Take 1 tablet by mouth daily (Patient taking differently: Take by mouth 2mg am, 1mg pm) 90 tablet 3    allopurinol (ZYLOPRIM) 100 MG tablet Take 1 tablet by mouth daily 30 tablet 3    HUMULIN R 500 UNIT/ML injection 4 times daily (before meals and nightly) Sliding scale      ergocalciferol (ERGOCALCIFEROL) 1.25 MG (18659 UT) capsule Take 50,000 Units by mouth once a week       No current facility-administered medications on file prior to encounter. REVIEW OF SYSTEMS See HPI    Objective:    Temp 97.1 °F (36.2 °C) (Temporal)   Resp 18   Ht 5' 11\" (1.803 m)   Wt 228 lb (103.4 kg)   BMI 31.80 kg/m²   Wt Readings from Last 3 Encounters:   10/04/21 228 lb (103.4 kg)   09/27/21 228 lb (103.4 kg)   09/20/21 228 lb (103.4 kg)     PHYSICAL EXAM  CONSTITUTIONAL:   Awake, alert, cooperative   EYES:  lids and lashes normal   ENT: external ears and nose without lesions   NECK:  supple, symmetrical, trachea midline   SKIN:  Open wound     Assessment:     Problem List Items Addressed This Visit     Diabetic ulcer of right foot associated with type 2 diabetes mellitus, with fat layer exposed ([facility] [address] - Primary    Relevant Orders    Initiate Outpatient Wound Care Protocol          Pre Debridement Measurements:  Are located in the [place]  Documentation Flow Sheet  Post Debridement Measurements:  Wound/Ulcer Descriptions are Pre Debridement except measurements:     Wound 07/13/20 Toe (Comment  which one) Anterior;Right;Medial #1 (feb 24, 2020)great toe.   Kat Mcdaniel III (Active)   Wound Image   08/31/20 1354   Wound Etiology Diabetic Martinez 3 07/13/20 1534   Wound Cleansed Rinsed/Irrigated with saline 08/24/20 1432   Dressing/Treatment Alginate with Ag 08/24/20 1432   Wound Length (cm) 0 cm 08/31/20 1354   Wound Width (cm) 0 cm 08/31/20 1354   Wound Depth (cm) 0 cm 08/31/20 1354   Wound Surface Area (cm^2) 0 cm^2 08/31/20 1354   Change in Wound Size % (l*w) 100 08/31/20 1354   Wound Volume (cm^3) 0 cm^3 08/31/20 1354   Wound Healing % 100 08/31/20 1354   Post-Procedure Length (cm) 0.2 cm 08/24/20 1407   Post-Procedure Width (cm) 0.4 cm 08/24/20 1407   Post-Procedure Depth (cm) 0.2 cm 08/24/20 1407   Post-Procedure Surface Area (cm^2) 0.08 cm^2 08/24/20 1407   Post-Procedure Volume (cm^3) 0.02 cm^3 08/24/20 1407   Undermining Starts ___ O'Clock 7 07/13/20 1534   Undermining Ends___ O'Clock 12 07/13/20 1534   Undermining Maxium Distance (cm) 0 07/20/20 1358   Number of days: 447       Wound 08/16/21 Toe (Comment  which one) Right #1 right great toe (Active)   Wound Image   09/20/21 1259   Wound Etiology Diabetic Martinez 2 08/16/21 1353   Dressing Status New dressing applied 09/27/21 1432   Wound Cleansed Cleansed with saline 09/27/21 1432   Dressing/Treatment Alginate;Dry dressing 09/27/21 1432   Offloading for Diabetic Foot Ulcers Other (comment) 09/27/21 1432   Wound Length (cm) 0.6 cm 10/04/21 1347   Wound Width (cm) 0.7 cm 10/04/21 1347   Wound Depth (cm) 0.2 cm 10/04/21 1347   Wound Surface Area (cm^2) 0.42 cm^2 10/04/21 1347   Change in Wound Size % (l*w) 30 10/04/21 1347   Wound Volume (cm^3) 0.084 cm^3 10/04/21 1347   Wound Healing % 30 10/04/21 1347   Post-Procedure Length (cm) 0.7 cm 10/04/21 1408   Post-Procedure Width (cm) 0.7 cm 10/04/21 1408   Post-Procedure Depth (cm) 0.2 cm 10/04/21 1408   Post-Procedure Surface Area (cm^2) 0.49 cm^2 10/04/21 1408   Post-Procedure Volume (cm^3) 0.098 cm^3 10/04/21 1408   Wound Assessment Granulation tissue;Fibrin 10/04/21 1347 Drainage Amount Small 10/04/21 1347   Drainage Description Serosanguinous 10/04/21 1347   Odor None 10/04/21 1347   Ricarda-wound Assessment Hyperkeratosis (callous) 10/04/21 1347   Number of days: 49       Wound 08/16/21 Toe (Comment  which one) Left #2 left great toe (Active)   Wound Image   09/20/21 1259   Wound Etiology Diabetic Martinez 2 08/16/21 1353   Dressing Status New dressing applied 09/27/21 1432   Wound Cleansed Cleansed with saline 09/27/21 1432   Dressing/Treatment Alginate;Dry dressing 09/27/21 1432   Offloading for Diabetic Foot Ulcers Diabetic shoes/inserts 09/27/21 1432   Wound Length (cm) 0.2 cm 10/04/21 1347   Wound Width (cm) 0.2 cm 10/04/21 1347   Wound Depth (cm) 0.2 cm 10/04/21 1347   Wound Surface Area (cm^2) 0.04 cm^2 10/04/21 1347   Change in Wound Size % (l*w) 0 10/04/21 1347   Wound Volume (cm^3) 0.008 cm^3 10/04/21 1347   Wound Healing % 33 10/04/21 1347   Post-Procedure Length (cm) 0.2 cm 10/04/21 1408   Post-Procedure Width (cm) 0.3 cm 10/04/21 1408   Post-Procedure Depth (cm) 0.2 cm 10/04/21 1408   Post-Procedure Surface Area (cm^2) 0.06 cm^2 10/04/21 1408   Post-Procedure Volume (cm^3) 0.012 cm^3 10/04/21 1408   Wound Assessment Granulation tissue;Fibrin 10/04/21 1347   Drainage Amount Small 10/04/21 1347   Drainage Description Serosanguinous 10/04/21 1347   Odor None 10/04/21 1347   Ricarda-wound Assessment Hyperkeratosis (callous) 10/04/21 1347   Number of days: 49       Procedure Note  Indications:  Based on my examination of this patient's wound(s)/ulcer(s) today, debridement is required to promote healing and evaluate the wound base.     Performed by: Priscilla Fox DPM     Consent obtained: Yes     Time out taken: Yes     Pain Control: Anesthetic  Anesthetic: 4% Lidocaine Liquid Topical      Debridement:Excisional Debridement     Using curette and #15 blade scalpel the wound(s)/ulcer(s) was/were sharply debrided down through and including the removal of subcutaneous tissue.    Devitalized Tissue Debrided:  fibrin, biofilm, slough and necrotic/eschar to stimulate bleeding to promote healing, post debridement good bleeding base and wound edges noted     Wound/Ulcer #: 1 and 2     Percent of Wound/Ulcer Debrided: 100%     Total Surface Area Debrided:  0.46 sq cm      Estimated Blood Loss:  Minimal  Hemostasis Achieved:  by pressure     Procedural Pain:  0  / 10   Post Procedural Pain:  0 / 10      Response to treatment:  Well tolerated by patient. Plan:   Treatment Note please see attached Discharge Instructions    Written patient dismissal instructions given to patient and signed by patient or POA. Discharge Instructions       Visit Discharge/Physician Orders     Discharge condition: Stable     Assessment of pain at discharge:  none     Anesthetic used: 4% lidocaine solution     Discharge to: Home     Left via:Private automobile     Accompanied by: accompanied by self     ECF/HHA: [facility]     Dressing Orders:     Right and left great toes - cleanse with normal saline, apply plain alginate, dry dressing, change daily.      Treatment Orders:     FOLLOW NUTRITIOUS DIET. CHOOSE FOODS HIGH IN PROTEIN -CHICKEN- FISH-AND EGGS,  CHOOSE FOODS HIGH IN VITAMIN C.   MULTIVITAMIN DAILY.       Watch blood sugar levels.      [facility] followup visit ______one week with Dr. Parnell_______________________  (Please note your next appointment above and if you are unable to keep, kindly give a 24 hour notice.  Thank you.)     Physician signature:__________________________        If you experience any of the following, please call the [facility] during business hours:     * Increase in Pain  * Temperature over 101  * Increase in drainage from your wound  * Drainage with a foul odor  * Bleeding  * Increase in swelling  * Need for compression bandage changes due to slippage, breakthrough drainage.     If you need medical attention outside of the business hours of the [facility] please contact your PCP or go to the nearest emergency room.                                                                          Electronically signed by Iona Hicks DPM on 10/4/2021 at 2:13 PM

## 2021-10-06 ENCOUNTER — HOSPITAL ENCOUNTER (EMERGENCY)
Age: 71
Discharge: ANOTHER HOSPITAL | End: 2021-10-06
Attending: EMERGENCY MEDICINE
Payer: COMMERCIAL

## 2021-10-06 ENCOUNTER — APPOINTMENT (OUTPATIENT)
Dept: GENERAL RADIOLOGY | Age: 71
End: 2021-10-06
Payer: COMMERCIAL

## 2021-10-06 VITALS
TEMPERATURE: 97.5 F | BODY MASS INDEX: 31.64 KG/M2 | SYSTOLIC BLOOD PRESSURE: 80 MMHG | HEART RATE: 88 BPM | RESPIRATION RATE: 18 BRPM | WEIGHT: 226 LBS | HEIGHT: 71 IN | DIASTOLIC BLOOD PRESSURE: 58 MMHG | OXYGEN SATURATION: 97 %

## 2021-10-06 DIAGNOSIS — Z45.02 DEFIBRILLATOR DISCHARGE: Primary | ICD-10-CM

## 2021-10-06 LAB
ALBUMIN SERPL-MCNC: 3.4 G/DL (ref 3.5–5.2)
ALP BLD-CCNC: 99 U/L (ref 40–129)
ALT SERPL-CCNC: 29 U/L (ref 0–40)
ANION GAP SERPL CALCULATED.3IONS-SCNC: 9 MMOL/L (ref 7–16)
APTT: 35.3 SEC (ref 24.5–35.1)
AST SERPL-CCNC: 32 U/L (ref 0–39)
BASOPHILS ABSOLUTE: 0.04 E9/L (ref 0–0.2)
BASOPHILS RELATIVE PERCENT: 0.6 % (ref 0–2)
BILIRUB SERPL-MCNC: 0.6 MG/DL (ref 0–1.2)
BUN BLDV-MCNC: 19 MG/DL (ref 6–23)
CALCIUM SERPL-MCNC: 9.7 MG/DL (ref 8.6–10.2)
CHLORIDE BLD-SCNC: 103 MMOL/L (ref 98–107)
CO2: 25 MMOL/L (ref 22–29)
CREAT SERPL-MCNC: 1.3 MG/DL (ref 0.7–1.2)
EKG ATRIAL RATE: 94 BPM
EKG Q-T INTERVAL: 356 MS
EKG QRS DURATION: 158 MS
EKG QTC CALCULATION (BAZETT): 468 MS
EKG R AXIS: -119 DEGREES
EKG T AXIS: 107 DEGREES
EKG VENTRICULAR RATE: 104 BPM
EOSINOPHILS ABSOLUTE: 0.15 E9/L (ref 0.05–0.5)
EOSINOPHILS RELATIVE PERCENT: 2.3 % (ref 0–6)
GFR AFRICAN AMERICAN: >60
GFR NON-AFRICAN AMERICAN: >60 ML/MIN/1.73
GLUCOSE BLD-MCNC: 171 MG/DL (ref 74–99)
HCT VFR BLD CALC: 33.8 % (ref 37–54)
HEMOGLOBIN: 11.1 G/DL (ref 12.5–16.5)
IMMATURE GRANULOCYTES #: 0.03 E9/L
IMMATURE GRANULOCYTES %: 0.5 % (ref 0–5)
INR BLD: 3.5
LYMPHOCYTES ABSOLUTE: 1.27 E9/L (ref 1.5–4)
LYMPHOCYTES RELATIVE PERCENT: 19.2 % (ref 20–42)
MAGNESIUM: 2.1 MG/DL (ref 1.6–2.6)
MCH RBC QN AUTO: 30 PG (ref 26–35)
MCHC RBC AUTO-ENTMCNC: 32.8 % (ref 32–34.5)
MCV RBC AUTO: 91.4 FL (ref 80–99.9)
MONOCYTES ABSOLUTE: 0.7 E9/L (ref 0.1–0.95)
MONOCYTES RELATIVE PERCENT: 10.6 % (ref 2–12)
NEUTROPHILS ABSOLUTE: 4.41 E9/L (ref 1.8–7.3)
NEUTROPHILS RELATIVE PERCENT: 66.8 % (ref 43–80)
PDW BLD-RTO: 13.8 FL (ref 11.5–15)
PLATELET # BLD: 161 E9/L (ref 130–450)
PMV BLD AUTO: 11.1 FL (ref 7–12)
POTASSIUM SERPL-SCNC: 4.2 MMOL/L (ref 3.5–5)
PRO-BNP: 3272 PG/ML (ref 0–125)
PROTHROMBIN TIME: 38.8 SEC (ref 9.3–12.4)
RBC # BLD: 3.7 E12/L (ref 3.8–5.8)
SODIUM BLD-SCNC: 137 MMOL/L (ref 132–146)
TOTAL PROTEIN: 6.5 G/DL (ref 6.4–8.3)
TROPONIN, HIGH SENSITIVITY: 55 NG/L (ref 0–11)
WBC # BLD: 6.6 E9/L (ref 4.5–11.5)

## 2021-10-06 PROCEDURE — 93010 ELECTROCARDIOGRAM REPORT: CPT | Performed by: INTERNAL MEDICINE

## 2021-10-06 PROCEDURE — 84484 ASSAY OF TROPONIN QUANT: CPT

## 2021-10-06 PROCEDURE — 99285 EMERGENCY DEPT VISIT HI MDM: CPT

## 2021-10-06 PROCEDURE — 83735 ASSAY OF MAGNESIUM: CPT

## 2021-10-06 PROCEDURE — 71045 X-RAY EXAM CHEST 1 VIEW: CPT

## 2021-10-06 PROCEDURE — 83880 ASSAY OF NATRIURETIC PEPTIDE: CPT

## 2021-10-06 PROCEDURE — 93005 ELECTROCARDIOGRAM TRACING: CPT | Performed by: EMERGENCY MEDICINE

## 2021-10-06 PROCEDURE — 80053 COMPREHEN METABOLIC PANEL: CPT

## 2021-10-06 PROCEDURE — 85610 PROTHROMBIN TIME: CPT

## 2021-10-06 PROCEDURE — 85025 COMPLETE CBC W/AUTO DIFF WBC: CPT

## 2021-10-06 PROCEDURE — 85730 THROMBOPLASTIN TIME PARTIAL: CPT

## 2021-10-06 ASSESSMENT — PAIN DESCRIPTION - PAIN TYPE: TYPE: ACUTE PAIN

## 2021-10-06 ASSESSMENT — PAIN SCALES - GENERAL: PAINLEVEL_OUTOF10: 8

## 2021-10-06 ASSESSMENT — PAIN DESCRIPTION - LOCATION: LOCATION: HEAD

## 2021-10-06 NOTE — ED NOTES
Patient accepted at [place] [address] room is 1104 bed1. Number to call report is [phone]. Still waiting on transfer center for transportation and ETA to .       Bailee Paulson RN  10/06/21 9650

## 2021-10-06 NOTE — ED PROVIDER NOTES
Department of Emergency Medicine   ED  Provider Note  Admit Date/RoomTime: 10/6/2021  9:56 AM  ED Room: [address] [place] [address]          History of Present Illness:  10/6/21, Time: 11:33 AM EDT  Chief Complaint   Patient presents with    AICD Problem     fired x1 this am, patient has LVAD                Getachew Irene Sr. is a 73 y.o. male presenting to the ED for defibrillator discharge. Came on suddenly, nothing makes it better or worse, no associated pain. Patient states he was at home when his defibrillator went off. He fell palpitations prior to it. He was at rest when it began. He currently has no symptoms or complaints. He is an LVAD patient. He did not contact his LVAD center. He denies any fever, chills, nausea, vomiting, change in bowel or bladder, neck pain or stiffness, paresthesias, lethargy, or any other symptoms or complaints. Review of Systems:   Pertinent positives and negatives are stated within HPI, all other systems reviewed and are negative.        --------------------------------------------- PAST HISTORY ---------------------------------------------  Past Medical History:  has a past medical history of Anxiety, Aorto-iliac atherosclerosis ([address] Arthritis, Atrial fibrillation ([address] AVNRT (AV teddy re-entry tachycardia) ([address] Blood type O+, CAD (coronary artery disease), Cancer ([address] CHF (congestive heart failure) ([address] Diabetes mellitus ([address] Diabetic neuropathy ([address] Diastolic dysfunction, Fall, Gout, History of blood transfusion, Hx of blood clots, Hyperlipidemia, Hypertension, LBBB (left bundle branch block), Left ventricular assist device (LVAD) complication, Moderate mitral regurgitation, Nonischemic cardiomyopathy ([address] Obesity, SNEHA (obstructive sleep apnea), PVD (peripheral vascular disease) with claudication ([address] Sarcoidosis, Severe tricuspid regurgitation, SVT (supraventricular tachycardia) ([address] and Viral cardiomyopathy ([address]     Past Surgical History:  has a past surgical history that includes Diagnostic Cardiac Cath Lab Procedure; knee surgery; Femur Surgery; ECHO Compl W Dop Color Flow (11/19/2011); Cardiac catheterization (11/21/2011); Echo Complete (12/14/2013); back surgery; joint replacement (Left); Cardiac defibrillator placement (08/20/2014); eye surgery (Right, 08/2019); picc line insertion nurse (10/21/2019); Cardiac catheterization (11/08/2019); Colonoscopy; and Prostate biopsy (08/09/2021). Social History:  reports that he quit smoking about 41 years ago. His smoking use included cigars. He smoked 0.00 packs per day for 2.00 years. He has never used smokeless tobacco. He reports previous alcohol use. He reports that he does not use drugs. Family History: family history includes Alzheimer's Disease in his mother; Arrhythmia in his brother; Diabetes in his mother; No Known Problems in his brother, brother, brother, and father; Other in his brother. . Unless otherwise noted, family history is non contributory    The patients home medications have been reviewed. Allergies: Food, Chlorhexidine, and Soap & cleansers        ---------------------------------------------------PHYSICAL EXAM--------------------------------------    Constitutional/General: Alert and oriented x3  Head: Normocephalic and atraumatic  Eyes: PERRL, EOMI, sclera non icteric  Mouth: Oropharynx clear, handling secretions, no trismus, no asymmetry of the posterior oropharynx or uvular edema  Neck: Supple, full ROM, no stridor, no meningeal signs  Respiratory: Lungs clear to auscultation bilaterally, no wheezes, rales, or rhonchi. Not in respiratory distress  Cardiovascular:  Regular rate. Regular rhythm. 2+ distal pulses. Equal extremity pulses. Chest: No chest wall tenderness, LVAD line noted  GI:  Abdomen Soft, Non tender, Non distended. No rebound, guarding, or rigidity. No pulsatile masses. Musculoskeletal: Moves all extremities x 4. Warm and well perfused, no clubbing, cyanosis, or edema. Capillary refill <3 seconds  Integument: skin warm and dry. No rashes. Neurologic: GCS 15, no focal deficits, symmetric strength 5/5 in the upper and lower extremities bilaterally  Psychiatric: Normal Affect          -------------------------------------------------- RESULTS -------------------------------------------------  I have personally reviewed all laboratory and imaging results for this patient. Results are listed below.      LABS: (Lab results interpreted by me)  Results for orders placed or performed during the hospital encounter of 10/06/21   CBC Auto Differential   Result Value Ref Range    WBC 6.6 4.5 - 11.5 E9/L    RBC 3.70 (L) 3.80 - 5.80 E12/L    Hemoglobin 11.1 (L) 12.5 - 16.5 g/dL    Hematocrit 33.8 (L) 37.0 - 54.0 %    MCV 91.4 80.0 - 99.9 fL    MCH 30.0 26.0 - 35.0 pg    MCHC 32.8 32.0 - 34.5 %    RDW 13.8 11.5 - 15.0 fL    Platelets 321 006 - 626 E9/L    MPV 11.1 7.0 - 12.0 fL    Neutrophils % 66.8 43.0 - 80.0 %    Immature Granulocytes % 0.5 0.0 - 5.0 %    Lymphocytes % 19.2 (L) 20.0 - 42.0 %    Monocytes % 10.6 2.0 - 12.0 %    Eosinophils % 2.3 0.0 - 6.0 %    Basophils % 0.6 0.0 - 2.0 %    Neutrophils Absolute 4.41 1.80 - 7.30 E9/L    Immature Granulocytes # 0.03 E9/L    Lymphocytes Absolute 1.27 (L) 1.50 - 4.00 E9/L    Monocytes Absolute 0.70 0.10 - 0.95 E9/L    Eosinophils Absolute 0.15 0.05 - 0.50 E9/L    Basophils Absolute 0.04 0.00 - 0.20 E9/L   Comprehensive Metabolic Panel   Result Value Ref Range    Sodium 137 132 - 146 mmol/L    Potassium 4.2 3.5 - 5.0 mmol/L    Chloride 103 98 - 107 mmol/L    CO2 25 22 - 29 mmol/L    Anion Gap 9 7 - 16 mmol/L    Glucose 171 (H) 74 - 99 mg/dL    BUN 19 6 - 23 mg/dL    CREATININE 1.3 (H) 0.7 - 1.2 mg/dL    GFR Non-African American >60 >=60 mL/min/1.73    GFR African American >60     Calcium 9.7 8.6 - 10.2 mg/dL    Total Protein 6.5 6.4 - 8.3 g/dL    Albumin 3.4 (L) 3.5 - 5.2 g/dL    Total Bilirubin 0.6 0.0 - 1.2 mg/dL    Alkaline Phosphatase 99 40 - 129 U/L    ALT 29 0 - 40 U/L    AST 32 0 - 39 U/L   Troponin   Result Value Ref Range    Troponin, High Sensitivity 55 (H) 0 - 11 ng/L   Brain Natriuretic Peptide   Result Value Ref Range    Pro-BNP 3,272 (H) 0 - 125 pg/mL   Protime-INR   Result Value Ref Range    Protime 38.8 (H) 9.3 - 12.4 sec    INR 3.5    APTT   Result Value Ref Range    aPTT 35.3 (H) 24.5 - 35.1 sec   Magnesium   Result Value Ref Range    Magnesium 2.1 1.6 - 2.6 mg/dL   EKG 12 Lead   Result Value Ref Range    Ventricular Rate 104 BPM    Atrial Rate 94 BPM    QRS Duration 158 ms    Q-T Interval 356 ms    QTc Calculation (Bazett) 468 ms    R Axis -119 degrees    T Axis 107 degrees   ,       RADIOLOGY:  Interpreted by Radiologist unless otherwise specified  XR CHEST PORTABLE   Final Result   1. Pulmonary vascular congestion with possible perihilar edema, potentially   congestive heart failure given mild to moderate cardiomegaly. 2. Possible left basilar airspace opacity is more likely due to summation of   overlying tissues, although atelectasis, pneumonia, and aspiration could   appear similar. EKG Interpretation  Interpreted by emergency department physician, Dr. Isa Henry, no STEMI, extensive artifact due to LVAD        ------------------------- NURSING NOTES AND VITALS REVIEWED ---------------------------   The nursing notes within the ED encounter and vital signs as below have been reviewed by myself  Pulse 94   Temp 97.6 °F (36.4 °C) (Infrared)   Resp 20   Ht 5' 11\" (1.803 m)   Wt 226 lb (102.5 kg)   SpO2 98%   BMI 31.52 kg/m²     Oxygen Saturation Interpretation: Normal    The patients available past medical records and past encounters were reviewed. ------------------------------ ED COURSE/MEDICAL DECISION MAKING----------------------  Medications - No data to display        The cardiac monitor revealed paced with a heart rate in the 70s as interpreted by me.  The cardiac monitor was ordered secondary to the patient's defib discharge and to monitor the patient for dysrhythmia. CPT [id number]         Medical Decision Making: On arrival, the patient had no symptoms or complaints. Defibrillator interrogated, patient was in a flutter which turned into V. fib. He did receive 1 appropriate shock. No other abnormalities. Labs and imaging reviewed. Discussed with [place], they will like the patient transferred to their facility. Discussed with patient, he is agreeable with this. Patiently will monitored closely till transferred. Critical Care Time: 33 minutes         Counseling: The emergency provider has spoken with the patient and discussed todays results, in addition to providing specific details for the plan of care and counseling regarding the diagnosis and prognosis. Questions are answered at this time and they are agreeable with the plan.       --------------------------------- IMPRESSION AND DISPOSITION ---------------------------------    IMPRESSION  1. Defibrillator discharge        DISPOSITION  Disposition: Transfer to [place]   Patient condition is stable        NOTE: This report was transcribed using voice recognition software.  Every effort was made to ensure accuracy; however, inadvertent computerized transcription errors may be present        Tonie Betancourt MD  10/06/21 1174

## 2021-10-07 ENCOUNTER — HOSPITAL ENCOUNTER (OUTPATIENT)
Dept: RADIATION ONCOLOGY | Age: 71
Discharge: HOME | End: 2021-10-07
Attending: RADIOLOGY
Payer: COMMERCIAL

## 2021-10-07 PROCEDURE — 77338 DESIGN MLC DEVICE FOR IMRT: CPT | Performed by: RADIOLOGY

## 2021-10-07 PROCEDURE — 77300 RADIATION THERAPY DOSE PLAN: CPT | Performed by: RADIOLOGY

## 2021-10-07 PROCEDURE — 77301 RADIOTHERAPY DOSE PLAN IMRT: CPT | Performed by: RADIOLOGY

## 2021-10-07 NOTE — ED NOTES
Report called to [facility] to 11 th floor. Report given to Catia.      Pranav Mccauley RN  10/06/21 3952

## 2021-10-08 ENCOUNTER — HOSPITAL ENCOUNTER (OUTPATIENT)
Dept: RADIATION ONCOLOGY | Age: 71
End: 2021-10-08
Attending: RADIOLOGY
Payer: COMMERCIAL

## 2021-10-15 ENCOUNTER — TELEPHONE (OUTPATIENT)
Dept: CASE MANAGEMENT | Age: 71
End: 2021-10-15

## 2021-10-21 ENCOUNTER — TELEPHONE (OUTPATIENT)
Dept: CASE MANAGEMENT | Age: 71
End: 2021-10-21

## 2021-10-21 NOTE — TELEPHONE ENCOUNTER
Patient called [facility] directly to schedule his daily radiation transportation. Patient was told that someone from our office needed to contact them directly. I called [facility] and spoke with Zeynep Mora. She emailed me paperwork to complete, which I did and then I emailed this back to [email or web address]. Zeynep Mora stated that patient will be accepted to start transportation effective on 10/25/21. Per Mathew AGUILLON, patient's daily RT time (from 10/25/21 to 12/14/21) has been adjusted fro, 12:45 pm to 1:00 pm. Patient is aware of all.

## 2021-10-21 NOTE — TELEPHONE ENCOUNTER
I spoke with patient to advise of his SEB RT start date of 10/25/21 at 12:45 pm (per Mahtew AGUILLON). Patient states that he previously received transportation assistance through [employer] and knows this is covered through his insurance. He will call them now to arrange.

## 2021-10-25 ENCOUNTER — HOSPITAL ENCOUNTER (OUTPATIENT)
Dept: RADIATION ONCOLOGY | Age: 71
Discharge: HOME | End: 2021-10-25
Attending: RADIOLOGY
Payer: COMMERCIAL

## 2021-10-25 PROCEDURE — 77385 HC NTSTY MODUL RAD TX DLVR SMPL: CPT | Performed by: RADIOLOGY

## 2021-10-25 PROCEDURE — 77014 PR CT GUIDANCE PLACEMENT RAD THERAPY FIELDS: CPT | Performed by: RADIOLOGY

## 2021-10-26 ENCOUNTER — HOSPITAL ENCOUNTER (OUTPATIENT)
Dept: RADIATION ONCOLOGY | Age: 71
Discharge: HOME | End: 2021-10-26
Payer: COMMERCIAL

## 2021-10-26 ENCOUNTER — HOSPITAL ENCOUNTER (OUTPATIENT)
Dept: RADIATION ONCOLOGY | Age: 71
Discharge: HOME | End: 2021-10-26
Attending: RADIOLOGY
Payer: COMMERCIAL

## 2021-10-26 ENCOUNTER — TELEPHONE (OUTPATIENT)
Dept: CASE MANAGEMENT | Age: 71
End: 2021-10-26

## 2021-10-26 VITALS
WEIGHT: 229 LBS | HEART RATE: 81 BPM | RESPIRATION RATE: 20 BRPM | OXYGEN SATURATION: 98 % | TEMPERATURE: 96.5 F | BODY MASS INDEX: 31.94 KG/M2

## 2021-10-26 PROCEDURE — 77385 HC NTSTY MODUL RAD TX DLVR SMPL: CPT | Performed by: RADIOLOGY

## 2021-10-26 PROCEDURE — 77014 PR CT GUIDANCE PLACEMENT RAD THERAPY FIELDS: CPT | Performed by: RADIOLOGY

## 2021-10-26 ASSESSMENT — PAIN SCALES - GENERAL: PAINLEVEL_OUTOF10: 4

## 2021-10-26 ASSESSMENT — PAIN DESCRIPTION - ORIENTATION: ORIENTATION: RIGHT

## 2021-10-26 ASSESSMENT — PAIN DESCRIPTION - FREQUENCY: FREQUENCY: CONTINUOUS

## 2021-10-26 ASSESSMENT — PAIN DESCRIPTION - PAIN TYPE: TYPE: CHRONIC PAIN

## 2021-10-26 NOTE — PROGRESS NOTES
DEPARTMENT OF RADIATION ONCOLOGY   ON TREATMENT VISIT       10/26/2021      NAME:  Hallie Harris Sr. YOB: 1950    DIAGNOSIS: Prostate cancer. SUBJECTIVE:   Hallie Harris Sr. has now received 360 cGy in 180-cGy daily fractions directed to the prostate for management of the above diagnosis. Today being treatment #2, the patient has no specific complaints and overall, feels well. Past medical, surgical, social and family histories reviewed and updated as indicated. PAIN: 0    ALLERGIES:  Food, Chlorhexidine, and Soap & cleansers         Current Outpatient Medications   Medication Sig Dispense Refill    ampicillin-sulbactam (UNASYN) 3 (2-1) g SOLR Infuse intravenously every 6 hours      amiodarone (CORDARONE) 200 MG tablet Take 200 mg by mouth daily      bacitracin 500 UNIT/GM ointment Apply topically 2 times daily Apply topically 2 times daily.       valsartan (DIOVAN) 40 MG tablet Take 40 mg by mouth daily      senna (SENOKOT) 8.6 MG tablet Take 1 tablet by mouth daily      psyllium (KONSYL) 28.3 % PACK Take 1 packet by mouth daily      sodium chloride (OCEAN) 0.65 % nasal spray 1 spray by Nasal route as needed for Congestion      triamcinolone (KENALOG) 0.1 % cream Apply topically 2 times daily      metoprolol succinate (TOPROL XL) 25 MG extended release tablet Take 25 mg by mouth daily      aspirin 81 MG EC tablet Take 81 mg by mouth daily      ergocalciferol (ERGOCALCIFEROL) 1.25 MG (87201 UT) capsule Take 50,000 Units by mouth once a week      digoxin (LANOXIN) 125 MCG tablet Take 125 mcg by mouth daily      vitamin D (ERGOCALCIFEROL) 1.25 MG (07134 UT) CAPS capsule Take 50,000 Units by mouth once a week On fridays      Magnesium 400 MG CAPS Take by mouth      predniSONE (DELTASONE) 10 MG tablet Take 5 mg by mouth daily 3 tab daily for 30 days       spironolactone (ALDACTONE) 50 MG tablet Take 25 mg by mouth daily       warfarin (COUMADIN) 5 MG tablet Take 5 mg by mouth      pantoprazole (PROTONIX) 40 MG tablet Take 1 tablet by mouth every morning (before breakfast) 90 tablet 3    bumetanide (BUMEX) 2 MG tablet Take 1 tablet by mouth daily (Patient taking differently: Take by mouth 2mg am, 1mg pm) 90 tablet 3    allopurinol (ZYLOPRIM) 100 MG tablet Take 1 tablet by mouth daily 30 tablet 3    HUMULIN R 500 UNIT/ML injection 4 times daily (before meals and nightly) Sliding scale       No current facility-administered medications for this encounter. OBJECTIVE:  Vitals: Temperature 96.5 F, pulse 81, respiration 20, 98% room air O2 saturation, weight at 229 pounds per RN. Physical Examination:  Constitutional: 73 y.o. male who is alert, cooperative and in no apparent distress. The patient looks well. ASSESSMENT/PLAN:   Patient is doing well, tolerating radiation treatment. I went over the logistics of on treatment visits with the patient. RT is to continue as planned.     Rito Banuelos MD PhD  Radiation Oncologist, [facility]

## 2021-10-26 NOTE — TELEPHONE ENCOUNTER
Below is an email confirmation that I received. I called patient to confirm that he is aware of transportation  as early as 12 pm. Another copy of this email has been scanned into patient's [facility] chart. Marcy Melissa [email or web address]  Fri 10/22/2021 7:43 PM  The transportation for [employer], being picked up from [employer] traveling to [facility] on Oct 25th-Dec 14th 2021  is scheduled under confirmation number [id number] . We asked that Patient is ready as early as 12pm.         All trip confirmation # will begin with 66 72 64. .I will list the last 4digits of each by the day         10/    10/    10/    10/    11/1-1852    11/2-1855    11/3-1856    11/5-1487    11/5-1859    11/8-1860    11/9-1861    11/    11/    11/    11/    11/    11/    11/    11/    11/    11/    11/    11/    11/    12/1-2025    12/2-3584    12/3-3585    12/6-2182    12/7-2186    12/8-2187    12/9-2188    12/    12/    12/

## 2021-10-26 NOTE — PROGRESS NOTES
Luisa Calvillo Sr.  10/26/2021  Wt Readings from Last 3 Encounters:   10/26/21 229 lb (103.9 kg)   10/06/21 226 lb (102.5 kg)   10/04/21 228 lb (103.4 kg)     Body mass index is 31.94 kg/m². Treatment Area:prostate    Patient was seen today for weekly visit. Comfort Alteration  KPS:60% difficulty walking downstairs  Fatigue:  Mild    Nutritional Alteration  Anorexia: No  Nausea: No  Vomiting: No     Elimination Alterations  Constipation: no  Diarrhea:  no  Urinary Frequency/Urgency: No  Urinary Retention: No  Dysuria: No  Urinary Incontinence: No  Proctitis: No  Nocturia: No #/night: 0/ 6 hours     Skin Alteration   Sensation:na    Radiation Dermatitis:  none    Emotional  Coping: effective    Sexuality Alteration  none    Injury, potential bleeding or infection: none    Lab Results   Component Value Date    WBC 6.6 10/06/2021     10/06/2021         Pulse 81   Temp 96.5 °F (35.8 °C) (Temporal)   Resp 20   Wt 229 lb (103.9 kg)   SpO2 98%   BMI 31.94 kg/m²   BP within normal range?  N/A       Assessment/Plan:  Completed 2/44 fractions; 360/7920 cGy, updated Dr Jairo Rose, RN

## 2021-10-27 ENCOUNTER — TELEPHONE (OUTPATIENT)
Dept: CASE MANAGEMENT | Age: 71
End: 2021-10-27

## 2021-10-27 ENCOUNTER — HOSPITAL ENCOUNTER (OUTPATIENT)
Dept: RADIATION ONCOLOGY | Age: 71
Discharge: HOME | End: 2021-10-27
Attending: RADIOLOGY
Payer: COMMERCIAL

## 2021-10-27 PROCEDURE — 77385 HC NTSTY MODUL RAD TX DLVR SMPL: CPT | Performed by: RADIOLOGY

## 2021-10-27 PROCEDURE — 77014 PR CT GUIDANCE PLACEMENT RAD THERAPY FIELDS: CPT | Performed by: RADIOLOGY

## 2021-10-27 NOTE — TELEPHONE ENCOUNTER
Met with patient face to face for the first time and I provided my contact information. Patient states he is tolerating radiation fine thus far and offers no complaints. [facility] has been transporting patient to and from his radiation appointments. Everything has been going fine so far except that patient is still now waiting on his  three hours after his radiation treatment. He, therefore, called [facility] to cancel to his  today. Patient's wife, who just came home from being out of town, is able to pick him up.

## 2021-10-28 ENCOUNTER — HOSPITAL ENCOUNTER (OUTPATIENT)
Dept: RADIATION ONCOLOGY | Age: 71
Discharge: HOME | End: 2021-10-28
Attending: RADIOLOGY
Payer: COMMERCIAL

## 2021-10-28 PROCEDURE — 77385 HC NTSTY MODUL RAD TX DLVR SMPL: CPT | Performed by: RADIOLOGY

## 2021-10-28 PROCEDURE — 77014 PR CT GUIDANCE PLACEMENT RAD THERAPY FIELDS: CPT | Performed by: RADIOLOGY

## 2021-10-29 ENCOUNTER — HOSPITAL ENCOUNTER (OUTPATIENT)
Dept: RADIATION ONCOLOGY | Age: 71
Discharge: HOME | End: 2021-10-29
Attending: RADIOLOGY
Payer: COMMERCIAL

## 2021-10-29 PROCEDURE — 77014 PR CT GUIDANCE PLACEMENT RAD THERAPY FIELDS: CPT | Performed by: RADIOLOGY

## 2021-10-29 PROCEDURE — 99999 PR OFFICE/OUTPT VISIT,PROCEDURE ONLY: CPT | Performed by: RADIOLOGY

## 2021-10-29 PROCEDURE — 77336 RADIATION PHYSICS CONSULT: CPT | Performed by: RADIOLOGY

## 2021-10-29 PROCEDURE — 77427 RADIATION TX MANAGEMENT X5: CPT | Performed by: RADIOLOGY

## 2021-10-29 PROCEDURE — 77385 HC NTSTY MODUL RAD TX DLVR SMPL: CPT | Performed by: RADIOLOGY

## 2021-10-29 NOTE — ADDENDUM NOTE
Encounter addended by: Calli Escobar MD on: 10/29/2021 3:12 PM   Actions taken: Clinical Note Signed

## 2021-10-29 NOTE — PROGRESS NOTES
DEPARTMENT OF RADIATION ONCOLOGY   ON TREATMENT VISIT       10/29/2021      NAME:  Luisa Calvillo Sr. YOB: 1950    Symptoms  ' I do not feel good. Feeling weak and dizzy. You cannot check my blood pressure I checked it with Doppler and it was 95/0 this morning  I was told the blood pressure should be between 60 and 85    I do not have any chest pains  Did not have any shortness of breath  I do not drive   I called the transportation service  Today is treatment #5  I do not have any bladder or bowel symptoms  I did not receive any injections for my prostate cancer    DIAGNOSIS:       Carcinoma of the prostate presently undergoing radiation therapy        Interim history since the initial consultation on 9/13/2021    Patient had some cardiac problems and was transferred to [facility].    Past medical, surgical, social and family histories reviewed and updated as indicated.     Cardiac Surgery Progress Note     Hx of HM III LVAD 2019    POD #3 s/p TF TAVR   10 /2021      Chronic Problems being Monitored during Admission  Principal Problem:  Nonrheumatic aortic valve insufficiency  Active Problems:  Nonischemic cardiomyopathy (HCC)  Atrial fibrillation (HCC)  Cardiac resynchronization therapy defibrillator (CRT-D) in place  Nonsustained ventricular tachycardia (HCC)  Type 2 diabetes mellitus ([address]  Obstructive sleep apnea  Stage 3 chronic kidney disease (HCC)  LVAD (left ventricular assist device) present (Heartmate 3 LVAD placed 12/23/19)  Anticoagulated on warfarin  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 Follow up in TAVR clinic in 4 weeks with repeat TTE, EKG and labs       ALLERGIES:  Food, Chlorhexidine, and Soap & cleansers         Current Outpatient Medications   Medication Sig Dispense Refill    ampicillin-sulbactam (UNASYN) 3 (2-1) g SOLR Infuse intravenously every 6 hours      amiodarone (CORDARONE) 200 MG tablet Take 200 mg by mouth daily      bacitracin 500 UNIT/GM ointment Apply topically 2 times daily Apply topically 2 times daily.       valsartan (DIOVAN) 40 MG tablet Take 40 mg by mouth daily      senna (SENOKOT) 8.6 MG tablet Take 1 tablet by mouth daily      psyllium (KONSYL) 28.3 % PACK Take 1 packet by mouth daily      sodium chloride (OCEAN) 0.65 % nasal spray 1 spray by Nasal route as needed for Congestion      triamcinolone (KENALOG) 0.1 % cream Apply topically 2 times daily      metoprolol succinate (TOPROL XL) 25 MG extended release tablet Take 25 mg by mouth daily      aspirin 81 MG EC tablet Take 81 mg by mouth daily      ergocalciferol (ERGOCALCIFEROL) 1.25 MG (69601 UT) capsule Take 50,000 Units by mouth once a week      digoxin (LANOXIN) 125 MCG tablet Take 125 mcg by mouth daily      vitamin D (ERGOCALCIFEROL) 1.25 MG (03107 UT) CAPS capsule Take 50,000 Units by mouth once a week On fridays      Magnesium 400 MG CAPS Take by mouth      predniSONE (DELTASONE) 10 MG tablet Take 5 mg by mouth daily 3 tab daily for 30 days       spironolactone (ALDACTONE) 50 MG tablet Take 25 mg by mouth daily       warfarin (COUMADIN) 5 MG tablet Take 5 mg by mouth      pantoprazole (PROTONIX) 40 MG tablet Take 1 tablet by mouth every morning (before breakfast) 90 tablet 3    bumetanide (BUMEX) 2 MG tablet Take 1 tablet by mouth daily (Patient taking differently: Take by mouth 2mg am, 1mg pm) 90 tablet 3    allopurinol (ZYLOPRIM) 100 MG tablet Take 1 tablet by mouth daily 30 tablet 3    HUMULIN R 500 UNIT/ML injection 4 times daily (before meals and nightly) Sliding scale       No current facility-administered medications for this encounter. mexiletine (MEXITIL) 150 MG capsule   Take 1 capsule (150 mg total) by mouth every 8 (eight) hours. 90 each   11 10/21/2021 10/21/2022   amoxicillin-clavulanate (AUGMENTIN) 875-125 mg per tablet   Take 1 tablet by mouth 2 (two) times a day. 60 tablet   0 10/21/2021     acetaminophen (TYLENOL) 500 MG tablet   Take 2 tablets (1,000 mg total) by mouth every 8 (eight) hours as needed for moderate pain (pain scale 4-7). 0 10/21/2021     warfarin (COUMADIN) 5 MG tablet   Take 1 and 1/2 tablets (total 7.5 mg) by mouth every evening through Monday 10/25. Recheck INR Monday 10/25. Further dosing as directed by the [facility] based off lab results. 0 10/21/2021     triamcinolone acetonide (ARISTOCORT) 0.1 % cream   Apply 1 application topically 2 (two) times a day as needed for rash. 0 10/21/2021 10/21/2022   mexiletine (MEXITIL) 150 MG capsule   Take 1 capsule (150 mg total) by mouth every 8 (eight) hours. 270 capsule   3 10/21/2021 10/21/2021   amoxicillin-clavulanate (AUGMENTIN) 875-125 mg per tablet   Take 1 tablet by mouth 2 (two) times a day. 60 tablet   0 10/21/2021 10/21/2021   mexiletine (MEXITIL) 150 MG capsule   Take 1 capsule (150 mg total) by mouth every 8 (eight) hours. 270 capsule   3 10/21/2021 10/21/2021   amoxicillin-clavulanate (AUGMENTIN) 875-125 mg per tablet   Take 1 tablet by mouth 2 (two) times a day. 60 tablet   0 10/21/2021 10/21/2021   mexiletine (MEXITIL) 150 MG capsule   Take 1 capsule (150 mg total) by mouth every 8 (eight) hours. 270 capsule   3 10/21/2021 10/21/2021   amoxicillin-clavulanate (AUGMENTIN) 875-125 mg per tablet   Take 1 tablet by mouth 2 (two) times a day.  14 tablet   0 10/21/2021 10/21/2021 Discharge Disposition  - documented in this encounter  Disposition Code Departure Means Destination     Date of discharge from [facility] 10/21/21    OBJECTIVE:  Alert and fully ambulatory with the help of a cane. ECOG 2    Unable to check his blood pressure  Does not feel well   Nauseated   Had a long visit and recommended  He refused to go to ER      There were no vitals filed for this visit. Wt Readings from Last 3 Encounters:   10/26/21 229 lb (103.9 kg)   10/06/21 226 lb (102.5 kg)   10/04/21 228 lb (103.4 kg)     In view of the serious comorbid conditions and recent cardiac issues patient was recommended to go to the emergency room but he refused to go to the emergency room.   After about visiting for 10 minutes he has indicated that he is feeling better and he would call for a ride and go home and call his cardiologist.      Dr Sheryl Stevenson notified     Fe Palm MD [facility]    Department of Radiation Oncology    PHYSICIANS [facility]) [facility]: [phone] ([facility]: [phone])  [facility]) [facility]: [phone] ([facility]: [phone])  [facility] [place]) [facility]:  [phone] ([facility]:  [phone])

## 2021-11-01 ENCOUNTER — TELEPHONE (OUTPATIENT)
Dept: CASE MANAGEMENT | Age: 71
End: 2021-11-01

## 2021-11-01 ENCOUNTER — TELEPHONE (OUTPATIENT)
Dept: RADIATION ONCOLOGY | Age: 71
End: 2021-11-01

## 2021-11-01 ENCOUNTER — HOSPITAL ENCOUNTER (OUTPATIENT)
Dept: RADIATION ONCOLOGY | Age: 71
Discharge: HOME | End: 2021-11-01
Attending: RADIOLOGY
Payer: COMMERCIAL

## 2021-11-01 VITALS — TEMPERATURE: 97.2 F | HEART RATE: 60 BPM | OXYGEN SATURATION: 99 % | RESPIRATION RATE: 20 BRPM

## 2021-11-01 PROCEDURE — 77385 HC NTSTY MODUL RAD TX DLVR SMPL: CPT | Performed by: RADIOLOGY

## 2021-11-01 PROCEDURE — 77014 PR CT GUIDANCE PLACEMENT RAD THERAPY FIELDS: CPT | Performed by: RADIOLOGY

## 2021-11-01 NOTE — TELEPHONE ENCOUNTER
Met with patient after his daily radiation treatment. Patient sees his cardiologist in [place] next on 11/19/2120. This appointment should not interfere with his daily radiation schedule. I provided patient with a copy of his confirmation sheet from [id number].

## 2021-11-01 NOTE — PROGRESS NOTES
DEPARTMENT OF RADIATION ONCOLOGY   ON TREATMENT VISIT       11/1/2021      NAME:  Robbie Penn Sr. YOB: 1950    DIAGNOSIS: Prostate cancer.     SUBJECTIVE:   Osmar Ceballos. has now received 900 cGy in 180-cGy daily fractions directed to the prostate for management of the above diagnosis as of 10-. I am asked to evaluate the patient's cardiac status to decide if radiation can resume. The patient underwent a valve replacement on October 18, 2021, was discharged from the hospital October 21, 2021, and was started radiation on October 25, 2021. On October 29, 2021, the patient had received a cumulative dose of 900 cGy when he was evaluated by Dr. Guicho Fischer for feeling weak/dizzy, not feeling well in general.  Dr. Guicho Fischer recommended that the patient be evaluated in the emergency department but he refused, subsequently felt better, and decided to go home. Over the weekend, the patient has been doing well. In his marriage, he does the cooking 99% of the time, and over the weekend, did 99% of the cooking including making a pot of chili to celebrate Halloween with the neighbors. He had no problem walking to the neighbors (his wife carrying the pot of chili), no dyspnea on exertion. He reports no shortness of breath at rest.  He slept more this weekend, but when he was awake, he was fully awake, no lethargy, drowsiness, grogginess, weakness, or dizziness. He denies any presyncopal symptoms. He reports having good appetite, normal bowel movements, and ample urination. Regarding lower extremity edema, the patient reports that that has significantly improved and now has \"hardly any. \"    Past medical, surgical, social and family histories reviewed and updated as indicated.     PAIN: 0    ALLERGIES:  Food, Chlorhexidine, and Soap & cleansers         Current Outpatient Medications   Medication Sig Dispense Refill    ampicillin-sulbactam (UNASYN) 3 (2-1) g SOLR Infuse distress. He looks well, with good mentation (comprehending good-natured humor and responding in kind). Heart exam shows regular rate and rhythm, with a constant humming background of the portable pump. Lungs are clear to auscultation on the right, with diminished breath sounds on the left. There is no left ankle edema. Protecting the right ankle is a plastic brace which I did not asked the patient to remove. ASSESSMENT/PLAN:   Patient is doing well from a cardiac viewpoint. I discussed with Dr. Guillermo Sky, and our joint recommendation is to resume radiation to the prostate as planned.     Tamia Brady MD PhD  Radiation Oncologist, [facility]

## 2021-11-01 NOTE — TELEPHONE ENCOUNTER
I called patient who states that he is currently feeling fine, well rested, and had a good weekend. Patient's blood pressure doppler reading from this morning was 78. The recommended range is 60-85. He denies any shortness of breath or chest pain. Patient did confirm that he received his first Lupron injection per Dr. Sharol Lombard ([facility]) on 8/17/21. His next appointment to see Dr. Sharol Lombard and to receive another Lupron injection is scheduled for January 2022. Patient will bring in his most recent medication list today since being discharged from [place].

## 2021-11-02 ENCOUNTER — HOSPITAL ENCOUNTER (OUTPATIENT)
Dept: RADIATION ONCOLOGY | Age: 71
Discharge: HOME | End: 2021-11-02
Attending: RADIOLOGY
Payer: COMMERCIAL

## 2021-11-02 ENCOUNTER — TELEPHONE (OUTPATIENT)
Dept: RADIATION ONCOLOGY | Age: 71
End: 2021-11-02

## 2021-11-02 VITALS
RESPIRATION RATE: 18 BRPM | HEART RATE: 67 BPM | TEMPERATURE: 97.4 F | BODY MASS INDEX: 33.33 KG/M2 | OXYGEN SATURATION: 98 % | WEIGHT: 239 LBS

## 2021-11-02 PROCEDURE — 77385 HC NTSTY MODUL RAD TX DLVR SMPL: CPT | Performed by: RADIOLOGY

## 2021-11-02 PROCEDURE — 77014 PR CT GUIDANCE PLACEMENT RAD THERAPY FIELDS: CPT | Performed by: RADIOLOGY

## 2021-11-02 RX ORDER — MEXILETINE HYDROCHLORIDE 150 MG/1
150 CAPSULE ORAL 3 TIMES DAILY
COMMUNITY

## 2021-11-02 RX ORDER — ACETAMINOPHEN 500 MG
1000 TABLET ORAL EVERY 8 HOURS PRN
COMMUNITY

## 2021-11-02 RX ORDER — BICALUTAMIDE 50 MG/1
50 TABLET, FILM COATED ORAL DAILY
COMMUNITY
End: 2022-05-09

## 2021-11-02 RX ORDER — AMOXICILLIN AND CLAVULANATE POTASSIUM 875; 125 MG/1; MG/1
1 TABLET, FILM COATED ORAL 2 TIMES DAILY
COMMUNITY

## 2021-11-02 ASSESSMENT — PAIN DESCRIPTION - LOCATION: LOCATION: GENERALIZED

## 2021-11-02 ASSESSMENT — PAIN SCALES - GENERAL: PAINLEVEL_OUTOF10: 4

## 2021-11-02 NOTE — PROGRESS NOTES
DEPARTMENT OF RADIATION ONCOLOGY   ON TREATMENT VISIT       11/2/2021      NAME:  Autumn Paul Sr. YOB: 1950    DIAGNOSIS:     Carcinoma of the prostate with multiple comorbid conditions. High-grade malignancy with a PSA of 50+    He did receive hormonal injection which is the 6-month shot in August 2021. SUBJECTIVE:   Autumn Paul Sr. has now received 1260 cGy in 7 fractions directed to the pelvis    Past medical, surgical, social and family histories reviewed and updated as indicated. As discussed in the previous visits patient recently had a valve replacement in October 2021. He was discharged from the hospital 3 days after the procedure. Radiation therapy was started about 4 days after the discharge. Patient seems to be tolerating the radiation with no bladder or bowel symptoms. As we do not have a Doppler in the clinic patient was requested to bring it with him  Doppler readings were 70 in the clinic prior to his radiation treatment today. Patient will bring his Doppler with him every day at our request until completion of radiation therapy. He has not taken a diuretic for over 2 weeks. Since the initial visit he has gained about 10 pounds 10 pound weight gain is within the last 10 days. PAIN:    Patient denies any chest pain chest pressure or shortness of breath. Denies any bladder or bowel symptoms. He has not taken any diuretic for over 2 weeks.     ALLERGIES:  Food, Chlorhexidine, and Soap & cleansers         Current Outpatient Medications   Medication Sig Dispense Refill    acetaminophen (TYLENOL) 500 MG tablet Take 1,000 mg by mouth every 8 hours as needed for Pain      bicalutamide (CASODEX) 50 MG chemo tablet Take 50 mg by mouth daily      mexiletine (MEXITIL) 150 MG capsule Take 150 mg by mouth every 8 hours as needed      Multiple Vitamin (MULTIVITAMINS PO) Take 1 tablet by mouth 4 times daily Geriatric fusion oral chewable      amoxicillin-clavulanate (AUGMENTIN) 875-125 MG per tablet Take 1 tablet by mouth 2 times daily      amiodarone (CORDARONE) 200 MG tablet Take 200 mg by mouth daily      bacitracin 500 UNIT/GM ointment Apply topically 2 times daily Apply topically 2 times daily.  senna (SENOKOT) 8.6 MG tablet Take 1 tablet by mouth nightly       psyllium (KONSYL) 28.3 % PACK Take 1 packet by mouth daily as needed Metamucil sugar free 3.4 gram powder pack      sodium chloride (OCEAN) 0.65 % nasal spray 1 spray by Nasal route 3 times daily as needed for Congestion       triamcinolone (KENALOG) 0.1 % cream Apply topically 2 times daily as needed       aspirin 81 MG EC tablet Take 81 mg by mouth daily      ergocalciferol (ERGOCALCIFEROL) 1.25 MG (49322 UT) capsule Take 50,000 Units by mouth once a week      digoxin (LANOXIN) 125 MCG tablet Take 125 mcg by mouth daily      Magnesium 400 MG CAPS Take 1 tablet by mouth daily       warfarin (COUMADIN) 5 MG tablet Take 5 mg by mouth      pantoprazole (PROTONIX) 40 MG tablet Take 1 tablet by mouth every morning (before breakfast) 90 tablet 3    bumetanide (BUMEX) 2 MG tablet Take 1 tablet by mouth daily (Patient taking differently: Take 1 mg by mouth Take 1 tablet (1 mg total) by mouth daily as needed (fluid retention: weight gain/bloating/shortness of breath). ) 90 tablet 3    allopurinol (ZYLOPRIM) 100 MG tablet Take 1 tablet by mouth daily 30 tablet 3    HUMULIN R 500 UNIT/ML injection 4 times daily (before meals and nightly) Sliding scale       No current facility-administered medications for this encounter. Physical Examination:     Constitutional: 82-year-old -American gentleman who is not in any acute distress. He is wheelchair-bound.   He is alert oriented to time and place and very well aware of the cardiac symptoms he needs to look for prior to calling his cardiologist at [facility].    He has not informed them regarding his weight gain and plans on doing so today. Clinically he has 3+ pretibial pitting edema. The pitting edema is very prominent through his clothes. Vitals:    11/02/21 1316   Pulse: 67   Resp: 18   Temp: 97.4 °F (36.3 °C)   TempSrc: Temporal   SpO2: 98%   Weight: 239 lb (108.4 kg)       Wt Readings from Last 3 Encounters:   11/02/21 239 lb (108.4 kg)   10/26/21 229 lb (103.9 kg)   10/06/21 226 lb (102.5 kg)       ASSESSMENT/PLAN:   Weight gain secondary to cardiac issues. Patient was strongly recommended to take the diuretic as recommended. He was also advised to call his cardiologist in [place] and keep him posted regarding the weight fluctuation. He has gained about 13 pounds since the 10/6/2021. Patient is tolerating treatments well with expected toxicities. Patient is very much aware of the fluid retention and the consequences and also his responsibility to inform his cardiologist.  Current and planned dose reviewed. Daily Doppler readings will be taken in the clinic as patient has promised to bring his Doppler instrument to measure his blood pressure. Questions answered to apparent satisfaction. Treatments will continue as planned. Thank you for the opportunity to participate in multidisciplinary management of this remarkable and pleasant patient.       Coral Norris MD [facility]    Department of Radiation Oncology    [facility]) [facility]: [phone] (DFD: [phone])  [address] [place] [address]: [phone] (AOP: [phone])  [facility]) [facility]:  [phone] (QVL:  [phone])

## 2021-11-02 NOTE — PROGRESS NOTES
Therisa Thebes Sr.  11/2/2021  Wt Readings from Last 3 Encounters:   11/02/21 239 lb (108.4 kg)   10/26/21 229 lb (103.9 kg)   10/06/21 226 lb (102.5 kg)     Body mass index is 33.33 kg/m². Treatment Area:Prostates    Patient was seen today for weekly visit. Comfort Alteration  KPS:70%  Fatigue: None    Nutritional Alteration  Anorexia: No  Nausea: No  Vomiting: No     Elimination Alterations  Constipation: no  Diarrhea:  no  Urinary Frequency/Urgency: Yes/baseline  Urinary Retention: No  Dysuria: No  Urinary Incontinence: No  Proctitis: No  Nocturia: No #/night: 0     Skin Alteration   Sensation:na    Radiation Dermatitis:  none    Emotional  Coping: effective    Sexuality Alteration  na    Injury, potential bleeding or infection: na    Lab Results   Component Value Date    WBC 6.6 10/06/2021     10/06/2021         Pulse 67   Temp 97.4 °F (36.3 °C) (Temporal)   Resp 18   Wt 239 lb (108.4 kg)   SpO2 98%   BMI 33.33 kg/m²   BP within normal range? Yes/ na, unable to get BP using doppler      Assessment/Plan:  Completed 7/44 fractions; 1260/7920 cGy. He states he is feeling good today. He has 10 lbs gain since last week, he has about 1+ swelling to bilateral hands, he does have demadex that he can take as needed. Informed Umair Caicedo CNP and Dr Nitesh Munson. Medication list updated with patient. No other complaints.     Umair Caicedo RN

## 2021-11-02 NOTE — PROGRESS NOTES
Apurva Gonzáles .  11/2/2021  9:01 AM          Current Outpatient Medications   Medication Sig Dispense Refill    acetaminophen (TYLENOL) 500 MG tablet Take 1,000 mg by mouth every 8 hours as needed for Pain      bicalutamide (CASODEX) 50 MG chemo tablet Take 50 mg by mouth daily      mexiletine (MEXITIL) 150 MG capsule Take 150 mg by mouth every 8 hours as needed      Multiple Vitamin (MULTIVITAMINS PO) Take 1 tablet by mouth 4 times daily Geriatric fusion oral chewable      amoxicillin-clavulanate (AUGMENTIN) 875-125 MG per tablet Take 1 tablet by mouth 2 times daily      amiodarone (CORDARONE) 200 MG tablet Take 200 mg by mouth daily      bacitracin 500 UNIT/GM ointment Apply topically 2 times daily Apply topically 2 times daily.       valsartan (DIOVAN) 40 MG tablet Take 40 mg by mouth daily      senna (SENOKOT) 8.6 MG tablet Take 1 tablet by mouth nightly       psyllium (KONSYL) 28.3 % PACK Take 1 packet by mouth daily as needed Metamucil sugar free 3.4 gram powder pack      sodium chloride (OCEAN) 0.65 % nasal spray 1 spray by Nasal route 3 times daily as needed for Congestion       triamcinolone (KENALOG) 0.1 % cream Apply topically 2 times daily as needed       metoprolol succinate (TOPROL XL) 25 MG extended release tablet Take 25 mg by mouth daily      aspirin 81 MG EC tablet Take 81 mg by mouth daily      ergocalciferol (ERGOCALCIFEROL) 1.25 MG (04152 UT) capsule Take 50,000 Units by mouth once a week      digoxin (LANOXIN) 125 MCG tablet Take 125 mcg by mouth daily      vitamin D (ERGOCALCIFEROL) 1.25 MG (41757 UT) CAPS capsule Take 50,000 Units by mouth once a week On fridays      Magnesium 400 MG CAPS Take 1 tablet by mouth daily       warfarin (COUMADIN) 5 MG tablet Take 5 mg by mouth      pantoprazole (PROTONIX) 40 MG tablet Take 1 tablet by mouth every morning (before breakfast) 90 tablet 3    bumetanide (BUMEX) 2 MG tablet Take 1 tablet by mouth daily (Patient taking differently: Take 1 mg by mouth Take 1 tablet (1 mg total) by mouth daily as needed (fluid retention: weight gain/bloating/shortness of breath). ) 90 tablet 3    allopurinol (ZYLOPRIM) 100 MG tablet Take 1 tablet by mouth daily 30 tablet 3    HUMULIN R 500 UNIT/ML injection 4 times daily (before meals and nightly) Sliding scale       No current facility-administered medications for this encounter. This is an up-to-date medication list.    Please take this list to your next care provider, and discard any previous medication lists.

## 2021-11-03 ENCOUNTER — HOSPITAL ENCOUNTER (OUTPATIENT)
Dept: RADIATION ONCOLOGY | Age: 71
Discharge: HOME | End: 2021-11-03
Attending: RADIOLOGY
Payer: COMMERCIAL

## 2021-11-03 PROCEDURE — 77014 PR CT GUIDANCE PLACEMENT RAD THERAPY FIELDS: CPT | Performed by: RADIOLOGY

## 2021-11-03 PROCEDURE — 77385 HC NTSTY MODUL RAD TX DLVR SMPL: CPT | Performed by: RADIOLOGY

## 2021-11-04 ENCOUNTER — HOSPITAL ENCOUNTER (OUTPATIENT)
Dept: RADIATION ONCOLOGY | Age: 71
Discharge: HOME | End: 2021-11-04
Attending: RADIOLOGY
Payer: COMMERCIAL

## 2021-11-04 PROCEDURE — 77385 HC NTSTY MODUL RAD TX DLVR SMPL: CPT | Performed by: RADIOLOGY

## 2021-11-04 PROCEDURE — 77014 PR CT GUIDANCE PLACEMENT RAD THERAPY FIELDS: CPT | Performed by: RADIOLOGY

## 2021-11-05 ENCOUNTER — HOSPITAL ENCOUNTER (OUTPATIENT)
Dept: RADIATION ONCOLOGY | Age: 71
Discharge: HOME | End: 2021-11-05
Attending: RADIOLOGY
Payer: COMMERCIAL

## 2021-11-05 PROCEDURE — 77014 PR CT GUIDANCE PLACEMENT RAD THERAPY FIELDS: CPT | Performed by: RADIOLOGY

## 2021-11-05 PROCEDURE — 77385 HC NTSTY MODUL RAD TX DLVR SMPL: CPT | Performed by: RADIOLOGY

## 2021-11-05 PROCEDURE — 77336 RADIATION PHYSICS CONSULT: CPT | Performed by: RADIOLOGY

## 2021-11-05 PROCEDURE — 77427 RADIATION TX MANAGEMENT X5: CPT | Performed by: RADIOLOGY

## 2021-11-08 ENCOUNTER — HOSPITAL ENCOUNTER (OUTPATIENT)
Dept: RADIATION ONCOLOGY | Age: 71
Discharge: HOME | End: 2021-11-08
Attending: RADIOLOGY
Payer: COMMERCIAL

## 2021-11-08 PROCEDURE — 77014 PR CT GUIDANCE PLACEMENT RAD THERAPY FIELDS: CPT | Performed by: RADIOLOGY

## 2021-11-08 PROCEDURE — 77385 HC NTSTY MODUL RAD TX DLVR SMPL: CPT | Performed by: RADIOLOGY

## 2021-11-09 ENCOUNTER — HOSPITAL ENCOUNTER (OUTPATIENT)
Dept: RADIATION ONCOLOGY | Age: 71
Discharge: HOME | End: 2021-11-09
Attending: RADIOLOGY
Payer: COMMERCIAL

## 2021-11-09 VITALS
TEMPERATURE: 97.5 F | BODY MASS INDEX: 32.64 KG/M2 | OXYGEN SATURATION: 99 % | HEART RATE: 51 BPM | RESPIRATION RATE: 20 BRPM | WEIGHT: 234 LBS

## 2021-11-09 PROCEDURE — 77385 HC NTSTY MODUL RAD TX DLVR SMPL: CPT | Performed by: RADIOLOGY

## 2021-11-09 PROCEDURE — 77014 PR CT GUIDANCE PLACEMENT RAD THERAPY FIELDS: CPT | Performed by: RADIOLOGY

## 2021-11-09 ASSESSMENT — PAIN DESCRIPTION - PAIN TYPE: TYPE: CHRONIC PAIN

## 2021-11-09 NOTE — PROGRESS NOTES
Robbiewillie Penn .  11/9/2021  Wt Readings from Last 3 Encounters:   11/09/21 234 lb (106.1 kg)   11/02/21 239 lb (108.4 kg)   10/26/21 229 lb (103.9 kg)     Body mass index is 32.64 kg/m². Treatment Area:prostate    Patient was seen today for weekly visit. Comfort Alteration  KPS:70%  Fatigue: None    Nutritional Alteration  Anorexia: No  Nausea: No  Vomiting: No     Elimination Alterations  Constipation: no  Diarrhea:  no  Urinary Frequency/Urgency: No  Urinary Retention: No  Dysuria: No  Urinary Incontinence: No  Proctitis: No  Nocturia: No #/night: 0-1     Skin Alteration   Sensation:non    Radiation Dermatitis:  none, states moisturizing skin at least daily    Emotional  Coping: effective    Sexuality Alteration  na    Injury, potential bleeding or infection: potential    Lab Results   Component Value Date    WBC 6.6 10/06/2021     10/06/2021         Pulse 51   Temp 97.5 °F (36.4 °C) (Temporal)   Resp 20   Wt 234 lb (106.1 kg)   SpO2 99%   BMI 32.64 kg/m²   BP within normal range?  Yes  doppler, unable to get BP reading      Assessment/Plan:  Completed 12/44 fractions; 2160/7920 cGy,  states feeling good this week, no complaints Dr Guicho Fischer updated    Kyra Ray, RAUL

## 2021-11-09 NOTE — PROGRESS NOTES
DEPARTMENT OF RADIATION ONCOLOGY   ON TREATMENT VISIT       11/9/2021      NAME:  Sydni Blake Sr. YOB: 1950    DIAGNOSIS: Carcinoma of the prostate      SUBJECTIVE:   Sydni Blake Sr. has now received 2160 cGy in 12 fractions directed to the pelvis,  Prostate  Seminal vesicles and regional lymph nodes. Past medical, surgical, social and family histories reviewed and updated as indicated. PAIN:   Denies any pain  Denies any bladder or bowel symptoms. Patient has lost 6 pounds since the last visit which helped his swelling of the lower extremities. Denies any chest pain or shortness of breath. ALLERGIES:  Food, Chlorhexidine, and Soap & cleansers         Current Outpatient Medications   Medication Sig Dispense Refill    acetaminophen (TYLENOL) 500 MG tablet Take 1,000 mg by mouth every 8 hours as needed for Pain      bicalutamide (CASODEX) 50 MG chemo tablet Take 50 mg by mouth daily      mexiletine (MEXITIL) 150 MG capsule Take 150 mg by mouth every 8 hours as needed      Multiple Vitamin (MULTIVITAMINS PO) Take 1 tablet by mouth 4 times daily Geriatric fusion oral chewable      amoxicillin-clavulanate (AUGMENTIN) 875-125 MG per tablet Take 1 tablet by mouth 2 times daily      amiodarone (CORDARONE) 200 MG tablet Take 200 mg by mouth daily      bacitracin 500 UNIT/GM ointment Apply topically 2 times daily Apply topically 2 times daily.       senna (SENOKOT) 8.6 MG tablet Take 1 tablet by mouth nightly       psyllium (KONSYL) 28.3 % PACK Take 1 packet by mouth daily as needed Metamucil sugar free 3.4 gram powder pack      sodium chloride (OCEAN) 0.65 % nasal spray 1 spray by Nasal route 3 times daily as needed for Congestion       aspirin 81 MG EC tablet Take 81 mg by mouth daily      ergocalciferol (ERGOCALCIFEROL) 1.25 MG (19182 UT) capsule Take 50,000 Units by mouth once a week      digoxin (LANOXIN) 125 MCG tablet Take 125 mcg by mouth daily      Magnesium 400 MG CAPS Take 1 tablet by mouth daily       warfarin (COUMADIN) 5 MG tablet Take 5 mg by mouth      pantoprazole (PROTONIX) 40 MG tablet Take 1 tablet by mouth every morning (before breakfast) 90 tablet 3    bumetanide (BUMEX) 2 MG tablet Take 1 tablet by mouth daily (Patient taking differently: Take 1 mg by mouth Take 1 tablet (1 mg total) by mouth daily as needed (fluid retention: weight gain/bloating/shortness of breath). ) 90 tablet 3    allopurinol (ZYLOPRIM) 100 MG tablet Take 1 tablet by mouth daily 30 tablet 3    HUMULIN R 500 UNIT/ML injection 4 times daily (before meals and nightly) Sliding scale       No current facility-administered medications for this encounter. OBJECTIVE:  Alert and fully ambulatory. Pleasant and conversant. Physical Examination:   Pleasant 82-year-old gentleman who is not short of breath at rest.    Constitutional: A well developed, well nourished 73 y.o. male who is alert, oriented, cooperative and in no apparent distress. Clinically there is no pretibial pitting edema. There were no vitals filed for this visit. Wt Readings from Last 3 Encounters:   11/02/21 239 lb (108.4 kg)   10/26/21 229 lb (103.9 kg)   10/06/21 226 lb (102.5 kg)       ASSESSMENT/PLAN:     Patient is tolerating treatments well with no treatment related side effects. With the weight loss and absence of pretibial pitting edema patient looks extremely comfortable. Current and planned dose reviewed. Goals of treatment and potential side effects were reviewed with the patient. Treatment imaging has been personally reviewed for accuracy and precision. Questions answered to apparent satisfaction. Treatments will continue as planned. Thank you for the opportunity to participate in multidisciplinary management of this remarkable and pleasant patient.       Katarina Maldonado MD [facility]    Department of Radiation Oncology    [facility]) [facility]: [phone] ([facility]: [phone])  [address] [place] [address] [facility]: [phone] (MBI: [phone])  [address] [facility]) [facility]:  [phone] ([facility]:  [phone])

## 2021-11-10 ENCOUNTER — HOSPITAL ENCOUNTER (OUTPATIENT)
Dept: RADIATION ONCOLOGY | Age: 71
Discharge: HOME | End: 2021-11-10
Attending: RADIOLOGY
Payer: COMMERCIAL

## 2021-11-10 PROCEDURE — 77014 PR CT GUIDANCE PLACEMENT RAD THERAPY FIELDS: CPT | Performed by: RADIOLOGY

## 2021-11-10 PROCEDURE — 77385 HC NTSTY MODUL RAD TX DLVR SMPL: CPT | Performed by: RADIOLOGY

## 2021-11-11 ENCOUNTER — OFFICE VISIT (OUTPATIENT)
Dept: FAMILY MEDICINE CLINIC | Age: 71
End: 2021-11-11
Payer: COMMERCIAL

## 2021-11-11 ENCOUNTER — HOSPITAL ENCOUNTER (OUTPATIENT)
Age: 71
Discharge: HOME | End: 2021-11-11
Payer: COMMERCIAL

## 2021-11-11 ENCOUNTER — HOSPITAL ENCOUNTER (OUTPATIENT)
Dept: RADIATION ONCOLOGY | Age: 71
Discharge: HOME | End: 2021-11-11
Attending: RADIOLOGY
Payer: COMMERCIAL

## 2021-11-11 VITALS
TEMPERATURE: 97 F | WEIGHT: 245 LBS | HEIGHT: 71 IN | RESPIRATION RATE: 18 BRPM | BODY MASS INDEX: 34.3 KG/M2 | HEART RATE: 54 BPM | OXYGEN SATURATION: 99 %

## 2021-11-11 DIAGNOSIS — C61 CARCINOMA OF PROSTATE (HCC): ICD-10-CM

## 2021-11-11 DIAGNOSIS — E11.59 TYPE 2 DIABETES MELLITUS WITH OTHER CIRCULATORY COMPLICATION, WITHOUT LONG-TERM CURRENT USE OF INSULIN (HCC): Primary | ICD-10-CM

## 2021-11-11 DIAGNOSIS — I48.91 ATRIAL FIBRILLATION, UNSPECIFIED TYPE (HCC): Chronic | ICD-10-CM

## 2021-11-11 DIAGNOSIS — R78.81 BACTEREMIA DUE TO ENTEROCOCCUS: ICD-10-CM

## 2021-11-11 DIAGNOSIS — I10 ESSENTIAL HYPERTENSION: Chronic | ICD-10-CM

## 2021-11-11 DIAGNOSIS — B95.2 BACTEREMIA DUE TO ENTEROCOCCUS: ICD-10-CM

## 2021-11-11 DIAGNOSIS — E11.65 UNCONTROLLED TYPE 2 DIABETES MELLITUS WITH HYPERGLYCEMIA (HCC): ICD-10-CM

## 2021-11-11 PROBLEM — Z95.3 STATUS POST TRANSCATHETER AORTIC VALVE REPLACEMENT (TAVR) USING BIOPROSTHESIS: Status: ACTIVE | Noted: 2021-10-19

## 2021-11-11 PROBLEM — E11.9 TYPE 2 DIABETES MELLITUS (HCC): Status: ACTIVE | Noted: 2019-12-16

## 2021-11-11 PROBLEM — T82.7XXA INFECTION ASSOCIATED WITH DRIVELINE OF LEFT VENTRICULAR ASSIST DEVICE (LVAD) (HCC): Status: ACTIVE | Noted: 2021-08-27

## 2021-11-11 LAB
HBA1C MFR BLD: 5.5 %
INR BLD: 3.6
PROTHROMBIN TIME: 38.7 SEC (ref 9.3–12.4)

## 2021-11-11 PROCEDURE — 3044F HG A1C LEVEL LT 7.0%: CPT | Performed by: FAMILY MEDICINE

## 2021-11-11 PROCEDURE — 85610 PROTHROMBIN TIME: CPT

## 2021-11-11 PROCEDURE — 1123F ACP DISCUSS/DSCN MKR DOCD: CPT | Performed by: FAMILY MEDICINE

## 2021-11-11 PROCEDURE — 4040F PNEUMOC VAC/ADMIN/RCVD: CPT | Performed by: FAMILY MEDICINE

## 2021-11-11 PROCEDURE — G8484 FLU IMMUNIZE NO ADMIN: HCPCS | Performed by: FAMILY MEDICINE

## 2021-11-11 PROCEDURE — 83036 HEMOGLOBIN GLYCOSYLATED A1C: CPT | Performed by: FAMILY MEDICINE

## 2021-11-11 PROCEDURE — G8427 DOCREV CUR MEDS BY ELIG CLIN: HCPCS | Performed by: FAMILY MEDICINE

## 2021-11-11 PROCEDURE — 99213 OFFICE O/P EST LOW 20 MIN: CPT | Performed by: FAMILY MEDICINE

## 2021-11-11 PROCEDURE — 77014 PR CT GUIDANCE PLACEMENT RAD THERAPY FIELDS: CPT | Performed by: RADIOLOGY

## 2021-11-11 PROCEDURE — 3017F COLORECTAL CA SCREEN DOC REV: CPT | Performed by: FAMILY MEDICINE

## 2021-11-11 PROCEDURE — 2022F DILAT RTA XM EVC RTNOPTHY: CPT | Performed by: FAMILY MEDICINE

## 2021-11-11 PROCEDURE — G8417 CALC BMI ABV UP PARAM F/U: HCPCS | Performed by: FAMILY MEDICINE

## 2021-11-11 PROCEDURE — 36415 COLL VENOUS BLD VENIPUNCTURE: CPT

## 2021-11-11 PROCEDURE — 1036F TOBACCO NON-USER: CPT | Performed by: FAMILY MEDICINE

## 2021-11-11 PROCEDURE — 77385 HC NTSTY MODUL RAD TX DLVR SMPL: CPT | Performed by: RADIOLOGY

## 2021-11-11 RX ORDER — VALSARTAN 80 MG/1
40 TABLET ORAL 2 TIMES DAILY
COMMUNITY

## 2021-11-11 RX ORDER — METOPROLOL SUCCINATE 25 MG/1
25 TABLET, EXTENDED RELEASE ORAL DAILY
COMMUNITY
End: 2021-12-30 | Stop reason: ALTCHOICE

## 2021-11-11 RX ORDER — TRIAMCINOLONE ACETONIDE 1 MG/G
CREAM TOPICAL DAILY PRN
COMMUNITY
Start: 2021-05-28

## 2021-11-11 NOTE — PROGRESS NOTES
CC: Dick Ortiz. is a 73 y.o. yo male is here for evaluation evaluation for the following acute & chronic medical concerns: Discuss Medications      HPI:      S/p TAVR 10/19/21  Bacteremia due to Enterococcus associated with LVAD: Completed 6 week course of IV unasyn therapy on 10/10/21. On Augmentin for chronic suppressive therapy. Prostate ca - follows with Kyle  HTN: on diovan 40mg; metoprolol 25mg, spironolactone  Nonischemic cardiomyopathy / HFrEF with LVAD in place - follows with Joey; on amiodarone, digoxin, metoprolol, spironolactone, bumex  Afib: on coumadin 5mg; metoprolol, antiarrhythmics  DMII - resolved and currently off of all oral medication and insulin; follows with [facility], endo   Sarcoid - stable at this time; off of steroids  Chronic pain - ultram PRN; same rx since january  Gets all prescriptions from [place]. SNEHA  CKD stage 3  Sp bariatric surgery  Foot wound / osteo - follows with podiatry  Bilateral lower extremity peripheral vascular disease     Vitals:   Pulse 54   Temp 97 °F (36.1 °C)   Resp 18   Ht 5' 11\" (1.803 m)   Wt 245 lb (111.1 kg)   SpO2 99%   BMI 34.17 kg/m²   Wt Readings from Last 3 Encounters:   11/11/21 245 lb (111.1 kg)   11/09/21 234 lb (106.1 kg)   11/02/21 239 lb (108.4 kg)       PE:  Constitutional - alert, well appearing, and in no distress  Eyes - extraocular eye movements intact, left eye normal, right eye normal, no conjunctivitis noted  Neck - symmetric, no obvious masses noted  Respiratory- clear to auscultation, no wheezes, rales or rhonchi, symmetric air entry; no increased work of breathing  Cardiovascular - mechanical LVAD sound  Extremities -  trace edema b/l noted  Abdomen - soft, nontender, nondistended  Skin - normal coloration and turgor, no rashes, no suspicious skin lesions noted      A / P:     Diagnosis Orders   1.  Type 2 diabetes mellitus with other circulatory complication, without long-term current use of insulin (HCC)  POCT glycosylated hemoglobin (Hb A1C)   2. Uncontrolled type 2 diabetes mellitus with hyperglycemia ([facility]     3. Essential hypertension     4. Atrial fibrillation, unspecified type ([facility]     5. Carcinoma of prostate ([facility]     6. Bacteremia due to Enterococcus         Med[facility] per [place] and [facility]  Continue to follow      RTO: Return in about 4 months (around 3/11/2022).         An electronic signature was used to authenticate this note.  ---- Radha Coker MD on 11/11/2021 at 4:43 PM

## 2021-11-12 ENCOUNTER — HOSPITAL ENCOUNTER (OUTPATIENT)
Dept: RADIATION ONCOLOGY | Age: 71
Discharge: HOME | End: 2021-11-12
Attending: RADIOLOGY
Payer: COMMERCIAL

## 2021-11-12 ENCOUNTER — TELEPHONE (OUTPATIENT)
Dept: ONCOLOGY | Age: 71
End: 2021-11-12

## 2021-11-12 PROCEDURE — 77427 RADIATION TX MANAGEMENT X5: CPT | Performed by: RADIOLOGY

## 2021-11-12 PROCEDURE — 77336 RADIATION PHYSICS CONSULT: CPT | Performed by: RADIOLOGY

## 2021-11-12 PROCEDURE — 77385 HC NTSTY MODUL RAD TX DLVR SMPL: CPT | Performed by: RADIOLOGY

## 2021-11-12 PROCEDURE — 77014 PR CT GUIDANCE PLACEMENT RAD THERAPY FIELDS: CPT | Performed by: RADIOLOGY

## 2021-11-12 NOTE — TELEPHONE ENCOUNTER
Met with pt in conjunction with radiation tx re: positive distress screen. Pt is 73-year-old male being treated for prostate cancer. Pt's mood appeared euthymic with full affect, he appeared A&Ox4, and he was willing/able to participate in session. He appeared appropriately dressed/groomed and was able to ambulate/transfer slowly with use of a cane. Pt reported that he is doing well at this time. Noted that he is independent of ADL/IADL and enjoys cooking. Stated that he has a good social support system and identified his wife as his primary support. Reported that he utilizes [id number] for transportation which can be frustrating due to long waits for pickups but overall expressed satisfaction with program.  Pt indicated that he has been sleeping well and has no concerns re: depression/anxiety. No additional needs identified at this time. Reviewed role of oncology SW and encouraged pt to notify this provider if additional needs arise.     Crys Hernández, CASEY, KRISTALW-S  Oncology Social Worker

## 2021-11-15 ENCOUNTER — HOSPITAL ENCOUNTER (OUTPATIENT)
Dept: RADIATION ONCOLOGY | Age: 71
Discharge: HOME | End: 2021-11-15
Attending: RADIOLOGY
Payer: COMMERCIAL

## 2021-11-15 PROCEDURE — 77385 HC NTSTY MODUL RAD TX DLVR SMPL: CPT | Performed by: RADIOLOGY

## 2021-11-15 PROCEDURE — 77014 PR CT GUIDANCE PLACEMENT RAD THERAPY FIELDS: CPT | Performed by: RADIOLOGY

## 2021-11-16 ENCOUNTER — HOSPITAL ENCOUNTER (OUTPATIENT)
Dept: RADIATION ONCOLOGY | Age: 71
Discharge: HOME | End: 2021-11-16
Attending: RADIOLOGY
Payer: COMMERCIAL

## 2021-11-16 VITALS
RESPIRATION RATE: 18 BRPM | HEART RATE: 52 BPM | TEMPERATURE: 96.4 F | BODY MASS INDEX: 31.38 KG/M2 | OXYGEN SATURATION: 100 % | WEIGHT: 225 LBS

## 2021-11-16 PROCEDURE — 77014 PR CT GUIDANCE PLACEMENT RAD THERAPY FIELDS: CPT | Performed by: RADIOLOGY

## 2021-11-16 PROCEDURE — 77385 HC NTSTY MODUL RAD TX DLVR SMPL: CPT | Performed by: RADIOLOGY

## 2021-11-16 ASSESSMENT — PAIN DESCRIPTION - PAIN TYPE: TYPE: CHRONIC PAIN

## 2021-11-16 NOTE — PROGRESS NOTES
Hallie Harris Sr.  11/16/2021  Wt Readings from Last 3 Encounters:   11/16/21 225 lb (102.1 kg)   11/11/21 245 lb (111.1 kg)   11/09/21 234 lb (106.1 kg)     Body mass index is 31.38 kg/m². Treatment Area:Prostate    Patient was seen today for weekly visit. Comfort Alteration  KPS:70%  Fatigue: None    Nutritional Alteration  Anorexia: No  Nausea: No  Vomiting: No     Elimination Alterations  Constipation: no  Diarrhea:  no  Urinary Frequency/Urgency: No  Urinary Retention: No  Dysuria: No  Urinary Incontinence: No  Proctitis: No  Nocturia: No #/night: 1-2     Skin Alteration   Sensation:none    Radiation Dermatitis:  none    Emotional  Coping: effective    Sexuality Alterationna    Injury, potential bleeding or infection: potential    Lab Results   Component Value Date    WBC 6.6 10/06/2021     10/06/2021         Pulse 52   Temp 96.4 °F (35.8 °C) (Temporal)   Resp 18   Wt 225 lb (102.1 kg)   SpO2 100%   BMI 31.38 kg/m²   BP within normal range?  Yes/ via doppler         Assessment/Plan:  Completed 17/44 fractions; 3060/7920 cGy,  No complaints, Dr Denise Cross updated    Nikki Gonzalez, RN

## 2021-11-16 NOTE — PROGRESS NOTES
DEPARTMENT OF RADIATION ONCOLOGY   ON TREATMENT VISIT       11/16/2021      NAME:  Jong Cai Sr. YOB: 1950    DIAGNOSIS: Carcinoma of the prostate    SUBJECTIVE:       Jong Cai Sr. has now received 3060 cGy in 17  fractions directed to the prostate gland and periprostatic area. Taking a diuretic 2 days ago. Overall lost 9 pounds and he denies any weakness. All urinary stream seem to be slow. Past medical, surgical, social and family histories reviewed and updated as indicated. PAIN:     ALLERGIES:  Food, Chlorhexidine, Soap & cleansers, and Strawberry (diagnostic)         Current Outpatient Medications   Medication Sig Dispense Refill    triamcinolone (KENALOG) 0.1 % cream APPLY SMALL AMOUNT TO AFFECTED AREA    TWICE A DAY **DO NOT USE ON FACE**(STEROID)      valsartan (DIOVAN) 80 MG tablet Take 80 mg by mouth daily Take one tab by mouth two times a day      metoprolol succinate (TOPROL XL) 25 MG extended release tablet Take 25 mg by mouth daily Take one tab by mouth daily      Calcium-Vitamin D-Vitamin K 650-12.5-40 MG-MCG-MCG CHEW Take by mouth Take one tab by mouth 2 times a day      acetaminophen (TYLENOL) 500 MG tablet Take 1,000 mg by mouth every 8 hours as needed for Pain      bicalutamide (CASODEX) 50 MG chemo tablet Take 50 mg by mouth daily      mexiletine (MEXITIL) 150 MG capsule Take 150 mg by mouth every 8 hours as needed      Multiple Vitamin (MULTIVITAMINS PO) Take 1 tablet by mouth 4 times daily Geriatric fusion oral chewable      amoxicillin-clavulanate (AUGMENTIN) 875-125 MG per tablet Take 1 tablet by mouth 2 times daily      amiodarone (CORDARONE) 200 MG tablet Take 200 mg by mouth daily      bacitracin 500 UNIT/GM ointment Apply topically 2 times daily Apply topically 2 times daily.       senna (SENOKOT) 8.6 MG tablet Take 1 tablet by mouth nightly       psyllium (KONSYL) 28.3 % PACK Take 1 packet by mouth daily as needed Metamucil sugar free 3.4 gram powder pack      sodium chloride (OCEAN) 0.65 % nasal spray 1 spray by Nasal route 3 times daily as needed for Congestion       aspirin 81 MG EC tablet Take 81 mg by mouth daily      ergocalciferol (ERGOCALCIFEROL) 1.25 MG (15104 UT) capsule Take 50,000 Units by mouth once a week      digoxin (LANOXIN) 125 MCG tablet Take 125 mcg by mouth daily      Magnesium 400 MG CAPS Take 1 tablet by mouth daily       warfarin (COUMADIN) 5 MG tablet Take 5 mg by mouth      pantoprazole (PROTONIX) 40 MG tablet Take 1 tablet by mouth every morning (before breakfast) 90 tablet 3    bumetanide (BUMEX) 2 MG tablet Take 1 tablet by mouth daily (Patient taking differently: Take 1 mg by mouth Take 1 tablet (1 mg total) by mouth daily as needed (fluid retention: weight gain/bloating/shortness of breath). ) 90 tablet 3    allopurinol (ZYLOPRIM) 100 MG tablet Take 1 tablet by mouth daily 30 tablet 3    HUMULIN R 500 UNIT/ML injection 4 times daily (before meals and nightly) Sliding scale       No current facility-administered medications for this encounter. OBJECTIVE:  Alert and fully ambulatory. Pleasant and conversant. Not in any acute distress. Physical Examination:   Constitutional: A well developed, well nourished 73 y.o. male who is alert, oriented, cooperative and in no apparent distress. There is no pretibial pitting edema. Short of breath at rest    There were no vitals filed for this visit. Wt Readings from Last 3 Encounters:   11/11/21 245 lb (111.1 kg)   11/09/21 234 lb (106.1 kg)   11/02/21 239 lb (108.4 kg)       ASSESSMENT/PLAN:     Patient is tolerating treatments well with expected toxicities. Recommend modified sitz bath's with moist heat. Current and planned dose reviewed. Goals of treatment and potential side effects were reviewed with the patient. Treatment imaging has been personally reviewed for accuracy and precision.     Questions answered to apparent satisfaction. Treatments will continue as planned. Thank you for the opportunity to participate in multidisciplinary management of this remarkable and pleasant patient.       Emily Foreman MD [facility]    Department of Radiation Oncology    [facility]) [facility]: [phone] (SQI: [phone])  [facility]) [facility]: [phone] (BWO: [phone])  [facility]) [facility]:  [phone] (OII:  [phone])

## 2021-11-17 ENCOUNTER — HOSPITAL ENCOUNTER (OUTPATIENT)
Dept: RADIATION ONCOLOGY | Age: 71
Discharge: HOME | End: 2021-11-17
Attending: RADIOLOGY
Payer: COMMERCIAL

## 2021-11-17 PROCEDURE — 77014 PR CT GUIDANCE PLACEMENT RAD THERAPY FIELDS: CPT | Performed by: RADIOLOGY

## 2021-11-17 PROCEDURE — 77385 HC NTSTY MODUL RAD TX DLVR SMPL: CPT | Performed by: RADIOLOGY

## 2021-11-18 ENCOUNTER — HOSPITAL ENCOUNTER (OUTPATIENT)
Dept: RADIATION ONCOLOGY | Age: 71
Discharge: HOME | End: 2021-11-18
Attending: RADIOLOGY
Payer: COMMERCIAL

## 2021-11-18 PROCEDURE — 77385 HC NTSTY MODUL RAD TX DLVR SMPL: CPT | Performed by: RADIOLOGY

## 2021-11-18 PROCEDURE — 77014 PR CT GUIDANCE PLACEMENT RAD THERAPY FIELDS: CPT | Performed by: RADIOLOGY

## 2021-11-19 ENCOUNTER — APPOINTMENT (OUTPATIENT)
Dept: RADIATION ONCOLOGY | Age: 71
End: 2021-11-19
Attending: RADIOLOGY
Payer: COMMERCIAL

## 2021-11-21 ENCOUNTER — HOSPITAL ENCOUNTER (OUTPATIENT)
Dept: RADIATION ONCOLOGY | Age: 71
Discharge: HOME | End: 2021-11-21
Attending: RADIOLOGY
Payer: COMMERCIAL

## 2021-11-21 PROCEDURE — 77014 PR CT GUIDANCE PLACEMENT RAD THERAPY FIELDS: CPT | Performed by: RADIOLOGY

## 2021-11-21 PROCEDURE — 77427 RADIATION TX MANAGEMENT X5: CPT | Performed by: RADIOLOGY

## 2021-11-21 PROCEDURE — 77336 RADIATION PHYSICS CONSULT: CPT | Performed by: RADIOLOGY

## 2021-11-21 PROCEDURE — 77385 HC NTSTY MODUL RAD TX DLVR SMPL: CPT | Performed by: RADIOLOGY

## 2021-11-22 ENCOUNTER — HOSPITAL ENCOUNTER (OUTPATIENT)
Dept: RADIATION ONCOLOGY | Age: 71
Discharge: HOME | End: 2021-11-22
Attending: RADIOLOGY
Payer: COMMERCIAL

## 2021-11-22 PROCEDURE — 77385 HC NTSTY MODUL RAD TX DLVR SMPL: CPT | Performed by: RADIOLOGY

## 2021-11-22 PROCEDURE — 77014 PR CT GUIDANCE PLACEMENT RAD THERAPY FIELDS: CPT | Performed by: RADIOLOGY

## 2021-11-23 ENCOUNTER — HOSPITAL ENCOUNTER (OUTPATIENT)
Dept: RADIATION ONCOLOGY | Age: 71
Discharge: HOME | End: 2021-11-23
Attending: RADIOLOGY
Payer: COMMERCIAL

## 2021-11-23 VITALS
RESPIRATION RATE: 18 BRPM | BODY MASS INDEX: 31.54 KG/M2 | TEMPERATURE: 97.2 F | WEIGHT: 226.13 LBS | HEART RATE: 54 BPM | OXYGEN SATURATION: 99 %

## 2021-11-23 PROCEDURE — 77014 PR CT GUIDANCE PLACEMENT RAD THERAPY FIELDS: CPT | Performed by: RADIOLOGY

## 2021-11-23 PROCEDURE — 77385 HC NTSTY MODUL RAD TX DLVR SMPL: CPT | Performed by: RADIOLOGY

## 2021-11-23 ASSESSMENT — PAIN DESCRIPTION - DESCRIPTORS: DESCRIPTORS: ACHING

## 2021-11-23 ASSESSMENT — PAIN SCALES - GENERAL: PAINLEVEL_OUTOF10: 5

## 2021-11-23 ASSESSMENT — PAIN DESCRIPTION - PAIN TYPE: TYPE: CHRONIC PAIN

## 2021-11-23 ASSESSMENT — PAIN DESCRIPTION - LOCATION: LOCATION: GENERALIZED

## 2021-11-23 NOTE — PROGRESS NOTES
DEPARTMENT OF RADIATION ONCOLOGY   ON TREATMENT VISIT       11/23/2021      NAME:  Renny Pinzon Sr. YOB: 1950    DIAGNOSIS: Carcinoma of the prostate gland. SUBJECTIVE:   Renny Pinzon Sr. has now received 4400 cGy in 22 fractions directed to the   Primary in the prostate gland. .  Was seen by his cardiologist last week and his cardiac status seem to be stable. There is no change in his weight. Denies any bladder or bowel symptoms    Past medical, surgical, social and family histories reviewed and updated as indicated. ALLERGIES:  Food, Chlorhexidine, Soap & cleansers, and Strawberry (diagnostic)         Current Outpatient Medications   Medication Sig Dispense Refill    triamcinolone (KENALOG) 0.1 % cream APPLY SMALL AMOUNT TO AFFECTED AREA    TWICE A DAY **DO NOT USE ON FACE**(STEROID)      valsartan (DIOVAN) 80 MG tablet Take 80 mg by mouth daily Take one tab by mouth two times a day      metoprolol succinate (TOPROL XL) 25 MG extended release tablet Take 25 mg by mouth daily Take one tab by mouth daily      Calcium-Vitamin D-Vitamin K 650-12.5-40 MG-MCG-MCG CHEW Take by mouth Take one tab by mouth 2 times a day      acetaminophen (TYLENOL) 500 MG tablet Take 1,000 mg by mouth every 8 hours as needed for Pain      bicalutamide (CASODEX) 50 MG chemo tablet Take 50 mg by mouth daily      mexiletine (MEXITIL) 150 MG capsule Take 150 mg by mouth every 8 hours as needed      Multiple Vitamin (MULTIVITAMINS PO) Take 1 tablet by mouth 4 times daily Geriatric fusion oral chewable      amoxicillin-clavulanate (AUGMENTIN) 875-125 MG per tablet Take 1 tablet by mouth 2 times daily      amiodarone (CORDARONE) 200 MG tablet Take 200 mg by mouth daily      bacitracin 500 UNIT/GM ointment Apply topically 2 times daily Apply topically 2 times daily.       senna (SENOKOT) 8.6 MG tablet Take 1 tablet by mouth nightly       psyllium (KONSYL) 28.3 % PACK Take 1 packet by mouth daily as needed Metamucil sugar free 3.4 gram powder pack      sodium chloride (OCEAN) 0.65 % nasal spray 1 spray by Nasal route 3 times daily as needed for Congestion       aspirin 81 MG EC tablet Take 81 mg by mouth daily      ergocalciferol (ERGOCALCIFEROL) 1.25 MG (53816 UT) capsule Take 50,000 Units by mouth once a week      digoxin (LANOXIN) 125 MCG tablet Take 125 mcg by mouth daily      Magnesium 400 MG CAPS Take 1 tablet by mouth daily       warfarin (COUMADIN) 5 MG tablet Take 5 mg by mouth      pantoprazole (PROTONIX) 40 MG tablet Take 1 tablet by mouth every morning (before breakfast) 90 tablet 3    bumetanide (BUMEX) 2 MG tablet Take 1 tablet by mouth daily (Patient taking differently: Take 1 mg by mouth Take 1 tablet (1 mg total) by mouth daily as needed (fluid retention: weight gain/bloating/shortness of breath). ) 90 tablet 3    allopurinol (ZYLOPRIM) 100 MG tablet Take 1 tablet by mouth daily 30 tablet 3    HUMULIN R 500 UNIT/ML injection 4 times daily (before meals and nightly) Sliding scale       No current facility-administered medications for this encounter. OBJECTIVE:  Alert and fully ambulatory. Pleasant and conversant. Physical Examination:   Constitutional: A well developed, well nourished 73 y.o. male who is alert, oriented, cooperative and in no apparent distress. There is no pretibial pitting edema. Vitals:    11/23/21 1316   Pulse: 54   Resp: 18   Temp: 97.2 °F (36.2 °C)   TempSrc: Temporal   SpO2: 99%   Weight: 226 lb 2 oz (102.6 kg)       Wt Readings from Last 3 Encounters:   11/23/21 226 lb 2 oz (102.6 kg)   11/16/21 225 lb (102.1 kg)   11/11/21 245 lb (111.1 kg)       ASSESSMENT/PLAN:     Patient is tolerating treatments well with expected toxicities. Current and planned dose reviewed. Goals of treatment and potential side effects were reviewed with the patient. Questions answered to apparent satisfaction.     Treatments will continue as planned. Thank you for the opportunity to participate in multidisciplinary management of this remarkable and pleasant patient.       Luciano Costa MD [facility]    Department of Radiation Oncology    [facility]) [facility]: [phone] (AMW: [phone])  [facility]: [phone] (PTZ: [phone])  [facility]:  [phone] (YKQ:  [phone])

## 2021-11-23 NOTE — PROGRESS NOTES
Yi Calixto .  11/23/2021  Wt Readings from Last 3 Encounters:   11/23/21 226 lb 2 oz (102.6 kg)   11/16/21 225 lb (102.1 kg)   11/11/21 245 lb (111.1 kg)     Body mass index is 31.54 kg/m². Treatment Area:Prostate    Patient was seen today for weekly visit. Comfort Alteration  KPS:70%  Fatigue: Mild    Nutritional Alteration  Anorexia: No  Nausea: No  Vomiting: No     Elimination Alterations  Constipation: no  Diarrhea:  no  Urinary Frequency/Urgency: No  Urinary Retention: No  Dysuria: No  Urinary Incontinence: No  Proctitis: No  Nocturia: No #/night: 1     Skin Alteration   Sensation:none    Radiation Dermatitis:  none    Emotional  Coping: effective    Sexuality Alteration  na    Injury, potential bleeding or infection: na    Lab Results   Component Value Date    WBC 6.6 10/06/2021     10/06/2021         Pulse 54   Temp 97.2 °F (36.2 °C) (Temporal)   Resp 18   Wt 226 lb 2 oz (102.6 kg)   SpO2 99%   BMI 31.54 kg/m²   BP within normal range?  N/A per doppler       Assessment/Plan:  Completed 22/44 fractions; 4821/4153 cGy, Dr Martell Mike updated    Antoni Ashley RN

## 2021-11-24 ENCOUNTER — HOSPITAL ENCOUNTER (OUTPATIENT)
Dept: RADIATION ONCOLOGY | Age: 71
Discharge: HOME | End: 2021-11-24
Attending: RADIOLOGY
Payer: COMMERCIAL

## 2021-11-24 PROCEDURE — 77385 HC NTSTY MODUL RAD TX DLVR SMPL: CPT | Performed by: RADIOLOGY

## 2021-11-24 PROCEDURE — 77014 PR CT GUIDANCE PLACEMENT RAD THERAPY FIELDS: CPT | Performed by: RADIOLOGY

## 2021-11-28 ENCOUNTER — APPOINTMENT (OUTPATIENT)
Dept: RADIATION ONCOLOGY | Age: 71
End: 2021-11-28
Attending: RADIOLOGY
Payer: COMMERCIAL

## 2021-11-29 ENCOUNTER — HOSPITAL ENCOUNTER (OUTPATIENT)
Dept: RADIATION ONCOLOGY | Age: 71
Discharge: HOME | End: 2021-11-29
Attending: RADIOLOGY
Payer: COMMERCIAL

## 2021-11-29 PROCEDURE — 77014 PR CT GUIDANCE PLACEMENT RAD THERAPY FIELDS: CPT | Performed by: RADIOLOGY

## 2021-11-29 PROCEDURE — 77385 HC NTSTY MODUL RAD TX DLVR SMPL: CPT | Performed by: RADIOLOGY

## 2021-11-30 ENCOUNTER — HOSPITAL ENCOUNTER (OUTPATIENT)
Dept: RADIATION ONCOLOGY | Age: 71
Discharge: HOME | End: 2021-11-30
Attending: RADIOLOGY
Payer: COMMERCIAL

## 2021-11-30 VITALS
TEMPERATURE: 97.3 F | RESPIRATION RATE: 20 BRPM | BODY MASS INDEX: 31.45 KG/M2 | HEART RATE: 67 BPM | WEIGHT: 225.5 LBS | OXYGEN SATURATION: 99 %

## 2021-11-30 DIAGNOSIS — C61 PROSTATE CANCER (HCC): Primary | ICD-10-CM

## 2021-11-30 PROCEDURE — 77385 HC NTSTY MODUL RAD TX DLVR SMPL: CPT | Performed by: RADIOLOGY

## 2021-11-30 PROCEDURE — 77014 PR CT GUIDANCE PLACEMENT RAD THERAPY FIELDS: CPT | Performed by: RADIOLOGY

## 2021-11-30 PROCEDURE — 77336 RADIATION PHYSICS CONSULT: CPT | Performed by: RADIOLOGY

## 2021-11-30 PROCEDURE — 77427 RADIATION TX MANAGEMENT X5: CPT | Performed by: RADIOLOGY

## 2021-11-30 ASSESSMENT — PAIN DESCRIPTION - PAIN TYPE: TYPE: CHRONIC PAIN

## 2021-11-30 ASSESSMENT — PAIN DESCRIPTION - LOCATION: LOCATION: GENERALIZED

## 2021-11-30 ASSESSMENT — PAIN DESCRIPTION - DESCRIPTORS: DESCRIPTORS: ACHING

## 2021-11-30 NOTE — PROGRESS NOTES
Therisa [place] Sr.  11/30/2021  Wt Readings from Last 3 Encounters:   11/30/21 225 lb 8 oz (102.3 kg)   11/23/21 226 lb 2 oz (102.6 kg)   11/16/21 225 lb (102.1 kg)     Body mass index is 31.45 kg/m². Treatment Area:Prostate    Patient was seen today for weekly visit. Comfort Alteration  KPS:70%  Fatigue: None    Nutritional Alteration  Anorexia: No  Nausea: No  Vomiting: No     Elimination Alterations  Constipation: no  Diarrhea:  no  Urinary Frequency/Urgency: No  Urinary Retention: No  Dysuria: No  Urinary Incontinence: No  Proctitis: No  Nocturia: No #/night: 1     Skin Alteration   Sensation:none    Radiation Dermatitis:  none    Emotional  Coping: effective    Sexuality Alteration  none    Injury, potential bleeding or infection: na    Lab Results   Component Value Date    WBC 6.6 10/06/2021     10/06/2021         Pulse 67   Temp 97.3 °F (36.3 °C) (Temporal)   Resp 20   Wt 225 lb 8 oz (102.3 kg)   SpO2 99%   BMI 31.45 kg/m²   BP within normal range? Per doppler at home       Assessment/Plan:  Completed 25/44 fractions; 4500/7920 cGy, no other complaints. Dr En Thayer updated.   complaints    Umair Caicedo RN
satisfaction. Treatments will continue as planned. Thank you for the opportunity to participate in multidisciplinary management of this remarkable and pleasant patient.       Mahesh Mullen MD    Department of Radiation Oncology  [facility]) [facility]: [phone] (SCO: [phone])  [address] [place] [address]: [phone] (IEU: [phone])  [facility]:  [phone] (MYL:  [phone])

## 2021-12-01 ENCOUNTER — HOSPITAL ENCOUNTER (OUTPATIENT)
Dept: RADIATION ONCOLOGY | Age: 71
Discharge: HOME | End: 2021-12-01
Attending: RADIOLOGY
Payer: COMMERCIAL

## 2021-12-01 PROCEDURE — 77385 HC NTSTY MODUL RAD TX DLVR SMPL: CPT | Performed by: RADIOLOGY

## 2021-12-01 PROCEDURE — 77014 PR CT GUIDANCE PLACEMENT RAD THERAPY FIELDS: CPT | Performed by: RADIOLOGY

## 2021-12-02 ENCOUNTER — HOSPITAL ENCOUNTER (OUTPATIENT)
Dept: RADIATION ONCOLOGY | Age: 71
Discharge: HOME | End: 2021-12-02
Attending: RADIOLOGY
Payer: COMMERCIAL

## 2021-12-02 PROCEDURE — 77014 PR CT GUIDANCE PLACEMENT RAD THERAPY FIELDS: CPT | Performed by: RADIOLOGY

## 2021-12-02 PROCEDURE — 77385 HC NTSTY MODUL RAD TX DLVR SMPL: CPT | Performed by: RADIOLOGY

## 2021-12-03 ENCOUNTER — APPOINTMENT (OUTPATIENT)
Dept: RADIATION ONCOLOGY | Age: 71
End: 2021-12-03
Attending: RADIOLOGY
Payer: COMMERCIAL

## 2021-12-03 ENCOUNTER — HOSPITAL ENCOUNTER (OUTPATIENT)
Dept: RADIATION ONCOLOGY | Age: 71
Discharge: HOME | End: 2021-12-03
Attending: RADIOLOGY
Payer: COMMERCIAL

## 2021-12-03 PROCEDURE — 77385 HC NTSTY MODUL RAD TX DLVR SMPL: CPT | Performed by: RADIOLOGY

## 2021-12-03 PROCEDURE — 77014 PR CT GUIDANCE PLACEMENT RAD THERAPY FIELDS: CPT | Performed by: RADIOLOGY

## 2021-12-06 ENCOUNTER — HOSPITAL ENCOUNTER (OUTPATIENT)
Dept: RADIATION ONCOLOGY | Age: 71
Discharge: HOME | End: 2021-12-06
Attending: RADIOLOGY
Payer: COMMERCIAL

## 2021-12-06 ENCOUNTER — TELEPHONE (OUTPATIENT)
Dept: CASE MANAGEMENT | Age: 71
End: 2021-12-06

## 2021-12-06 VITALS — RESPIRATION RATE: 18 BRPM | TEMPERATURE: 97.2 F | OXYGEN SATURATION: 97 %

## 2021-12-06 DIAGNOSIS — C61 CARCINOMA OF PROSTATE (HCC): Primary | ICD-10-CM

## 2021-12-06 PROCEDURE — 77014 PR CT GUIDANCE PLACEMENT RAD THERAPY FIELDS: CPT | Performed by: RADIOLOGY

## 2021-12-06 PROCEDURE — 77385 HC NTSTY MODUL RAD TX DLVR SMPL: CPT | Performed by: RADIOLOGY

## 2021-12-06 ASSESSMENT — PAIN DESCRIPTION - ORIENTATION: ORIENTATION: RIGHT

## 2021-12-06 ASSESSMENT — PAIN DESCRIPTION - DESCRIPTORS: DESCRIPTORS: ACHING;SORE

## 2021-12-06 ASSESSMENT — PAIN DESCRIPTION - PAIN TYPE: TYPE: ACUTE PAIN;CHRONIC PAIN

## 2021-12-06 ASSESSMENT — PAIN DESCRIPTION - FREQUENCY: FREQUENCY: INTERMITTENT

## 2021-12-06 ASSESSMENT — PAIN SCALES - GENERAL: PAINLEVEL_OUTOF10: 5

## 2021-12-06 NOTE — PROGRESS NOTES
DEPARTMENT OF RADIATION ONCOLOGY   ON TREATMENT VISIT       12/6/2021      NAME:  Harinder Baca Sr. 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 has now received 5220 cGy in 29 fractions directed to the prostate, 4500 cGy to the pelvis. Past medical, surgical, social and family histories reviewed and updated as indicated. PAIN: 0 out of 10    ALLERGIES:  Food, Chlorhexidine, Soap & cleansers, and Strawberry (diagnostic)         Current Outpatient Medications   Medication Sig Dispense Refill    triamcinolone (KENALOG) 0.1 % cream APPLY SMALL AMOUNT TO AFFECTED AREA    TWICE A DAY **DO NOT USE ON FACE**(STEROID)      valsartan (DIOVAN) 80 MG tablet Take 80 mg by mouth daily Take one tab by mouth two times a day . 80 mg in the morning 40 mg at night      metoprolol succinate (TOPROL XL) 25 MG extended release tablet Take 25 mg by mouth daily Take one tab by mouth daily      Calcium-Vitamin D-Vitamin K 650-12.5-40 MG-MCG-MCG CHEW Take by mouth Take one tab by mouth 2 times a day      acetaminophen (TYLENOL) 500 MG tablet Take 1,000 mg by mouth every 8 hours as needed for Pain      bicalutamide (CASODEX) 50 MG chemo tablet Take 50 mg by mouth daily      mexiletine (MEXITIL) 150 MG capsule Take 150 mg by mouth every 8 hours as needed      Multiple Vitamin (MULTIVITAMINS PO) Take 1 tablet by mouth 4 times daily Geriatric fusion oral chewable      amoxicillin-clavulanate (AUGMENTIN) 875-125 MG per tablet Take 1 tablet by mouth 2 times daily      amiodarone (CORDARONE) 200 MG tablet Take 200 mg by mouth daily      bacitracin 500 UNIT/GM ointment Apply topically 2 times daily Apply topically 2 times daily.       senna (SENOKOT) 8.6 MG tablet Take 1 tablet by mouth nightly       psyllium (KONSYL) 28.3 % PACK Take 1 packet by mouth daily as needed Metamucil sugar free 3.4 gram powder pack  sodium chloride (OCEAN) 0.65 % nasal spray 1 spray by Nasal route 3 times daily as needed for Congestion       aspirin 81 MG EC tablet Take 81 mg by mouth daily      ergocalciferol (ERGOCALCIFEROL) 1.25 MG (30418 UT) capsule Take 50,000 Units by mouth once a week      digoxin (LANOXIN) 125 MCG tablet Take 125 mcg by mouth daily      Magnesium 400 MG CAPS Take 1 tablet by mouth daily       warfarin (COUMADIN) 5 MG tablet Take 5 mg by mouth      pantoprazole (PROTONIX) 40 MG tablet Take 1 tablet by mouth every morning (before breakfast) 90 tablet 3    bumetanide (BUMEX) 2 MG tablet Take 1 tablet by mouth daily (Patient taking differently: Take 1 mg by mouth Take 1 tablet (1 mg total) by mouth daily as needed (fluid retention: weight gain/bloating/shortness of breath). ) 90 tablet 3    allopurinol (ZYLOPRIM) 100 MG tablet Take 1 tablet by mouth daily 30 tablet 3    HUMULIN R 500 UNIT/ML injection 4 times daily (before meals and nightly) Sliding scale       No current facility-administered medications for this encounter. OBJECTIVE:  Alert and fully ambulatory. Pleasant and conversant. Physical Examination:General appearance - alert, well appearing, and in no distress:  Constitutional: A well developed, well nourished 73 y.o. male who is alert, oriented, cooperative and in no apparent distress. HEENT:   Skin:  Warm and dry. No obvious rashes. Vitals:    12/06/21 1241   Resp: 18   Temp: 97.2 °F (36.2 °C)   TempSrc: Temporal   SpO2: 97%       Wt Readings from Last 3 Encounters:   11/30/21 225 lb 8 oz (102.3 kg)   11/23/21 226 lb 2 oz (102.6 kg)   11/16/21 225 lb (102.1 kg)       ASSESSMENT/PLAN:     Patient is tolerating treatments well with expected toxicities. He is starting to lose weight as can be seen on the scale as well as on the cone beam CT, the patient has been trying to lose weight for his height of transplant, but I have recommended not to lose it during the radiation therapy.   He will try to add more calories to his diet and we will reconsider next week for a possible plan for resimulation. Current and planned dose reviewed. Goals of treatment and potential side effects were reviewed with the patient. Treatment imaging has been personally reviewed for accuracy and precision. Questions answered to apparent satisfaction. Treatments will continue as planned. Thank you for the opportunity to participate in multidisciplinary management of this remarkable and pleasant patient.       Dina Kaiser MD    Department of Radiation Oncology  [facility]) [facility]: [phone] (AHR: [phone])  [facility]: [phone] (UJF: [phone])  [facility]) [facility]:  [phone] (EDN:  [phone])

## 2021-12-06 NOTE — TELEPHONE ENCOUNTER
Patient's last radiation treatment is scheduled for 12/28/21. His current authorization with [id number] is approved through 12/14/21. Patient will need transportation assistance on 12/15/21 and then can get a ride from a family member for the remainder of treatments.     I completed the \"[employer]" request for [id number] for 12/15/21 and emailed it to [email or web address].

## 2021-12-06 NOTE — TELEPHONE ENCOUNTER
Hello and thank you for contacting us,     The transportation for Aj Mcgill being picked up from [facility]. [address] traveling to [place] [address] on 12/15/21 is scheduled under confirmation number [id number]. We asked that Patient is ready as early as 12:00 PM.    Thank you for your understanding and please let us know if you have any questions. Gilford Hane  Customer Service     [address] [facility], [address] [place] [address]    From: [email or web address]   Sent: Monday, December 6, 2021 3:32 PM  To: Customer Service [email or web address]  Subject: (SECURE) Transportation EXTENSION request    Please confirm approval.     Thank you. Mary RODRIGUESN, RN, OCN  Oncology Nurse Navigator  [facility]   O: [phone]   F: [phone]  [email or web address]      12/06/21 at 1610 - I called patient to advise of above.

## 2021-12-07 ENCOUNTER — HOSPITAL ENCOUNTER (OUTPATIENT)
Dept: RADIATION ONCOLOGY | Age: 71
Discharge: HOME | End: 2021-12-07
Attending: RADIOLOGY
Payer: COMMERCIAL

## 2021-12-07 PROCEDURE — 77427 RADIATION TX MANAGEMENT X5: CPT | Performed by: RADIOLOGY

## 2021-12-07 PROCEDURE — 77385 HC NTSTY MODUL RAD TX DLVR SMPL: CPT | Performed by: RADIOLOGY

## 2021-12-07 PROCEDURE — 77336 RADIATION PHYSICS CONSULT: CPT | Performed by: RADIOLOGY

## 2021-12-07 PROCEDURE — 77014 PR CT GUIDANCE PLACEMENT RAD THERAPY FIELDS: CPT | Performed by: RADIOLOGY

## 2021-12-08 ENCOUNTER — HOSPITAL ENCOUNTER (OUTPATIENT)
Dept: RADIATION ONCOLOGY | Age: 71
Discharge: HOME | End: 2021-12-08
Attending: RADIOLOGY
Payer: COMMERCIAL

## 2021-12-08 PROCEDURE — 77014 PR CT GUIDANCE PLACEMENT RAD THERAPY FIELDS: CPT | Performed by: RADIOLOGY

## 2021-12-08 PROCEDURE — 77385 HC NTSTY MODUL RAD TX DLVR SMPL: CPT | Performed by: RADIOLOGY

## 2021-12-09 ENCOUNTER — HOSPITAL ENCOUNTER (OUTPATIENT)
Dept: RADIATION ONCOLOGY | Age: 71
Discharge: HOME | End: 2021-12-09
Attending: RADIOLOGY
Payer: COMMERCIAL

## 2021-12-09 PROCEDURE — 77385 HC NTSTY MODUL RAD TX DLVR SMPL: CPT | Performed by: RADIOLOGY

## 2021-12-09 PROCEDURE — 77014 PR CT GUIDANCE PLACEMENT RAD THERAPY FIELDS: CPT | Performed by: RADIOLOGY

## 2021-12-10 ENCOUNTER — APPOINTMENT (OUTPATIENT)
Dept: RADIATION ONCOLOGY | Age: 71
End: 2021-12-10
Attending: RADIOLOGY
Payer: COMMERCIAL

## 2021-12-13 ENCOUNTER — HOSPITAL ENCOUNTER (OUTPATIENT)
Dept: RADIATION ONCOLOGY | Age: 71
Discharge: HOME | End: 2021-12-13
Attending: RADIOLOGY
Payer: COMMERCIAL

## 2021-12-13 VITALS
OXYGEN SATURATION: 98 % | WEIGHT: 220 LBS | BODY MASS INDEX: 30.68 KG/M2 | RESPIRATION RATE: 18 BRPM | TEMPERATURE: 97.3 F

## 2021-12-13 DIAGNOSIS — C61 CARCINOMA OF PROSTATE (HCC): Primary | ICD-10-CM

## 2021-12-13 PROCEDURE — 77385 HC NTSTY MODUL RAD TX DLVR SMPL: CPT | Performed by: RADIOLOGY

## 2021-12-13 PROCEDURE — 77014 PR CT GUIDANCE PLACEMENT RAD THERAPY FIELDS: CPT | Performed by: RADIOLOGY

## 2021-12-13 ASSESSMENT — PAIN SCALES - GENERAL: PAINLEVEL_OUTOF10: 10

## 2021-12-13 ASSESSMENT — PAIN DESCRIPTION - LOCATION: LOCATION: LEG;OTHER (COMMENT)

## 2021-12-13 NOTE — PROGRESS NOTES
Jong Cai Sr.  12/13/2021  Wt Readings from Last 3 Encounters:   12/13/21 220 lb (99.8 kg)   11/30/21 225 lb 8 oz (102.3 kg)   11/23/21 226 lb 2 oz (102.6 kg)     Body mass index is 30.68 kg/m². Treatment Area:prostate    Patient was seen today for weekly visit. Comfort Alteration  KPS:70%  Fatigue: Mild    Nutritional Alteration  Anorexia: No  Nausea: No  Vomiting: No     Elimination Alterations  Constipation: no  Diarrhea:  no  Urinary Frequency/Urgency: No  Urinary Retention: Patially  Dysuria: No  Urinary Incontinence: No  Proctitis: No  Nocturia: No #/night: 2     Skin Alteration   Sensation:na    Radiation Dermatitis:  none    Emotional  Coping: effective    Sexuality Alteration  na    Injury, potential bleeding or infection: na    Lab Results   Component Value Date    WBC 6.6 10/06/2021     10/06/2021         Temp 97.3 °F (36.3 °C) (Temporal)   Resp 18   Wt 220 lb (99.8 kg)   SpO2 98%   BMI 30.68 kg/m²   BP within normal range? Doppler reading       Assessment/Plan:  Completed 33/44 fractions; 5940/7920 cGy, no other complaints Dr Rajat Cheung updated.     Yadi Bush RN

## 2021-12-13 NOTE — PROGRESS NOTES
DEPARTMENT OF RADIATION ONCOLOGY   ON TREATMENT VISIT       12/13/2021      NAME:  Timur York Sr. YOB: 1950    DIAGNOSIS: cT2B high volume  GS 9 (4+5) adenocarcinoma prostate. PSA 60.8 ng/mL      SUBJECTIVE:   Timur York Sr. has now received 5940 cGy in 33  fractions directed to the prostate and seminal vesicles. Past medical, surgical, social and family histories reviewed and updated as indicated. PAIN: None    ALLERGIES:  Food, Chlorhexidine, Soap & cleansers, and Strawberry (diagnostic)         Current Outpatient Medications   Medication Sig Dispense Refill    triamcinolone (KENALOG) 0.1 % cream APPLY SMALL AMOUNT TO AFFECTED AREA    TWICE A DAY **DO NOT USE ON FACE**(STEROID)      valsartan (DIOVAN) 80 MG tablet Take 80 mg by mouth daily Take one tab by mouth two times a day . 80 mg in the morning 40 mg at night      metoprolol succinate (TOPROL XL) 25 MG extended release tablet Take 25 mg by mouth daily Take one tab by mouth daily      Calcium-Vitamin D-Vitamin K 650-12.5-40 MG-MCG-MCG CHEW Take by mouth Take one tab by mouth 2 times a day      acetaminophen (TYLENOL) 500 MG tablet Take 1,000 mg by mouth every 8 hours as needed for Pain      bicalutamide (CASODEX) 50 MG chemo tablet Take 50 mg by mouth daily      mexiletine (MEXITIL) 150 MG capsule Take 150 mg by mouth every 8 hours as needed      Multiple Vitamin (MULTIVITAMINS PO) Take 1 tablet by mouth 4 times daily Geriatric fusion oral chewable      amoxicillin-clavulanate (AUGMENTIN) 875-125 MG per tablet Take 1 tablet by mouth 2 times daily      amiodarone (CORDARONE) 200 MG tablet Take 200 mg by mouth daily      bacitracin 500 UNIT/GM ointment Apply topically 2 times daily Apply topically 2 times daily.       senna (SENOKOT) 8.6 MG tablet Take 1 tablet by mouth nightly       psyllium (KONSYL) 28.3 % PACK Take 1 packet by mouth daily as needed Metamucil sugar free 3.4 gram powder pack      sodium chloride (OCEAN) 0.65 % nasal spray 1 spray by Nasal route 3 times daily as needed for Congestion       aspirin 81 MG EC tablet Take 81 mg by mouth daily      ergocalciferol (ERGOCALCIFEROL) 1.25 MG (47564 UT) capsule Take 50,000 Units by mouth once a week      digoxin (LANOXIN) 125 MCG tablet Take 125 mcg by mouth daily      Magnesium 400 MG CAPS Take 1 tablet by mouth daily       warfarin (COUMADIN) 5 MG tablet Take 5 mg by mouth      pantoprazole (PROTONIX) 40 MG tablet Take 1 tablet by mouth every morning (before breakfast) 90 tablet 3    bumetanide (BUMEX) 2 MG tablet Take 1 tablet by mouth daily (Patient taking differently: Take 1 mg by mouth Take 1 tablet (1 mg total) by mouth daily as needed (fluid retention: weight gain/bloating/shortness of breath). ) 90 tablet 3    allopurinol (ZYLOPRIM) 100 MG tablet Take 1 tablet by mouth daily 30 tablet 3    HUMULIN R 500 UNIT/ML injection 4 times daily (before meals and nightly) Sliding scale       No current facility-administered medications for this encounter. OBJECTIVE:  Alert and fully ambulatory. Pleasant and conversant. Physical Examination:General appearance - alert, well appearing, and in no distress, oriented to person, place, and time and overweight:  Constitutional: A well developed, well nourished 73 y.o. male who is alert, oriented, cooperative and in no apparent distress. HEENT:   Skin:  Warm and dry. No obvious rashes. Vitals:    12/13/21 1338   Resp: 18   Temp: 97.3 °F (36.3 °C)   TempSrc: Temporal   SpO2: 98%   Weight: 220 lb (99.8 kg)       Wt Readings from Last 3 Encounters:   12/13/21 220 lb (99.8 kg)   11/30/21 225 lb 8 oz (102.3 kg)   11/23/21 226 lb 2 oz (102.6 kg)       ASSESSMENT/PLAN:     Patient is tolerating treatments well with expected toxicities.   His weight is stable compared to last week, actually he has gained couple of pounds probably due to the fact that he underwent a Lasix injections the previous week, but none this week    Current and planned dose reviewed. Goals of treatment and potential side effects were reviewed with the patient. Treatment imaging has been personally reviewed for accuracy and precision. Questions answered to apparent satisfaction. Treatments will continue as planned. Thank you for the opportunity to participate in multidisciplinary management of this remarkable and pleasant patient.       Mahesh Hubbard MD    Department of Radiation Oncology  [facility]) [facility]: [phone] ([facility]: [phone])  [address] [place] [address]) [facility]: [phone] (EK: [phone])  [facility]) [facility]:  [phone] (BQP:  [phone])

## 2021-12-14 ENCOUNTER — APPOINTMENT (OUTPATIENT)
Dept: RADIATION ONCOLOGY | Age: 71
End: 2021-12-14
Attending: RADIOLOGY
Payer: COMMERCIAL

## 2021-12-15 ENCOUNTER — APPOINTMENT (OUTPATIENT)
Dept: RADIATION ONCOLOGY | Age: 71
End: 2021-12-15
Attending: RADIOLOGY
Payer: COMMERCIAL

## 2021-12-15 ENCOUNTER — HOSPITAL ENCOUNTER (OUTPATIENT)
Dept: RADIATION ONCOLOGY | Age: 71
End: 2021-12-15
Attending: RADIOLOGY
Payer: COMMERCIAL

## 2021-12-15 ENCOUNTER — TELEPHONE (OUTPATIENT)
Dept: RADIATION ONCOLOGY | Age: 71
End: 2021-12-15

## 2021-12-15 NOTE — TELEPHONE ENCOUNTER
Keven Alonzo called to inform us that he is admitted at [facility] and he will not be here for treatment today, Thursday and Friday. He will call us Monday 12/20/21 with an update. I will update Dr Corey Brito and therapists.

## 2021-12-16 ENCOUNTER — APPOINTMENT (OUTPATIENT)
Dept: RADIATION ONCOLOGY | Age: 71
End: 2021-12-16
Attending: RADIOLOGY
Payer: COMMERCIAL

## 2021-12-16 ENCOUNTER — HOSPITAL ENCOUNTER (OUTPATIENT)
Dept: WOUND CARE | Age: 71
Discharge: HOME | End: 2021-12-16

## 2021-12-17 ENCOUNTER — APPOINTMENT (OUTPATIENT)
Dept: RADIATION ONCOLOGY | Age: 71
End: 2021-12-17
Attending: RADIOLOGY
Payer: COMMERCIAL

## 2021-12-17 ENCOUNTER — TELEPHONE (OUTPATIENT)
Dept: FAMILY MEDICINE CLINIC | Age: 71
End: 2021-12-17

## 2021-12-17 NOTE — TELEPHONE ENCOUNTER
----- Message from Noemy Barragan sent at 12/17/2021  3:28 PM EST -----  Subject: Message to Provider    QUESTIONS  Information for Provider? Lay Ramirez with [facility] wants to know if Dr. Tc Thakkar will follow home care orders. Pt was discharged today   from [place]. Please advise  ---------------------------------------------------------------------------  --------------  CALL BACK INFO  What is the best way for the office to contact you? OK to leave message on   voicemail  Preferred Call Back Phone Number? [phone]  ---------------------------------------------------------------------------  --------------  SCRIPT ANSWERS  Relationship to Patient? Third Party  Representative Name?  Lay Ramirez

## 2021-12-20 ENCOUNTER — APPOINTMENT (OUTPATIENT)
Dept: RADIATION ONCOLOGY | Age: 71
End: 2021-12-20
Attending: RADIOLOGY
Payer: COMMERCIAL

## 2021-12-20 ENCOUNTER — HOSPITAL ENCOUNTER (OUTPATIENT)
Dept: RADIATION ONCOLOGY | Age: 71
Discharge: HOME | End: 2021-12-20
Attending: RADIOLOGY
Payer: COMMERCIAL

## 2021-12-21 ENCOUNTER — APPOINTMENT (OUTPATIENT)
Dept: RADIATION ONCOLOGY | Age: 71
End: 2021-12-21
Attending: RADIOLOGY
Payer: COMMERCIAL

## 2021-12-22 ENCOUNTER — APPOINTMENT (OUTPATIENT)
Dept: RADIATION ONCOLOGY | Age: 71
End: 2021-12-22
Attending: RADIOLOGY
Payer: COMMERCIAL

## 2021-12-23 ENCOUNTER — APPOINTMENT (OUTPATIENT)
Dept: RADIATION ONCOLOGY | Age: 71
End: 2021-12-23
Attending: RADIOLOGY
Payer: COMMERCIAL

## 2021-12-27 ENCOUNTER — HOSPITAL ENCOUNTER (OUTPATIENT)
Dept: RADIATION ONCOLOGY | Age: 71
Discharge: HOME | End: 2021-12-27
Attending: RADIOLOGY
Payer: COMMERCIAL

## 2021-12-27 ENCOUNTER — APPOINTMENT (OUTPATIENT)
Dept: RADIATION ONCOLOGY | Age: 71
End: 2021-12-27
Attending: RADIOLOGY
Payer: COMMERCIAL

## 2021-12-27 VITALS — TEMPERATURE: 97.2 F | HEART RATE: 81 BPM | WEIGHT: 237.5 LBS | OXYGEN SATURATION: 98 % | BODY MASS INDEX: 33.12 KG/M2

## 2021-12-27 DIAGNOSIS — C61 CARCINOMA OF PROSTATE (HCC): Primary | ICD-10-CM

## 2021-12-27 PROCEDURE — 77014 PR CT GUIDANCE PLACEMENT RAD THERAPY FIELDS: CPT | Performed by: RADIOLOGY

## 2021-12-27 PROCEDURE — 77385 HC NTSTY MODUL RAD TX DLVR SMPL: CPT | Performed by: RADIOLOGY

## 2021-12-27 RX ORDER — ONDANSETRON 4 MG/1
4 TABLET, FILM COATED ORAL EVERY 6 HOURS PRN
COMMUNITY

## 2021-12-27 ASSESSMENT — PAIN DESCRIPTION - PAIN TYPE: TYPE: CHRONIC PAIN

## 2021-12-27 ASSESSMENT — PAIN DESCRIPTION - LOCATION: LOCATION: LEG;HAND

## 2021-12-27 ASSESSMENT — PAIN SCALES - GENERAL: PAINLEVEL_OUTOF10: 8

## 2021-12-27 ASSESSMENT — PAIN DESCRIPTION - DESCRIPTORS: DESCRIPTORS: ACHING

## 2021-12-27 ASSESSMENT — PAIN DESCRIPTION - FREQUENCY: FREQUENCY: INTERMITTENT

## 2021-12-27 NOTE — PROGRESS NOTES
Tod Davis .  12/27/2021  Wt Readings from Last 3 Encounters:   12/27/21 237 lb 8 oz (107.7 kg)   12/13/21 220 lb (99.8 kg)   11/30/21 225 lb 8 oz (102.3 kg)     Body mass index is 33.12 kg/m². Treatment Area:Prostates    Patient was seen today for weekly visit. Comfort Alteration  KPS:70%  Fatigue: Mild    Nutritional Alteration  Anorexia: No  Nausea: No/ none for the last 5 days  Vomiting: No     Elimination Alterations  Constipation: no  Diarrhea:  no  Urinary Frequency/Urgency: No  Urinary Retention: No  Dysuria: No  Urinary Incontinence: No  Proctitis: No  Nocturia: Yes #/night: 3     Skin Alteration   Sensation:none    Radiation Dermatitis:  None, moisturizing skin at least daily     Emotional  Coping: effective    Sexuality Alteration  na    Injury, potential bleeding or infection: na    Lab Results   Component Value Date    WBC 6.6 10/06/2021     10/06/2021         Pulse 81   Temp 97.2 °F (36.2 °C) (Temporal)   Wt 237 lb 8 oz (107.7 kg)   SpO2 98%   BMI 33.12 kg/m²   BP within normal range? Per doppler         Assessment/Plan:  Completed 34/44 fractions; 6120/ 7920 cGy, no other complaints. Edema to bilateral LE, he takes Bumex as needed. Dr Daniele Dobbs updated.     Ruthy Dai RN

## 2021-12-27 NOTE — PROGRESS NOTES
DEPARTMENT OF RADIATION ONCOLOGY   ON TREATMENT VISIT       12/27/2021      NAME:  Ricarda Kee . YOB: 1950    DIAGNOSIS: cT2B high volume  GS 9 Adenocarcinoma prostate cancer. SUBJECTIVE:   Imani Zhong. has now received 6120 cGy in 34 fractions directed to the state and proximal seminal vesicles. Past medical, surgical, social and family histories reviewed and updated as indicated. PAIN: No    ALLERGIES:  Food, Chlorhexidine, Soap & cleansers, and Strawberry (diagnostic)         Current Outpatient Medications   Medication Sig Dispense Refill    ondansetron (ZOFRAN) 4 MG tablet Take 4 mg by mouth every 6 hours as needed for Nausea or Vomiting      triamcinolone (KENALOG) 0.1 % cream APPLY SMALL AMOUNT TO AFFECTED AREA    TWICE A DAY **DO NOT USE ON FACE**(STEROID)      valsartan (DIOVAN) 80 MG tablet Take 80 mg by mouth daily Take one tab by mouth two times a day . 80 mg in the morning 40 mg at night      metoprolol succinate (TOPROL XL) 25 MG extended release tablet Take 25 mg by mouth daily Take one tab by mouth daily      acetaminophen (TYLENOL) 500 MG tablet Take 1,000 mg by mouth every 8 hours as needed for Pain      bicalutamide (CASODEX) 50 MG chemo tablet Take 50 mg by mouth daily      mexiletine (MEXITIL) 150 MG capsule Take 150 mg by mouth every 8 hours as needed      Multiple Vitamin (MULTIVITAMINS PO) Take 1 tablet by mouth 4 times daily Geriatric fusion oral chewable      amoxicillin-clavulanate (AUGMENTIN) 875-125 MG per tablet Take 1 tablet by mouth 2 times daily      amiodarone (CORDARONE) 200 MG tablet Take 200 mg by mouth daily      bacitracin 500 UNIT/GM ointment Apply topically 2 times daily Apply topically 2 times daily.       senna (SENOKOT) 8.6 MG tablet Take 1 tablet by mouth nightly       psyllium (KONSYL) 28.3 % PACK Take 1 packet by mouth daily as needed Metamucil sugar free 3.4 gram powder pack      sodium chloride (OCEAN) 0.65 % nasal spray 1 spray by Nasal route 3 times daily as needed for Congestion       aspirin 81 MG EC tablet Take 81 mg by mouth daily      ergocalciferol (ERGOCALCIFEROL) 1.25 MG (74728 UT) capsule Take 50,000 Units by mouth once a week      digoxin (LANOXIN) 125 MCG tablet Take 125 mcg by mouth daily      Magnesium 400 MG CAPS Take 1 tablet by mouth daily       warfarin (COUMADIN) 5 MG tablet Take 5 mg by mouth      pantoprazole (PROTONIX) 40 MG tablet Take 1 tablet by mouth every morning (before breakfast) 90 tablet 3    bumetanide (BUMEX) 2 MG tablet Take 1 tablet by mouth daily (Patient taking differently: Take 1 mg by mouth Take 1 tablet (1 mg total) by mouth daily as needed (fluid retention: weight gain/bloating/shortness of breath). ) 90 tablet 3    allopurinol (ZYLOPRIM) 100 MG tablet Take 1 tablet by mouth daily 30 tablet 3    HUMULIN R 500 UNIT/ML injection 4 times daily (before meals and nightly) Sliding scale       No current facility-administered medications for this encounter. OBJECTIVE:  Alert and fully ambulatory. Pleasant and conversant. Physical Examination:Abdomen - soft, nontender, nondistended, no masses or organomegaly:  Constitutional: A well developed, well nourished 73 y.o. male who is alert, oriented, cooperative and in no apparent distress. HEENT:   Skin:  Warm and dry. No obvious rashes. Vitals:    12/27/21 1334   Pulse: 81   Temp: 97.2 °F (36.2 °C)   TempSrc: Temporal   SpO2: 98%   Weight: 237 lb 8 oz (107.7 kg)       Wt Readings from Last 3 Encounters:   12/27/21 237 lb 8 oz (107.7 kg)   12/13/21 220 lb (99.8 kg)   11/30/21 225 lb 8 oz (102.3 kg)       ASSESSMENT/PLAN:     Patient is tolerating treatments well with expected toxicities. The patient has been admitted to [facility] for about a week, there he was checked for blood clots and found to have none.   He was given new therapy with Zofran due to vertigo symptoms presumably due to [facility] becoming loose after his fall. Current and planned dose reviewed. Goals of treatment and potential side effects were reviewed with the patient. Treatment imaging has been personally reviewed for accuracy and precision. Questions answered to apparent satisfaction. Treatments will continue as planned. Thank you for the opportunity to participate in multidisciplinary management of this remarkable and pleasant patient.       Verner Robert, MD    Department of Radiation Oncology  [facility] [place]) [facility]: [phone] (ASF: [phone])  [address] [place] [address]: [phone] (MOC: [phone])  [facility]) [facility]:  [phone] (WSB:  [phone])

## 2021-12-28 ENCOUNTER — APPOINTMENT (OUTPATIENT)
Dept: RADIATION ONCOLOGY | Age: 71
End: 2021-12-28
Attending: RADIOLOGY
Payer: COMMERCIAL

## 2021-12-28 ENCOUNTER — TELEPHONE (OUTPATIENT)
Dept: CASE MANAGEMENT | Age: 71
End: 2021-12-28

## 2021-12-28 ENCOUNTER — HOSPITAL ENCOUNTER (OUTPATIENT)
Dept: RADIATION ONCOLOGY | Age: 71
Discharge: HOME | End: 2021-12-28
Attending: RADIOLOGY
Payer: COMMERCIAL

## 2021-12-28 PROCEDURE — 77336 RADIATION PHYSICS CONSULT: CPT | Performed by: RADIOLOGY

## 2021-12-28 PROCEDURE — 77427 RADIATION TX MANAGEMENT X5: CPT | Performed by: RADIOLOGY

## 2021-12-28 PROCEDURE — 77014 PR CT GUIDANCE PLACEMENT RAD THERAPY FIELDS: CPT | Performed by: RADIOLOGY

## 2021-12-28 PROCEDURE — 77385 HC NTSTY MODUL RAD TX DLVR SMPL: CPT | Performed by: RADIOLOGY

## 2021-12-28 NOTE — TELEPHONE ENCOUNTER
I called patient who confirm that he will need an extension on his [id number] authorization as his RT appointments are now due to be completed on 1/11/22 due to a recent hospitalization.      I emailed [id number] the request.

## 2021-12-29 ENCOUNTER — HOSPITAL ENCOUNTER (OUTPATIENT)
Dept: RADIATION ONCOLOGY | Age: 71
Discharge: HOME | End: 2021-12-29
Attending: RADIOLOGY
Payer: COMMERCIAL

## 2021-12-29 ENCOUNTER — TELEPHONE (OUTPATIENT)
Dept: CASE MANAGEMENT | Age: 71
End: 2021-12-29

## 2021-12-29 PROCEDURE — 77385 HC NTSTY MODUL RAD TX DLVR SMPL: CPT | Performed by: RADIOLOGY

## 2021-12-29 PROCEDURE — 77014 PR CT GUIDANCE PLACEMENT RAD THERAPY FIELDS: CPT | Performed by: RADIOLOGY

## 2021-12-30 ENCOUNTER — HOSPITAL ENCOUNTER (OUTPATIENT)
Dept: WOUND CARE | Age: 71
Discharge: HOME | End: 2021-12-30
Payer: COMMERCIAL

## 2021-12-30 ENCOUNTER — HOSPITAL ENCOUNTER (OUTPATIENT)
Dept: RADIATION ONCOLOGY | Age: 71
Discharge: HOME | End: 2021-12-30
Attending: RADIOLOGY
Payer: COMMERCIAL

## 2021-12-30 VITALS
WEIGHT: 230 LBS | HEART RATE: 72 BPM | RESPIRATION RATE: 16 BRPM | TEMPERATURE: 97.2 F | BODY MASS INDEX: 32.2 KG/M2 | HEIGHT: 71 IN

## 2021-12-30 DIAGNOSIS — L97.512 DIABETIC ULCER OF TOE OF RIGHT FOOT ASSOCIATED WITH TYPE 2 DIABETES MELLITUS, WITH FAT LAYER EXPOSED (HCC): Primary | ICD-10-CM

## 2021-12-30 DIAGNOSIS — E11.621 DIABETIC ULCER OF TOE OF RIGHT FOOT ASSOCIATED WITH TYPE 2 DIABETES MELLITUS, WITH FAT LAYER EXPOSED (HCC): Primary | ICD-10-CM

## 2021-12-30 PROCEDURE — 11042 DBRDMT SUBQ TIS 1ST 20SQCM/<: CPT

## 2021-12-30 PROCEDURE — 77385 HC NTSTY MODUL RAD TX DLVR SMPL: CPT | Performed by: RADIOLOGY

## 2021-12-30 PROCEDURE — 77014 PR CT GUIDANCE PLACEMENT RAD THERAPY FIELDS: CPT | Performed by: RADIOLOGY

## 2021-12-30 RX ORDER — BACITRACIN ZINC AND POLYMYXIN B SULFATE 500; 1000 [USP'U]/G; [USP'U]/G
OINTMENT TOPICAL ONCE
Status: CANCELLED | OUTPATIENT
Start: 2021-12-30 | End: 2021-12-30

## 2021-12-30 RX ORDER — GENTAMICIN SULFATE 1 MG/G
OINTMENT TOPICAL ONCE
Status: CANCELLED | OUTPATIENT
Start: 2021-12-30 | End: 2021-12-30

## 2021-12-30 RX ORDER — BACITRACIN, NEOMYCIN, POLYMYXIN B 400; 3.5; 5 [USP'U]/G; MG/G; [USP'U]/G
OINTMENT TOPICAL ONCE
Status: CANCELLED | OUTPATIENT
Start: 2021-12-30 | End: 2021-12-30

## 2021-12-30 RX ORDER — LIDOCAINE HYDROCHLORIDE 40 MG/ML
SOLUTION TOPICAL ONCE
Status: DISCONTINUED | OUTPATIENT
Start: 2021-12-30 | End: 2021-12-31 | Stop reason: HOSPADM

## 2021-12-30 RX ORDER — BETAMETHASONE DIPROPIONATE 0.05 %
OINTMENT (GRAM) TOPICAL ONCE
Status: CANCELLED | OUTPATIENT
Start: 2021-12-30 | End: 2021-12-30

## 2021-12-30 RX ORDER — LIDOCAINE HYDROCHLORIDE 20 MG/ML
JELLY TOPICAL ONCE
Status: CANCELLED | OUTPATIENT
Start: 2021-12-30 | End: 2021-12-30

## 2021-12-30 RX ORDER — GINSENG 100 MG
CAPSULE ORAL ONCE
Status: CANCELLED | OUTPATIENT
Start: 2021-12-30 | End: 2021-12-30

## 2021-12-30 NOTE — PLAN OF CARE
Problem: Wound:  Goal: Will show signs of wound healing; wound closure and no evidence of infection  Description: Will show signs of wound healing; wound closure and no evidence of infection  Outcome: Ongoing     Problem: Wound:  Goal: Will show signs of wound healing; wound closure and no evidence of infection  Description: Will show signs of wound healing; wound closure and no evidence of infection  Outcome: Ongoing     Problem: Venous:  Goal: Signs of wound healing will improve  Description: Signs of wound healing will improve  Outcome: Ongoing

## 2021-12-30 NOTE — PROGRESS NOTES
[facility]  History and Physical/Consultation  Podiatry    Referring Physician : MD Renny Alarcon . MEDICAL RECORD NUMBER:  [id number]  AGE: 73 y.o. GENDER: male  : 1950  EPISODE DATE:  2021  Subjective:     Chief Complaint   Patient presents with    Wound Check     right great toe         HISTORY of PRESENT ILLNESS HPI     Verito Cristobal is a 73 y.o. male who presents today with his wife for wound/ulcer evaluation. History of Wound Context:   Last seen Oct 4th. dressings per wife, including multiple types. Had his diabetic insert adjusted.   Undergoing radiation tx.    21 local care, no pressure, evaluated insert, area is offloaded       Wound/Ulcer Pain Timing/Severity: none  Quality of pain:   Severity:   / 10   Modifying Factors:   Associated Signs/Symptoms:     Ulcer Identification:  Ulcer Type: diabetic, pressure and neuropathic  Contributing Factors: chronic pressure    Diabetic/Pressure/Non Pressure Ulcers onl y:  Ulcer: N/A    If patient has diabetic lower extremity wounds  Martinez Classification of diabetic lower extremity wounds:    Grade Description   []  0 No open wound   []  1 Superficial ulcer involving the full skin thickness   []  2 Deep ulcer involves ligament, tendon, joint capsule, or fascia  No bone involvement or abscess presence   []  3 Deep Ulcer with abcess formation and/or osteomyelitis   []  4 Localized gangrene   []  5 Extensive gangrene of the foot     Wound: N/A        PAST MEDICAL HISTORY      Diagnosis Date    Anxiety     Aorto-iliac atherosclerosis ([address] 2020    Arthritis     Atrial fibrillation (HCC)     AVNRT (AV teddy re-entry tachycardia) ([address]     Blood type O+ 2019    CAD (coronary artery disease)     Cancer ([address]     CHF (congestive heart failure) ([address]     Diabetes mellitus ([address]     Diabetic neuropathy ([address]     Diastolic dysfunction     stage 3    Fall     right knee    Gout     chemically induced    History of blood transfusion     Hx of blood clots     left leg    Hyperlipidemia     Hypertension     LBBB (left bundle branch block)     Left ventricular assist device (LVAD) complication 8781    Moderate mitral regurgitation 2016    Nonischemic cardiomyopathy (HCC)     Obesity     SNEHA (obstructive sleep apnea)     treated with CPAP    PVD (peripheral vascular disease) with claudication ([facility] 2020    Sarcoidosis 2019    Severe tricuspid regurgitation 2016    SVT (supraventricular tachycardia) (HCC)     Viral cardiomyopathy ([facility]      Past Surgical History:   Procedure Laterality Date    BACK SURGERY      CARDIAC CATHETERIZATION  2011    Dr. Keven Harley  2019    Dr. Stefany Alvarez  2014    BiV/ICD  (Shirlean Patella)    Dr. Markus Carranza CATH LAB PROCEDURE      ECHO COMPL W DOP COLOR FLOW  2011         ECHO COMPLETE  2013         EYE SURGERY Right 2019    FEMUR SURGERY      rt; compound frx right femur at 3years old.     JOINT REPLACEMENT Left     knee    KNEE SURGERY      7 on right/1 on left/ total replacement on R    PICC LINE INSERTION NURSE  10/21/2019         PROSTATE BIOPSY  2021     Family History   Problem Relation Age of Onset    Alzheimer's Disease Mother     Diabetes Mother     No Known Problems Father         l[facility] in [facility]    No Known Problems Brother     Other Brother         aneurysm behind his eye    Arrhythmia Brother          age 61    No Known Problems Brother     No Known Problems Brother      Social History     Tobacco Use    Smoking status: Former Smoker     Packs/day: 0.00     Years: 2.00     Pack years: 0.00     Types: Cigars     Quit date: 1980     Years since quittin.0    Smokeless tobacco: Never Used    Tobacco comment: smoked 1 cigar a week    Vaping Use    Vaping Wound Image   08/31/20 1354   Wound Etiology Diabetic Martinez 3 07/13/20 1534   Wound Cleansed Rinsed/Irrigated with saline 08/24/20 1432   Dressing/Treatment Alginate with Ag 08/24/20 1432   Wound Length (cm) 0 cm 08/31/20 1354   Wound Width (cm) 0 cm 08/31/20 1354   Wound Depth (cm) 0 cm 08/31/20 1354   Wound Surface Area (cm^2) 0 cm^2 08/31/20 1354   Change in Wound Size % (l*w) 100 08/31/20 1354   Wound Volume (cm^3) 0 cm^3 08/31/20 1354   Wound Healing % 100 08/31/20 1354   Post-Procedure Length (cm) 0.2 cm 08/24/20 1407   Post-Procedure Width (cm) 0.4 cm 08/24/20 1407   Post-Procedure Depth (cm) 0.2 cm 08/24/20 1407   Post-Procedure Surface Area (cm^2) 0.08 cm^2 08/24/20 1407   Post-Procedure Volume (cm^3) 0.02 cm^3 08/24/20 1407   Undermining Starts ___ O'Clock 7 07/13/20 1534   Undermining Ends___ O'Clock 12 07/13/20 1534   Undermining Maxium Distance (cm) 0 07/20/20 1358   Number of days: 534       Wound 08/16/21 Toe (Comment  which one) Right #1 right great toe (Active)   Wound Image   12/30/21 0931   Wound Etiology Pressure Stage  3 12/30/21 0931   Dressing Status New dressing applied 12/30/21 1012   Wound Cleansed Cleansed with saline 12/30/21 1012   Dressing/Treatment Alginate;Dry dressing 12/30/21 1012   Offloading for Diabetic Foot Ulcers Other (comment) 12/30/21 1012   Wound Length (cm) 2.7 cm 12/30/21 0931   Wound Width (cm) 0.6 cm 12/30/21 0931   Wound Depth (cm) 0.2 cm 12/30/21 0931   Wound Surface Area (cm^2) 1.62 cm^2 12/30/21 0931   Change in Wound Size % (l*w) -170 12/30/21 0931   Wound Volume (cm^3) 0.324 cm^3 12/30/21 0931   Wound Healing % -170 12/30/21 0931   Post-Procedure Length (cm) 2.8 cm 12/30/21 0952   Post-Procedure Width (cm) 0.8 cm 12/30/21 0952   Post-Procedure Depth (cm) 0.3 cm 12/30/21 0952   Post-Procedure Surface Area (cm^2) 2.24 cm^2 12/30/21 0952   Post-Procedure Volume (cm^3) 0.672 cm^3 12/30/21 0952   Wound Assessment Pale granulation tissue;Fibrin 12/30/21 0931 Drainage Amount Small 12/30/21 0931   Drainage Description Serous; Yellow 12/30/21 0931   Odor None 12/30/21 0931   Ricarda-wound Assessment Hyperkeratosis (callous) 10/04/21 1347   Number of days: 135          Procedure Note  Indications:  Based on my examination of this patient's wound(s)/ulcer(s) today, debridement is required to promote healing and evaluate the wound base. Performed by: Chelsea Daily, DPM    Consent obtained:  Yes    Time out taken:  Yes    Pain Control: Anesthetic  Anesthetic: 4% Lidocaine Liquid Topical     Debridement:Excisional Debridement    Using curette, #15 blade scalpel and tissue nippers the wound(s)/ulcer(s) was/were sharply debrided down through and including the removal of subcutaneous tissue. Devitalized Tissue Debrided:  fibrin, biofilm, slough and necrotic/eschar to stimulate bleeding to promote healing, post debridement good bleeding base and wound edges noted    Wound/Ulcer #: 1    Percent of Wound/Ulcer Debrided: 100%    Total Surface Area Debrided:  1.62 sq cm     Estimated Blood Loss:  Minimal  Hemostasis Achieved:  by pressure    Procedural Pain:  0  / 10   Post Procedural Pain:  0 / 10     Response to treatment:  Well tolerated by patient. A culture was not done. Plan:     Treatment Note please see attached Discharge Instructions    Written patient dismissal instructions given to patient and signed by patient or POA. Discharge Instructions       Visit Discharge/Physician Orders     Discharge condition: Stable     Assessment of pain at discharge:  none     Anesthetic used: 4% lidocaine solution     Discharge to: Home     Left via:Private automobile     Accompanied by: accompanied by self     ECF/HHA: [facility]     Dressing Orders:     Right  great toes - cleanse with normal saline, apply alginate ag, dry dressing, change daily.      Treatment Orders:     FOLLOW NUTRITIOUS DIET.  CHOOSE FOODS HIGH IN PROTEIN -CHICKEN- FISH-AND EGGS,  CHOOSE FOODS HIGH IN VITAMIN C.   MULTIVITAMIN DAILY.       Watch blood sugar levels.      [facility] followup visit ______2 week with Dr. Parnell_______________________  (Please note your next appointment above and if you are unable to keep, kindly give a 24 hour notice.  Thank you.)     Physician signature:__________________________        If you experience any of the following, please call the [facility] during business hours:     * Increase in Pain  * Temperature over 101  * Increase in drainage from your wound  * Drainage with a foul odor  * Bleeding  * Increase in swelling  * Need for compression bandage changes due to slippage, breakthrough drainage.     If you need medical attention outside of the business hours of the [facility] please contact your PCP or go to the nearest emergency room.                                                                                               Electronically signed by Maria Maldonado DPM on 12/30/2021 at 10:15 AM

## 2022-01-03 ENCOUNTER — APPOINTMENT (OUTPATIENT)
Dept: RADIATION ONCOLOGY | Age: 72
End: 2022-01-03
Attending: RADIOLOGY
Payer: COMMERCIAL

## 2022-01-03 ENCOUNTER — HOSPITAL ENCOUNTER (OUTPATIENT)
Dept: RADIATION ONCOLOGY | Age: 72
End: 2022-01-03
Attending: RADIOLOGY
Payer: COMMERCIAL

## 2022-01-04 ENCOUNTER — HOSPITAL ENCOUNTER (OUTPATIENT)
Dept: RADIATION ONCOLOGY | Age: 72
Discharge: HOME | End: 2022-01-04
Attending: RADIOLOGY
Payer: COMMERCIAL

## 2022-01-04 ENCOUNTER — HOSPITAL ENCOUNTER (OUTPATIENT)
Age: 72
Discharge: HOME | End: 2022-01-04
Payer: COMMERCIAL

## 2022-01-04 VITALS
HEART RATE: 51 BPM | WEIGHT: 224.38 LBS | RESPIRATION RATE: 18 BRPM | OXYGEN SATURATION: 98 % | TEMPERATURE: 97.2 F | BODY MASS INDEX: 31.29 KG/M2

## 2022-01-04 DIAGNOSIS — C61 CARCINOMA OF PROSTATE (HCC): Primary | ICD-10-CM

## 2022-01-04 LAB
INR BLD: 1.8
PROTHROMBIN TIME: 18.9 SEC (ref 9.3–12.4)

## 2022-01-04 PROCEDURE — 85610 PROTHROMBIN TIME: CPT

## 2022-01-04 PROCEDURE — 77385 HC NTSTY MODUL RAD TX DLVR SMPL: CPT | Performed by: RADIOLOGY

## 2022-01-04 PROCEDURE — 36415 COLL VENOUS BLD VENIPUNCTURE: CPT

## 2022-01-04 PROCEDURE — 77014 PR CT GUIDANCE PLACEMENT RAD THERAPY FIELDS: CPT | Performed by: RADIOLOGY

## 2022-01-04 ASSESSMENT — PAIN DESCRIPTION - LOCATION: LOCATION: KNEE;ANKLE;HAND

## 2022-01-04 ASSESSMENT — PAIN DESCRIPTION - ORIENTATION: ORIENTATION: LEFT

## 2022-01-04 ASSESSMENT — PAIN DESCRIPTION - DESCRIPTORS: DESCRIPTORS: ACHING

## 2022-01-04 ASSESSMENT — PAIN SCALES - GENERAL: PAINLEVEL_OUTOF10: 5

## 2022-01-04 NOTE — PROGRESS NOTES
Jose Blackwood Sr.  1/4/2022  Wt Readings from Last 3 Encounters:   01/04/22 224 lb 6 oz (101.8 kg)   12/30/21 230 lb (104.3 kg)   12/27/21 237 lb 8 oz (107.7 kg)     Body mass index is 31.29 kg/m². Treatment Area:Prostate    Patient was seen today for weekly visit. Comfort Alteration  KPS:70%  Fatigue: Mild    Nutritional Alteration  Anorexia: No  Nausea: No  Vomiting: No     Elimination Alterations  Constipation: no  Diarrhea:  no  Urinary Frequency/Urgency: No  Urinary Retention: No  Dysuria: No  Urinary Incontinence: No  Proctitis: No  Nocturia: No #/night: 1     Skin Alteration   Sensation:na    Radiation Dermatitis:  none    Emotional  Coping: effective    Sexuality Alteration  na    Injury, potential bleeding or infection: na    Lab Results   Component Value Date    WBC 6.6 10/06/2021     10/06/2021         Pulse 51   Temp 97.2 °F (36.2 °C) (Temporal)   Resp 18   Wt 224 lb 6 oz (101.8 kg)   SpO2 98%   BMI 31.29 kg/m²   BP within normal range?  Doppler only 98     Assessment/Plan:  Completed 38/44 fractions; 3360/4273 cGy, no other complaints, Dr Symone Zaidi updated    Susan Kim RN

## 2022-01-05 ENCOUNTER — HOSPITAL ENCOUNTER (OUTPATIENT)
Dept: RADIATION ONCOLOGY | Age: 72
Discharge: HOME | End: 2022-01-05
Attending: RADIOLOGY
Payer: COMMERCIAL

## 2022-01-05 ENCOUNTER — TELEPHONE (OUTPATIENT)
Dept: CASE MANAGEMENT | Age: 72
End: 2022-01-05

## 2022-01-05 PROCEDURE — 77014 PR CT GUIDANCE PLACEMENT RAD THERAPY FIELDS: CPT | Performed by: RADIOLOGY

## 2022-01-05 PROCEDURE — 77385 HC NTSTY MODUL RAD TX DLVR SMPL: CPT | Performed by: RADIOLOGY

## 2022-01-05 NOTE — PROGRESS NOTES
DEPARTMENT OF RADIATION ONCOLOGY   ON TREATMENT VISIT       1/5/2022      NAME:  Jordan Goodman Sr. YOB: 1950    DIAGNOSIS: cT2B high volume  GS 9 Adenocarcinoma prostate cancer. SUBJECTIVE:   Jodran Goodman Sr. has now received 6840 cGy in 38 fractions directed to the pelvis. Past medical, surgical, social and family histories reviewed and updated as indicated. PAIN: None    ALLERGIES:  Food, Chlorhexidine, Soap & cleansers, and Strawberry (diagnostic)         Current Outpatient Medications   Medication Sig Dispense Refill    ondansetron (ZOFRAN) 4 MG tablet Take 4 mg by mouth every 6 hours as needed for Nausea or Vomiting      triamcinolone (KENALOG) 0.1 % cream daily as needed       valsartan (DIOVAN) 80 MG tablet Take 40 mg by mouth daily       acetaminophen (TYLENOL) 500 MG tablet Take 1,000 mg by mouth every 8 hours as needed for Pain      bicalutamide (CASODEX) 50 MG chemo tablet Take 50 mg by mouth daily      mexiletine (MEXITIL) 150 MG capsule Take 150 mg by mouth 3 times daily       Multiple Vitamin (MULTIVITAMINS PO) Take 1 tablet by mouth 4 times daily Geriatric fusion oral chewable      amoxicillin-clavulanate (AUGMENTIN) 875-125 MG per tablet Take 1 tablet by mouth 2 times daily      amiodarone (CORDARONE) 200 MG tablet Take 200 mg by mouth daily      bacitracin 500 UNIT/GM ointment Apply topically 2 times daily Apply topically 2 times daily.       senna (SENOKOT) 8.6 MG tablet Take 1 tablet by mouth nightly       psyllium (KONSYL) 28.3 % PACK Take 1 packet by mouth daily as needed Metamucil sugar free 3.4 gram powder pack      sodium chloride (OCEAN) 0.65 % nasal spray 1 spray by Nasal route 3 times daily as needed for Congestion       aspirin 81 MG EC tablet Take 81 mg by mouth daily      ergocalciferol (ERGOCALCIFEROL) 1.25 MG (17262 UT) capsule Take 50,000 Units by mouth once a week Thursdays      digoxin (LANOXIN) 125 MCG tablet Take 125 mcg by mouth every other day       Magnesium 400 MG CAPS Take 1 tablet by mouth daily       warfarin (COUMADIN) 5 MG tablet Take 5 mg by mouth      pantoprazole (PROTONIX) 40 MG tablet Take 1 tablet by mouth every morning (before breakfast) 90 tablet 3    bumetanide (BUMEX) 2 MG tablet Take 1 tablet by mouth daily (Patient taking differently: Take 1 mg by mouth Take 1 tablet (1 mg total) by mouth daily as needed (fluid retention: weight gain/bloating/shortness of breath). ) 90 tablet 3    allopurinol (ZYLOPRIM) 100 MG tablet Take 1 tablet by mouth daily 30 tablet 3     No current facility-administered medications for this encounter. OBJECTIVE:  Alert and fully ambulatory. Pleasant and conversant. Physical Examination:General appearance - alert, well appearing, and in no distress and overweight:  Constitutional: A well developed, well nourished 73 y.o. male who is alert, oriented, cooperative and in no apparent distress. HEENT:   Skin:  Warm and dry. No obvious rashes. There were no vitals filed for this visit. Wt Readings from Last 3 Encounters:   01/04/22 224 lb 6 oz (101.8 kg)   12/30/21 230 lb (104.3 kg)   12/27/21 237 lb 8 oz (107.7 kg)       ASSESSMENT/PLAN:     Patient is tolerating treatments well with expected toxicities. Current and planned dose reviewed. Goals of treatment and potential side effects were reviewed with the patient. Treatment imaging has been personally reviewed for accuracy and precision. Questions answered to apparent satisfaction. Treatments will continue as planned. Thank you for the opportunity to participate in multidisciplinary management of this remarkable and pleasant patient.       Real Floyd MD    Department of Radiation Oncology  [facility]) [facility]: [phone] (EOB: [phone])  [address] [place] [address]) [facility]: [phone] (YWO: [phone])  [facility]) [facility]:  [phone] (YWB:  [phone])

## 2022-01-05 NOTE — TELEPHONE ENCOUNTER
I spoke with patient prior to his daily radiation treatment. He is now scheduled to be completed on 1/12/22. Because of this, I emailed an extension request for one additional day to [id number]. Patient is aware.

## 2022-01-06 ENCOUNTER — TELEPHONE (OUTPATIENT)
Dept: CASE MANAGEMENT | Age: 72
End: 2022-01-06

## 2022-01-06 ENCOUNTER — HOSPITAL ENCOUNTER (OUTPATIENT)
Dept: RADIATION ONCOLOGY | Age: 72
Discharge: HOME | End: 2022-01-06
Attending: RADIOLOGY
Payer: COMMERCIAL

## 2022-01-06 PROCEDURE — 77385 HC NTSTY MODUL RAD TX DLVR SMPL: CPT | Performed by: RADIOLOGY

## 2022-01-06 PROCEDURE — 77336 RADIATION PHYSICS CONSULT: CPT | Performed by: RADIOLOGY

## 2022-01-06 PROCEDURE — 77427 RADIATION TX MANAGEMENT X5: CPT | Performed by: RADIOLOGY

## 2022-01-06 PROCEDURE — 77014 PR CT GUIDANCE PLACEMENT RAD THERAPY FIELDS: CPT | Performed by: RADIOLOGY

## 2022-01-06 NOTE — TELEPHONE ENCOUNTER
I received an email confirmation from [id number] for the one day extension request to include transportation for patient's radiation treatment on 01/12/22. Confirmation number is [id number] and patient was given a copy.

## 2022-01-07 ENCOUNTER — HOSPITAL ENCOUNTER (OUTPATIENT)
Dept: RADIATION ONCOLOGY | Age: 72
Discharge: HOME | End: 2022-01-07
Attending: RADIOLOGY
Payer: COMMERCIAL

## 2022-01-07 PROCEDURE — 77385 HC NTSTY MODUL RAD TX DLVR SMPL: CPT | Performed by: RADIOLOGY

## 2022-01-07 PROCEDURE — 77014 PR CT GUIDANCE PLACEMENT RAD THERAPY FIELDS: CPT | Performed by: RADIOLOGY

## 2022-01-10 ENCOUNTER — HOSPITAL ENCOUNTER (OUTPATIENT)
Dept: RADIATION ONCOLOGY | Age: 72
Discharge: HOME | End: 2022-01-10
Attending: RADIOLOGY
Payer: COMMERCIAL

## 2022-01-10 VITALS
WEIGHT: 228 LBS | HEART RATE: 61 BPM | TEMPERATURE: 97.7 F | RESPIRATION RATE: 18 BRPM | OXYGEN SATURATION: 100 % | BODY MASS INDEX: 31.8 KG/M2

## 2022-01-10 DIAGNOSIS — C61 CARCINOMA OF PROSTATE (HCC): Primary | ICD-10-CM

## 2022-01-10 PROCEDURE — 77014 PR CT GUIDANCE PLACEMENT RAD THERAPY FIELDS: CPT | Performed by: RADIOLOGY

## 2022-01-10 PROCEDURE — 77385 HC NTSTY MODUL RAD TX DLVR SMPL: CPT | Performed by: RADIOLOGY

## 2022-01-10 RX ORDER — TAMSULOSIN HYDROCHLORIDE 0.4 MG/1
0.4 CAPSULE ORAL DAILY
COMMUNITY

## 2022-01-10 NOTE — PROGRESS NOTES
Hang Dies Sr.  1/10/2022  Wt Readings from Last 3 Encounters:   01/10/22 228 lb (103.4 kg)   01/04/22 224 lb 6 oz (101.8 kg)   12/30/21 230 lb (104.3 kg)     Body mass index is 31.8 kg/m². Treatment Area:prostate    Patient was seen today for weekly visit. Comfort Alteration  KPS:70%  Fatigue: Mild    Nutritional Alteration  Anorexia: No  Nausea: No  Vomiting: No     Elimination Alterations  Constipation: no  Diarrhea:  no  Urinary Frequency/Urgency: No  Urinary Retention: No  Dysuria: No  Urinary Incontinence: No  Proctitis: No  Nocturia: No #/night: 1-2     Skin Alteration   Sensation:na    Radiation Dermatitis:  Na, moisturizing skin at least 2 times/day    Emotional  Coping: effective    Sexuality Alteration  na    Injury, potential bleeding or infection: na    Lab Results   Component Value Date    WBC 6.6 10/06/2021     10/06/2021         Pulse 61   Temp 97.7 °F (36.5 °C) (Temporal)   Resp 18   Wt 228 lb (103.4 kg)   SpO2 100%   BMI 31.80 kg/m²   BP within normal range?  Doppler only         Assessment/Plan:  Completed 42/44 fractions;  9409/6971 cGy, no other complaints, Dr Jc Byers updated    Katarina Franklin RN

## 2022-01-10 NOTE — PROGRESS NOTES
DEPARTMENT OF RADIATION ONCOLOGY   ON TREATMENT VISIT       1/10/2022      NAME:  Elvira Friedman Sr. YOB: 1950    DIAGNOSIS: cT2B high volume  GS 9 Adenocarcinoma prostate cancer. SUBJECTIVE:   Elvira Friedman Sr. has now received 7560 cGy in 42 fractions directed to the prostate and proximal seminal vesicles. Past medical, surgical, social and family histories reviewed and updated as indicated. PAIN: None    ALLERGIES:  Food, Chlorhexidine, Soap & cleansers, and Strawberry (diagnostic)         Current Outpatient Medications   Medication Sig Dispense Refill    tamsulosin (FLOMAX) 0.4 MG capsule Take 0.4 mg by mouth daily      ondansetron (ZOFRAN) 4 MG tablet Take 4 mg by mouth every 6 hours as needed for Nausea or Vomiting      triamcinolone (KENALOG) 0.1 % cream daily as needed       valsartan (DIOVAN) 80 MG tablet Take 40 mg by mouth daily       acetaminophen (TYLENOL) 500 MG tablet Take 1,000 mg by mouth every 8 hours as needed for Pain      bicalutamide (CASODEX) 50 MG chemo tablet Take 50 mg by mouth daily      mexiletine (MEXITIL) 150 MG capsule Take 150 mg by mouth 3 times daily       Multiple Vitamin (MULTIVITAMINS PO) Take 1 tablet by mouth 4 times daily Geriatric fusion oral chewable      amoxicillin-clavulanate (AUGMENTIN) 875-125 MG per tablet Take 1 tablet by mouth 2 times daily      amiodarone (CORDARONE) 200 MG tablet Take 200 mg by mouth daily      bacitracin 500 UNIT/GM ointment Apply topically 2 times daily Apply topically 2 times daily.       senna (SENOKOT) 8.6 MG tablet Take 1 tablet by mouth nightly       psyllium (KONSYL) 28.3 % PACK Take 1 packet by mouth daily as needed Metamucil sugar free 3.4 gram powder pack      sodium chloride (OCEAN) 0.65 % nasal spray 1 spray by Nasal route 3 times daily as needed for Congestion       aspirin 81 MG EC tablet Take 81 mg by mouth daily      ergocalciferol (ERGOCALCIFEROL) 1.25 MG (42933 UT) capsule Take 50,000 Units by mouth once a week Thursdays      digoxin (LANOXIN) 125 MCG tablet Take 125 mcg by mouth every other day       Magnesium 400 MG CAPS Take 1 tablet by mouth daily       warfarin (COUMADIN) 5 MG tablet Take 5 mg by mouth      pantoprazole (PROTONIX) 40 MG tablet Take 1 tablet by mouth every morning (before breakfast) 90 tablet 3    bumetanide (BUMEX) 2 MG tablet Take 1 tablet by mouth daily (Patient taking differently: Take 1 mg by mouth Take 1 tablet (1 mg total) by mouth daily as needed (fluid retention: weight gain/bloating/shortness of breath). ) 90 tablet 3    allopurinol (ZYLOPRIM) 100 MG tablet Take 1 tablet by mouth daily 30 tablet 3     No current facility-administered medications for this encounter. OBJECTIVE:  Alert and fully ambulatory. Pleasant and conversant. Physical Examination:General appearance - alert, well appearing, and in no distress and overweight:  Constitutional: A well developed, well nourished 73 y.o. male who is alert, oriented, cooperative and in no apparent distress. HEENT:   Skin:  Warm and dry. No obvious rashes. Vitals:    01/10/22 1324   Pulse: 61   Resp: 18   Temp: 97.7 °F (36.5 °C)   TempSrc: Temporal   SpO2: 100%   Weight: 228 lb (103.4 kg)       Wt Readings from Last 3 Encounters:   01/10/22 228 lb (103.4 kg)   01/04/22 224 lb 6 oz (101.8 kg)   12/30/21 230 lb (104.3 kg)       ASSESSMENT/PLAN:     Patient is tolerating treatments well with expected toxicities. Moderate signs of obstruction, he was recently put on treatment with Flomax with benefit. Current and planned dose reviewed. Goals of treatment and potential side effects were reviewed with the patient. Treatment imaging has been personally reviewed for accuracy and precision. Questions answered to apparent satisfaction. Treatments will continue as planned.     Thank you for the opportunity to participate in multidisciplinary management of this remarkable and pleasant patient.       Real Floyd MD    Department of Radiation Oncology  [facility]) [facility]: [phone] ([facility]: [phone])  [address] [place] [address]: [phone] ([facility]: [phone])  [facility]:  [phone] ([facility]:  [phone])

## 2022-01-11 ENCOUNTER — HOSPITAL ENCOUNTER (OUTPATIENT)
Dept: RADIATION ONCOLOGY | Age: 72
Discharge: HOME | End: 2022-01-11
Attending: RADIOLOGY
Payer: COMMERCIAL

## 2022-01-11 PROCEDURE — 77385 HC NTSTY MODUL RAD TX DLVR SMPL: CPT | Performed by: RADIOLOGY

## 2022-01-11 PROCEDURE — 77014 PR CT GUIDANCE PLACEMENT RAD THERAPY FIELDS: CPT | Performed by: RADIOLOGY

## 2022-01-12 ENCOUNTER — HOSPITAL ENCOUNTER (OUTPATIENT)
Dept: RADIATION ONCOLOGY | Age: 72
Discharge: HOME | End: 2022-01-12
Attending: RADIOLOGY
Payer: COMMERCIAL

## 2022-01-12 PROCEDURE — 77336 RADIATION PHYSICS CONSULT: CPT | Performed by: RADIOLOGY

## 2022-01-12 PROCEDURE — 77385 HC NTSTY MODUL RAD TX DLVR SMPL: CPT | Performed by: RADIOLOGY

## 2022-01-12 PROCEDURE — 77014 PR CT GUIDANCE PLACEMENT RAD THERAPY FIELDS: CPT | Performed by: RADIOLOGY

## 2022-01-12 PROCEDURE — 77427 RADIATION TX MANAGEMENT X5: CPT | Performed by: RADIOLOGY

## 2022-01-12 NOTE — PROGRESS NOTES
total) by mouth daily as needed (fluid retention: weight gain/bloating/shortness of breath). ) 90 tablet 3    allopurinol (ZYLOPRIM) 100 MG tablet Take 1 tablet by mouth daily 30 tablet 3     No current facility-administered medications for this encounter. This is an up-to-date medication list.    Please take this list to your next care provider, and discard any previous medication lists.

## 2022-01-12 NOTE — PROGRESS NOTES
Arcelia Rileymarzena .  1/12/2022  9:39 AM          Current Outpatient Medications   Medication Sig Dispense Refill    tamsulosin (FLOMAX) 0.4 MG capsule Take 0.4 mg by mouth daily      ondansetron (ZOFRAN) 4 MG tablet Take 4 mg by mouth every 6 hours as needed for Nausea or Vomiting      triamcinolone (KENALOG) 0.1 % cream daily as needed       valsartan (DIOVAN) 80 MG tablet Take 40 mg by mouth daily       acetaminophen (TYLENOL) 500 MG tablet Take 1,000 mg by mouth every 8 hours as needed for Pain      bicalutamide (CASODEX) 50 MG chemo tablet Take 50 mg by mouth daily      mexiletine (MEXITIL) 150 MG capsule Take 150 mg by mouth 3 times daily       Multiple Vitamin (MULTIVITAMINS PO) Take 1 tablet by mouth 4 times daily Geriatric fusion oral chewable      amoxicillin-clavulanate (AUGMENTIN) 875-125 MG per tablet Take 1 tablet by mouth 2 times daily      amiodarone (CORDARONE) 200 MG tablet Take 200 mg by mouth daily      bacitracin 500 UNIT/GM ointment Apply topically 2 times daily Apply topically 2 times daily.       senna (SENOKOT) 8.6 MG tablet Take 1 tablet by mouth nightly       psyllium (KONSYL) 28.3 % PACK Take 1 packet by mouth daily as needed Metamucil sugar free 3.4 gram powder pack      sodium chloride (OCEAN) 0.65 % nasal spray 1 spray by Nasal route 3 times daily as needed for Congestion       aspirin 81 MG EC tablet Take 81 mg by mouth daily      ergocalciferol (ERGOCALCIFEROL) 1.25 MG (15163 UT) capsule Take 50,000 Units by mouth once a week Thursdays      digoxin (LANOXIN) 125 MCG tablet Take 125 mcg by mouth every other day       Magnesium 400 MG CAPS Take 1 tablet by mouth daily       warfarin (COUMADIN) 5 MG tablet Take 5 mg by mouth      pantoprazole (PROTONIX) 40 MG tablet Take 1 tablet by mouth every morning (before breakfast) 90 tablet 3    bumetanide (BUMEX) 2 MG tablet Take 1 tablet by mouth daily (Patient taking differently: Take 1 mg by mouth Take 1 tablet (1 mg total) by mouth daily as needed (fluid retention: weight gain/bloating/shortness of breath). ) 90 tablet 3    allopurinol (ZYLOPRIM) 100 MG tablet Take 1 tablet by mouth daily 30 tablet 3     No current facility-administered medications for this encounter. This is an up-to-date medication list.    Please take this list to your next care provider, and discard any previous medication lists.

## 2022-01-13 ENCOUNTER — HOSPITAL ENCOUNTER (OUTPATIENT)
Dept: WOUND CARE | Age: 72
Discharge: HOME | End: 2022-01-13
Payer: COMMERCIAL

## 2022-01-13 DIAGNOSIS — E11.621 DIABETIC ULCER OF TOE OF RIGHT FOOT ASSOCIATED WITH TYPE 2 DIABETES MELLITUS, WITH FAT LAYER EXPOSED (HCC): Primary | ICD-10-CM

## 2022-01-13 DIAGNOSIS — L97.512 DIABETIC ULCER OF TOE OF RIGHT FOOT ASSOCIATED WITH TYPE 2 DIABETES MELLITUS, WITH FAT LAYER EXPOSED (HCC): Primary | ICD-10-CM

## 2022-01-13 PROCEDURE — 11042 DBRDMT SUBQ TIS 1ST 20SQCM/<: CPT

## 2022-01-13 RX ORDER — LIDOCAINE HYDROCHLORIDE 20 MG/ML
JELLY TOPICAL ONCE
Status: CANCELLED | OUTPATIENT
Start: 2022-01-13 | End: 2022-01-13

## 2022-01-13 RX ORDER — BACITRACIN ZINC AND POLYMYXIN B SULFATE 500; 1000 [USP'U]/G; [USP'U]/G
OINTMENT TOPICAL ONCE
Status: CANCELLED | OUTPATIENT
Start: 2022-01-13 | End: 2022-01-13

## 2022-01-13 RX ORDER — BACITRACIN, NEOMYCIN, POLYMYXIN B 400; 3.5; 5 [USP'U]/G; MG/G; [USP'U]/G
OINTMENT TOPICAL ONCE
Status: CANCELLED | OUTPATIENT
Start: 2022-01-13 | End: 2022-01-13

## 2022-01-13 RX ORDER — GENTAMICIN SULFATE 1 MG/G
OINTMENT TOPICAL ONCE
Status: CANCELLED | OUTPATIENT
Start: 2022-01-13 | End: 2022-01-13

## 2022-01-13 RX ORDER — GINSENG 100 MG
CAPSULE ORAL ONCE
Status: CANCELLED | OUTPATIENT
Start: 2022-01-13 | End: 2022-01-13

## 2022-01-13 RX ORDER — BETAMETHASONE DIPROPIONATE 0.05 %
OINTMENT (GRAM) TOPICAL ONCE
Status: CANCELLED | OUTPATIENT
Start: 2022-01-13 | End: 2022-01-13

## 2022-01-13 NOTE — PLAN OF CARE
Problem: Pain:  Goal: Pain level will decrease  Description: Pain level will decrease  Outcome: Ongoing  Goal: Control of acute pain  Description: Control of acute pain  Outcome: Ongoing  Goal: Control of chronic pain  Description: Control of chronic pain  Outcome: Ongoing     Problem: Wound:  Goal: Will show signs of wound healing; wound closure and no evidence of infection  Description: Will show signs of wound healing; wound closure and no evidence of infection  Outcome: Ongoing     Problem: Venous:  Goal: Signs of wound healing will improve  Description: Signs of wound healing will improve  Outcome: Ongoing     Problem: Wound:  Goal: Will show signs of wound healing; wound closure and no evidence of infection  Description: Will show signs of wound healing; wound closure and no evidence of infection  Outcome: Ongoing

## 2022-01-13 NOTE — PROGRESS NOTES
[facility] Followup Visit Note    Referring Physician : Lisa Addison MD  Ubaldo Tse Sr. MEDICAL RECORD NUMBER:  [id number]  AGE: 73 y.o. GENDER: male  : 1950  EPISODE DATE:  2022    Subjective:     Chief Complaint   Patient presents with    Wound Check     left leg      HISTORY of PRESENT ILLNESS HPI   Ubaldo Tse Sr. is a 73 y.o. male who presents today in regards to follow up evaluation and treatment of wound/ulcer. That patient's past medical, family and social hx were reviewed and changes were made if present. History of Wound Context:   Last seen Oct 4th. dressings per wife, including multiple types. Had his diabetic insert adjusted. Undergoing radiation tx.     21 local care, no pressure, evaluated insert, area is offloaded    22 presents with wife in follow-up. Doing well. Finished radiation treatment. Utilizing offloading insert. Right foot great toe area covered with devitalized nonviable tissue, no purulence, odor, erythema, crepitus or increase in temperature. No pain. With debridement of subcutaneous tissue. Vascular intact. Couple toenails missing, patient actually was cutting and pulled them off.   Continue no pressure, local care.        Wound/Ulcer Pain Timing/Severity: none  Quality of pain:   Severity:   / 10   Modifying Factors:   Associated Signs/Symptoms:      Ulcer Identification:  Ulcer Type: diabetic, pressure and neuropathic  Contributing Factors: chronic pressure          PAST MEDICAL HISTORY      Diagnosis Date    Anxiety     Aorto-iliac atherosclerosis (HCC) 2020    Arthritis     Atrial fibrillation (HCC)     AVNRT (AV teddy re-entry tachycardia) ([address]     Blood type O+ 2019    CAD (coronary artery disease)     Cancer (HCC)     CHF (congestive heart failure) (HCC)     Diabetes mellitus ([address]     Diabetic neuropathy ([address]     Diastolic dysfunction     stage 3    Fall     right knee  Gout     chemically induced    History of blood transfusion     Hx of blood clots     left leg    Hyperlipidemia     Hypertension     LBBB (left bundle branch block)     Left ventricular assist device (LVAD) complication 5377    Moderate mitral regurgitation 2016    Nonischemic cardiomyopathy (HCC)     Obesity     SNEHA (obstructive sleep apnea)     treated with CPAP    PVD (peripheral vascular disease) with claudication ([facility] 2020    Sarcoidosis 2019    Severe tricuspid regurgitation 2016    SVT (supraventricular tachycardia) (HCC)     Viral cardiomyopathy ([facility]      Past Surgical History:   Procedure Laterality Date    BACK SURGERY      CARDIAC CATHETERIZATION  2011    Dr. Ailyn Lyles  2019    Dr. Dillon Fisher  2014    BiV/ICD  (Liat Castro)    Dr. Tyler Montana CATH LAB PROCEDURE      ECHO COMPL W DOP COLOR FLOW  2011         ECHO COMPLETE  2013         EYE SURGERY Right 2019    FEMUR SURGERY      rt; compound frx right femur at 3years old.     JOINT REPLACEMENT Left     knee    KNEE SURGERY      7 on right/1 on left/ total replacement on R    PICC LINE INSERTION NURSE  10/21/2019         PROSTATE BIOPSY  2021     Family History   Problem Relation Age of Onset    Alzheimer's Disease Mother     Diabetes Mother     No Known Problems Father         l[facility] in [facility]    No Known Problems Brother     Other Brother         aneurysm behind his eye    Arrhythmia Brother          age 61    No Known Problems Brother     No Known Problems Brother      Social History     Tobacco Use    Smoking status: Former Smoker     Packs/day: 0.00     Years: 2.00     Pack years: 0.00     Types: Cigars     Quit date: 1980     Years since quittin.0    Smokeless tobacco: Never Used    Tobacco comment: non smoker   Vaping Use    Vaping Use: Never used   Substance Use Topics    Alcohol use: Not Currently    Drug use: Never     Allergies   Allergen Reactions    Food Hives     Strawberries      Chlorhexidine Rash    Soap & Cleansers Rash     TIDE detergent    Strawberry (Diagnostic) Rash     Other reaction(s): Eruption, ITCHING,WATERING EYES     Current Outpatient Medications on File Prior to Encounter   Medication Sig Dispense Refill    tamsulosin (FLOMAX) 0.4 MG capsule Take 0.4 mg by mouth daily      triamcinolone (KENALOG) 0.1 % cream daily as needed       valsartan (DIOVAN) 80 MG tablet Take 40 mg by mouth daily       bicalutamide (CASODEX) 50 MG chemo tablet Take 50 mg by mouth daily      mexiletine (MEXITIL) 150 MG capsule Take 150 mg by mouth 3 times daily       Multiple Vitamin (MULTIVITAMINS PO) Take 1 tablet by mouth 4 times daily Geriatric fusion oral chewable      amoxicillin-clavulanate (AUGMENTIN) 875-125 MG per tablet Take 1 tablet by mouth 2 times daily      amiodarone (CORDARONE) 200 MG tablet Take 200 mg by mouth daily      bacitracin 500 UNIT/GM ointment Apply topically 2 times daily Apply topically 2 times daily.       senna (SENOKOT) 8.6 MG tablet Take 1 tablet by mouth nightly       psyllium (KONSYL) 28.3 % PACK Take 1 packet by mouth daily as needed Metamucil sugar free 3.4 gram powder pack      sodium chloride (OCEAN) 0.65 % nasal spray 1 spray by Nasal route 3 times daily as needed for Congestion       aspirin 81 MG EC tablet Take 81 mg by mouth daily      ergocalciferol (ERGOCALCIFEROL) 1.25 MG (68390 UT) capsule Take 50,000 Units by mouth once a week Thursdays      digoxin (LANOXIN) 125 MCG tablet Take 125 mcg by mouth every other day       Magnesium 400 MG CAPS Take 1 tablet by mouth daily       warfarin (COUMADIN) 5 MG tablet Take 5 mg by mouth      pantoprazole (PROTONIX) 40 MG tablet Take 1 tablet by mouth every morning (before breakfast) 90 tablet 3    bumetanide (BUMEX) 2 MG tablet Take 1 tablet by mouth daily (Patient taking differently: Take 1 mg by mouth Take 1 tablet (1 mg total) by mouth daily as needed (fluid retention: weight gain/bloating/shortness of breath). ) 90 tablet 3    allopurinol (ZYLOPRIM) 100 MG tablet Take 1 tablet by mouth daily 30 tablet 3    ondansetron (ZOFRAN) 4 MG tablet Take 4 mg by mouth every 6 hours as needed for Nausea or Vomiting      acetaminophen (TYLENOL) 500 MG tablet Take 1,000 mg by mouth every 8 hours as needed for Pain       No current facility-administered medications on file prior to encounter. REVIEW OF SYSTEMS See HPI    Objective: There were no vitals taken for this visit. Wt Readings from Last 3 Encounters:   01/10/22 228 lb (103.4 kg)   01/04/22 224 lb 6 oz (101.8 kg)   12/30/21 230 lb (104.3 kg)     PHYSICAL EXAM  CONSTITUTIONAL:   Awake, alert, cooperative   EYES:  lids and lashes normal   ENT: external ears and nose without lesions   NECK:  supple, symmetrical, trachea midline   SKIN:  Open wound     Assessment:     Problem List Items Addressed This Visit     Diabetic ulcer of right foot associated with type 2 diabetes mellitus, with fat layer exposed ([facility] - Primary    Relevant Orders    Initiate Outpatient Wound Care Protocol          Pre Debridement Measurements:  Are located in the [place]  Documentation Flow Sheet  Post Debridement Measurements:  Wound/Ulcer Descriptions are Pre Debridement except measurements:     Wound 07/13/20 Toe (Comment  which one) Anterior;Right;Medial #1 (feb 24, 2020)great toe.   Martinez III (Active)   Wound Image   08/31/20 1354   Wound Etiology Diabetic Martinez 3 07/13/20 1534   Wound Cleansed Rinsed/Irrigated with saline 08/24/20 1432   Dressing/Treatment Alginate with Ag 08/24/20 1432   Wound Length (cm) 0 cm 08/31/20 1354   Wound Width (cm) 0 cm 08/31/20 1354   Wound Depth (cm) 0 cm 08/31/20 1354   Wound Surface Area (cm^2) 0 cm^2 08/31/20 1354   Change in Wound Size % (l*w) 100 08/31/20 1354   Wound Volume (cm^3) 0 cm^3 08/31/20 1354   Wound Healing % 100 08/31/20 1354   Post-Procedure Length (cm) 0.2 cm 08/24/20 1407   Post-Procedure Width (cm) 0.4 cm 08/24/20 1407   Post-Procedure Depth (cm) 0.2 cm 08/24/20 1407   Post-Procedure Surface Area (cm^2) 0.08 cm^2 08/24/20 1407   Post-Procedure Volume (cm^3) 0.02 cm^3 08/24/20 1407   Undermining Starts ___ O'Clock 7 07/13/20 1534   Undermining Ends___ O'Clock 12 07/13/20 1534   Undermining Maxium Distance (cm) 0 07/20/20 1358   Number of days: 548       Wound 08/16/21 Toe (Comment  which one) Right #1 right great toe (Active)   Wound Image   12/30/21 0931   Wound Etiology Diabetic 01/13/22 1004   Dressing Status New dressing applied 12/30/21 1012   Wound Cleansed Cleansed with saline 12/30/21 1012   Dressing/Treatment Alginate;Dry dressing 12/30/21 1012   Offloading for Diabetic Foot Ulcers Other (comment) 12/30/21 1012   Wound Length (cm) 0.2 cm 01/13/22 1004   Wound Width (cm) 0.2 cm 01/13/22 1004   Wound Depth (cm) 0.2 cm 01/13/22 1004   Wound Surface Area (cm^2) 0.04 cm^2 01/13/22 1004   Change in Wound Size % (l*w) 93.33 01/13/22 1004   Wound Volume (cm^3) 0.008 cm^3 01/13/22 1004   Wound Healing % 93 01/13/22 1004   Post-Procedure Length (cm) 0.3 cm 01/13/22 1014   Post-Procedure Width (cm) 0.3 cm 01/13/22 1014   Post-Procedure Depth (cm) 0.2 cm 01/13/22 1014   Post-Procedure Surface Area (cm^2) 0.09 cm^2 01/13/22 1014   Post-Procedure Volume (cm^3) 0.018 cm^3 01/13/22 1014   Wound Assessment Pink/red;Fibrin 01/13/22 1004   Drainage Amount Small 01/13/22 1004   Drainage Description Serosanguinous 01/13/22 1004   Odor None 12/30/21 0931   Ricarda-wound Assessment Hyperkeratosis (callous) 01/13/22 1004   Number of days: 149          Procedure Note  Indications:  Based on my examination of this patient's wound(s)/ulcer(s) today, debridement is required to promote healing and evaluate the wound base.     Performed by: Mikala Ariza DPM    Consent obtained:  Yes    Time out taken: Yes    Pain Control: Anesthetic  Anesthetic: 4% Lidocaine Liquid Topical     Debridement:Excisional Debridement    Using curette and #15 blade scalpel the wound(s)/ulcer(s) was/were sharply debrided down through and including the removal of subcutaneous tissue. Devitalized Tissue Debrided:  fibrin, biofilm, slough and necrotic/eschar to stimulate bleeding to promote healing, post debridement good bleeding base and wound edges noted    Wound/Ulcer #: 1    Percent of Wound/Ulcer Debrided: 100%    Total Surface Area Debrided:  0.04 sq cm     Estimated Blood Loss:  Minimal  Hemostasis Achieved:  by pressure    Procedural Pain:  0  / 10   Post Procedural Pain:  0 / 10     Response to treatment:  Well tolerated by patient. Plan:   Treatment Note please see attached Discharge Instructions    Written patient dismissal instructions given to patient and signed by patient or POA. Discharge Instructions       Visit Discharge/Physician Orders     Discharge condition: Stable     Assessment of pain at discharge:  none     Anesthetic used: 4% lidocaine solution     Discharge to: Home     Left via:Private automobile     Accompanied by: accompanied by self     ECF/HHA: [facility]     Dressing Orders:     Right  great toes - cleanse with normal saline, apply alginate ag, dry dressing, change daily.      Treatment Orders:     FOLLOW NUTRITIOUS DIET. CHOOSE FOODS HIGH IN PROTEIN -CHICKEN- FISH-AND EGGS,  CHOOSE FOODS HIGH IN VITAMIN C.   MULTIVITAMIN DAILY.       Watch blood sugar levels.      [facility] followup visit ______2 week with Dr. Parnell_______________________  (Please note your next appointment above and if you are unable to keep, kindly give a 24 hour notice.  Thank you.)     Physician signature:__________________________        If you experience any of the following, please call the [address] [place] [address] Road during business hours:     * Increase in Pain  * Temperature over 101  * Increase in drainage from your wound  * Drainage with a foul odor  * Bleeding  * Increase in swelling  * Need for compression bandage changes due to slippage, breakthrough drainage.     If you need medical attention outside of the business hours of the [address] [place] [address] Road please contact your PCP or go to the nearest emergency room.                                                                                                                    Electronically signed by Jaiden Quijano DPM on 1/13/2022 at 10:21 AM

## 2022-01-20 ENCOUNTER — HOSPITAL ENCOUNTER (OUTPATIENT)
Dept: WOUND CARE | Age: 72
Discharge: HOME | End: 2022-01-20

## 2022-02-14 NOTE — PROGRESS NOTES
CC: Dina Blue Sr. is a 78 y.o. yo male is here for evaluation evaluation for the following acute & chronic medical concerns: Follow-up      HPI:    Newly diagnosed Prostate ca - follows with [facility]; will see rad onc; [facility]th at [facility]. E locally    HTN: on diovan 40mg; metoprolol 25mg, spironolactone  Nonischemic cardiomyopathy / HFrEF with LVAD in place - follows with [facility]; on amiodarone, digoxin, metoprolol, spironolactone, bumex; plans for getting on transplant list  Afib: on coumadin 5mg; metoprolol, antiarrhythmics  DMII - on sliding scale - about 10U humulin before meals; rx through [place]; [place] with [facility], endo   Sarcoid - stable at this time; off of steroids  Chronic pain - ultram PRN; same rx since january  Gets all prescriptions from [place]. SNEHA  CKD stage 3  Sp bariatric surgery  Foot wound / osteo - follows with podiatry  Bilateral lower extremity peripheral vascular disease       ROS negative unless otherwise noted    Vitals:   Temp 97.3 °F (36.3 °C)   Resp 16   Ht 5' 11\" (1.803 m)   Wt 232 lb (105.2 kg)   BMI 32.36 kg/m²   Wt Readings from Last 3 Encounters:   08/25/21 232 lb (105.2 kg)   08/23/21 221 lb (100.2 kg)   08/16/21 221 lb (100.2 kg)       PE:  Constitutional - alert, well appearing, and in no distress  Eyes - extraocular eye movements intact, left eye normal, right eye normal, no conjunctivitis noted  Neck - symmetric, no obvious masses noted  Respiratory- clear to auscultation, no wheezes, rales or rhonchi, symmetric air entry; no increased work of breathing  Cardiovascular - mechanical LVAD sound  Extremities -  trace edema b/l noted  Abdomen - soft, nontender, nondistended  Skin - normal coloration and turgor, no rashes, no suspicious skin lesions noted      A / P:     Diagnosis Orders   1. Acute HFrEF (heart failure with reduced ejection fraction) ([address]     2. Atrial fibrillation, unspecified type ([address]     3. Chronic anticoagulation     4.  Major depressive disorder, remission status unspecified, unspecified whether recurrent     5. Essential hypertension     6. Hyperlipidemia, unspecified hyperlipidemia type     7. PVD (peripheral vascular disease) with claudication ([facility]       [facility]  Continue to follow    RTO: Return in about 3 months (around 11/25/2021) for routine f/u.         An electronic signature was used to authenticate this note.  ---- Shankar Powell MD on 8/25/2021 at 4:59 PM Libtayo Counseling- I discussed with the patient the risks of Libtayo including but not limited to nausea, vomiting, diarrhea, and bone or muscle pain.  The patient verbalized understanding of the proper use and possible adverse effects of Libtayo.  All of the patient's questions and concerns were addressed.

## 2022-03-01 ENCOUNTER — HOSPITAL ENCOUNTER (OUTPATIENT)
Age: 72
Discharge: HOME | End: 2022-03-01
Payer: COMMERCIAL

## 2022-03-01 LAB
ANION GAP SERPL CALCULATED.3IONS-SCNC: 13 MMOL/L (ref 7–16)
BUN BLDV-MCNC: 30 MG/DL (ref 6–23)
CALCIUM SERPL-MCNC: 9.7 MG/DL (ref 8.6–10.2)
CHLORIDE BLD-SCNC: 103 MMOL/L (ref 98–107)
CO2: 22 MMOL/L (ref 22–29)
CREAT SERPL-MCNC: 1.1 MG/DL (ref 0.7–1.2)
GFR AFRICAN AMERICAN: >60
GFR NON-AFRICAN AMERICAN: >60 ML/MIN/1.73
GLUCOSE BLD-MCNC: 240 MG/DL (ref 74–99)
HCT VFR BLD CALC: 36.6 % (ref 37–54)
HEMOGLOBIN: 11.4 G/DL (ref 12.5–16.5)
MCH RBC QN AUTO: 29.5 PG (ref 26–35)
MCHC RBC AUTO-ENTMCNC: 31.1 % (ref 32–34.5)
MCV RBC AUTO: 94.6 FL (ref 80–99.9)
PDW BLD-RTO: 15.2 FL (ref 11.5–15)
PLATELET # BLD: 150 E9/L (ref 130–450)
PMV BLD AUTO: 11 FL (ref 7–12)
POTASSIUM SERPL-SCNC: 4.9 MMOL/L (ref 3.5–5)
RBC # BLD: 3.87 E12/L (ref 3.8–5.8)
SODIUM BLD-SCNC: 138 MMOL/L (ref 132–146)
WBC # BLD: 6 E9/L (ref 4.5–11.5)

## 2022-03-01 PROCEDURE — 36415 COLL VENOUS BLD VENIPUNCTURE: CPT

## 2022-03-01 PROCEDURE — 80048 BASIC METABOLIC PNL TOTAL CA: CPT

## 2022-03-01 PROCEDURE — 85027 COMPLETE CBC AUTOMATED: CPT

## 2022-03-03 ENCOUNTER — HOSPITAL ENCOUNTER (OUTPATIENT)
Dept: RADIATION ONCOLOGY | Age: 72
Discharge: HOME | End: 2022-03-03
Attending: RADIOLOGY

## 2022-03-03 VITALS
TEMPERATURE: 97.3 F | RESPIRATION RATE: 20 BRPM | OXYGEN SATURATION: 95 % | HEART RATE: 52 BPM | BODY MASS INDEX: 30.88 KG/M2 | WEIGHT: 221.38 LBS

## 2022-03-03 DIAGNOSIS — C61 PROSTATE CANCER (HCC): Primary | ICD-10-CM

## 2022-03-03 DIAGNOSIS — R93.6 ABNORMAL X-RAY OF HUMERUS: ICD-10-CM

## 2022-03-03 PROCEDURE — 99999 PR OFFICE/OUTPT VISIT,PROCEDURE ONLY: CPT | Performed by: NURSE PRACTITIONER

## 2022-03-03 RX ORDER — SPIRONOLACTONE 25 MG/1
25 TABLET ORAL DAILY
COMMUNITY
End: 2022-04-11

## 2022-03-03 RX ORDER — HYDRALAZINE HYDROCHLORIDE 25 MG/1
25 TABLET, FILM COATED ORAL 3 TIMES DAILY
COMMUNITY

## 2022-03-03 RX ORDER — SULFAMETHOXAZOLE AND TRIMETHOPRIM 800; 160 MG/1; MG/1
1 TABLET ORAL
COMMUNITY

## 2022-03-03 NOTE — PROGRESS NOTES
Rollo Canavan Sr.  3/3/2022  9:39 AM      Vitals:    03/03/22 0929   Pulse: 52   Resp: 20   Temp: 97.3 °F (36.3 °C)   SpO2: 95%    : Wt Readings from Last 3 Encounters:   03/03/22 221 lb 6 oz (100.4 kg)   01/10/22 228 lb (103.4 kg)   01/04/22 224 lb 6 oz (101.8 kg)                Current Outpatient Medications:     PREDNISONE PO, Take 30 mg by mouth daily, Disp: , Rfl:     hydrALAZINE (APRESOLINE) 25 MG tablet, Take 25 mg by mouth 3 times daily, Disp: , Rfl:     spironolactone (ALDACTONE) 25 MG tablet, Take 25 mg by mouth daily, Disp: , Rfl:     sulfamethoxazole-trimethoprim (BACTRIM DS;SEPTRA DS) 800-160 MG per tablet, Take 1 tablet by mouth three times a week, Disp: , Rfl:     tamsulosin (FLOMAX) 0.4 MG capsule, Take 0.4 mg by mouth daily, Disp: , Rfl:     ondansetron (ZOFRAN) 4 MG tablet, Take 4 mg by mouth every 6 hours as needed for Nausea or Vomiting, Disp: , Rfl:     triamcinolone (KENALOG) 0.1 % cream, daily as needed , Disp: , Rfl:     valsartan (DIOVAN) 80 MG tablet, Take 40 mg by mouth 2 times daily , Disp: , Rfl:     acetaminophen (TYLENOL) 500 MG tablet, Take 1,000 mg by mouth every 8 hours as needed for Pain, Disp: , Rfl:     bicalutamide (CASODEX) 50 MG chemo tablet, Take 50 mg by mouth daily, Disp: , Rfl:     mexiletine (MEXITIL) 150 MG capsule, Take 150 mg by mouth 3 times daily , Disp: , Rfl:     Multiple Vitamin (MULTIVITAMINS PO), Take 1 tablet by mouth 4 times daily Geriatric fusion oral chewable, Disp: , Rfl:     amoxicillin-clavulanate (AUGMENTIN) 875-125 MG per tablet, Take 1 tablet by mouth 2 times daily, Disp: , Rfl:     amiodarone (CORDARONE) 200 MG tablet, Take 200 mg by mouth daily, Disp: , Rfl:     bacitracin 500 UNIT/GM ointment, Apply topically 2 times daily Apply topically 2 times daily. , Disp: , Rfl:     senna (SENOKOT) 8.6 MG tablet, Take 1 tablet by mouth nightly , Disp: , Rfl:     psyllium (KONSYL) 28.3 % PACK, Take 1 packet by mouth daily as needed Metamucil sugar free 3.4 gram powder pack, Disp: , Rfl:     sodium chloride (OCEAN) 0.65 % nasal spray, 1 spray by Nasal route 3 times daily as needed for Congestion , Disp: , Rfl:     aspirin 81 MG EC tablet, Take 81 mg by mouth daily, Disp: , Rfl:     ergocalciferol (ERGOCALCIFEROL) 1.25 MG (45401 UT) capsule, Take 50,000 Units by mouth once a week Thursdays, Disp: , Rfl:     digoxin (LANOXIN) 125 MCG tablet, Take 125 mcg by mouth every other day , Disp: , Rfl:     Magnesium 400 MG CAPS, Take 1 tablet by mouth daily , Disp: , Rfl:     warfarin (COUMADIN) 5 MG tablet, Take 5 mg by mouth, Disp: , Rfl:     pantoprazole (PROTONIX) 40 MG tablet, Take 1 tablet by mouth every morning (before breakfast), Disp: 90 tablet, Rfl: 3    bumetanide (BUMEX) 2 MG tablet, Take 1 tablet by mouth daily (Patient taking differently: Take 1 mg by mouth Take 1 tablet (1 mg total) by mouth daily as needed (fluid retention: weight gain/bloating/shortness of breath). ), Disp: 90 tablet, Rfl: 3    allopurinol (ZYLOPRIM) 100 MG tablet, Take 1 tablet by mouth daily, Disp: 30 tablet, Rfl: 3      Patient is seen today in follow up for Prostate cancer    Basilia Kehr is here today with his wife for a follow up after completed radiation therapy to pelvis 44 fractions; 7920 cGy completed 01/22/2022. He is alert and oriented x 4, denies pain for now, ambulating with a cane. He was following with his Urologist, his last visit was 2/01/22 which he received Lupron at that time also. No other complaints, Raisa CNP updated. FALLS RISK SCREENING ASSESSMENT    Instructions:  Assess the patient and enter the appropriate indicators that are present for fall risk identification. Total the numbers entered and assign a fall risk score from Table 2.  Reassess patient at a minimum every 12 weeks or with status change. Assessment   Date  3/3/2022     1. Mental Ability: confusion/cognitively impaired No - 0       2.   Elimination Issues: incontinence, frequency No - 0       3. Ambulatory: use of assistive devices (walker, cane, off-loading devices), attached to equipment (IV pole, oxygen) Yes - 2/cane     4. Sensory Limitations: dizziness, vertigo, impaired vision No - 0       5. Age 72 years or greater - 1       10. Medication: diuretics, strong analgesics, hypnotics, sedatives, antihypertensive agents   Yes - 3   7. Falls:  recent history of falls within the last 3 months (not to include slipping or tripping)   No - 0   TOTAL 6    If score of 4 or greater was education given? Yes       TABLE 2   Risk Score Risk Level Plan of Care   0-3 Little or  No Risk 1. Provide assistance as indicated for ambulation activities  2. Reorient confused/cognitively impaired patient  3. Call-light/bell within patient's reach  4. Chair/bed in low position, stretcher/bed with siderails up except when performing patient care activities  5. Educate patient/family/caregiver on falls prevention  6.  Reassess in 12 weeks or with any noted change in patient condition which places them at a risk for a fall   4-6 Moderate Risk 1. Provide assistance as indicated for ambulation activities  2. Reorient confused/cognitively impaired patient  3. Call-light/bell within patient's reach  4. Chair/bed in low position, stretcher/bed with siderails up except when performing patient care activities  5. Educate patient/family/caregiver on falls prevention  6. Falls risk precaution (Yellow sticker Level II) placed on patient chart   7 or   Higher High Risk 1. Place patient in easily observable treatment room  2. Patient attended at all times by family member or staff  3. Provide assistance as indicated for ambulation activities  4. Reorient confused/cognitively impaired patient  5. Call-light/bell within patient's reach  6. Chair/bed in low position, stretcher/bed with siderails up except when performing patient care activities  7.   Educate patient/family/caregiver on falls prevention  8. Falls risk precaution (Yellow sticker Level III) placed on patient chart           MALNUTRITION RISK SCREENING ASSESSMENT    3/3/2022   Patient:  Luther Damon .  Sex:  male    Instructions:  Assess the patient and enter the appropriate indicators that are present for nutrition risk identification. Total the numbers entered and assign a risk score. Follow the appropriate action for total score listed below. Assessment   Date  3/3/2022     1. Have you lost weight without trying? 0- No     2. Have you been eating poorly because of a decreased appetite? 0- No   3. Do you have a diagnosis of head and neck cancer?       0- No                                                                                    TOTAL 0          Score of 0-1: No action  Score 2 or greater:  · For Non-Diabetic Patient: Recommend adding Ensure Complete 2 x daily and provide patient with Ensure wellness bag with coupons  · For Diabetic Patient: Recommend adding Glucerna Shake 2 x daily and provide patient with Glucerna Wellness bag with coupons  · Route to the dietitian via Will Daniels RN

## 2022-03-03 NOTE — PROGRESS NOTES
RADIATION ONCOLOGY  6 week follow up       3/3/2022      NAME:  Laurel Mccormick. YOB: 1950    Diagnosis:  Prostate cancer ([address]    Subjective: On 01/12/2022, Ebenezer Hernandez Sr. completed 7920 cGy in 40 fractions directed to the prostate + proximal SV for management of high risk adenocarcinoma of the prostate. Patient seen today in 6 week post XRT completion symptom management check. The patient denies urinary symptoms including no urinary hesitancy, difficulty initiating urine stream, feeling of incomplete bladder emptying, urinary frequency, hematuria, dysuria or flank pain. The patient continues on Flomax. No fevers, chills, nausea or vomiting. The patient is following with Dr. Jessie Rodríguez for Urology at [facility], most recent visit 02/01/2022. The patient received Lupron injection #2 at this visit (planned for 36 months ADT). Next follow-up visit 4 months with PSA. The patient reports hospitalized at [facility] after ICD fired 02/18/2022. Admitted with ventricular tachyarrhythmia, taken for emergent VT ablation 02/18/2022 and ICD generator change 02/24/2022. Sustained VT post ablation, due to suspected sarcoidosis. Started on daily steroid. The patient discharged 02/25/2022. Pain: No pain complaints. Past medical, surgical, social and family histories reviewed and updated as indicated.     ALLERGIES:  Food, Chlorhexidine, Soap & cleansers, and Strawberry (diagnostic)         Current Outpatient Medications   Medication Sig Dispense Refill    PREDNISONE PO Take 30 mg by mouth daily      hydrALAZINE (APRESOLINE) 25 MG tablet Take 25 mg by mouth 3 times daily      spironolactone (ALDACTONE) 25 MG tablet Take 25 mg by mouth daily      sulfamethoxazole-trimethoprim (BACTRIM DS;SEPTRA DS) 800-160 MG per tablet Take 1 tablet by mouth three times a week      tamsulosin (FLOMAX) 0.4 MG capsule Take 0.4 mg by mouth daily      ondansetron (ZOFRAN) 4 MG tablet Take 4 mg by mouth every 6 hours as needed for Nausea or Vomiting      triamcinolone (KENALOG) 0.1 % cream daily as needed       valsartan (DIOVAN) 80 MG tablet Take 40 mg by mouth 2 times daily       acetaminophen (TYLENOL) 500 MG tablet Take 1,000 mg by mouth every 8 hours as needed for Pain      bicalutamide (CASODEX) 50 MG chemo tablet Take 50 mg by mouth daily      mexiletine (MEXITIL) 150 MG capsule Take 150 mg by mouth 3 times daily       Multiple Vitamin (MULTIVITAMINS PO) Take 1 tablet by mouth 4 times daily Geriatric fusion oral chewable      amoxicillin-clavulanate (AUGMENTIN) 875-125 MG per tablet Take 1 tablet by mouth 2 times daily      amiodarone (CORDARONE) 200 MG tablet Take 200 mg by mouth daily      bacitracin 500 UNIT/GM ointment Apply topically 2 times daily Apply topically 2 times daily.  senna (SENOKOT) 8.6 MG tablet Take 1 tablet by mouth nightly       psyllium (KONSYL) 28.3 % PACK Take 1 packet by mouth daily as needed Metamucil sugar free 3.4 gram powder pack      sodium chloride (OCEAN) 0.65 % nasal spray 1 spray by Nasal route 3 times daily as needed for Congestion       aspirin 81 MG EC tablet Take 81 mg by mouth daily      digoxin (LANOXIN) 125 MCG tablet Take 125 mcg by mouth every other day       Magnesium 400 MG CAPS Take 1 tablet by mouth daily       warfarin (COUMADIN) 5 MG tablet Take 5 mg by mouth      pantoprazole (PROTONIX) 40 MG tablet Take 1 tablet by mouth every morning (before breakfast) 90 tablet 3    bumetanide (BUMEX) 2 MG tablet Take 1 tablet by mouth daily (Patient taking differently: Take 1 mg by mouth Take 1 tablet (1 mg total) by mouth daily as needed (fluid retention: weight gain/bloating/shortness of breath). ) 90 tablet 3    allopurinol (ZYLOPRIM) 100 MG tablet Take 1 tablet by mouth daily 30 tablet 3    ergocalciferol (ERGOCALCIFEROL) 1.25 MG (86015 UT) capsule Take 50,000 Units by mouth once a week Thursdays       No current facility-administered medications for this encounter. Physical Examination:   Vitals:    03/03/22 0929   Pulse: 52   Resp: 20   Temp: 97.3 °F (36.3 °C)   SpO2: 95%       Wt Readings from Last 3 Encounters:   03/03/22 221 lb 6 oz (100.4 kg)   01/10/22 228 lb (103.4 kg)   01/04/22 224 lb 6 oz (101.8 kg)     Per patient's report home doppler pressure 85      Alert and fully ambulatory. Pleasant and conversant. ASSESSMENT/PLAN:     High risk prostate cancer post XRT completion. Patient is doing well post XRT completion. Continue follow-up with Urology as directed. ADT for 36 months. Serial PSA per Urology. Further review of outside records after patient left for day, revealed a 1.6 cm ovoid faintly sclerotic lesion in the right humeral neck redemonstrated and indeterminate on XR Shoulder 2 view Right 12/14/2021 at outside facility [facility]. I discussed patient's case including right shoulder XRay results with Dr. Kathy Tamayo, who recommends patient per referred to Medical Oncology. I then called Niko Reich Sr. and discussed recommendations. Patient agreeable to proceed. Patient prefers to be seen at [facility] location. Referral to [facility] location ordered. Referral back to Dr. Kathy Tamayo if humerus lesion metastatic for consideration of XRT. The patient was given our contact number in the event that if at any time they change their mind and would like to return to the clinic to see either myself or one of the Radiation Oncologists, they can simply call us and we would be happy to see them sooner. Thank you for involving us in the management of this extremely pleasant patient. More than 15 min was in direct contact with pt coordinating/giving care. >50% of the visit was spent in counseling the pt on the following: Follow up care    The nurses notes were reviewed and incorporated into this assessment and plan.       Questions answered to apparent satisfaction.         Geneva Rogers, MSN, APRN-CNP  Certified Nurse Practitioner for [address] [place] [address] Rankin: [phone]/ F: [phone]   [facility] Rankin: [phone] / F: [phone]

## 2022-03-10 ENCOUNTER — TELEPHONE (OUTPATIENT)
Dept: FAMILY MEDICINE CLINIC | Age: 72
End: 2022-03-10

## 2022-03-10 NOTE — TELEPHONE ENCOUNTER
----- Message from Molina Reams sent at 3/10/2022  9:44 AM EST -----  Subject: Message to Provider    QUESTIONS  Information for Provider? Needs to reschedule with Dr. Colleen Steel for His   AWV, and wanted to try for after 3 on april 11th, please call to schedule.     ---------------------------------------------------------------------------  --------------  CALL BACK INFO  What is the best way for the office to contact you? OK to leave message on   voicemail  Preferred Call Back Phone Number? [phone]  ---------------------------------------------------------------------------  --------------  SCRIPT ANSWERS  Relationship to Patient?  Self

## 2022-03-14 ENCOUNTER — HOSPITAL ENCOUNTER (OUTPATIENT)
Age: 72
Discharge: HOME | End: 2022-03-14
Payer: COMMERCIAL

## 2022-03-14 LAB
INR BLD: 6.3
PROTHROMBIN TIME: 69.4 SEC (ref 9.3–12.4)

## 2022-03-14 PROCEDURE — 85610 PROTHROMBIN TIME: CPT

## 2022-03-14 PROCEDURE — 36415 COLL VENOUS BLD VENIPUNCTURE: CPT

## 2022-03-15 ENCOUNTER — HOSPITAL ENCOUNTER (OUTPATIENT)
Age: 72
Discharge: HOME | End: 2022-03-15
Payer: COMMERCIAL

## 2022-03-15 LAB
INR BLD: 5
PROTHROMBIN TIME: 54.3 SEC (ref 9.3–12.4)

## 2022-03-15 PROCEDURE — 36415 COLL VENOUS BLD VENIPUNCTURE: CPT

## 2022-03-15 PROCEDURE — 85610 PROTHROMBIN TIME: CPT

## 2022-03-18 ENCOUNTER — OFFICE VISIT (OUTPATIENT)
Dept: ONCOLOGY | Age: 72
End: 2022-03-18
Payer: COMMERCIAL

## 2022-03-18 ENCOUNTER — HOSPITAL ENCOUNTER (OUTPATIENT)
Dept: INFUSION THERAPY | Age: 72
Discharge: HOME | End: 2022-03-18
Payer: COMMERCIAL

## 2022-03-18 VITALS
OXYGEN SATURATION: 99 % | HEART RATE: 42 BPM | HEIGHT: 71 IN | WEIGHT: 238 LBS | TEMPERATURE: 97.1 F | BODY MASS INDEX: 33.32 KG/M2

## 2022-03-18 DIAGNOSIS — C61 PROSTATE CANCER (HCC): Primary | ICD-10-CM

## 2022-03-18 DIAGNOSIS — C61 PROSTATE CANCER (HCC): ICD-10-CM

## 2022-03-18 LAB
ALBUMIN SERPL-MCNC: 3.7 G/DL (ref 3.5–5.2)
ALP BLD-CCNC: 114 U/L (ref 40–129)
ALT SERPL-CCNC: 55 U/L (ref 0–40)
ANION GAP SERPL CALCULATED.3IONS-SCNC: 10 MMOL/L (ref 7–16)
AST SERPL-CCNC: 40 U/L (ref 0–39)
BASOPHILS ABSOLUTE: 0.01 E9/L (ref 0–0.2)
BASOPHILS RELATIVE PERCENT: 0.2 % (ref 0–2)
BILIRUB SERPL-MCNC: 0.5 MG/DL (ref 0–1.2)
BUN BLDV-MCNC: 55 MG/DL (ref 6–23)
CALCIUM SERPL-MCNC: 9.1 MG/DL (ref 8.6–10.2)
CHLORIDE BLD-SCNC: 95 MMOL/L (ref 98–107)
CO2: 22 MMOL/L (ref 22–29)
CREAT SERPL-MCNC: 1.6 MG/DL (ref 0.7–1.2)
EOSINOPHILS ABSOLUTE: 0.01 E9/L (ref 0.05–0.5)
EOSINOPHILS RELATIVE PERCENT: 0.2 % (ref 0–6)
GFR AFRICAN AMERICAN: 52
GFR NON-AFRICAN AMERICAN: 43 ML/MIN/1.73
GLUCOSE BLD-MCNC: 210 MG/DL (ref 74–99)
HCT VFR BLD CALC: 33.1 % (ref 37–54)
HEMOGLOBIN: 10.8 G/DL (ref 12.5–16.5)
IMMATURE GRANULOCYTES #: 0.04 E9/L
IMMATURE GRANULOCYTES %: 0.7 % (ref 0–5)
LYMPHOCYTES ABSOLUTE: 0.25 E9/L (ref 1.5–4)
LYMPHOCYTES RELATIVE PERCENT: 4.6 % (ref 20–42)
MCH RBC QN AUTO: 30.2 PG (ref 26–35)
MCHC RBC AUTO-ENTMCNC: 32.6 % (ref 32–34.5)
MCV RBC AUTO: 92.5 FL (ref 80–99.9)
MONOCYTES ABSOLUTE: 0.16 E9/L (ref 0.1–0.95)
MONOCYTES RELATIVE PERCENT: 2.9 % (ref 2–12)
NEUTROPHILS ABSOLUTE: 4.97 E9/L (ref 1.8–7.3)
NEUTROPHILS RELATIVE PERCENT: 91.4 % (ref 43–80)
OVALOCYTES: ABNORMAL
PDW BLD-RTO: 15.9 FL (ref 11.5–15)
PLATELET # BLD: 119 E9/L (ref 130–450)
PMV BLD AUTO: 11.9 FL (ref 7–12)
POIKILOCYTES: ABNORMAL
POTASSIUM SERPL-SCNC: 5.4 MMOL/L (ref 3.5–5)
PROSTATE SPECIFIC ANTIGEN: 20.86 NG/ML (ref 0–4)
RBC # BLD: 3.58 E12/L (ref 3.8–5.8)
SCHISTOCYTES: ABNORMAL
SODIUM BLD-SCNC: 127 MMOL/L (ref 132–146)
TESTOSTERONE TOTAL: <2.5 NG/DL
TOTAL PROTEIN: 6.8 G/DL (ref 6.4–8.3)
WBC # BLD: 5.4 E9/L (ref 4.5–11.5)

## 2022-03-18 PROCEDURE — G8417 CALC BMI ABV UP PARAM F/U: HCPCS | Performed by: INTERNAL MEDICINE

## 2022-03-18 PROCEDURE — 36415 COLL VENOUS BLD VENIPUNCTURE: CPT

## 2022-03-18 PROCEDURE — 84153 ASSAY OF PSA TOTAL: CPT

## 2022-03-18 PROCEDURE — 84403 ASSAY OF TOTAL TESTOSTERONE: CPT

## 2022-03-18 PROCEDURE — G8428 CUR MEDS NOT DOCUMENT: HCPCS | Performed by: INTERNAL MEDICINE

## 2022-03-18 PROCEDURE — 99245 OFF/OP CONSLTJ NEW/EST HI 55: CPT | Performed by: INTERNAL MEDICINE

## 2022-03-18 PROCEDURE — 85025 COMPLETE CBC W/AUTO DIFF WBC: CPT

## 2022-03-18 PROCEDURE — 99214 OFFICE O/P EST MOD 30 MIN: CPT

## 2022-03-18 PROCEDURE — 80053 COMPREHEN METABOLIC PANEL: CPT

## 2022-03-18 PROCEDURE — G8484 FLU IMMUNIZE NO ADMIN: HCPCS | Performed by: INTERNAL MEDICINE

## 2022-03-18 NOTE — PROGRESS NOTES
Department of [facility] Med Oncology  Attending Consult Note    Reason for Visit: Consultation on a patient with Prostate Cancer    Referring Physician: Konstantin Wray CNP    PCP:  Lisa Addison MD    History of Present Illness:  72 y/o male with High risk Prostate Cancer post XRT completion. On 01/12/2022, he completed 7920 cGy in 44 fractions directed to the prostate + proximal SV    Dr. Crow Aranda for Urology at [facility], most recent visit 02/01/2022. The patient received Lupron injection #2 at this visit (planned for 36 months ADT)    The patient reports hospitalized at [facility] after ICD fired 02/18/2022. Admitted with ventricular tachyarrhythmia, taken for emergent VT ablation 02/18/2022 and ICD generator change 02/24/2022. Sustained VT post ablation, due to suspected sarcoidosis. Started on daily steroid. The patient was discharged 02/25/2022    Review of Systems;  CONSTITUTIONAL: No fever, chills. Fair appetite and energy level. ENMT: Eyes: No diplopia; Nose: No epistaxis. Mouth: No sore throat. RESPIRATORY: No hemoptysis, shortness of breath, cough. CARDIOVASCULAR: see HPI  GASTROINTESTINAL: No nausea/vomiting, abdominal pain, diarrhea/constipation. GENITOURINARY: see HPI  NEURO: No syncope, presyncope, headache.   Remainder:  ROS NEGATIVE    Past Medical History:      Diagnosis Date    Anxiety     Aorto-iliac atherosclerosis ([address] 8/31/2020    Arthritis     Atrial fibrillation (HCC)     AVNRT (AV teddy re-entry tachycardia) ([address]     Blood type O+ 11/14/2019    CAD (coronary artery disease)     Cancer (HCC)     CHF (congestive heart failure) (HCC)     Diabetes mellitus ([address]     Diabetic neuropathy ([address]     Diastolic dysfunction 02/38/5876    stage 3    Fall     right knee    Gout     chemically induced    History of blood transfusion     Hx of blood clots 1976    left leg    Hyperlipidemia     Hypertension     LBBB (left bundle branch block)     Left ventricular assist device (LVAD) complication 0258    Moderate mitral regurgitation 2016    Nonischemic cardiomyopathy (HCC)     Obesity     SNEHA (obstructive sleep apnea)     treated with CPAP    PVD (peripheral vascular disease) with claudication ([facility] 2020    Sarcoidosis 2019    Severe tricuspid regurgitation 2016    SVT (supraventricular tachycardia) (HCC)     Viral cardiomyopathy ([facility]      Past Surgical History:      Procedure Laterality Date    BACK SURGERY      CARDIAC CATHETERIZATION  2011    Dr. Maynor Childs  2019    Dr. Rolando Powers  2014    BiV/ICD  (Doneen Millet)    Dr. Betsey Francis CATH LAB PROCEDURE      ECHO COMPL W DOP COLOR FLOW  2011         ECHO COMPLETE  2013         EYE SURGERY Right 2019    FEMUR SURGERY      rt; compound frx right femur at 3years old.  JOINT REPLACEMENT Left     knee    KNEE SURGERY      7 on right/1 on left/ total replacement on R    PICC LINE INSERTION NURSE  10/21/2019         PROSTATE BIOPSY  2021     Family History:  Family History   Problem Relation Age of Onset    Alzheimer's Disease Mother     Diabetes Mother     No Known Problems Father         lives in [facility]    No Known Problems Brother     Other Brother         aneurysm behind his eye    Arrhythmia Brother          age 61    No Known Problems Brother     No Known Problems Brother      Medications:  Reviewed and reconciled. Social History:  Social History     Socioeconomic History    Marital status: Legally      Spouse name: Shira Burrows Number of children: 3    Years of education: 13    Highest education level:  Bachelor's degree (e.g., BA, AB, BS)   Occupational History    Occupation: [employer]     Comment: [place]; medical discharge    Occupation: teacher     Comment: [id number] handicapped    Occupation:    Tobacco Use    Smoking status: Former Smoker     Packs/day: 0.00     Years: 2.00     Pack years: 0.00     Types: Cigars     Quit date: 1980     Years since quittin.2    Smokeless tobacco: Never Used    Tobacco comment: non smoker   Vaping Use    Vaping Use: Never used   Substance and Sexual Activity    Alcohol use: Not Currently    Drug use: Never    Sexual activity: Not on file   Other Topics Concern    Not on file   Social History Narrative    Drinks 0-1 cup of decaf coffee/tea daily. Social Determinants of Health     Financial Resource Strain: Low Risk     Difficulty of Paying Living Expenses: Not hard at all   Food Insecurity: No Food Insecurity    Worried About Running Out of Food in the Last Year: Never true    Satish of Food in the Last Year: Never true   Transportation Needs:     Lack of Transportation (Medical): Not on file    Lack of Transportation (Non-Medical): Not on file   Physical Activity: Unknown    Days of Exercise per Week: Patient refused    Minutes of Exercise per Session: 0 min   Stress:     Feeling of Stress : Not on file   Social Connections:     Frequency of Communication with Friends and Family: Not on file    Frequency of Social Gatherings with Friends and Family: Not on file    Attends Taoism Services: Not on file    Active Member of [address] [place] [address] [employer] or Organizations: Not on file    Attends Club or Organization Meetings: Not on file    Marital Status: Not on file   Intimate Partner Violence:     Fear of Current or Ex-Partner: Not on file    Emotionally Abused: Not on file    Physically Abused: Not on file    Sexually Abused: Not on file   Housing Stability:     Unable to Pay for Housing in the Last Year: Not on file    Number of [place] in the Last Year: Not on file    Unstable Housing in the Last Year: Not on file     Allergies:   Allergies   Allergen Reactions    Food Hives     Strawberries      Chlorhexidine Rash    Soap & Cleansers Rash     TIDE detergent    Strawberry (Diagnostic) Rash     Other reaction(s): Eruption, ITCHING,WATERING EYES     Physical Exam:  Pulse (!) 42   Temp 97.1 °F (36.2 °C)   Ht 5' 11\" (1.803 m)   Wt 238 lb (108 kg)   SpO2 99%   BMI 33.19 kg/m²   GENERAL: Alert, oriented x 3, not in acute distress. HEENT: PERRLA; EOMI. Oropharynx clear. NECK: Supple. Without lymphadenopathy. LUNGS: Good air entry bilaterally. No wheezing, crackles or ronchi. CARDIOVASCULAR: Regular rate. No murmurs, rubs or gallops. ABDOMEN: Soft. Non-tender, non-distended. EXTREMITIES: Without clubbing, cyanosis, or edema. NEUROLOGIC: No focal deficits. ECOG PS 1-2    Lab Results   Component Value Date    WBC 6.0 03/01/2022    HGB 11.4 (L) 03/01/2022    HCT 36.6 (L) 03/01/2022    MCV 94.6 03/01/2022     03/01/2022     Lab Results   Component Value Date     03/01/2022    K 4.9 03/01/2022     03/01/2022    CO2 22 03/01/2022    BUN 30 (H) 03/01/2022    CREATININE 1.1 03/01/2022    GLUCOSE 240 (H) 03/01/2022    CALCIUM 9.7 03/01/2022    PROT 6.5 10/06/2021    LABALBU 3.4 (L) 10/06/2021    BILITOT 0.6 10/06/2021    ALKPHOS 99 10/06/2021    AST 32 10/06/2021    ALT 29 10/06/2021    LABGLOM >60 03/01/2022    GFRAA >60 03/01/2022     Impression/Plan:  72 y/o male with High risk prostate cancer post XRT completion. On 01/12/2022, he completed 7920 cGy in 40 fractions directed to the prostate + proximal SV for management of high risk adenocarcinoma of the prostate    Dr. Kuldip Jay for Urology at [facility], most recent visit 02/01/2022. The patient received Lupron injection #2 at this visit (planned for 36 months ADT)    Further review of outside records revealed a 1.6 cm ovoid faintly sclerotic lesion in the right humeral neck redemonstrated and indeterminate on XR Shoulder 2 view Right 12/14/2021 at [facility]    We recommended:   Labs including CBC, CMP, PSA, Testosterone   NM Bone scan, CT right humerus for further evaluation.    RTC 2 weeks to review test results.      Thank you for allowing us to participate in the care of . Guido Vines MD   3/18/2022

## 2022-03-18 NOTE — PROGRESS NOTES
Deann Maryar.  1950 73 y.o. Referring Physician: Dr. Philip Quick MD [facility] [place], Dr. Faye Hester MD  Radiation Oncologist here in [facility]    PCP: Denise Bowden MD    Vitals:    03/18/22 1106   Pulse: (!) 42   Temp: 97.1 °F (36.2 °C)   SpO2: 99%        Wt Readings from Last 3 Encounters:   03/18/22 238 lb (108 kg)   03/03/22 221 lb 6 oz (100.4 kg)   01/10/22 228 lb (103.4 kg)        Body mass index is 33.19 kg/m². Chief Complaint: No chief complaint on file. Cancer Staging  Carcinoma of prostate [facility])  Staging form: Prostate, AJCC 8th Edition  - Clinical: No stage assigned - Unsigned      Prior Radiation Therapy? YES: Site Treated: Prostate          Facility: [facility]          Date: finished January 2022    Concurrent Chemo/radiation? NO    Prior Chemotherapy? NO    Prior Hormonal Therapy? NO    Head and Neck Cancer? No, patient does NOT have HN cancer.             Current Outpatient Medications:     PREDNISONE PO, Take 30 mg by mouth daily, Disp: , Rfl:     hydrALAZINE (APRESOLINE) 25 MG tablet, Take 25 mg by mouth 3 times daily, Disp: , Rfl:     spironolactone (ALDACTONE) 25 MG tablet, Take 25 mg by mouth daily, Disp: , Rfl:     sulfamethoxazole-trimethoprim (BACTRIM DS;SEPTRA DS) 800-160 MG per tablet, Take 1 tablet by mouth three times a week, Disp: , Rfl:     tamsulosin (FLOMAX) 0.4 MG capsule, Take 0.4 mg by mouth daily, Disp: , Rfl:     ondansetron (ZOFRAN) 4 MG tablet, Take 4 mg by mouth every 6 hours as needed for Nausea or Vomiting, Disp: , Rfl:     triamcinolone (KENALOG) 0.1 % cream, daily as needed , Disp: , Rfl:     valsartan (DIOVAN) 80 MG tablet, Take 40 mg by mouth 2 times daily , Disp: , Rfl:     acetaminophen (TYLENOL) 500 MG tablet, Take 1,000 mg by mouth every 8 hours as needed for Pain, Disp: , Rfl:     bicalutamide (CASODEX) 50 MG chemo tablet, Take 50 mg by mouth daily, Disp: , Rfl:     mexiletine (MEXITIL) 150 MG capsule, Take 150 mg by mouth 3 times daily , Disp: , Rfl:     Multiple Vitamin (MULTIVITAMINS PO), Take 1 tablet by mouth 4 times daily Geriatric fusion oral chewable, Disp: , Rfl:     amoxicillin-clavulanate (AUGMENTIN) 875-125 MG per tablet, Take 1 tablet by mouth 2 times daily, Disp: , Rfl:     amiodarone (CORDARONE) 200 MG tablet, Take 200 mg by mouth daily, Disp: , Rfl:     bacitracin 500 UNIT/GM ointment, Apply topically 2 times daily Apply topically 2 times daily. , Disp: , Rfl:     senna (SENOKOT) 8.6 MG tablet, Take 1 tablet by mouth nightly , Disp: , Rfl:     psyllium (KONSYL) 28.3 % PACK, Take 1 packet by mouth daily as needed Metamucil sugar free 3.4 gram powder pack, Disp: , Rfl:     sodium chloride (OCEAN) 0.65 % nasal spray, 1 spray by Nasal route 3 times daily as needed for Congestion , Disp: , Rfl:     aspirin 81 MG EC tablet, Take 81 mg by mouth daily, Disp: , Rfl:     ergocalciferol (ERGOCALCIFEROL) 1.25 MG (24940 UT) capsule, Take 50,000 Units by mouth once a week Thursdays, Disp: , Rfl:     digoxin (LANOXIN) 125 MCG tablet, Take 125 mcg by mouth every other day , Disp: , Rfl:     Magnesium 400 MG CAPS, Take 1 tablet by mouth daily , Disp: , Rfl:     warfarin (COUMADIN) 5 MG tablet, Take 5 mg by mouth, Disp: , Rfl:     pantoprazole (PROTONIX) 40 MG tablet, Take 1 tablet by mouth every morning (before breakfast), Disp: 90 tablet, Rfl: 3    bumetanide (BUMEX) 2 MG tablet, Take 1 tablet by mouth daily (Patient taking differently: Take 1 mg by mouth Take 1 tablet (1 mg total) by mouth daily as needed (fluid retention: weight gain/bloating/shortness of breath). ), Disp: 90 tablet, Rfl: 3    allopurinol (ZYLOPRIM) 100 MG tablet, Take 1 tablet by mouth daily, Disp: 30 tablet, Rfl: 3       Past Medical History:   Diagnosis Date    Anxiety     Aorto-iliac atherosclerosis ([facility] 8/31/2020    Arthritis     Atrial fibrillation (HCC)     AVNRT (AV teddy re-entry tachycardia) ([address]     Blood type O+ 11/14/2019    CAD (coronary artery disease)     Cancer (HCC)     CHF (congestive heart failure) ([address]     Diabetes mellitus ([address]     Diabetic neuropathy ([address]     Diastolic dysfunction     stage 3    Fall     right knee    Gout     chemically induced    History of blood transfusion     Hx of blood clots     left leg    Hyperlipidemia     Hypertension     LBBB (left bundle branch block)     Left ventricular assist device (LVAD) complication 0001    Moderate mitral regurgitation 2016    Nonischemic cardiomyopathy ([address]     Obesity     SNEHA (obstructive sleep apnea)     treated with CPAP    PVD (peripheral vascular disease) with claudication ([address] 2020    Sarcoidosis 2019    Severe tricuspid regurgitation 2016    SV[facility]ricular tachycardia) ([facility])     Viral cardiomyopathy ([address]        Past Surgical History:   Procedure Laterality Date    BACK SURGERY      CARDIAC CATHETERIZATION  2011    Dr. Ramón Mcconnell  2019    Dr. Harrison Mak  2014    BiV/ICD  ([facility])    Dr. Bryan Dickey CATH LAB PROCEDURE      ECHO COMPL W DOP COLOR FLOW  2011         ECHO COMPLETE  2013         EYE SURGERY Right 2019    FEMUR SURGERY      rt; compound frx right femur at 3years old.     JOINT REPLACEMENT Left     knee    KNEE SURGERY      7 on right/1 on left/ total replacement on R    PICC LINE INSERTION NURSE  10/21/2019         PROSTATE BIOPSY  2021       Family History   Problem Relation Age of Onset    Alzheimer's Disease Mother     Diabetes Mother     No Known Problems Father         l[facility]    No Known Problems Brother     Other Brother         aneurysm behind his eye    Arrhythmia Brother          age 61    No Known Problems Brother     No Known Problems Brother        Social History     Socioeconomic History    Marital status: Legally Abused: Not on file   Housing Stability:     Unable to Pay for Housing in the Last Year: Not on file    Number of Places Lived in the Last Year: Not on file    Unstable Housing in the Last Year: Not on file           Occupation: retired  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 REVIEW OF SYSTEMS: <<For Level 5, 10 or more systems>>     Pacemaker/Defibulator/ICD:  Yes, also LVAHD    Mediport: No           FALLS RISK SCREENING ASSESSMENT    Instructions:  Assess the patient and [place] the appropriate indicators that are present for fall risk identification. Total the numbers circled and assign a fall risk score from Table 2.  Reassess patient at a minimum every 12 weeks or with status change. Assessment   Date  3/18/2022     1. Mental Ability: confusion/cognitively impaired Yes - 3       2. Elimination Issues: incontinence, frequency No - 0       3. Ambulatory: use of assistive devices (walker, cane, off-loading devices), attached to equipment (IV pole, oxygen) Yes - 2     4. Sensory Limitations: dizziness, vertigo, impaired vision Yes - 3       5. Age 72 years or greater - 1       10. Medication: diuretics, strong analgesics, hypnotics, sedatives, antihypertensive agents   Yes - 3   7. Falls:  recent history of falls within the last 3 months (not to include slipping or tripping)   No - 0   TOTAL 12    If score of 4 or greater was education given? Yes       TABLE 2   Risk Score Risk Level Plan of Care   0-3 Little or  No Risk 1. Provide assistance as indicated for ambulation activities  2. Reorient confused/cognitively impaired patient  3. Call-light/bell within patient's reach  4. Chair/bed in low position, stretcher/bed with siderails up except when performing patient care activities  5. Educate patient/family/caregiver on falls prevention  6.  Reassess in 12 weeks or with any noted change in patient condition which places them at a risk for a fall   4-6 Moderate Risk 1.   Provide assistance as indicated for ambulation activities  2. Reorient confused/cognitively impaired patient  3. Call-light/bell within patient's reach  4. Chair/bed in low position, stretcher/bed with siderails up except when performing patient care activities  5. Educate patient/family/caregiver on falls prevention  6. Falls risk precaution (Yellow sticker Level II) placed on patient chart   7 or   Higher High Risk 1. Place patient in easily observable treatment room  2. Patient attended at all times by family member or staff  3. Provide assistance as indicated for ambulation activities  4. Reorient confused/cognitively impaired patient  5. Call-light/bell within patient's reach  6. Chair/bed in low position, stretcher/bed with siderails up except when performing patient care activities  7. Educate patient/family/caregiver on falls prevention  8. Falls risk precaution (Yellow sticker Level III) placed on patient chart           MALNUTRITION RISK SCREENING ASSESSMENT    Instructions:  Assess the patient and enter the appropriate indicators that are present for nutrition risk identification. Total the numbers entered and assign a risk score. Follow the appropriate action for total score listed below. Assessment   Date  3/18/2022     1. Have you lost weight without trying? 0- No     2. Have you been eating poorly because of a decreased appetite? 0- No   3. Do you have a diagnosis of head and neck cancer?       0- No                                                                                    TOTAL 0        Score of 0-1: No action  Score 2 or greater:  · For Non-Diabetic Patient: Recommend adding Ensure Enlive 2 x daily and provide patient with Ensure wellness bag with coupons  · For Diabetic Patient: Recommend adding Glucerna Shake 2 x daily and provide patient with Glucerna Wellness bag with coupons  · Route to the dietitian via [employer]    · Are you having difficulty performing daily routine tasks  due to fatigue or weakness (ie: bathing/showering, dressing, housework, meal prep, work, child Iris Ache): No     · Do you have any arm flexibility/ROM restrictions, swelling or pain that limit activity: No     · Any changes in memory, attention/focus that impact daily activities: No     · Do you avoid participation in leisure/social activity due weakness, fatigue or pain: No     ARE ANY OF THE ABOVE ARE ANSWERED YES: No          PT ASSESSMENT FOR REFERRAL    · Have you had any recent falls in past 2 months:  No    · Do you have difficulty  going up/down stairs: YES    · Are you having difficulty walking:YES    · Do you often hold onto furniture/environmental supports or feel off balance when you are walking: YES    · Do you need to take rest breaks when you are walking: YES    · Any pain on scale of 1-10 that limits your mobility: Yes 7/10    ARE ANY OF THE ABOVE ARE ANSWERED YES: Yes - PT referral request sent to Dr Kaylah Delgado MD           [address] [facility]    Is this patient a breast cancer patient requiring neoadjuvant chemotherapy: No, this patient does NOT require neoadjuvant chemotherapy. PREHAB AUDIOLOGY REFERRAL    - Is patient planned to receive Cisplatin? Unknown. Will check with Dr Kaylah Delgado MD and request audiology consult as indicated. - Is patient complaining of new onset hearing loss? Yes.  Audiology referral request sent to Dr Kaylah Delgado MD.        Tootie Everett RN

## 2022-03-26 ENCOUNTER — HOSPITAL ENCOUNTER (OUTPATIENT)
Age: 72
Discharge: HOME | End: 2022-03-26
Payer: COMMERCIAL

## 2022-03-26 LAB
INR BLD: 3.9
PROTHROMBIN TIME: 43.2 SEC (ref 9.3–12.4)

## 2022-03-26 PROCEDURE — 36415 COLL VENOUS BLD VENIPUNCTURE: CPT

## 2022-03-26 PROCEDURE — 85610 PROTHROMBIN TIME: CPT

## 2022-03-30 ENCOUNTER — HOSPITAL ENCOUNTER (OUTPATIENT)
Dept: CT IMAGING | Age: 72
Discharge: HOME | End: 2022-04-01
Payer: COMMERCIAL

## 2022-03-30 ENCOUNTER — HOSPITAL ENCOUNTER (OUTPATIENT)
Age: 72
Discharge: HOME | End: 2022-03-30
Payer: COMMERCIAL

## 2022-03-30 ENCOUNTER — HOSPITAL ENCOUNTER (OUTPATIENT)
Dept: NUCLEAR MEDICINE | Age: 72
Discharge: HOME | End: 2022-03-30
Payer: COMMERCIAL

## 2022-03-30 DIAGNOSIS — C61 PROSTATE CANCER (HCC): ICD-10-CM

## 2022-03-30 LAB
INR BLD: 2.7
PROTHROMBIN TIME: 29.6 SEC (ref 9.3–12.4)

## 2022-03-30 PROCEDURE — 73202 CT UPPR EXTREMITY W/O&W/DYE: CPT

## 2022-03-30 PROCEDURE — 78306 BONE IMAGING WHOLE BODY: CPT

## 2022-03-30 PROCEDURE — 74177 CT ABD & PELVIS W/CONTRAST: CPT

## 2022-03-30 PROCEDURE — A9503 TC99M MEDRONATE: HCPCS | Performed by: RADIOLOGY

## 2022-03-30 PROCEDURE — 36415 COLL VENOUS BLD VENIPUNCTURE: CPT

## 2022-03-30 PROCEDURE — 71260 CT THORAX DX C+: CPT

## 2022-03-30 PROCEDURE — 3430000000 HC RX DIAGNOSTIC RADIOPHARMACEUTICAL: Performed by: RADIOLOGY

## 2022-03-30 PROCEDURE — 85610 PROTHROMBIN TIME: CPT

## 2022-03-30 PROCEDURE — 6360000004 HC RX CONTRAST MEDICATION: Performed by: RADIOLOGY

## 2022-03-30 RX ORDER — TC 99M MEDRONATE 20 MG/10ML
25 INJECTION, POWDER, LYOPHILIZED, FOR SOLUTION INTRAVENOUS
Status: COMPLETED | OUTPATIENT
Start: 2022-03-30 | End: 2022-03-30

## 2022-03-30 RX ADMIN — TC 99M MEDRONATE 25 MILLICURIE: 20 INJECTION, POWDER, LYOPHILIZED, FOR SOLUTION INTRAVENOUS at 08:30

## 2022-03-30 RX ADMIN — IOHEXOL 50 ML: 240 INJECTION, SOLUTION INTRATHECAL; INTRAVASCULAR; INTRAVENOUS; ORAL at 10:13

## 2022-03-30 RX ADMIN — IOPAMIDOL 150 ML: 755 INJECTION, SOLUTION INTRAVENOUS at 10:13

## 2022-04-01 ENCOUNTER — TELEPHONE (OUTPATIENT)
Dept: ONCOLOGY | Age: 72
End: 2022-04-01

## 2022-04-01 ENCOUNTER — OFFICE VISIT (OUTPATIENT)
Dept: ONCOLOGY | Age: 72
End: 2022-04-01
Payer: COMMERCIAL

## 2022-04-01 ENCOUNTER — HOSPITAL ENCOUNTER (OUTPATIENT)
Dept: INFUSION THERAPY | Age: 72
Discharge: HOME | End: 2022-04-01

## 2022-04-01 VITALS
HEIGHT: 71 IN | BODY MASS INDEX: 35.42 KG/M2 | TEMPERATURE: 79 F | OXYGEN SATURATION: 98 % | HEART RATE: 83 BPM | WEIGHT: 253 LBS

## 2022-04-01 DIAGNOSIS — C61 PROSTATE CANCER METASTATIC TO BONE (HCC): ICD-10-CM

## 2022-04-01 DIAGNOSIS — C79.51 PROSTATE CANCER METASTATIC TO BONE (HCC): ICD-10-CM

## 2022-04-01 PROCEDURE — 3017F COLORECTAL CA SCREEN DOC REV: CPT | Performed by: INTERNAL MEDICINE

## 2022-04-01 PROCEDURE — 99215 OFFICE O/P EST HI 40 MIN: CPT | Performed by: INTERNAL MEDICINE

## 2022-04-01 PROCEDURE — 99213 OFFICE O/P EST LOW 20 MIN: CPT

## 2022-04-01 PROCEDURE — 1036F TOBACCO NON-USER: CPT | Performed by: INTERNAL MEDICINE

## 2022-04-01 PROCEDURE — G8417 CALC BMI ABV UP PARAM F/U: HCPCS | Performed by: INTERNAL MEDICINE

## 2022-04-01 PROCEDURE — 4040F PNEUMOC VAC/ADMIN/RCVD: CPT | Performed by: INTERNAL MEDICINE

## 2022-04-01 PROCEDURE — G8427 DOCREV CUR MEDS BY ELIG CLIN: HCPCS | Performed by: INTERNAL MEDICINE

## 2022-04-01 PROCEDURE — 1123F ACP DISCUSS/DSCN MKR DOCD: CPT | Performed by: INTERNAL MEDICINE

## 2022-04-01 RX ORDER — ENZALUTAMIDE 40 MG/1
160 TABLET ORAL DAILY
Qty: 120 TABLET | Refills: 0 | Status: ACTIVE
Start: 2022-04-01 | End: 2022-05-06

## 2022-04-01 NOTE — PROGRESS NOTES
[address] [facility] [facility] Update    Date: 04/01/22      Patient's medication requires a prior authorization. Status update to follow as soon as possible. Please call us with any questions at [phone] opt.  6.

## 2022-04-01 NOTE — PROGRESS NOTES
Department of [facility] Med Oncology  Attending Clinic Note    Reason for Visit: Follow-up on a patient with Prostate Cancer    PCP:  Katty Salter MD    History of Present Illness:  72 y/o male with High risk Prostate Cancer post XRT completion. On 01/12/2022, he completed 7920 cGy in 44 fractions directed to the prostate + proximal SV    Dr. Jasiel Chavez for Urology at [facility], most recent visit 02/01/2022. The patient received Lupron injection #2 at this visit (planned for 36 months ADT)    The patient reports hospitalized at [facility] after ICD fired 02/18/2022. Admitted with ventricular tachyarrhythmia, taken for emergent VT ablation 02/18/2022 and ICD generator change 02/24/2022. Sustained VT post ablation, due to suspected sarcoidosis. Started on daily steroid. The patient was discharged 02/25/2022    Further review of outside records revealed a 1.6 cm ovoid faintly sclerotic lesion in the right humeral neck redemonstrated and indeterminate on XR Shoulder 2 view Right 12/14/2021 at [facility]    Referred to our clinic for further evaluation and treatment    Review of Systems;  CONSTITUTIONAL: No fever, chills. Fair appetite and energy level. ENMT: Eyes: No diplopia; Nose: No epistaxis. Mouth: No sore throat. RESPIRATORY: No hemoptysis, shortness of breath, cough. CARDIOVASCULAR: see HPI  GASTROINTESTINAL: No nausea/vomiting, abdominal pain, diarrhea/constipation. GENITOURINARY: see HPI  NEURO: No syncope, presyncope, headache.   Remainder:  ROS NEGATIVE    Past Medical History:      Diagnosis Date    Anxiety     Aorto-iliac atherosclerosis ([address] 8/31/2020    Arthritis     Atrial fibrillation (HCC)     AVNRT (AV teddy re-entry tachycardia) ([address]     Blood type O+ 11/14/2019    CAD (coronary artery disease)     Cancer (HCC)     CHF (congestive heart failure) (HCC)     Diabetes mellitus ([address]     Diabetic neuropathy ([address]     Diastolic dysfunction 22/74/8825    stage 3    Fall right knee    Gout     chemically induced    History of blood transfusion     Hx of blood clots 1976    left leg    Hyperlipidemia     Hypertension     LBBB (left bundle branch block)     Left ventricular assist device (LVAD) complication 3631    Moderate mitral regurgitation 04/19/2016    Nonischemic cardiomyopathy (HCC)     Obesity     SNEHA (obstructive sleep apnea)     treated with CPAP    PVD (peripheral vascular disease) with claudication ([facility] 8/31/2020    Sarcoidosis 2019    Severe tricuspid regurgitation 04/19/2016    SVT (supraventricular tachycardia) (HCC)     Viral cardiomyopathy (HCC)      Medications:  Reviewed and reconciled. Allergies: Allergies   Allergen Reactions    Food Hives     Strawberries      Chlorhexidine Rash    Soap & Cleansers Rash     TIDE detergent    Strawberry (Diagnostic) Rash     Other reaction(s): Eruption, ITCHING,WATERING EYES     Physical Exam:  Pulse 83   Temp (!) 79 °F (26.1 °C)   Ht 5' 11\" (1.803 m)   Wt 253 lb (114.8 kg)   SpO2 98%   BMI 35.29 kg/m²   GENERAL: Alert, oriented x 3, not in acute distress. HEENT: PERRLA; EOMI. Oropharynx clear. EXTREMITIES: Without clubbing, cyanosis, or edema. NEUROLOGIC: No focal deficits.    ECOG PS 1-2    Lab Results   Component Value Date    WBC 5.4 03/18/2022    HGB 10.8 (L) 03/18/2022    HCT 33.1 (L) 03/18/2022    MCV 92.5 03/18/2022     (L) 03/18/2022     Lab Results   Component Value Date     (L) 03/18/2022    K 5.4 (H) 03/18/2022    CL 95 (L) 03/18/2022    CO2 22 03/18/2022    BUN 55 (H) 03/18/2022    CREATININE 1.6 (H) 03/18/2022    GLUCOSE 210 (H) 03/18/2022    CALCIUM 9.1 03/18/2022    PROT 6.8 03/18/2022    LABALBU 3.7 03/18/2022    BILITOT 0.5 03/18/2022    ALKPHOS 114 03/18/2022    AST 40 (H) 03/18/2022    ALT 55 (H) 03/18/2022    LABGLOM 43 03/18/2022    GFRAA 52 03/18/2022     Lab Results   Component Value Date    PSA 20.86 (H) 03/18/2022     Impression/Plan:  74 y/o male with High risk prostate cancer post XRT completion. On 01/12/2022, he completed 7920 cGy in 40 fractions directed to the prostate + proximal SV for management of high risk adenocarcinoma of the prostate    Dr. Denver Potts for Urology at [facility], most recent visit 02/01/2022. The patient received Lupron injection #2 at this visit (planned for 36 months ADT)    Further review of outside records revealed a 1.6 cm ovoid faintly sclerotic lesion in the right humeral neck redemonstrated and indeterminate on XR Shoulder 2 view Right 12/14/2021 at [facility]    Referred to our clinic for further evaluation and treatment    PSA 20.86 on 03/18/2022  Testosterone <2.5 on 03/18/2022    NM Bone scan 03/30/2022 reviewed extensively with Dr. Lena Romero from Radiology team; there is activity within the lateral aspect of the right humeral head which corresponds to the sclerotic lesion seen on CT humerus consistent with osseous metastatic disease    CT chest/abdomen/pelvis 03/30/2022 Sclerotic lesions in the right humeral head and the right 2nd rib suspicious for developing bone metastasis. Sclerotic lesions in the lumbar spine L2 and L3 as noted concerning for bone metastasis. CT right humerus 03/30/2022 14 x 12 x 10 mm sclerotic focus in the proximal humerus compatible with osteoblastic metastasis. Imaging reviewed. Labs reviewed. Metastatic prostate cancer to bone  Recommended XTANDI. Side effects reviewed. He agreed to proceed. Script [facility]. Will D/C Casodex once XTANDI obtained. Ivelisse Lee given bone metastasis (prior dental clearance to be obtained). Rad Onc follow-up for palliative RT to humerus    RTC 2 weeks with labs and Ivelisse Lee.      Analilia Martin MD   4/1/2022

## 2022-04-01 NOTE — TELEPHONE ENCOUNTER
Received a vm from Lafaye Marcio, [place] from [address]. She was requesting a prescription of Elko Proud and 's most recent progress note. She will try to Brennan Rhonda the Elko Proud through the [place]. I faxed the requested information to [phone] and received a confirmation of SUCCESS.

## 2022-04-01 NOTE — PROGRESS NOTES
[facility] [address] [facility] Update    Date: 04/01/22    We have informed patient their approved prescription was routed to the mandated specialty pharmacy [facility]. Prior authorization effective 3/2/22 to 4/1/23. Patient was provided with the specialty pharmacy's phone number. Please call us with any questions at [phone] opt. 6.   LVM for patient.

## 2022-04-01 NOTE — PROGRESS NOTES
[facility] Update    Date: 04/01/22    Patient's prescription benefits are being verified for coverage. Status update to follow as soon as possible. Please call us with any questions at [phone] opt.  6.

## 2022-04-04 ENCOUNTER — TELEPHONE (OUTPATIENT)
Dept: FAMILY MEDICINE CLINIC | Age: 72
End: 2022-04-04

## 2022-04-04 ENCOUNTER — TELEPHONE (OUTPATIENT)
Dept: ONCOLOGY | Age: 72
End: 2022-04-04

## 2022-04-04 ENCOUNTER — TELEPHONE (OUTPATIENT)
Dept: INFUSION THERAPY | Age: 72
End: 2022-04-04

## 2022-04-04 NOTE — TELEPHONE ENCOUNTER
Called patient and reinforced teaching given by Dr. Sergey Alvarez  with regards to oral medication and patient's Erjuan Rase. Patient was given a teaching handout from [facility] with written information on Erle Rase, when he was last in the office. All of patient's questions and concerns were addressed to his satisfaction. Patient denied any further questions or concerns at this time. Patient is agreeable with the plan. He will return call to office should any further questions, concerns, and or needs arise.

## 2022-04-04 NOTE — TELEPHONE ENCOUNTER
Called and left message for [facility]([phone]), Raúl Conroy, regarding Luis Jones prescription and VA authorization for treatment(Xgeva). Left direct contact information and awaiting call back.

## 2022-04-04 NOTE — TELEPHONE ENCOUNTER
Called Hiral Thayer and his wife, Vanessa Cole, answered. She stated that Hiral Thayer was overwhelmed and trying to keep everything straight. I stated that I was his  and that I work closely with insurance and the [place]. I inquired that if patient wanted to use VA benefits to fill Xtandi medication, that it was also an option to request [facility] from the [place] to cover office visits/labs and treatment from the [place] as well. I provided [place]'s phone number ([phone]), who is the RN at the [place] coordinating authorization and supply of the [place] from the [address]. I also provided my direct contact information for them to call with any questions. Wendy Angle stated that Hiral Thayer has an appointment with his dentist on 04/11/22 for dental clearance. Next OV with Dr. Joe Peck is 04/15/22. I stated that if unable to get VA approval, then we would use [facility] as Primary. Wendy Barbour verbalized understanding and stated that she would call me with any updates. Per Vicky Montoya they could back date [place] approval to 04/01/22.

## 2022-04-04 NOTE — TELEPHONE ENCOUNTER
Left a message for patient to please return call to [phone] with regards to his Fred Favre, awaiting a return call.

## 2022-04-05 ENCOUNTER — TELEPHONE (OUTPATIENT)
Dept: INFUSION THERAPY | Age: 72
End: 2022-04-05

## 2022-04-05 NOTE — TELEPHONE ENCOUNTER
Called Dr. Carol Lal' office for dental clearance. Spoke with Randee Smith, dental clearance form faxed, confirmation received. Patient has appointment 4/11/22.

## 2022-04-07 ENCOUNTER — TELEPHONE (OUTPATIENT)
Dept: ONCOLOGY | Age: 72
End: 2022-04-07

## 2022-04-07 NOTE — TELEPHONE ENCOUNTER
Yes okay for Magalis Beckham with RT - called patient and confirmed with him and he will start today 4/7

## 2022-04-07 NOTE — TELEPHONE ENCOUNTER
Spoke with Mir Skinner today regarding VA approval for [facility] with Dr. Jeanna Hayes at [address] [facility] location from [facility]. [facility] filled first script of Sulema Calixto, but all future refills need to sent to [facility]. Will also check with Dr. Jeanna Hayes is the 80mg tablets could be prescribed for future doses. Patient received Xtandi 40 mg tablets quantity 120 for a 30 day supply. Patient thought he received a 90 supply. I explained that his daily dose is 160 mg so he needs to take 4 tablets at one time once daily. Patient stated that he would take with his other medication that he take 4 times a day. I reiterated that he would NOT take medication 4 times daily but that he would take 4 tablets ONCE a day. Patient verbalized understanding. Patient inquired if he should start taking medication today. I confirmed patient future appointments for Radiation consult with Dr. Tiff Escudero at 8 am on 04/11/22, dental clearance appointment on 04/11/22 and Dr. Jeanna Hayes appointment on 04/15/22. I called Hung Ventura NP to inquire if patient should start taking medication now or wait until after RT consult and patient sees Dr. Jeanna Hayes on 04/15/22. Lexus Mail stated that she would double check with Dr. Jeanna Hayes and then she would call the patient herself to confirm when patient is to start medication. I did inform patient that he would receive a return phone call today regarding his question.

## 2022-04-11 ENCOUNTER — HOSPITAL ENCOUNTER (OUTPATIENT)
Dept: RADIATION ONCOLOGY | Age: 72
Discharge: HOME | End: 2022-04-11
Payer: COMMERCIAL

## 2022-04-11 VITALS
RESPIRATION RATE: 18 BRPM | OXYGEN SATURATION: 99 % | TEMPERATURE: 97.6 F | WEIGHT: 225 LBS | BODY MASS INDEX: 31.38 KG/M2 | HEART RATE: 55 BPM

## 2022-04-11 DIAGNOSIS — C61 PROSTATE CANCER (HCC): Primary | ICD-10-CM

## 2022-04-11 PROCEDURE — 99213 OFFICE O/P EST LOW 20 MIN: CPT

## 2022-04-11 PROCEDURE — 77263 THER RADIOLOGY TX PLNG CPLX: CPT | Performed by: RADIOLOGY

## 2022-04-11 PROCEDURE — 99215 OFFICE O/P EST HI 40 MIN: CPT | Performed by: RADIOLOGY

## 2022-04-11 RX ORDER — METOPROLOL SUCCINATE 50 MG/1
50 TABLET, EXTENDED RELEASE ORAL DAILY
COMMUNITY

## 2022-04-11 RX ORDER — ONDANSETRON 4 MG/1
4 TABLET, ORALLY DISINTEGRATING ORAL EVERY 8 HOURS PRN
COMMUNITY

## 2022-04-11 RX ORDER — AMOXICILLIN 875 MG/1
875 TABLET, COATED ORAL 2 TIMES DAILY
COMMUNITY

## 2022-04-11 RX ORDER — BACITRACIN 500 [USP'U]/G
OINTMENT OPHTHALMIC EVERY 4 HOURS
COMMUNITY

## 2022-04-11 NOTE — PROGRESS NOTES
Radiation Oncology      Kathleen Akbar. Carlos Octavio 52      Referring Physician: Dr. Magy Mack MD   Primary Oncologist: Dr. Kane Pena      Diagnosis: AJCC SG IV prostate cancer   -new bone met, right shoulder pain    Service:  Radiation Oncology consultation performed on 4/11/22        HPI:        Lucero Kellogg is a pleasant 73year old well known to me with a new bone lesion. On 01/12/2022, he completed 7920 cGy in 44 fractions directed to the prostate + proximal SV. Dr. Jasiel Chavez Urology at [facility], most recent visit 02/01/2022. The patient received Lupron injection #2 at this visit (planned for 36 months ADT). Further review of outside records revealed a 1.6 cm ovoid faintly sclerotic lesion in the right humeral neck redemonstrated and indeterminate on XR Shoulder 2 view Right 12/14/2021 at [facility]. NM Bone scan 03/30/2022 reviewed extensively with Dr. Harper Flannery from Radiology team; there is activity within the lateral aspect of the right humeral head which corresponds to the sclerotic lesion seen on CT humerus consistent with osseous metastatic disease. CT chest/abdomen/pelvis 03/30/2022 Sclerotic lesions in the right humeral head and the right 2nd rib suspicious for developing bone metastasis. Sclerotic lesions in the lumbar spine L2 and L3 as noted concerning for bone metastasis. CT right humerus 03/30/2022 14 x 12 x 10 mm sclerotic focus in the proximal humerus compatible with osteoblastic metastasis. Patient follows Dr. Luz De La Cruz and he recommends palliative RT to right humerus. The patient presents today to discuss fractionated external beam radiation therapy as a component of multidisciplinary, palliative management. We reviewed the available medical records including the complete medical history of this pt today prior to consultation.  Epic -CE and available scanned documents per the Epic Media tab were reviewed PRN. A complete ROS was also performed today and is noted below. During consultation today I personally discussed the pts workup to date; including but not limited to applicable imaging studies, Pathology reports, and interventions. The NCCN guidelines, as pertaining to the above diagnosis were also recapped for the pt today in brief. Today, Dannielle De Luna Sr.  notes Sx that include R shoulder pain. KPS 60-70 (BL).        -----    Per [address]:      Impression/Plan:  74 y/o male with High risk prostate cancer post XRT completion.      On 01/12/2022, he completed 7920 cGy in 44 fractions directed to the prostate + proximal SV for management of high risk adenocarcinoma of the prostate     Dr. Anette Pascual for Urology at [facility], most recent visit 02/01/2022. The patient received Lupron injection #2 at this visit (planned for 36 months ADT)     Further review of outside records revealed a 1.6 cm ovoid faintly sclerotic lesion in the right humeral neck redemonstrated and indeterminate on XR Shoulder 2 view Right 12/14/2021 at [facility]     Referred to our clinic for further evaluation and treatment     PSA 20.86 on 03/18/2022  Testosterone <2.5 on 03/18/2022     NM Bone scan 03/30/2022 reviewed extensively with Dr. Purvi Alonso from Radiology team; there is activity within the lateral aspect of the right humeral head which corresponds to the sclerotic lesion seen on CT humerus consistent with osseous metastatic disease     CT chest/abdomen/pelvis 03/30/2022 Sclerotic lesions in the right humeral head and the right 2nd rib suspicious for developing bone metastasis. Sclerotic lesions in the lumbar spine L2 and L3 as noted concerning for bone metastasis. CT right humerus 03/30/2022 14 x 12 x 10 mm sclerotic focus in the proximal humerus compatible with osteoblastic metastasis. Imaging reviewed.  Labs reviewed.      Metastatic prostate cancer to bone  Recommended XTANDI. Side effects reviewed. He agreed to proceed. [facility]. Will D/C Casodex once XTANDI obtained. Helon Pill given bone metastasis (prior dental clearance to be obtained). Rad Onc follow-up for palliative RT to humerus     RTC 2 weeks with labs and XGEVA.     -----        Past Medical History:   Diagnosis Date    Anxiety     Aorto-iliac atherosclerosis ([address] 8/31/2020    Arthritis     Atrial fibrillation ([address]     AVNRT (AV teddy re-entry tachycardia) ([address]     Blood type O+ 11/14/2019    CAD (coronary artery disease)     Cancer (HCC)     CHF (congestive heart failure) ([address]     Diabetes mellitus ([address]     Diabetic neuropathy ([address]     Diastolic dysfunction 99/70/7985    stage 3    Fall     right knee    Gout     chemically induced    History of blood transfusion     Hx of blood clots 1976    left leg    Hyperlipidemia     Hypertension     LBBB (left bundle branch block)     Left ventricular assist device (LVAD) complication 8247    Moderate mitral regurgitation 04/19/2016    Nonischemic cardiomyopathy ([address]     Obesity     SNEHA (obstructive sleep apnea)     treated with CPAP    PVD (peripheral vascular disease) with claudication ([address] 8/31/2020    Sarcoidosis 2019    Severe tricuspid regurgitation 04/19/2016    SVT (supraventricular tachycardia) (HCC)     Viral cardiomyopathy ([address]        Past Surgical History:   Procedure Laterality Date    BACK SURGERY      CARDIAC CATHETERIZATION  11/21/2011    Dr. Barrera Garcias  11/08/2019    Dr. Zev Tse  08/20/2014    BiV/ICD  (Srinivas José)    Dr. July Chapman CATH LAB PROCEDURE      ECHO COMPL W DOP COLOR FLOW  11/19/2011         ECHO COMPLETE  12/14/2013         EYE SURGERY Right 08/2019    FEMUR SURGERY      rt; compound frx right femur at 3years old.     JOINT REPLACEMENT Left     knee    KNEE SURGERY 7 on right/1 on left/ total replacement on R    PICC LINE INSERTION NURSE  10/21/2019         PROSTATE BIOPSY  2021       Family History   Problem Relation Age of Onset    Alzheimer's Disease Mother     Diabetes Mother     No Known Problems Father         l[facility]    No Known Problems Brother     Other Brother         aneurysm behind his eye    Arrhythmia Brother          age 61    No Known Problems Brother     No Known Problems Brother        Current Outpatient Medications   Medication Sig Dispense Refill    amoxicillin (AMOXIL) 875 MG tablet Take 875 mg by mouth 2 times daily      bacitracin 500 UNIT/GM ophthalmic ointment every 4 hours      enoxaparin (LOVENOX) 100 MG/ML injection Inject into the skin 2 times daily      metoprolol succinate (TOPROL XL) 50 MG extended release tablet Take 50 mg by mouth daily      ondansetron (ZOFRAN-ODT) 4 MG disintegrating tablet Take 4 mg by mouth every 8 hours as needed for Nausea or Vomiting      Enzalutamide (XTANDI) 40 MG TABS Take 160 mg by mouth daily 120 tablet 0    PREDNISONE PO Take 30 mg by mouth daily      hydrALAZINE (APRESOLINE) 25 MG tablet Take 25 mg by mouth 3 times daily      sulfamethoxazole-trimethoprim (BACTRIM DS;SEPTRA DS) 800-160 MG per tablet Take 1 tablet by mouth three times a week      tamsulosin (FLOMAX) 0.4 MG capsule Take 0.4 mg by mouth daily      ondansetron (ZOFRAN) 4 MG tablet Take 4 mg by mouth every 6 hours as needed for Nausea or Vomiting      triamcinolone (KENALOG) 0.1 % cream daily as needed       valsartan (DIOVAN) 80 MG tablet Take 40 mg by mouth 2 times daily       acetaminophen (TYLENOL) 500 MG tablet Take 1,000 mg by mouth every 8 hours as needed for Pain      bicalutamide (CASODEX) 50 MG chemo tablet Take 50 mg by mouth daily      mexiletine (MEXITIL) 150 MG capsule Take 150 mg by mouth 3 times daily       Multiple Vitamin (MULTIVITAMINS PO) Take 1 tablet by mouth 4 times daily Geriatric fusion oral chewable      amoxicillin-clavulanate (AUGMENTIN) 875-125 MG per tablet Take 1 tablet by mouth 2 times daily      amiodarone (CORDARONE) 200 MG tablet Take 200 mg by mouth daily      bacitracin 500 UNIT/GM ointment Apply topically 2 times daily Apply topically 2 times daily.  senna (SENOKOT) 8.6 MG tablet Take 1 tablet by mouth nightly       psyllium (KONSYL) 28.3 % PACK Take 1 packet by mouth daily as needed Metamucil sugar free 3.4 gram powder pack      sodium chloride (OCEAN) 0.65 % nasal spray 1 spray by Nasal route 3 times daily as needed for Congestion       aspirin 81 MG EC tablet Take 81 mg by mouth daily      ergocalciferol (ERGOCALCIFEROL) 1.25 MG (20626 UT) capsule Take 50,000 Units by mouth once a week Thursdays      digoxin (LANOXIN) 125 MCG tablet Take 125 mcg by mouth every other day       Magnesium 400 MG CAPS Take 1 tablet by mouth daily       warfarin (COUMADIN) 5 MG tablet Take 5 mg by mouth      pantoprazole (PROTONIX) 40 MG tablet Take 1 tablet by mouth every morning (before breakfast) 90 tablet 3    bumetanide (BUMEX) 2 MG tablet Take 1 tablet by mouth daily (Patient taking differently: Take 1 mg by mouth Take 1 tablet (1 mg total) by mouth daily as needed (fluid retention: weight gain/bloating/shortness of breath). ) 90 tablet 3    allopurinol (ZYLOPRIM) 100 MG tablet Take 1 tablet by mouth daily 30 tablet 3     No current facility-administered medications for this encounter. 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 Social Determinants of Health     Financial Resource Strain: Low Risk     Difficulty of Paying Living Expenses: Not hard at all   Food Insecurity: No Food Insecurity    Worried About Running Out of Food in the Last Year: Never true    Satish of Food in the Last Year: Never true   Transportation Needs:     Lack of Transportation (Medical): Not on file    Lack of Transportation (Non-Medical):  Not on file   Physical Activity: Unknown    Days of Exercise per Week: Patient refused    Minutes of Exercise per Session: 0 min   Stress:     Feeling of Stress : Not on file   Social Connections:     Frequency of Communication with Friends and Family: Not on file    Frequency of Social Gatherings with Friends and Family: Not on file    Attends Sikhism Services: Not on file    Active Member of Clubs or Organizations: Not on file    Attends Club or Organization Meetings: Not on file    Marital Status: Not on file   Intimate Partner Violence:     Fear of Current or Ex-Partner: Not on file    Emotionally Abused: Not on file    Physically Abused: Not on file    Sexually Abused: Not on file   Housing Stability:     Unable to Pay for Housing in the Last Year: Not on file    Number of [place] in the Last Year: Not on file    Unstable Housing in the Last Year: Not on file           Review of Systems - History obtained from chart review and the patient  General ROS: positive for  - fatigue  Psychological ROS: negative  Ophthalmic ROS: negative  ENT ROS: negative  Allergy and Immunology ROS: negative  Hematological and Lymphatic ROS: negative  Endocrine ROS: negative  Breast ROS: negative for breast lumps  Respiratory ROS: no cough, shortness of breath, or wheezing  Cardiovascular ROS: no chest pain or dyspnea on exertion  Gastrointestinal ROS: no abdominal pain, change in bowel habits, or black or bloody stools  Genito-Urinary ROS: no dysuria, trouble voiding, or hematuria  Musculoskeletal ROS: positive for - joint pain  Neurological ROS: no TIA or stroke symptoms  Dermatological ROS: negative        Physical Exam  HENT:      Head: Normocephalic and atraumatic. Right Ear: External ear normal.      Left Ear: External ear normal.      Nose: Nose normal.      Mouth/Throat:      Mouth: Mucous membranes are moist.   Eyes:      Extraocular Movements: Extraocular movements intact. Pupils: Pupils are equal, round, and reactive to light. Cardiovascular:      Rate and Rhythm: Normal rate and regular rhythm. Pulses: Normal pulses. Heart sounds: Normal heart sounds. Pulmonary:      Effort: Pulmonary effort is normal.      Breath sounds: Normal breath sounds. Abdominal:      General: Abdomen is flat. Palpations: Abdomen is soft. Musculoskeletal:         General: Tenderness present. Normal range of motion. Cervical back: Normal range of motion. Skin:     General: Skin is warm and dry. Neurological:      Mental Status: He is alert and oriented to person, place, and time. Psychiatric:         Mood and Affect: Mood normal.         Behavior: Behavior normal.         Thought Content:  Thought content normal.         Judgment: Judgment normal.             Imaging reviewed:        CT R humerus 3/30/22:  Impression   1.  14 x 12 x 10 mm sclerotic focus in the proximal humerus compatible with   osteoblastic metastasis.  No pathologic fracture.       2.  No other sclerotic mets visible. Radiation Safety and Treatment Support:  -previous Radiation history: yes - pelvis  -history of connective tissue disease: No  -history of autoimmune disease: No  -pregnant: not applicable  -fertility conservation and /or contraception discussed: no  -nutrition consult prior to [address]: Yes  -PEG: No  -Dental evaluation prior to treatment:No  -Social Work requested: Yes  -Oncology Nurse Navigator requested: Yes  -pre + post treatment PT / Rehab / PM+R evaluation considered: Yes  -ICD: No   -ICD brand: -  -[facility] patient navigator: Michoacano Leon  -Nurse Practitioners for Radiation Oncology:    ---Marcus Goldman, MSN, RN, FNP-C   ---Kerline Wheeler, MSN, RN, FNP-BC        Assessment and Plan: Randee Rodas is a pleasant and cooperative 73year old with a recent diagnosis of AJCC stage group IV prostate cancer with a new R prox humerus blastic lesion. We recommend palliative fractionated external beam radiation therapy tithe right humerus, NCCN review today, outcomes per [address]. The risks, benefits, alternatives, process and logistics of external beam radiation were reviewed today. We answered all of the patient's questions to the best of our ability. Randee Rodas and Temo Escobar verbalized understanding and seemed satisfied. Radiation planning will commence within 3 days; the next step in management being the simulation scan, with external beam radiation to commence in a timely fashion thereafter. It was a pleasure meeting Randee Rodas again today and we appreciate the referral and opportunity to be involved in his care. We had an extensive discussion today regarding the course to date (including a focused review of theapplicable radiographic and laboratory information), multidisciplinary approach to cancer care, and indications for external beam radiation therapy as a component therein.  A literature review and multidisciplinary discussion was performed after seeing this patient due to the complexity of the medical decision making in this case. I personally spent greater than 80 minutes on this case and with this patient. I performed the complete history and physical as above at today's visit, at least 45 minutes was in direct discussion and  regarding disease management.          -sim at [facility] 79 prox humerus  -Tx at [facility] per pt request        Marylen Pass. Odalis Figueroa MD [place] [address] Oncology  Cell: [phone]    [facility]:  [facility]: [phone]  [address] [place] [address] Street:  [phone]   FAX:    [phone]  [address] [place] [address] Road:  [phone]   FAX:  [phone]        NOTE: This report was transcribed using voice recognition software. Every effort was made to ensure accuracy; however, inadvertent computerized transcription errors may be present.

## 2022-04-11 NOTE — PROGRESS NOTES
0.4 mg by mouth daily      ondansetron (ZOFRAN) 4 MG tablet Take 4 mg by mouth every 6 hours as needed for Nausea or Vomiting      triamcinolone (KENALOG) 0.1 % cream daily as needed       valsartan (DIOVAN) 80 MG tablet Take 40 mg by mouth 2 times daily       acetaminophen (TYLENOL) 500 MG tablet Take 1,000 mg by mouth every 8 hours as needed for Pain      bicalutamide (CASODEX) 50 MG chemo tablet Take 50 mg by mouth daily      mexiletine (MEXITIL) 150 MG capsule Take 150 mg by mouth 3 times daily       Multiple Vitamin (MULTIVITAMINS PO) Take 1 tablet by mouth 4 times daily Geriatric fusion oral chewable      amoxicillin-clavulanate (AUGMENTIN) 875-125 MG per tablet Take 1 tablet by mouth 2 times daily      amiodarone (CORDARONE) 200 MG tablet Take 200 mg by mouth daily      bacitracin 500 UNIT/GM ointment Apply topically 2 times daily Apply topically 2 times daily.  senna (SENOKOT) 8.6 MG tablet Take 1 tablet by mouth nightly       psyllium (KONSYL) 28.3 % PACK Take 1 packet by mouth daily as needed Metamucil sugar free 3.4 gram powder pack      sodium chloride (OCEAN) 0.65 % nasal spray 1 spray by Nasal route 3 times daily as needed for Congestion       aspirin 81 MG EC tablet Take 81 mg by mouth daily      ergocalciferol (ERGOCALCIFEROL) 1.25 MG (48291 UT) capsule Take 50,000 Units by mouth once a week Thursdays      digoxin (LANOXIN) 125 MCG tablet Take 125 mcg by mouth every other day       Magnesium 400 MG CAPS Take 1 tablet by mouth daily       warfarin (COUMADIN) 5 MG tablet Take 5 mg by mouth      pantoprazole (PROTONIX) 40 MG tablet Take 1 tablet by mouth every morning (before breakfast) 90 tablet 3    bumetanide (BUMEX) 2 MG tablet Take 1 tablet by mouth daily (Patient taking differently: Take 1 mg by mouth Take 1 tablet (1 mg total) by mouth daily as needed (fluid retention: weight gain/bloating/shortness of breath). ) 90 tablet 3    allopurinol (ZYLOPRIM) 100 MG tablet Take 1 tablet by mouth daily 30 tablet 3     No current facility-administered medications for this encounter. Past Medical History:   Diagnosis Date    Anxiety     Aorto-iliac atherosclerosis ([facility] 8/31/2020    Arthritis     Atrial fibrillation (HCC)     AVNRT (AV teddy re-entry tachycardia) ([facility]     Blood type O+ 11/14/2019    CAD (coronary artery disease)     Cancer (HCC)     CHF (congestive heart failure) (HCC)     Diabetes mellitus ([facility]     Diabetic neuropathy ([facility]     Diastolic dysfunction 73/60/6434    stage 3    Fall     right knee    Gout     chemically induced    History of blood transfusion     Hx of blood clots 1976    left leg    Hyperlipidemia     Hypertension     LBBB (left bundle branch block)     Left ventricular assist device (LVAD) complication 1969    Moderate mitral regurgitation 04/19/2016    Nonischemic cardiomyopathy ([facility]     Obesity     SNEHA (obstructive sleep apnea)     treated with CPAP    PVD (peripheral vascular disease) with claudication ([facility] 8/31/2020    Sarcoidosis 2019    Severe tricuspid regurgitation 04/19/2016    [facility]     Viral cardiomyopathy ([facility]        Past Surgical History:   Procedure Laterality Date    BACK SURGERY      CARDIAC CATHETERIZATION  11/21/2011    Dr. Rebecca Qiu  11/08/2019    Dr. Ivy Goldman  08/20/2014    BiV/ICD  (Ariella Coronado)    Dr. Bob Guevara CATH LAB PROCEDURE      ECHO COMPL W DOP COLOR FLOW  11/19/2011         ECHO COMPLETE  12/14/2013         EYE SURGERY Right 08/2019    FEMUR SURGERY      rt; compound frx right femur at 3years old.     JOINT REPLACEMENT Left     knee    KNEE SURGERY      7 on right/1 on left/ total replacement on R    PICC LINE INSERTION NURSE  10/21/2019         PROSTATE BIOPSY  08/09/2021       Family History   Problem Relation Age of Onset    Alzheimer's Disease Mother  Diabetes Mother     No Known Problems Father         lives in [facility]    No Known Problems Brother     Other Brother         aneurysm behind his eye    Arrhythmia Brother          age 61    No Known Problems Brother     No Known Problems Brother        Social History     Socioeconomic History    Marital status:      Spouse name: Benji Jones Number of children: 3    Years of education: 15    Highest education level: Bachelor's degree (e.g., BA, AB, BS)   Occupational History    Occupation: [employer]     Comment: [place]; medical discharge    Occupation: teacher     Comment: [id number] handicapped    Occupation:    Tobacco Use    Smoking status: Former Smoker     Packs/day: 0.00     Years: 2.00     Pack years: 0.00     Types: Cigars     Quit date: 1980     Years since quittin.3    Smokeless tobacco: Never Used    Tobacco comment: non smoker   Vaping Use    Vaping Use: Never used   Substance and Sexual Activity    Alcohol use: Not Currently    Drug use: Never    Sexual activity: Not on file   Other Topics Concern    Not on file   Social History Narrative    Drinks 0-1 cup of decaf coffee/tea daily. Social Determinants of Health     Financial Resource Strain: Low Risk     Difficulty of Paying Living Expenses: Not hard at all   Food Insecurity: No Food Insecurity    Worried About Running Out of Food in the Last Year: Never true    Satish of Food in the Last Year: Never true   Transportation Needs:     Lack of Transportation (Medical): Not on file    Lack of Transportation (Non-Medical):  Not on file   Physical Activity: Unknown    Days of Exercise per Week: Patient refused    Minutes of Exercise per Session: 0 min   Stress:     Feeling of Stress : Not on file   Social Connections:     Frequency of Communication with Friends and Family: Not on file    Frequency of Social Gatherings with Friends and Family: Not on file    Attends Oriental orthodox Services: Not on file    Active Member of Clubs or Organizations: Not on file    Attends Club or Organization Meetings: Not on file    Marital Status: Not on file   Intimate Partner Violence:     Fear of Current or Ex-Partner: Not on file    Emotionally Abused: Not on file    Physically Abused: Not on file    Sexually Abused: Not on file   Housing Stability:     Unable to Pay for Housing in the Last Year: Not on file    Number of [place] in the Last Year: Not on file    Unstable Housing in the Last Year: Not on file           Occupation: retired  Retired:  YES: Patient is retired from [employer]. REVIEW OF SYSTEMS: <<For Level 5, 10 or more systems>>   Approximately 20 minutes was spent with the patient and his wife about radiation therapy to his right humeral head utilizing handouts and slides. On 01/12/2022, he completed 7920 cGy in 44 fractions directed to the prostate + proximal SV. Dr. Richard Olivares for Urology at [facility], most recent visit 02/01/2022. The patient received Lupron injection #2 at this visit (planned for 36 months ADT). Further review of outside records revealed a 1.6 cm ovoid faintly sclerotic lesion in the right humeral neck redemonstrated and indeterminate on XR Shoulder 2 view Right 12/14/2021 at [facility]. NM Bone scan 03/30/2022 reviewed extensively with Dr. Moise Jones from Radiology team; there is activity within the lateral aspect of the right humeral head which corresponds to the sclerotic lesion seen on CT humerus consistent with osseous metastatic disease. CT chest/abdomen/pelvis 03/30/2022 Sclerotic lesions in the right humeral head and the right 2nd rib suspicious for developing bone metastasis. Sclerotic lesions in the lumbar spine L2 and L3 as noted concerning for bone metastasis. CT right humerus 03/30/2022 14 x 12 x 10 mm sclerotic focus in the proximal humerus compatible with osteoblastic metastasis.  Patient follows Dr. Jesusita Mcmahon and he recommends palliative RT to right humerus. Patient verbalized understanding of care and all questions were answered from a nursing perspective. Pacemaker/Defibulator/ICD:  Yes, Scanned into system    Mediport: No        FALLS RISK SCREENING ASSESSMENT    Instructions:  Assess the patient and enter the appropriate indicators that are present for fall risk identification. Total the numbers entered and assign a fall risk score from Table 2.  Reassess patient at a minimum every 12 weeks or with status change. Assessment   Date  4/11/2022     1. Mental Ability: confusion/cognitively impaired Yes - 3       2. Elimination Issues: incontinence, frequency No - 0       3. Ambulatory: use of assistive devices (walker, cane, off-loading devices), attached to equipment (IV pole, oxygen) Yes - 2     4. Sensory Limitations: dizziness, vertigo, impaired vision Yes - 3       5. Age 72 years or greater - 1       10. Medication: diuretics, strong analgesics, hypnotics, sedatives, antihypertensive agents   Yes - 3   7. Falls:  recent history of falls within the last 3 months (not to include slipping or tripping)   Yes - 7   TOTAL 19    If score of 4 or greater was education given? Yes       TABLE 2   Risk Score Risk Level Plan of Care   0-3 Little or  No Risk 1. Provide assistance as indicated for ambulation activities  2. Reorient confused/cognitively impaired patient  3. Call-light/bell within patient's reach  4. Chair/bed in low position, stretcher/bed with siderails up except when performing patient care activities  5. Educate patient/family/caregiver on falls prevention  6.  Reassess in 12 weeks or with any noted change in patient condition which places them at a risk for a fall   4-6 Moderate Risk 1. Provide assistance as indicated for ambulation activities  2. Reorient confused/cognitively impaired patient  3. Call-light/bell within patient's reach  4.   Chair/bed in low position, stretcher/bed with siderails up except when performing patient care activities  5. Educate patient/family/caregiver on falls prevention  6. Falls risk precaution (Yellow sticker Level II) placed on patient chart   7 or   Higher High Risk 1. Place patient in easily observable treatment room  2. Patient attended at all times by family member or staff  3. Provide assistance as indicated for ambulation activities  4. Reorient confused/cognitively impaired patient  5. Call-light/bell within patient's reach  6. Chair/bed in low position, stretcher/bed with siderails up except when performing patient care activities  7. Educate patient/family/caregiver on falls prevention  8. Falls risk precaution (Yellow sticker Level III) placed on patient chart           MALNUTRITION RISK SCREENING ASSESSMENT    Instructions:  Assess the patient and enter the appropriate indicators that are present for nutrition risk identification. Total the numbers entered and assign a risk score. Follow the appropriate action for total score listed below. Assessment   Date  4/11/2022     1. Have you lost weight without trying? 0- No     2. Have you been eating poorly because of a decreased appetite? 0- No   3. Do you have a diagnosis of head and neck cancer? 0- No                                                                                    TOTAL 0          Score of 0-1: No action  Score 2 or greater:  · For Non-Diabetic Patient: Recommend adding Ensure Complete 2 x daily and provide patient with Ensure wellness bag with coupons  · For Diabetic Patient: Recommend adding Glucerna Shake 2 x daily and provide patient with Glucerna Wellness bag with coupons  · Route to the dietitian via [employer]    · Are you having difficulty performing daily routine tasks due to fatigue or weakness (ie: bathing/showering, dressing, housework, meal prep, work, , etc):  No     · Do you have any arm flexibility/ROM restrictions, swelling or pain that limit activity: Yes     · Any changes in memory, attention/focus that impact daily activities: No     · Do you avoid participation in leisure/social activity due to weakness, fatigue or pain: Yes     ARE ANY OF THE ABOVE ARE ANSWERED YES: Yes - but NO OT referral request sent due to patient already being seen by OT. PT ASSESSMENT FOR REFERRAL    · Have you had any recent falls in the past 2 months: Yes     · Do you have difficulty going up/down stairs: Yes     · Are you having difficulty walking: Yes     · Do you often hold onto furniture/environmental supports or feel off balance when you are walking: Yes     · Do you need to take rest breaks when you are walking: Yes     · Any pain on a scale of 1-10 that limits your mobility: No 0/10    ARE ANY OF THE ABOVE ARE ANSWERED YES: Yes - but NO PT referral request sent due to patient already being seen by PT. PELVIC FLOOR DYSFUNCTION SCREENING ASSESSMENT    - Do you have any pelvic pain? No     - Do you have any fecal constipation or fecal incontinence? No     - Do you have any urinary incontinence or difficulty starting to urinate? No     ARE ANY OF THE ABOVE ARE ANSWERED YES: No          PREHAB AUDIOLOGY REFERRAL    - Is patient planned to receive Cisplatin? No. This patient is not planned to start Cisplatin. - Is patient planned to receive radiation therapy that may be directed toward auditory canals or nerves? No. Patient is not planned to start radiation therapy to auditory canals or nerves. - Is patient complaining of new onset hearing loss? No. Patient is not complaining of new onset hearing loss. Patient education given on radiation therapy. The patient expresses understanding and acceptance of instructions.  Miriam Lu RN 4/11/2022 8:12 AM           Miriam Lu RN

## 2022-04-12 DIAGNOSIS — C79.51 PROSTATE CANCER METASTATIC TO BONE (HCC): Primary | ICD-10-CM

## 2022-04-12 DIAGNOSIS — C61 PROSTATE CANCER METASTATIC TO BONE (HCC): Primary | ICD-10-CM

## 2022-04-15 ENCOUNTER — OFFICE VISIT (OUTPATIENT)
Dept: FAMILY MEDICINE CLINIC | Age: 72
End: 2022-04-15
Payer: COMMERCIAL

## 2022-04-15 ENCOUNTER — HOSPITAL ENCOUNTER (OUTPATIENT)
Dept: INFUSION THERAPY | Age: 72
Discharge: HOME | End: 2022-04-15
Payer: COMMERCIAL

## 2022-04-15 ENCOUNTER — OFFICE VISIT (OUTPATIENT)
Dept: ONCOLOGY | Age: 72
End: 2022-04-15
Payer: COMMERCIAL

## 2022-04-15 ENCOUNTER — HOSPITAL ENCOUNTER (OUTPATIENT)
Dept: RADIATION ONCOLOGY | Age: 72
Discharge: HOME | End: 2022-04-15
Attending: RADIOLOGY
Payer: COMMERCIAL

## 2022-04-15 VITALS
WEIGHT: 228 LBS | OXYGEN SATURATION: 98 % | BODY MASS INDEX: 31.92 KG/M2 | HEART RATE: 68 BPM | RESPIRATION RATE: 18 BRPM | TEMPERATURE: 97 F | HEIGHT: 71 IN

## 2022-04-15 VITALS
HEIGHT: 71 IN | BODY MASS INDEX: 34.72 KG/M2 | TEMPERATURE: 97.9 F | OXYGEN SATURATION: 100 % | HEART RATE: 67 BPM | WEIGHT: 248 LBS

## 2022-04-15 DIAGNOSIS — C61 PROSTATE CANCER METASTATIC TO BONE (HCC): Primary | ICD-10-CM

## 2022-04-15 DIAGNOSIS — Z91.81 AT HIGH RISK FOR FALLS: ICD-10-CM

## 2022-04-15 DIAGNOSIS — I42.8 NONISCHEMIC CARDIOMYOPATHY (HCC): ICD-10-CM

## 2022-04-15 DIAGNOSIS — E11.59 TYPE 2 DIABETES MELLITUS WITH OTHER CIRCULATORY COMPLICATION, WITHOUT LONG-TERM CURRENT USE OF INSULIN (HCC): ICD-10-CM

## 2022-04-15 DIAGNOSIS — C79.51 PROSTATE CANCER METASTATIC TO BONE (HCC): ICD-10-CM

## 2022-04-15 DIAGNOSIS — C61 PROSTATE CANCER METASTATIC TO BONE (HCC): ICD-10-CM

## 2022-04-15 DIAGNOSIS — C79.51 PROSTATE CANCER METASTATIC TO BONE (HCC): Primary | ICD-10-CM

## 2022-04-15 DIAGNOSIS — I10 ESSENTIAL HYPERTENSION: Primary | ICD-10-CM

## 2022-04-15 DIAGNOSIS — I48.91 ATRIAL FIBRILLATION, UNSPECIFIED TYPE (HCC): ICD-10-CM

## 2022-04-15 LAB
ALBUMIN SERPL-MCNC: 3.2 G/DL (ref 3.5–5.2)
ALP BLD-CCNC: 85 U/L (ref 40–129)
ALT SERPL-CCNC: 75 U/L (ref 0–40)
ANION GAP SERPL CALCULATED.3IONS-SCNC: 9 MMOL/L (ref 7–16)
AST SERPL-CCNC: 51 U/L (ref 0–39)
BILIRUB SERPL-MCNC: 0.6 MG/DL (ref 0–1.2)
BUN BLDV-MCNC: 39 MG/DL (ref 6–23)
CALCIUM SERPL-MCNC: 9 MG/DL (ref 8.6–10.2)
CHLORIDE BLD-SCNC: 98 MMOL/L (ref 98–107)
CO2: 22 MMOL/L (ref 22–29)
CREAT SERPL-MCNC: 1.3 MG/DL (ref 0.7–1.2)
GFR AFRICAN AMERICAN: >60
GFR NON-AFRICAN AMERICAN: 55 ML/MIN/1.73
GLUCOSE BLD-MCNC: 215 MG/DL (ref 74–99)
POTASSIUM SERPL-SCNC: 4.6 MMOL/L (ref 3.5–5)
PROSTATE SPECIFIC ANTIGEN: 16.97 NG/ML (ref 0–4)
SODIUM BLD-SCNC: 129 MMOL/L (ref 132–146)
TOTAL PROTEIN: 6.1 G/DL (ref 6.4–8.3)

## 2022-04-15 PROCEDURE — 77334 RADIATION TREATMENT AID(S): CPT | Performed by: RADIOLOGY

## 2022-04-15 PROCEDURE — 1123F ACP DISCUSS/DSCN MKR DOCD: CPT | Performed by: INTERNAL MEDICINE

## 2022-04-15 PROCEDURE — 1036F TOBACCO NON-USER: CPT | Performed by: INTERNAL MEDICINE

## 2022-04-15 PROCEDURE — 77290 THER RAD SIMULAJ FIELD CPLX: CPT | Performed by: RADIOLOGY

## 2022-04-15 PROCEDURE — G8417 CALC BMI ABV UP PARAM F/U: HCPCS | Performed by: INTERNAL MEDICINE

## 2022-04-15 PROCEDURE — 3017F COLORECTAL CA SCREEN DOC REV: CPT | Performed by: INTERNAL MEDICINE

## 2022-04-15 PROCEDURE — 36415 COLL VENOUS BLD VENIPUNCTURE: CPT

## 2022-04-15 PROCEDURE — 99397 PER PM REEVAL EST PAT 65+ YR: CPT | Performed by: PHYSICIAN ASSISTANT

## 2022-04-15 PROCEDURE — G8427 DOCREV CUR MEDS BY ELIG CLIN: HCPCS | Performed by: INTERNAL MEDICINE

## 2022-04-15 PROCEDURE — 99214 OFFICE O/P EST MOD 30 MIN: CPT | Performed by: INTERNAL MEDICINE

## 2022-04-15 PROCEDURE — 4040F PNEUMOC VAC/ADMIN/RCVD: CPT | Performed by: INTERNAL MEDICINE

## 2022-04-15 PROCEDURE — 84153 ASSAY OF PSA TOTAL: CPT

## 2022-04-15 PROCEDURE — 80053 COMPREHEN METABOLIC PANEL: CPT

## 2022-04-15 PROCEDURE — 99212 OFFICE O/P EST SF 10 MIN: CPT

## 2022-04-15 RX ORDER — MEPERIDINE HYDROCHLORIDE 50 MG/ML
12.5 INJECTION INTRAMUSCULAR; INTRAVENOUS; SUBCUTANEOUS PRN
Status: CANCELLED | OUTPATIENT
Start: 2022-04-15

## 2022-04-15 RX ORDER — ONDANSETRON 2 MG/ML
8 INJECTION INTRAMUSCULAR; INTRAVENOUS
Status: CANCELLED | OUTPATIENT
Start: 2022-04-15

## 2022-04-15 RX ORDER — ACETAMINOPHEN 325 MG/1
650 TABLET ORAL
Status: CANCELLED | OUTPATIENT
Start: 2022-04-15

## 2022-04-15 RX ORDER — DIPHENHYDRAMINE HYDROCHLORIDE 50 MG/ML
50 INJECTION INTRAMUSCULAR; INTRAVENOUS
Status: CANCELLED | OUTPATIENT
Start: 2022-04-15

## 2022-04-15 RX ORDER — EPINEPHRINE 1 MG/ML
0.3 INJECTION, SOLUTION, CONCENTRATE INTRAVENOUS PRN
Status: CANCELLED | OUTPATIENT
Start: 2022-04-15

## 2022-04-15 RX ORDER — SODIUM CHLORIDE 9 MG/ML
INJECTION, SOLUTION INTRAVENOUS CONTINUOUS
Status: CANCELLED | OUTPATIENT
Start: 2022-04-15

## 2022-04-15 RX ORDER — ALBUTEROL SULFATE 90 UG/1
4 AEROSOL, METERED RESPIRATORY (INHALATION) PRN
Status: CANCELLED | OUTPATIENT
Start: 2022-04-15

## 2022-04-15 NOTE — PROGRESS NOTES
Chief Complaint:  Annual Exam, Cancer (stage 4 prostate and moved to the right shoulder), and Diabetes    History of Present Illness:  Source of history provided by:  patient. Patient presents for annual exam  No acute concerns today  Stage 4 prostate with mets to the right shoulder  Seeing Dr. Addison Bamberger on 4/29 and seeing heme/onc at [facility] in June  HTN: on diovan 40mg; metoprolol 25mg, spironolactone  Nonischemic cardiomyopathy / HFrEF with LVAD in place - follows with [facility]; on amiodarone, digoxin, metoprolol, spironolactone, bumex  Afib: on coumadin 5mg; metoprolol, antiarrhythmics  [facility]; was off medications but had new defibrillator placed and has needed a PRN injection since then  Sarcoid - stable at this time; off of steroids  Chronic pain - ultram PRN  Gets all prescriptions from [place]. SNEHA  CKD stage 3  Sp bariatric surgery  Foot wound / osteo - follows with podiatry  Bilateral lower extremity peripheral vascular disease   Doing PT- uses walker in house- balance hasn't been great     Review of Systems: Unless otherwise stated in this report or unable to obtain because of the patient's clinical or mental status as evidenced by the medical record, this patients's positive and negative responses for Review of Systems, constitutional, psych, eyes, ENT, cardiovascular, respiratory, gastrointestinal, neurological, genitourinary, musculoskeletal, integument systems and systems related to the presenting problem are either stated in the preceding or were not pertinent or were negative for the symptoms and/or complaints related to the medical problem.     Past Medical History:  has a past medical history of Anxiety, Aorto-iliac atherosclerosis ([address] Arthritis, Atrial fibrillation ([address] AVNRT (AV tedyd re-entry tachycardia) ([address] Blood type O+, CAD (coronary artery disease), Cancer ([address] CHF (congestive heart failure) ([address] Diabetes mellitus ([address] Diabetic neuropathy ([address] Diastolic dysfunction, Fall, Gout, History of blood transfusion, Hx of blood clots, Hyperlipidemia, Hypertension, LBBB (left bundle branch block), Left ventricular assist device (LVAD) complication, Moderate mitral regurgitation, Nonischemic cardiomyopathy ([address] Obesity, SNEHA (obstructive sleep apnea), PVD (peripheral vascular disease) with claudication ([address] Sarcoidosis, Severe tricuspid regurgitation, SVT (supraventricular tachycardia) ([address] and Viral cardiomyopathy ([address] Past Surgical History:  has a past surgical history that includes Diagnostic Cardiac Cath Lab Procedure; knee surgery; Femur Surgery; ECHO Compl W Dop Color Flow (11/19/2011); Cardiac catheterization (11/21/2011); Echo Complete (12/14/2013); back surgery; joint replacement (Left); Cardiac defibrillator placement (08/20/2014); eye surgery (Right, 08/2019); picc line insertion nurse (10/21/2019); Cardiac catheterization (11/08/2019); Colonoscopy; and Prostate biopsy (08/09/2021). Social History:  reports that he quit smoking about 42 years ago. His smoking use included cigars. He smoked 0.00 packs per day for 2.00 years. He has never used smokeless tobacco. He reports previous alcohol use. He reports that he does not use drugs. Family History: family history includes Alzheimer's Disease in his mother; Arrhythmia in his brother; Diabetes in his mother; No Known Problems in his brother, brother, brother, and father; Other in his brother. Allergies: Food, Chlorhexidine, Soap & cleansers, and Strawberry (diagnostic)    Physical Exam:  (Vital signs reviewed) Pulse 68   Temp 97 °F (36.1 °C)   Resp 18   Ht 5' 11\" (1.803 m)   Wt 228 lb (103.4 kg)   SpO2 98%   BMI 31.80 kg/m²   Oxygen Saturation Interpretation: Normal.   Constitutional:  Alert, development consistent with age. Eyes:  PERRL, EOMI, no discharge or conjunctival injection. Ears:  External ears without lesions. TM's clear without erythema or perforation bilaterally.     Throat: Pharynx without injection, exudate, or tonsillar hypertrophy. Airway patient. Neck:  Normal ROM. Supple. Lungs:  Clear to auscultation and breath sounds equal.  Heart:  Regular rate and rhythm, normal heart sounds, without pathological murmurs, ectopy, gallops, or rubs. Skin:  Normal turgor. Warm, dry, without visible rash, unless noted elsewhere. Neurological:  Alert and oriented. ------------------------------------------Test Results Section----------------------------------------------  (All laboratory and radiology results have been personally reviewed by myself)  Laboratory:    Radiology: All Radiology results interpreted by Radiologist unless otherwise noted. -------------------------------------Impression & Disposition Section-----------------------------------  Impression(s):  Annika Fajardo was seen today for annual exam, cancer and diabetes. Diagnoses and all orders for this visit:    Essential hypertension  Atrial fibrillation, unspecified type ([facility]  Nonischemic cardiomyopathy ([facility] [address] [facility]  Type 2 diabetes mellitus with other circulatory complication, without long-term current use of insulin (HCC)  Prostate cancer metastatic to bone ([facility] [facility]  At high risk for falls  On the basis of positive falls risk screening, assessment and plan is as follows: patient declines any further evaluation/treatment for increased falls risk.     Routine appointment in 4-6 months  Medication changes per Regina Corado, and Dr. Ana Cristina Camacho to follow

## 2022-04-15 NOTE — PROGRESS NOTES
Department of [facility] Med Oncology  Attending Clinic Note    Reason for Visit: Follow-up on a patient with Prostate Cancer    PCP:  Loraine Aleman MD    History of Present Illness:  72 y/o male with High risk Prostate Cancer post XRT completion. On 01/12/2022, he completed 7920 cGy in 44 fractions directed to the prostate + proximal SV    Dr. Alexandria Grady for Urology at [facility], most recent visit 02/01/2022. The patient received Lupron injection #2 at this visit (planned for 36 months ADT)    The patient reports hospitalized at [facility] after ICD fired 02/18/2022. Admitted with ventricular tachyarrhythmia, taken for emergent VT ablation 02/18/2022 and ICD generator change 02/24/2022. Sustained VT post ablation, due to suspected sarcoidosis. Started on daily steroid. The patient was discharged 02/25/2022    Further review of outside records revealed a 1.6 cm ovoid faintly sclerotic lesion in the right humeral neck redemonstrated and indeterminate on XR Shoulder 2 view Right 12/14/2021 at [facility]    Referred to our clinic for further evaluation and treatment    PSA 20.86 on 03/18/2022  Testosterone <2.5 on 03/18/2022    NM Bone scan 03/30/2022 reviewed extensively with Dr. Allan Cagle from Radiology team; there is activity within the lateral aspect of the right humeral head which corresponds to the sclerotic lesion seen on CT humerus consistent with osseous metastatic disease    CT chest/abdomen/pelvis 03/30/2022 Sclerotic lesions in the right humeral head and the right 2nd rib suspicious for developing bone metastasis. Sclerotic lesions in the lumbar spine L2 and L3 as noted concerning for bone metastasis. CT right humerus 03/30/2022 14 x 12 x 10 mm sclerotic focus in the proximal humerus compatible with osteoblastic metastasis. Imaging reviewed. Labs reviewed. Metastatic Prostate Cancer to Bone  Recommended XTANDI. Side effects reviewed. He agreed to proceed.    D/C Casodex 04/06/2022  Anamaria Catalan Wt 248 lb (112.5 kg)   SpO2 100%   BMI 34.59 kg/m²   GENERAL: Alert, oriented x 3, not in acute distress. HEENT: PERRLA; EOMI. Oropharynx clear. LUNGS: CTA Uriel  CVS: RRR  EXTREMITIES: Without clubbing, cyanosis, or edema. NEUROLOGIC: No focal deficits. ECOG PS 1-2    Lab Results   Component Value Date    PSA 20.86 (H) 03/18/2022     Impression/Plan:  72 y/o male with High risk prostate cancer post XRT completion. On 01/12/2022, he completed 7920 cGy in 40 fractions directed to the prostate + proximal SV for management of high risk adenocarcinoma of the prostate    Dr. Sadie Nguyen for Urology at [facility], most recent visit 02/01/2022. The patient received Lupron injection #2 at this visit (planned for 36 months ADT)    Further review of outside records revealed a 1.6 cm ovoid faintly sclerotic lesion in the right humeral neck redemonstrated and indeterminate on XR Shoulder 2 view Right 12/14/2021 at [facility]    Referred to our clinic for further evaluation and treatment    PSA 20.86 on 03/18/2022  Testosterone <2.5 on 03/18/2022    NM Bone scan 03/30/2022 reviewed extensively with Dr. Jane Goetz from Radiology team; there is activity within the lateral aspect of the right humeral head which corresponds to the sclerotic lesion seen on CT humerus consistent with osseous metastatic disease    CT chest/abdomen/pelvis 03/30/2022 Sclerotic lesions in the right humeral head and the right 2nd rib suspicious for developing bone metastasis. Sclerotic lesions in the lumbar spine L2 and L3 as noted concerning for bone metastasis. CT right humerus 03/30/2022 14 x 12 x 10 mm sclerotic focus in the proximal humerus compatible with osteoblastic metastasis. Imaging reviewed. Labs reviewed. Metastatic Prostate Cancer to Bone  Recommended XTANDI. Side effects reviewed. He agreed to proceed.   Casodex D/C on 04/06/2022  Soledad Riddles was started on 04/07/2022 with fair tolerance so far.  1 episode of dizziness resolved spontaneously. PSA today 4/15/2022  XGEVA given bone metastasis (dental clearance not obtained yet)  Palliative RT to right proximal humerus recommended; Rad Onc on board    RTC 2 weeks with repeat labs and Huang Lorenzo.      Lula Oliva MD   4/15/2022

## 2022-04-18 ENCOUNTER — TELEPHONE (OUTPATIENT)
Dept: ONCOLOGY | Age: 72
End: 2022-04-18

## 2022-04-18 ENCOUNTER — HOSPITAL ENCOUNTER (OUTPATIENT)
Dept: RADIATION ONCOLOGY | Age: 72
Discharge: HOME | End: 2022-04-18
Attending: RADIOLOGY
Payer: COMMERCIAL

## 2022-04-18 ENCOUNTER — TELEPHONE (OUTPATIENT)
Dept: FAMILY MEDICINE CLINIC | Age: 72
End: 2022-04-18

## 2022-04-18 PROCEDURE — 77307 TELETHX ISODOSE PLAN CPLX: CPT | Performed by: RADIOLOGY

## 2022-04-18 PROCEDURE — 77334 RADIATION TREATMENT AID(S): CPT | Performed by: RADIOLOGY

## 2022-04-18 NOTE — TELEPHONE ENCOUNTER
Kassi,    Are you able to assist me with this at all? Or point me in the correct direction? Thank you!

## 2022-04-18 NOTE — TELEPHONE ENCOUNTER
Pt called today asking if doctor can set up provider ride to wound care. He states Dr. Funmilayo Shaw (wound care doctor) advised that PCP needs to set it up. He has problems with his big toe again. Fluid draining out. Wound reopening  He has had cancer treatments and also has heart issues. He needs it to  be open availability depending on when he can get the appointment to wound care. Please let him know when this is done so he can set up appt. Any problems, please contact patient.

## 2022-04-18 NOTE — TELEPHONE ENCOUNTER
Called Dr. Akhil Guadalupe' office for dental clearance, spoke with [facility] [place], informed her that dental clearance papers were faxed, confirmation received, and spoke with [facility] [place] prior to patient's appointment on 4-11-22 and the need for dental clearance paper to be completed and returned to office. Verbalized understanding.

## 2022-04-20 ENCOUNTER — HOSPITAL ENCOUNTER (OUTPATIENT)
Dept: RADIATION ONCOLOGY | Age: 72
Discharge: HOME | End: 2022-04-20
Attending: RADIOLOGY
Payer: COMMERCIAL

## 2022-04-20 PROCEDURE — 77334 RADIATION TREATMENT AID(S): CPT | Performed by: RADIOLOGY

## 2022-04-20 PROCEDURE — 77307 TELETHX ISODOSE PLAN CPLX: CPT | Performed by: RADIOLOGY

## 2022-04-21 ENCOUNTER — HOSPITAL ENCOUNTER (OUTPATIENT)
Dept: WOUND CARE | Age: 72
Discharge: HOME | End: 2022-04-21
Payer: COMMERCIAL

## 2022-04-21 VITALS
WEIGHT: 228 LBS | TEMPERATURE: 96.1 F | BODY MASS INDEX: 31.92 KG/M2 | RESPIRATION RATE: 18 BRPM | HEART RATE: 80 BPM | HEIGHT: 71 IN

## 2022-04-21 PROCEDURE — 87070 CULTURE OTHR SPECIMN AEROBIC: CPT

## 2022-04-21 PROCEDURE — 99213 OFFICE O/P EST LOW 20 MIN: CPT

## 2022-04-21 PROCEDURE — 87075 CULTR BACTERIA EXCEPT BLOOD: CPT

## 2022-04-21 PROCEDURE — 87205 SMEAR GRAM STAIN: CPT

## 2022-04-21 NOTE — PROGRESS NOTES
[facility]  History and Physical/Consultation  Podiatry    Referring Physician : Fabian Velasco MD  Jonnathan Block Sr. MEDICAL RECORD NUMBER:  [id number]  AGE: 73 y.o. GENDER: male  : 1950  EPISODE DATE:  2022  Subjective:     Chief Complaint   Patient presents with    Wound Check     right great toe         HISTORY of PRESENT ILLNESS HPI     Jonnathan Block Sr. is a 73 y.o. male who presents today for wound/ulcer evaluation. History of Wound Context: Patient presents with his wife for evaluation of his right great toe. Last seen 2022. He relates that the area did heal however recent past loss his balance, fell and bumped his right great toe. His wife noticed the toe becoming edematous and erythematous with positive drainage and took a turn for the worse about a week ago. She was applying alginate. Noticed +odor recently. Pt is not on abx at time of initial visit.    22 patient follows very closely with [facility] ([facility]). Patient will need admitted for incision and drainage with possible amputation of the right great toe. Wife contacted his /coordinator at [facility] and he will be a direct admit.       Wound/Ulcer Pain Timing/Severity: none  Quality of pain:   Severity:   / 10   Modifying Factors:   Associated Signs/Symptoms:     Ulcer Identification:  Ulcer Type: diabetic and traumatic  Contributing Factors: diabetes and chronic pressure    Diabetic/Pressure/Non Pressure Ulcers onl y:  Ulcer: N/A    If patient has diabetic lower extremity wounds  Martinez Classification of diabetic lower extremity wounds:    Grade Description   []  0 No open wound   []  1 Superficial ulcer involving the full skin thickness   []  2 Deep ulcer involves ligament, tendon, joint capsule, or fascia  No bone involvement or abscess presence   []  3 Deep Ulcer with abcess formation and/or osteomyelitis   []  4 Localized gangrene   []  5 Extensive gangrene of the foot     Wound: N/A        PAST MEDICAL HISTORY      Diagnosis Date    Anxiety     Aorto-iliac atherosclerosis ([address] 8/31/2020    Arthritis     Atrial fibrillation (HCC)     AVNRT (AV teddy re-entry tachycardia) ([address]     Blood type O+ 11/14/2019    CAD (coronary artery disease)     Cancer (HCC)     CHF (congestive heart failure) (HCC)     Diabetes mellitus ([address]     Diabetic neuropathy ([address]     Diastolic dysfunction 69/94/4498    stage 3    Fall     right knee    Gout     chemically induced    History of blood transfusion     Hx of blood clots 1976    left leg    Hyperlipidemia     Hypertension     LBBB (left bundle branch block)     Left ventricular assist device (LVAD) complication 3610    Moderate mitral regurgitation 04/19/2016    Nonischemic cardiomyopathy ([address]     Obesity     SNEHA (obstructive sleep apnea)     treated with CPAP    PVD (peripheral vascular disease) with claudication ([facility] 8/31/2020    Sarcoidosis 2019    Severe tricuspid regurgitation 04/19/2016    SVT [facility]supraventricular tachycardia) ([facility]     Viral cardiomyopathy ([address]      Past Surgical History:   Procedure Laterality Date    BACK SURGERY      CARDIAC CATHETERIZATION  11/21/2011    Dr. Tong Music  11/08/2019    Dr. Solis Hugo  08/20/2014    BiV/ICD  ([facility])    Dr. Beck Mariano CATH LAB PROCEDURE      ECHO COMPL W DOP COLOR FLOW  11/19/2011         ECHO COMPLETE  12/14/2013         EYE SURGERY Right 08/2019    FEMUR SURGERY      rt; compound frx right femur at 3years old.     JOINT REPLACEMENT Left     knee    KNEE SURGERY      7 on right/1 on left/ total replacement on R    PICC LINE INSERTION NURSE  10/21/2019         PROSTATE BIOPSY  08/09/2021     Family History   Problem Relation Age of Onset    Alzheimer's Disease Mother     Diabetes Mother     No Known Problems Father         lives in [facility] No Known Problems Brother     Other Brother         aneurysm behind his eye    Arrhythmia Brother          age 61    No Known Problems Brother     No Known Problems Brother      Social History     Tobacco Use    Smoking status: Former Smoker     Packs/day: 0.00     Years: 2.00     Pack years: 0.00     Types: Cigars     Quit date: 1980     Years since quittin.3    Smokeless tobacco: Never Used    Tobacco comment: non smoker   Vaping Use    Vaping Use: Never used   Substance Use Topics    Alcohol use: Not Currently    Drug use: Never     Allergies   Allergen Reactions    Food Hives     Strawberries      Chlorhexidine Rash    Soap & Cleansers Rash     TIDE detergent    Strawberry (Diagnostic) Rash     Other reaction(s): Eruption, ITCHING,WATERING EYES     Current Outpatient Medications on File Prior to Encounter   Medication Sig Dispense Refill    amoxicillin (AMOXIL) 875 MG tablet Take 875 mg by mouth 2 times daily      bacitracin 500 UNIT/GM ophthalmic ointment every 4 hours      enoxaparin (LOVENOX) 100 MG/ML injection Inject into the skin 2 times daily       metoprolol succinate (TOPROL XL) 50 MG extended release tablet Take 50 mg by mouth daily      ondansetron (ZOFRAN-ODT) 4 MG disintegrating tablet Take 4 mg by mouth every 8 hours as needed for Nausea or Vomiting      Enzalutamide (XTANDI) 40 MG TABS Take 160 mg by mouth daily 120 tablet 0    PREDNISONE PO Take 30 mg by mouth daily      hydrALAZINE (APRESOLINE) 25 MG tablet Take 25 mg by mouth 3 times daily      sulfamethoxazole-trimethoprim (BACTRIM DS;SEPTRA DS) 800-160 MG per tablet Take 1 tablet by mouth three times a week      tamsulosin (FLOMAX) 0.4 MG capsule Take 0.4 mg by mouth daily      ondansetron (ZOFRAN) 4 MG tablet Take 4 mg by mouth every 6 hours as needed for Nausea or Vomiting      triamcinolone (KENALOG) 0.1 % cream daily as needed       valsartan (DIOVAN) 80 MG tablet Take 40 mg by mouth 2 times daily  acetaminophen (TYLENOL) 500 MG tablet Take 1,000 mg by mouth every 8 hours as needed for Pain      bicalutamide (CASODEX) 50 MG chemo tablet Take 50 mg by mouth daily      mexiletine (MEXITIL) 150 MG capsule Take 150 mg by mouth 3 times daily       Multiple Vitamin (MULTIVITAMINS PO) Take 1 tablet by mouth 4 times daily Geriatric fusion oral chewable      amoxicillin-clavulanate (AUGMENTIN) 875-125 MG per tablet Take 1 tablet by mouth 2 times daily      bacitracin 500 UNIT/GM ointment Apply topically 2 times daily Apply topically 2 times daily.  senna (SENOKOT) 8.6 MG tablet Take 1 tablet by mouth nightly       psyllium (KONSYL) 28.3 % PACK Take 1 packet by mouth daily as needed Metamucil sugar free 3.4 gram powder pack      sodium chloride (OCEAN) 0.65 % nasal spray 1 spray by Nasal route 3 times daily as needed for Congestion       aspirin 81 MG EC tablet Take 81 mg by mouth daily      ergocalciferol (ERGOCALCIFEROL) 1.25 MG (15896 UT) capsule Take 50,000 Units by mouth once a week Thursdays      digoxin (LANOXIN) 125 MCG tablet Take 125 mcg by mouth every other day       Magnesium 400 MG CAPS Take 1 tablet by mouth daily       warfarin (COUMADIN) 5 MG tablet Take 5 mg by mouth      pantoprazole (PROTONIX) 40 MG tablet Take 1 tablet by mouth every morning (before breakfast) (Patient taking differently: Take 40 mg by mouth as needed ) 90 tablet 3    bumetanide (BUMEX) 2 MG tablet Take 1 tablet by mouth daily (Patient taking differently: Take 1 mg by mouth Take 1 tablet (1 mg total) by mouth daily as needed (fluid retention: weight gain/bloating/shortness of breath). ) 90 tablet 3    allopurinol (ZYLOPRIM) 100 MG tablet Take 1 tablet by mouth daily 30 tablet 3     No current facility-administered medications on file prior to encounter.        REVIEW OF SYSTEMS   ROS : All others Negative if blank [], Positive if [x]  General Vascular   [] Fevers [] Claudication   [] Chills [] Rest Pain   Skin Neurologic   [] Tissue Loss [] Lower extremity neuropathy     Objective:    Pulse 80   Temp 96.1 °F (35.6 °C) (Temporal)   Resp 18   Ht 5' 11\" (1.803 m)   Wt 228 lb (103.4 kg)   BMI 31.80 kg/m²   Wt Readings from Last 3 Encounters:   04/21/22 228 lb (103.4 kg)   04/15/22 228 lb (103.4 kg)   04/15/22 248 lb (112.5 kg)       PHYSICAL EXAM   CONSTITUTIONAL:   Awake, alert, cooperative   PSYCHIATRIC :  Oriented to time, place and person       insight to disease process  EXTREMITIES:   Bilateral lower extremity exam demonstrates intact vascular status. Loss of protective sensation. Right lower extremity positive edema. Right great toe erythematous edematous with multiple open areas, with palpation positive purulence expressed. No crepitus appreciated. Probes to bone. Assessment:     Diabetic with ulcer. Wound. Abscess/infection.   Probable osteomyelitis       Wound 08/16/21 Toe (Comment  which one) Right #1 right great toe (Active)   Number of days: 247       Wound 04/21/22 Toe (Comment  which one) Right #1 great toe (Active)   Wound Image   04/21/22 1040   Wound Etiology Diabetic 04/21/22 1040   Dressing Status New dressing applied 04/21/22 1110   Wound Cleansed Betadine/povidone iodine 04/21/22 1110   Dressing/Treatment ABD;Dry dressing 04/21/22 1110   Offloading for Diabetic Foot Ulcers Other (comment) 04/21/22 1110   Wound Length (cm) 4.5 cm 04/21/22 1040   Wound Width (cm) 3.2 cm 04/21/22 1040   Wound Depth (cm) 0.2 cm 04/21/22 1040   Wound Surface Area (cm^2) 14.4 cm^2 04/21/22 1040   Wound Volume (cm^3) 2.88 cm^3 04/21/22 1040   Post-Procedure Length (cm) 4.5 cm 04/21/22 1059   Post-Procedure Width (cm) 3.6 cm 04/21/22 1059   Post-Procedure Depth (cm) 1.6 cm 04/21/22 1059   Post-Procedure Surface Area (cm^2) 16.2 cm^2 04/21/22 1059   Post-Procedure Volume (cm^3) 25.92 cm^3 04/21/22 1059   Wound Assessment Fibrin;Pink/red;Slough 04/21/22 1040   Drainage Amount Copious 04/21/22 1040   Drainage Description Serosanguinous; Sanguinous 04/21/22 1040   Odor None 04/21/22 1040   Ricarda-wound Assessment Maceration 04/21/22 1040   Number of days: 0       Wound 04/21/22 Toe (Comment  which one) Left #2 great toe (Active)   Wound Image   04/21/22 1040   Wound Etiology Diabetic 04/21/22 1040   Dressing Status New dressing applied 04/21/22 1110   Wound Cleansed Cleansed with saline 04/21/22 1110   Dressing/Treatment ABD;Dry dressing 04/21/22 1110   Offloading for Diabetic Foot Ulcers Other (comment) 04/21/22 1110   Wound Length (cm) 0.3 cm 04/21/22 1040   Wound Width (cm) 0.2 cm 04/21/22 1040   Wound Depth (cm) 0.2 cm 04/21/22 1040   Wound Surface Area (cm^2) 0.06 cm^2 04/21/22 1040   Wound Volume (cm^3) 0.012 cm^3 04/21/22 1040   Wound Assessment Fibrin;Pink/red 04/21/22 1040   Drainage Amount None 04/21/22 1040   Odor None 04/21/22 1040   Ricarda-wound Assessment Hyperkeratosis (callous) 04/21/22 1040   Number of days: 0              Plan:     Treatment Note please see attached Discharge Instructions    Written patient dismissal instructions given to patient and signed by patient or POA. Discharge Instructions        Visit Discharge/Physician Orders     Discharge condition: Stable     Assessment of pain at discharge:  none     Anesthetic used: 4% lidocaine solution     Discharge to: Home     Left via:Private automobile     Accompanied by: accompanied by self     ECF/HHA: [facility]     Dressing Orders:     Right/left great toe - cleanse with normal saline, apply betadine moistened guaze     Treatment Orders:     FOLLOW NUTRITIOUS DIET. CHOOSE FOODS HIGH IN PROTEIN -CHICKEN- FISH-AND EGGS,  CHOOSE FOODS HIGH IN VITAMIN C.   MULTIVITAMIN DAILY.       Watch blood sugar levels.      [facility] followup visit ____to er for eval ____________________  (Please note your next appointment above and if you are unable to keep, kindly give a 24 hour notice.  Thank you.)     Physician signature:__________________________        If you experience any of the following, please call the [facility] during business hours:     * Increase in Pain  * Temperature over 101  * Increase in drainage from your wound  * Drainage with a foul odor  * Bleeding  * Increase in swelling  * Need for compression bandage changes due to slippage, breakthrough drainage.     If you need medical attention outside of the business hours of the [facility] please contact your PCP or go to the nearest emergency room.                                                                                                                    Electronically signed by Hiram Cain DPM on 4/21/2022 at 11:13 AM

## 2022-04-21 NOTE — PLAN OF CARE
Problem: Chronic Conditions and Co-morbidities  Goal: Patient's chronic conditions and co-morbidity symptoms are monitored and maintained or improved  Outcome: Progressing     Problem: Skin/Tissue Integrity  Goal: Absence of new skin breakdown  Description: 1. Monitor for areas of redness and/or skin breakdown  2. Assess vascular access sites hourly  3. Every 4-6 hours minimum:  Change oxygen saturation probe site  4. Every 4-6 hours:  If on nasal continuous positive airway pressure, respiratory therapy assess nares and determine need for appliance change or resting period.   Outcome: Progressing

## 2022-04-23 LAB
GRAM STAIN RESULT: ABNORMAL
ORGANISM: ABNORMAL
ORGANISM: ABNORMAL
WOUND/ABSCESS: ABNORMAL
WOUND/ABSCESS: ABNORMAL

## 2022-04-24 LAB
ANAEROBIC CULTURE: ABNORMAL
ORGANISM: ABNORMAL

## 2022-04-27 ENCOUNTER — TELEPHONE (OUTPATIENT)
Dept: ONCOLOGY | Age: 72
End: 2022-04-27

## 2022-04-27 NOTE — TELEPHONE ENCOUNTER
[place] wife called and left a vm letting us know that Rashawn Whitten is currently admitted in [facility]. He will be cancelling is appt this Friday, as he's been in almost a week. He wants to know if Dr Desirae Bush, oncologist who referred Rashawn Whitten to us is able to give him his Xgeva injection while he's in the hospital? Please call Rashawn Whitten at [phone].   Rashawn Whitten also sent a [employer] message as well asking this same question

## 2022-04-28 ENCOUNTER — APPOINTMENT (OUTPATIENT)
Dept: NUCLEAR MEDICINE | Age: 72
End: 2022-04-28
Payer: COMMERCIAL

## 2022-04-28 ENCOUNTER — TELEPHONE (OUTPATIENT)
Dept: ONCOLOGY | Age: 72
End: 2022-04-28

## 2022-04-28 NOTE — PROGRESS NOTES
Talked with patient, he is at [facility], we will provide xgeva injection when he is outpatient for follow up

## 2022-04-29 ENCOUNTER — HOSPITAL ENCOUNTER (OUTPATIENT)
Dept: INFUSION THERAPY | Age: 72
End: 2022-04-29

## 2022-05-06 RX ORDER — ENZALUTAMIDE 40 MG/1
TABLET ORAL
Qty: 120 TABLET | Refills: 0 | Status: ACTIVE
Start: 2022-05-06 | End: 2022-05-09

## 2022-05-06 NOTE — PROGRESS NOTES
[facility] [address] Update    Date: 05/06/22    Medication is currently being filled at [address] [facility] and has been routed there. Prior authorization effective 3/2/22 to 4/1/23. Please call us with any questions at [phone] opt.  2.

## 2022-05-09 DIAGNOSIS — C61 PROSTATE CANCER METASTATIC TO BONE (HCC): ICD-10-CM

## 2022-05-09 DIAGNOSIS — C79.51 PROSTATE CANCER METASTATIC TO BONE (HCC): ICD-10-CM

## 2022-05-09 RX ORDER — ENZALUTAMIDE 80 MG/1
160 TABLET ORAL DAILY
Qty: 60 TABLET | Refills: 3 | Status: ACTIVE
Start: 2022-05-09 | End: 2022-05-11 | Stop reason: SDUPTHER

## 2022-05-09 NOTE — PROGRESS NOTES
[facility] A[facility] Update    Date: 05/09/22    We have informed patient their approved prescription was routed to the mandated specialty pharmacy [facility]. Please call us with any questions at [phone] opt.  2.

## 2022-05-10 ENCOUNTER — TELEPHONE (OUTPATIENT)
Dept: INFUSION THERAPY | Age: 72
End: 2022-05-10

## 2022-05-10 NOTE — TELEPHONE ENCOUNTER
[facility] ([phone]), regarding [facility] prescription that was escribed by the office yesterday, as this is the location for all future fills. Answered all questions based on information in patient's medical record. Stated that I did not see any current prescriptions for Warfin, Prednisone, Lipitor, or Amiodarone, which may have drug interactions with Xtandi. Confirmed patient ECOG 1-2, in clinicals that were faxed to [facility].. Indira Burdick stated that she would update information, and if any other questions would reach out to me or Devonte Nelson for clarification. Indira Burdick stated that she would send answers to pharmacist to review and if no other questions would get prescription processed and shipped out to the patient today.

## 2022-05-11 DIAGNOSIS — C79.51 PROSTATE CANCER METASTATIC TO BONE (HCC): ICD-10-CM

## 2022-05-11 DIAGNOSIS — C61 PROSTATE CANCER METASTATIC TO BONE (HCC): ICD-10-CM

## 2022-05-12 ENCOUNTER — TELEPHONE (OUTPATIENT)
Dept: INFUSION THERAPY | Age: 72
End: 2022-05-12

## 2022-05-12 NOTE — TELEPHONE ENCOUNTER
Patient's wife [facility] called, [phone]. States patient is going to rehab facility in  [place] after surgical procedures on April 27 and 29. Wife needs to know how to proceed with care from oncology point. She will obtain [place] from pharmacy and take to patient so he can take his own prescription in rehab. Facility tells patients wife that they will arrange transportation to see Dr. Sherry Mario to resume care and receive Xgeva injections.   Message conveyed to [facility] ([facility]) office, [facility],  will convey message to Dr. Sherry Mario, and/or Malcolm Ayala NP.

## 2022-05-13 ENCOUNTER — TELEPHONE (OUTPATIENT)
Dept: INFUSION THERAPY | Age: 72
End: 2022-05-13

## 2022-05-13 NOTE — TELEPHONE ENCOUNTER
Received a call from patient's wife Sadaf Sigala with questions regarding patient's care and scheduled medication. He is currently in rehab facility in [address] [place] [address] post foot surgery. Cuca Melgoza she was inquiring about whether he would  Be able to receive his [place] B[place] in the rehab facility.   After discussing with dr. Kellie Croft as long as the facility is willing and is able to draw the needed lab work he would be okay with patient receiving the injection while inpatient  She will contact the facility and will call me back once she receives an answer

## 2022-05-16 ENCOUNTER — TELEPHONE (OUTPATIENT)
Dept: INFUSION THERAPY | Age: 72
End: 2022-05-16

## 2022-05-16 DIAGNOSIS — C61 PROSTATE CANCER METASTATIC TO BONE (HCC): Primary | ICD-10-CM

## 2022-05-16 DIAGNOSIS — C79.51 PROSTATE CANCER METASTATIC TO BONE (HCC): Primary | ICD-10-CM

## 2022-05-16 NOTE — TELEPHONE ENCOUNTER
Received message from [place] [id number] regarding prescription Fritzi Mary, follow up from previous telephone call on 05/10/22. Per Ellie Newton patient is being managed locally on Warfarin by [facility] in [place], and wanted the physician to know that patient was still currently taking and for possible drug interactions with Xtandi. Jagdishia Warner stated that the [place] [id number] only has the 40 mg tablets available, NOT 80 mg. So, unfortunately, patient will have to continue taking 4 tablets daily. Also, per [facility] requirements for approval of the Fritzi Mary, patient will need a Palliative Care consult. I called Maurice Angelucci, NP and updated her with this information. I asked that she please place the consult, notified her patient was taking warfarin and could have possible interaction with xtandi and for all future refills ONLY Xtandi 40 mg tablets were available. Marietta verbalized understanding.

## 2022-05-16 NOTE — TELEPHONE ENCOUNTER
Spoke with Any Munoz at [phone], NP, about appropriate dose of Xtandi, it is 160 mg daily. Verbalized understanding.

## 2022-05-18 ENCOUNTER — TELEPHONE (OUTPATIENT)
Dept: RADIATION ONCOLOGY | Age: 72
End: 2022-05-18

## 2022-05-18 NOTE — TELEPHONE ENCOUNTER
This RN called [employer] [employer], to see how he is doing. He states he is still in [place],  He is undergoing rehabilitation S/P right great toe amputation. He states he may be there for awhile. He states that Dr Tanvi Medina will postpone treatment for now until he is discharge home. I advised him to give us a call for an update or when he is discharge to home, he verbalizes understanding. Alyssa Vasquez, HENNA and Dr Fariha Mckenna updated.

## 2022-05-24 ENCOUNTER — TELEPHONE (OUTPATIENT)
Dept: PALLATIVE CARE | Age: 72
End: 2022-05-24

## 2022-05-24 NOTE — TELEPHONE ENCOUNTER
Spoke to Rizwana Benedict. He informed me that he is currently a resident at [address] [place] [address] in [place]. His discharge date is unknown. Rizwana Benedict states he receives treatment with [facility] in [facility] with Dr Rosanna Gallego. He states he does not currently take any pain medications other than on occasional tylenol for his amputated toe pain. He says he rarely uses his prescribed anti-nausea medications. After explaining what services Palliative Care has to offer, Rizwana Benedict stated he does not feel he is in need of our services at this time.

## 2022-06-07 ENCOUNTER — TELEPHONE (OUTPATIENT)
Dept: INFUSION THERAPY | Age: 72
End: 2022-06-07

## 2022-06-07 NOTE — TELEPHONE ENCOUNTER
Pt called from rehab center in [place] questioning any f/u care needed with Dr. Damaris Avila and/or radiation-After reading Dr progress notes from 4/22, pt was encouraged to call for f/u appointment/labs and xgeva injection after discharged from rehab-Pt was also encouraged to call radiation to schedule his treatments-Pt states he has both phone numbers and was appreciative of the help-[facility]

## 2022-06-14 ENCOUNTER — TELEPHONE (OUTPATIENT)
Dept: ONCOLOGY | Age: 72
End: 2022-06-14

## 2022-06-17 ENCOUNTER — TELEPHONE (OUTPATIENT)
Dept: RADIATION ONCOLOGY | Age: 72
End: 2022-06-17

## 2022-06-17 NOTE — TELEPHONE ENCOUNTER
contacted pt at the request of cancer center staff re: transportation. Pt was informed of [facility] all access and stated that he was completing paperwork for this at this time. Pt reported that in the past he did get transportation through his insurance and was encouraged to reach out to his insurance provider to see if he had any coverage available. Pt needs transportation that is wheelchair accessible. Pt reported that he would make these calls and contact  back if any other needs arise.           Guadalupe Alicea MSW, LISW-S  Oncology Social Worker

## 2022-06-20 ENCOUNTER — TELEPHONE (OUTPATIENT)
Dept: INFUSION THERAPY | Age: 72
End: 2022-06-20

## 2022-06-20 ENCOUNTER — TELEPHONE (OUTPATIENT)
Dept: RADIATION ONCOLOGY | Age: 72
End: 2022-06-20

## 2022-06-20 PROCEDURE — 77280 THER RAD SIMULAJ FIELD SMPL: CPT | Performed by: RADIOLOGY

## 2022-06-20 NOTE — TELEPHONE ENCOUNTER
followed up with pt re: transportation issues. Pt reported that his paperwork from [address] was returned to him but reported that he did not know why at this time, and his wife was not available. Pt reported that currently his wife was planning on leaving work a couple of hours early to bring him to his treatment. Pt reported that he was still going to attempt to contact his insurance company and was encouraged to reach out should they need any further assistance.         Delio Yoon MSW, NAS-S  Oncology Social Worker

## 2022-06-20 NOTE — TELEPHONE ENCOUNTER
Pt called in stating he was home from rehab and needs to schedule a visit with Dr Addison Bamberger, to get his Jackie Caitie injection and to discuss his \"hormone blocking\" shot that he hasn't had since August 2021and his progress-Appointments scheduled-Pt states he has transportation avail ible this Friday-[facility]

## 2022-06-21 ENCOUNTER — HOSPITAL ENCOUNTER (OUTPATIENT)
Dept: GENERAL RADIOLOGY | Age: 72
Discharge: HOME | End: 2022-06-23
Payer: OTHER GOVERNMENT

## 2022-06-21 ENCOUNTER — HOSPITAL ENCOUNTER (OUTPATIENT)
Age: 72
Discharge: HOME | End: 2022-06-23
Payer: OTHER GOVERNMENT

## 2022-06-21 ENCOUNTER — HOSPITAL ENCOUNTER (OUTPATIENT)
Dept: RADIATION ONCOLOGY | Age: 72
Discharge: HOME | End: 2022-06-21
Payer: COMMERCIAL

## 2022-06-21 DIAGNOSIS — C61 PROSTATE CANCER (HCC): ICD-10-CM

## 2022-06-21 DIAGNOSIS — G89.29 CHRONIC PAIN OF RIGHT KNEE: ICD-10-CM

## 2022-06-21 DIAGNOSIS — C61 PROSTATE CANCER (HCC): Primary | ICD-10-CM

## 2022-06-21 DIAGNOSIS — M25.561 CHRONIC PAIN OF RIGHT KNEE: ICD-10-CM

## 2022-06-21 PROCEDURE — 73060 X-RAY EXAM OF HUMERUS: CPT

## 2022-06-21 PROCEDURE — 77412 RADIATION TX DELIVERY LVL 3: CPT | Performed by: RADIOLOGY

## 2022-06-22 ENCOUNTER — HOSPITAL ENCOUNTER (OUTPATIENT)
Dept: RADIATION ONCOLOGY | Age: 72
Discharge: HOME | End: 2022-06-22
Payer: COMMERCIAL

## 2022-06-22 PROCEDURE — 77412 RADIATION TX DELIVERY LVL 3: CPT | Performed by: RADIOLOGY

## 2022-06-23 ENCOUNTER — HOSPITAL ENCOUNTER (OUTPATIENT)
Dept: RADIATION ONCOLOGY | Age: 72
Discharge: HOME | End: 2022-06-23
Payer: COMMERCIAL

## 2022-06-23 PROCEDURE — 77412 RADIATION TX DELIVERY LVL 3: CPT | Performed by: RADIOLOGY

## 2022-06-24 ENCOUNTER — HOSPITAL ENCOUNTER (OUTPATIENT)
Dept: RADIATION ONCOLOGY | Age: 72
Discharge: HOME | End: 2022-06-24
Payer: COMMERCIAL

## 2022-06-24 PROCEDURE — 77412 RADIATION TX DELIVERY LVL 3: CPT | Performed by: RADIOLOGY

## 2022-06-27 ENCOUNTER — HOSPITAL ENCOUNTER (OUTPATIENT)
Dept: RADIATION ONCOLOGY | Age: 72
Discharge: HOME | End: 2022-06-27
Payer: COMMERCIAL

## 2022-06-27 VITALS
RESPIRATION RATE: 18 BRPM | OXYGEN SATURATION: 98 % | TEMPERATURE: 97.5 F | BODY MASS INDEX: 29.15 KG/M2 | WEIGHT: 209 LBS | HEART RATE: 49 BPM

## 2022-06-27 DIAGNOSIS — C61 CARCINOMA OF PROSTATE (HCC): Primary | ICD-10-CM

## 2022-06-27 PROCEDURE — 77427 RADIATION TX MANAGEMENT X5: CPT | Performed by: RADIOLOGY

## 2022-06-27 PROCEDURE — 77336 RADIATION PHYSICS CONSULT: CPT | Performed by: RADIOLOGY

## 2022-06-27 PROCEDURE — 77412 RADIATION TX DELIVERY LVL 3: CPT | Performed by: RADIOLOGY

## 2022-06-27 PROCEDURE — 77417 THER RADIOLOGY PORT IMAGE(S): CPT | Performed by: RADIOLOGY

## 2022-06-27 NOTE — PROGRESS NOTES
DEPARTMENT OF RADIATION ONCOLOGY   ON TREATMENT VISIT       6/27/2022      NAME:  Sedrick Pallas Sr. YOB: 1950    DIAGNOSIS: cT2B high volume  GS 9 Adenocarcinoma prostate cancer, with bone metastases to the right humerus. SUBJECTIVE:   Sedrick Pallas Sr. has now received 1500 cGy in 5 fractions directed to the right humerus. Past medical, surgical, social and family histories reviewed and updated as indicated.     PAIN: No    ALLERGIES:  Food, Chlorhexidine, Soap & cleansers, and Strawberry (diagnostic)         Current Outpatient Medications   Medication Sig Dispense Refill    Enzalutamide (XTANDI) 80 MG TABS Take 160 mg by mouth daily 60 tablet 3    amoxicillin (AMOXIL) 875 MG tablet Take 875 mg by mouth 2 times daily      bacitracin 500 UNIT/GM ophthalmic ointment every 4 hours      enoxaparin (LOVENOX) 100 MG/ML injection Inject into the skin 2 times daily       metoprolol succinate (TOPROL XL) 50 MG extended release tablet Take 50 mg by mouth daily      ondansetron (ZOFRAN-ODT) 4 MG disintegrating tablet Take 4 mg by mouth every 8 hours as needed for Nausea or Vomiting      PREDNISONE PO Take 30 mg by mouth daily      hydrALAZINE (APRESOLINE) 25 MG tablet Take 25 mg by mouth 3 times daily      sulfamethoxazole-trimethoprim (BACTRIM DS;SEPTRA DS) 800-160 MG per tablet Take 1 tablet by mouth three times a week      tamsulosin (FLOMAX) 0.4 MG capsule Take 0.4 mg by mouth daily      ondansetron (ZOFRAN) 4 MG tablet Take 4 mg by mouth every 6 hours as needed for Nausea or Vomiting      triamcinolone (KENALOG) 0.1 % cream daily as needed       valsartan (DIOVAN) 80 MG tablet Take 40 mg by mouth 2 times daily       acetaminophen (TYLENOL) 500 MG tablet Take 1,000 mg by mouth every 8 hours as needed for Pain      mexiletine (MEXITIL) 150 MG capsule Take 150 mg by mouth 3 times daily       Multiple Vitamin (MULTIVITAMINS PO) Take 1 tablet by mouth 4 times daily Geriatric fusion oral chewable      amoxicillin-clavulanate (AUGMENTIN) 875-125 MG per tablet Take 1 tablet by mouth 2 times daily      bacitracin 500 UNIT/GM ointment Apply topically 2 times daily Apply topically 2 times daily.  senna (SENOKOT) 8.6 MG tablet Take 1 tablet by mouth nightly       psyllium (KONSYL) 28.3 % PACK Take 1 packet by mouth daily as needed Metamucil sugar free 3.4 gram powder pack      sodium chloride (OCEAN) 0.65 % nasal spray 1 spray by Nasal route 3 times daily as needed for Congestion       aspirin 81 MG EC tablet Take 81 mg by mouth daily      ergocalciferol (ERGOCALCIFEROL) 1.25 MG (13403 UT) capsule Take 50,000 Units by mouth once a week Thursdays      digoxin (LANOXIN) 125 MCG tablet Take 125 mcg by mouth every other day       Magnesium 400 MG CAPS Take 1 tablet by mouth daily       warfarin (COUMADIN) 5 MG tablet Take 5 mg by mouth      pantoprazole (PROTONIX) 40 MG tablet Take 1 tablet by mouth every morning (before breakfast) (Patient taking differently: Take 40 mg by mouth as needed ) 90 tablet 3    bumetanide (BUMEX) 2 MG tablet Take 1 tablet by mouth daily (Patient taking differently: Take 1 mg by mouth Take 1 tablet (1 mg total) by mouth daily as needed (fluid retention: weight gain/bloating/shortness of breath). ) 90 tablet 3    allopurinol (ZYLOPRIM) 100 MG tablet Take 1 tablet by mouth daily 30 tablet 3     No current facility-administered medications for this encounter. OBJECTIVE:  Alert and fully ambulatory. Pleasant and conversant. Physical Examination:General appearance - alert, well appearing, and in no distress and overweight:  Constitutional: A well developed, well nourished 73 y.o. male who is alert, oriented, cooperative and in no apparent distress. HEENT:   Skin:  Warm and dry. No obvious rashes.     Vitals:    06/27/22 1531   Pulse: (!) 49   Resp: 18   Temp: 97.5 °F (36.4 °C)   TempSrc: Temporal   SpO2: 98%   Weight: 209 lb (94.8 kg) Wt Readings from Last 3 Encounters:   06/27/22 209 lb (94.8 kg)   04/21/22 228 lb (103.4 kg)   04/15/22 228 lb (103.4 kg)       ASSESSMENT/PLAN:     Patient is tolerating treatments well with expected toxicities. Current and planned dose reviewed. Goals of treatment and potential side effects were reviewed with the patient. Treatment imaging has been personally reviewed for accuracy and precision. Questions answered to apparent satisfaction. Treatments will continue as planned. Thank you for the opportunity to participate in multidisciplinary management of this remarkable and pleasant patient.       Mable Gibson MD    Department of Radiation Oncology  [facility]) [facility]: [phone] (VFS: [phone])  [address] [place] [address]: [phone] (JRU: [phone])  [facility]) [facility]:  [phone] (XAT:  [phone])

## 2022-06-27 NOTE — PROGRESS NOTES
Jonnathan Block Sr.  6/27/2022  Wt Readings from Last 3 Encounters:   06/27/22 209 lb (94.8 kg)   04/21/22 228 lb (103.4 kg)   04/15/22 228 lb (103.4 kg)     Body mass index is 29.15 kg/m². Treatment Area:right humerus    Patient was seen today for weekly visit. Comfort Alteration  KPS:60%  Fatigue: None      Nutritional Alteration  Anorexia: No   Nausea: No   Vomiting: No     Elimination Alterations  Constipation: no  Diarrhea:  no  Bowel Incontinence: No  Urinary Incontinence: No    Skin Alteration   Sensation:no    Radiation Dermatitis:  none      Emotional  Coping: effective    Sexuality Alteration  na    Injury, potential bleeding or infection: na        Lab Results   Component Value Date    WBC 5.4 03/18/2022     (L) 03/18/2022       Pulse (!) 49   Temp 97.5 °F (36.4 °C) (Temporal)   Resp 18   Wt 209 lb (94.8 kg)   SpO2 98%   BMI 29.15 kg/m²   BP within normal range?  Yes/ doppler reading     Assessment/Plan:  Completed 5/10 fractions; 1500/3000 cGy    Tamar Cunha RN

## 2022-06-28 ENCOUNTER — HOSPITAL ENCOUNTER (OUTPATIENT)
Dept: RADIATION ONCOLOGY | Age: 72
Discharge: HOME | End: 2022-06-28
Payer: COMMERCIAL

## 2022-06-28 ENCOUNTER — HOSPITAL ENCOUNTER (OUTPATIENT)
Age: 72
Discharge: HOME | End: 2022-06-28
Payer: OTHER GOVERNMENT

## 2022-06-28 DIAGNOSIS — C79.51 PROSTATE CANCER METASTATIC TO BONE (HCC): Primary | ICD-10-CM

## 2022-06-28 DIAGNOSIS — C61 PROSTATE CANCER METASTATIC TO BONE (HCC): Primary | ICD-10-CM

## 2022-06-28 LAB
ALBUMIN SERPL-MCNC: 3.3 G/DL (ref 3.5–5.2)
ALP BLD-CCNC: 102 U/L (ref 40–129)
ALT SERPL-CCNC: 15 U/L (ref 0–40)
ANION GAP SERPL CALCULATED.3IONS-SCNC: 13 MMOL/L (ref 7–16)
ANISOCYTOSIS: ABNORMAL
AST SERPL-CCNC: 23 U/L (ref 0–39)
BASOPHILS ABSOLUTE: 0 E9/L (ref 0–0.2)
BASOPHILS RELATIVE PERCENT: 0 % (ref 0–2)
BILIRUB SERPL-MCNC: 0.4 MG/DL (ref 0–1.2)
BUN BLDV-MCNC: 23 MG/DL (ref 6–23)
CALCIUM SERPL-MCNC: 9.2 MG/DL (ref 8.6–10.2)
CHLORIDE BLD-SCNC: 101 MMOL/L (ref 98–107)
CO2: 23 MMOL/L (ref 22–29)
CREAT SERPL-MCNC: 1.1 MG/DL (ref 0.7–1.2)
EOSINOPHILS ABSOLUTE: 0 E9/L (ref 0.05–0.5)
EOSINOPHILS RELATIVE PERCENT: 0 % (ref 0–6)
GFR AFRICAN AMERICAN: >60
GFR NON-AFRICAN AMERICAN: >60 ML/MIN/1.73
GLUCOSE BLD-MCNC: 205 MG/DL (ref 74–99)
HBA1C MFR BLD: 5.2 % (ref 4–5.6)
HCT VFR BLD CALC: 32.6 % (ref 37–54)
HEMOGLOBIN: 10.3 G/DL (ref 12.5–16.5)
INR BLD: 2.6
LACTATE DEHYDROGENASE: 310 U/L (ref 135–225)
LYMPHOCYTES ABSOLUTE: 0.19 E9/L (ref 1.5–4)
LYMPHOCYTES RELATIVE PERCENT: 3.5 % (ref 20–42)
MCH RBC QN AUTO: 30.9 PG (ref 26–35)
MCHC RBC AUTO-ENTMCNC: 31.6 % (ref 32–34.5)
MCV RBC AUTO: 97.9 FL (ref 80–99.9)
METAMYELOCYTES RELATIVE PERCENT: 0.9 % (ref 0–1)
MONOCYTES ABSOLUTE: 0.19 E9/L (ref 0.1–0.95)
MONOCYTES RELATIVE PERCENT: 4.3 % (ref 2–12)
NEUTROPHILS ABSOLUTE: 4.42 E9/L (ref 1.8–7.3)
NEUTROPHILS RELATIVE PERCENT: 91.3 % (ref 43–80)
NUCLEATED RED BLOOD CELLS: 0 /100 WBC
OVALOCYTES: ABNORMAL
PDW BLD-RTO: 16.6 FL (ref 11.5–15)
PLATELET # BLD: 216 E9/L (ref 130–450)
PMV BLD AUTO: 10.2 FL (ref 7–12)
POIKILOCYTES: ABNORMAL
POLYCHROMASIA: ABNORMAL
POTASSIUM SERPL-SCNC: 4.4 MMOL/L (ref 3.5–5)
PREALBUMIN: 15 MG/DL (ref 20–40)
PROTHROMBIN TIME: 27.7 SEC (ref 9.3–12.4)
RBC # BLD: 3.33 E12/L (ref 3.8–5.8)
SCHISTOCYTES: ABNORMAL
SODIUM BLD-SCNC: 137 MMOL/L (ref 132–146)
STOMATOCYTES: ABNORMAL
TOTAL PROTEIN: 6.3 G/DL (ref 6.4–8.3)
WBC # BLD: 4.8 E9/L (ref 4.5–11.5)

## 2022-06-28 PROCEDURE — 85610 PROTHROMBIN TIME: CPT

## 2022-06-28 PROCEDURE — 85025 COMPLETE CBC W/AUTO DIFF WBC: CPT

## 2022-06-28 PROCEDURE — 77412 RADIATION TX DELIVERY LVL 3: CPT | Performed by: RADIOLOGY

## 2022-06-28 PROCEDURE — 83036 HEMOGLOBIN GLYCOSYLATED A1C: CPT

## 2022-06-28 PROCEDURE — 80053 COMPREHEN METABOLIC PANEL: CPT

## 2022-06-28 PROCEDURE — 84134 ASSAY OF PREALBUMIN: CPT

## 2022-06-28 PROCEDURE — 83615 LACTATE (LD) (LDH) ENZYME: CPT

## 2022-06-28 PROCEDURE — 36415 COLL VENOUS BLD VENIPUNCTURE: CPT

## 2022-06-29 ENCOUNTER — HOSPITAL ENCOUNTER (OUTPATIENT)
Dept: RADIATION ONCOLOGY | Age: 72
Discharge: HOME | End: 2022-06-29
Payer: COMMERCIAL

## 2022-06-29 PROCEDURE — 77412 RADIATION TX DELIVERY LVL 3: CPT | Performed by: RADIOLOGY

## 2022-06-30 ENCOUNTER — HOSPITAL ENCOUNTER (OUTPATIENT)
Dept: RADIATION ONCOLOGY | Age: 72
Discharge: HOME | End: 2022-06-30
Payer: COMMERCIAL

## 2022-06-30 PROCEDURE — 77412 RADIATION TX DELIVERY LVL 3: CPT | Performed by: RADIOLOGY

## 2022-07-01 ENCOUNTER — HOSPITAL ENCOUNTER (OUTPATIENT)
Dept: INFUSION THERAPY | Age: 72
Discharge: HOME | End: 2022-07-01
Payer: OTHER GOVERNMENT

## 2022-07-01 ENCOUNTER — HOSPITAL ENCOUNTER (OUTPATIENT)
Dept: RADIATION ONCOLOGY | Age: 72
Discharge: HOME | End: 2022-07-01
Payer: OTHER GOVERNMENT

## 2022-07-01 ENCOUNTER — OFFICE VISIT (OUTPATIENT)
Dept: ONCOLOGY | Age: 72
End: 2022-07-01
Payer: OTHER GOVERNMENT

## 2022-07-01 VITALS — HEART RATE: 92 BPM | HEIGHT: 71 IN | TEMPERATURE: 97.4 F | OXYGEN SATURATION: 100 % | BODY MASS INDEX: 29.15 KG/M2

## 2022-07-01 DIAGNOSIS — C79.51 PROSTATE CANCER METASTATIC TO BONE (HCC): Primary | ICD-10-CM

## 2022-07-01 DIAGNOSIS — C61 PROSTATE CANCER METASTATIC TO BONE (HCC): Primary | ICD-10-CM

## 2022-07-01 LAB
ALBUMIN SERPL-MCNC: 3.1 G/DL (ref 3.5–5.2)
ALP BLD-CCNC: 98 U/L (ref 40–129)
ALT SERPL-CCNC: 18 U/L (ref 0–40)
ANION GAP SERPL CALCULATED.3IONS-SCNC: 14 MMOL/L (ref 7–16)
AST SERPL-CCNC: 30 U/L (ref 0–39)
BILIRUB SERPL-MCNC: 0.3 MG/DL (ref 0–1.2)
BUN BLDV-MCNC: 20 MG/DL (ref 6–23)
CALCIUM SERPL-MCNC: 9.1 MG/DL (ref 8.6–10.2)
CHLORIDE BLD-SCNC: 101 MMOL/L (ref 98–107)
CO2: 21 MMOL/L (ref 22–29)
CREAT SERPL-MCNC: 0.9 MG/DL (ref 0.7–1.2)
GFR AFRICAN AMERICAN: >60
GFR NON-AFRICAN AMERICAN: >60 ML/MIN/1.73
GLUCOSE BLD-MCNC: 193 MG/DL (ref 74–99)
POTASSIUM SERPL-SCNC: 4.4 MMOL/L (ref 3.5–5)
PROSTATE SPECIFIC ANTIGEN: 4.07 NG/ML (ref 0–4)
SODIUM BLD-SCNC: 136 MMOL/L (ref 132–146)
TOTAL PROTEIN: 6.5 G/DL (ref 6.4–8.3)

## 2022-07-01 PROCEDURE — 3017F COLORECTAL CA SCREEN DOC REV: CPT | Performed by: INTERNAL MEDICINE

## 2022-07-01 PROCEDURE — 99214 OFFICE O/P EST MOD 30 MIN: CPT | Performed by: INTERNAL MEDICINE

## 2022-07-01 PROCEDURE — 99212 OFFICE O/P EST SF 10 MIN: CPT

## 2022-07-01 PROCEDURE — 96372 THER/PROPH/DIAG INJ SC/IM: CPT

## 2022-07-01 PROCEDURE — 84153 ASSAY OF PSA TOTAL: CPT

## 2022-07-01 PROCEDURE — 80053 COMPREHEN METABOLIC PANEL: CPT

## 2022-07-01 PROCEDURE — 36415 COLL VENOUS BLD VENIPUNCTURE: CPT

## 2022-07-01 PROCEDURE — G8417 CALC BMI ABV UP PARAM F/U: HCPCS | Performed by: INTERNAL MEDICINE

## 2022-07-01 PROCEDURE — 1123F ACP DISCUSS/DSCN MKR DOCD: CPT | Performed by: INTERNAL MEDICINE

## 2022-07-01 PROCEDURE — 6360000002 HC RX W HCPCS: Performed by: INTERNAL MEDICINE

## 2022-07-01 PROCEDURE — 1036F TOBACCO NON-USER: CPT | Performed by: INTERNAL MEDICINE

## 2022-07-01 PROCEDURE — G8427 DOCREV CUR MEDS BY ELIG CLIN: HCPCS | Performed by: INTERNAL MEDICINE

## 2022-07-01 PROCEDURE — 77412 RADIATION TX DELIVERY LVL 3: CPT | Performed by: RADIOLOGY

## 2022-07-01 RX ORDER — DIPHENHYDRAMINE HYDROCHLORIDE 50 MG/ML
50 INJECTION INTRAMUSCULAR; INTRAVENOUS
Status: CANCELLED | OUTPATIENT
Start: 2022-07-01

## 2022-07-01 RX ORDER — FAMOTIDINE 10 MG/ML
20 INJECTION, SOLUTION INTRAVENOUS
Status: CANCELLED | OUTPATIENT
Start: 2022-07-01

## 2022-07-01 RX ORDER — EPINEPHRINE 1 MG/ML
0.3 INJECTION, SOLUTION, CONCENTRATE INTRAVENOUS PRN
Status: CANCELLED | OUTPATIENT
Start: 2022-07-01

## 2022-07-01 RX ORDER — SODIUM CHLORIDE 9 MG/ML
INJECTION, SOLUTION INTRAVENOUS CONTINUOUS
Status: CANCELLED | OUTPATIENT
Start: 2022-07-01

## 2022-07-01 RX ORDER — MEPERIDINE HYDROCHLORIDE 50 MG/ML
12.5 INJECTION INTRAMUSCULAR; INTRAVENOUS; SUBCUTANEOUS PRN
Status: CANCELLED | OUTPATIENT
Start: 2022-07-01

## 2022-07-01 RX ORDER — ACETAMINOPHEN 325 MG/1
650 TABLET ORAL
Status: CANCELLED | OUTPATIENT
Start: 2022-07-01

## 2022-07-01 RX ORDER — ONDANSETRON 2 MG/ML
8 INJECTION INTRAMUSCULAR; INTRAVENOUS
Status: CANCELLED | OUTPATIENT
Start: 2022-07-01

## 2022-07-01 RX ORDER — ALBUTEROL SULFATE 90 UG/1
4 AEROSOL, METERED RESPIRATORY (INHALATION) PRN
Status: CANCELLED | OUTPATIENT
Start: 2022-07-01

## 2022-07-01 RX ORDER — MIDODRINE HYDROCHLORIDE 10 MG/1
10 TABLET ORAL
COMMUNITY
Start: 2022-06-14

## 2022-07-01 RX ADMIN — DENOSUMAB 120 MG: 120 INJECTION SUBCUTANEOUS at 15:57

## 2022-07-01 NOTE — PROGRESS NOTES
Department of [facility] Med Oncology  Attending Clinic Note    Reason for Visit: Follow-up on a patient with Prostate Cancer    PCP:  Alie Sidhu MD    History of Present Illness:  72 y/o male with High risk Prostate Cancer post XRT completion. On 01/12/2022, he completed 7920 cGy in 44 fractions directed to the prostate + proximal SV    Dr. Randall Arcos for Urology at [facility], most recent visit 02/01/2022. The patient received Lupron injection #2 at this visit (planned for 36 months ADT)    The patient reports hospitalized at [facility] after ICD fired 02/18/2022. Admitted with ventricular tachyarrhythmia, taken for emergent VT ablation 02/18/2022 and ICD generator change 02/24/2022. Sustained VT post ablation, due to suspected sarcoidosis. Started on daily steroid. The patient was discharged 02/25/2022    Further review of outside records revealed a 1.6 cm ovoid faintly sclerotic lesion in the right humeral neck redemonstrated and indeterminate on XR Shoulder 2 view Right 12/14/2021 at [facility]    Referred to our clinic for further evaluation and treatment    PSA 20.86 on 03/18/2022  Testosterone <2.5 on 03/18/2022    NM Bone scan 03/30/2022 reviewed extensively with Dr. Carrie Thomson from Radiology team; there is activity within the lateral aspect of the right humeral head which corresponds to the sclerotic lesion seen on CT humerus consistent with osseous metastatic disease    CT chest/abdomen/pelvis 03/30/2022 Sclerotic lesions in the right humeral head and the right 2nd rib suspicious for developing bone metastasis. Sclerotic lesions in the lumbar spine L2 and L3 as noted concerning for bone metastasis. CT right humerus 03/30/2022 14 x 12 x 10 mm sclerotic focus in the proximal humerus compatible with osteoblastic metastasis. Metastatic Prostate Cancer to Bone  Recommended XTANDI. Side effects reviewed. He agreed to proceed.    D/C Casodex 04/06/2022  Teretha Pile was started on 04/07/2022 with fair tolerance so far. Today 07/01/2022; he continues to tolerate XTANDI well. Fair appetite and energy level. No fever, chills. Palliative RT to right proximal humerus will be completed on 07/05/2022. Review of Systems;  CONSTITUTIONAL: No fever, chills. Fair appetite and energy level. ENMT: Eyes: No diplopia; Nose: No epistaxis. Mouth: No sore throat. RESPIRATORY: No hemoptysis, shortness of breath, cough. CARDIOVASCULAR: No chest pain  GASTROINTESTINAL: No nausea/vomiting, abdominal pain  GENITOURINARY: No hematuria  NEURO: No syncope, presyncope or headache. Remainder:  ROS NEGATIVE    Past Medical History:      Diagnosis Date    Anxiety     Aorto-iliac atherosclerosis ([address] 8/31/2020    Arthritis     Atrial fibrillation (HCC)     AVNRT (AV teddy re-entry tachycardia) ([address]     Blood type O+ 11/14/2019    CAD (coronary artery disease)     Cancer (HCC)     CH[facility]congestive [facility] failure[facility]     Diabetes mellitus ([address]     Diabetic neuropathy ([address]     Diastolic dysfunction 51/13/2908    stage 3    Fall     right knee    Gout     chemically induced    History of blood transfusion     Hx of blood clots 1976    left leg    Hyperlipidemia     Hypertension     LBBB (left bundle branch block)     Left ventricular assist device (LVAD) complication 1893    Moderate mitral regurgitation 04/19/2016    Nonischemic cardiomyopathy ([address]     Obesity     SNEHA (obstructive sleep apnea)     treated with CPAP    PVD (peripheral vascular disease) with claudication ([address] 8/31/2020    Sarcoidosis 2019    Severe tricuspid regurgitation 04/19/2016    SVT (supraventricular tachycardia) ([facility])     Viral cardiomyopathy ([facility])      Medications:  Reviewed and reconciled. Allergies:   Allergies   Allergen Reactions    Food Hives     Strawberries      Chlorhexidine Rash    Soap & Cleansers Rash     TIDE detergent    Strawberry (Diagnostic) Rash     Other reaction(s): Eruption, ITCHING,WATERING EYES Physical Exam:  Pulse 92   Temp 97.4 °F (36.3 °C)   Ht 5' 11\" (1.803 m)   SpO2 100%   BMI 29.15 kg/m²   GENERAL: Alert, oriented x 3, not in acute distress. HEENT: PERRLA; EOMI. Oropharynx clear. LUNGS: CTA Uriel  CVS: RRR  EXTREMITIES: Without clubbing, cyanosis, or edema. NEUROLOGIC: No focal deficits. ECOG PS 2    Lab Results   Component Value Date    PSA 16.97 (H) 04/15/2022    PSA 20.86 (H) 03/18/2022     Impression/Plan:  72 y/o male with High risk prostate cancer post XRT completion. On 01/12/2022, he completed 7920 cGy in 40 fractions directed to the prostate + proximal SV for management of high risk adenocarcinoma of the prostate    Dr. Jahaira Ponce for Urology at [address] [place] [address], most recent visit 02/01/2022. The patient received Lupron injection #2 at this visit (planned for 36 months ADT)    Further review of outside records revealed a 1.6 cm ovoid faintly sclerotic lesion in the right humeral neck redemonstrated and indeterminate on XR Shoulder 2 view Right 12/14/2021 at [facility]    Referred to our clinic for further evaluation and treatment    PSA 20.86 on 03/18/2022  Testosterone <2.5 on 03/18/2022    NM Bone scan 03/30/2022 reviewed extensively with Dr. Aurea Jimenez from Radiology team; there is activity within the lateral aspect of the right humeral head which corresponds to the sclerotic lesion seen on CT humerus consistent with osseous metastatic disease    CT chest/abdomen/pelvis 03/30/2022 Sclerotic lesions in the right humeral head and the right 2nd rib suspicious for developing bone metastasis. Sclerotic lesions in the lumbar spine L2 and L3 as noted concerning for bone metastasis. CT right humerus 03/30/2022 14 x 12 x 10 mm sclerotic focus in the proximal humerus compatible with osteoblastic metastasis. Metastatic Prostate Cancer to Bone  Recommended XTANDI. Side effects reviewed. He agreed to proceed.   Casodex D/C on 04/06/2022  Damion Jessi was started on 04/07/2022 with fair tolerance so far. PSA 16.97 on 04/15/2022  Labs reviewed  PSA pending on 07/01/2022  Palliative RT to right proximal humerus will be completed on 07/05/2022. Arthur Rojas given bone metastasis (one given today 07/01/2022)  Continue XTANDI    RTC 4 weeks with repeat labs and Arthur Rojas. Scans after next visit.     Irwin Griffin MD   7/1/2022

## 2022-07-05 ENCOUNTER — HOSPITAL ENCOUNTER (OUTPATIENT)
Dept: RADIATION ONCOLOGY | Age: 72
Discharge: HOME | End: 2022-07-05
Payer: OTHER GOVERNMENT

## 2022-07-05 ENCOUNTER — HOSPITAL ENCOUNTER (OUTPATIENT)
Age: 72
Discharge: HOME | End: 2022-07-05
Payer: COMMERCIAL

## 2022-07-05 VITALS — HEART RATE: 62 BPM | RESPIRATION RATE: 20 BRPM | WEIGHT: 216 LBS | TEMPERATURE: 97.9 F | BODY MASS INDEX: 30.13 KG/M2

## 2022-07-05 DIAGNOSIS — C61 PROSTATE CANCER METASTATIC TO BONE (HCC): Primary | ICD-10-CM

## 2022-07-05 DIAGNOSIS — C79.51 PROSTATE CANCER METASTATIC TO BONE (HCC): Primary | ICD-10-CM

## 2022-07-05 LAB
INR BLD: 2.1
PROTHROMBIN TIME: 23.2 SEC (ref 9.3–12.4)

## 2022-07-05 PROCEDURE — 85610 PROTHROMBIN TIME: CPT

## 2022-07-05 PROCEDURE — 77336 RADIATION PHYSICS CONSULT: CPT | Performed by: RADIOLOGY

## 2022-07-05 PROCEDURE — 77427 RADIATION TX MANAGEMENT X5: CPT | Performed by: RADIOLOGY

## 2022-07-05 PROCEDURE — 36415 COLL VENOUS BLD VENIPUNCTURE: CPT

## 2022-07-05 PROCEDURE — 77412 RADIATION TX DELIVERY LVL 3: CPT | Performed by: RADIOLOGY

## 2022-07-05 PROCEDURE — 77417 THER RADIOLOGY PORT IMAGE(S): CPT | Performed by: RADIOLOGY

## 2022-07-05 ASSESSMENT — PAIN SCALES - GENERAL: PAINLEVEL_OUTOF10: 7

## 2022-07-05 ASSESSMENT — PAIN DESCRIPTION - ORIENTATION: ORIENTATION: RIGHT

## 2022-07-05 ASSESSMENT — PAIN DESCRIPTION - LOCATION: LOCATION: SHOULDER

## 2022-07-05 NOTE — PROGRESS NOTES
Tod Davis Sr.  7/5/2022  Wt Readings from Last 3 Encounters:   07/05/22 216 lb (98 kg)   06/27/22 209 lb (94.8 kg)   04/21/22 228 lb (103.4 kg)     Body mass index is 30.13 kg/m². Treatment Area:right humerus    Patient was seen today for weekly visit. Comfort Alteration  KPS:60%  Fatigue: None      Nutritional Alteration  Anorexia: No   Nausea: No   Vomiting: No     Elimination Alterations  Constipation: no  Diarrhea:  no  Bowel Incontinence: No  Urinary Incontinence: No    Skin Alteration   Sensation:na    Radiation Dermatitis:  none      Emotional  Coping: effective    Sexuality Alteration  na    Injury, potential bleeding or infection: na        Lab Results   Component Value Date    WBC 4.8 06/28/2022     06/28/2022       Pulse 62   Temp 97.9 °F (36.6 °C) (Temporal)   Resp 20   Wt 216 lb (98 kg)   BMI 30.13 kg/m²   BP within normal range? Yes/  Doppler reading only     Assessment/Plan:  Completed treatment today 10 fractions; 3000 cGy. Discharge instructions given, pt and family verbalizes understanding.     Ruthy Dai RN

## 2022-07-05 NOTE — PROGRESS NOTES
gram powder pack      sodium chloride (OCEAN) 0.65 % nasal spray 1 spray by Nasal route 3 times daily as needed for Congestion       aspirin 81 MG EC tablet Take 81 mg by mouth daily      ergocalciferol (ERGOCALCIFEROL) 1.25 MG (88354 UT) capsule Take 50,000 Units by mouth once a week Thursdays      digoxin (LANOXIN) 125 MCG tablet Take 125 mcg by mouth every other day       Magnesium 400 MG CAPS Take 1 tablet by mouth daily       warfarin (COUMADIN) 5 MG tablet Take 5 mg by mouth      pantoprazole (PROTONIX) 40 MG tablet Take 1 tablet by mouth every morning (before breakfast) (Patient taking differently: Take 40 mg by mouth as needed ) 90 tablet 3    bumetanide (BUMEX) 2 MG tablet Take 1 tablet by mouth daily (Patient taking differently: Take 1 mg by mouth Take 1 tablet (1 mg total) by mouth daily as needed (fluid retention: weight gain/bloating/shortness of breath). ) 90 tablet 3    allopurinol (ZYLOPRIM) 100 MG tablet Take 1 tablet by mouth daily 30 tablet 3     No current facility-administered medications for this encounter. This is an up-to-date medication list.    Please take this list to your next care provider, and discard any previous medication lists.

## 2022-07-05 NOTE — PROGRESS NOTES
DEPARTMENT OF RADIATION ONCOLOGY   ON TREATMENT VISIT       7/5/2022      NAME:  Elvira Dailey Sr. YOB: 1950    DIAGNOSIS: Bone metastases from prostate cancer    SUBJECTIVE:   Elvira Dailey Sr. has now received 30 cGy in 10 fractions directed to the right humerus. Past medical, surgical, social and family histories reviewed and updated as indicated.     PAIN: No    ALLERGIES:  Food, Chlorhexidine, Soap & cleansers, and Strawberry (diagnostic)         Current Outpatient Medications   Medication Sig Dispense Refill    midodrine (PROAMATINE) 10 MG tablet Take 10 mg by mouth      Enzalutamide (XTANDI) 80 MG TABS Take 160 mg by mouth daily 60 tablet 3    amoxicillin (AMOXIL) 875 MG tablet Take 875 mg by mouth 2 times daily      bacitracin 500 UNIT/GM ophthalmic ointment every 4 hours      enoxaparin (LOVENOX) 100 MG/ML injection Inject into the skin 2 times daily       metoprolol succinate (TOPROL XL) 50 MG extended release tablet Take 50 mg by mouth daily      ondansetron (ZOFRAN-ODT) 4 MG disintegrating tablet Take 4 mg by mouth every 8 hours as needed for Nausea or Vomiting      PREDNISONE PO Take 10 mg by mouth daily       hydrALAZINE (APRESOLINE) 25 MG tablet Take 25 mg by mouth 3 times daily      sulfamethoxazole-trimethoprim (BACTRIM DS;SEPTRA DS) 800-160 MG per tablet Take 1 tablet by mouth three times a week      tamsulosin (FLOMAX) 0.4 MG capsule Take 0.4 mg by mouth daily      ondansetron (ZOFRAN) 4 MG tablet Take 4 mg by mouth every 6 hours as needed for Nausea or Vomiting      triamcinolone (KENALOG) 0.1 % cream daily as needed       valsartan (DIOVAN) 80 MG tablet Take 40 mg by mouth 2 times daily  (Patient not taking: Reported on 7/1/2022)      acetaminophen (TYLENOL) 500 MG tablet Take 1,000 mg by mouth every 8 hours as needed for Pain      mexiletine (MEXITIL) 150 MG capsule Take 150 mg by mouth 3 times daily       Multiple Vitamin (MULTIVITAMINS PO) Take 1 tablet by mouth 4 times daily Geriatric fusion oral chewable      amoxicillin-clavulanate (AUGMENTIN) 875-125 MG per tablet Take 1 tablet by mouth 2 times daily      bacitracin 500 UNIT/GM ointment Apply topically 2 times daily Apply topically 2 times daily.  senna (SENOKOT) 8.6 MG tablet Take 1 tablet by mouth nightly       psyllium (KONSYL) 28.3 % PACK Take 1 packet by mouth daily as needed Metamucil sugar free 3.4 gram powder pack      sodium chloride (OCEAN) 0.65 % nasal spray 1 spray by Nasal route 3 times daily as needed for Congestion       aspirin 81 MG EC tablet Take 81 mg by mouth daily      ergocalciferol (ERGOCALCIFEROL) 1.25 MG (13790 UT) capsule Take 50,000 Units by mouth once a week Thursdays      digoxin (LANOXIN) 125 MCG tablet Take 125 mcg by mouth every other day       Magnesium 400 MG CAPS Take 1 tablet by mouth daily       warfarin (COUMADIN) 5 MG tablet Take 5 mg by mouth      pantoprazole (PROTONIX) 40 MG tablet Take 1 tablet by mouth every morning (before breakfast) (Patient taking differently: Take 40 mg by mouth as needed ) 90 tablet 3    bumetanide (BUMEX) 2 MG tablet Take 1 tablet by mouth daily (Patient taking differently: Take 1 mg by mouth Take 1 tablet (1 mg total) by mouth daily as needed (fluid retention: weight gain/bloating/shortness of breath). ) 90 tablet 3    allopurinol (ZYLOPRIM) 100 MG tablet Take 1 tablet by mouth daily 30 tablet 3     No current facility-administered medications for this encounter. OBJECTIVE:  Alert and fully ambulatory. Pleasant and conversant. Physical Examination:General appearance - alert, well appearing, and in no distress, overweight and improved:  Constitutional: A well developed, well nourished 73 y.o. male who is alert, oriented, cooperative and in no apparent distress. HEENT:   Skin:  Warm and dry. No obvious rashes.     Vitals:    07/05/22 1526   Pulse: 62   Resp: 20   Temp: 97.9 °F (36.6 °C)   TempSrc: Temporal   Weight: 216 lb (98 kg)       Wt Readings from Last 3 Encounters:   07/05/22 216 lb (98 kg)   06/27/22 209 lb (94.8 kg)   04/21/22 228 lb (103.4 kg)       ASSESSMENT/PLAN:     Patient is tolerating treatments well with expected toxicities. Current and planned dose reviewed. Goals of treatment and potential side effects were reviewed with the patient. Treatment imaging has been personally reviewed for accuracy and precision. Questions answered to apparent satisfaction. Treatments will continue as planned. Thank you for the opportunity to participate in multidisciplinary management of this remarkable and pleasant patient.       Spike Dunn MD    Department of Radiation Oncology  [facility]) [facility]: [phone] (KOB: [phone])  [address] [place] [address]: [phone] (YTP: [phone])  [facility]:  [phone] (UXP:  [phone])

## 2022-07-11 ENCOUNTER — HOSPITAL ENCOUNTER (OUTPATIENT)
Age: 72
Discharge: HOME | End: 2022-07-11
Payer: OTHER GOVERNMENT

## 2022-07-11 LAB
INR BLD: 1.5
PROTHROMBIN TIME: 16.8 SEC (ref 9.3–12.4)

## 2022-07-11 PROCEDURE — 85610 PROTHROMBIN TIME: CPT

## 2022-07-11 PROCEDURE — 36415 COLL VENOUS BLD VENIPUNCTURE: CPT

## 2022-07-15 RX ORDER — ENZALUTAMIDE 80 MG/1
160 TABLET ORAL DAILY
Qty: 60 TABLET | Refills: 3 | Status: ACTIVE | OUTPATIENT
Start: 2022-07-15

## 2022-07-15 NOTE — PROGRESS NOTES
[facility]    Date: 07/15/22    Medication is currently being filled at [facility] and has been routed there. PA good thru 4/1/23. Please call us with any questions at [phone] opt.  2.

## 2022-07-15 NOTE — PROGRESS NOTES
[facility] Update    Date: 07/15/22    Patient's prescription benefits are being verified for coverage. Status update to follow as soon as possible. Please call us with any questions at [phone] opt.  2.

## 2022-07-29 ENCOUNTER — HOSPITAL ENCOUNTER (OUTPATIENT)
Dept: INFUSION THERAPY | Age: 72
Discharge: HOME | End: 2022-07-29
Payer: OTHER GOVERNMENT

## 2022-07-29 ENCOUNTER — HOSPITAL ENCOUNTER (OUTPATIENT)
Age: 72
Discharge: HOME | End: 2022-07-29
Payer: OTHER GOVERNMENT

## 2022-07-29 ENCOUNTER — OFFICE VISIT (OUTPATIENT)
Dept: ONCOLOGY | Age: 72
End: 2022-07-29
Payer: OTHER GOVERNMENT

## 2022-07-29 VITALS
OXYGEN SATURATION: 99 % | TEMPERATURE: 98.3 F | HEIGHT: 71 IN | WEIGHT: 202 LBS | BODY MASS INDEX: 28.28 KG/M2 | HEART RATE: 55 BPM

## 2022-07-29 DIAGNOSIS — C61 PROSTATE CANCER METASTATIC TO BONE (HCC): ICD-10-CM

## 2022-07-29 DIAGNOSIS — C79.51 PROSTATE CANCER METASTATIC TO BONE (HCC): ICD-10-CM

## 2022-07-29 DIAGNOSIS — C79.51 PROSTATE CANCER METASTATIC TO BONE (HCC): Primary | ICD-10-CM

## 2022-07-29 DIAGNOSIS — C61 PROSTATE CANCER METASTATIC TO BONE (HCC): Primary | ICD-10-CM

## 2022-07-29 LAB
ALBUMIN SERPL-MCNC: 3.4 G/DL (ref 3.5–5.2)
ALP BLD-CCNC: 103 U/L (ref 40–129)
ALT SERPL-CCNC: 14 U/L (ref 0–40)
ANION GAP SERPL CALCULATED.3IONS-SCNC: 14 MMOL/L (ref 7–16)
ANISOCYTOSIS: ABNORMAL
AST SERPL-CCNC: 24 U/L (ref 0–39)
BASOPHILS ABSOLUTE: 0.01 E9/L (ref 0–0.2)
BASOPHILS RELATIVE PERCENT: 0.3 % (ref 0–2)
BILIRUB SERPL-MCNC: 0.3 MG/DL (ref 0–1.2)
BUN BLDV-MCNC: 26 MG/DL (ref 6–23)
CALCIUM SERPL-MCNC: 8.9 MG/DL (ref 8.6–10.2)
CHLORIDE BLD-SCNC: 107 MMOL/L (ref 98–107)
CO2: 20 MMOL/L (ref 22–29)
CREAT SERPL-MCNC: 1 MG/DL (ref 0.7–1.2)
EOSINOPHILS ABSOLUTE: 0.01 E9/L (ref 0.05–0.5)
EOSINOPHILS RELATIVE PERCENT: 0.3 % (ref 0–6)
GFR AFRICAN AMERICAN: >60
GFR NON-AFRICAN AMERICAN: >60 ML/MIN/1.73
GLUCOSE BLD-MCNC: 172 MG/DL (ref 74–99)
HCT VFR BLD CALC: 32.4 % (ref 37–54)
HEMOGLOBIN: 10.4 G/DL (ref 12.5–16.5)
IMMATURE GRANULOCYTES #: 0.02 E9/L
IMMATURE GRANULOCYTES %: 0.6 % (ref 0–5)
LYMPHOCYTES ABSOLUTE: 0.51 E9/L (ref 1.5–4)
LYMPHOCYTES RELATIVE PERCENT: 14.5 % (ref 20–42)
MCH RBC QN AUTO: 31 PG (ref 26–35)
MCHC RBC AUTO-ENTMCNC: 32.1 % (ref 32–34.5)
MCV RBC AUTO: 96.4 FL (ref 80–99.9)
MONOCYTES ABSOLUTE: 0.3 E9/L (ref 0.1–0.95)
MONOCYTES RELATIVE PERCENT: 8.5 % (ref 2–12)
NEUTROPHILS ABSOLUTE: 2.67 E9/L (ref 1.8–7.3)
NEUTROPHILS RELATIVE PERCENT: 75.8 % (ref 43–80)
OVALOCYTES: ABNORMAL
PDW BLD-RTO: 16 FL (ref 11.5–15)
PLATELET # BLD: 167 E9/L (ref 130–450)
PMV BLD AUTO: 10.2 FL (ref 7–12)
POIKILOCYTES: ABNORMAL
POTASSIUM SERPL-SCNC: 4.3 MMOL/L (ref 3.5–5)
PROSTATE SPECIFIC ANTIGEN: 2.66 NG/ML (ref 0–4)
RBC # BLD: 3.36 E12/L (ref 3.8–5.8)
SODIUM BLD-SCNC: 141 MMOL/L (ref 132–146)
TEAR DROP CELLS: ABNORMAL
TOTAL PROTEIN: 6.5 G/DL (ref 6.4–8.3)
WBC # BLD: 3.5 E9/L (ref 4.5–11.5)

## 2022-07-29 PROCEDURE — G8417 CALC BMI ABV UP PARAM F/U: HCPCS | Performed by: INTERNAL MEDICINE

## 2022-07-29 PROCEDURE — 1123F ACP DISCUSS/DSCN MKR DOCD: CPT | Performed by: INTERNAL MEDICINE

## 2022-07-29 PROCEDURE — 84153 ASSAY OF PSA TOTAL: CPT

## 2022-07-29 PROCEDURE — 99213 OFFICE O/P EST LOW 20 MIN: CPT

## 2022-07-29 PROCEDURE — 36415 COLL VENOUS BLD VENIPUNCTURE: CPT

## 2022-07-29 PROCEDURE — 96372 THER/PROPH/DIAG INJ SC/IM: CPT

## 2022-07-29 PROCEDURE — G8427 DOCREV CUR MEDS BY ELIG CLIN: HCPCS | Performed by: INTERNAL MEDICINE

## 2022-07-29 PROCEDURE — 80053 COMPREHEN METABOLIC PANEL: CPT

## 2022-07-29 PROCEDURE — 85025 COMPLETE CBC W/AUTO DIFF WBC: CPT

## 2022-07-29 PROCEDURE — 99215 OFFICE O/P EST HI 40 MIN: CPT | Performed by: INTERNAL MEDICINE

## 2022-07-29 PROCEDURE — 3017F COLORECTAL CA SCREEN DOC REV: CPT | Performed by: INTERNAL MEDICINE

## 2022-07-29 PROCEDURE — 1036F TOBACCO NON-USER: CPT | Performed by: INTERNAL MEDICINE

## 2022-07-29 PROCEDURE — 6360000002 HC RX W HCPCS: Performed by: INTERNAL MEDICINE

## 2022-07-29 RX ORDER — SODIUM CHLORIDE 9 MG/ML
INJECTION, SOLUTION INTRAVENOUS CONTINUOUS
Status: CANCELLED | OUTPATIENT
Start: 2022-07-29

## 2022-07-29 RX ORDER — ACETAMINOPHEN 325 MG/1
650 TABLET ORAL
Status: CANCELLED | OUTPATIENT
Start: 2022-07-29

## 2022-07-29 RX ORDER — DIPHENHYDRAMINE HYDROCHLORIDE 50 MG/ML
50 INJECTION INTRAMUSCULAR; INTRAVENOUS
Status: CANCELLED | OUTPATIENT
Start: 2022-07-29

## 2022-07-29 RX ORDER — EPINEPHRINE 1 MG/ML
0.3 INJECTION, SOLUTION, CONCENTRATE INTRAVENOUS PRN
Status: CANCELLED | OUTPATIENT
Start: 2022-07-29

## 2022-07-29 RX ORDER — MEPERIDINE HYDROCHLORIDE 50 MG/ML
12.5 INJECTION INTRAMUSCULAR; INTRAVENOUS; SUBCUTANEOUS PRN
Status: CANCELLED | OUTPATIENT
Start: 2022-07-29

## 2022-07-29 RX ORDER — FAMOTIDINE 10 MG/ML
20 INJECTION, SOLUTION INTRAVENOUS
Status: CANCELLED | OUTPATIENT
Start: 2022-07-29

## 2022-07-29 RX ORDER — ALBUTEROL SULFATE 90 UG/1
4 AEROSOL, METERED RESPIRATORY (INHALATION) PRN
Status: CANCELLED | OUTPATIENT
Start: 2022-07-29

## 2022-07-29 RX ORDER — ONDANSETRON 2 MG/ML
8 INJECTION INTRAMUSCULAR; INTRAVENOUS
Status: CANCELLED | OUTPATIENT
Start: 2022-07-29

## 2022-07-29 RX ADMIN — DENOSUMAB 120 MG: 120 INJECTION SUBCUTANEOUS at 16:01

## 2022-07-29 NOTE — PROGRESS NOTES
Spoke with Beti Major in  [facility] regarding adding on a PSA to a CMP specimen drawn earlier today. Orders confirmed and she states she will process the add on.

## 2022-07-29 NOTE — PROGRESS NOTES
Department of [facility]  Attending Clinic Note    Reason for Visit: Follow-up on a patient with Prostate Cancer    PCP:  Tyree Dumont MD    History of Present Illness:  72 y/o male with High risk Prostate Cancer post XRT completion. On 01/12/2022, he completed 7920 cGy in 44 fractions directed to the prostate + proximal SV    Dr. Costa Necessary for Urology at [facility], most recent visit 02/01/2022. The patient received Lupron injection #2 at this visit (planned for 36 months ADT)    The patient reports hospitalized at [facility] after ICD fired 02/18/2022. Admitted with ventricular tachyarrhythmia, taken for emergent VT ablation 02/18/2022 and ICD generator change 02/24/2022. Sustained VT post ablation, due to suspected sarcoidosis. Started on daily steroid. The patient was discharged 02/25/2022    Further review of outside records revealed a 1.6 cm ovoid faintly sclerotic lesion in the right humeral neck redemonstrated and indeterminate on XR Shoulder 2 view Right 12/14/2021 at [facility]    Referred to our clinic for further evaluation and treatment    PSA 20.86 on 03/18/2022  Testosterone <2.5 on 03/18/2022    NM Bone scan 03/30/2022 reviewed extensively with Dr. Akua Blake from Radiology team; there is activity within the lateral aspect of the right humeral head which corresponds to the sclerotic lesion seen on CT humerus consistent with osseous metastatic disease    CT chest/abdomen/pelvis 03/30/2022 Sclerotic lesions in the right humeral head and the right 2nd rib suspicious for developing bone metastasis. Sclerotic lesions in the lumbar spine L2 and L3 as noted concerning for bone metastasis. CT right humerus 03/30/2022 14 x 12 x 10 mm sclerotic focus in the proximal humerus compatible with osteoblastic metastasis. Metastatic Prostate Cancer to Bone  Recommended XTANDI. Side effects reviewed. He agreed to proceed.    D/C Casodex 04/06/2022  Cathlyn Bhupendra was started on 04/07/2022 with fair tolerance so far. Palliative RT to right proximal humerus was completed on 07/05/2022. Today 07/29/2022; he continues to tolerate XTANDI well. Fair appetite and energy level. No fever, chills. No N.V.     Review of Systems;  CONSTITUTIONAL: No fever, chills. Fair appetite and energy level. ENMT: Eyes: No diplopia; Nose: No epistaxis. Mouth: No sore throat. RESPIRATORY: No hemoptysis, shortness of breath, cough. CARDIOVASCULAR: No chest pain  GASTROINTESTINAL: No nausea/vomiting, abdominal pain  GENITOURINARY: No hematuria  NEURO: No syncope, presyncope or headache. Remainder:  ROS NEGATIVE    Past Medical History:      Diagnosis Date    Anxiety     Aorto-iliac atherosclerosis ([facility] [address] 8/31/2020    Arthritis     Atrial fibrillation (HCC)     AVNRT (AV teddy re-entry tachycardia) ([facility] [address]     Blood type O+ 11/14/2019    CAD (coronary artery disease)     Cancer (HCC)     CHF (congestive heart failure) (HCC)     Diabetes mellitus ([facility] [address]     Diabetic neuropathy (HCC)     Diastolic dysfunction 37/46/9683    stage 3    Fall     right knee    Gout     chemically induced    History of blood transfusion     Hx of blood clots 1976    left leg    Hyperlipidemia     Hypertension     LBBB (left bundle branch block)     Left ventricular assist device (LVAD) complication 6457    Moderate mitral regurgitation 04/19/2016    Nonischemic cardiomyopathy (HCC)     Obesity     SNEHA (obstructive sleep apnea)     treated with CPAP    PVD (peripheral vascular disease) with claudication ([facility] [address] 8/31/2020    Sarcoidosis 2019    Severe tricuspid regurgitation 04/19/2016    SVT (supraventricular tachycardia) (HCC)     Viral cardiomyopathy (HCC)      Medications:  Reviewed and reconciled. Allergies:   Allergies   Allergen Reactions    Food Hives     Strawberries      Chlorhexidine Rash    Soap & Cleansers Rash     TIDE detergent    Strawberry (Diagnostic) Rash     Other reaction(s): Eruption, ITCHING,WATERING EYES     Physical Exam:  Pulse 55 Temp 98.3 °F (36.8 °C)   Ht 5' 11\" (1.803 m)   Wt 202 lb (91.6 kg)   SpO2 99%   BMI 28.17 kg/m²   GENERAL: Alert, oriented x 3, not in acute distress. HEENT: PERRLA; EOMI. Oropharynx clear. LUNGS: CTA Uriel  CVS: RRR  EXTREMITIES: Without clubbing, cyanosis, or edema. NEUROLOGIC: No focal deficits. ECOG PS 2    Lab Results   Component Value Date    PSA 2.66 07/29/2022    PSA 4.07 (H) 07/01/2022    PSA 16.97 (H) 04/15/2022     Impression/Plan:  74 y/o male with High risk prostate cancer post XRT completion. On 01/12/2022, he completed 7920 cGy in 40 fractions directed to the prostate + proximal SV for management of high risk adenocarcinoma of the prostate    Dr. Tomeka Acharya for Urology at [facility], most recent visit 02/01/2022. The patient received Lupron injection #2 at this visit (planned for 36 months ADT)    Further review of outside records revealed a 1.6 cm ovoid faintly sclerotic lesion in the right humeral neck redemonstrated and indeterminate on XR Shoulder 2 view Right 12/14/2021 at [facility]    Referred to our clinic for further evaluation and treatment    PSA 20.86 on 03/18/2022  Testosterone <2.5 on 03/18/2022    NM Bone scan 03/30/2022 reviewed extensively with Dr. Concepcion Arvizu from Radiology team; there is activity within the lateral aspect of the right humeral head which corresponds to the sclerotic lesion seen on CT humerus consistent with osseous metastatic disease    CT chest/abdomen/pelvis 03/30/2022 Sclerotic lesions in the right humeral head and the right 2nd rib suspicious for developing bone metastasis. Sclerotic lesions in the lumbar spine L2 and L3 as noted concerning for bone metastasis. CT right humerus 03/30/2022 14 x 12 x 10 mm sclerotic focus in the proximal humerus compatible with osteoblastic metastasis. Metastatic Prostate Cancer to Bone  Recommended XTANDI. Side effects reviewed. He agreed to proceed.   Casodex D/C on 04/06/2022  Michoacano Mitchell was started on 04/07/2022 with fair tolerance so far. Palliative RT to right proximal humerus was completed on 07/05/2022. PSA 16.97 on 04/15/2022  Right Humerus X-Ray 06/21/2022 suspected blastic metastatic focus at the proximal humerus which is not appreciably changed since 03/30/2022. Imaging reviewed. PSA 4.07 on 07/01/2022  PSA 2.66 on 07/29/2022.  Labs reviewed  Rona Byrness given bone metastasis (one given today 07/29/2022)  Continue XTANDI    RTC 4 weeks with prior scans (ordered in Epic) and Rona Founds same day    Rickie Orr MD   7/29/2022

## 2022-08-01 ENCOUNTER — PATIENT MESSAGE (OUTPATIENT)
Dept: FAMILY MEDICINE CLINIC | Age: 72
End: 2022-08-01

## 2022-08-02 NOTE — TELEPHONE ENCOUNTER
From: Autumn Paul Sr. To: Dr. Sharon Leong  Sent: 8/1/2022 5:00 PM EDT  Subject: Please complete the paperwork for me    Rick Sainz! I had my wife deliver on my behalf two types of application paperwork for my disability services for the [employer] and for my continued Disability Plates on my car. Please let me know if my wife can  the completed paperwork that she left with one of your . Thank you in advance for assisting me with these two requests. Take care!  [facility]

## 2022-08-18 ENCOUNTER — HOSPITAL ENCOUNTER (OUTPATIENT)
Dept: RADIATION ONCOLOGY | Age: 72
Discharge: HOME | End: 2022-08-18

## 2022-08-18 ENCOUNTER — HOSPITAL ENCOUNTER (OUTPATIENT)
Age: 72
Discharge: HOME | End: 2022-08-18
Payer: COMMERCIAL

## 2022-08-18 DIAGNOSIS — C79.51 SECONDARY CANCER OF BONE (HCC): Primary | ICD-10-CM

## 2022-08-18 LAB
ALBUMIN SERPL-MCNC: 3.3 G/DL (ref 3.5–5.2)
ALP BLD-CCNC: 103 U/L (ref 40–129)
ALT SERPL-CCNC: 12 U/L (ref 0–40)
ANION GAP SERPL CALCULATED.3IONS-SCNC: 10 MMOL/L (ref 7–16)
AST SERPL-CCNC: 20 U/L (ref 0–39)
BILIRUB SERPL-MCNC: 0.3 MG/DL (ref 0–1.2)
BUN BLDV-MCNC: 18 MG/DL (ref 6–23)
CALCIUM SERPL-MCNC: 9 MG/DL (ref 8.6–10.2)
CHLORIDE BLD-SCNC: 101 MMOL/L (ref 98–107)
CO2: 25 MMOL/L (ref 22–29)
CREAT SERPL-MCNC: 1 MG/DL (ref 0.7–1.2)
GFR AFRICAN AMERICAN: >60
GFR NON-AFRICAN AMERICAN: >60 ML/MIN/1.73
GLUCOSE BLD-MCNC: 183 MG/DL (ref 74–99)
INR BLD: 2.1
POTASSIUM SERPL-SCNC: 4.9 MMOL/L (ref 3.5–5)
PROTHROMBIN TIME: 22.1 SEC (ref 9.3–12.4)
SODIUM BLD-SCNC: 136 MMOL/L (ref 132–146)
TOTAL PROTEIN: 6.1 G/DL (ref 6.4–8.3)

## 2022-08-18 PROCEDURE — 99999 PR OFFICE/OUTPT VISIT,PROCEDURE ONLY: CPT | Performed by: NURSE PRACTITIONER

## 2022-08-18 PROCEDURE — 80053 COMPREHEN METABOLIC PANEL: CPT

## 2022-08-18 PROCEDURE — 36415 COLL VENOUS BLD VENIPUNCTURE: CPT

## 2022-08-18 PROCEDURE — 85610 PROTHROMBIN TIME: CPT

## 2022-08-18 NOTE — PROGRESS NOTES
RADIATION ONCOLOGY  6 week follow up       8/18/2022      NAME:  Nicole Beckham Sr. YOB: 1950    Diagnosis:  Secondary cancer of the bone [facility])     Subjective: On 07/05/2022, Nicole Beckham Sr. completed 3000 cGy in 10 fractions directed to the right humerus. The patient seen today in 6  week post XRT completion symptom management check. The patient is accompanied by his wife Sepideh Casas into the exam room. The patient states pain from right humerus bone met is much improved but not completely resolved. The patient denies skin irritation to treatment site. The patient is following with Dr. Maxine Eason for medical oncology. The patient is taking XTANDI. Next appointment 08/26/2022. The patient is known and/or following with multiple physicians at [facility]. Pain: controlled. Past medical, surgical, social and family histories reviewed and updated as indicated.     ALLERGIES:  Food, Chlorhexidine, Soap & cleansers, and Strawberry (diagnostic)         Current Outpatient Medications   Medication Sig Dispense Refill    Enzalutamide (XTANDI) 80 MG TABS Take 160 mg by mouth daily 60 tablet 3    midodrine (PROAMATINE) 10 MG tablet Take 10 mg by mouth      amoxicillin (AMOXIL) 875 MG tablet Take 875 mg by mouth 2 times daily      bacitracin 500 UNIT/GM ophthalmic ointment every 4 hours      enoxaparin (LOVENOX) 100 MG/ML injection Inject into the skin 2 times daily       metoprolol succinate (TOPROL XL) 50 MG extended release tablet Take 50 mg by mouth daily      ondansetron (ZOFRAN-ODT) 4 MG disintegrating tablet Take 4 mg by mouth every 8 hours as needed for Nausea or Vomiting      PREDNISONE PO Take 10 mg by mouth daily       hydrALAZINE (APRESOLINE) 25 MG tablet Take 25 mg by mouth 3 times daily      sulfamethoxazole-trimethoprim (BACTRIM DS;SEPTRA DS) 800-160 MG per tablet Take 1 tablet by mouth three times a week      tamsulosin (FLOMAX) 0.4 MG capsule Take 0.4 mg by mouth daily      ondansetron (ZOFRAN) 4 MG tablet Take 4 mg by mouth every 6 hours as needed for Nausea or Vomiting      triamcinolone (KENALOG) 0.1 % cream daily as needed       valsartan (DIOVAN) 80 MG tablet Take 40 mg by mouth 2 times daily      acetaminophen (TYLENOL) 500 MG tablet Take 1,000 mg by mouth every 8 hours as needed for Pain      mexiletine (MEXITIL) 150 MG capsule Take 150 mg by mouth 3 times daily       Multiple Vitamin (MULTIVITAMINS PO) Take 1 tablet by mouth 4 times daily Geriatric fusion oral chewable      amoxicillin-clavulanate (AUGMENTIN) 875-125 MG per tablet Take 1 tablet by mouth 2 times daily      bacitracin 500 UNIT/GM ointment Apply topically 2 times daily Apply topically 2 times daily. senna (SENOKOT) 8.6 MG tablet Take 1 tablet by mouth nightly       psyllium (KONSYL) 28.3 % PACK Take 1 packet by mouth daily as needed Metamucil sugar free 3.4 gram powder pack      sodium chloride (OCEAN) 0.65 % nasal spray 1 spray by Nasal route 3 times daily as needed for Congestion       aspirin 81 MG EC tablet Take 81 mg by mouth daily      digoxin (LANOXIN) 125 MCG tablet Take 125 mcg by mouth every other day       Magnesium 400 MG CAPS Take 1 tablet by mouth daily       warfarin (COUMADIN) 5 MG tablet Take 5 mg by mouth      pantoprazole (PROTONIX) 40 MG tablet Take 1 tablet by mouth every morning (before breakfast) (Patient taking differently: Take 40 mg by mouth as needed) 90 tablet 3    bumetanide (BUMEX) 2 MG tablet Take 1 tablet by mouth daily (Patient taking differently: Take 1 mg by mouth Take 1 tablet (1 mg total) by mouth daily as needed (fluid retention: weight gain/bloating/shortness of breath). ) 90 tablet 3    allopurinol (ZYLOPRIM) 100 MG tablet Take 1 tablet by mouth daily 30 tablet 3    ergocalciferol (ERGOCALCIFEROL) 1.25 MG (86944 UT) capsule Take 50,000 Units by mouth once a week Thursdays       No current facility-administered medications for this encounter.

## 2022-08-26 ENCOUNTER — OFFICE VISIT (OUTPATIENT)
Dept: ONCOLOGY | Age: 72
End: 2022-08-26
Payer: OTHER GOVERNMENT

## 2022-08-26 ENCOUNTER — HOSPITAL ENCOUNTER (OUTPATIENT)
Dept: INFUSION THERAPY | Age: 72
Discharge: HOME | End: 2022-08-26
Payer: OTHER GOVERNMENT

## 2022-08-26 VITALS
HEART RATE: 56 BPM | BODY MASS INDEX: 28.56 KG/M2 | HEIGHT: 71 IN | OXYGEN SATURATION: 97 % | TEMPERATURE: 97.5 F | WEIGHT: 204 LBS

## 2022-08-26 DIAGNOSIS — C79.51 PROSTATE CANCER METASTATIC TO BONE (HCC): Primary | ICD-10-CM

## 2022-08-26 DIAGNOSIS — C61 PROSTATE CANCER METASTATIC TO BONE (HCC): Primary | ICD-10-CM

## 2022-08-26 LAB
ACANTHOCYTES: ABNORMAL
ANISOCYTOSIS: ABNORMAL
BASOPHILS ABSOLUTE: 0 E9/L (ref 0–0.2)
BASOPHILS RELATIVE PERCENT: 0 % (ref 0–2)
EOSINOPHILS ABSOLUTE: 0 E9/L (ref 0.05–0.5)
EOSINOPHILS RELATIVE PERCENT: 0 % (ref 0–6)
HCT VFR BLD CALC: 32.7 % (ref 37–54)
HEMOGLOBIN: 10.6 G/DL (ref 12.5–16.5)
HYPOCHROMIA: ABNORMAL
LYMPHOCYTES ABSOLUTE: 0.23 E9/L (ref 1.5–4)
LYMPHOCYTES RELATIVE PERCENT: 6 % (ref 20–42)
MCH RBC QN AUTO: 31.3 PG (ref 26–35)
MCHC RBC AUTO-ENTMCNC: 32.4 % (ref 32–34.5)
MCV RBC AUTO: 96.5 FL (ref 80–99.9)
METAMYELOCYTES RELATIVE PERCENT: 1 % (ref 0–1)
MONOCYTES ABSOLUTE: 0.12 E9/L (ref 0.1–0.95)
MONOCYTES RELATIVE PERCENT: 3 % (ref 2–12)
MYELOCYTE PERCENT: 3 % (ref 0–0)
NEUTROPHILS ABSOLUTE: 3.55 E9/L (ref 1.8–7.3)
NEUTROPHILS RELATIVE PERCENT: 87 % (ref 43–80)
NUCLEATED RED BLOOD CELLS: 1 /100 WBC
OVALOCYTES: ABNORMAL
PDW BLD-RTO: 16.5 FL (ref 11.5–15)
PLATELET # BLD: 158 E9/L (ref 130–450)
PMV BLD AUTO: 10.2 FL (ref 7–12)
POIKILOCYTES: ABNORMAL
POLYCHROMASIA: ABNORMAL
PROSTATE SPECIFIC ANTIGEN: 3.1 NG/ML (ref 0–4)
RBC # BLD: 3.39 E12/L (ref 3.8–5.8)
SCHISTOCYTES: ABNORMAL
STOMATOCYTES: ABNORMAL
WBC # BLD: 3.9 E9/L (ref 4.5–11.5)

## 2022-08-26 PROCEDURE — G8417 CALC BMI ABV UP PARAM F/U: HCPCS | Performed by: INTERNAL MEDICINE

## 2022-08-26 PROCEDURE — 36415 COLL VENOUS BLD VENIPUNCTURE: CPT

## 2022-08-26 PROCEDURE — 6360000002 HC RX W HCPCS: Performed by: INTERNAL MEDICINE

## 2022-08-26 PROCEDURE — 85025 COMPLETE CBC W/AUTO DIFF WBC: CPT

## 2022-08-26 PROCEDURE — 96372 THER/PROPH/DIAG INJ SC/IM: CPT

## 2022-08-26 PROCEDURE — 99214 OFFICE O/P EST MOD 30 MIN: CPT | Performed by: INTERNAL MEDICINE

## 2022-08-26 PROCEDURE — 3017F COLORECTAL CA SCREEN DOC REV: CPT | Performed by: INTERNAL MEDICINE

## 2022-08-26 PROCEDURE — 1123F ACP DISCUSS/DSCN MKR DOCD: CPT | Performed by: INTERNAL MEDICINE

## 2022-08-26 PROCEDURE — G8427 DOCREV CUR MEDS BY ELIG CLIN: HCPCS | Performed by: INTERNAL MEDICINE

## 2022-08-26 PROCEDURE — 99213 OFFICE O/P EST LOW 20 MIN: CPT

## 2022-08-26 PROCEDURE — 1036F TOBACCO NON-USER: CPT | Performed by: INTERNAL MEDICINE

## 2022-08-26 PROCEDURE — 84153 ASSAY OF PSA TOTAL: CPT

## 2022-08-26 RX ORDER — ONDANSETRON 2 MG/ML
8 INJECTION INTRAMUSCULAR; INTRAVENOUS
Status: CANCELLED | OUTPATIENT
Start: 2022-08-26

## 2022-08-26 RX ORDER — DIPHENHYDRAMINE HYDROCHLORIDE 50 MG/ML
50 INJECTION INTRAMUSCULAR; INTRAVENOUS
Status: CANCELLED | OUTPATIENT
Start: 2022-08-26

## 2022-08-26 RX ORDER — ACETAMINOPHEN 325 MG/1
650 TABLET ORAL
Status: CANCELLED | OUTPATIENT
Start: 2022-08-26

## 2022-08-26 RX ORDER — FAMOTIDINE 10 MG/ML
20 INJECTION, SOLUTION INTRAVENOUS
Status: CANCELLED | OUTPATIENT
Start: 2022-08-26

## 2022-08-26 RX ORDER — SODIUM CHLORIDE 9 MG/ML
INJECTION, SOLUTION INTRAVENOUS CONTINUOUS
Status: CANCELLED | OUTPATIENT
Start: 2022-08-26

## 2022-08-26 RX ORDER — EPINEPHRINE 1 MG/ML
0.3 INJECTION, SOLUTION, CONCENTRATE INTRAVENOUS PRN
Status: CANCELLED | OUTPATIENT
Start: 2022-08-26

## 2022-08-26 RX ORDER — MEPERIDINE HYDROCHLORIDE 50 MG/ML
12.5 INJECTION INTRAMUSCULAR; INTRAVENOUS; SUBCUTANEOUS PRN
Status: CANCELLED | OUTPATIENT
Start: 2022-08-26

## 2022-08-26 RX ORDER — ALBUTEROL SULFATE 90 UG/1
4 AEROSOL, METERED RESPIRATORY (INHALATION) PRN
Status: CANCELLED | OUTPATIENT
Start: 2022-08-26

## 2022-08-26 RX ADMIN — DENOSUMAB 120 MG: 120 INJECTION SUBCUTANEOUS at 15:41

## 2022-08-26 NOTE — PROGRESS NOTES
Department of [facility]  Attending Clinic Note    Reason for Visit: Follow-up on a patient with Prostate Cancer    PCP:  Luiz Tobias MD    History of Present Illness:  72 y/o male with High risk Prostate Cancer post XRT completion. On 01/12/2022, he completed 7920 cGy in 44 fractions directed to the prostate + proximal SV    Dr. Nicholas Copeland for Urology at [facility], most recent visit 02/01/2022. The patient received Lupron injection #2 at this visit (planned for 36 months ADT)    The patient reports hospitalized at [facility] after ICD fired 02/18/2022. Admitted with ventricular tachyarrhythmia, taken for emergent VT ablation 02/18/2022 and ICD generator change 02/24/2022. Sustained VT post ablation, due to suspected sarcoidosis. Started on daily steroid. The patient was discharged 02/25/2022    Further review of outside records revealed a 1.6 cm ovoid faintly sclerotic lesion in the right humeral neck redemonstrated and indeterminate on XR Shoulder 2 view Right 12/14/2021 at [facility]    Referred to our clinic for further evaluation and treatment    PSA 20.86 on 03/18/2022  Testosterone <2.5 on 03/18/2022    NM Bone scan 03/30/2022 reviewed extensively with Dr. Alyx Barahona from Radiology team; there is activity within the lateral aspect of the right humeral head which corresponds to the sclerotic lesion seen on CT humerus consistent with osseous metastatic disease    CT chest/abdomen/pelvis 03/30/2022 Sclerotic lesions in the right humeral head and the right 2nd rib suspicious for developing bone metastasis. Sclerotic lesions in the lumbar spine L2 and L3 as noted concerning for bone metastasis. CT right humerus 03/30/2022 14 x 12 x 10 mm sclerotic focus in the proximal humerus compatible with osteoblastic metastasis. Metastatic Prostate Cancer to Bone  Recommended XTANDI. Side effects reviewed. He agreed to proceed.    D/C Casodex 04/06/2022  Felicia Mcconnell was started on 04/07/2022 with fair tolerance so far. Palliative RT to right proximal humerus was completed on 07/05/2022. Today 08/26/2022; he continues to tolerate XTANDI well. Fair appetite and energy level. No fever, chills. No N.V.     Review of Systems;  CONSTITUTIONAL: No fever, chills. Fair appetite and energy level. ENMT: Eyes: No diplopia; Nose: No epistaxis. Mouth: No sore throat. RESPIRATORY: No hemoptysis, shortness of breath, cough. CARDIOVASCULAR: No chest pain  GASTROINTESTINAL: No nausea/vomiting, abdominal pain  GENITOURINARY: No hematuria  NEURO: No syncope, presyncope or headache. Remainder:  ROS NEGATIVE    Past Medical History:      Diagnosis Date    Anxiety     Aorto-iliac atherosclerosis ([facility] 8/31/2020    Arthritis     Atrial fibrillation (HCC)     AVNRT (AV teddy re-entry tachycardia) ([facility]     Blood type O+ 11/14/2019    CAD (coronary artery disease)     Cancer (HCC)     CHF (congestive heart failure) (HCC)     Diabetes mellitus ([facility] [address]     Diabetic neuropathy (HCC)     Diastolic dysfunction 27/67/8233    stage 3    Fall     right knee    Gout     chemically induced    History of blood transfusion     Hx of blood clots 1976    left leg    Hyperlipidemia     Hypertension     LBBB (left bundle branch block)     Left ventricular assist device (LVAD) complication 4130    Moderate mitral regurgitation 04/19/2016    Nonischemic cardiomyopathy (HCC)     Obesity     SNEHA (obstructive sleep apnea)     treated with CPAP    PVD (peripheral vascular disease) with claudication ([facility] [address] 8/31/2020    Sarcoidosis 2019    Severe tricuspid regurgitation 04/19/2016    SVT (supraventricular tachycardia) (HCC)     Viral cardiomyopathy (HCC)      Medications:  Reviewed and reconciled. Allergies:   Allergies   Allergen Reactions    Food Hives     Strawberries      Chlorhexidine Rash    Soap & Cleansers Rash     TIDE detergent    Strawberry (Diagnostic) Rash     Other reaction(s): Eruption, ITCHING,WATERING EYES     Physical Exam:  Pulse 56 to the sclerotic lesion seen on CT humerus consistent with osseous metastatic disease    CT chest/abdomen/pelvis 03/30/2022 Sclerotic lesions in the right humeral head and the right 2nd rib suspicious for developing bone metastasis. Sclerotic lesions in the lumbar spine L2 and L3 as noted concerning for bone metastasis. CT right humerus 03/30/2022 14 x 12 x 10 mm sclerotic focus in the proximal humerus compatible with osteoblastic metastasis. Metastatic Prostate Cancer to Bone  Recommended XTANDI. Side effects reviewed. He agreed to proceed. Casodex D/C on 04/06/2022  Damion Bowen was started on 04/07/2022 with fair tolerance so far. Palliative RT to right proximal humerus was completed on 07/05/2022. PSA 16.97 on 04/15/2022  Right Humerus X-Ray 06/21/2022 suspected blastic metastatic focus at the proximal humerus which is not appreciably changed since 03/30/2022. Imaging reviewed. PSA 4.07 on 07/01/2022  PSA 2.66 on 07/29/2022.    PSA pending 08/26/2022  Veronika Tellez given bone metastasis (one given today 08/26/2022)  Continue XTANDI    RTC 4 weeks with prior scans (ordered in Epic and scheduled on 08/31/2022) and Veronika Tellez same day    Tess Ibarra MD   8/26/2022

## 2022-08-31 ENCOUNTER — HOSPITAL ENCOUNTER (OUTPATIENT)
Dept: NUCLEAR MEDICINE | Age: 72
Discharge: HOME | End: 2022-08-31
Payer: COMMERCIAL

## 2022-08-31 ENCOUNTER — HOSPITAL ENCOUNTER (OUTPATIENT)
Age: 72
Discharge: HOME | End: 2022-08-31
Payer: COMMERCIAL

## 2022-08-31 ENCOUNTER — HOSPITAL ENCOUNTER (OUTPATIENT)
Dept: CT IMAGING | Age: 72
Discharge: HOME | End: 2022-09-02
Payer: COMMERCIAL

## 2022-08-31 DIAGNOSIS — C79.51 PROSTATE CANCER METASTATIC TO BONE (HCC): ICD-10-CM

## 2022-08-31 DIAGNOSIS — C61 PROSTATE CANCER METASTATIC TO BONE (HCC): ICD-10-CM

## 2022-08-31 LAB
INR BLD: 2.3
PROTHROMBIN TIME: 25.5 SEC (ref 9.3–12.4)

## 2022-08-31 PROCEDURE — 71260 CT THORAX DX C+: CPT

## 2022-08-31 PROCEDURE — 36415 COLL VENOUS BLD VENIPUNCTURE: CPT

## 2022-08-31 PROCEDURE — 78306 BONE IMAGING WHOLE BODY: CPT | Performed by: INTERNAL MEDICINE

## 2022-08-31 PROCEDURE — 74177 CT ABD & PELVIS W/CONTRAST: CPT

## 2022-08-31 PROCEDURE — 6360000004 HC RX CONTRAST MEDICATION: Performed by: RADIOLOGY

## 2022-08-31 PROCEDURE — 85610 PROTHROMBIN TIME: CPT

## 2022-08-31 PROCEDURE — 3430000000 HC RX DIAGNOSTIC RADIOPHARMACEUTICAL: Performed by: RADIOLOGY

## 2022-08-31 PROCEDURE — A9503 TC99M MEDRONATE: HCPCS | Performed by: RADIOLOGY

## 2022-08-31 RX ORDER — TC 99M MEDRONATE 20 MG/10ML
25 INJECTION, POWDER, LYOPHILIZED, FOR SOLUTION INTRAVENOUS
Status: COMPLETED | OUTPATIENT
Start: 2022-08-31 | End: 2022-08-31

## 2022-08-31 RX ADMIN — IOHEXOL 50 ML: 240 INJECTION, SOLUTION INTRATHECAL; INTRAVASCULAR; INTRAVENOUS; ORAL at 10:20

## 2022-08-31 RX ADMIN — IOPAMIDOL 75 ML: 755 INJECTION, SOLUTION INTRAVENOUS at 10:20

## 2022-08-31 RX ADMIN — TC 99M MEDRONATE 25 MILLICURIE: 20 INJECTION, POWDER, LYOPHILIZED, FOR SOLUTION INTRAVENOUS at 09:00

## 2022-09-17 ENCOUNTER — HOSPITAL ENCOUNTER (OUTPATIENT)
Age: 72
Discharge: HOME | End: 2022-09-17
Payer: COMMERCIAL

## 2022-09-17 DIAGNOSIS — C79.51 PROSTATE CANCER METASTATIC TO BONE (HCC): ICD-10-CM

## 2022-09-17 DIAGNOSIS — C61 PROSTATE CANCER METASTATIC TO BONE (HCC): ICD-10-CM

## 2022-09-17 LAB
ALBUMIN SERPL-MCNC: 3.8 G/DL (ref 3.5–5.2)
ALP BLD-CCNC: 85 U/L (ref 40–129)
ALT SERPL-CCNC: 14 U/L (ref 0–40)
ANION GAP SERPL CALCULATED.3IONS-SCNC: 9 MMOL/L (ref 7–16)
AST SERPL-CCNC: 25 U/L (ref 0–39)
BILIRUB SERPL-MCNC: 0.5 MG/DL (ref 0–1.2)
BUN BLDV-MCNC: 23 MG/DL (ref 6–23)
CALCIUM SERPL-MCNC: 8.9 MG/DL (ref 8.6–10.2)
CHLORIDE BLD-SCNC: 105 MMOL/L (ref 98–107)
CO2: 26 MMOL/L (ref 22–29)
CREAT SERPL-MCNC: 0.9 MG/DL (ref 0.7–1.2)
GFR AFRICAN AMERICAN: >60
GFR NON-AFRICAN AMERICAN: >60 ML/MIN/1.73
GLUCOSE BLD-MCNC: 173 MG/DL (ref 74–99)
INR BLD: 2.2
POTASSIUM SERPL-SCNC: 4.3 MMOL/L (ref 3.5–5)
PROTHROMBIN TIME: 23.2 SEC (ref 9.3–12.4)
SODIUM BLD-SCNC: 140 MMOL/L (ref 132–146)
TOTAL PROTEIN: 6.9 G/DL (ref 6.4–8.3)

## 2022-09-17 PROCEDURE — 85610 PROTHROMBIN TIME: CPT

## 2022-09-17 PROCEDURE — 80053 COMPREHEN METABOLIC PANEL: CPT

## 2022-09-17 PROCEDURE — 36415 COLL VENOUS BLD VENIPUNCTURE: CPT

## 2022-09-23 ENCOUNTER — OFFICE VISIT (OUTPATIENT)
Dept: ONCOLOGY | Age: 72
End: 2022-09-23
Payer: OTHER GOVERNMENT

## 2022-09-23 ENCOUNTER — HOSPITAL ENCOUNTER (OUTPATIENT)
Dept: INFUSION THERAPY | Age: 72
Discharge: HOME | End: 2022-09-23
Payer: OTHER GOVERNMENT

## 2022-09-23 VITALS
HEART RATE: 86 BPM | OXYGEN SATURATION: 93 % | WEIGHT: 182 LBS | HEIGHT: 71 IN | BODY MASS INDEX: 25.48 KG/M2 | TEMPERATURE: 97.2 F

## 2022-09-23 DIAGNOSIS — C79.51 PROSTATE CANCER METASTATIC TO BONE (HCC): Primary | ICD-10-CM

## 2022-09-23 DIAGNOSIS — C61 PROSTATE CANCER METASTATIC TO BONE (HCC): Primary | ICD-10-CM

## 2022-09-23 PROCEDURE — 6360000002 HC RX W HCPCS: Performed by: INTERNAL MEDICINE

## 2022-09-23 PROCEDURE — G8427 DOCREV CUR MEDS BY ELIG CLIN: HCPCS | Performed by: INTERNAL MEDICINE

## 2022-09-23 PROCEDURE — G8417 CALC BMI ABV UP PARAM F/U: HCPCS | Performed by: INTERNAL MEDICINE

## 2022-09-23 PROCEDURE — 99215 OFFICE O/P EST HI 40 MIN: CPT | Performed by: INTERNAL MEDICINE

## 2022-09-23 PROCEDURE — 3017F COLORECTAL CA SCREEN DOC REV: CPT | Performed by: INTERNAL MEDICINE

## 2022-09-23 PROCEDURE — 1036F TOBACCO NON-USER: CPT | Performed by: INTERNAL MEDICINE

## 2022-09-23 PROCEDURE — 96372 THER/PROPH/DIAG INJ SC/IM: CPT

## 2022-09-23 PROCEDURE — 1123F ACP DISCUSS/DSCN MKR DOCD: CPT | Performed by: INTERNAL MEDICINE

## 2022-09-23 PROCEDURE — 99212 OFFICE O/P EST SF 10 MIN: CPT

## 2022-09-23 RX ORDER — SODIUM CHLORIDE 9 MG/ML
INJECTION, SOLUTION INTRAVENOUS CONTINUOUS
Status: CANCELLED | OUTPATIENT
Start: 2022-09-23

## 2022-09-23 RX ORDER — ONDANSETRON 2 MG/ML
8 INJECTION INTRAMUSCULAR; INTRAVENOUS
Status: CANCELLED | OUTPATIENT
Start: 2022-09-23

## 2022-09-23 RX ORDER — ALBUTEROL SULFATE 90 UG/1
4 AEROSOL, METERED RESPIRATORY (INHALATION) PRN
Status: CANCELLED | OUTPATIENT
Start: 2022-09-23

## 2022-09-23 RX ORDER — MEPERIDINE HYDROCHLORIDE 50 MG/ML
12.5 INJECTION INTRAMUSCULAR; INTRAVENOUS; SUBCUTANEOUS PRN
Status: CANCELLED | OUTPATIENT
Start: 2022-09-23

## 2022-09-23 RX ORDER — PREGABALIN 50 MG/1
50 CAPSULE ORAL
COMMUNITY
Start: 2022-08-16

## 2022-09-23 RX ORDER — FAMOTIDINE 10 MG/ML
20 INJECTION, SOLUTION INTRAVENOUS
Status: CANCELLED | OUTPATIENT
Start: 2022-09-23

## 2022-09-23 RX ORDER — ACETAMINOPHEN 325 MG/1
650 TABLET ORAL
Status: CANCELLED | OUTPATIENT
Start: 2022-09-23

## 2022-09-23 RX ORDER — AMIODARONE HYDROCHLORIDE 200 MG/1
200 TABLET ORAL
COMMUNITY
Start: 2022-08-05

## 2022-09-23 RX ORDER — EPINEPHRINE 1 MG/ML
0.3 INJECTION, SOLUTION, CONCENTRATE INTRAVENOUS PRN
Status: CANCELLED | OUTPATIENT
Start: 2022-09-23

## 2022-09-23 RX ORDER — DIPHENHYDRAMINE HYDROCHLORIDE 50 MG/ML
50 INJECTION INTRAMUSCULAR; INTRAVENOUS
Status: CANCELLED | OUTPATIENT
Start: 2022-09-23

## 2022-09-23 RX ADMIN — DENOSUMAB 120 MG: 120 INJECTION SUBCUTANEOUS at 15:18

## 2022-09-23 NOTE — PROGRESS NOTES
Department of [facility]  Attending Clinic Note    Reason for Visit: Follow-up on a patient with Prostate Cancer    PCP:  Suzi Sky MD    History of Present Illness:  82 y/o male with High risk Prostate Cancer post XRT completion. On 01/12/2022, he completed 7920 cGy in 44 fractions directed to the prostate + proximal SV    Dr. Huan Valderrama for Urology at [facility], most recent visit 02/01/2022. The patient received Lupron injection #2 at this visit (planned for 36 months ADT)    The patient reports hospitalized at [facility] after ICD fired 02/18/2022. Admitted with ventricular tachyarrhythmia, taken for emergent VT ablation 02/18/2022 and ICD generator change 02/24/2022. Sustained VT post ablation, due to suspected sarcoidosis. Started on daily steroid. The patient was discharged 02/25/2022    Further review of outside records revealed a 1.6 cm ovoid faintly sclerotic lesion in the right humeral neck redemonstrated and indeterminate on XR Shoulder 2 view Right 12/14/2021 at [facility]    Referred to our clinic for further evaluation and treatment    PSA 20.86 on 03/18/2022  Testosterone <2.5 on 03/18/2022    NM Bone scan 03/30/2022 reviewed extensively with Dr. Alber Katz from Radiology team; there is activity within the lateral aspect of the right humeral head which corresponds to the sclerotic lesion seen on CT humerus consistent with osseous metastatic disease    CT chest/abdomen/pelvis 03/30/2022 Sclerotic lesions in the right humeral head and the right 2nd rib suspicious for developing bone metastasis. Sclerotic lesions in the lumbar spine L2 and L3 as noted concerning for bone metastasis. CT right humerus 03/30/2022 14 x 12 x 10 mm sclerotic focus in the proximal humerus compatible with osteoblastic metastasis. Metastatic Prostate Cancer to Bone  Recommended XTANDI. Side effects reviewed. He agreed to proceed.    D/C Casodex 04/06/2022  Toni China was started on 04/07/2022 with fair tolerance so far. Palliative RT to right proximal humerus was completed on 07/05/2022. Today 09/23/2022: He continues to tolerate XTANDI well. No fever chills. Fair appetite and energy level. No nausea vomiting. No diarrhea    Review of Systems;  CONSTITUTIONAL: No fever, chills. Fair appetite and energy level. ENMT: Eyes: No diplopia; Nose: No epistaxis. Mouth: No sore throat. RESPIRATORY: No hemoptysis, shortness of breath, cough. CARDIOVASCULAR: No chest pain  GASTROINTESTINAL: No nausea/vomiting, abdominal pain  GENITOURINARY: No hematuria  NEURO: No syncope, presyncope or headache. Remainder:  ROS NEGATIVE    Past Medical History:      Diagnosis Date    Anxiety     Aorto-iliac atherosclerosis ([facility] 8/31/2020    Arthritis     Atrial fibrillation (HCC)     AVNRT (AV teddy re-entry tachycardia) ([facility]     Blood type O+ 11/14/2019    CAD (coronary artery disease)     Cancer (HCC)     CHF (congestive heart failure) (HCC)     Diabetes mellitus ([facility]     Diabetic neuropathy (HCC)     Diastolic dysfunction 90/65/8082    stage 3    Fall     right knee    Gout     chemically induced    History of blood transfusion     Hx of blood clots 1976    left leg    Hyperlipidemia     Hypertension     LBBB (left bundle branch block)     Left ventricular assist device (LVAD) complication 2775    Moderate mitral regurgitation 04/19/2016    Nonischemic cardiomyopathy (HCC)     Obesity     SNEHA (obstructive sleep apnea)     treated with CPAP    PVD (peripheral vascular disease) with claudication ([facility] 8/31/2020    Sarcoidosis 2019    Severe tricuspid regurgitation 04/19/2016    SVT (supraventricular tachycardia) (HCC)     Viral cardiomyopathy (HCC)      Medications:  Reviewed and reconciled. Allergies:   Allergies   Allergen Reactions    Food Hives     Strawberries      Chlorhexidine Rash    Soap & Cleansers Rash     TIDE detergent    Strawberry (Diagnostic) Rash     Other reaction(s): Eruption, ITCHING,WATERING EYES Physical Exam:  Pulse 86   Temp 97.2 °F (36.2 °C)   Ht 5' 11\" (1.803 m)   Wt 182 lb (82.6 kg)   SpO2 93%   BMI 25.38 kg/m²   GENERAL: Alert, oriented x 3, not in acute distress. HEENT: PERRLA; EOMI. Oropharynx clear. LUNGS: CTA Uriel  CVS: RRR  EXTREMITIES: Without clubbing, cyanosis, or edema. NEUROLOGIC: No focal deficits. ECOG PS 2    Lab Results   Component Value Date    WBC 3.9 (L) 08/26/2022    HGB 10.6 (L) 08/26/2022    HCT 32.7 (L) 08/26/2022    MCV 96.5 08/26/2022     08/26/2022     Lab Results   Component Value Date     09/17/2022    K 4.3 09/17/2022     09/17/2022    CO2 26 09/17/2022    BUN 23 09/17/2022    CREATININE 0.9 09/17/2022    GLUCOSE 173 (H) 09/17/2022    CALCIUM 8.9 09/17/2022    PROT 6.9 09/17/2022    LABALBU 3.8 09/17/2022    BILITOT 0.5 09/17/2022    ALKPHOS 85 09/17/2022    AST 25 09/17/2022    ALT 14 09/17/2022    LABGLOM >60 09/17/2022    GFRAA >60 09/17/2022     Lab Results   Component Value Date    PSA 3.10 08/26/2022    PSA 2.66 07/29/2022    PSA 4.07 (H) 07/01/2022     Impression/Plan:  82 y/o male with High risk prostate cancer post XRT completion. On 01/12/2022, he completed 7920 cGy in 40 fractions directed to the prostate + proximal SV for management of high risk adenocarcinoma of the prostate    Dr. Lynne Josue for Urology at [facility], most recent visit 02/01/2022.    The patient received Lupron injection #2 at this visit (planned for 36 months ADT)    Further review of outside records revealed a 1.6 cm ovoid faintly sclerotic lesion in the right humeral neck redemonstrated and indeterminate on XR Shoulder 2 view Right 12/14/2021 at [facility]    Referred to our clinic for further evaluation and treatment    PSA 20.86 on 03/18/2022  Testosterone <2.5 on 03/18/2022    NM Bone scan 03/30/2022 reviewed extensively with Dr. Raymond Rosales from Radiology team; there is activity within the lateral aspect of the right humeral head which corresponds to the sclerotic lesion seen on CT humerus consistent with osseous metastatic disease    CT chest/abdomen/pelvis 03/30/2022 Sclerotic lesions in the right humeral head and the right 2nd rib suspicious for developing bone metastasis. Sclerotic lesions in the lumbar spine L2 and L3 as noted concerning for bone metastasis. CT right humerus 03/30/2022 14 x 12 x 10 mm sclerotic focus in the proximal humerus compatible with osteoblastic metastasis. Metastatic Prostate Cancer to Bone  Recommended XTANDI. Side effects reviewed. He agreed to proceed. Casodex D/C on 04/06/2022  Vega Yue was started on 04/07/2022 with fair tolerance so far. Palliative RT to right proximal humerus was completed on 07/05/2022. PSA 16.97 on 04/15/2022  Right Humerus X-Ray 06/21/2022 suspected blastic metastatic focus at the proximal humerus which is not appreciably changed since 03/30/2022. Imaging reviewed. PSA 4.07 on 07/01/2022  PSA 2.66 on 07/29/2022. PSA 3.10 on 08/26/2022. Bone scan 08/31/2022 No evidence of metastatic bone disease or new lesion  CT chest 08/31/2022 stable CT of the chest with cardiomegaly with postoperative changes in the chest and coronary artery calcification. Persistent sclerotic lesions in the right first rib and humeral head without change. CT abdomen/pelvis 08/31/2022 stable CT of the abdomen pelvis with persistent sclerotic lesions on L1 and L2.  1.3 cm nonspecific hypodense area in the tail of the pancreas. Imaging reviewed. Labs reviewed.    Continue XTANDI    RTC 4 weeks with prior labs and XGEVA same day  XGEVA q4 weeks given bone metastasis (one given today 09/23/2022)    Tri Lawson MD   9/23/2022

## 2022-10-03 ENCOUNTER — HOSPITAL ENCOUNTER (OUTPATIENT)
Age: 72
Discharge: HOME | End: 2022-10-03
Payer: COMMERCIAL

## 2022-10-03 LAB
ALBUMIN SERPL-MCNC: 3.4 G/DL (ref 3.5–5.2)
ALP BLD-CCNC: 88 U/L (ref 40–129)
ALT SERPL-CCNC: 14 U/L (ref 0–40)
ANION GAP SERPL CALCULATED.3IONS-SCNC: 12 MMOL/L (ref 7–16)
ANISOCYTOSIS: ABNORMAL
AST SERPL-CCNC: 21 U/L (ref 0–39)
BASOPHILS ABSOLUTE: 0 E9/L (ref 0–0.2)
BASOPHILS RELATIVE PERCENT: 0 % (ref 0–2)
BILIRUB SERPL-MCNC: 0.5 MG/DL (ref 0–1.2)
BUN BLDV-MCNC: 28 MG/DL (ref 6–23)
CALCIUM SERPL-MCNC: 8.7 MG/DL (ref 8.6–10.2)
CHLORIDE BLD-SCNC: 102 MMOL/L (ref 98–107)
CO2: 23 MMOL/L (ref 22–29)
CREAT SERPL-MCNC: 1.2 MG/DL (ref 0.7–1.2)
EOSINOPHILS ABSOLUTE: 0 E9/L (ref 0.05–0.5)
EOSINOPHILS RELATIVE PERCENT: 0 % (ref 0–6)
GFR AFRICAN AMERICAN: >60
GFR NON-AFRICAN AMERICAN: 60 ML/MIN/1.73
GLUCOSE BLD-MCNC: 149 MG/DL (ref 74–99)
HCT VFR BLD CALC: 30.3 % (ref 37–54)
HEMOGLOBIN: 9.6 G/DL (ref 12.5–16.5)
LYMPHOCYTES ABSOLUTE: 0.26 E9/L (ref 1.5–4)
LYMPHOCYTES RELATIVE PERCENT: 6.1 % (ref 20–42)
MCH RBC QN AUTO: 31.5 PG (ref 26–35)
MCHC RBC AUTO-ENTMCNC: 31.7 % (ref 32–34.5)
MCV RBC AUTO: 99.3 FL (ref 80–99.9)
MONOCYTES ABSOLUTE: 0.26 E9/L (ref 0.1–0.95)
MONOCYTES RELATIVE PERCENT: 6.1 % (ref 2–12)
MYELOCYTE PERCENT: 0.9 % (ref 0–0)
NEUTROPHILS ABSOLUTE: 3.87 E9/L (ref 1.8–7.3)
NEUTROPHILS RELATIVE PERCENT: 86.9 % (ref 43–80)
NUCLEATED RED BLOOD CELLS: 1.7 /100 WBC
OVALOCYTES: ABNORMAL
PDW BLD-RTO: 18.2 FL (ref 11.5–15)
PLATELET # BLD: 154 E9/L (ref 130–450)
PMV BLD AUTO: 10.7 FL (ref 7–12)
POIKILOCYTES: ABNORMAL
POLYCHROMASIA: ABNORMAL
POTASSIUM SERPL-SCNC: 4.2 MMOL/L (ref 3.5–5)
RBC # BLD: 3.05 E12/L (ref 3.8–5.8)
SCHISTOCYTES: ABNORMAL
SODIUM BLD-SCNC: 137 MMOL/L (ref 132–146)
TOTAL PROTEIN: 6.5 G/DL (ref 6.4–8.3)
VACUOLATED NEUTROPHILS: ABNORMAL
WBC # BLD: 4.4 E9/L (ref 4.5–11.5)

## 2022-10-03 PROCEDURE — 36415 COLL VENOUS BLD VENIPUNCTURE: CPT

## 2022-10-03 PROCEDURE — 85025 COMPLETE CBC W/AUTO DIFF WBC: CPT

## 2022-10-03 PROCEDURE — 80053 COMPREHEN METABOLIC PANEL: CPT

## 2022-10-21 ENCOUNTER — OFFICE VISIT (OUTPATIENT)
Dept: ONCOLOGY | Age: 72
End: 2022-10-21
Payer: OTHER GOVERNMENT

## 2022-10-21 ENCOUNTER — HOSPITAL ENCOUNTER (OUTPATIENT)
Dept: INFUSION THERAPY | Age: 72
Discharge: HOME | End: 2022-10-21
Payer: OTHER GOVERNMENT

## 2022-10-21 ENCOUNTER — HOSPITAL ENCOUNTER (OUTPATIENT)
Dept: INFUSION THERAPY | Age: 72
Discharge: HOME | End: 2022-10-21

## 2022-10-21 VITALS
TEMPERATURE: 97.8 F | OXYGEN SATURATION: 98 % | BODY MASS INDEX: 25.38 KG/M2 | HEART RATE: 51 BPM | RESPIRATION RATE: 18 BRPM | HEIGHT: 71 IN

## 2022-10-21 DIAGNOSIS — C61 PROSTATE CANCER (HCC): Primary | ICD-10-CM

## 2022-10-21 DIAGNOSIS — C79.51 PROSTATE CANCER METASTATIC TO BONE (HCC): ICD-10-CM

## 2022-10-21 DIAGNOSIS — C61 PROSTATE CANCER METASTATIC TO BONE (HCC): ICD-10-CM

## 2022-10-21 LAB — PROSTATE SPECIFIC ANTIGEN: 2.37 NG/ML (ref 0–4)

## 2022-10-21 PROCEDURE — 3017F COLORECTAL CA SCREEN DOC REV: CPT | Performed by: INTERNAL MEDICINE

## 2022-10-21 PROCEDURE — 36415 COLL VENOUS BLD VENIPUNCTURE: CPT

## 2022-10-21 PROCEDURE — 1123F ACP DISCUSS/DSCN MKR DOCD: CPT | Performed by: INTERNAL MEDICINE

## 2022-10-21 PROCEDURE — 99214 OFFICE O/P EST MOD 30 MIN: CPT | Performed by: INTERNAL MEDICINE

## 2022-10-21 PROCEDURE — 99212 OFFICE O/P EST SF 10 MIN: CPT

## 2022-10-21 PROCEDURE — 1036F TOBACCO NON-USER: CPT | Performed by: INTERNAL MEDICINE

## 2022-10-21 PROCEDURE — G8484 FLU IMMUNIZE NO ADMIN: HCPCS | Performed by: INTERNAL MEDICINE

## 2022-10-21 PROCEDURE — 6360000002 HC RX W HCPCS: Performed by: INTERNAL MEDICINE

## 2022-10-21 PROCEDURE — 84153 ASSAY OF PSA TOTAL: CPT

## 2022-10-21 PROCEDURE — G8427 DOCREV CUR MEDS BY ELIG CLIN: HCPCS | Performed by: INTERNAL MEDICINE

## 2022-10-21 PROCEDURE — G8417 CALC BMI ABV UP PARAM F/U: HCPCS | Performed by: INTERNAL MEDICINE

## 2022-10-21 PROCEDURE — 96372 THER/PROPH/DIAG INJ SC/IM: CPT

## 2022-10-21 RX ORDER — ALBUTEROL SULFATE 90 UG/1
4 AEROSOL, METERED RESPIRATORY (INHALATION) PRN
Status: CANCELLED | OUTPATIENT
Start: 2022-10-21

## 2022-10-21 RX ORDER — EPINEPHRINE 1 MG/ML
0.3 INJECTION, SOLUTION, CONCENTRATE INTRAVENOUS PRN
Status: CANCELLED | OUTPATIENT
Start: 2022-10-21

## 2022-10-21 RX ORDER — ACETAMINOPHEN 325 MG/1
650 TABLET ORAL
Status: CANCELLED | OUTPATIENT
Start: 2022-10-21

## 2022-10-21 RX ORDER — FAMOTIDINE 10 MG/ML
20 INJECTION, SOLUTION INTRAVENOUS
Status: CANCELLED | OUTPATIENT
Start: 2022-10-21

## 2022-10-21 RX ORDER — SODIUM CHLORIDE 9 MG/ML
INJECTION, SOLUTION INTRAVENOUS CONTINUOUS
Status: CANCELLED | OUTPATIENT
Start: 2022-10-21

## 2022-10-21 RX ORDER — DIPHENHYDRAMINE HYDROCHLORIDE 50 MG/ML
50 INJECTION INTRAMUSCULAR; INTRAVENOUS
Status: CANCELLED | OUTPATIENT
Start: 2022-10-21

## 2022-10-21 RX ORDER — ONDANSETRON 2 MG/ML
8 INJECTION INTRAMUSCULAR; INTRAVENOUS
Status: CANCELLED | OUTPATIENT
Start: 2022-10-21

## 2022-10-21 RX ORDER — MEPERIDINE HYDROCHLORIDE 50 MG/ML
12.5 INJECTION INTRAMUSCULAR; INTRAVENOUS; SUBCUTANEOUS PRN
Status: CANCELLED | OUTPATIENT
Start: 2022-10-21

## 2022-10-21 RX ADMIN — DENOSUMAB 120 MG: 120 INJECTION SUBCUTANEOUS at 15:16

## 2022-10-21 NOTE — PROGRESS NOTES
Department of [facility]   Attending Clinic Note    Reason for Visit: Follow-up on a patient with Prostate Cancer    PCP:  Vamsi Munoz MD    History of Present Illness:  82 y/o male with High risk Prostate Cancer post XRT completion. On 01/12/2022, he completed 7920 cGy in 44 fractions directed to the prostate + proximal SV    Dr. Charlie Salazar for Urology at [facility], most recent visit 02/01/2022. The patient received Lupron injection #2 at this visit (planned for 36 months ADT)    The patient reports hospitalized at [facility] after ICD fired 02/18/2022. Admitted with ventricular tachyarrhythmia, taken for emergent VT ablation 02/18/2022 and ICD generator change 02/24/2022. Sustained VT post ablation, due to suspected sarcoidosis. Started on daily steroid. The patient was discharged 02/25/2022    Further review of outside records revealed a 1.6 cm ovoid faintly sclerotic lesion in the right humeral neck redemonstrated and indeterminate on XR Shoulder 2 view Right 12/14/2021 at [facility]    Referred to our clinic for further evaluation and treatment    PSA 20.86 on 03/18/2022  Testosterone <2.5 on 03/18/2022    NM Bone scan 03/30/2022 reviewed extensively with Dr. Fabi Royal from Radiology team; there is activity within the lateral aspect of the right humeral head which corresponds to the sclerotic lesion seen on CT humerus consistent with osseous metastatic disease    CT chest/abdomen/pelvis 03/30/2022 Sclerotic lesions in the right humeral head and the right 2nd rib suspicious for developing bone metastasis. Sclerotic lesions in the lumbar spine L2 and L3 as noted concerning for bone metastasis. CT right humerus 03/30/2022 14 x 12 x 10 mm sclerotic focus in the proximal humerus compatible with osteoblastic metastasis. Metastatic Prostate Cancer to Bone  Recommended XTANDI. Side effects reviewed. He agreed to proceed.    D/C Casodex 04/06/2022  Delphia Hem was started on 04/07/2022 with fair tolerance so far. Palliative RT to right proximal humerus was completed on 07/05/2022. [facility] 10/5-10/17/22 with increased lower extremity edema, falls, and debilitation. IV diuresis. History of LVAD HM III. Declined IPR and discharged home with [place] [facility] when stable. Today 10/21/2022; No fever, chills. Fair appetite and energy level. Continues to tolerate XTANDI well. Review of Systems;  CONSTITUTIONAL: No fever, chills. Fair appetite and energy level. ENMT: Eyes: No diplopia; Nose: No epistaxis. Mouth: No sore throat. RESPIRATORY: No hemoptysis, shortness of breath, cough. CARDIOVASCULAR: No chest pain  GASTROINTESTINAL: No nausea/vomiting, abdominal pain  GENITOURINARY: No hematuria  NEURO: No syncope, presyncope or headache. Remainder:  ROS NEGATIVE    Past Medical History:      Diagnosis Date    Anxiety     Aorto-iliac atherosclerosis ([address] 8/31/2020    Arthritis     Atrial fibrillation (HCC)     AVNRT (AV teddy re-entry tachycardia) ([address]     Blood type O+ 11/14/2019    CAD (coronary artery disease)     Cancer (HCC)     CHF (congestive heart failure) ([facility]     Diabetes mellitus ([address]     Diabetic neuropathy ([facility])     Diastolic dysfunction 38/95/2271    stage 3    Fall     right knee    Gout     chemically induced    History of blood transfusion     Hx of blood clots 1976    left leg    Hyperlipidemia     Hypertension     LBBB (left bundle branch block)     Left ventricular assist device (LVAD) complication 8027    Moderate mitral regurgitation 04/19/2016    Nonischemic cardiomyopathy (HCC)     Obesity     SNEHA (obstructive sleep apnea)     treated with CPAP    PVD (peripheral vascular disease) with claudication ([address] 8/31/2020    Sarcoidosis 2019    Severe tricuspid regurgitation 04/19/2016    SVT (supraventricular tachycardia) (HCC)     Viral cardiomyopathy (HCC)      Medications:  Reviewed and reconciled. Allergies:   Allergies   Allergen Reactions    Food Hives Strawberries      Chlorhexidine Rash    Soap & Cleansers Rash     TIDE detergent    Strawberry (Diagnostic) Rash     Other reaction(s): Eruption, ITCHING,WATERING EYES     Physical Exam:  Pulse 51   Temp 97.8 °F (36.6 °C)   Resp 18   Ht 5' 11\" (1.803 m)   SpO2 98%   BMI 25.38 kg/m²   GENERAL: Alert, oriented x 3, not in acute distress. HEENT: PERRLA; EOMI. Oropharynx clear. LUNGS: CTA Uriel  CVS: RRR  EXTREMITIES: Without clubbing, cyanosis, or edema. NEUROLOGIC: No focal deficits. ECOG PS 2    Lab Results   Component Value Date    PSA 3.10 08/26/2022    PSA 2.66 07/29/2022    PSA 4.07 (H) 07/01/2022     Impression/Plan:  80 y/o male with High risk prostate cancer post XRT completion. On 01/12/2022, he completed 7920 cGy in 40 fractions directed to the prostate + proximal SV for management of high risk adenocarcinoma of the prostate    Dr. Amelia Rizo for Urology at [facility], most recent visit 02/01/2022. The patient received Lupron injection #2 at this visit (planned for 36 months ADT)    Further review of outside records revealed a 1.6 cm ovoid faintly sclerotic lesion in the right humeral neck redemonstrated and indeterminate on XR Shoulder 2 view Right 12/14/2021 at [facility]    Referred to our clinic for further evaluation and treatment    PSA 20.86 on 03/18/2022  Testosterone <2.5 on 03/18/2022    NM Bone scan 03/30/2022 reviewed extensively with Dr. Denise Augustine from Radiology team; there is activity within the lateral aspect of the right humeral head which corresponds to the sclerotic lesion seen on CT humerus consistent with osseous metastatic disease    CT chest/abdomen/pelvis 03/30/2022 Sclerotic lesions in the right humeral head and the right 2nd rib suspicious for developing bone metastasis. Sclerotic lesions in the lumbar spine L2 and L3 as noted concerning for bone metastasis.    CT right humerus 03/30/2022 14 x 12 x 10 mm sclerotic focus in the proximal humerus compatible with osteoblastic metastasis. Metastatic Prostate Cancer to Bone  Recommended XTANDI. Side effects reviewed. He agreed to proceed. Casodex D/C on 04/06/2022  Osiel Llano was started on 04/07/2022 with fair tolerance so far. Palliative RT to right proximal humerus was completed on 07/05/2022. PSA 16.97 on 04/15/2022  Right Humerus X-Ray 06/21/2022 suspected blastic metastatic focus at the proximal humerus which is not appreciably changed since 03/30/2022. Imaging reviewed. PSA 4.07 on 07/01/2022  PSA 2.66 on 07/29/2022. PSA 3.10 on 08/26/2022. Bone scan 08/31/2022 No evidence of metastatic bone disease or new lesion  CT chest 08/31/2022 stable CT of the chest with cardiomegaly with postoperative changes in the chest and coronary artery calcification. Persistent sclerotic lesions in the right first rib and humeral head without change. CT abdomen/pelvis 08/31/2022 stable CT of the abdomen pelvis with persistent sclerotic lesions on L1 and L2.  1.3 cm nonspecific hypodense area in the tail of the pancreas. [facility] 10/5-10/17/22 with increased lower extremity edema, falls, and debilitation. IV diuresis. History of LVAD HM III. Declined IPR and [facility]. Hospital course reviewed. Labs reviewed.    PSA pending today 10/21/2022  Continue XTANDI    RTC 4 weeks with prior labs and XGEVA same day  XGEVA q4 weeks given bone metastasis (one given today 10/21/2022)    Guillermo Pak MD   10/21/2022

## 2022-11-11 ENCOUNTER — HOSPITAL ENCOUNTER (OUTPATIENT)
Age: 72
Discharge: HOME | End: 2022-11-11
Payer: COMMERCIAL

## 2022-11-11 DIAGNOSIS — C61 PROSTATE CANCER METASTATIC TO BONE (HCC): ICD-10-CM

## 2022-11-11 DIAGNOSIS — C79.51 PROSTATE CANCER METASTATIC TO BONE (HCC): ICD-10-CM

## 2022-11-11 LAB
ALBUMIN SERPL-MCNC: 3.6 G/DL (ref 3.5–5.2)
ALBUMIN SERPL-MCNC: 3.6 G/DL (ref 3.5–5.2)
ALP BLD-CCNC: 107 U/L (ref 40–129)
ALP BLD-CCNC: 107 U/L (ref 40–129)
ALT SERPL-CCNC: 15 U/L (ref 0–40)
ALT SERPL-CCNC: 15 U/L (ref 0–40)
ANION GAP SERPL CALCULATED.3IONS-SCNC: 11 MMOL/L (ref 7–16)
ANION GAP SERPL CALCULATED.3IONS-SCNC: 11 MMOL/L (ref 7–16)
ANISOCYTOSIS: ABNORMAL
AST SERPL-CCNC: 24 U/L (ref 0–39)
AST SERPL-CCNC: 25 U/L (ref 0–39)
BASOPHILS ABSOLUTE: 0 E9/L (ref 0–0.2)
BASOPHILS RELATIVE PERCENT: 0 % (ref 0–2)
BILIRUB SERPL-MCNC: 0.3 MG/DL (ref 0–1.2)
BILIRUB SERPL-MCNC: 0.4 MG/DL (ref 0–1.2)
BUN BLDV-MCNC: 33 MG/DL (ref 6–23)
BUN BLDV-MCNC: 34 MG/DL (ref 6–23)
CALCIUM SERPL-MCNC: 10 MG/DL (ref 8.6–10.2)
CALCIUM SERPL-MCNC: 9.8 MG/DL (ref 8.6–10.2)
CHLORIDE BLD-SCNC: 101 MMOL/L (ref 98–107)
CHLORIDE BLD-SCNC: 102 MMOL/L (ref 98–107)
CO2: 27 MMOL/L (ref 22–29)
CO2: 28 MMOL/L (ref 22–29)
CREAT SERPL-MCNC: 1.3 MG/DL (ref 0.7–1.2)
CREAT SERPL-MCNC: 1.3 MG/DL (ref 0.7–1.2)
EOSINOPHILS ABSOLUTE: 0.11 E9/L (ref 0.05–0.5)
EOSINOPHILS RELATIVE PERCENT: 2.6 % (ref 0–6)
GFR SERPL CREATININE-BSD FRML MDRD: 59 ML/MIN/1.73
GFR SERPL CREATININE-BSD FRML MDRD: 59 ML/MIN/1.73
GLUCOSE BLD-MCNC: 146 MG/DL (ref 74–99)
GLUCOSE BLD-MCNC: 147 MG/DL (ref 74–99)
HCT VFR BLD CALC: 31.1 % (ref 37–54)
HEMOGLOBIN: 9.9 G/DL (ref 12.5–16.5)
INR BLD: 1.7
LACTATE DEHYDROGENASE: 328 U/L (ref 135–225)
LYMPHOCYTES ABSOLUTE: 0.63 E9/L (ref 1.5–4)
LYMPHOCYTES RELATIVE PERCENT: 14.8 % (ref 20–42)
MAGNESIUM: 2 MG/DL (ref 1.6–2.6)
MCH RBC QN AUTO: 31.3 PG (ref 26–35)
MCHC RBC AUTO-ENTMCNC: 31.8 % (ref 32–34.5)
MCV RBC AUTO: 98.4 FL (ref 80–99.9)
MONOCYTES ABSOLUTE: 0.34 E9/L (ref 0.1–0.95)
MONOCYTES RELATIVE PERCENT: 7.8 % (ref 2–12)
NEUTROPHILS ABSOLUTE: 3.15 E9/L (ref 1.8–7.3)
NEUTROPHILS RELATIVE PERCENT: 74.8 % (ref 43–80)
NUCLEATED RED BLOOD CELLS: 0 /100 WBC
OVALOCYTES: ABNORMAL
PDW BLD-RTO: 17 FL (ref 11.5–15)
PLATELET # BLD: 176 E9/L (ref 130–450)
PMV BLD AUTO: 10.3 FL (ref 7–12)
POIKILOCYTES: ABNORMAL
POLYCHROMASIA: ABNORMAL
POTASSIUM SERPL-SCNC: 4.4 MMOL/L (ref 3.5–5)
POTASSIUM SERPL-SCNC: 4.5 MMOL/L (ref 3.5–5)
PROTHROMBIN TIME: 18.9 SEC (ref 9.3–12.4)
RBC # BLD: 3.16 E12/L (ref 3.8–5.8)
SCHISTOCYTES: ABNORMAL
SODIUM BLD-SCNC: 140 MMOL/L (ref 132–146)
SODIUM BLD-SCNC: 140 MMOL/L (ref 132–146)
TOTAL PROTEIN: 6.9 G/DL (ref 6.4–8.3)
TOTAL PROTEIN: 7 G/DL (ref 6.4–8.3)
WBC # BLD: 4.2 E9/L (ref 4.5–11.5)

## 2022-11-11 PROCEDURE — 36415 COLL VENOUS BLD VENIPUNCTURE: CPT

## 2022-11-11 PROCEDURE — 85025 COMPLETE CBC W/AUTO DIFF WBC: CPT

## 2022-11-11 PROCEDURE — 83735 ASSAY OF MAGNESIUM: CPT

## 2022-11-11 PROCEDURE — 85610 PROTHROMBIN TIME: CPT

## 2022-11-11 PROCEDURE — 83615 LACTATE (LD) (LDH) ENZYME: CPT

## 2022-11-11 PROCEDURE — 80053 COMPREHEN METABOLIC PANEL: CPT

## 2022-11-18 ENCOUNTER — HOSPITAL ENCOUNTER (OUTPATIENT)
Dept: INFUSION THERAPY | Age: 72
Discharge: HOME | End: 2022-11-18
Payer: OTHER GOVERNMENT

## 2022-11-18 ENCOUNTER — OFFICE VISIT (OUTPATIENT)
Dept: ONCOLOGY | Age: 72
End: 2022-11-18
Payer: OTHER GOVERNMENT

## 2022-11-18 VITALS — BODY MASS INDEX: 25.38 KG/M2 | OXYGEN SATURATION: 93 % | HEART RATE: 64 BPM | HEIGHT: 71 IN | TEMPERATURE: 97.6 F

## 2022-11-18 DIAGNOSIS — C61 PROSTATE CANCER METASTATIC TO BONE (HCC): Primary | ICD-10-CM

## 2022-11-18 DIAGNOSIS — C79.51 PROSTATE CANCER METASTATIC TO BONE (HCC): Primary | ICD-10-CM

## 2022-11-18 LAB — PROSTATE SPECIFIC ANTIGEN: 1.32 NG/ML (ref 0–4)

## 2022-11-18 PROCEDURE — 84153 ASSAY OF PSA TOTAL: CPT

## 2022-11-18 PROCEDURE — G8484 FLU IMMUNIZE NO ADMIN: HCPCS | Performed by: INTERNAL MEDICINE

## 2022-11-18 PROCEDURE — 1036F TOBACCO NON-USER: CPT | Performed by: INTERNAL MEDICINE

## 2022-11-18 PROCEDURE — 1123F ACP DISCUSS/DSCN MKR DOCD: CPT | Performed by: INTERNAL MEDICINE

## 2022-11-18 PROCEDURE — G8417 CALC BMI ABV UP PARAM F/U: HCPCS | Performed by: INTERNAL MEDICINE

## 2022-11-18 PROCEDURE — 36415 COLL VENOUS BLD VENIPUNCTURE: CPT

## 2022-11-18 PROCEDURE — 96372 THER/PROPH/DIAG INJ SC/IM: CPT

## 2022-11-18 PROCEDURE — G8427 DOCREV CUR MEDS BY ELIG CLIN: HCPCS | Performed by: INTERNAL MEDICINE

## 2022-11-18 PROCEDURE — 99214 OFFICE O/P EST MOD 30 MIN: CPT | Performed by: INTERNAL MEDICINE

## 2022-11-18 PROCEDURE — 99213 OFFICE O/P EST LOW 20 MIN: CPT

## 2022-11-18 PROCEDURE — 6360000002 HC RX W HCPCS: Performed by: INTERNAL MEDICINE

## 2022-11-18 PROCEDURE — 3017F COLORECTAL CA SCREEN DOC REV: CPT | Performed by: INTERNAL MEDICINE

## 2022-11-18 RX ORDER — FAMOTIDINE 10 MG/ML
20 INJECTION, SOLUTION INTRAVENOUS
Status: CANCELLED | OUTPATIENT
Start: 2022-11-18

## 2022-11-18 RX ORDER — ALBUTEROL SULFATE 90 UG/1
4 AEROSOL, METERED RESPIRATORY (INHALATION) PRN
Status: CANCELLED | OUTPATIENT
Start: 2022-11-18

## 2022-11-18 RX ORDER — ONDANSETRON 2 MG/ML
8 INJECTION INTRAMUSCULAR; INTRAVENOUS
Status: CANCELLED | OUTPATIENT
Start: 2022-11-18

## 2022-11-18 RX ORDER — SODIUM CHLORIDE 9 MG/ML
INJECTION, SOLUTION INTRAVENOUS CONTINUOUS
Status: CANCELLED | OUTPATIENT
Start: 2022-11-18

## 2022-11-18 RX ORDER — ACETAMINOPHEN 325 MG/1
650 TABLET ORAL
Status: CANCELLED | OUTPATIENT
Start: 2022-11-18

## 2022-11-18 RX ORDER — DIPHENHYDRAMINE HYDROCHLORIDE 50 MG/ML
50 INJECTION INTRAMUSCULAR; INTRAVENOUS
Status: CANCELLED | OUTPATIENT
Start: 2022-11-18

## 2022-11-18 RX ORDER — MEPERIDINE HYDROCHLORIDE 50 MG/ML
12.5 INJECTION INTRAMUSCULAR; INTRAVENOUS; SUBCUTANEOUS PRN
Status: CANCELLED | OUTPATIENT
Start: 2022-11-18

## 2022-11-18 RX ORDER — EPINEPHRINE 1 MG/ML
0.3 INJECTION, SOLUTION, CONCENTRATE INTRAVENOUS PRN
Status: CANCELLED | OUTPATIENT
Start: 2022-11-18

## 2022-11-18 RX ADMIN — DENOSUMAB 120 MG: 120 INJECTION SUBCUTANEOUS at 15:00

## 2022-11-18 NOTE — PROGRESS NOTES
Department of [facility]   Attending Clinic Note    Reason for Visit: Follow-up on a patient with Prostate Cancer    PCP:  Kwesi Asif MD    History of Present Illness:  80 y/o male with High risk Prostate Cancer post XRT completion. On 01/12/2022, he completed 7920 cGy in 44 fractions directed to the prostate + proximal SV    Dr. Mariposa Harley for Urology at [facility], most recent visit 02/01/2022. The patient received Lupron injection #2 at this visit (planned for 36 months ADT)    The patient reports hospitalized at [facility] after ICD fired 02/18/2022. Admitted with ventricular tachyarrhythmia, taken for emergent VT ablation 02/18/2022 and ICD generator change 02/24/2022. Sustained VT post ablation, due to suspected sarcoidosis. Started on daily steroid. The patient was discharged 02/25/2022    Further review of outside records revealed a 1.6 cm ovoid faintly sclerotic lesion in the right humeral neck redemonstrated and indeterminate on XR Shoulder 2 view Right 12/14/2021 at [facility]    Referred to our clinic for further evaluation and treatment    PSA 20.86 on 03/18/2022  Testosterone <2.5 on 03/18/2022    NM Bone scan 03/30/2022 reviewed extensively with Dr. Amanda Diana from Radiology team; there is activity within the lateral aspect of the right humeral head which corresponds to the sclerotic lesion seen on CT humerus consistent with osseous metastatic disease    CT chest/abdomen/pelvis 03/30/2022 Sclerotic lesions in the right humeral head and the right 2nd rib suspicious for developing bone metastasis. Sclerotic lesions in the lumbar spine L2 and L3 as noted concerning for bone metastasis. CT right humerus 03/30/2022 14 x 12 x 10 mm sclerotic focus in the proximal humerus compatible with osteoblastic metastasis. Metastatic Prostate Cancer to Bone  Recommended XTANDI. Side effects reviewed. He agreed to proceed.    D/C Casodex 04/06/2022  Patience Corporal was started on 04/07/2022 with fair tolerance so far. Palliative RT to right proximal humerus was completed on 07/05/2022. Admitted to [facility] 10/5-10/17/22 with increased lower extremity edema, falls, and debilitation. IV diuresis. History of LVAD HM III. Declined IPR and discharged home with [address] when stable. PSA 2.37 on 10/21/2022    Today 11/18/2022. No fever, chills. Fair appetite and energy level. He continues to tolerate XTANDI well. No N.V. No diarrhea. Review of Systems;  CONSTITUTIONAL: No fever, chills. Fair appetite and energy level. ENMT: Eyes: No diplopia; Nose: No epistaxis. Mouth: No sore throat. RESPIRATORY: No hemoptysis, shortness of breath, cough. CARDIOVASCULAR: No chest pain  GASTROINTESTINAL: No nausea/vomiting, abdominal pain. No diarrhea. GENITOURINARY: No hematuria  NEURO: No syncope, presyncope or headache.    Remainder:  ROS NEGATIVE    Past Medical History:      Diagnosis Date    Anxiety     Aorto-iliac atherosclerosis ([address] 8/31/2020    Arthritis     Atrial fibrillation (HCC)     AVNRT (AV teddy re-entry tachycardia) ([address]     Blood type O+ 11/14/2019    CAD (coronary artery disease)     Cancer (HCC)     CHF (congestive heart failure) (HCC)     Diabetes mellitus ([address]     Diabetic neuropathy (HCC)     Diastolic dysfunction 89/83/0339    stage 3    Fall     right knee    Gout     chemically induced    History of blood transfusion     Hx of blood clots 1976    left leg    Hyperlipidemia     Hypertension     LBBB (left bundle branch block)     Left ventricular assist device (LVAD) complication 7883    Moderate mitral regurgitation 04/19/2016    Nonischemic cardiomyopathy (HCC)     Obesity     SNEHA (obstructive sleep apnea)     treated with CPAP    PVD (peripheral vascular disease) with claudication ([address] 8/31/2020    Sarcoidosis 2019    Severe tricuspid regurgitation 04/19/2016    SVT (supraventricular tachycardia) (HCC)     Viral cardiomyopathy (HCC)      Medications:  Reviewed and reconciled. Allergies: Allergies   Allergen Reactions    Food Hives     Strawberries      Chlorhexidine Rash    Soap & Cleansers Rash     TIDE detergent    Strawberry (Diagnostic) Rash     Other reaction(s): Eruption, ITCHING,WATERING EYES     Physical Exam:  Pulse 64   Temp 97.6 °F (36.4 °C)   Ht 5' 11\" (1.803 m)   SpO2 93%   BMI 25.38 kg/m²   GENERAL: Alert, oriented x 3, not in acute distress. HEENT: PERRLA; EOMI. Oropharynx clear. LUNGS: CTA Uriel  CVS: RRR  EXTREMITIES: Without clubbing, cyanosis, or edema. NEUROLOGIC: No focal deficits. ECOG PS 2    Lab Results   Component Value Date    PSA 1.32 11/18/2022    PSA 2.37 10/21/2022    PSA 3.10 08/26/2022     Impression/Plan:  82 y/o male with High risk prostate cancer post XRT completion. On 01/12/2022, he completed 7920 cGy in 40 fractions directed to the prostate + proximal SV for management of high risk adenocarcinoma of the prostate    Dr. Camilo Finley for Urology at [facility], most recent visit 02/01/2022. The patient received Lupron injection #2 at this visit (planned for 36 months ADT)    Further review of outside records revealed a 1.6 cm ovoid faintly sclerotic lesion in the right humeral neck redemonstrated and indeterminate on XR Shoulder 2 view Right 12/14/2021 at [facility]    Referred to our clinic for further evaluation and treatment    PSA 20.86 on 03/18/2022  Testosterone <2.5 on 03/18/2022    NM Bone scan 03/30/2022 reviewed extensively with Dr. Dick Noonan from Radiology team; there is activity within the lateral aspect of the right humeral head which corresponds to the sclerotic lesion seen on CT humerus consistent with osseous metastatic disease    CT chest/abdomen/pelvis 03/30/2022 Sclerotic lesions in the right humeral head and the right 2nd rib suspicious for developing bone metastasis. Sclerotic lesions in the lumbar spine L2 and L3 as noted concerning for bone metastasis.    CT right humerus 03/30/2022 14 x 12 x 10 mm sclerotic focus in the proximal humerus compatible with osteoblastic metastasis. Metastatic Prostate Cancer to Bone  Recommended XTANDI. Side effects reviewed. He agreed to proceed. Casodex D/C on 04/06/2022  Rosy Rias was started on 04/07/2022 with fair tolerance so far. Palliative RT to right proximal humerus was completed on 07/05/2022. PSA 16.97 on 04/15/2022  Right Humerus X-Ray 06/21/2022 suspected blastic metastatic focus at the proximal humerus which is not appreciably changed since 03/30/2022. Imaging reviewed. PSA 4.07 on 07/01/2022  PSA 2.66 on 07/29/2022. PSA 3.10 on 08/26/2022. Bone scan 08/31/2022 No evidence of metastatic bone disease or new lesion  CT chest 08/31/2022 stable CT of the chest with cardiomegaly with postoperative changes in the chest and coronary artery calcification. Persistent sclerotic lesions in the right first rib and humeral head without change. CT abdomen/pelvis 08/31/2022 stable CT of the abdomen pelvis with persistent sclerotic lesions on L1 and L2.  1.3 cm nonspecific hypodense area in the tail of the pancreas. [facility] 10/5-10/17/22 with increased lower extremity edema, falls, and debilitation. IV diuresis. History of LVAD HM III. Declined IPR and discharged home with [address] when stable. PSA 2.37 on 10/21/2022  PSA 1.32 today 11/18/2022. Labs reviewed   Scans ordered   Continue XTANDI per protocol. [place] Josseline given today 11/18/2022.      RTC 6 weeks with prior scans and XGEVA same day  XGEVA q4 weeks given bone metastasis (one given today 11/18/2022)    Alexus Owens MD   11/18/2022

## 2022-12-12 ENCOUNTER — HOSPITAL ENCOUNTER (OUTPATIENT)
Age: 72
Discharge: HOME | End: 2022-12-12
Payer: COMMERCIAL

## 2022-12-12 LAB
ALBUMIN SERPL-MCNC: 3.2 G/DL (ref 3.5–5.2)
ALP BLD-CCNC: 101 U/L (ref 40–129)
ALT SERPL-CCNC: 14 U/L (ref 0–40)
ANION GAP SERPL CALCULATED.3IONS-SCNC: 8 MMOL/L (ref 7–16)
AST SERPL-CCNC: 22 U/L (ref 0–39)
BASOPHILS ABSOLUTE: 0.01 E9/L (ref 0–0.2)
BASOPHILS RELATIVE PERCENT: 0.3 % (ref 0–2)
BILIRUB SERPL-MCNC: 0.3 MG/DL (ref 0–1.2)
BUN BLDV-MCNC: 28 MG/DL (ref 6–23)
CALCIUM SERPL-MCNC: 8.9 MG/DL (ref 8.6–10.2)
CHLORIDE BLD-SCNC: 104 MMOL/L (ref 98–107)
CO2: 25 MMOL/L (ref 22–29)
CREAT SERPL-MCNC: 1 MG/DL (ref 0.7–1.2)
EOSINOPHILS ABSOLUTE: 0.05 E9/L (ref 0.05–0.5)
EOSINOPHILS RELATIVE PERCENT: 1.5 % (ref 0–6)
GFR SERPL CREATININE-BSD FRML MDRD: >60 ML/MIN/1.73
GLUCOSE BLD-MCNC: 179 MG/DL (ref 74–99)
HCT VFR BLD CALC: 34 % (ref 37–54)
HEMOGLOBIN: 10.9 G/DL (ref 12.5–16.5)
IMMATURE GRANULOCYTES #: 0.02 E9/L
IMMATURE GRANULOCYTES %: 0.6 % (ref 0–5)
INR BLD: 4.5
LYMPHOCYTES ABSOLUTE: 0.66 E9/L (ref 1.5–4)
LYMPHOCYTES RELATIVE PERCENT: 19.4 % (ref 20–42)
MCH RBC QN AUTO: 30.5 PG (ref 26–35)
MCHC RBC AUTO-ENTMCNC: 32.1 % (ref 32–34.5)
MCV RBC AUTO: 95.2 FL (ref 80–99.9)
MONOCYTES ABSOLUTE: 0.38 E9/L (ref 0.1–0.95)
MONOCYTES RELATIVE PERCENT: 11.1 % (ref 2–12)
NEUTROPHILS ABSOLUTE: 2.29 E9/L (ref 1.8–7.3)
NEUTROPHILS RELATIVE PERCENT: 67.1 % (ref 43–80)
PDW BLD-RTO: 15.4 FL (ref 11.5–15)
PLATELET # BLD: 168 E9/L (ref 130–450)
PMV BLD AUTO: 10.7 FL (ref 7–12)
POTASSIUM SERPL-SCNC: 4.7 MMOL/L (ref 3.5–5)
PROTHROMBIN TIME: 50.8 SEC (ref 9.3–12.4)
RBC # BLD: 3.57 E12/L (ref 3.8–5.8)
SODIUM BLD-SCNC: 137 MMOL/L (ref 132–146)
TOTAL PROTEIN: 7 G/DL (ref 6.4–8.3)
WBC # BLD: 3.4 E9/L (ref 4.5–11.5)

## 2022-12-12 PROCEDURE — 36415 COLL VENOUS BLD VENIPUNCTURE: CPT

## 2022-12-12 PROCEDURE — 85610 PROTHROMBIN TIME: CPT

## 2022-12-12 PROCEDURE — 85025 COMPLETE CBC W/AUTO DIFF WBC: CPT

## 2022-12-12 PROCEDURE — 80053 COMPREHEN METABOLIC PANEL: CPT

## 2022-12-14 ENCOUNTER — TELEPHONE (OUTPATIENT)
Dept: FAMILY MEDICINE CLINIC | Age: 72
End: 2022-12-14

## 2022-12-14 DIAGNOSIS — K62.5 RECTAL BLEEDING: Primary | ICD-10-CM

## 2022-12-14 NOTE — TELEPHONE ENCOUNTER
Sophia Muhammad, nurse with [place] called requesting an order for a CBC with Diff order to be placed for the patient d/t he has had rectal bleeding.

## 2022-12-21 ENCOUNTER — TELEPHONE (OUTPATIENT)
Dept: INFUSION THERAPY | Age: 72
End: 2022-12-21

## 2022-12-21 NOTE — TELEPHONE ENCOUNTER
Pt called in to let staff know that he has been admitted to a [address] with possible lower GI bleeding-Pt states he may need to change the date of his upcoming scans-Pt encouraged to have scans done before his next appointment-Pt voiced Serge Carmona RN

## 2022-12-29 ENCOUNTER — HOSPITAL ENCOUNTER (OUTPATIENT)
Age: 72
Discharge: HOME | End: 2022-12-29
Payer: COMMERCIAL

## 2022-12-29 ENCOUNTER — HOSPITAL ENCOUNTER (OUTPATIENT)
Dept: NUCLEAR MEDICINE | Age: 72
Discharge: HOME | End: 2022-12-31
Payer: COMMERCIAL

## 2022-12-29 ENCOUNTER — HOSPITAL ENCOUNTER (OUTPATIENT)
Dept: CT IMAGING | Age: 72
Discharge: HOME | End: 2022-12-31
Payer: COMMERCIAL

## 2022-12-29 DIAGNOSIS — C61 PROSTATE CANCER METASTATIC TO BONE (HCC): ICD-10-CM

## 2022-12-29 DIAGNOSIS — C79.51 PROSTATE CANCER METASTATIC TO BONE (HCC): ICD-10-CM

## 2022-12-29 LAB
ALBUMIN SERPL-MCNC: 3.1 G/DL (ref 3.5–5.2)
ALP BLD-CCNC: 119 U/L (ref 40–129)
ALT SERPL-CCNC: 16 U/L (ref 0–40)
ANION GAP SERPL CALCULATED.3IONS-SCNC: 10 MMOL/L (ref 7–16)
ANISOCYTOSIS: ABNORMAL
AST SERPL-CCNC: 24 U/L (ref 0–39)
BASOPHILS ABSOLUTE: 0.03 E9/L (ref 0–0.2)
BASOPHILS RELATIVE PERCENT: 0.9 % (ref 0–2)
BILIRUB SERPL-MCNC: 0.3 MG/DL (ref 0–1.2)
BUN BLDV-MCNC: 37 MG/DL (ref 6–23)
CALCIUM SERPL-MCNC: 9.3 MG/DL (ref 8.6–10.2)
CHLORIDE BLD-SCNC: 103 MMOL/L (ref 98–107)
CO2: 25 MMOL/L (ref 22–29)
CREAT SERPL-MCNC: 1.4 MG/DL (ref 0.7–1.2)
EOSINOPHILS ABSOLUTE: 0.03 E9/L (ref 0.05–0.5)
EOSINOPHILS RELATIVE PERCENT: 0.9 % (ref 0–6)
GFR SERPL CREATININE-BSD FRML MDRD: 54 ML/MIN/1.73
GLUCOSE BLD-MCNC: 116 MG/DL (ref 74–99)
HCT VFR BLD CALC: 25.4 % (ref 37–54)
HEMOGLOBIN: 8.1 G/DL (ref 12.5–16.5)
HYPOCHROMIA: ABNORMAL
INR BLD: 4.3
LYMPHOCYTES ABSOLUTE: 0.35 E9/L (ref 1.5–4)
LYMPHOCYTES RELATIVE PERCENT: 12.2 % (ref 20–42)
MCH RBC QN AUTO: 29.8 PG (ref 26–35)
MCHC RBC AUTO-ENTMCNC: 31.9 % (ref 32–34.5)
MCV RBC AUTO: 93.4 FL (ref 80–99.9)
MONOCYTES ABSOLUTE: 0.29 E9/L (ref 0.1–0.95)
MONOCYTES RELATIVE PERCENT: 9.5 % (ref 2–12)
NEUTROPHILS ABSOLUTE: 2.23 E9/L (ref 1.8–7.3)
NEUTROPHILS RELATIVE PERCENT: 76.5 % (ref 43–80)
OVALOCYTES: ABNORMAL
PDW BLD-RTO: 15.7 FL (ref 11.5–15)
PLATELET # BLD: 167 E9/L (ref 130–450)
PMV BLD AUTO: 9.6 FL (ref 7–12)
POIKILOCYTES: ABNORMAL
POLYCHROMASIA: ABNORMAL
POTASSIUM SERPL-SCNC: 4.5 MMOL/L (ref 3.5–5)
PROSTATE SPECIFIC ANTIGEN: 2.53 NG/ML (ref 0–4)
PROTHROMBIN TIME: 46.5 SEC (ref 9.3–12.4)
RBC # BLD: 2.72 E12/L (ref 3.8–5.8)
SODIUM BLD-SCNC: 138 MMOL/L (ref 132–146)
TARGET CELLS: ABNORMAL
TOTAL PROTEIN: 6.3 G/DL (ref 6.4–8.3)
WBC # BLD: 2.9 E9/L (ref 4.5–11.5)

## 2022-12-29 PROCEDURE — 84153 ASSAY OF PSA TOTAL: CPT

## 2022-12-29 PROCEDURE — 6360000004 HC RX CONTRAST MEDICATION: Performed by: RADIOLOGY

## 2022-12-29 PROCEDURE — 85610 PROTHROMBIN TIME: CPT

## 2022-12-29 PROCEDURE — 36415 COLL VENOUS BLD VENIPUNCTURE: CPT

## 2022-12-29 PROCEDURE — 3430000000 HC RX DIAGNOSTIC RADIOPHARMACEUTICAL: Performed by: RADIOLOGY

## 2022-12-29 PROCEDURE — A9503 TC99M MEDRONATE: HCPCS | Performed by: RADIOLOGY

## 2022-12-29 PROCEDURE — 85025 COMPLETE CBC W/AUTO DIFF WBC: CPT

## 2022-12-29 PROCEDURE — 71260 CT THORAX DX C+: CPT

## 2022-12-29 PROCEDURE — 74177 CT ABD & PELVIS W/CONTRAST: CPT

## 2022-12-29 PROCEDURE — 80053 COMPREHEN METABOLIC PANEL: CPT

## 2022-12-29 PROCEDURE — 78306 BONE IMAGING WHOLE BODY: CPT | Performed by: RADIOLOGY

## 2022-12-29 PROCEDURE — 78306 BONE IMAGING WHOLE BODY: CPT | Performed by: INTERNAL MEDICINE

## 2022-12-29 RX ORDER — TC 99M MEDRONATE 20 MG/10ML
25 INJECTION, POWDER, LYOPHILIZED, FOR SOLUTION INTRAVENOUS
Status: COMPLETED | OUTPATIENT
Start: 2022-12-29 | End: 2022-12-29

## 2022-12-29 RX ADMIN — IOPAMIDOL 90 ML: 755 INJECTION, SOLUTION INTRAVENOUS at 12:39

## 2022-12-29 RX ADMIN — TC 99M MEDRONATE 24 MILLICURIE: 20 INJECTION, POWDER, LYOPHILIZED, FOR SOLUTION INTRAVENOUS at 09:48

## 2022-12-30 ENCOUNTER — HOSPITAL ENCOUNTER (OUTPATIENT)
Dept: INFUSION THERAPY | Age: 72
Discharge: HOME | End: 2022-12-30
Payer: COMMERCIAL

## 2022-12-30 ENCOUNTER — OFFICE VISIT (OUTPATIENT)
Dept: ONCOLOGY | Age: 72
End: 2022-12-30

## 2022-12-30 VITALS
HEART RATE: 92 BPM | BODY MASS INDEX: 25.81 KG/M2 | OXYGEN SATURATION: 82 % | HEIGHT: 71 IN | TEMPERATURE: 96.9 F | WEIGHT: 184.4 LBS

## 2022-12-30 DIAGNOSIS — C61 PROSTATE CANCER METASTATIC TO BONE (HCC): Primary | ICD-10-CM

## 2022-12-30 DIAGNOSIS — C79.51 PROSTATE CANCER METASTATIC TO BONE (HCC): Primary | ICD-10-CM

## 2022-12-30 PROCEDURE — 96372 THER/PROPH/DIAG INJ SC/IM: CPT

## 2022-12-30 PROCEDURE — 6360000002 HC RX W HCPCS: Performed by: INTERNAL MEDICINE

## 2022-12-30 RX ORDER — FAMOTIDINE 10 MG/ML
20 INJECTION, SOLUTION INTRAVENOUS
Status: CANCELLED | OUTPATIENT
Start: 2022-12-30

## 2022-12-30 RX ORDER — ONDANSETRON 2 MG/ML
8 INJECTION INTRAMUSCULAR; INTRAVENOUS
Status: CANCELLED | OUTPATIENT
Start: 2022-12-30

## 2022-12-30 RX ORDER — MEPERIDINE HYDROCHLORIDE 50 MG/ML
12.5 INJECTION INTRAMUSCULAR; INTRAVENOUS; SUBCUTANEOUS PRN
Status: CANCELLED | OUTPATIENT
Start: 2022-12-30

## 2022-12-30 RX ORDER — DIPHENHYDRAMINE HYDROCHLORIDE 50 MG/ML
50 INJECTION INTRAMUSCULAR; INTRAVENOUS
Status: CANCELLED | OUTPATIENT
Start: 2022-12-30

## 2022-12-30 RX ORDER — ALBUTEROL SULFATE 90 UG/1
4 AEROSOL, METERED RESPIRATORY (INHALATION) PRN
Status: CANCELLED | OUTPATIENT
Start: 2022-12-30

## 2022-12-30 RX ORDER — EPINEPHRINE 1 MG/ML
0.3 INJECTION, SOLUTION, CONCENTRATE INTRAVENOUS PRN
Status: CANCELLED | OUTPATIENT
Start: 2022-12-30

## 2022-12-30 RX ORDER — ACETAMINOPHEN 325 MG/1
650 TABLET ORAL
Status: CANCELLED | OUTPATIENT
Start: 2022-12-30

## 2022-12-30 RX ORDER — SODIUM CHLORIDE 9 MG/ML
INJECTION, SOLUTION INTRAVENOUS CONTINUOUS
Status: CANCELLED | OUTPATIENT
Start: 2022-12-30

## 2022-12-30 RX ADMIN — DENOSUMAB 120 MG: 120 INJECTION SUBCUTANEOUS at 14:57

## 2022-12-30 NOTE — PROGRESS NOTES
Department of [facility] On[facility]   Attending Clinic Note    Reason for Visit: Follow-up on a patient with Prostate Cancer    PCP:  Tabitha Hanson MD    History of Present Illness:  82 y/o male with High risk Prostate Cancer post XRT completion. On 01/12/2022, he completed 7920 cGy in 44 fractions directed to the prostate + proximal SV    Dr. Saniya Shankar for Urology at [facility], most recent visit 02/01/2022. The patient received Lupron injection #2 at this visit (planned for 36 months ADT)    The patient reports hospitalized at [facility] after ICD fired 02/18/2022. Admitted with ventricular tachyarrhythmia, taken for emergent VT ablation 02/18/2022 and ICD generator change 02/24/2022. Sustained VT post ablation, due to suspected sarcoidosis. Started on daily steroid. The patient was discharged 02/25/2022    Further review of outside records revealed a 1.6 cm ovoid faintly sclerotic lesion in the right humeral neck redemonstrated and indeterminate on XR Shoulder 2 view Right 12/14/2021 at [facility]    Referred to our clinic for further evaluation and treatment    PSA 20.86 on 03/18/2022  Testosterone <2.5 on 03/18/2022    NM Bone scan 03/30/2022 reviewed extensively with Dr. Ignacia Javed from Radiology team; there is activity within the lateral aspect of the right humeral head which corresponds to the sclerotic lesion seen on CT humerus consistent with osseous metastatic disease    CT chest/abdomen/pelvis 03/30/2022 Sclerotic lesions in the right humeral head and the right 2nd rib suspicious for developing bone metastasis. Sclerotic lesions in the lumbar spine L2 and L3 as noted concerning for bone metastasis. CT right humerus 03/30/2022 14 x 12 x 10 mm sclerotic focus in the proximal humerus compatible with osteoblastic metastasis. Metastatic Prostate Cancer to Bone  Recommended XTANDI. Side effects reviewed. He agreed to proceed.    D/C Casodex 04/06/2022  Lg Mosquera was started on 04/07/2022 with fair tolerance so far. Palliative RT to right proximal humerus was completed on 07/05/2022. [facility] 10/5-10/17/22 with increased lower extremity edema, falls, and debilitation. IV diuresis. History of LVAD  III. Declined IPR and discharged home with [address] when stable. PSA 2.37 on 10/21/2022  PSA 1.32 on 11/18/2022  PSA 2.53 on 12/29/2022    Today 12/30/2022; No fever, chills. Fair appetite and energy level. No N.V. No diarrhea. He continues to tolerate XTANDI well. Review of Systems;  CONSTITUTIONAL: No fever, chills. Fair appetite and energy level. ENMT: Eyes: No diplopia; Nose: No epistaxis. Mouth: No sore throat. RESPIRATORY: No hemoptysis, shortness of breath, cough. CARDIOVASCULAR: No chest pain  GASTROINTESTINAL: No nausea/vomiting, abdominal pain. No diarrhea. GENITOURINARY: No hematuria  NEURO: No syncope, presyncope or headache.    Remainder:  ROS NEGATIVE    Past Medical History:      Diagnosis Date    Anxiety     Aorto-iliac atherosclerosis ([address] 8/31/2020    Arthritis     Atrial fibrillation (HCC)     AVNRT (AV teddy re-entry tachycardia) ([address]     Blood type O+ 11/14/2019    CAD (coronary artery disease)     Cancer (HCC)     CHF (congestive heart failure) (HCC)     Diabetes mellitus ([address]     Diabetic neuropathy ([facility])     Diastolic dysfunction 49/11/6122    stage 3    Fall     right knee    Gout     chemically induced    History of blood transfusion     Hx of blood clots 1976    left leg    Hyperlipidemia     Hypertension     LBBB (left bundle branch block)     Left ventricular assist device (LVAD) complication 0833    Moderate mitral regurgitation 04/19/2016    Nonischemic cardiomyopathy (HCC)     Obesity     SNEHA (obstructive sleep apnea)     treated with CPAP    PVD (peripheral vascular disease) with claudication ([address] 8/31/2020    Sarcoidosis 2019    Severe tricuspid regurgitation 04/19/2016    SVT (supraventricular tachycardia) (HCC)     Viral cardiomyopathy ([address] Medications:  Reviewed and reconciled. Allergies: Allergies   Allergen Reactions    Food Hives     Strawberries      Chlorhexidine Rash    Soap & Cleansers Rash     TIDE detergent    Strawberry (Diagnostic) Rash     Other reaction(s): Eruption, ITCHING,WATERING EYES     Physical Exam:  Pulse 92   Temp 96.9 °F (36.1 °C)   Ht 5' 11\" (1.803 m)   Wt 184 lb 6.4 oz (83.6 kg)   SpO2 (!) 82%   BMI 25.72 kg/m²   GENERAL: Alert, oriented x 3, not in acute distress. HEENT: PERRLA; EOMI. Oropharynx clear. EXTREMITIES: Without clubbing, cyanosis, or edema. NEUROLOGIC: No focal deficits. ECOG PS 2    Lab Results   Component Value Date    PSA 2.53 12/29/2022    PSA 1.32 11/18/2022    PSA 2.37 10/21/2022     Impression/Plan:  82 y/o male with High risk prostate cancer post XRT completion. On 01/12/2022, he completed 7920 cGy in 40 fractions directed to the prostate + proximal SV for management of high risk adenocarcinoma of the prostate    Dr. Zuri Thornton for Urology at [facility], most recent visit 02/01/2022. The patient received Lupron injection #2 at this visit (planned for 36 months ADT)    Further review of outside records revealed a 1.6 cm ovoid faintly sclerotic lesion in the right humeral neck redemonstrated and indeterminate on XR Shoulder 2 view Right 12/14/2021 at [facility]    Referred to our clinic for further evaluation and treatment    PSA 20.86 on 03/18/2022  Testosterone <2.5 on 03/18/2022    NM Bone scan 03/30/2022 reviewed extensively with Dr. Dragan Reddy from Radiology team; there is activity within the lateral aspect of the right humeral head which corresponds to the sclerotic lesion seen on CT humerus consistent with osseous metastatic disease    CT chest/abdomen/pelvis 03/30/2022 Sclerotic lesions in the right humeral head and the right 2nd rib suspicious for developing bone metastasis. Sclerotic lesions in the lumbar spine L2 and L3 as noted concerning for bone metastasis.    CT right humerus 03/30/2022 14 x 12 x 10 mm sclerotic focus in the proximal humerus compatible with osteoblastic metastasis. Metastatic Prostate Cancer to Bone  Recommended XTANDI. Side effects reviewed. He agreed to proceed. Casodex D/C on 04/06/2022  Sandra Manzo was started on 04/07/2022 with fair tolerance so far. Palliative RT to right proximal humerus was completed on 07/05/2022. PSA 16.97 on 04/15/2022  Right Humerus X-Ray 06/21/2022 suspected blastic metastatic focus at the proximal humerus which is not appreciably changed since 03/30/2022. Imaging reviewed. PSA 4.07 on 07/01/2022  PSA 2.66 on 07/29/2022. PSA 3.10 on 08/26/2022. Bone scan 08/31/2022 No evidence of metastatic bone disease or new lesion  CT chest 08/31/2022 stable CT of the chest with cardiomegaly with postoperative changes in the chest and coronary artery calcification. Persistent sclerotic lesions in the right first rib and humeral head without change. CT abdomen/pelvis 08/31/2022 stable CT of the abdomen pelvis with persistent sclerotic lesions on L1 and L2.  1.3 cm nonspecific hypodense area in the tail of the pancreas. Ad[facility] 10/5-10/17/22 with increased lower extremity edema, falls, and debilitation. IV diuresis. History of LVAD HM III. Declined IPR and discharged home with [address] when stable. PSA 2.37 on 10/21/2022  PSA 1.32 on 11/18/2022. PSA 2.53 on 12/29/2022. Bone scan 12/29/2022 Findings compatible with degenerative changes. CT chest 12/29/2022 similar appearance of sclerotic areas osseous structures without evidence for disease progression or pathologic fracture. No soft tissue findings otherwise. CT abdomen/pelvis 12/29/2022: Sclerotic areas again noted lumbar spine unchanged from prior without progression or pathologic fracture.     Stable partially visualized cystic structure in the distal pancreas likely a side branch IPMN in the absence of prior pancreatitis without interval change or progression. Imaging reviewed. Encouraging results. Recommended to continue XTANDI and repeat scans in 3-4 months.  Labs reviewed; ok to proceed with Rosa Rojas; Rosa Rojas ordered and given today 12/30/2022  Continue XTANDI    RTC 4 weeks with labs and Cameron Melendez q4 weeks given bone metastasis (one given today 12/30/2022)    Barber Ponce MD   12/30/2022

## 2023-01-16 ENCOUNTER — APPOINTMENT (OUTPATIENT)
Dept: GENERAL RADIOLOGY | Age: 73
End: 2023-01-16
Payer: OTHER GOVERNMENT

## 2023-01-16 ENCOUNTER — HOSPITAL ENCOUNTER (EMERGENCY)
Age: 73
Discharge: HOME | End: 2023-01-17
Attending: EMERGENCY MEDICINE
Payer: OTHER GOVERNMENT

## 2023-01-16 DIAGNOSIS — D64.9 ANEMIA, UNSPECIFIED TYPE: Primary | ICD-10-CM

## 2023-01-16 DIAGNOSIS — I13.10 CARDIORENAL DISEASE: ICD-10-CM

## 2023-01-16 DIAGNOSIS — Z51.89 ENCOUNTER FOR BLOOD TRANSFUSION: ICD-10-CM

## 2023-01-16 LAB
ABO/RH: NORMAL
ALBUMIN SERPL-MCNC: 3.2 G/DL (ref 3.5–5.2)
ALP BLD-CCNC: 92 U/L (ref 40–129)
ALT SERPL-CCNC: 14 U/L (ref 0–40)
ANION GAP SERPL CALCULATED.3IONS-SCNC: 11 MMOL/L (ref 7–16)
ANISOCYTOSIS: ABNORMAL
ANTIBODY SCREEN: NORMAL
APTT: 31.8 SEC (ref 24.5–35.1)
AST SERPL-CCNC: 35 U/L (ref 0–39)
BASOPHILS ABSOLUTE: 0.01 E9/L (ref 0–0.2)
BASOPHILS RELATIVE PERCENT: 0.3 % (ref 0–2)
BILIRUB SERPL-MCNC: 0.3 MG/DL (ref 0–1.2)
BLOOD BANK DISPENSE STATUS: NORMAL
BLOOD BANK PRODUCT CODE: NORMAL
BPU ID: NORMAL
BUN BLDV-MCNC: 35 MG/DL (ref 6–23)
CALCIUM SERPL-MCNC: 9.1 MG/DL (ref 8.6–10.2)
CHLORIDE BLD-SCNC: 100 MMOL/L (ref 98–107)
CO2: 26 MMOL/L (ref 22–29)
CREAT SERPL-MCNC: 1.2 MG/DL (ref 0.7–1.2)
DESCRIPTION BLOOD BANK: NORMAL
EOSINOPHILS ABSOLUTE: 0.05 E9/L (ref 0.05–0.5)
EOSINOPHILS RELATIVE PERCENT: 1.5 % (ref 0–6)
GFR SERPL CREATININE-BSD FRML MDRD: >60 ML/MIN/1.73
GLUCOSE BLD-MCNC: 154 MG/DL (ref 74–99)
HCT VFR BLD CALC: 23.7 % (ref 37–54)
HEMOGLOBIN: 7.3 G/DL (ref 12.5–16.5)
HYPOCHROMIA: ABNORMAL
IMMATURE GRANULOCYTES #: 0.02 E9/L
IMMATURE GRANULOCYTES %: 0.6 % (ref 0–5)
INR BLD: 2.3
LYMPHOCYTES ABSOLUTE: 0.57 E9/L (ref 1.5–4)
LYMPHOCYTES RELATIVE PERCENT: 17.3 % (ref 20–42)
MCH RBC QN AUTO: 28.9 PG (ref 26–35)
MCHC RBC AUTO-ENTMCNC: 30.8 % (ref 32–34.5)
MCV RBC AUTO: 93.7 FL (ref 80–99.9)
MONOCYTES ABSOLUTE: 0.45 E9/L (ref 0.1–0.95)
MONOCYTES RELATIVE PERCENT: 13.7 % (ref 2–12)
NEUTROPHILS ABSOLUTE: 2.19 E9/L (ref 1.8–7.3)
NEUTROPHILS RELATIVE PERCENT: 66.6 % (ref 43–80)
PDW BLD-RTO: 17.9 FL (ref 11.5–15)
PLATELET # BLD: 154 E9/L (ref 130–450)
PMV BLD AUTO: 10.7 FL (ref 7–12)
POIKILOCYTES: ABNORMAL
POLYCHROMASIA: ABNORMAL
POTASSIUM REFLEX MAGNESIUM: 4.6 MMOL/L (ref 3.5–5)
PROTHROMBIN TIME: 26.4 SEC (ref 9.3–12.4)
RBC # BLD: 2.53 E12/L (ref 3.8–5.8)
SODIUM BLD-SCNC: 137 MMOL/L (ref 132–146)
TARGET CELLS: ABNORMAL
TOTAL PROTEIN: 6.8 G/DL (ref 6.4–8.3)
WBC # BLD: 3.3 E9/L (ref 4.5–11.5)

## 2023-01-16 PROCEDURE — 80053 COMPREHEN METABOLIC PANEL: CPT

## 2023-01-16 PROCEDURE — P9016 RBC LEUKOCYTES REDUCED: HCPCS

## 2023-01-16 PROCEDURE — 86850 RBC ANTIBODY SCREEN: CPT

## 2023-01-16 PROCEDURE — 86901 BLOOD TYPING SEROLOGIC RH(D): CPT

## 2023-01-16 PROCEDURE — 86900 BLOOD TYPING SEROLOGIC ABO: CPT

## 2023-01-16 PROCEDURE — 99285 EMERGENCY DEPT VISIT HI MDM: CPT

## 2023-01-16 PROCEDURE — 85730 THROMBOPLASTIN TIME PARTIAL: CPT

## 2023-01-16 PROCEDURE — 71046 X-RAY EXAM CHEST 2 VIEWS: CPT

## 2023-01-16 PROCEDURE — 86923 COMPATIBILITY TEST ELECTRIC: CPT

## 2023-01-16 PROCEDURE — 85025 COMPLETE CBC W/AUTO DIFF WBC: CPT

## 2023-01-16 PROCEDURE — 85610 PROTHROMBIN TIME: CPT

## 2023-01-16 RX ORDER — SODIUM CHLORIDE 9 MG/ML
INJECTION, SOLUTION INTRAVENOUS PRN
Status: DISCONTINUED | OUTPATIENT
Start: 2023-01-16 | End: 2023-01-17 | Stop reason: HOSPADM

## 2023-01-16 ASSESSMENT — PAIN - FUNCTIONAL ASSESSMENT: PAIN_FUNCTIONAL_ASSESSMENT: NONE - DENIES PAIN

## 2023-01-17 VITALS
TEMPERATURE: 97.7 F | WEIGHT: 185 LBS | HEIGHT: 71 IN | SYSTOLIC BLOOD PRESSURE: 118 MMHG | BODY MASS INDEX: 25.9 KG/M2 | OXYGEN SATURATION: 97 % | DIASTOLIC BLOOD PRESSURE: 90 MMHG | RESPIRATION RATE: 16 BRPM | HEART RATE: 70 BPM

## 2023-01-17 NOTE — ED PROVIDER NOTES
[facility] ENCOUNTER        Pt Name: Desiree Pruitt Sr. MRN: [id number]  Birthdate 1950  Date of evaluation: 1/16/2023  Provider: Doug Coleman MD  PCP: Katty Salter MD  Note Started: 9:55 PM EST 1/16/23    CHIEF COMPLAINT       Chief Complaint   Patient presents with    Abnormal Lab     Low h&H  family unsure how low   LVAD patient        HISTORY OF PRESENT ILLNESS: 1 or more Elements   History From: Patient and wife    Limitations to history : None    Desiree Pruitt Sr. is a 81 y.o. male who presents urgency department with low hemoglobin. Patient has a history of metastatic prostate cancer. He is currently on oral chemotherapy. He follows with Dr. Luz De La Cruz from the [facility]. Patient also follows with cardiology at [facility] in [place]. He has an LVAD in place and has had that for the past 3 years. Patient is not had any fevers or chills. He has routine outpatient lab work formed regularly. Lab work performed 2 days ago and his hemoglobin was 7.7. He was sent in to have a unit of packed red blood cells transfused. No changes in symptoms no dark or bloody stool. Patient is on Coumadin. He has a history of A. fib. Patient states he has had blood transfusions in the past.  Patient denies any gross hematuria no fevers no chills. Denies any chest pain or shortness of breath at this time. His wife is here who is his primary caretaker. They state they are \"just here for the blood\". Nursing Notes were all reviewed and agreed with or any disagreements were addressed in the HPI. REVIEW OF EXTERNAL NOTE :       Reviewed outpatient hematology/oncology note (see ED course)     REVIEW OF SYSTEMS :           Positives and Pertinent negatives as per HPI.      SURGICAL HISTORY     Past Surgical History:   Procedure Laterality Date    BACK SURGERY      CARDIAC CATHETERIZATION  11/21/2011    Dr. Camila Hartmann CARDIAC CATHETERIZATION  11/08/2019    Dr. Maria Esther Cardozo  08/20/2014    BiV/ICD  ([place] [facility])    Dr. Vira Wu CATH LAB PROCEDURE      ECHO COMPL W DOP COLOR FLOW  11/19/2011         ECHO COMPLETE  12/14/2013         EYE SURGERY Right 08/2019    FEMUR SURGERY      rt; compound frx right femur at 3years old. JOINT REPLACEMENT Left     knee    KNEE SURGERY      7 on right/1 on left/ total replacement on R    PICC LINE INSERTION NURSE  10/21/2019         PROSTATE BIOPSY  08/09/2021       CURRENTMEDICATIONS       Previous Medications    ACETAMINOPHEN (TYLENOL) 500 MG TABLET    Take 1,000 mg by mouth every 8 hours as needed for Pain    ALLOPURINOL (ZYLOPRIM) 100 MG TABLET    Take 1 tablet by mouth daily    AMIODARONE (CORDARONE) 200 MG TABLET    200 mg    AMOXICILLIN (AMOXIL) 875 MG TABLET    Take 875 mg by mouth 2 times daily    AMOXICILLIN-CLAVULANATE (AUGMENTIN) 875-125 MG PER TABLET    Take 1 tablet by mouth 2 times daily    ASPIRIN 81 MG EC TABLET    Take 81 mg by mouth daily    BACITRACIN 500 UNIT/GM OINTMENT    Apply topically 2 times daily Apply topically 2 times daily.     BACITRACIN 500 UNIT/GM OPHTHALMIC OINTMENT    every 4 hours    BUMETANIDE (BUMEX) 2 MG TABLET    Take 1 tablet by mouth daily    DIGOXIN (LANOXIN) 125 MCG TABLET    Take 125 mcg by mouth every other day     ENOXAPARIN (LOVENOX) 100 MG/ML INJECTION    Inject into the skin 2 times daily     ENZALUTAMIDE (XTANDI) 80 MG TABS    Take 160 mg by mouth daily    ERGOCALCIFEROL (ERGOCALCIFEROL) 1.25 MG (66642 UT) CAPSULE    Take 50,000 Units by mouth once a week Thursdays    HYDRALAZINE (APRESOLINE) 25 MG TABLET    Take 25 mg by mouth 3 times daily    MAGNESIUM 400 MG CAPS    Take 1 tablet by mouth daily     METOPROLOL SUCCINATE (TOPROL XL) 50 MG EXTENDED RELEASE TABLET    Take 50 mg by mouth daily    MEXILETINE (MEXITIL) 150 MG CAPSULE    Take 150 mg by mouth 3 times daily     MIDODRINE (PROAMATINE) 10 MG TABLET    Take 10 mg by mouth    MULTIPLE VITAMIN (MULTIVITAMINS PO)    Take 1 tablet by mouth 4 times daily Geriatric fusion oral chewable    ONDANSETRON (ZOFRAN) 4 MG TABLET    Take 4 mg by mouth every 6 hours as needed for Nausea or Vomiting    ONDANSETRON (ZOFRAN-ODT) 4 MG DISINTEGRATING TABLET    Take 4 mg by mouth every 8 hours as needed for Nausea or Vomiting    PANTOPRAZOLE (PROTONIX) 40 MG TABLET    Take 1 tablet by mouth every morning (before breakfast)    PREDNISONE PO    Take 5 mg by mouth daily    PREGABALIN (LYRICA) 50 MG CAPSULE    50 mg.     PSYLLIUM (KONSYL) 28.3 % PACK    Take 1 packet by mouth daily as needed Metamucil sugar free 3.4 gram powder pack    SENNA (SENOKOT) 8.6 MG TABLET    Take 1 tablet by mouth nightly     SODIUM CHLORIDE (OCEAN) 0.65 % NASAL SPRAY    1 spray by Nasal route 3 times daily as needed for Congestion     SULFAMETHOXAZOLE-TRIMETHOPRIM (BACTRIM DS;SEPTRA DS) 800-160 MG PER TABLET    Take 1 tablet by mouth three times a week    TAMSULOSIN (FLOMAX) 0.4 MG CAPSULE    Take 0.4 mg by mouth daily    TRIAMCINOLONE (KENALOG) 0.1 % CREAM    daily as needed     VALSARTAN (DIOVAN) 80 MG TABLET    Take 40 mg by mouth 2 times daily    WARFARIN (COUMADIN) 5 MG TABLET    Take 10 mg by mouth       ALLERGIES     Food, Chlorhexidine, Soap & cleansers, and Strawberry (diagnostic)    FAMILYHISTORY       Family History   Problem Relation Age of Onset    Alzheimer's Disease Mother     Diabetes Mother     No Known Problems Father         lives in [facility]    No Known Problems Brother     Other Brother         aneurysm behind his eye    Arrhythmia Brother          age 61    No Known Problems Brother     No Known Problems Brother         SOCIAL HISTORY       Social History     Tobacco Use    Smoking status: Former     Packs/day: 0.00     Years: 2.00     Pack years: 0.00     Types: Cigars, Cigarettes     Quit date: 1980     Years since quittin.0    Smokeless tobacco: Never    Tobacco comments:     non smoker   Vaping Use    Vaping Use: Never used   Substance Use Topics    Alcohol use: Not Currently    Drug use: Never       SCREENINGS        [place] Coma Scale  Eye Opening: Spontaneous  Best Verbal Response: Oriented  Best Motor Response: Obeys commands  [place] Coma Scale Score: 15                CIWA Assessment  BP:  (LVAD SEE MAP)  Heart Rate: 70           PHYSICAL EXAM  1 or more Elements     ED Triage Vitals   BP Temp Temp src Heart Rate Resp SpO2 Height Weight   01/16/23 2130 01/16/23 1651 -- 01/16/23 2130 01/16/23 1651 01/16/23 2130 01/16/23 1651 01/16/23 1651   (!) 118/90 97 °F (36.1 °C)  86 17 100 % 5' 11\" (1.803 m) 185 lb (83.9 kg)                Constitutional/General: Alert and oriented x3; NAD: overweight  Head: Normocephalic and atraumatic  Eyes: PERRL, EOMI, conjunctiva normal, sclera non icteric  ENT:  Oropharynx clear, handling secretions, no trismus, no asymmetry of the posterior oropharynx or uvular edema  Neck: Supple, full ROM, no stridor, no meningeal signs  Respiratory: Lungs clear to auscultation bilaterally, no wheezes, rales, or rhonchi. Not in respiratory distress  Cardiovascular:  Regular rate. Regular rhythm. LVAD in place 2+ distal pulses. Equal extremity pulses. Chest: No chest wall tenderness  GI:  Abdomen Soft, Non tender, Non distended. No rebound, guarding, or rigidity. No pulsatile masses. Musculoskeletal: Moves all extremities x 4. Warm and well perfused, no cyanosis. 2+ pitting edema to bilateral lower extremity capillary refill <3 seconds  Integument: skin warm and dry. No rashes.    Neurologic: GCS 15, no focal deficits, symmetric strength 5/5 in the upper and lower extremities bilaterally  Psychiatric: Normal Affect            DIAGNOSTIC RESULTS   LABS:    Labs Reviewed   CBC WITH AUTO DIFFERENTIAL - Abnormal; Notable for the following components:       Result Value    WBC 3.3 (*)     RBC 2.53 (*)     Hemoglobin 7.3 (*) Hematocrit 23.7 (*)     MCHC 30.8 (*)     RDW 17.9 (*)     Lymphocytes % 17.3 (*)     Monocytes % 13.7 (*)     Lymphocytes Absolute 0.57 (*)     All other components within normal limits   COMPREHENSIVE METABOLIC PANEL W/ REFLEX TO MG FOR LOW K - Abnormal; Notable for the following components:    Glucose 154 (*)     BUN 35 (*)     Albumin 3.2 (*)     All other components within normal limits   PROTIME-INR - Abnormal; Notable for the following components:    Protime 26.4 (*)     All other components within normal limits   APTT   TYPE AND SCREEN   PREPARE RBC (CROSSMATCH)       As interpreted by me, the above displayed labs are abnormal. All other labs obtained during this visit were within normal range or not returned as of this dictation. RADIOLOGY:   Non-plain film images such as CT, Ultrasound and MRI are read by the radiologist. Plain radiographic images are visualized and preliminarily interpreted by the ED Provider with the below findings:    No fluid overload. Interpretation per the Radiologist below, if available at the time of this note:    XR CHEST (2 VW)   Final Result   No acute process. Cardiomegaly. XR CHEST (2 VW)    Result Date: 1/16/2023  EXAMINATION: TWO XRAY VIEWS OF THE CHEST 1/16/2023 5:33 pm COMPARISON: None. HISTORY: ORDERING SYSTEM PROVIDED HISTORY: Shortness of breath TECHNOLOGIST PROVIDED HISTORY: Reason for exam:->Shortness of breath FINDINGS: The lungs are without acute focal process. There is no effusion or pneumothorax. The cardiomediastinal silhouette is without acute process. The osseous structures are without acute process. No acute process. Cardiomegaly. No results found. PROCEDURES   Unless otherwise noted below, none          CRITICAL CARE TIME (.cct)     CRITICAL CARE:  32 MINUTES.           Please note that the withdrawal or failure to initiate urgent interventions for this patient would likely result in a life threatening deterioration or permanent disability. Accordingly this patient received the above mentioned time, excluding separately billable procedures. PAST MEDICAL HISTORY/Chronic Conditions Affecting Care      has a past medical history of Anxiety, Aorto-iliac atherosclerosis ([facility] (8/31/2020), Arthritis, Atrial fibrillation ([facility] AVNRT (AV teddy re-entry tachycardia) ([facility] Blood type O+ (11/14/2019), CAD (coronary artery disease), Cancer ([facility] CHF (congestive heart failure) ([facility] Diabetes mellitus ([facility] Diabetic neuropathy ([facility] Diastolic dysfunction (74/39/5482), Fall, Gout, History of blood transfusion, blood clots (1976), Hyperlipidemia, Hypertension, LBBB (left bundle branch block), Left ventricular assist device (LVAD) complication (9593), Moderate mitral regurgitation (04/19/2016), Nonischemic cardiomyopathy ([facility] Obesity, SNEHA (obstructive sleep apnea), PVD (peripheral vascular disease) with claudication ([facility] (8/31/2020), Sarcoidosis (2019), Severe tricuspid regurgitation (04/19/2016), SVT (supraventricular tachycardia) ([facility] and Viral cardiomyopathy ([facility] EMERGENCY DEPARTMENT COURSE    Vitals:    Vitals:    01/16/23 2130 01/16/23 2315 01/16/23 2352 01/17/23 0145   BP: (!) 118/90      Pulse: 86 80 91 70   Resp: 16 16 16 16   Temp:  98.9 °F (37.2 °C) 97.7 °F (36.5 °C) 97.7 °F (36.5 °C)   TempSrc:  Oral Oral Oral   SpO2: 100% 100% 100% 97%   Weight:       Height:           Patient was given the following medications:  Medications   0.9 % sodium chloride infusion (has no administration in time range)           Is this patient to be included in the SEP-1 Core Measure due to severe sepsis or septic shock? No Exclusion criteria - the patient is NOT to be included for SEP-1 Core Measure due to:  Infection is not suspected        Medical Decision Making/Differential Diagnosis:    CC/HPI Summary, Social Determinants of health, Records Reviewed, DDx, testing done/not done, ED Course, Reassessment, disposition considerations/shared decision making with patient, consults, disposition:      Patient is 79-year-old gentleman with history of metastatic prostate cancer to the bone. Followed by Dr. Dennis Gonzales for hematology oncology. He also follows at [facility] for history of CHF heart failure is an LVAD in place. He has had this for 3 years. No chest pain or shortness of breath. He had routine outpatient lab work today that showed a hemoglobin of 7.7. He was sent in for 1 unit of packed red blood cell transfusion. Denies any black or bloody stool he is on Coumadin for A. fib. He has had frequent transfusions due to chronic anemia in the past.    Differential diagnosis acute on chronic chronic anemia STEFF CHF fluid overload pancytopenia    Patient's hemoglobin was concerned here to be 7.3. Given his chronic cardiorenal disease, recommendation is hematologist are for hemoglobin to become closer to 8. He was ordered 1 unit of packed red blood cells for stat transfusion. Patient denies any black or bloody stools declining rectal exam.  He has been frequently transfused for similar symptoms in the past.  Chemistry was normal creatinine was normal at 1.2 INR was 2.3 which is therapeutic based on his Coumadin for A. fib. Chest x-ray showed no acute process no fluid overload. Patient tolerated transfusion well. He will closely follow-up outpatient with Dr. Dennis Gonzales for heme-onc. He will return to the ER for any new worsening symptoms or follow-up with his heart doctors at [facility]. Patient is stable for outpatient follow-up. Wife will take him home. ED Course as of 01/17/23 0159   Mon Jan 16, 2023 2141 Reviewed outpatient heme-onc note from 12/30/2022 by Dr. Dennis Gonzales. Patient has a history of metastatic prostate cancer with mets to the bone. Currently undergoing treatment.   Patient is currently being treated with oral Deepa Birkenhead   2151 Reviewed patient's outpatient blood work on his phone on his [employer] that showed the hemoglobin was 7.7 from 2 days ago. Patient was sent in by his hematologist to have 1 unit of packed red blood cells for transfusion. [KK]      ED Course User Index  [KK] Benji Palacios MD        Medical Decision Making  Amount and/or Complexity of Data Reviewed  Labs: ordered. Radiology: ordered. Risk  Prescription drug management. CONSULTS: (Who and What was discussed)  None        I am the Primary Clinician of Record. FINAL IMPRESSION      1. Anemia, unspecified type    2. Cardiorenal disease Stable   3.  Encounter for blood transfusion          DISPOSITION/PLAN     DISPOSITION Decision To Discharge 01/17/2023 01:57:50 AM      PATIENT REFERRED TO:  Candelaria Huntley MD  [address] [facility] [phone]    Call in 2 days  for follow-up appointment    [place]  [phone]    Call in 2 days  for follow-up appointment    DISCHARGE MEDICATIONS:  New Prescriptions    No medications on file       DISCONTINUED MEDICATIONS:  Discontinued Medications    No medications on file              (Please note that portions of this note were completed with a voice recognition program.  Efforts were made to edit the dictations but occasionally words are mis-transcribed.)    Britni Reece MD (electronically signed)            Benji Palacios MD  01/18/23 0546

## 2023-01-27 ENCOUNTER — HOSPITAL ENCOUNTER (OUTPATIENT)
Dept: INFUSION THERAPY | Age: 73
Discharge: HOME | End: 2023-01-27

## 2023-02-03 ENCOUNTER — OFFICE VISIT (OUTPATIENT)
Dept: ONCOLOGY | Age: 73
End: 2023-02-03
Payer: OTHER GOVERNMENT

## 2023-02-03 ENCOUNTER — HOSPITAL ENCOUNTER (OUTPATIENT)
Dept: INFUSION THERAPY | Age: 73
Discharge: HOME | End: 2023-02-03
Payer: OTHER GOVERNMENT

## 2023-02-03 VITALS
WEIGHT: 210 LBS | TEMPERATURE: 97.7 F | BODY MASS INDEX: 29.4 KG/M2 | HEART RATE: 68 BPM | HEIGHT: 71 IN | OXYGEN SATURATION: 99 %

## 2023-02-03 DIAGNOSIS — C79.51 PROSTATE CANCER METASTATIC TO BONE (HCC): Primary | ICD-10-CM

## 2023-02-03 DIAGNOSIS — C61 PROSTATE CANCER METASTATIC TO BONE (HCC): Primary | ICD-10-CM

## 2023-02-03 LAB — PROSTATE SPECIFIC ANTIGEN: 1.47 NG/ML (ref 0–4)

## 2023-02-03 PROCEDURE — G8484 FLU IMMUNIZE NO ADMIN: HCPCS | Performed by: INTERNAL MEDICINE

## 2023-02-03 PROCEDURE — 3017F COLORECTAL CA SCREEN DOC REV: CPT | Performed by: INTERNAL MEDICINE

## 2023-02-03 PROCEDURE — 36415 COLL VENOUS BLD VENIPUNCTURE: CPT

## 2023-02-03 PROCEDURE — G8417 CALC BMI ABV UP PARAM F/U: HCPCS | Performed by: INTERNAL MEDICINE

## 2023-02-03 PROCEDURE — 96372 THER/PROPH/DIAG INJ SC/IM: CPT

## 2023-02-03 PROCEDURE — 84153 ASSAY OF PSA TOTAL: CPT

## 2023-02-03 PROCEDURE — 1036F TOBACCO NON-USER: CPT | Performed by: INTERNAL MEDICINE

## 2023-02-03 PROCEDURE — 6360000002 HC RX W HCPCS: Performed by: INTERNAL MEDICINE

## 2023-02-03 PROCEDURE — 99214 OFFICE O/P EST MOD 30 MIN: CPT | Performed by: INTERNAL MEDICINE

## 2023-02-03 PROCEDURE — G8427 DOCREV CUR MEDS BY ELIG CLIN: HCPCS | Performed by: INTERNAL MEDICINE

## 2023-02-03 PROCEDURE — 1123F ACP DISCUSS/DSCN MKR DOCD: CPT | Performed by: INTERNAL MEDICINE

## 2023-02-03 RX ORDER — ALBUTEROL SULFATE 90 UG/1
4 AEROSOL, METERED RESPIRATORY (INHALATION) PRN
Status: CANCELLED | OUTPATIENT
Start: 2023-02-03

## 2023-02-03 RX ORDER — SODIUM CHLORIDE 9 MG/ML
INJECTION, SOLUTION INTRAVENOUS CONTINUOUS
Status: CANCELLED | OUTPATIENT
Start: 2023-02-03

## 2023-02-03 RX ORDER — DOXYCYCLINE HYCLATE 100 MG/1
100 CAPSULE ORAL 2 TIMES DAILY
COMMUNITY

## 2023-02-03 RX ORDER — FAMOTIDINE 10 MG/ML
20 INJECTION, SOLUTION INTRAVENOUS
Status: CANCELLED | OUTPATIENT
Start: 2023-02-03

## 2023-02-03 RX ORDER — EPINEPHRINE 1 MG/ML
0.3 INJECTION, SOLUTION, CONCENTRATE INTRAVENOUS PRN
Status: CANCELLED | OUTPATIENT
Start: 2023-02-03

## 2023-02-03 RX ORDER — ACETAMINOPHEN 325 MG/1
650 TABLET ORAL
Status: CANCELLED | OUTPATIENT
Start: 2023-02-03

## 2023-02-03 RX ORDER — ONDANSETRON 2 MG/ML
8 INJECTION INTRAMUSCULAR; INTRAVENOUS
Status: CANCELLED | OUTPATIENT
Start: 2023-02-03

## 2023-02-03 RX ORDER — MEPERIDINE HYDROCHLORIDE 50 MG/ML
12.5 INJECTION INTRAMUSCULAR; INTRAVENOUS; SUBCUTANEOUS PRN
Status: CANCELLED | OUTPATIENT
Start: 2023-02-03

## 2023-02-03 RX ORDER — DIPHENHYDRAMINE HYDROCHLORIDE 50 MG/ML
50 INJECTION INTRAMUSCULAR; INTRAVENOUS
Status: CANCELLED | OUTPATIENT
Start: 2023-02-03

## 2023-02-03 RX ADMIN — DENOSUMAB 120 MG: 120 INJECTION SUBCUTANEOUS at 09:09

## 2023-02-03 NOTE — PROGRESS NOTES
Department of [facility] Med Oncology   Attending Clinic Note    Reason for Visit: Follow-up on a patient with Prostate Cancer    PCP:  Amor Orozco MD    History of Present Illness:  82 y/o male with High risk Prostate Cancer post XRT completion. On 01/12/2022, he completed 7920 cGy in 44 fractions directed to the prostate + proximal SV    Dr. Lacey Ortez for Urology at [facility], most recent visit 02/01/2022. The patient received Lupron injection #2 at this visit (planned for 36 months ADT)    The patient reports hospitalized at [facility] after ICD fired 02/18/2022. Admitted with ventricular tachyarrhythmia, taken for emergent VT ablation 02/18/2022 and ICD generator change 02/24/2022. Sustained VT post ablation, due to suspected sarcoidosis. Started on daily steroid. The patient was discharged 02/25/2022    Further review of outside records revealed a 1.6 cm ovoid faintly sclerotic lesion in the right humeral neck redemonstrated and indeterminate on XR Shoulder 2 view Right 12/14/2021 at [facility]    Referred to our clinic for further evaluation and treatment    PSA 20.86 on 03/18/2022  Testosterone <2.5 on 03/18/2022    NM Bone scan 03/30/2022 reviewed extensively with Dr. Yadi Obrien from Radiology team; there is activity within the lateral aspect of the right humeral head which corresponds to the sclerotic lesion seen on CT humerus consistent with osseous metastatic disease    CT chest/abdomen/pelvis 03/30/2022 Sclerotic lesions in the right humeral head and the right 2nd rib suspicious for developing bone metastasis. Sclerotic lesions in the lumbar spine L2 and L3 as noted concerning for bone metastasis. CT right humerus 03/30/2022 14 x 12 x 10 mm sclerotic focus in the proximal humerus compatible with osteoblastic metastasis. Metastatic Prostate Cancer to Bone  Recommended XTANDI. Side effects reviewed. He agreed to proceed.    D/C Casodex 04/06/2022  Doroteo Adan was started on 04/07/2022 with fair tolerance so far. Palliative RT to right proximal humerus was completed on 07/05/2022. [facility] 10[facility]10[facility]17/22 with increased lower extremity edema, falls, and debilitation. IV diuresis. History of LVAD HM III. Declined IPR and [facility]. PSA 2.37 on 10/21/2022  PSA 1.32 on 11/18/2022  PSA 2.53 on 12/29/2022    Today 02/03/2023; No fever, chills. Fair appetite and energy level. No N.V. No diarrhea. He continues to tolerate XTANDI well. Recent hospital admission for fluid overload reviewed. Recovering well post hospital discharge. SOB and LE edema improved. Review of Systems;  CONSTITUTIONAL: No fever, chills. Fair appetite and energy level. ENMT: Eyes: No diplopia; Nose: No epistaxis. Mouth: No sore throat. RESPIRATORY: No hemoptysis. Shortness of breath improved. CARDIOVASCULAR: No chest pain. LE edema improved. GASTROINTESTINAL: No nausea/vomiting, abdominal pain. No diarrhea. GENITOURINARY: No hematuria  NEURO: No syncope, presyncope or headache.    Remainder:ROS NEGATIVE    Past Medical History:      Diagnosis Date    Anxiety     Aorto-iliac atherosclerosis ([address] 8/31/2020    Arthritis     Atrial fibrillation ([address]     AVNRT (AV teddy re-entry tachycardia) ([address]     Blood type O+ 11/14/2019    CAD (coronary artery disease)     Cancer (HCC)     CHF (congestive heart failure) (HCC)     Diabetes mellitus ([address]     Diabetic neuropathy ([facility])     Diastolic dysfunction 35/03/7738    stage 3    Fall     right knee    Gout     chemically induced    History of blood transfusion     Hx of blood clots 1976    left leg    Hyperlipidemia     Hypertension     LBBB (left bundle branch block)     Left ventricular assist device (LVAD) complication 0148    Moderate mitral regurgitation 04/19/2016    Nonischemic cardiomyopathy (HCC)     Obesity     SNEHA (obstructive sleep apnea)     treated with CPAP    PVD (peripheral vascular disease) with claudication ([address] 8/31/2020 Sarcoidosis 2019    Severe tricuspid regurgitation 04/19/2016    SVT (supraventricular tachycardia) (HCC)     Viral cardiomyopathy (HCC)      Medications:  Reviewed and reconciled. Allergies: Allergies   Allergen Reactions    Food Hives     Strawberries      Chlorhexidine Rash    Soap & Cleansers Rash     TIDE detergent    Strawberry (Diagnostic) Rash     Other reaction(s): Eruption, ITCHING,WATERING EYES     Physical Exam:  Pulse 68   Temp 97.7 °F (36.5 °C)   Ht 5' 11\" (1.803 m)   Wt 210 lb (95.3 kg)   SpO2 99%   BMI 29.29 kg/m²   GENERAL: Alert, oriented x 3, not in acute distress. HEENT: PERRLA; EOMI. Oropharynx clear. LUNGS: Fair air entry bennie  CVS: RRR  EXTREMITIES: Without clubbing, cyanosis, or edema. NEUROLOGIC: No focal deficits. ECOG PS 2    Lab Results   Component Value Date    PSA 2.53 12/29/2022    PSA 1.32 11/18/2022    PSA 2.37 10/21/2022     Impression/Plan:  80 y/o male with High risk prostate cancer post XRT completion. On 01/12/2022, he completed 7920 cGy in 40 fractions directed to the prostate + proximal SV for management of high risk adenocarcinoma of the prostate    Dr. Yenifer Rangel for Urology at [facility], most recent visit 02/01/2022.    The patient received Lupron injection #2 at this visit (planned for 36 months ADT)    Further review of outside records revealed a 1.6 cm ovoid faintly sclerotic lesion in the right humeral neck redemonstrated and indeterminate on XR Shoulder 2 view Right 12/14/2021 at [facility]    Referred to our clinic for further evaluation and treatment    PSA 20.86 on 03/18/2022  Testosterone <2.5 on 03/18/2022    NM Bone scan 03/30/2022 reviewed extensively with Dr. Josef Leiva from Radiology team; there is activity within the lateral aspect of the right humeral head which corresponds to the sclerotic lesion seen on CT humerus consistent with osseous metastatic disease    CT chest/abdomen/pelvis 03/30/2022 Sclerotic lesions in the right humeral head and the right 2nd rib suspicious for developing bone metastasis. Sclerotic lesions in the lumbar spine L2 and L3 as noted concerning for bone metastasis. CT right humerus 03/30/2022 14 x 12 x 10 mm sclerotic focus in the proximal humerus compatible with osteoblastic metastasis. Metastatic Prostate Cancer to Bone  Recommended XTANDI. Side effects reviewed. He agreed to proceed. Casodex D/C on 04/06/2022  Suanne Glass was started on 04/07/2022 with fair tolerance so far. Palliative RT to right proximal humerus was completed on 07/05/2022. PSA 16.97 on 04/15/2022  Right Humerus X-Ray 06/21/2022 suspected blastic metastatic focus at the proximal humerus which is not appreciably changed since 03/30/2022. Imaging reviewed. PSA 4.07 on 07/01/2022  PSA 2.66 on 07/29/2022. PSA 3.10 on 08/26/2022. Bone scan 08/31/2022 No evidence of metastatic bone disease or new lesion  CT chest 08/31/2022 stable CT of the chest with cardiomegaly with postoperative changes in the chest and coronary artery calcification. Persistent sclerotic lesions in the right first rib and humeral head without change. CT abdomen/pelvis 08/31/2022 stable CT of the abdomen pelvis with persistent sclerotic lesions on L1 and L2.  1.3 cm nonspecific hypodense area in the tail of the pancreas. Admitted to [facility] 10/5-10/17/22 with increased lower extremity edema, falls, and debilitation. IV diuresis. History of LVAD HM III. Declined IPR and discharged home with [facility] services when stable. PSA 2.37 on 10/21/2022  PSA 1.32 on 11/18/2022. PSA 2.53 on 12/29/2022. Bone scan 12/29/2022 Findings compatible with degenerative changes. CT chest 12/29/2022 similar appearance of sclerotic areas osseous structures without evidence for disease progression or pathologic fracture. No soft tissue findings otherwise.     CT abdomen/pelvis 12/29/2022: Sclerotic areas again noted lumbar spine unchanged from prior without progression or pathologic fracture. Stable partially visualized cystic structure in the distal pancreas likely a side branch IPMN in the absence of prior pancreatitis without interval change or progression. Encouraging results. Recommended to continue XTANDI and repeat scans in 3-4 months. CXR 01/20/2023: shallow lung volumes with probable bibasilar atelectasis. Imaging reviewed. Labs reviewed. PSA pending 02/03/2023. Ok to proceed with Maicol Ree; Maicol Ree ordered and given today 02/03/2023.   Continue XTANDI    RTC 4 weeks with labs and Cheri Burdick q4 weeks given bone metastasis (labs reviewed ok to proceed; one ordered to be given today 02/03/2023)    Pasquale Santizo MD   2/3/2023

## 2023-02-22 ENCOUNTER — HOSPITAL ENCOUNTER (OUTPATIENT)
Age: 73
Discharge: HOME | End: 2023-02-22
Payer: OTHER GOVERNMENT

## 2023-02-22 DIAGNOSIS — C79.51 PROSTATE CANCER METASTATIC TO BONE (HCC): ICD-10-CM

## 2023-02-22 DIAGNOSIS — C61 PROSTATE CANCER METASTATIC TO BONE (HCC): ICD-10-CM

## 2023-02-22 LAB
ALBUMIN SERPL-MCNC: 3.3 G/DL (ref 3.5–5.2)
ALP BLD-CCNC: 117 U/L (ref 40–129)
ALT SERPL-CCNC: 15 U/L (ref 0–40)
ANION GAP SERPL CALCULATED.3IONS-SCNC: 9 MMOL/L (ref 7–16)
AST SERPL-CCNC: 20 U/L (ref 0–39)
BILIRUB SERPL-MCNC: 0.2 MG/DL (ref 0–1.2)
BUN BLDV-MCNC: 33 MG/DL (ref 6–23)
CALCIUM SERPL-MCNC: 9.2 MG/DL (ref 8.6–10.2)
CHLORIDE BLD-SCNC: 100 MMOL/L (ref 98–107)
CO2: 26 MMOL/L (ref 22–29)
CREAT SERPL-MCNC: 1.5 MG/DL (ref 0.7–1.2)
DIGOXIN LEVEL: 0.9 NG/ML (ref 0.8–2)
GFR SERPL CREATININE-BSD FRML MDRD: 50 ML/MIN/1.73
GLUCOSE BLD-MCNC: 163 MG/DL (ref 74–99)
INR BLD: 3.9
POTASSIUM SERPL-SCNC: 5 MMOL/L (ref 3.5–5)
PROTHROMBIN TIME: 42.8 SEC (ref 9.3–12.4)
SODIUM BLD-SCNC: 135 MMOL/L (ref 132–146)
TOTAL PROTEIN: 7.1 G/DL (ref 6.4–8.3)

## 2023-02-22 PROCEDURE — 80162 ASSAY OF DIGOXIN TOTAL: CPT

## 2023-02-22 PROCEDURE — 80053 COMPREHEN METABOLIC PANEL: CPT

## 2023-02-22 PROCEDURE — 36415 COLL VENOUS BLD VENIPUNCTURE: CPT

## 2023-02-22 PROCEDURE — 85610 PROTHROMBIN TIME: CPT

## 2023-02-23 ENCOUNTER — TELEPHONE (OUTPATIENT)
Dept: FAMILY MEDICINE CLINIC | Age: 73
End: 2023-02-23

## 2023-02-23 DIAGNOSIS — R09.89 POOR CIRCULATION: ICD-10-CM

## 2023-02-23 DIAGNOSIS — I42.8 NONISCHEMIC CARDIOMYOPATHY (HCC): Primary | ICD-10-CM

## 2023-02-23 DIAGNOSIS — Z95.811 LVAD (LEFT VENTRICULAR ASSIST DEVICE) PRESENT (HCC): ICD-10-CM

## 2023-02-23 NOTE — TELEPHONE ENCOUNTER
Pt called stating he goes to [facility] for something with his heart. The doctor up there recommended that he gets into [facility] to help with circulation his legs.  He stated he needed a referral. He has an appt coming up 03/28/2023

## 2023-02-27 ENCOUNTER — TELEPHONE (OUTPATIENT)
Dept: FAMILY MEDICINE CLINIC | Age: 73
End: 2023-02-27

## 2023-02-27 NOTE — TELEPHONE ENCOUNTER
Pt had called me back to tell me where he is getting his physical therapy done and had stated he wanted me to let the doctor know he fell on Thursday. He had to call 911 to help him get up. No sudden injuries. He then fell again Saturday on the hardwood floor. He has some brusing this time and bumps on his knee. States swelling has gone down but they are still black and blue. Bumped his head but states so smyptoms of head pain. He is just sore. Just wanted to inform doctor.

## 2023-02-27 NOTE — TELEPHONE ENCOUNTER
Pt notified and stated he is fine, just he feels like someone hit him with a club.  Pt states if he needs an appointment he will call    Electronically signed by Mikey Johnson MA on 2/27/2023 at 3:29 PM

## 2023-02-28 NOTE — TELEPHONE ENCOUNTER
Lenny Paris called today and stated that she wants to coordinate a next appointment with Dr Damaris Avila. She did say that Kole López is not doing great today and that they are giving him a unit of blood in [facility]. The dr's also said that there is a good chance that he might lose his right foot and maybe leg. But she is looking for direction on what is next concerning the follow up and [employer]. Quality 431: Preventive Care And Screening: Unhealthy Alcohol Use - Screening: Patient not identified as an unhealthy alcohol user when screened for unhealthy alcohol use using a systematic screening method Quality 111:Pneumonia Vaccination Status For Older Adults: Pneumococcal vaccine (PPSV23) administered on or after patient’s 60th birthday and before the end of the measurement period Quality 110: Preventive Care And Screening: Influenza Immunization: Influenza immunization was not ordered or administered, reason not given Quality 226: Preventive Care And Screening: Tobacco Use: Screening And Cessation Intervention: Patient screened for tobacco use and is an ex/non-smoker Detail Level: Detailed

## 2023-03-03 ENCOUNTER — OFFICE VISIT (OUTPATIENT)
Dept: ONCOLOGY | Age: 73
End: 2023-03-03
Payer: OTHER GOVERNMENT

## 2023-03-03 ENCOUNTER — HOSPITAL ENCOUNTER (OUTPATIENT)
Dept: INFUSION THERAPY | Age: 73
Discharge: HOME | End: 2023-03-03
Payer: MEDICARE

## 2023-03-03 VITALS — HEIGHT: 71 IN | HEART RATE: 65 BPM | BODY MASS INDEX: 33.04 KG/M2 | WEIGHT: 236 LBS

## 2023-03-03 DIAGNOSIS — C79.51 PROSTATE CANCER METASTATIC TO BONE (HCC): Primary | ICD-10-CM

## 2023-03-03 DIAGNOSIS — C61 PROSTATE CANCER METASTATIC TO BONE (HCC): Primary | ICD-10-CM

## 2023-03-03 LAB
ALBUMIN SERPL-MCNC: 3.4 G/DL (ref 3.5–5.2)
ALP BLD-CCNC: 110 U/L (ref 40–129)
ALT SERPL-CCNC: 14 U/L (ref 0–40)
ANION GAP SERPL CALCULATED.3IONS-SCNC: 9 MMOL/L (ref 7–16)
ANISOCYTOSIS: ABNORMAL
AST SERPL-CCNC: 21 U/L (ref 0–39)
BASOPHILS ABSOLUTE: 0.01 E9/L (ref 0–0.2)
BASOPHILS RELATIVE PERCENT: 0.3 % (ref 0–2)
BILIRUB SERPL-MCNC: 0.3 MG/DL (ref 0–1.2)
BUN BLDV-MCNC: 31 MG/DL (ref 6–23)
CALCIUM SERPL-MCNC: 9.2 MG/DL (ref 8.6–10.2)
CHLORIDE BLD-SCNC: 102 MMOL/L (ref 98–107)
CO2: 27 MMOL/L (ref 22–29)
CREAT SERPL-MCNC: 1.5 MG/DL (ref 0.7–1.2)
EOSINOPHILS ABSOLUTE: 0.06 E9/L (ref 0.05–0.5)
EOSINOPHILS RELATIVE PERCENT: 1.6 % (ref 0–6)
GFR SERPL CREATININE-BSD FRML MDRD: 50 ML/MIN/1.73
GLUCOSE BLD-MCNC: 160 MG/DL (ref 74–99)
HCT VFR BLD CALC: 28.6 % (ref 37–54)
HEMOGLOBIN: 8.9 G/DL (ref 12.5–16.5)
HYPOCHROMIA: ABNORMAL
IMMATURE GRANULOCYTES #: 0.02 E9/L
IMMATURE GRANULOCYTES %: 0.5 % (ref 0–5)
LYMPHOCYTES ABSOLUTE: 0.49 E9/L (ref 1.5–4)
LYMPHOCYTES RELATIVE PERCENT: 13.4 % (ref 20–42)
MCH RBC QN AUTO: 28.8 PG (ref 26–35)
MCHC RBC AUTO-ENTMCNC: 31.1 % (ref 32–34.5)
MCV RBC AUTO: 92.6 FL (ref 80–99.9)
MONOCYTES ABSOLUTE: 0.4 E9/L (ref 0.1–0.95)
MONOCYTES RELATIVE PERCENT: 10.9 % (ref 2–12)
NEUTROPHILS ABSOLUTE: 2.69 E9/L (ref 1.8–7.3)
NEUTROPHILS RELATIVE PERCENT: 73.3 % (ref 43–80)
PDW BLD-RTO: 20.4 FL (ref 11.5–15)
PLATELET # BLD: 160 E9/L (ref 130–450)
PMV BLD AUTO: 9.8 FL (ref 7–12)
POLYCHROMASIA: ABNORMAL
POTASSIUM SERPL-SCNC: 4.6 MMOL/L (ref 3.5–5)
PROSTATE SPECIFIC ANTIGEN: 1.18 NG/ML (ref 0–4)
RBC # BLD: 3.09 E12/L (ref 3.8–5.8)
SODIUM BLD-SCNC: 138 MMOL/L (ref 132–146)
TOTAL PROTEIN: 7 G/DL (ref 6.4–8.3)
WBC # BLD: 3.7 E9/L (ref 4.5–11.5)

## 2023-03-03 PROCEDURE — 99214 OFFICE O/P EST MOD 30 MIN: CPT | Performed by: INTERNAL MEDICINE

## 2023-03-03 PROCEDURE — 36415 COLL VENOUS BLD VENIPUNCTURE: CPT

## 2023-03-03 PROCEDURE — 96372 THER/PROPH/DIAG INJ SC/IM: CPT

## 2023-03-03 PROCEDURE — 3017F COLORECTAL CA SCREEN DOC REV: CPT | Performed by: INTERNAL MEDICINE

## 2023-03-03 PROCEDURE — 80053 COMPREHEN METABOLIC PANEL: CPT

## 2023-03-03 PROCEDURE — G8484 FLU IMMUNIZE NO ADMIN: HCPCS | Performed by: INTERNAL MEDICINE

## 2023-03-03 PROCEDURE — 85025 COMPLETE CBC W/AUTO DIFF WBC: CPT

## 2023-03-03 PROCEDURE — 6360000002 HC RX W HCPCS: Performed by: INTERNAL MEDICINE

## 2023-03-03 PROCEDURE — G8417 CALC BMI ABV UP PARAM F/U: HCPCS | Performed by: INTERNAL MEDICINE

## 2023-03-03 PROCEDURE — G8427 DOCREV CUR MEDS BY ELIG CLIN: HCPCS | Performed by: INTERNAL MEDICINE

## 2023-03-03 PROCEDURE — 1036F TOBACCO NON-USER: CPT | Performed by: INTERNAL MEDICINE

## 2023-03-03 PROCEDURE — 84153 ASSAY OF PSA TOTAL: CPT

## 2023-03-03 PROCEDURE — 1123F ACP DISCUSS/DSCN MKR DOCD: CPT | Performed by: INTERNAL MEDICINE

## 2023-03-03 RX ORDER — MEPERIDINE HYDROCHLORIDE 50 MG/ML
12.5 INJECTION INTRAMUSCULAR; INTRAVENOUS; SUBCUTANEOUS PRN
Status: CANCELLED | OUTPATIENT
Start: 2023-03-03

## 2023-03-03 RX ORDER — ALBUTEROL SULFATE 90 UG/1
4 AEROSOL, METERED RESPIRATORY (INHALATION) PRN
Status: CANCELLED | OUTPATIENT
Start: 2023-03-03

## 2023-03-03 RX ORDER — ACETAMINOPHEN 325 MG/1
650 TABLET ORAL
Status: CANCELLED | OUTPATIENT
Start: 2023-03-03

## 2023-03-03 RX ORDER — SODIUM CHLORIDE 9 MG/ML
INJECTION, SOLUTION INTRAVENOUS CONTINUOUS
Status: CANCELLED | OUTPATIENT
Start: 2023-03-03

## 2023-03-03 RX ORDER — DIPHENHYDRAMINE HYDROCHLORIDE 50 MG/ML
50 INJECTION INTRAMUSCULAR; INTRAVENOUS
Status: CANCELLED | OUTPATIENT
Start: 2023-03-03

## 2023-03-03 RX ORDER — FAMOTIDINE 10 MG/ML
20 INJECTION, SOLUTION INTRAVENOUS
Status: CANCELLED | OUTPATIENT
Start: 2023-03-03

## 2023-03-03 RX ORDER — EPINEPHRINE 1 MG/ML
0.3 INJECTION, SOLUTION, CONCENTRATE INTRAVENOUS PRN
Status: CANCELLED | OUTPATIENT
Start: 2023-03-03

## 2023-03-03 RX ORDER — ONDANSETRON 2 MG/ML
8 INJECTION INTRAMUSCULAR; INTRAVENOUS
Status: CANCELLED | OUTPATIENT
Start: 2023-03-03

## 2023-03-03 RX ADMIN — DENOSUMAB 120 MG: 120 INJECTION SUBCUTANEOUS at 10:06

## 2023-03-03 NOTE — PROGRESS NOTES
Department of [facility] Med Oncology   Attending Clinic Note    Reason for Visit: Follow-up on a patient with Prostate Cancer    PCP:  Griselda Koch MD    History of Present Illness:  82 y/o male with High risk Prostate Cancer post XRT completion. On 01/12/2022, he completed 7920 cGy in 44 fractions directed to the prostate + proximal SV    Dr. Jonnie Fernandez for Urology at [facility], most recent visit 02/01/2022. The patient received Lupron injection #2 at this visit (planned for 36 months ADT)    The patient reports hospitalized at [facility] after ICD fired 02/18/2022. Admitted with ventricular tachyarrhythmia, taken for emergent VT ablation 02/18/2022 and ICD generator change 02/24/2022. Sustained VT post ablation, due to suspected sarcoidosis. Started on daily steroid. The patient was discharged 02/25/2022    Further review of outside records revealed a 1.6 cm ovoid faintly sclerotic lesion in the right humeral neck redemonstrated and indeterminate on XR Shoulder 2 view Right 12/14/2021 at [facility]    Referred to our clinic for further evaluation and treatment    PSA 20.86 on 03/18/2022  Testosterone <2.5 on 03/18/2022    NM Bone scan 03/30/2022 reviewed extensively with Dr. Merlyn Gottlieb from Radiology team; there is activity within the lateral aspect of the right humeral head which corresponds to the sclerotic lesion seen on CT humerus consistent with osseous metastatic disease    CT chest/abdomen/pelvis 03/30/2022 Sclerotic lesions in the right humeral head and the right 2nd rib suspicious for developing bone metastasis. Sclerotic lesions in the lumbar spine L2 and L3 as noted concerning for bone metastasis. CT right humerus 03/30/2022 14 x 12 x 10 mm sclerotic focus in the proximal humerus compatible with osteoblastic metastasis. Metastatic Prostate Cancer to Bone  Recommended XTANDI. Side effects reviewed. He agreed to proceed.    D/C Casodex 04/06/2022  Aileen Birch was started on 04/07/2022 with fair tolerance so far. Palliative RT to right proximal humerus was completed on 07/05/2022. [facility] 10/5-10/17/22 with increased lower extremity edema, falls, and debilitation. IV diuresis. History of LVAD HM III. Declined IPR and [facility] when [facility]. PSA 2.37 on 10/21/2022  PSA 1.32 on 11/18/2022  PSA 2.53 on 12/29/2022    Bone scan 12/29/2022 Findings compatible with degenerative changes. CT chest 12/29/2022 similar appearance of sclerotic areas osseous structures without evidence for disease progression or pathologic fracture. No soft tissue findings otherwise. CT abdomen/pelvis 12/29/2022: Sclerotic areas again noted lumbar spine unchanged from prior without progression or pathologic fracture. Stable partially visualized cystic structure in the distal pancreas likely a side branch IPMN in the absence of prior pancreatitis without interval change or progression. Encouraging results. Recommended to continue XTANDI and repeat scans in 3-4 months. CXR 01/20/2023: shallow lung volumes with probable bibasilar atelectasis. PSA 1.47 on 02/03/2023  Today 03/03/2023; No fever chills. Fair appetite. Fatigue. Shortness breath on exertion and LE edema. Hx of falls. He continues to tolerate XTANDI well. He will be starting PT/OT for lymphedema    Review of Systems;  CONSTITUTIONAL: No fever, chills. Fair appetite. Fatigue. ENMT: Eyes: No diplopia; Nose: No epistaxis. Mouth: No sore throat. RESPIRATORY: No hemoptysis. Shortness of breath on exertion. CARDIOVASCULAR: No chest pain. LE edema  GASTROINTESTINAL: No nausea/vomiting, abdominal pain. No diarrhea. GENITOURINARY: No hematuria  NEURO: No syncope, presyncope or headache.    Remainder:ROS NEGATIVE    Past Medical History:      Diagnosis Date    Anxiety     Aorto-iliac atherosclerosis ([address] 8/31/2020    Arthritis     Atrial fibrillation (HCC)     AVNRT (AV teddy re-entry tachycardia) ([address]     Blood type O+ 11/14/2019    CAD (coronary artery disease)     Cancer (HCC)     CHF (congestive heart failure) (HCC)     Diabetes mellitus ([facility]     Diabetic neuropathy ([facility])     Diastolic dysfunction 19/13/5646    stage 3    Fall     right knee    Gout     chemically induced    History of blood transfusion     Hx of blood clots 1976    left leg    Hyperlipidemia     Hypertension     LBBB (left bundle branch block)     Left ventricular assist device (LVAD) complication 1428    Moderate mitral regurgitation 04/19/2016    Nonischemic cardiomyopathy (HCC)     Obesity     SNEHA (obstructive sleep apnea)     treated with CPAP    PVD (peripheral vascular disease) with claudication ([facility] 8/31/2020    Sarcoidosis 2019    Severe tricuspid regurgitation 04/19/2016    SVT (supraventricular tachycardia) (HCC)     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 Allergies: Allergies   Allergen Reactions    Food Hives     Strawberries      Chlorhexidine Rash    Soap & Cleansers Rash     TIDE detergent    Strawberry (Diagnostic) Rash     Other reaction(s): Eruption, ITCHING,WATERING EYES     Physical Exam:  Pulse 65   Ht 5' 11\" (1.803 m)   Wt 236 lb (107 kg)   BMI 32.92 kg/m²   GENERAL: Alert, oriented x 3, tired  HEENT: PERRLA; EOMI. Oropharynx clear. LUNGS: Fair air entry bennie  CVS: RRR  EXTREMITIES: Without clubbing, cyanosis. LE edema  NEUROLOGIC: No focal deficits.    ECOG PS 2    Lab Results   Component Value Date    WBC 3.7 (L) 03/03/2023    HGB 8.9 (L) 03/03/2023    HCT 28.6 (L) 03/03/2023    MCV 92.6 03/03/2023     03/03/2023     Lab Results   Component Value Date     03/03/2023    K 4.6 03/03/2023     03/03/2023    CO2 27 03/03/2023    BUN 31 (H) 03/03/2023    CREATININE 1.5 (H) 03/03/2023    GLUCOSE 160 (H) 03/03/2023    CALCIUM 9.2 03/03/2023    PROT 7.0 03/03/2023    LABALBU 3.4 (L) 03/03/2023    BILITOT 0.3 03/03/2023    ALKPHOS 110 03/03/2023    AST 21 03/03/2023    ALT 14 03/03/2023 LABGLOM 50 03/03/2023    GFRAA >60 10/03/2022     Impression/Plan:  82 y/o male with High risk prostate cancer post XRT completion. On 01/12/2022, he completed 7920 cGy in 40 fractions directed to the prostate + proximal SV for management of high risk adenocarcinoma of the prostate    Dr. Jonnie Fernandez for Urology at [facility], most recent visit 02/01/2022. The patient received Lupron injection #2 at this visit (planned for 36 months ADT)    Further review of outside records revealed a 1.6 cm ovoid faintly sclerotic lesion in the right humeral neck redemonstrated and indeterminate on XR Shoulder 2 view Right 12/14/2021 at [facility]    Referred to our clinic for further evaluation and treatment    PSA 20.86 on 03/18/2022  Testosterone <2.5 on 03/18/2022    NM Bone scan 03/30/2022 reviewed extensively with Dr. Merlyn Gottlieb from Radiology team; there is activity within the lateral aspect of the right humeral head which corresponds to the sclerotic lesion seen on CT humerus consistent with osseous metastatic disease    CT chest/abdomen/pelvis 03/30/2022 Sclerotic lesions in the right humeral head and the right 2nd rib suspicious for developing bone metastasis. Sclerotic lesions in the lumbar spine L2 and L3 as noted concerning for bone metastasis. CT right humerus 03/30/2022 14 x 12 x 10 mm sclerotic focus in the proximal humerus compatible with osteoblastic metastasis. Metastatic Prostate Cancer to Bone  Recommended XTANDI. Side effects reviewed. He agreed to proceed. Casodex D/C on 04/06/2022  Aileen Birch was started on 04/07/2022 with fair tolerance so far. Palliative RT to right proximal humerus was completed on 07/05/2022. PSA 16.97 on 04/15/2022  Right Humerus X-Ray 06/21/2022 suspected blastic metastatic focus at the proximal humerus which is not appreciably changed since 03/30/2022. Imaging reviewed. PSA 4.07 on 07/01/2022  PSA 2.66 on 07/29/2022. PSA 3.10 on 08/26/2022.     Bone scan 08/31/2022 No evidence of metastatic bone disease or new lesion  CT chest 08/31/2022 stable CT of the chest with cardiomegaly with postoperative changes in the chest and coronary artery calcification. Persistent sclerotic lesions in the right first rib and humeral head without change. CT abdomen/pelvis 08/31/2022 stable CT of the abdomen pelvis with persistent sclerotic lesions on L1 and L2.  1.3 cm nonspecific hypodense area in the tail of the pancreas. [facility] 10/5-10/17/22 with increased lower extremity edema, falls, and debilitation. IV diuresis. History of LVAD  III. Declined IPR and discharged home with [address] when stable. PSA 2.37 on 10/21/2022  PSA 1.32 on 11/18/2022. PSA 2.53 on 12/29/2022. Bone scan 12/29/2022 Findings compatible with degenerative changes. CT chest 12/29/2022 similar appearance of sclerotic areas osseous structures without evidence for disease progression or pathologic fracture. No soft tissue findings otherwise. CT abdomen/pelvis 12/29/2022: Sclerotic areas again noted lumbar spine unchanged from prior without progression or pathologic fracture. Stable partially visualized cystic structure in the distal pancreas likely a side branch IPMN in the absence of prior pancreatitis without interval change or progression. Encouraging results. Recommended to continue XTANDI and repeat scans in 3-4 months. CXR 01/20/2023: shallow lung volumes with probable bibasilar atelectasis. Uriel LE U/S 01/23/2023: No evidence of DVT. Imaging reviewed. LE edema; start PT/OT this week  PSA 1.47 on 02/03/2023. Labs reviewed; ok to proceed.  Vinita Lopez ordered and given today 03/03/2023  Continue XTANDI    RTC 4 weeks with labs and Letty Pena q4 weeks given bone metastasis (labs reviewed; one ordered to be given today 03/03/2023)    Sam Castellanos MD   3/3/2023

## 2023-03-15 ENCOUNTER — HOSPITAL ENCOUNTER (OUTPATIENT)
Age: 73
Discharge: HOME | End: 2023-03-15
Payer: OTHER GOVERNMENT

## 2023-03-15 LAB
INR BLD: 2.7
PROTHROMBIN TIME: 30.5 SEC (ref 9.3–12.4)

## 2023-03-15 PROCEDURE — 36415 COLL VENOUS BLD VENIPUNCTURE: CPT

## 2023-03-15 PROCEDURE — 85610 PROTHROMBIN TIME: CPT

## 2023-03-28 ENCOUNTER — OFFICE VISIT (OUTPATIENT)
Dept: FAMILY MEDICINE CLINIC | Age: 73
End: 2023-03-28
Payer: OTHER GOVERNMENT

## 2023-03-28 VITALS — OXYGEN SATURATION: 97 % | HEART RATE: 113 BPM | TEMPERATURE: 97.7 F

## 2023-03-28 DIAGNOSIS — L97.512 DIABETIC ULCER OF TOE OF RIGHT FOOT ASSOCIATED WITH TYPE 2 DIABETES MELLITUS, WITH FAT LAYER EXPOSED (HCC): ICD-10-CM

## 2023-03-28 DIAGNOSIS — T82.7XXA INFECTION ASSOCIATED WITH DRIVELINE OF LEFT VENTRICULAR ASSIST DEVICE (LVAD) (HCC): ICD-10-CM

## 2023-03-28 DIAGNOSIS — F33.41 RECURRENT MAJOR DEPRESSIVE DISORDER, IN PARTIAL REMISSION (HCC): ICD-10-CM

## 2023-03-28 DIAGNOSIS — I50.21 ACUTE HFREF (HEART FAILURE WITH REDUCED EJECTION FRACTION) (HCC): Primary | ICD-10-CM

## 2023-03-28 DIAGNOSIS — C79.51 PROSTATE CANCER METASTATIC TO BONE (HCC): ICD-10-CM

## 2023-03-28 DIAGNOSIS — E11.59 TYPE 2 DIABETES MELLITUS WITH OTHER CIRCULATORY COMPLICATION, WITHOUT LONG-TERM CURRENT USE OF INSULIN (HCC): ICD-10-CM

## 2023-03-28 DIAGNOSIS — Z95.3 STATUS POST TRANSCATHETER AORTIC VALVE REPLACEMENT (TAVR) USING BIOPROSTHESIS: ICD-10-CM

## 2023-03-28 DIAGNOSIS — Z95.811 LVAD (LEFT VENTRICULAR ASSIST DEVICE) PRESENT (HCC): ICD-10-CM

## 2023-03-28 DIAGNOSIS — N18.30 STAGE 3 CHRONIC KIDNEY DISEASE, UNSPECIFIED WHETHER STAGE 3A OR 3B CKD (HCC): ICD-10-CM

## 2023-03-28 DIAGNOSIS — I73.9 PVD (PERIPHERAL VASCULAR DISEASE) WITH CLAUDICATION (HCC): ICD-10-CM

## 2023-03-28 DIAGNOSIS — E11.621 DIABETIC ULCER OF TOE OF RIGHT FOOT ASSOCIATED WITH TYPE 2 DIABETES MELLITUS, WITH FAT LAYER EXPOSED (HCC): ICD-10-CM

## 2023-03-28 DIAGNOSIS — I48.91 ATRIAL FIBRILLATION, UNSPECIFIED TYPE (HCC): ICD-10-CM

## 2023-03-28 DIAGNOSIS — C61 PROSTATE CANCER METASTATIC TO BONE (HCC): ICD-10-CM

## 2023-03-28 PROCEDURE — 1123F ACP DISCUSS/DSCN MKR DOCD: CPT | Performed by: FAMILY MEDICINE

## 2023-03-28 PROCEDURE — 99214 OFFICE O/P EST MOD 30 MIN: CPT | Performed by: FAMILY MEDICINE

## 2023-03-28 SDOH — ECONOMIC STABILITY: HOUSING INSECURITY
IN THE LAST 12 MONTHS, WAS THERE A TIME WHEN YOU DID NOT HAVE A STEADY PLACE TO SLEEP OR SLEPT IN A SHELTER (INCLUDING NOW)?: NO

## 2023-03-28 SDOH — ECONOMIC STABILITY: FOOD INSECURITY: WITHIN THE PAST 12 MONTHS, THE FOOD YOU BOUGHT JUST DIDN'T LAST AND YOU DIDN'T HAVE MONEY TO GET MORE.: NEVER TRUE

## 2023-03-28 SDOH — ECONOMIC STABILITY: INCOME INSECURITY: HOW HARD IS IT FOR YOU TO PAY FOR THE VERY BASICS LIKE FOOD, HOUSING, MEDICAL CARE, AND HEATING?: NOT HARD AT ALL

## 2023-03-28 SDOH — ECONOMIC STABILITY: FOOD INSECURITY: WITHIN THE PAST 12 MONTHS, YOU WORRIED THAT YOUR FOOD WOULD RUN OUT BEFORE YOU GOT MONEY TO BUY MORE.: NEVER TRUE

## 2023-03-28 ASSESSMENT — PATIENT HEALTH QUESTIONNAIRE - PHQ9
SUM OF ALL RESPONSES TO PHQ9 QUESTIONS 1 & 2: 5
SUM OF ALL RESPONSES TO PHQ QUESTIONS 1-9: 18
2. FEELING DOWN, DEPRESSED OR HOPELESS: 2
SUM OF ALL RESPONSES TO PHQ QUESTIONS 1-9: 18
4. FEELING TIRED OR HAVING LITTLE ENERGY: 3
6. FEELING BAD ABOUT YOURSELF - OR THAT YOU ARE A FAILURE OR HAVE LET YOURSELF OR YOUR FAMILY DOWN: 0
SUM OF ALL RESPONSES TO PHQ QUESTIONS 1-9: 18
7. TROUBLE CONCENTRATING ON THINGS, SUCH AS READING THE NEWSPAPER OR WATCHING TELEVISION: 1
8. MOVING OR SPEAKING SO SLOWLY THAT OTHER PEOPLE COULD HAVE NOTICED. OR THE OPPOSITE, BEING SO FIGETY OR RESTLESS THAT YOU HAVE BEEN MOVING AROUND A LOT MORE THAN USUAL: 3
3. TROUBLE FALLING OR STAYING ASLEEP: 3
1. LITTLE INTEREST OR PLEASURE IN DOING THINGS: 3
9. THOUGHTS THAT YOU WOULD BE BETTER OFF DEAD, OR OF HURTING YOURSELF: 0
5. POOR APPETITE OR OVEREATING: 3
SUM OF ALL RESPONSES TO PHQ QUESTIONS 1-9: 18
10. IF YOU CHECKED OFF ANY PROBLEMS, HOW DIFFICULT HAVE THESE PROBLEMS MADE IT FOR YOU TO DO YOUR WORK, TAKE CARE OF THINGS AT HOME, OR GET ALONG WITH OTHER PEOPLE: 3

## 2023-03-28 ASSESSMENT — LIFESTYLE VARIABLES
HOW OFTEN DO YOU HAVE A DRINK CONTAINING ALCOHOL: NEVER
HOW MANY STANDARD DRINKS CONTAINING ALCOHOL DO YOU HAVE ON A TYPICAL DAY: PATIENT DOES NOT DRINK

## 2023-03-28 NOTE — PROGRESS NOTES
vascular disease) with claudication ([facility]        7. Atrial fibrillation, unspecified type ([facility]        8. Stage 3 chronic kidney disease, unspecified whether stage 3a or 3b CKD ([facility]        9. 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 Status post transcatheter aortic valve replacement (TAVR) using bioprosthesis        11. Prostate cancer metastatic to bone [facility])              Medication changes per [place] and [facility]  Continue to follow      RTO: Return in about 1 year (around 3/28/2024) for chronic disease / routine f/u. An electronic signature was used to authenticate this note.  ---- Dahlia Doherty MD on 3/28/2023 at 4:51 PM      On this date 3/28/2023 I have spent 45 minutes reviewing previous notes, test results and face to face with the patient discussing the diagnosis and importance of compliance with the treatment plan as well as documenting on the day of the visit.

## 2023-03-31 ENCOUNTER — HOSPITAL ENCOUNTER (OUTPATIENT)
Age: 73
Discharge: HOME | End: 2023-03-31
Payer: MEDICARE

## 2023-03-31 ENCOUNTER — HOSPITAL ENCOUNTER (OUTPATIENT)
Dept: INFUSION THERAPY | Age: 73
Discharge: HOME | End: 2023-03-31

## 2023-03-31 DIAGNOSIS — C61 PROSTATE CANCER METASTATIC TO BONE (HCC): ICD-10-CM

## 2023-03-31 DIAGNOSIS — C79.51 PROSTATE CANCER METASTATIC TO BONE (HCC): ICD-10-CM

## 2023-03-31 LAB
ALBUMIN SERPL-MCNC: 3.7 G/DL (ref 3.5–5.2)
ALP SERPL-CCNC: 117 U/L (ref 40–129)
ALT SERPL-CCNC: 15 U/L (ref 0–40)
ANION GAP SERPL CALCULATED.3IONS-SCNC: 12 MMOL/L (ref 7–16)
ANISOCYTOSIS: ABNORMAL
AST SERPL-CCNC: 25 U/L (ref 0–39)
BASOPHILS # BLD: 0 E9/L (ref 0–0.2)
BASOPHILS NFR BLD: 0 % (ref 0–2)
BILIRUB SERPL-MCNC: 0.3 MG/DL (ref 0–1.2)
BUN SERPL-MCNC: 40 MG/DL (ref 6–23)
CALCIUM SERPL-MCNC: 9.5 MG/DL (ref 8.6–10.2)
CHLORIDE SERPL-SCNC: 95 MMOL/L (ref 98–107)
CO2 SERPL-SCNC: 29 MMOL/L (ref 22–29)
CREAT SERPL-MCNC: 1.4 MG/DL (ref 0.7–1.2)
EOSINOPHIL # BLD: 0.04 E9/L (ref 0.05–0.5)
EOSINOPHIL NFR BLD: 0.8 % (ref 0–6)
ERYTHROCYTE [DISTWIDTH] IN BLOOD BY AUTOMATED COUNT: 19.9 FL (ref 11.5–15)
GLUCOSE SERPL-MCNC: 188 MG/DL (ref 74–99)
HCT VFR BLD AUTO: 31.2 % (ref 37–54)
HGB BLD-MCNC: 9.6 G/DL (ref 12.5–16.5)
IMM GRANULOCYTES # BLD: 0.04 E9/L
IMM GRANULOCYTES NFR BLD: 0.8 % (ref 0–5)
INR BLD: 2.4
LYMPHOCYTES # BLD: 0.52 E9/L (ref 1.5–4)
LYMPHOCYTES NFR BLD: 10.3 % (ref 20–42)
MCH RBC QN AUTO: 29 PG (ref 26–35)
MCHC RBC AUTO-ENTMCNC: 30.8 % (ref 32–34.5)
MCV RBC AUTO: 94.3 FL (ref 80–99.9)
MONOCYTES # BLD: 0.44 E9/L (ref 0.1–0.95)
MONOCYTES NFR BLD: 8.7 % (ref 2–12)
NEUTROPHILS # BLD: 3.99 E9/L (ref 1.8–7.3)
NEUTS SEG NFR BLD: 79.4 % (ref 43–80)
OVALOCYTES: ABNORMAL
PLATELET # BLD AUTO: 150 E9/L (ref 130–450)
PMV BLD AUTO: 10 FL (ref 7–12)
POIKILOCYTES: ABNORMAL
POLYCHROMASIA: ABNORMAL
POTASSIUM SERPL-SCNC: 4.1 MMOL/L (ref 3.5–5)
PROT SERPL-MCNC: 8 G/DL (ref 6.4–8.3)
PROTHROMBIN TIME: 27.1 SEC (ref 9.3–12.4)
PSA SERPL-MCNC: 1.23 NG/ML (ref 0–4)
RBC # BLD AUTO: 3.31 E12/L (ref 3.8–5.8)
SODIUM SERPL-SCNC: 136 MMOL/L (ref 132–146)
TEAR DROP CELLS: ABNORMAL
WBC # BLD: 5 E9/L (ref 4.5–11.5)

## 2023-03-31 PROCEDURE — 85025 COMPLETE CBC W/AUTO DIFF WBC: CPT

## 2023-03-31 PROCEDURE — 85610 PROTHROMBIN TIME: CPT

## 2023-03-31 PROCEDURE — 80053 COMPREHEN METABOLIC PANEL: CPT

## 2023-03-31 PROCEDURE — 36415 COLL VENOUS BLD VENIPUNCTURE: CPT

## 2023-03-31 PROCEDURE — 84153 ASSAY OF PSA TOTAL: CPT

## 2023-04-10 ENCOUNTER — HOSPITAL ENCOUNTER (OUTPATIENT)
Dept: INFUSION THERAPY | Age: 73
Discharge: HOME | End: 2023-04-10
Payer: OTHER GOVERNMENT

## 2023-04-10 DIAGNOSIS — C61 PROSTATE CANCER METASTATIC TO BONE (HCC): Primary | ICD-10-CM

## 2023-04-10 DIAGNOSIS — C79.51 PROSTATE CANCER METASTATIC TO BONE (HCC): Primary | ICD-10-CM

## 2023-04-10 PROCEDURE — 6360000002 HC RX W HCPCS: Performed by: INTERNAL MEDICINE

## 2023-04-10 PROCEDURE — 96372 THER/PROPH/DIAG INJ SC/IM: CPT

## 2023-04-10 RX ADMIN — DENOSUMAB 120 MG: 120 INJECTION SUBCUTANEOUS at 15:19

## 2023-05-12 ENCOUNTER — HOSPITAL ENCOUNTER (OUTPATIENT)
Dept: INFUSION THERAPY | Age: 73
Discharge: HOME | End: 2023-05-12
Payer: OTHER GOVERNMENT

## 2023-05-12 ENCOUNTER — OFFICE VISIT (OUTPATIENT)
Dept: ONCOLOGY | Age: 73
End: 2023-05-12
Payer: MEDICARE

## 2023-05-12 ENCOUNTER — HOSPITAL ENCOUNTER (OUTPATIENT)
Age: 73
Discharge: HOME | End: 2023-05-12
Payer: MEDICARE

## 2023-05-12 VITALS
HEIGHT: 71 IN | OXYGEN SATURATION: 95 % | HEART RATE: 110 BPM | BODY MASS INDEX: 28.28 KG/M2 | WEIGHT: 202 LBS | TEMPERATURE: 98 F

## 2023-05-12 DIAGNOSIS — C79.51 PROSTATE CANCER METASTATIC TO BONE (HCC): ICD-10-CM

## 2023-05-12 DIAGNOSIS — C61 PROSTATE CANCER METASTATIC TO BONE (HCC): Primary | ICD-10-CM

## 2023-05-12 DIAGNOSIS — K62.5 RECTAL BLEEDING: Primary | ICD-10-CM

## 2023-05-12 DIAGNOSIS — C61 PROSTATE CANCER METASTATIC TO BONE (HCC): ICD-10-CM

## 2023-05-12 DIAGNOSIS — C79.51 PROSTATE CANCER METASTATIC TO BONE (HCC): Primary | ICD-10-CM

## 2023-05-12 LAB
ALBUMIN SERPL-MCNC: 3.7 G/DL (ref 3.5–5.2)
ALP SERPL-CCNC: 125 U/L (ref 40–129)
ALT SERPL-CCNC: 17 U/L (ref 0–40)
ANION GAP SERPL CALCULATED.3IONS-SCNC: 10 MMOL/L (ref 7–16)
ANISOCYTOSIS: ABNORMAL
AST SERPL-CCNC: 25 U/L (ref 0–39)
BASOPHILS # BLD: 0 E9/L (ref 0–0.2)
BASOPHILS NFR BLD: 0 % (ref 0–2)
BILIRUB SERPL-MCNC: 0.3 MG/DL (ref 0–1.2)
BUN SERPL-MCNC: 31 MG/DL (ref 6–23)
CALCIUM SERPL-MCNC: 9.3 MG/DL (ref 8.6–10.2)
CHLORIDE SERPL-SCNC: 101 MMOL/L (ref 98–107)
CO2 SERPL-SCNC: 28 MMOL/L (ref 22–29)
CREAT SERPL-MCNC: 1.3 MG/DL (ref 0.7–1.2)
EOSINOPHIL # BLD: 0.04 E9/L (ref 0.05–0.5)
EOSINOPHIL NFR BLD: 1 % (ref 0–6)
ERYTHROCYTE [DISTWIDTH] IN BLOOD BY AUTOMATED COUNT: 17.6 FL (ref 11.5–15)
GLUCOSE SERPL-MCNC: 219 MG/DL (ref 74–99)
HCT VFR BLD AUTO: 32.5 % (ref 37–54)
HGB BLD-MCNC: 10 G/DL (ref 12.5–16.5)
IMM GRANULOCYTES # BLD: 0.03 E9/L
IMM GRANULOCYTES NFR BLD: 0.7 % (ref 0–5)
INR BLD: 2.9
LYMPHOCYTES # BLD: 0.54 E9/L (ref 1.5–4)
LYMPHOCYTES NFR BLD: 13.2 % (ref 20–42)
MCH RBC QN AUTO: 29.7 PG (ref 26–35)
MCHC RBC AUTO-ENTMCNC: 30.8 % (ref 32–34.5)
MCV RBC AUTO: 96.4 FL (ref 80–99.9)
MONOCYTES # BLD: 0.48 E9/L (ref 0.1–0.95)
MONOCYTES NFR BLD: 11.7 % (ref 2–12)
NEUTROPHILS # BLD: 3 E9/L (ref 1.8–7.3)
NEUTS SEG NFR BLD: 73.4 % (ref 43–80)
PLATELET # BLD AUTO: 165 E9/L (ref 130–450)
PMV BLD AUTO: 11 FL (ref 7–12)
POLYCHROMASIA: ABNORMAL
POTASSIUM SERPL-SCNC: 4.6 MMOL/L (ref 3.5–5)
PROT SERPL-MCNC: 7.3 G/DL (ref 6.4–8.3)
PROTHROMBIN TIME: 33.2 SEC (ref 9.3–12.4)
PSA SERPL-MCNC: 0.93 NG/ML (ref 0–4)
RBC # BLD AUTO: 3.37 E12/L (ref 3.8–5.8)
SODIUM SERPL-SCNC: 139 MMOL/L (ref 132–146)
WBC # BLD: 4.1 E9/L (ref 4.5–11.5)

## 2023-05-12 PROCEDURE — 96372 THER/PROPH/DIAG INJ SC/IM: CPT

## 2023-05-12 PROCEDURE — G8427 DOCREV CUR MEDS BY ELIG CLIN: HCPCS | Performed by: INTERNAL MEDICINE

## 2023-05-12 PROCEDURE — 1036F TOBACCO NON-USER: CPT | Performed by: INTERNAL MEDICINE

## 2023-05-12 PROCEDURE — 36415 COLL VENOUS BLD VENIPUNCTURE: CPT

## 2023-05-12 PROCEDURE — 85610 PROTHROMBIN TIME: CPT

## 2023-05-12 PROCEDURE — G8417 CALC BMI ABV UP PARAM F/U: HCPCS | Performed by: INTERNAL MEDICINE

## 2023-05-12 PROCEDURE — 85025 COMPLETE CBC W/AUTO DIFF WBC: CPT

## 2023-05-12 PROCEDURE — 99214 OFFICE O/P EST MOD 30 MIN: CPT | Performed by: INTERNAL MEDICINE

## 2023-05-12 PROCEDURE — 1123F ACP DISCUSS/DSCN MKR DOCD: CPT | Performed by: INTERNAL MEDICINE

## 2023-05-12 PROCEDURE — 6360000002 HC RX W HCPCS: Performed by: INTERNAL MEDICINE

## 2023-05-12 PROCEDURE — 3017F COLORECTAL CA SCREEN DOC REV: CPT | Performed by: INTERNAL MEDICINE

## 2023-05-12 PROCEDURE — 80053 COMPREHEN METABOLIC PANEL: CPT

## 2023-05-12 PROCEDURE — 84153 ASSAY OF PSA TOTAL: CPT

## 2023-05-12 RX ORDER — ACETAMINOPHEN 325 MG/1
650 TABLET ORAL
OUTPATIENT
Start: 2023-05-12

## 2023-05-12 RX ORDER — ONDANSETRON 2 MG/ML
8 INJECTION INTRAMUSCULAR; INTRAVENOUS
OUTPATIENT
Start: 2023-05-12

## 2023-05-12 RX ORDER — EPINEPHRINE 1 MG/ML
0.3 INJECTION, SOLUTION, CONCENTRATE INTRAVENOUS PRN
OUTPATIENT
Start: 2023-05-12

## 2023-05-12 RX ORDER — DIPHENHYDRAMINE HYDROCHLORIDE 50 MG/ML
50 INJECTION INTRAMUSCULAR; INTRAVENOUS
OUTPATIENT
Start: 2023-05-12

## 2023-05-12 RX ORDER — FAMOTIDINE 10 MG/ML
20 INJECTION, SOLUTION INTRAVENOUS
OUTPATIENT
Start: 2023-05-12

## 2023-05-12 RX ORDER — SODIUM CHLORIDE 9 MG/ML
INJECTION, SOLUTION INTRAVENOUS CONTINUOUS
OUTPATIENT
Start: 2023-05-12

## 2023-05-12 RX ORDER — MEPERIDINE HYDROCHLORIDE 50 MG/ML
12.5 INJECTION INTRAMUSCULAR; INTRAVENOUS; SUBCUTANEOUS PRN
OUTPATIENT
Start: 2023-05-12

## 2023-05-12 RX ORDER — ALBUTEROL SULFATE 90 UG/1
4 AEROSOL, METERED RESPIRATORY (INHALATION) PRN
OUTPATIENT
Start: 2023-05-12

## 2023-05-12 RX ADMIN — DENOSUMAB 120 MG: 120 INJECTION SUBCUTANEOUS at 15:28

## 2023-05-12 NOTE — PROGRESS NOTES
Department of [facility] On[facility]   Attending Clinic Note    Reason for Visit: Follow-up on a patient with Prostate Cancer    PCP:  Quinten Flood MD    History of Present Illness:  82 y/o male with High risk Prostate Cancer post XRT completion. On 01/12/2022, he completed 7920 cGy in 44 fractions directed to the prostate + proximal SV    Dr. Harshal Rincon for Urology at [facility], most recent visit 02/01/2022. The patient received Lupron injection #2 at this visit (planned for 36 months ADT)    The patient reports hospitalized at [facility] after ICD fired 02/18/2022. Admitted with ventricular tachyarrhythmia, taken for emergent VT ablation 02/18/2022 and ICD generator change 02/24/2022. Sustained VT post ablation, due to suspected sarcoidosis. Started on daily steroid. The patient was discharged 02/25/2022    Further review of outside records revealed a 1.6 cm ovoid faintly sclerotic lesion in the right humeral neck redemonstrated and indeterminate on XR Shoulder 2 view Right 12/14/2021 at [facility]    Referred to our clinic for further evaluation and treatment    PSA 20.86 on 03/18/2022  Testosterone <2.5 on 03/18/2022    NM Bone scan 03/30/2022 reviewed extensively with Dr. Stefani Sharma from Radiology team; there is activity within the lateral aspect of the right humeral head which corresponds to the sclerotic lesion seen on CT humerus consistent with osseous metastatic disease    CT chest/abdomen/pelvis 03/30/2022 Sclerotic lesions in the right humeral head and the right 2nd rib suspicious for developing bone metastasis. Sclerotic lesions in the lumbar spine L2 and L3 as noted concerning for bone metastasis. CT right humerus 03/30/2022 14 x 12 x 10 mm sclerotic focus in the proximal humerus compatible with osteoblastic metastasis. Metastatic Prostate Cancer to Bone  Recommended XTANDI. Side effects reviewed. He agreed to proceed.    D/C Casodex 04/06/2022  Ailyn Rang was started on 04/07/2022 with fair

## 2023-05-30 ENCOUNTER — HOSPITAL ENCOUNTER (OUTPATIENT)
Age: 73
Discharge: HOME | End: 2023-05-30
Payer: MEDICARE

## 2023-05-30 DIAGNOSIS — C61 PROSTATE CANCER METASTATIC TO BONE (HCC): ICD-10-CM

## 2023-05-30 DIAGNOSIS — C79.51 PROSTATE CANCER METASTATIC TO BONE (HCC): ICD-10-CM

## 2023-05-30 LAB
ALBUMIN SERPL-MCNC: 3.5 G/DL (ref 3.5–5.2)
ALP SERPL-CCNC: 133 U/L (ref 40–129)
ALT SERPL-CCNC: 24 U/L (ref 0–40)
ANION GAP SERPL CALCULATED.3IONS-SCNC: 10 MMOL/L (ref 7–16)
ANISOCYTOSIS: ABNORMAL
AST SERPL-CCNC: 36 U/L (ref 0–39)
BASOPHILS # BLD: 0.01 E9/L (ref 0–0.2)
BASOPHILS NFR BLD: 0.3 % (ref 0–2)
BILIRUB SERPL-MCNC: 0.3 MG/DL (ref 0–1.2)
BUN SERPL-MCNC: 35 MG/DL (ref 6–23)
CALCIUM SERPL-MCNC: 9.4 MG/DL (ref 8.6–10.2)
CHLORIDE SERPL-SCNC: 100 MMOL/L (ref 98–107)
CO2 SERPL-SCNC: 29 MMOL/L (ref 22–29)
CREAT SERPL-MCNC: 1.6 MG/DL (ref 0.7–1.2)
EOSINOPHIL # BLD: 0.03 E9/L (ref 0.05–0.5)
EOSINOPHIL NFR BLD: 0.8 % (ref 0–6)
ERYTHROCYTE [DISTWIDTH] IN BLOOD BY AUTOMATED COUNT: 16.5 FL (ref 11.5–15)
GLUCOSE SERPL-MCNC: 184 MG/DL (ref 74–99)
HCT VFR BLD AUTO: 37.6 % (ref 37–54)
HGB BLD-MCNC: 11.9 G/DL (ref 12.5–16.5)
IMM GRANULOCYTES # BLD: 0.03 E9/L
IMM GRANULOCYTES NFR BLD: 0.8 % (ref 0–5)
INR BLD: 5.5
LYMPHOCYTES # BLD: 0.59 E9/L (ref 1.5–4)
LYMPHOCYTES NFR BLD: 16.4 % (ref 20–42)
MCH RBC QN AUTO: 29.9 PG (ref 26–35)
MCHC RBC AUTO-ENTMCNC: 31.6 % (ref 32–34.5)
MCV RBC AUTO: 94.5 FL (ref 80–99.9)
MONOCYTES # BLD: 0.36 E9/L (ref 0.1–0.95)
MONOCYTES NFR BLD: 10 % (ref 2–12)
NEUTROPHILS # BLD: 2.57 E9/L (ref 1.8–7.3)
NEUTS SEG NFR BLD: 71.7 % (ref 43–80)
OVALOCYTES: ABNORMAL
PLATELET # BLD AUTO: 156 E9/L (ref 130–450)
PMV BLD AUTO: 10.9 FL (ref 7–12)
POIKILOCYTES: ABNORMAL
POLYCHROMASIA: ABNORMAL
POTASSIUM SERPL-SCNC: 4.7 MMOL/L (ref 3.5–5)
PROT SERPL-MCNC: 7.8 G/DL (ref 6.4–8.3)
PROTHROMBIN TIME: 60 SEC (ref 9.3–12.4)
PSA SERPL-MCNC: 1.49 NG/ML (ref 0–4)
RBC # BLD AUTO: 3.98 E12/L (ref 3.8–5.8)
SODIUM SERPL-SCNC: 139 MMOL/L (ref 132–146)
TEAR DROP CELLS: ABNORMAL
WBC # BLD: 3.6 E9/L (ref 4.5–11.5)

## 2023-05-30 PROCEDURE — 84153 ASSAY OF PSA TOTAL: CPT

## 2023-05-30 PROCEDURE — 36415 COLL VENOUS BLD VENIPUNCTURE: CPT

## 2023-05-30 PROCEDURE — 85610 PROTHROMBIN TIME: CPT

## 2023-05-30 PROCEDURE — 85025 COMPLETE CBC W/AUTO DIFF WBC: CPT

## 2023-05-30 PROCEDURE — 80053 COMPREHEN METABOLIC PANEL: CPT

## 2023-06-09 ENCOUNTER — OFFICE VISIT (OUTPATIENT)
Dept: ONCOLOGY | Age: 73
End: 2023-06-09
Payer: MEDICARE

## 2023-06-09 ENCOUNTER — HOSPITAL ENCOUNTER (OUTPATIENT)
Dept: INFUSION THERAPY | Age: 73
Discharge: HOME | End: 2023-06-09
Payer: MEDICARE

## 2023-06-09 VITALS — BODY MASS INDEX: 25.94 KG/M2 | WEIGHT: 186 LBS | TEMPERATURE: 98.1 F | OXYGEN SATURATION: 97 %

## 2023-06-09 DIAGNOSIS — C61 PROSTATE CANCER METASTATIC TO BONE (HCC): Primary | ICD-10-CM

## 2023-06-09 DIAGNOSIS — C79.51 PROSTATE CANCER METASTATIC TO BONE (HCC): Primary | ICD-10-CM

## 2023-06-09 PROCEDURE — 6360000002 HC RX W HCPCS: Performed by: INTERNAL MEDICINE

## 2023-06-09 PROCEDURE — G8428 CUR MEDS NOT DOCUMENT: HCPCS | Performed by: INTERNAL MEDICINE

## 2023-06-09 PROCEDURE — 1036F TOBACCO NON-USER: CPT | Performed by: INTERNAL MEDICINE

## 2023-06-09 PROCEDURE — 3017F COLORECTAL CA SCREEN DOC REV: CPT | Performed by: INTERNAL MEDICINE

## 2023-06-09 PROCEDURE — 99214 OFFICE O/P EST MOD 30 MIN: CPT | Performed by: INTERNAL MEDICINE

## 2023-06-09 PROCEDURE — 1123F ACP DISCUSS/DSCN MKR DOCD: CPT | Performed by: INTERNAL MEDICINE

## 2023-06-09 PROCEDURE — G8417 CALC BMI ABV UP PARAM F/U: HCPCS | Performed by: INTERNAL MEDICINE

## 2023-06-09 RX ORDER — ALBUTEROL SULFATE 90 UG/1
4 AEROSOL, METERED RESPIRATORY (INHALATION) PRN
OUTPATIENT
Start: 2023-06-09

## 2023-06-09 RX ORDER — FAMOTIDINE 10 MG/ML
20 INJECTION, SOLUTION INTRAVENOUS
OUTPATIENT
Start: 2023-06-09

## 2023-06-09 RX ORDER — DIPHENHYDRAMINE HYDROCHLORIDE 50 MG/ML
50 INJECTION INTRAMUSCULAR; INTRAVENOUS
OUTPATIENT
Start: 2023-06-09

## 2023-06-09 RX ORDER — MEPERIDINE HYDROCHLORIDE 50 MG/ML
12.5 INJECTION INTRAMUSCULAR; INTRAVENOUS; SUBCUTANEOUS PRN
OUTPATIENT
Start: 2023-06-09

## 2023-06-09 RX ORDER — SODIUM CHLORIDE 9 MG/ML
INJECTION, SOLUTION INTRAVENOUS CONTINUOUS
OUTPATIENT
Start: 2023-06-09

## 2023-06-09 RX ORDER — ACETAMINOPHEN 325 MG/1
650 TABLET ORAL
OUTPATIENT
Start: 2023-06-09

## 2023-06-09 RX ORDER — ONDANSETRON 2 MG/ML
8 INJECTION INTRAMUSCULAR; INTRAVENOUS
OUTPATIENT
Start: 2023-06-09

## 2023-06-09 RX ORDER — EPINEPHRINE 1 MG/ML
0.3 INJECTION, SOLUTION, CONCENTRATE INTRAVENOUS PRN
OUTPATIENT
Start: 2023-06-09

## 2023-06-09 RX ADMIN — DENOSUMAB 120 MG: 120 INJECTION SUBCUTANEOUS at 15:22

## 2023-06-09 NOTE — PROGRESS NOTES
Department of [facility] Med Oncology   Attending Clinic Note    Reason for Visit: Follow-up on a patient with Prostate Cancer    PCP:  Eyal Bella MD    History of Present Illness:  82 y/o male with High risk Prostate Cancer post XRT completion. On 01/12/2022, he completed 7920 cGy in 44 fractions directed to the prostate + proximal SV    Dr. Camilo Finley for Urology at [facility], most recent visit 02/01/2022. The patient received Lupron injection #2 at this visit (planned for 36 months ADT)    The patient reports hospitalized at [facility] after ICD fired 02/18/2022. Admitted with ventricular tachyarrhythmia, taken for emergent VT ablation 02/18/2022 and ICD generator change 02/24/2022. Sustained VT post ablation, due to suspected sarcoidosis. Started on daily steroid. The patient was discharged 02/25/2022    Further review of outside records revealed a 1.6 cm ovoid faintly sclerotic lesion in the right humeral neck redemonstrated and indeterminate on XR Shoulder 2 view Right 12/14/2021 at [facility]    Referred to our clinic for further evaluation and treatment    PSA 20.86 on 03/18/2022  Testosterone <2.5 on 03/18/2022    NM Bone scan 03/30/2022 reviewed extensively with Dr. Dick Noonan from Radiology team; there is activity within the lateral aspect of the right humeral head which corresponds to the sclerotic lesion seen on CT humerus consistent with osseous metastatic disease    CT chest/abdomen/pelvis 03/30/2022 Sclerotic lesions in the right humeral head and the right 2nd rib suspicious for developing bone metastasis. Sclerotic lesions in the lumbar spine L2 and L3 as noted concerning for bone metastasis. CT right humerus 03/30/2022 14 x 12 x 10 mm sclerotic focus in the proximal humerus compatible with osteoblastic metastasis. Metastatic Prostate Cancer to Bone  Recommended XTANDI. Side effects reviewed. He agreed to proceed.    D/C Casodex 04/06/2022  Luly Castañeda was started on 04/07/2022 with fair

## 2023-06-22 LAB
BACTERIA BLD CULT ORG #2: NORMAL
BACTERIA BLD CULT: NORMAL

## 2023-06-28 ENCOUNTER — HOSPITAL ENCOUNTER (OUTPATIENT)
Dept: NUCLEAR MEDICINE | Age: 73
Discharge: HOME | End: 2023-06-28
Attending: INTERNAL MEDICINE
Payer: OTHER GOVERNMENT

## 2023-06-28 ENCOUNTER — HOSPITAL ENCOUNTER (OUTPATIENT)
Age: 73
Discharge: HOME | End: 2023-06-28
Attending: INTERNAL MEDICINE
Payer: OTHER GOVERNMENT

## 2023-06-28 ENCOUNTER — HOSPITAL ENCOUNTER (OUTPATIENT)
Dept: CT IMAGING | Age: 73
Discharge: HOME | End: 2023-06-30
Attending: INTERNAL MEDICINE
Payer: OTHER GOVERNMENT

## 2023-06-28 DIAGNOSIS — C61 PROSTATE CANCER METASTATIC TO BONE (HCC): ICD-10-CM

## 2023-06-28 DIAGNOSIS — C79.51 PROSTATE CANCER METASTATIC TO BONE (HCC): ICD-10-CM

## 2023-06-28 LAB
ALBUMIN SERPL-MCNC: 3.4 G/DL (ref 3.5–5.2)
ALP SERPL-CCNC: 126 U/L (ref 40–129)
ALT SERPL-CCNC: 31 U/L (ref 0–40)
ANION GAP SERPL CALCULATED.3IONS-SCNC: 9 MMOL/L (ref 7–16)
ANISOCYTOSIS: ABNORMAL
AST SERPL-CCNC: 44 U/L (ref 0–39)
BASOPHILS # BLD: 0.01 E9/L (ref 0–0.2)
BASOPHILS NFR BLD: 0.3 % (ref 0–2)
BILIRUB SERPL-MCNC: 0.5 MG/DL (ref 0–1.2)
BUN SERPL-MCNC: 27 MG/DL (ref 6–23)
CALCIUM SERPL-MCNC: 9.4 MG/DL (ref 8.6–10.2)
CHLORIDE SERPL-SCNC: 98 MMOL/L (ref 98–107)
CO2 SERPL-SCNC: 27 MMOL/L (ref 22–29)
CREAT SERPL-MCNC: 1.6 MG/DL (ref 0.7–1.2)
EOSINOPHIL # BLD: 0.02 E9/L (ref 0.05–0.5)
EOSINOPHIL NFR BLD: 0.5 % (ref 0–6)
ERYTHROCYTE [DISTWIDTH] IN BLOOD BY AUTOMATED COUNT: 16.7 FL (ref 11.5–15)
GLUCOSE SERPL-MCNC: 111 MG/DL (ref 74–99)
HBA1C MFR BLD: 7 % (ref 4–5.6)
HCT VFR BLD AUTO: 36.9 % (ref 37–54)
HGB BLD-MCNC: 12 G/DL (ref 12.5–16.5)
IMM GRANULOCYTES # BLD: 0.03 E9/L
IMM GRANULOCYTES NFR BLD: 0.8 % (ref 0–5)
INR BLD: 3.5
LDH SERPL-CCNC: 392 U/L (ref 135–225)
LYMPHOCYTES # BLD: 0.53 E9/L (ref 1.5–4)
LYMPHOCYTES NFR BLD: 14.6 % (ref 20–42)
MCH RBC QN AUTO: 30.8 PG (ref 26–35)
MCHC RBC AUTO-ENTMCNC: 32.5 % (ref 32–34.5)
MCV RBC AUTO: 94.9 FL (ref 80–99.9)
MONOCYTES # BLD: 0.34 E9/L (ref 0.1–0.95)
MONOCYTES NFR BLD: 9.3 % (ref 2–12)
NEUTROPHILS # BLD: 2.71 E9/L (ref 1.8–7.3)
NEUTS SEG NFR BLD: 74.5 % (ref 43–80)
PLATELET # BLD AUTO: 151 E9/L (ref 130–450)
PMV BLD AUTO: 10.5 FL (ref 7–12)
POLYCHROMASIA: ABNORMAL
POTASSIUM SERPL-SCNC: 4.7 MMOL/L (ref 3.5–5)
PREALB SERPL-MCNC: 19 MG/DL (ref 20–40)
PROT SERPL-MCNC: 7.2 G/DL (ref 6.4–8.3)
PROTHROMBIN TIME: 37.9 SEC (ref 9.3–12.4)
RBC # BLD AUTO: 3.89 E12/L (ref 3.8–5.8)
SCHISTOCYTES: ABNORMAL
SODIUM SERPL-SCNC: 134 MMOL/L (ref 132–146)
WBC # BLD: 3.6 E9/L (ref 4.5–11.5)

## 2023-06-28 PROCEDURE — 78306 BONE IMAGING WHOLE BODY: CPT | Performed by: INTERNAL MEDICINE

## 2023-06-28 PROCEDURE — 74177 CT ABD & PELVIS W/CONTRAST: CPT

## 2023-06-28 PROCEDURE — 80053 COMPREHEN METABOLIC PANEL: CPT

## 2023-06-28 PROCEDURE — 6360000004 HC RX CONTRAST MEDICATION: Performed by: RADIOLOGY

## 2023-06-28 PROCEDURE — 85025 COMPLETE CBC W/AUTO DIFF WBC: CPT

## 2023-06-28 PROCEDURE — 83036 HEMOGLOBIN GLYCOSYLATED A1C: CPT

## 2023-06-28 PROCEDURE — 84134 ASSAY OF PREALBUMIN: CPT

## 2023-06-28 PROCEDURE — 3430000000 HC RX DIAGNOSTIC RADIOPHARMACEUTICAL: Performed by: RADIOLOGY

## 2023-06-28 PROCEDURE — A9503 TC99M MEDRONATE: HCPCS | Performed by: RADIOLOGY

## 2023-06-28 PROCEDURE — 83615 LACTATE (LD) (LDH) ENZYME: CPT

## 2023-06-28 PROCEDURE — 71260 CT THORAX DX C+: CPT

## 2023-06-28 PROCEDURE — 36415 COLL VENOUS BLD VENIPUNCTURE: CPT

## 2023-06-28 PROCEDURE — 85610 PROTHROMBIN TIME: CPT

## 2023-06-28 RX ORDER — TC 99M MEDRONATE 20 MG/10ML
25 INJECTION, POWDER, LYOPHILIZED, FOR SOLUTION INTRAVENOUS
Status: COMPLETED | OUTPATIENT
Start: 2023-06-28 | End: 2023-06-28

## 2023-06-28 RX ADMIN — IOPAMIDOL 18 ML: 755 INJECTION, SOLUTION INTRAVENOUS at 10:45

## 2023-06-28 RX ADMIN — TC 99M MEDRONATE 25 MILLICURIE: 20 INJECTION, POWDER, LYOPHILIZED, FOR SOLUTION INTRAVENOUS at 09:50

## 2023-06-28 RX ADMIN — IOPAMIDOL 75 ML: 755 INJECTION, SOLUTION INTRAVENOUS at 10:45

## 2023-07-05 ENCOUNTER — HOSPITAL ENCOUNTER (OUTPATIENT)
Age: 73
Discharge: HOME | End: 2023-07-05
Payer: OTHER GOVERNMENT

## 2023-07-05 DIAGNOSIS — C61 PROSTATE CANCER METASTATIC TO BONE (HCC): ICD-10-CM

## 2023-07-05 DIAGNOSIS — C79.51 PROSTATE CANCER METASTATIC TO BONE (HCC): ICD-10-CM

## 2023-07-05 LAB — PSA SERPL-MCNC: 1.28 NG/ML (ref 0–4)

## 2023-07-05 PROCEDURE — 36415 COLL VENOUS BLD VENIPUNCTURE: CPT

## 2023-07-05 PROCEDURE — 84153 ASSAY OF PSA TOTAL: CPT

## 2023-07-07 ENCOUNTER — HOSPITAL ENCOUNTER (OUTPATIENT)
Dept: INFUSION THERAPY | Age: 73
Discharge: HOME | End: 2023-07-07

## 2023-07-19 ENCOUNTER — TELEPHONE (OUTPATIENT)
Dept: ONCOLOGY | Age: 73
End: 2023-07-19

## 2023-07-19 NOTE — TELEPHONE ENCOUNTER
07/19/23 Patient canceled via MyChart: Patient Comments: I am going to [facility] to the ER to be admitted. Not going to be able to keep this appointment again. So sorry for this inconvenience.   Ugo Lu, [phone]

## 2023-07-21 ENCOUNTER — HOSPITAL ENCOUNTER (OUTPATIENT)
Dept: INFUSION THERAPY | Age: 73
Discharge: HOME | End: 2023-07-21

## 2023-07-21 ENCOUNTER — TELEPHONE (OUTPATIENT)
Dept: ONCOLOGY | Age: 73
End: 2023-07-21

## 2023-07-21 NOTE — TELEPHONE ENCOUNTER
This nurse received a call from Randy Adrian from [facility] related to patient's wife concerns. Patient is admitted with dehydration. Wife is concerned that him not receiving X-geva of Ruby Goo while in hospital would cause the symptoms of nausea, dizziness. After talking with Dr. Maryjane Craig, it was determined that the symptoms are related to dehydration and not the patient's treatment. Voicemail left for [place], [place] related to the above.

## 2023-08-07 LAB
ABSOLUTE IMMATURE GRANULOCYTE: <0.03 K/UL (ref 0–0.58)
ALBUMIN SERPL-MCNC: 2.9 G/DL (ref 3.5–5.2)
ALP BLD-CCNC: 101 U/L (ref 40–129)
ALT SERPL-CCNC: 16 U/L (ref 0–40)
ANION GAP SERPL CALCULATED.3IONS-SCNC: 13 MMOL/L (ref 7–16)
AST SERPL-CCNC: 28 U/L (ref 0–39)
BASOPHILS ABSOLUTE: 0.01 K/UL (ref 0–0.2)
BASOPHILS RELATIVE PERCENT: 0 % (ref 0–2)
BILIRUB SERPL-MCNC: 0.3 MG/DL (ref 0–1.2)
BUN BLDV-MCNC: 15 MG/DL (ref 6–23)
CALCIUM SERPL-MCNC: 8.7 MG/DL (ref 8.6–10.2)
CHLORIDE BLD-SCNC: 109 MMOL/L (ref 98–107)
CO2: 22 MMOL/L (ref 22–29)
CREAT SERPL-MCNC: 1 MG/DL (ref 0.7–1.2)
EOSINOPHILS ABSOLUTE: 0.05 K/UL (ref 0.05–0.5)
EOSINOPHILS RELATIVE PERCENT: 2 % (ref 0–6)
GFR SERPL CREATININE-BSD FRML MDRD: >60 ML/MIN/1.73M2
GLUCOSE BLD-MCNC: 87 MG/DL (ref 74–99)
HCT VFR BLD CALC: 27.2 % (ref 37–54)
HEMOGLOBIN: 8.7 G/DL (ref 12.5–16.5)
IMMATURE GRANULOCYTES: 0 % (ref 0–5)
LYMPHOCYTES ABSOLUTE: 0.43 K/UL (ref 1.5–4)
LYMPHOCYTES RELATIVE PERCENT: 14 % (ref 20–42)
MCH RBC QN AUTO: 32.2 PG (ref 26–35)
MCHC RBC AUTO-ENTMCNC: 32 G/DL (ref 32–34.5)
MCV RBC AUTO: 100.7 FL (ref 80–99.9)
MONOCYTES ABSOLUTE: 0.47 K/UL (ref 0.1–0.95)
MONOCYTES RELATIVE PERCENT: 15 % (ref 2–12)
NEUTROPHILS ABSOLUTE: 2.21 K/UL (ref 1.8–7.3)
NEUTROPHILS RELATIVE PERCENT: 70 % (ref 43–80)
PDW BLD-RTO: 19.7 % (ref 11.5–15)
PLATELET # BLD: 149 K/UL (ref 130–450)
PMV BLD AUTO: 10.8 FL (ref 7–12)
POTASSIUM SERPL-SCNC: 3.8 MMOL/L (ref 3.5–5)
RBC # BLD: 2.7 M/UL (ref 3.8–5.8)
RBC # BLD: ABNORMAL 10*6/UL
SODIUM BLD-SCNC: 144 MMOL/L (ref 132–146)
TOTAL PROTEIN: 6.1 G/DL (ref 6.4–8.3)
VANCOMYCIN TROUGH DATE LAST DOSE: NORMAL
VANCOMYCIN TROUGH DOSE AMOUNT: NORMAL
VANCOMYCIN TROUGH TIME LAST DOSE: NORMAL
VANCOMYCIN TROUGH: 16 UG/ML (ref 5–16)
WBC # BLD: 3.2 K/UL (ref 4.5–11.5)

## 2023-08-16 ENCOUNTER — TELEMEDICINE (OUTPATIENT)
Dept: ONCOLOGY | Age: 73
End: 2023-08-16
Payer: MEDICARE

## 2023-08-16 DIAGNOSIS — C79.51 PROSTATE CANCER METASTATIC TO BONE (HCC): Primary | ICD-10-CM

## 2023-08-16 DIAGNOSIS — C61 PROSTATE CANCER METASTATIC TO BONE (HCC): Primary | ICD-10-CM

## 2023-08-16 PROCEDURE — 1123F ACP DISCUSS/DSCN MKR DOCD: CPT | Performed by: INTERNAL MEDICINE

## 2023-08-16 PROCEDURE — G8427 DOCREV CUR MEDS BY ELIG CLIN: HCPCS | Performed by: INTERNAL MEDICINE

## 2023-08-16 PROCEDURE — 1036F TOBACCO NON-USER: CPT | Performed by: INTERNAL MEDICINE

## 2023-08-16 PROCEDURE — 3017F COLORECTAL CA SCREEN DOC REV: CPT | Performed by: INTERNAL MEDICINE

## 2023-08-16 PROCEDURE — 99213 OFFICE O/P EST LOW 20 MIN: CPT | Performed by: INTERNAL MEDICINE

## 2023-08-16 PROCEDURE — G8417 CALC BMI ABV UP PARAM F/U: HCPCS | Performed by: INTERNAL MEDICINE

## 2023-08-16 NOTE — PROGRESS NOTES
Department of [facility] Med Oncology   Attending Clinic Note    Mr. Bre Mckenzie is a an Established patient, presenting virtually for evaluation of the following: Prostate Ca     The patient was evaluated through a synchronous (real-time) audio-video encounter. The patient is aware that this is a billable service, which includes applicable co-pays. This Virtual Visit was conducted with patient'sconsent. Patient identification was verified, and a caregiver was present when appropriate. The patient was located at Home: [address]  Provider was located at [facility] (Appt Dept): [address]  [place],  [address]       Reason for Visit: Follow-up on a patient with Prostate Cancer    PCP:  King Hitesh MD    History of Present Illness:  82 y/o male with High risk Prostate Cancer post XRT completion. On 01/12/2022, he completed 7920 cGy in 44 fractions directed to the prostate + proximal SV    Dr. Brandi Alberto for Urology at [facility], most recent visit 02/01/2022. The patient received Lupron injection #2 at this visit (planned for 36 months ADT)    The patient reports hospitalized at [facility] after ICD fired 02/18/2022. Admitted with ventricular tachyarrhythmia, taken for emergent VT ablation 02/18/2022 and ICD generator change 02/24/2022. Sustained VT post ablation, due to suspected sarcoidosis. Started on daily steroid.  The patient was discharged 02/25/2022    Further review of outside records revealed a 1.6 cm ovoid faintly sclerotic lesion in the right humeral neck redemonstrated and indeterminate on XR Shoulder 2 view Right 12/14/2021 at [facility]    Referred to our clinic for further evaluation and treatment    PSA 20.86 on 03/18/2022  Testosterone <2.5 on 03/18/2022    NM Bone scan 03/30/2022 reviewed extensively with Dr. Chip Babin from Radiology team; there is activity within the lateral aspect of the right humeral head which corresponds to the sclerotic lesion

## 2023-09-05 DIAGNOSIS — C61 PROSTATE CANCER METASTATIC TO BONE (HCC): ICD-10-CM

## 2023-09-05 DIAGNOSIS — C79.51 PROSTATE CANCER METASTATIC TO BONE (HCC): ICD-10-CM

## 2023-09-05 RX ORDER — ENZALUTAMIDE 80 MG/1
160 TABLET ORAL DAILY
Qty: 60 TABLET | Refills: 3 | Status: ACTIVE | OUTPATIENT
Start: 2023-09-05

## 2023-11-02 NOTE — PROGRESS NOTES
[N18.3] 09/18/2019    Chronic systolic heart failure ([facility] [address] [I50.22] 12/08/2015     Plan:  · To have stress test tomorrow  · Placed on high dose SSI for diabetes. Holding home U500 Insulin R due to hypoglycemia  · Continue home medications for HTN, CHF, gout, HLD, atrial fibrillation  · EP on board for atrial fibrillation  · Lifestyle modification for obesity    · Body mass index is 44.24 kg/m². · DVT Prophylaxis  · Eliquis    · Diet  · DIET CARDIAC;  · Diet NPO, After Midnight    · Code Status  · Full Code    · PT/OT Eval Status  · NA     · Disposition  · Likely home at [facility] [address] - Chief Hospitalist  Please contact me through perfect serve    NOTE: This report was transcribed using voice recognition software. Every effort was made to ensure accuracy; however, inadvertent computerized transcription errors may be present. Billing Type: Third-Party Bill

## 2024-12-03 NOTE — PROGRESS NOTES
"Samuel Mims is a 51 y.o. female on day 17 of admission presenting with Sepsis with encephalopathy without septic shock, due to unspecified organism (Multi).    Subjective   Patient continues to be very restless, impulsive, and confused.  Continues to be unsafe without continuous monitoring with a sitter.  At midnight she fell out of bed as the bed alarm was switched off by mistake.       Objective     Last Recorded Vitals  /76 (BP Location: Right arm, Patient Position: Lying)   Pulse 103   Temp 36.2 °C (97.2 °F) (Temporal)   Resp 18   Wt 109 kg (240 lb 11.9 oz)   SpO2 96%   Intake/Output last 3 Shifts:    Intake/Output Summary (Last 24 hours) at 12/3/2024 0800  Last data filed at 12/2/2024 2000  Gross per 24 hour   Intake 420 ml   Output --   Net 420 ml       Admission Weight  Weight: 127 kg (280 lb) (11/16/24 1755)    Daily Weight  12/01/24 : 109 kg (240 lb 11.9 oz)      Physical Exam:  General: Not in acute distress  HEENT: PERRLA, Left TM indicates recent otitis media, Right TM full of fluid.  Neck: Normal to inspection  Lungs: Clear to auscultation, work of breathing within normal limit  Cardiac: Regular rate and rhythm  Abdomen: Soft nontender, positive bowel sounds  : Exam deferred  Skin: Perianal erythema with clear demarcation.  Hematology: No petechia or excessive ecchymosis  Musculoskeletal: Without significant trauma.  Neurological:  Alert to person and place.  \"I feel hung over though I didn't drink any alcohol\" she states.    Relevant Results  Scheduled medications  budesonide, 0.5 mg, nebulization, BID  divalproex sprinkle, 125 mg, oral, q12h SANTIAGO  influenza, 0.5 mL, intramuscular, During hospitalization  formoterol, 20 mcg, nebulization, BID  haloperidol lactate, 5 mg, intramuscular, Once  heparin (porcine), 5,000 Units, subcutaneous, q8h SANTIAGO  losartan, 50 mg, oral, BID  metoprolol tartrate, 50 mg, oral, q12h  multivitamin with minerals, 1 tablet, oral, Daily  nystatin, , Topical, " Patient Active Problem List   Diagnosis    AVNRT (AV teddy re-entry tachycardia) ([facility]    DM (diabetes mellitus), type 2, uncontrolled ([facility]    HTN (hypertension), benign    Hyperlipidemia    SNEHA (obstructive sleep apnea)    Depression    NICM (nonischemic cardiomyopathy) ([facility]    Renal insufficiency    Biventricular implantable cardioverter-defibrillator in situ    Chronic systolic CHF (congestive heart failure) ([facility])    PAF (paroxysmal atrial fibrillation) ([facility])    CHF (NYHA class III, ACC/AHA stage C) ([facility])    Gout       Current Outpatient Prescriptions   Medication Sig Dispense Refill    sertraline (ZOLOFT) 100 MG tablet Take 100 mg by mouth daily      ELIQUIS 5 MG TABS tablet TAKE 1 TABLET TWICE A  tablet 3    VILAZODONE HCL 10 MG Tablet TAKE ONE TABLET BY MOUTH EVERY DAY  2    metoprolol succinate (TOPROL XL) 100 MG extended release tablet TAKE 1 TABLET TWICE A  tablet 3    dofetilide (TIKOSYN) 250 MCG capsule TAKE 1 CAPSULE TWICE A  capsule 3    torsemide (DEMADEX) 20 MG tablet Take 20 mg by mouth daily      furosemide (LASIX) 40 MG tablet Take 1 tablet by mouth 2 times daily 30 tablet 0    famotidine (PEPCID) 20 MG tablet Take 1 tablet by mouth daily 60 tablet 3    metolazone (ZAROXOLYN) 5 MG tablet TAKE ONE TABLET BY MOUTH DAILY (TAKE 1 TABLET BY MOUTH ONE-HALF HOUR BEFORE MORNING LASIX DOSE ON 09/21 AND 09/22 ONLY. ) 10 tablet 0    spironolactone (ALDACTONE) 25 MG tablet Take 1 tablet by mouth daily 90 tablet 3    allopurinol (ZYLOPRIM) 100 MG tablet Take 200 mg by mouth daily       colchicine (COLCRYS) 0.6 MG tablet Take 0.6 mg by mouth three times a week Monday Wednesday Friday.       gabapentin (NEURONTIN) 100 MG capsule Take 1 capsule by mouth 3 times daily 90 capsule 3    busPIRone (BUSPAR) 15 MG tablet Take 15 mg by mouth daily       HUMULIN R 500 UNIT/ML injection See Admin Instructions @ 0700 150 units  @ 1500 120 units  @ 2300 30 units      BID  pantoprazole, 40 mg, oral, BID AC  spironolactone, 12.5 mg, oral, Daily  thiamine, 100 mg, oral, Daily      Continuous medications     PRN medications  PRN medications: acetaminophen **OR** acetaminophen, albuterol, [Held by provider] clonazePAM, haloperidol, hydrALAZINE, melatonin, metoprolol, risperiDONE   Results for orders placed or performed during the hospital encounter of 11/16/24 (from the past 24 hours)   CBC   Result Value Ref Range    WBC 6.5 4.4 - 11.3 x10*3/uL    nRBC 0.0 0.0 - 0.0 /100 WBCs    RBC 3.49 (L) 4.00 - 5.20 x10*6/uL    Hemoglobin 10.2 (L) 12.0 - 16.0 g/dL    Hematocrit 31.6 (L) 36.0 - 46.0 %    MCV 91 80 - 100 fL    MCH 29.2 26.0 - 34.0 pg    MCHC 32.3 32.0 - 36.0 g/dL    RDW 18.1 (H) 11.5 - 14.5 %    Platelets 248 150 - 450 x10*3/uL   Basic Metabolic Panel   Result Value Ref Range    Glucose 144 (H) 74 - 99 mg/dL    Sodium 137 136 - 145 mmol/L    Potassium 3.9 3.5 - 5.3 mmol/L    Chloride 103 98 - 107 mmol/L    Bicarbonate 25 21 - 32 mmol/L    Anion Gap 13 10 - 20 mmol/L    Urea Nitrogen 18 6 - 23 mg/dL    Creatinine 0.85 0.50 - 1.05 mg/dL    eGFR 84 >60 mL/min/1.73m*2    Calcium 9.3 8.6 - 10.3 mg/dL      Lab Results   Component Value Date    BLOODCULT No growth at 4 days -  FINAL REPORT 11/16/2024    BLOODCULT No growth at 4 days -  FINAL REPORT 11/16/2024    URINECULTURE (A) 11/16/2024     Multiple organisms present, probable contamination. Repeat culture if clinically indicated.         Assessment/Plan   Samuel Mims is a 51 y.o. female on day 17 of admission presenting with Sepsis with encephalopathy without septic shock, due to unspecified organism (Multi).  Principal Problem:    Sepsis with encephalopathy without septic shock, due to unspecified organism (Multi)  Active Problems:    Diarrhea    UTI (urinary tract infection)    Hypertension    Colitis    Sinus tachycardia    Prolonged Q-T interval on ECG    Clostridium difficile infection    Metabolic encephalopathy-  isosorbide mononitrate (IMDUR) 30 MG CR tablet Take 1 tablet by mouth 2 times daily. 30 tablet 3    hydrALAZINE (APRESOLINE) 100 MG tablet Take 1 tablet by mouth every 8 hours. 90 tablet 3    potassium chloride SA (K-DUR;KLOR-CON M) 20 MEQ tablet Take 1 tablet by mouth daily. 30 tablet 11    magnesium oxide (MAG-OX) 400 (240 MG) MG tablet Take 1 tablet by mouth daily. 30 tablet 6    digoxin (LANOXIN) 125 MCG tablet Take 1 tablet by mouth daily. 30 tablet 3    atorvastatin (LIPITOR) 20 MG tablet Take 20 mg by mouth nightly        No current facility-administered medications for this visit. CC:    Patient is seen in evaluation  for:  1. Acute on chronic combined systolic and diastolic CHF (congestive heart failure) (HCC)     2. Cardiomyopathy (HCC)  EKG 12 Lead   3. PAF (paroxysmal atrial fibrillation) ([facility] [address]     4. Mixed hyperlipidemia     5. ICD (implantable cardioverter-defibrillator) in place         HPI:  Patient is seen for chronic systolic CHF. No chest pain, lightheadedness or dizziness. No unusual shortness of breath. Patient is tolerating medications well without side effects. Is compliant with follow-up at [address] [place] [address].      ROS:   General: No weight loss, positive change in exercise tolerance  Skin: No rash or itching  EENT: No vision changes or nosebleeds  Cardiovascular: No  orthopnea and paroxysmal nocturnal dyspnea  Respiratory: No cough or hemoptysis  Gastrointestinal: No hematemesis or recent changes in bowel habits  Genitourinary: No hematuria, urgency or frequency  Musculoskeletal: No muscular weakness or joint swelling   Neurologic / Psychiatric: No incoordination or convulsions  Allergic / Immunologic/ Lymphatic / Endocrine: No anemia or bleeding tendency    Social History     Social History    Marital status:      Spouse name: N/A    Number of children: N/A    Years of education: N/A     Occupational History    [employer]      [place]; medical discharge    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          Social History Main Topics    Smoking status: Former Smoker     Types: Cigars     Quit date: 1/1/1980    Smokeless tobacco: Never Used      Comment: Quit smoking in 1980    Alcohol use No    Drug use: No    Sexual activity: Not Currently     Other Topics Concern    Not on file     Social History Narrative    No narrative on file       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 tachycardia) ([facility]     CAD (coronary artery disease)     CHF (congestive heart failure) ([facility]     [facility]     Diabetic neuropathy ([facility]     Diastolic dysfunction 02/55/7656    stage 3    Fall     right knee    Gout     chemically induced    History of blood transfusion     Hx of blood clots 1976    left leg    Hyperlipidemia     Hypertension     LBBB (left bundle branch block)     Moderate mitral regurgitation 04/19/2016    Nonischemic cardiomyopathy ([facility]     Obesity     SNEHA (obstructive sleep apnea)     treated with CPAP    Severe tricuspid regurgitation 04/19/2016    SVT [facility]supraventricular tachycardia) [facility]     Viral cardiomyopathy ([facility])        PHYSICAL EXAM:  CONSTITUTIONAL:  Well developed, well nourished    Vitals:    03/28/18 0907   BP: 136/84   Pulse: 67   Resp: 16   Weight: (!) 340 lb (154.2 kg)   Height: 5' 11\" (1.803 m)     HEAD & FACE: Normocephalic. Symmetric. EYES: No xanthelasma. Conjunctivae not injected. EARS, NOSE, MOUTH & THROAT: Good dentition. No oral pallor or cyanosis. NECK: No JVD at 30 degrees. No thyromegaly. RESPIRATORY: Clear. No use of accessory muscle or intercostal retractions. CARDIOVASCULAR: Regular rate and rhythm. No lifts or thrills on palpitation. Auscultation with normal S1 and paradoxical S2 in splitting. No carotid bruits. Abdominal aorta not enlarged. Femoral arteries without bruits. Pedal pulses 2+. multifactorial, possible Wernicke's, possible hepatic encephalopathy   Possibly due to significant weight loss of 50+ pounds w/poor nutritional intake, less likely PRES from poorly treated HTN.     Initial CT head with no acute changes. Urine Tox screen negative.  No elevated WBC count.  Ammonia mildly elevated at 59. Blood cultures are negative X2. On Lactulose for mildly elevated ammonia levels.  Corpak inserted due to poor oral intake but removed when she became alert on 11/21.  On 11/19/24 MRI head of poor quality due to motion artifact but show increased signal within the medial thalami, periaqueductal region and possible increased signal within the caudate and cortex of the bilateral posterior frontal lobes.  Started on high dose IV Thiamine and MVI for possible Wernicke's, changed to oral Thiamine on 11/25.  Thiamine level low at 33.  IV Acyclovir discontinued as CSF negative for HSV infection.  Had Lumbar Puncture on 11/20 and results negative for viruses and bacteria.  Some improvement in her alertness since 11/21/24, but not able to answer questions or follow directions.  Sitter in room since 11/25 as fell out of bed.  MRI with anesthesia completed 11/25- results are non specific white matter changes.  Consult psychiatry recommended Haldol 5 mg q 6 hours prn for agitation and Risperdal 0.5 mg at bedtime for insomnia for 5 nights.  Ammonia level has dropped from 59 to 19 on 11/28/24 so the Lactulose discontinued.  On 11/29 she fell out of bed fortunately sitter was at bedside and broke the fall.  She continues to be episodes of lucidity alternating with drowsiness and confusion, showing severe cognitive impairment.  Very restless requiring Haldol 1 mg every 6 hours to calm her down. Depakote 125 mg bid added last night, unclear for what purpose.      Untreated C. Difficile Colitis  Per  she has not taken the oral Vancomycin prescribed at discharge on 11/2/24.   Stool for pathogens negative.  C. Diff  PCR negative but since untreated this could be false negative.  Treated with oral Vancomycin for 10 days,  discontinued on 11/27.  CT a/p confirms she continues to have colitis.  Loose stools/diarrhea due to lactulose.  Lactulose discontinued when ammonia levels dropped from 59 to 19 on 11/28/24.        HTN and Sinus Tachycardia  On oral Losartan 50 mg, Metoprolol tartrate 50 mg bid, Spironolactone 12.5 mg  Blood pressure mildly elevated and continues to be tachycardic off and on.     Asthma  Budesonide 0.5 mg and Formoterol nebs bid.     CHET  Buspirone 10 mg bid on hold.  Klonopin on hold due to encephalopathy     GERD  Oral Pantoprazole 40 mg bid.     Recent Otitis Media  Ears show fluid probably causing some hearing impairment.  Refer to ENT as out patient for possible hearing aids.     Discharge planning  Per  unable to find accepting acute rehab facility with her insurance.  She will need cognitive rehab, PT and OT evaluation for driving safety and capacity to return to work.   has a Medicaid number now so hopefully we can find a SNF that accepts medicaid, she is unable to participate in 3 hours of therapy that acute rehab requires.  Patient waiting for placement at this time.                  Nell Green MD